# Patient Record
Sex: FEMALE | Race: WHITE | Employment: OTHER | URBAN - NONMETROPOLITAN AREA
[De-identification: names, ages, dates, MRNs, and addresses within clinical notes are randomized per-mention and may not be internally consistent; named-entity substitution may affect disease eponyms.]

---

## 2017-01-04 RX ORDER — ANASTROZOLE 1 MG/1
1 TABLET ORAL DAILY
Qty: 30 TABLET | Refills: 5 | Status: SHIPPED | OUTPATIENT
Start: 2017-01-04 | End: 2017-03-21 | Stop reason: SDUPTHER

## 2017-01-13 ENCOUNTER — CARE COORDINATION (OUTPATIENT)
Dept: PRIMARY CARE CLINIC | Age: 72
End: 2017-01-13

## 2017-01-23 ENCOUNTER — HOSPITAL ENCOUNTER (OUTPATIENT)
Dept: PAIN MANAGEMENT | Age: 72
Discharge: HOME | End: 2017-01-23
Payer: MEDICARE

## 2017-01-26 ENCOUNTER — TELEPHONE (OUTPATIENT)
Dept: PAIN MANAGEMENT | Age: 72
End: 2017-01-26

## 2017-01-31 ENCOUNTER — HOSPITAL ENCOUNTER (OUTPATIENT)
Dept: PAIN MANAGEMENT | Age: 72
Discharge: HOME | End: 2017-01-31
Payer: MEDICARE

## 2017-01-31 VITALS
OXYGEN SATURATION: 91 % | WEIGHT: 263 LBS | HEART RATE: 78 BPM | DIASTOLIC BLOOD PRESSURE: 44 MMHG | RESPIRATION RATE: 18 BRPM | TEMPERATURE: 97.4 F | HEIGHT: 62 IN | BODY MASS INDEX: 48.4 KG/M2 | SYSTOLIC BLOOD PRESSURE: 119 MMHG

## 2017-01-31 DIAGNOSIS — M25.50 ARTHRALGIA, UNSPECIFIED JOINT: ICD-10-CM

## 2017-01-31 DIAGNOSIS — G89.29 CHRONIC BILATERAL LOW BACK PAIN WITHOUT SCIATICA: ICD-10-CM

## 2017-01-31 DIAGNOSIS — G57.93 NEUROPATHY INVOLVING BOTH LOWER EXTREMITIES: ICD-10-CM

## 2017-01-31 DIAGNOSIS — M47.816 LUMBAR FACET ARTHROPATHY: ICD-10-CM

## 2017-01-31 DIAGNOSIS — M54.50 CHRONIC BILATERAL LOW BACK PAIN WITHOUT SCIATICA: ICD-10-CM

## 2017-01-31 PROCEDURE — 80307 DRUG TEST PRSMV CHEM ANLYZR: CPT

## 2017-01-31 PROCEDURE — 99213 OFFICE O/P EST LOW 20 MIN: CPT

## 2017-01-31 RX ORDER — HYDROCODONE BITARTRATE AND ACETAMINOPHEN 10; 325 MG/1; MG/1
1 TABLET ORAL DAILY PRN
Qty: 30 TABLET | Refills: 0 | Status: SHIPPED | OUTPATIENT
Start: 2017-02-08 | End: 2017-02-23 | Stop reason: SDUPTHER

## 2017-01-31 ASSESSMENT — ACTIVITIES OF DAILY LIVING (ADL): EFFECT OF PAIN ON DAILY ACTIVITIES: DAILY ACTIVITY

## 2017-01-31 ASSESSMENT — PAIN SCALES - GENERAL: PAINLEVEL_OUTOF10: 6

## 2017-01-31 ASSESSMENT — PAIN DESCRIPTION - ORIENTATION: ORIENTATION: RIGHT;LEFT;LOWER

## 2017-01-31 ASSESSMENT — PAIN DESCRIPTION - PROGRESSION: CLINICAL_PROGRESSION: NOT CHANGED

## 2017-01-31 ASSESSMENT — PAIN DESCRIPTION - FREQUENCY: FREQUENCY: CONTINUOUS

## 2017-01-31 ASSESSMENT — PAIN DESCRIPTION - DESCRIPTORS: DESCRIPTORS: ACHING;BURNING;CONSTANT

## 2017-01-31 ASSESSMENT — PAIN DESCRIPTION - ONSET: ONSET: ON-GOING

## 2017-01-31 ASSESSMENT — PAIN DESCRIPTION - LOCATION: LOCATION: BACK;NECK;ARM

## 2017-01-31 ASSESSMENT — PAIN DESCRIPTION - PAIN TYPE: TYPE: CHRONIC PAIN

## 2017-02-02 ENCOUNTER — CARE COORDINATION (OUTPATIENT)
Dept: CARE COORDINATION | Age: 72
End: 2017-02-02

## 2017-02-09 ENCOUNTER — CARE COORDINATION (OUTPATIENT)
Dept: PRIMARY CARE CLINIC | Age: 72
End: 2017-02-09

## 2017-02-09 ENCOUNTER — TELEPHONE (OUTPATIENT)
Dept: PRIMARY CARE CLINIC | Age: 72
End: 2017-02-09

## 2017-02-14 ENCOUNTER — OFFICE VISIT (OUTPATIENT)
Dept: PRIMARY CARE CLINIC | Age: 72
End: 2017-02-14
Payer: MEDICARE

## 2017-02-14 ENCOUNTER — CARE COORDINATOR VISIT (OUTPATIENT)
Dept: PRIMARY CARE CLINIC | Age: 72
End: 2017-02-14

## 2017-02-14 VITALS
BODY MASS INDEX: 48.4 KG/M2 | SYSTOLIC BLOOD PRESSURE: 128 MMHG | RESPIRATION RATE: 16 BRPM | HEIGHT: 62 IN | WEIGHT: 263 LBS | OXYGEN SATURATION: 96 % | HEART RATE: 74 BPM | DIASTOLIC BLOOD PRESSURE: 86 MMHG

## 2017-02-14 DIAGNOSIS — E11.8 TYPE 2 DIABETES MELLITUS WITH COMPLICATION, UNSPECIFIED LONG TERM INSULIN USE STATUS: Primary | ICD-10-CM

## 2017-02-14 PROCEDURE — 3017F COLORECTAL CA SCREEN DOC REV: CPT | Performed by: FAMILY MEDICINE

## 2017-02-14 PROCEDURE — G8427 DOCREV CUR MEDS BY ELIG CLIN: HCPCS | Performed by: FAMILY MEDICINE

## 2017-02-14 PROCEDURE — 1036F TOBACCO NON-USER: CPT | Performed by: FAMILY MEDICINE

## 2017-02-14 PROCEDURE — 4040F PNEUMOC VAC/ADMIN/RCVD: CPT | Performed by: FAMILY MEDICINE

## 2017-02-14 PROCEDURE — 1123F ACP DISCUSS/DSCN MKR DOCD: CPT | Performed by: FAMILY MEDICINE

## 2017-02-14 PROCEDURE — 99213 OFFICE O/P EST LOW 20 MIN: CPT | Performed by: FAMILY MEDICINE

## 2017-02-14 PROCEDURE — G8399 PT W/DXA RESULTS DOCUMENT: HCPCS | Performed by: FAMILY MEDICINE

## 2017-02-14 PROCEDURE — 3045F PR MOST RECENT HEMOGLOBIN A1C LEVEL 7.0-9.0%: CPT | Performed by: FAMILY MEDICINE

## 2017-02-14 PROCEDURE — 3014F SCREEN MAMMO DOC REV: CPT | Performed by: FAMILY MEDICINE

## 2017-02-14 PROCEDURE — G8417 CALC BMI ABV UP PARAM F/U: HCPCS | Performed by: FAMILY MEDICINE

## 2017-02-14 PROCEDURE — 1090F PRES/ABSN URINE INCON ASSESS: CPT | Performed by: FAMILY MEDICINE

## 2017-02-14 PROCEDURE — G8482 FLU IMMUNIZE ORDER/ADMIN: HCPCS | Performed by: FAMILY MEDICINE

## 2017-02-14 RX ORDER — ERGOCALCIFEROL 1.25 MG/1
CAPSULE ORAL
Qty: 12 CAPSULE | Refills: 3 | Status: SHIPPED | OUTPATIENT
Start: 2017-02-14 | End: 2017-03-03 | Stop reason: SDUPTHER

## 2017-02-14 ASSESSMENT — ENCOUNTER SYMPTOMS
SHORTNESS OF BREATH: 0
COUGH: 0
COLOR CHANGE: 0

## 2017-02-15 DIAGNOSIS — C50.412 MALIGNANT NEOPLASM OF UPPER-OUTER QUADRANT OF LEFT FEMALE BREAST (HCC): Primary | ICD-10-CM

## 2017-02-16 ENCOUNTER — OFFICE VISIT (OUTPATIENT)
Dept: ONCOLOGY | Facility: CLINIC | Age: 72
End: 2017-02-16

## 2017-02-16 ENCOUNTER — LAB (OUTPATIENT)
Dept: ONCOLOGY | Facility: CLINIC | Age: 72
End: 2017-02-16

## 2017-02-16 VITALS
HEIGHT: 67 IN | OXYGEN SATURATION: 95 % | SYSTOLIC BLOOD PRESSURE: 130 MMHG | HEART RATE: 77 BPM | BODY MASS INDEX: 40.65 KG/M2 | TEMPERATURE: 98.2 F | DIASTOLIC BLOOD PRESSURE: 74 MMHG | RESPIRATION RATE: 18 BRPM | WEIGHT: 259 LBS

## 2017-02-16 DIAGNOSIS — C50.412 MALIGNANT NEOPLASM OF UPPER-OUTER QUADRANT OF LEFT FEMALE BREAST (HCC): Primary | ICD-10-CM

## 2017-02-16 LAB
ALBUMIN SERPL-MCNC: 4.3 G/DL (ref 3.5–5)
ALBUMIN/GLOB SERPL: 1.2 G/DL
ALP SERPL-CCNC: 85 U/L (ref 38–126)
ALT SERPL W P-5'-P-CCNC: 41 U/L (ref 9–52)
ANION GAP SERPL CALCULATED.3IONS-SCNC: 14 MMOL/L
AST SERPL-CCNC: 31 U/L (ref 5–40)
AUTO MIXED CELLS #: 0.6 10*3/UL (ref 0.1–1.5)
AUTO MIXED CELLS %: 7.4 % (ref 0.2–15.1)
BILIRUB SERPL-MCNC: 0.6 MG/DL (ref 0.2–1.3)
BUN BLD-MCNC: 14 MG/DL (ref 7–26)
BUN/CREAT SERPL: 20 (ref 7–25)
CALCIUM SPEC-SCNC: 9.4 MG/DL (ref 8.4–10.2)
CHLORIDE SERPL-SCNC: 98 MMOL/L (ref 98–107)
CO2 SERPL-SCNC: 27 MMOL/L (ref 22–30)
CREAT BLD-MCNC: 0.7 MG/DL (ref 0.7–1.4)
ERYTHROCYTE [DISTWIDTH] IN BLOOD BY AUTOMATED COUNT: 16.4 % (ref 11.5–14.5)
GFR SERPL CREATININE-BSD FRML MDRD: 83 ML/MIN/1.73
GLOBULIN UR ELPH-MCNC: 3.6 GM/DL
GLUCOSE BLD-MCNC: 148 MG/DL (ref 75–110)
HCT VFR BLD AUTO: 41.9 % (ref 37–47)
HGB BLD-MCNC: 13.3 G/DL (ref 12–16)
LYMPHOCYTES # BLD AUTO: 3 10*3/MM3 (ref 0.8–7)
LYMPHOCYTES NFR BLD AUTO: 35.4 % (ref 10–58.5)
MCH RBC QN AUTO: 26.3 PG (ref 27–31)
MCHC RBC AUTO-ENTMCNC: 31.7 G/DL (ref 33–37)
MCV RBC AUTO: 83 FL (ref 81–99)
NEUTROPHILS # BLD AUTO: 4.9 10*3/MM3 (ref 2–7.8)
NEUTROPHILS NFR BLD AUTO: 57.2 % (ref 37–92)
PLATELET # BLD AUTO: 293 10*3/MM3 (ref 130–400)
PMV BLD AUTO: 8.5 FL (ref 6–12)
POTASSIUM BLD-SCNC: 4.6 MMOL/L (ref 3.6–5)
PROT SERPL-MCNC: 7.9 G/DL (ref 6.3–8.2)
RBC # BLD AUTO: 5.05 10*6/MM3 (ref 4.2–5.4)
SODIUM BLD-SCNC: 139 MMOL/L (ref 137–145)
WBC NRBC COR # BLD: 8.5 10*3/MM3 (ref 4.8–10.8)

## 2017-02-16 PROCEDURE — 36415 COLL VENOUS BLD VENIPUNCTURE: CPT | Performed by: INTERNAL MEDICINE

## 2017-02-16 PROCEDURE — 99214 OFFICE O/P EST MOD 30 MIN: CPT | Performed by: INTERNAL MEDICINE

## 2017-02-16 PROCEDURE — 80053 COMPREHEN METABOLIC PANEL: CPT | Performed by: INTERNAL MEDICINE

## 2017-02-16 PROCEDURE — 85025 COMPLETE CBC W/AUTO DIFF WBC: CPT | Performed by: INTERNAL MEDICINE

## 2017-02-21 ENCOUNTER — CARE COORDINATION (OUTPATIENT)
Dept: PRIMARY CARE CLINIC | Age: 72
End: 2017-02-21

## 2017-02-23 ENCOUNTER — TELEPHONE (OUTPATIENT)
Dept: PRIMARY CARE CLINIC | Age: 72
End: 2017-02-23

## 2017-02-23 ENCOUNTER — CARE COORDINATOR VISIT (OUTPATIENT)
Dept: PRIMARY CARE CLINIC | Age: 72
End: 2017-02-23

## 2017-02-24 RX ORDER — HYDROCODONE BITARTRATE AND ACETAMINOPHEN 10; 325 MG/1; MG/1
1 TABLET ORAL DAILY PRN
Qty: 30 TABLET | Refills: 0 | Status: SHIPPED | OUTPATIENT
Start: 2017-03-02 | End: 2017-03-27 | Stop reason: SDUPTHER

## 2017-02-28 ENCOUNTER — TELEPHONE (OUTPATIENT)
Dept: PRIMARY CARE CLINIC | Age: 72
End: 2017-02-28

## 2017-03-01 ENCOUNTER — CARE COORDINATION (OUTPATIENT)
Dept: PRIMARY CARE CLINIC | Age: 72
End: 2017-03-01

## 2017-03-01 DIAGNOSIS — E11.8 TYPE 2 DIABETES MELLITUS WITH COMPLICATION, UNSPECIFIED LONG TERM INSULIN USE STATUS: ICD-10-CM

## 2017-03-01 LAB
AMPHETAMINES, URINE: NEGATIVE NG/ML
BARBITURATES, URINE: NEGATIVE NG/ML
BENZODIAZEPINES, URINE: NEGATIVE NG/ML
CANNABINOIDS, URINE: NEGATIVE NG/ML
COCAINE METABOLITE, URINE: NEGATIVE NG/ML
CREATININE, URINE: 65.3 MG/DL (ref 20–300)
ETHANOL U, QUAN: NEGATIVE %
FENTANYL URINE: NEGATIVE ML
MEPERIDINE, UR: NEGATIVE NG/ML
METHADONE SCREEN, URINE: NEGATIVE NG/ML
OPIATES, URINE: NEGATIVE NG/ML
OXYCODONE/OXYMORPHONE, UR: NEGATIVE NG/ML
PH, URINE: 5.9 (ref 4.5–8.9)
PHENCYCLIDINE, URINE: NEGATIVE NG/ML
PROPOXYPHENE, URINE: NEGATIVE NG/ML

## 2017-03-02 DIAGNOSIS — E11.8 TYPE 2 DIABETES MELLITUS WITH COMPLICATION, UNSPECIFIED LONG TERM INSULIN USE STATUS: ICD-10-CM

## 2017-03-02 LAB
ALBUMIN SERPL-MCNC: 4.3 G/DL (ref 3.5–5.2)
ALP BLD-CCNC: 83 U/L (ref 35–104)
ALT SERPL-CCNC: 32 U/L (ref 5–33)
ANION GAP SERPL CALCULATED.3IONS-SCNC: 16 MMOL/L (ref 7–19)
AST SERPL-CCNC: 29 U/L (ref 5–32)
BILIRUB SERPL-MCNC: 0.5 MG/DL (ref 0.2–1.2)
BUN BLDV-MCNC: 13 MG/DL (ref 8–23)
CALCIUM SERPL-MCNC: 9.1 MG/DL (ref 8.8–10.2)
CHLORIDE BLD-SCNC: 95 MMOL/L (ref 98–111)
CO2: 26 MMOL/L (ref 22–29)
CREAT SERPL-MCNC: 0.7 MG/DL (ref 0.5–0.9)
GFR NON-AFRICAN AMERICAN: >60
GLOBULIN: 3.1 G/DL
GLUCOSE BLD-MCNC: 127 MG/DL (ref 74–109)
HBA1C MFR BLD: 8.4 %
POTASSIUM SERPL-SCNC: 4.4 MMOL/L (ref 3.5–5)
SODIUM BLD-SCNC: 137 MMOL/L (ref 136–145)
TOTAL PROTEIN: 7.4 G/DL (ref 6.6–8.7)

## 2017-03-06 ENCOUNTER — TELEPHONE (OUTPATIENT)
Dept: PRIMARY CARE CLINIC | Age: 72
End: 2017-03-06

## 2017-03-06 RX ORDER — FLUCONAZOLE 100 MG/1
100 TABLET ORAL DAILY
Qty: 7 TABLET | Refills: 0 | Status: SHIPPED | OUTPATIENT
Start: 2017-03-06 | End: 2017-10-04 | Stop reason: SDUPTHER

## 2017-03-09 ENCOUNTER — CARE COORDINATION (OUTPATIENT)
Dept: PRIMARY CARE CLINIC | Age: 72
End: 2017-03-09

## 2017-03-14 ENCOUNTER — OFFICE VISIT (OUTPATIENT)
Dept: PRIMARY CARE CLINIC | Age: 72
End: 2017-03-14
Payer: MEDICARE

## 2017-03-14 ENCOUNTER — CARE COORDINATOR VISIT (OUTPATIENT)
Dept: PRIMARY CARE CLINIC | Age: 72
End: 2017-03-14

## 2017-03-14 VITALS
HEIGHT: 62 IN | SYSTOLIC BLOOD PRESSURE: 138 MMHG | OXYGEN SATURATION: 96 % | BODY MASS INDEX: 47.48 KG/M2 | WEIGHT: 258 LBS | DIASTOLIC BLOOD PRESSURE: 72 MMHG | HEART RATE: 72 BPM | RESPIRATION RATE: 18 BRPM | TEMPERATURE: 97.2 F

## 2017-03-14 DIAGNOSIS — G62.9 NEUROPATHY: ICD-10-CM

## 2017-03-14 DIAGNOSIS — H66.92 LEFT OTITIS MEDIA, UNSPECIFIED CHRONICITY, UNSPECIFIED OTITIS MEDIA TYPE: ICD-10-CM

## 2017-03-14 DIAGNOSIS — E11.8 TYPE 2 DIABETES MELLITUS WITH COMPLICATION, UNSPECIFIED LONG TERM INSULIN USE STATUS: Primary | ICD-10-CM

## 2017-03-14 DIAGNOSIS — Z12.11 SCREENING FOR COLON CANCER: ICD-10-CM

## 2017-03-14 PROCEDURE — 1036F TOBACCO NON-USER: CPT | Performed by: NURSE PRACTITIONER

## 2017-03-14 PROCEDURE — G8428 CUR MEDS NOT DOCUMENT: HCPCS | Performed by: NURSE PRACTITIONER

## 2017-03-14 PROCEDURE — 4040F PNEUMOC VAC/ADMIN/RCVD: CPT | Performed by: NURSE PRACTITIONER

## 2017-03-14 PROCEDURE — 1123F ACP DISCUSS/DSCN MKR DOCD: CPT | Performed by: NURSE PRACTITIONER

## 2017-03-14 PROCEDURE — G8399 PT W/DXA RESULTS DOCUMENT: HCPCS | Performed by: NURSE PRACTITIONER

## 2017-03-14 PROCEDURE — 3045F PR MOST RECENT HEMOGLOBIN A1C LEVEL 7.0-9.0%: CPT | Performed by: NURSE PRACTITIONER

## 2017-03-14 PROCEDURE — 1090F PRES/ABSN URINE INCON ASSESS: CPT | Performed by: NURSE PRACTITIONER

## 2017-03-14 PROCEDURE — 2028F FOOT EXAM PERFORMED: CPT | Performed by: NURSE PRACTITIONER

## 2017-03-14 PROCEDURE — 3017F COLORECTAL CA SCREEN DOC REV: CPT | Performed by: NURSE PRACTITIONER

## 2017-03-14 PROCEDURE — 99214 OFFICE O/P EST MOD 30 MIN: CPT | Performed by: NURSE PRACTITIONER

## 2017-03-14 PROCEDURE — 3014F SCREEN MAMMO DOC REV: CPT | Performed by: NURSE PRACTITIONER

## 2017-03-14 PROCEDURE — G8482 FLU IMMUNIZE ORDER/ADMIN: HCPCS | Performed by: NURSE PRACTITIONER

## 2017-03-14 PROCEDURE — G8417 CALC BMI ABV UP PARAM F/U: HCPCS | Performed by: NURSE PRACTITIONER

## 2017-03-14 RX ORDER — CEFDINIR 300 MG/1
300 CAPSULE ORAL 2 TIMES DAILY
Qty: 20 CAPSULE | Refills: 0 | Status: SHIPPED | OUTPATIENT
Start: 2017-03-14 | End: 2017-03-24

## 2017-03-14 ASSESSMENT — ENCOUNTER SYMPTOMS
RESPIRATORY NEGATIVE: 1
EYES NEGATIVE: 1
GASTROINTESTINAL NEGATIVE: 1

## 2017-03-15 ENCOUNTER — TELEPHONE (OUTPATIENT)
Dept: PRIMARY CARE CLINIC | Age: 72
End: 2017-03-15

## 2017-03-15 DIAGNOSIS — E11.8 TYPE 2 DIABETES MELLITUS WITH COMPLICATION, UNSPECIFIED LONG TERM INSULIN USE STATUS: Primary | ICD-10-CM

## 2017-03-17 ENCOUNTER — TELEPHONE (OUTPATIENT)
Dept: PRIMARY CARE CLINIC | Age: 72
End: 2017-03-17

## 2017-03-20 ENCOUNTER — TELEPHONE (OUTPATIENT)
Dept: PRIMARY CARE CLINIC | Age: 72
End: 2017-03-20

## 2017-03-20 DIAGNOSIS — E11.8 TYPE 2 DIABETES MELLITUS WITH COMPLICATION, WITHOUT LONG-TERM CURRENT USE OF INSULIN (HCC): Primary | ICD-10-CM

## 2017-03-21 ENCOUNTER — OFFICE VISIT (OUTPATIENT)
Dept: OBSTETRICS AND GYNECOLOGY | Facility: CLINIC | Age: 72
End: 2017-03-21

## 2017-03-21 VITALS
SYSTOLIC BLOOD PRESSURE: 131 MMHG | HEIGHT: 67 IN | BODY MASS INDEX: 40.65 KG/M2 | DIASTOLIC BLOOD PRESSURE: 75 MMHG | WEIGHT: 259 LBS

## 2017-03-21 DIAGNOSIS — N39.3 SUI (STRESS URINARY INCONTINENCE, FEMALE): ICD-10-CM

## 2017-03-21 DIAGNOSIS — N39.41 URGE INCONTINENCE: Primary | ICD-10-CM

## 2017-03-21 PROCEDURE — 99213 OFFICE O/P EST LOW 20 MIN: CPT | Performed by: NURSE PRACTITIONER

## 2017-03-21 RX ORDER — MONTELUKAST SODIUM 10 MG/1
TABLET ORAL
COMMUNITY
Start: 2017-02-03

## 2017-03-21 RX ORDER — OXYBUTYNIN CHLORIDE 10 MG/1
10 TABLET, EXTENDED RELEASE ORAL DAILY
COMMUNITY
Start: 2016-12-08

## 2017-03-21 RX ORDER — ERGOCALCIFEROL 1.25 MG/1
CAPSULE ORAL
COMMUNITY
Start: 2017-03-06 | End: 2020-06-08

## 2017-03-21 RX ORDER — ANASTROZOLE 1 MG/1
TABLET ORAL
COMMUNITY
End: 2018-09-07 | Stop reason: SDUPTHER

## 2017-03-21 RX ORDER — ASPIRIN 81 MG/1
TABLET, CHEWABLE ORAL
COMMUNITY
End: 2020-06-08

## 2017-03-21 RX ORDER — INSULIN ASPART 100 [IU]/ML
INJECTION, SOLUTION INTRAVENOUS; SUBCUTANEOUS
COMMUNITY
Start: 2016-09-02

## 2017-03-21 RX ORDER — LORATADINE 10 MG/1
TABLET ORAL
COMMUNITY
Start: 2016-11-11

## 2017-03-21 RX ORDER — MAGNESIUM OXIDE 400 MG/1
TABLET ORAL
COMMUNITY
Start: 2016-02-23

## 2017-03-21 RX ORDER — CITALOPRAM 20 MG/1
TABLET ORAL
COMMUNITY
Start: 2016-11-14

## 2017-03-21 RX ORDER — OMEPRAZOLE 20 MG/1
CAPSULE, DELAYED RELEASE ORAL
COMMUNITY
Start: 2016-12-08

## 2017-03-21 RX ORDER — ROSUVASTATIN CALCIUM 10 MG/1
TABLET, COATED ORAL
COMMUNITY
Start: 2016-02-23 | End: 2019-03-12

## 2017-03-21 RX ORDER — HYDROCODONE BITARTRATE AND ACETAMINOPHEN 10; 325 MG/1; MG/1
TABLET ORAL EVERY 24 HOURS
COMMUNITY
Start: 2017-03-02 | End: 2019-03-12

## 2017-03-21 RX ORDER — GABAPENTIN 300 MG/1
CAPSULE ORAL
COMMUNITY
Start: 2017-03-06

## 2017-03-21 RX ORDER — IBUPROFEN 200 MG
TABLET ORAL
COMMUNITY
Start: 2016-04-27

## 2017-03-21 RX ORDER — METOPROLOL SUCCINATE 50 MG/1
TABLET, EXTENDED RELEASE ORAL
COMMUNITY
Start: 2016-02-08

## 2017-03-21 RX ORDER — ALBUTEROL SULFATE 90 UG/1
AEROSOL, METERED RESPIRATORY (INHALATION) EVERY 4 HOURS
COMMUNITY

## 2017-03-21 RX ORDER — LOSARTAN POTASSIUM 100 MG/1
TABLET ORAL
COMMUNITY
Start: 2016-10-05

## 2017-03-27 ENCOUNTER — TELEPHONE (OUTPATIENT)
Dept: PRIMARY CARE CLINIC | Age: 72
End: 2017-03-27

## 2017-03-27 ENCOUNTER — HOSPITAL ENCOUNTER (OUTPATIENT)
Dept: PAIN MANAGEMENT | Age: 72
Discharge: HOME | End: 2017-03-27
Payer: MEDICARE

## 2017-03-27 VITALS
TEMPERATURE: 97.5 F | BODY MASS INDEX: 47.66 KG/M2 | WEIGHT: 259 LBS | HEART RATE: 75 BPM | DIASTOLIC BLOOD PRESSURE: 63 MMHG | SYSTOLIC BLOOD PRESSURE: 132 MMHG | RESPIRATION RATE: 16 BRPM | HEIGHT: 62 IN | OXYGEN SATURATION: 95 %

## 2017-03-27 DIAGNOSIS — G57.93 NEUROPATHY INVOLVING BOTH LOWER EXTREMITIES: ICD-10-CM

## 2017-03-27 DIAGNOSIS — G89.29 CHRONIC BILATERAL LOW BACK PAIN WITHOUT SCIATICA: ICD-10-CM

## 2017-03-27 DIAGNOSIS — M47.816 LUMBAR FACET ARTHROPATHY: ICD-10-CM

## 2017-03-27 DIAGNOSIS — M54.50 CHRONIC BILATERAL LOW BACK PAIN WITHOUT SCIATICA: ICD-10-CM

## 2017-03-27 DIAGNOSIS — M25.50 ARTHRALGIA, UNSPECIFIED JOINT: ICD-10-CM

## 2017-03-27 PROCEDURE — 80307 DRUG TEST PRSMV CHEM ANLYZR: CPT

## 2017-03-27 PROCEDURE — 99213 OFFICE O/P EST LOW 20 MIN: CPT

## 2017-03-27 RX ORDER — HYDROCODONE BITARTRATE AND ACETAMINOPHEN 10; 325 MG/1; MG/1
1 TABLET ORAL DAILY PRN
Qty: 30 TABLET | Refills: 0 | Status: SHIPPED | OUTPATIENT
Start: 2017-04-08 | End: 2017-05-02 | Stop reason: SDUPTHER

## 2017-03-27 ASSESSMENT — PAIN DESCRIPTION - DESCRIPTORS: DESCRIPTORS: ACHING;BURNING;CONSTANT

## 2017-03-27 ASSESSMENT — PAIN DESCRIPTION - LOCATION: LOCATION: BACK;SHOULDER;LEG

## 2017-03-27 ASSESSMENT — ACTIVITIES OF DAILY LIVING (ADL): EFFECT OF PAIN ON DAILY ACTIVITIES: DAILY ACTIVITY

## 2017-03-27 ASSESSMENT — PAIN DESCRIPTION - ORIENTATION: ORIENTATION: RIGHT;LEFT;LOWER

## 2017-03-27 ASSESSMENT — PAIN DESCRIPTION - ONSET: ONSET: ON-GOING

## 2017-03-27 ASSESSMENT — PAIN DESCRIPTION - FREQUENCY: FREQUENCY: CONTINUOUS

## 2017-03-27 ASSESSMENT — PAIN DESCRIPTION - PROGRESSION: CLINICAL_PROGRESSION: NOT CHANGED

## 2017-03-27 ASSESSMENT — PAIN DESCRIPTION - PAIN TYPE: TYPE: CHRONIC PAIN

## 2017-03-27 ASSESSMENT — PAIN SCALES - GENERAL: PAINLEVEL_OUTOF10: 6

## 2017-03-28 ENCOUNTER — TELEPHONE (OUTPATIENT)
Dept: PRIMARY CARE CLINIC | Age: 72
End: 2017-03-28

## 2017-03-28 LAB
AMPHETAMINES, URINE: NEGATIVE NG/ML
BARBITURATES, URINE: NEGATIVE NG/ML
BENZODIAZEPINES, URINE: NEGATIVE NG/ML
CANNABINOIDS, URINE: NEGATIVE NG/ML
COCAINE METABOLITE, URINE: NEGATIVE NG/ML
CREATININE, URINE: 40 MG/DL (ref 20–300)
ETHANOL U, QUAN: NEGATIVE %
FENTANYL URINE: NEGATIVE PG/ML
MEPERIDINE, UR: NEGATIVE NG/ML
METHADONE SCREEN, URINE: NEGATIVE NG/ML
OPIATES, URINE: NEGATIVE NG/ML
OXYCODONE/OXYMORPHONE, UR: NEGATIVE NG/ML
PH, URINE: 6.1 (ref 4.5–8.9)
PHENCYCLIDINE, URINE: NEGATIVE NG/ML
PROPOXYPHENE, URINE: NEGATIVE NG/ML

## 2017-03-31 ENCOUNTER — TELEPHONE (OUTPATIENT)
Dept: PRIMARY CARE CLINIC | Age: 72
End: 2017-03-31

## 2017-04-06 ENCOUNTER — HOSPITAL ENCOUNTER (OUTPATIENT)
Dept: MRI IMAGING | Age: 72
Discharge: HOME | End: 2017-04-06
Payer: MEDICARE

## 2017-04-06 DIAGNOSIS — M47.816 LUMBAR FACET ARTHROPATHY: ICD-10-CM

## 2017-04-06 PROCEDURE — 72148 MRI LUMBAR SPINE W/O DYE: CPT

## 2017-04-13 ENCOUNTER — OFFICE VISIT (OUTPATIENT)
Dept: PRIMARY CARE CLINIC | Age: 72
End: 2017-04-13
Payer: MEDICARE

## 2017-04-13 VITALS
RESPIRATION RATE: 18 BRPM | WEIGHT: 262 LBS | HEART RATE: 82 BPM | BODY MASS INDEX: 48.21 KG/M2 | HEIGHT: 62 IN | DIASTOLIC BLOOD PRESSURE: 62 MMHG | OXYGEN SATURATION: 98 % | SYSTOLIC BLOOD PRESSURE: 124 MMHG | TEMPERATURE: 97.8 F

## 2017-04-13 DIAGNOSIS — M21.612 BILATERAL BUNIONS: Primary | ICD-10-CM

## 2017-04-13 DIAGNOSIS — M21.611 BILATERAL BUNIONS: Primary | ICD-10-CM

## 2017-04-13 PROCEDURE — 3014F SCREEN MAMMO DOC REV: CPT | Performed by: NURSE PRACTITIONER

## 2017-04-13 PROCEDURE — 1090F PRES/ABSN URINE INCON ASSESS: CPT | Performed by: NURSE PRACTITIONER

## 2017-04-13 PROCEDURE — 99213 OFFICE O/P EST LOW 20 MIN: CPT | Performed by: NURSE PRACTITIONER

## 2017-04-13 PROCEDURE — 4040F PNEUMOC VAC/ADMIN/RCVD: CPT | Performed by: NURSE PRACTITIONER

## 2017-04-13 PROCEDURE — 3017F COLORECTAL CA SCREEN DOC REV: CPT | Performed by: NURSE PRACTITIONER

## 2017-04-13 PROCEDURE — G8417 CALC BMI ABV UP PARAM F/U: HCPCS | Performed by: NURSE PRACTITIONER

## 2017-04-13 PROCEDURE — 1123F ACP DISCUSS/DSCN MKR DOCD: CPT | Performed by: NURSE PRACTITIONER

## 2017-04-13 PROCEDURE — 1036F TOBACCO NON-USER: CPT | Performed by: NURSE PRACTITIONER

## 2017-04-13 PROCEDURE — G8427 DOCREV CUR MEDS BY ELIG CLIN: HCPCS | Performed by: NURSE PRACTITIONER

## 2017-04-13 PROCEDURE — G8399 PT W/DXA RESULTS DOCUMENT: HCPCS | Performed by: NURSE PRACTITIONER

## 2017-04-19 ENCOUNTER — HOSPITAL ENCOUNTER (OUTPATIENT)
Dept: PHYSICAL THERAPY | Facility: HOSPITAL | Age: 72
Setting detail: THERAPIES SERIES
Discharge: HOME | End: 2017-04-19

## 2017-04-19 DIAGNOSIS — N81.89 PELVIC FLOOR WEAKNESS IN FEMALE: ICD-10-CM

## 2017-04-19 DIAGNOSIS — N39.3 FEMALE STRESS INCONTINENCE: ICD-10-CM

## 2017-04-19 DIAGNOSIS — N39.41 URGE INCONTINENCE: Primary | ICD-10-CM

## 2017-04-19 PROCEDURE — G8978 MOBILITY CURRENT STATUS: HCPCS | Performed by: PHYSICAL THERAPIST

## 2017-04-19 PROCEDURE — 97161 PT EVAL LOW COMPLEX 20 MIN: CPT | Performed by: PHYSICAL THERAPIST

## 2017-04-19 PROCEDURE — G8979 MOBILITY GOAL STATUS: HCPCS | Performed by: PHYSICAL THERAPIST

## 2017-04-24 ENCOUNTER — CARE COORDINATOR VISIT (OUTPATIENT)
Dept: PRIMARY CARE CLINIC | Age: 72
End: 2017-04-24

## 2017-04-26 ENCOUNTER — HOSPITAL ENCOUNTER (OUTPATIENT)
Dept: PHYSICAL THERAPY | Facility: HOSPITAL | Age: 72
Setting detail: THERAPIES SERIES
Discharge: HOME | End: 2017-04-26

## 2017-04-26 DIAGNOSIS — N39.41 URGE INCONTINENCE: ICD-10-CM

## 2017-04-26 DIAGNOSIS — N81.89 PELVIC FLOOR WEAKNESS IN FEMALE: Primary | ICD-10-CM

## 2017-04-26 DIAGNOSIS — N39.3 FEMALE STRESS INCONTINENCE: ICD-10-CM

## 2017-04-26 PROCEDURE — 97110 THERAPEUTIC EXERCISES: CPT | Performed by: PHYSICAL THERAPIST

## 2017-04-27 ENCOUNTER — TELEPHONE (OUTPATIENT)
Dept: PRIMARY CARE CLINIC | Age: 72
End: 2017-04-27

## 2017-05-02 RX ORDER — HYDROCODONE BITARTRATE AND ACETAMINOPHEN 10; 325 MG/1; MG/1
1 TABLET ORAL DAILY PRN
Qty: 30 TABLET | Refills: 0 | Status: SHIPPED | OUTPATIENT
Start: 2017-05-08 | End: 2017-06-05 | Stop reason: SDUPTHER

## 2017-05-03 DIAGNOSIS — E11.8 TYPE 2 DIABETES MELLITUS WITH COMPLICATION, UNSPECIFIED LONG TERM INSULIN USE STATUS: Primary | ICD-10-CM

## 2017-05-03 DIAGNOSIS — J44.9 CHRONIC OBSTRUCTIVE PULMONARY DISEASE, UNSPECIFIED COPD TYPE (HCC): ICD-10-CM

## 2017-05-05 ENCOUNTER — HOSPITAL ENCOUNTER (OUTPATIENT)
Dept: PHYSICAL THERAPY | Facility: HOSPITAL | Age: 72
Setting detail: THERAPIES SERIES
Discharge: HOME | End: 2017-05-05

## 2017-05-05 DIAGNOSIS — N39.41 URGE INCONTINENCE: ICD-10-CM

## 2017-05-05 DIAGNOSIS — N39.3 FEMALE STRESS INCONTINENCE: ICD-10-CM

## 2017-05-05 DIAGNOSIS — N81.89 PELVIC FLOOR WEAKNESS IN FEMALE: Primary | ICD-10-CM

## 2017-05-05 PROCEDURE — 97110 THERAPEUTIC EXERCISES: CPT | Performed by: PHYSICAL THERAPIST

## 2017-05-08 ENCOUNTER — OFFICE VISIT (OUTPATIENT)
Dept: PODIATRY | Facility: CLINIC | Age: 72
End: 2017-05-08

## 2017-05-08 VITALS
HEART RATE: 72 BPM | DIASTOLIC BLOOD PRESSURE: 80 MMHG | SYSTOLIC BLOOD PRESSURE: 130 MMHG | BODY MASS INDEX: 47.66 KG/M2 | WEIGHT: 259 LBS | HEIGHT: 62 IN

## 2017-05-08 DIAGNOSIS — M20.11 HALLUX VALGUS (ACQUIRED), RIGHT FOOT: ICD-10-CM

## 2017-05-08 DIAGNOSIS — M20.12 HALLUX VALGUS (ACQUIRED), LEFT FOOT: ICD-10-CM

## 2017-05-08 DIAGNOSIS — M79.672 FOOT PAIN, BILATERAL: ICD-10-CM

## 2017-05-08 DIAGNOSIS — B35.1 ONYCHOMYCOSIS: ICD-10-CM

## 2017-05-08 DIAGNOSIS — M72.2 PLANTAR FASCIITIS, RIGHT: ICD-10-CM

## 2017-05-08 DIAGNOSIS — M79.671 FOOT PAIN, BILATERAL: ICD-10-CM

## 2017-05-08 DIAGNOSIS — E66.01 OBESITY, CLASS III, BMI 40-49.9 (MORBID OBESITY) (HCC): ICD-10-CM

## 2017-05-08 DIAGNOSIS — M72.2 PLANTAR FASCIITIS, LEFT: ICD-10-CM

## 2017-05-08 DIAGNOSIS — E11.49 DIABETES MELLITUS TYPE 2 WITH NEUROLOGICAL MANIFESTATIONS (HCC): Primary | ICD-10-CM

## 2017-05-08 PROCEDURE — 99203 OFFICE O/P NEW LOW 30 MIN: CPT | Performed by: PODIATRIST

## 2017-05-08 PROCEDURE — 11721 DEBRIDE NAIL 6 OR MORE: CPT | Performed by: PODIATRIST

## 2017-05-08 RX ORDER — INSULIN GLARGINE 100 [IU]/ML
INJECTION, SOLUTION SUBCUTANEOUS DAILY
COMMUNITY
End: 2017-06-12 | Stop reason: ALTCHOICE

## 2017-05-12 ENCOUNTER — HOSPITAL ENCOUNTER (OUTPATIENT)
Dept: PHYSICAL THERAPY | Facility: HOSPITAL | Age: 72
Setting detail: THERAPIES SERIES
Discharge: HOME | End: 2017-05-12

## 2017-05-12 DIAGNOSIS — N81.89 PELVIC FLOOR WEAKNESS IN FEMALE: Primary | ICD-10-CM

## 2017-05-12 DIAGNOSIS — N39.41 URGE INCONTINENCE: ICD-10-CM

## 2017-05-12 DIAGNOSIS — N39.3 FEMALE STRESS INCONTINENCE: ICD-10-CM

## 2017-05-12 PROCEDURE — 97110 THERAPEUTIC EXERCISES: CPT | Performed by: PHYSICAL THERAPIST

## 2017-05-16 ENCOUNTER — CARE COORDINATOR VISIT (OUTPATIENT)
Dept: PRIMARY CARE CLINIC | Age: 72
End: 2017-05-16

## 2017-05-16 ENCOUNTER — OFFICE VISIT (OUTPATIENT)
Dept: PRIMARY CARE CLINIC | Age: 72
End: 2017-05-16
Payer: MEDICARE

## 2017-05-16 ENCOUNTER — APPOINTMENT (OUTPATIENT)
Dept: PHYSICAL THERAPY | Facility: HOSPITAL | Age: 72
End: 2017-05-16

## 2017-05-16 VITALS
WEIGHT: 259 LBS | HEART RATE: 72 BPM | TEMPERATURE: 98.4 F | DIASTOLIC BLOOD PRESSURE: 80 MMHG | BODY MASS INDEX: 47.66 KG/M2 | SYSTOLIC BLOOD PRESSURE: 118 MMHG | OXYGEN SATURATION: 96 % | HEIGHT: 62 IN

## 2017-05-16 DIAGNOSIS — M47.816 LUMBAR FACET ARTHROPATHY: Chronic | ICD-10-CM

## 2017-05-16 DIAGNOSIS — J44.9 CHRONIC OBSTRUCTIVE PULMONARY DISEASE, UNSPECIFIED COPD TYPE (HCC): ICD-10-CM

## 2017-05-16 DIAGNOSIS — G62.9 NEUROPATHY: ICD-10-CM

## 2017-05-16 DIAGNOSIS — E11.8 TYPE 2 DIABETES MELLITUS WITH COMPLICATION, UNSPECIFIED LONG TERM INSULIN USE STATUS: ICD-10-CM

## 2017-05-16 DIAGNOSIS — E11.8 TYPE 2 DIABETES MELLITUS WITH COMPLICATION, UNSPECIFIED LONG TERM INSULIN USE STATUS: Primary | ICD-10-CM

## 2017-05-16 PROCEDURE — 3014F SCREEN MAMMO DOC REV: CPT | Performed by: FAMILY MEDICINE

## 2017-05-16 PROCEDURE — 4040F PNEUMOC VAC/ADMIN/RCVD: CPT | Performed by: FAMILY MEDICINE

## 2017-05-16 PROCEDURE — 3017F COLORECTAL CA SCREEN DOC REV: CPT | Performed by: FAMILY MEDICINE

## 2017-05-16 PROCEDURE — 3045F PR MOST RECENT HEMOGLOBIN A1C LEVEL 7.0-9.0%: CPT | Performed by: FAMILY MEDICINE

## 2017-05-16 PROCEDURE — 3023F SPIROM DOC REV: CPT | Performed by: FAMILY MEDICINE

## 2017-05-16 PROCEDURE — G8399 PT W/DXA RESULTS DOCUMENT: HCPCS | Performed by: FAMILY MEDICINE

## 2017-05-16 PROCEDURE — 1123F ACP DISCUSS/DSCN MKR DOCD: CPT | Performed by: FAMILY MEDICINE

## 2017-05-16 PROCEDURE — 1090F PRES/ABSN URINE INCON ASSESS: CPT | Performed by: FAMILY MEDICINE

## 2017-05-16 PROCEDURE — 1036F TOBACCO NON-USER: CPT | Performed by: FAMILY MEDICINE

## 2017-05-16 PROCEDURE — G8427 DOCREV CUR MEDS BY ELIG CLIN: HCPCS | Performed by: FAMILY MEDICINE

## 2017-05-16 PROCEDURE — 99214 OFFICE O/P EST MOD 30 MIN: CPT | Performed by: FAMILY MEDICINE

## 2017-05-16 PROCEDURE — G8926 SPIRO NO PERF OR DOC: HCPCS | Performed by: FAMILY MEDICINE

## 2017-05-16 PROCEDURE — G8417 CALC BMI ABV UP PARAM F/U: HCPCS | Performed by: FAMILY MEDICINE

## 2017-05-16 ASSESSMENT — ENCOUNTER SYMPTOMS
COLOR CHANGE: 0
SHORTNESS OF BREATH: 0
COUGH: 0

## 2017-05-17 ENCOUNTER — HOSPITAL ENCOUNTER (OUTPATIENT)
Dept: PHYSICAL THERAPY | Facility: HOSPITAL | Age: 72
Setting detail: THERAPIES SERIES
Discharge: HOME | End: 2017-05-17

## 2017-05-17 DIAGNOSIS — N39.3 FEMALE STRESS INCONTINENCE: ICD-10-CM

## 2017-05-17 DIAGNOSIS — N81.89 PELVIC FLOOR WEAKNESS IN FEMALE: Primary | ICD-10-CM

## 2017-05-17 DIAGNOSIS — N39.41 URGE INCONTINENCE: ICD-10-CM

## 2017-05-17 PROCEDURE — 97110 THERAPEUTIC EXERCISES: CPT | Performed by: PHYSICAL THERAPIST

## 2017-05-18 ENCOUNTER — OFFICE VISIT (OUTPATIENT)
Dept: BARIATRICS/WEIGHT MGMT | Facility: CLINIC | Age: 72
End: 2017-05-18

## 2017-05-18 ENCOUNTER — OFFICE VISIT (OUTPATIENT)
Dept: BARIATRICS/WEIGHT MGMT | Facility: HOSPITAL | Age: 72
End: 2017-05-18

## 2017-05-18 VITALS
SYSTOLIC BLOOD PRESSURE: 120 MMHG | HEIGHT: 62 IN | DIASTOLIC BLOOD PRESSURE: 49 MMHG | WEIGHT: 264.2 LBS | BODY MASS INDEX: 48.62 KG/M2 | RESPIRATION RATE: 18 BRPM | HEART RATE: 74 BPM | OXYGEN SATURATION: 99 %

## 2017-05-18 DIAGNOSIS — I10 ESSENTIAL HYPERTENSION: ICD-10-CM

## 2017-05-18 DIAGNOSIS — E11.69 DIABETES MELLITUS TYPE 2 IN OBESE (HCC): ICD-10-CM

## 2017-05-18 DIAGNOSIS — G47.33 OSA (OBSTRUCTIVE SLEEP APNEA): ICD-10-CM

## 2017-05-18 DIAGNOSIS — E66.9 DIABETES MELLITUS TYPE 2 IN OBESE (HCC): ICD-10-CM

## 2017-05-18 DIAGNOSIS — E78.5 HYPERLIPIDEMIA, UNSPECIFIED HYPERLIPIDEMIA TYPE: ICD-10-CM

## 2017-05-18 DIAGNOSIS — E66.01 MORBID OBESITY, UNSPECIFIED OBESITY TYPE (HCC): Primary | ICD-10-CM

## 2017-05-18 DIAGNOSIS — R53.83 FATIGUE, UNSPECIFIED TYPE: ICD-10-CM

## 2017-05-18 PROCEDURE — 99214 OFFICE O/P EST MOD 30 MIN: CPT | Performed by: NURSE PRACTITIONER

## 2017-05-24 ENCOUNTER — TELEPHONE (OUTPATIENT)
Dept: PRIMARY CARE CLINIC | Age: 72
End: 2017-05-24

## 2017-05-25 ENCOUNTER — CARE COORDINATOR VISIT (OUTPATIENT)
Dept: CARE COORDINATION | Age: 72
End: 2017-05-25

## 2017-05-30 ENCOUNTER — CARE COORDINATION (OUTPATIENT)
Dept: PRIMARY CARE CLINIC | Age: 72
End: 2017-05-30

## 2017-06-02 ENCOUNTER — APPOINTMENT (OUTPATIENT)
Dept: PHYSICAL THERAPY | Facility: HOSPITAL | Age: 72
End: 2017-06-02

## 2017-06-02 ENCOUNTER — CARE COORDINATOR VISIT (OUTPATIENT)
Dept: PRIMARY CARE CLINIC | Age: 72
End: 2017-06-02

## 2017-06-05 ENCOUNTER — CARE COORDINATOR VISIT (OUTPATIENT)
Dept: PRIMARY CARE CLINIC | Age: 72
End: 2017-06-05

## 2017-06-05 RX ORDER — HYDROCODONE BITARTRATE AND ACETAMINOPHEN 10; 325 MG/1; MG/1
1 TABLET ORAL DAILY PRN
Qty: 30 TABLET | Refills: 0 | Status: SHIPPED | OUTPATIENT
Start: 2017-06-08 | End: 2017-06-29 | Stop reason: SDUPTHER

## 2017-06-06 ENCOUNTER — CARE COORDINATION (OUTPATIENT)
Dept: PRIMARY CARE CLINIC | Age: 72
End: 2017-06-06

## 2017-06-07 ENCOUNTER — HOSPITAL ENCOUNTER (OUTPATIENT)
Dept: PHYSICAL THERAPY | Facility: HOSPITAL | Age: 72
Setting detail: THERAPIES SERIES
Discharge: HOME | End: 2017-06-07

## 2017-06-07 DIAGNOSIS — N81.89 PELVIC FLOOR WEAKNESS IN FEMALE: Primary | ICD-10-CM

## 2017-06-07 DIAGNOSIS — N39.41 URGE INCONTINENCE: ICD-10-CM

## 2017-06-07 DIAGNOSIS — N39.3 FEMALE STRESS INCONTINENCE: ICD-10-CM

## 2017-06-07 PROCEDURE — 97110 THERAPEUTIC EXERCISES: CPT | Performed by: PHYSICAL THERAPIST

## 2017-06-08 ENCOUNTER — CARE COORDINATOR VISIT (OUTPATIENT)
Dept: PRIMARY CARE CLINIC | Age: 72
End: 2017-06-08

## 2017-06-08 DIAGNOSIS — I10 UNSPECIFIED ESSENTIAL HYPERTENSION: ICD-10-CM

## 2017-06-08 RX ORDER — METOPROLOL SUCCINATE 50 MG/1
TABLET, EXTENDED RELEASE ORAL
Qty: 30 TABLET | Refills: 0 | Status: SHIPPED | OUTPATIENT
Start: 2017-06-08 | End: 2017-06-15 | Stop reason: SDUPTHER

## 2017-06-09 ENCOUNTER — RESULTS ENCOUNTER (OUTPATIENT)
Dept: BARIATRICS/WEIGHT MGMT | Facility: CLINIC | Age: 72
End: 2017-06-09

## 2017-06-09 DIAGNOSIS — E78.5 HYPERLIPIDEMIA, UNSPECIFIED HYPERLIPIDEMIA TYPE: ICD-10-CM

## 2017-06-09 DIAGNOSIS — I10 ESSENTIAL HYPERTENSION: ICD-10-CM

## 2017-06-09 DIAGNOSIS — E66.9 DIABETES MELLITUS TYPE 2 IN OBESE (HCC): ICD-10-CM

## 2017-06-09 DIAGNOSIS — E11.69 DIABETES MELLITUS TYPE 2 IN OBESE (HCC): ICD-10-CM

## 2017-06-09 DIAGNOSIS — E66.01 MORBID OBESITY, UNSPECIFIED OBESITY TYPE (HCC): ICD-10-CM

## 2017-06-12 ENCOUNTER — OFFICE VISIT (OUTPATIENT)
Dept: BARIATRICS/WEIGHT MGMT | Facility: CLINIC | Age: 72
End: 2017-06-12

## 2017-06-12 VITALS
DIASTOLIC BLOOD PRESSURE: 54 MMHG | OXYGEN SATURATION: 97 % | WEIGHT: 263.8 LBS | HEART RATE: 81 BPM | TEMPERATURE: 98.9 F | HEIGHT: 62 IN | SYSTOLIC BLOOD PRESSURE: 127 MMHG | BODY MASS INDEX: 48.55 KG/M2

## 2017-06-12 DIAGNOSIS — E66.01 MORBID OBESITY, UNSPECIFIED OBESITY TYPE (HCC): Primary | ICD-10-CM

## 2017-06-12 PROCEDURE — 99212 OFFICE O/P EST SF 10 MIN: CPT | Performed by: NURSE PRACTITIONER

## 2017-06-12 RX ORDER — FAMOTIDINE 20 MG
TABLET ORAL AS NEEDED
COMMUNITY
End: 2020-06-08

## 2017-06-14 ENCOUNTER — CARE COORDINATION (OUTPATIENT)
Dept: PRIMARY CARE CLINIC | Age: 72
End: 2017-06-14

## 2017-06-15 DIAGNOSIS — K21.9 GASTROESOPHAGEAL REFLUX DISEASE, ESOPHAGITIS PRESENCE NOT SPECIFIED: ICD-10-CM

## 2017-06-15 DIAGNOSIS — I10 UNSPECIFIED ESSENTIAL HYPERTENSION: ICD-10-CM

## 2017-06-15 RX ORDER — OMEPRAZOLE 20 MG/1
CAPSULE, DELAYED RELEASE ORAL
Qty: 11 CAPSULE | Refills: 0 | Status: SHIPPED | OUTPATIENT
Start: 2017-06-15 | End: 2017-06-15 | Stop reason: SDUPTHER

## 2017-06-15 RX ORDER — OMEPRAZOLE 20 MG/1
CAPSULE, DELAYED RELEASE ORAL
Qty: 60 CAPSULE | Refills: 11 | Status: SHIPPED | OUTPATIENT
Start: 2017-06-15 | End: 2018-11-06 | Stop reason: SDUPTHER

## 2017-06-15 RX ORDER — METOPROLOL SUCCINATE 50 MG/1
TABLET, EXTENDED RELEASE ORAL
Qty: 30 TABLET | Refills: 11 | Status: SHIPPED | OUTPATIENT
Start: 2017-06-15 | End: 2018-07-02 | Stop reason: SDUPTHER

## 2017-06-16 ENCOUNTER — APPOINTMENT (OUTPATIENT)
Dept: PHYSICAL THERAPY | Facility: HOSPITAL | Age: 72
End: 2017-06-16

## 2017-06-20 ENCOUNTER — CARE COORDINATION (OUTPATIENT)
Dept: CARE COORDINATION | Age: 72
End: 2017-06-20

## 2017-06-28 DIAGNOSIS — E11.69 TYPE 2 DIABETES MELLITUS WITH OTHER SPECIFIED COMPLICATION (HCC): ICD-10-CM

## 2017-06-29 RX ORDER — HYDROCODONE BITARTRATE AND ACETAMINOPHEN 10; 325 MG/1; MG/1
1 TABLET ORAL DAILY PRN
Qty: 9 TABLET | Refills: 0 | Status: SHIPPED | OUTPATIENT
Start: 2017-07-08 | End: 2017-07-17 | Stop reason: SDUPTHER

## 2017-07-05 RX ORDER — ANASTROZOLE 1 MG/1
TABLET ORAL
Qty: 30 TABLET | Refills: 5 | Status: SHIPPED | OUTPATIENT
Start: 2017-07-05 | End: 2018-02-12 | Stop reason: SDUPTHER

## 2017-07-07 ENCOUNTER — RESULTS ENCOUNTER (OUTPATIENT)
Dept: BARIATRICS/WEIGHT MGMT | Facility: CLINIC | Age: 72
End: 2017-07-07

## 2017-07-07 DIAGNOSIS — E66.9 DIABETES MELLITUS TYPE 2 IN OBESE (HCC): ICD-10-CM

## 2017-07-07 DIAGNOSIS — I10 ESSENTIAL HYPERTENSION: ICD-10-CM

## 2017-07-07 DIAGNOSIS — E11.69 DIABETES MELLITUS TYPE 2 IN OBESE (HCC): ICD-10-CM

## 2017-07-07 DIAGNOSIS — E66.01 MORBID OBESITY, UNSPECIFIED OBESITY TYPE (HCC): ICD-10-CM

## 2017-07-07 DIAGNOSIS — E78.5 HYPERLIPIDEMIA, UNSPECIFIED HYPERLIPIDEMIA TYPE: ICD-10-CM

## 2017-07-13 ENCOUNTER — TELEPHONE (OUTPATIENT)
Dept: SURGERY | Age: 72
End: 2017-07-13

## 2017-07-17 ENCOUNTER — HOSPITAL ENCOUNTER (OUTPATIENT)
Dept: PAIN MANAGEMENT | Age: 72
Discharge: HOME | End: 2017-07-17
Payer: MEDICARE

## 2017-07-17 VITALS
BODY MASS INDEX: 47.84 KG/M2 | DIASTOLIC BLOOD PRESSURE: 73 MMHG | HEART RATE: 80 BPM | TEMPERATURE: 97.5 F | SYSTOLIC BLOOD PRESSURE: 120 MMHG | WEIGHT: 260 LBS | HEIGHT: 62 IN | OXYGEN SATURATION: 92 % | RESPIRATION RATE: 18 BRPM

## 2017-07-17 DIAGNOSIS — G57.93 NEUROPATHY INVOLVING BOTH LOWER EXTREMITIES: ICD-10-CM

## 2017-07-17 DIAGNOSIS — G89.29 CHRONIC BILATERAL LOW BACK PAIN WITHOUT SCIATICA: ICD-10-CM

## 2017-07-17 DIAGNOSIS — M25.50 ARTHRALGIA, UNSPECIFIED JOINT: ICD-10-CM

## 2017-07-17 DIAGNOSIS — M47.816 LUMBAR FACET ARTHROPATHY: ICD-10-CM

## 2017-07-17 DIAGNOSIS — M54.50 CHRONIC BILATERAL LOW BACK PAIN WITHOUT SCIATICA: ICD-10-CM

## 2017-07-17 PROCEDURE — 99213 OFFICE O/P EST LOW 20 MIN: CPT

## 2017-07-17 RX ORDER — HYDROCODONE BITARTRATE AND ACETAMINOPHEN 10; 325 MG/1; MG/1
1 TABLET ORAL DAILY PRN
Qty: 30 TABLET | Refills: 0 | Status: SHIPPED | OUTPATIENT
Start: 2017-07-17 | End: 2017-08-25 | Stop reason: SDUPTHER

## 2017-07-17 ASSESSMENT — ACTIVITIES OF DAILY LIVING (ADL): EFFECT OF PAIN ON DAILY ACTIVITIES: LIMITS ACTIVITY

## 2017-07-17 ASSESSMENT — PAIN DESCRIPTION - LOCATION: LOCATION: BACK;SHOULDER

## 2017-07-17 ASSESSMENT — PAIN SCALES - GENERAL: PAINLEVEL_OUTOF10: 7

## 2017-07-17 ASSESSMENT — PAIN DESCRIPTION - PROGRESSION: CLINICAL_PROGRESSION: NOT CHANGED

## 2017-07-17 ASSESSMENT — PAIN DESCRIPTION - ONSET: ONSET: ON-GOING

## 2017-07-17 ASSESSMENT — PAIN DESCRIPTION - DESCRIPTORS: DESCRIPTORS: ACHING;BURNING;CONSTANT

## 2017-07-17 ASSESSMENT — PAIN DESCRIPTION - DIRECTION: RADIATING_TOWARDS: INTO BILATERAL LEGS

## 2017-07-17 ASSESSMENT — PAIN DESCRIPTION - FREQUENCY: FREQUENCY: CONTINUOUS

## 2017-07-17 ASSESSMENT — PAIN DESCRIPTION - PAIN TYPE: TYPE: CHRONIC PAIN

## 2017-07-17 ASSESSMENT — PAIN DESCRIPTION - ORIENTATION: ORIENTATION: LOWER;RIGHT;LEFT

## 2017-07-31 ENCOUNTER — DOCUMENTATION (OUTPATIENT)
Dept: PHYSICAL THERAPY | Facility: HOSPITAL | Age: 72
End: 2017-07-31

## 2017-07-31 DIAGNOSIS — N39.41 URGE INCONTINENCE: ICD-10-CM

## 2017-07-31 DIAGNOSIS — N81.89 PELVIC FLOOR WEAKNESS IN FEMALE: Primary | ICD-10-CM

## 2017-07-31 DIAGNOSIS — N39.3 FEMALE STRESS INCONTINENCE: ICD-10-CM

## 2017-08-04 ENCOUNTER — RESULTS ENCOUNTER (OUTPATIENT)
Dept: BARIATRICS/WEIGHT MGMT | Facility: CLINIC | Age: 72
End: 2017-08-04

## 2017-08-04 DIAGNOSIS — E11.69 DIABETES MELLITUS TYPE 2 IN OBESE (HCC): ICD-10-CM

## 2017-08-04 DIAGNOSIS — E78.5 HYPERLIPIDEMIA, UNSPECIFIED HYPERLIPIDEMIA TYPE: ICD-10-CM

## 2017-08-04 DIAGNOSIS — E66.9 DIABETES MELLITUS TYPE 2 IN OBESE (HCC): ICD-10-CM

## 2017-08-04 DIAGNOSIS — E66.01 MORBID OBESITY, UNSPECIFIED OBESITY TYPE (HCC): ICD-10-CM

## 2017-08-04 DIAGNOSIS — I10 ESSENTIAL HYPERTENSION: ICD-10-CM

## 2017-08-14 ENCOUNTER — CARE COORDINATION (OUTPATIENT)
Dept: PRIMARY CARE CLINIC | Age: 72
End: 2017-08-14

## 2017-08-16 ENCOUNTER — OFFICE VISIT (OUTPATIENT)
Dept: SURGERY | Age: 72
End: 2017-08-16
Payer: MEDICARE

## 2017-08-16 ENCOUNTER — TELEPHONE (OUTPATIENT)
Dept: PAIN MANAGEMENT | Age: 72
End: 2017-08-16

## 2017-08-16 ENCOUNTER — HOSPITAL ENCOUNTER (OUTPATIENT)
Dept: WOMENS IMAGING | Age: 72
Discharge: HOME | End: 2017-08-16
Payer: MEDICARE

## 2017-08-16 VITALS — SYSTOLIC BLOOD PRESSURE: 110 MMHG | DIASTOLIC BLOOD PRESSURE: 66 MMHG

## 2017-08-16 DIAGNOSIS — Z85.3 PERSONAL HISTORY OF MALIGNANT NEOPLASM OF BREAST: Primary | ICD-10-CM

## 2017-08-16 DIAGNOSIS — Z85.3 PERSONAL HISTORY OF BREAST CANCER: ICD-10-CM

## 2017-08-16 PROCEDURE — 1123F ACP DISCUSS/DSCN MKR DOCD: CPT | Performed by: PHYSICIAN ASSISTANT

## 2017-08-16 PROCEDURE — 4040F PNEUMOC VAC/ADMIN/RCVD: CPT | Performed by: PHYSICIAN ASSISTANT

## 2017-08-16 PROCEDURE — G8399 PT W/DXA RESULTS DOCUMENT: HCPCS | Performed by: PHYSICIAN ASSISTANT

## 2017-08-16 PROCEDURE — 3014F SCREEN MAMMO DOC REV: CPT | Performed by: PHYSICIAN ASSISTANT

## 2017-08-16 PROCEDURE — 1036F TOBACCO NON-USER: CPT | Performed by: PHYSICIAN ASSISTANT

## 2017-08-16 PROCEDURE — 99212 OFFICE O/P EST SF 10 MIN: CPT | Performed by: PHYSICIAN ASSISTANT

## 2017-08-16 PROCEDURE — 1090F PRES/ABSN URINE INCON ASSESS: CPT | Performed by: PHYSICIAN ASSISTANT

## 2017-08-16 PROCEDURE — G8427 DOCREV CUR MEDS BY ELIG CLIN: HCPCS | Performed by: PHYSICIAN ASSISTANT

## 2017-08-16 PROCEDURE — 3017F COLORECTAL CA SCREEN DOC REV: CPT | Performed by: PHYSICIAN ASSISTANT

## 2017-08-16 PROCEDURE — G8417 CALC BMI ABV UP PARAM F/U: HCPCS | Performed by: PHYSICIAN ASSISTANT

## 2017-08-16 PROCEDURE — G0279 TOMOSYNTHESIS, MAMMO: HCPCS

## 2017-08-17 ENCOUNTER — TELEPHONE (OUTPATIENT)
Dept: PRIMARY CARE CLINIC | Age: 72
End: 2017-08-17

## 2017-08-25 ENCOUNTER — HOSPITAL ENCOUNTER (OUTPATIENT)
Dept: PAIN MANAGEMENT | Age: 72
Discharge: HOME | End: 2017-08-25
Payer: MEDICARE

## 2017-08-25 VITALS
BODY MASS INDEX: 47.84 KG/M2 | HEIGHT: 62 IN | RESPIRATION RATE: 20 BRPM | SYSTOLIC BLOOD PRESSURE: 107 MMHG | DIASTOLIC BLOOD PRESSURE: 48 MMHG | WEIGHT: 260 LBS | HEART RATE: 70 BPM | OXYGEN SATURATION: 97 % | TEMPERATURE: 97.5 F

## 2017-08-25 DIAGNOSIS — G57.93 NEUROPATHY INVOLVING BOTH LOWER EXTREMITIES: ICD-10-CM

## 2017-08-25 DIAGNOSIS — M47.816 LUMBAR FACET ARTHROPATHY: ICD-10-CM

## 2017-08-25 DIAGNOSIS — G89.29 CHRONIC BILATERAL LOW BACK PAIN WITHOUT SCIATICA: ICD-10-CM

## 2017-08-25 DIAGNOSIS — M54.50 CHRONIC BILATERAL LOW BACK PAIN WITHOUT SCIATICA: ICD-10-CM

## 2017-08-25 DIAGNOSIS — M47.812 CERVICAL FACET JOINT SYNDROME: ICD-10-CM

## 2017-08-25 PROCEDURE — 64636 DESTROY L/S FACET JNT ADDL: CPT

## 2017-08-25 PROCEDURE — 64635 DESTROY LUMB/SAC FACET JNT: CPT

## 2017-08-25 PROCEDURE — 6360000002 HC RX W HCPCS

## 2017-08-25 PROCEDURE — 2500000003 HC RX 250 WO HCPCS

## 2017-08-25 PROCEDURE — 3209999900 FLUORO FOR SURGICAL PROCEDURES

## 2017-08-25 PROCEDURE — 62323 NJX INTERLAMINAR LMBR/SAC: CPT

## 2017-08-25 PROCEDURE — 20553 NJX 1/MLT TRIGGER POINTS 3/>: CPT

## 2017-08-25 RX ORDER — BUPIVACAINE HYDROCHLORIDE 5 MG/ML
INJECTION, SOLUTION EPIDURAL; INTRACAUDAL
Status: COMPLETED | OUTPATIENT
Start: 2017-08-25 | End: 2017-08-25

## 2017-08-25 RX ORDER — LIDOCAINE HYDROCHLORIDE 10 MG/ML
INJECTION, SOLUTION EPIDURAL; INFILTRATION; INTRACAUDAL; PERINEURAL
Status: COMPLETED | OUTPATIENT
Start: 2017-08-25 | End: 2017-08-25

## 2017-08-25 RX ORDER — HYDROCODONE BITARTRATE AND ACETAMINOPHEN 10; 325 MG/1; MG/1
1 TABLET ORAL DAILY PRN
Qty: 30 TABLET | Refills: 0 | Status: SHIPPED | OUTPATIENT
Start: 2017-08-25 | End: 2017-09-19 | Stop reason: SDUPTHER

## 2017-08-25 RX ORDER — TRIAMCINOLONE ACETONIDE 40 MG/ML
INJECTION, SUSPENSION INTRA-ARTICULAR; INTRAMUSCULAR
Status: COMPLETED | OUTPATIENT
Start: 2017-08-25 | End: 2017-08-25

## 2017-08-25 RX ORDER — LIDOCAINE HYDROCHLORIDE 20 MG/ML
INJECTION, SOLUTION EPIDURAL; INFILTRATION; INTRACAUDAL; PERINEURAL
Status: COMPLETED | OUTPATIENT
Start: 2017-08-25 | End: 2017-08-25

## 2017-08-25 RX ADMIN — TRIAMCINOLONE ACETONIDE 40 MG: 40 INJECTION, SUSPENSION INTRA-ARTICULAR; INTRAMUSCULAR at 11:44

## 2017-08-25 RX ADMIN — BUPIVACAINE HYDROCHLORIDE 4 ML: 5 INJECTION, SOLUTION EPIDURAL; INTRACAUDAL at 11:40

## 2017-08-25 RX ADMIN — BUPIVACAINE HYDROCHLORIDE 19 ML: 5 INJECTION, SOLUTION EPIDURAL; INTRACAUDAL at 11:44

## 2017-08-25 RX ADMIN — LIDOCAINE HYDROCHLORIDE 10 ML: 20 INJECTION, SOLUTION EPIDURAL; INFILTRATION; INTRACAUDAL; PERINEURAL at 11:39

## 2017-08-25 RX ADMIN — TRIAMCINOLONE ACETONIDE 40 MG: 40 INJECTION, SUSPENSION INTRA-ARTICULAR; INTRAMUSCULAR at 11:43

## 2017-08-25 RX ADMIN — LIDOCAINE HYDROCHLORIDE 3 ML: 10 INJECTION, SOLUTION EPIDURAL; INFILTRATION; INTRACAUDAL; PERINEURAL at 11:39

## 2017-08-25 ASSESSMENT — PAIN - FUNCTIONAL ASSESSMENT
PAIN_FUNCTIONAL_ASSESSMENT: 0-10
PAIN_FUNCTIONAL_ASSESSMENT: 0-10

## 2017-08-25 ASSESSMENT — PAIN DESCRIPTION - DESCRIPTORS: DESCRIPTORS: ACHING;CONSTANT;THROBBING

## 2017-08-25 ASSESSMENT — ACTIVITIES OF DAILY LIVING (ADL): EFFECT OF PAIN ON DAILY ACTIVITIES: DAILY CHORES AND ACTIVITIES

## 2017-08-31 RX ORDER — OXYBUTYNIN CHLORIDE 5 MG/1
TABLET, EXTENDED RELEASE ORAL
Qty: 30 TABLET | Refills: 0 | Status: SHIPPED | OUTPATIENT
Start: 2017-08-31 | End: 2017-10-02 | Stop reason: SDUPTHER

## 2017-09-01 ENCOUNTER — RESULTS ENCOUNTER (OUTPATIENT)
Dept: BARIATRICS/WEIGHT MGMT | Facility: CLINIC | Age: 72
End: 2017-09-01

## 2017-09-01 DIAGNOSIS — E66.9 DIABETES MELLITUS TYPE 2 IN OBESE (HCC): ICD-10-CM

## 2017-09-01 DIAGNOSIS — E66.01 MORBID OBESITY, UNSPECIFIED OBESITY TYPE (HCC): ICD-10-CM

## 2017-09-01 DIAGNOSIS — E11.69 DIABETES MELLITUS TYPE 2 IN OBESE (HCC): ICD-10-CM

## 2017-09-01 DIAGNOSIS — E78.5 HYPERLIPIDEMIA, UNSPECIFIED HYPERLIPIDEMIA TYPE: ICD-10-CM

## 2017-09-01 DIAGNOSIS — I10 ESSENTIAL HYPERTENSION: ICD-10-CM

## 2017-09-14 ENCOUNTER — CARE COORDINATION (OUTPATIENT)
Dept: PRIMARY CARE CLINIC | Age: 72
End: 2017-09-14

## 2017-09-14 DIAGNOSIS — Z79.4 TYPE 2 DIABETES MELLITUS WITHOUT COMPLICATION, WITH LONG-TERM CURRENT USE OF INSULIN (HCC): ICD-10-CM

## 2017-09-14 DIAGNOSIS — E11.9 TYPE 2 DIABETES MELLITUS WITHOUT COMPLICATION, WITH LONG-TERM CURRENT USE OF INSULIN (HCC): ICD-10-CM

## 2017-09-14 RX ORDER — IBUPROFEN 200 MG
TABLET ORAL
Qty: 90 EACH | Refills: 1 | Status: SHIPPED | OUTPATIENT
Start: 2017-09-14 | End: 2022-07-12 | Stop reason: SDUPTHER

## 2017-09-19 RX ORDER — HYDROCODONE BITARTRATE AND ACETAMINOPHEN 10; 325 MG/1; MG/1
1 TABLET ORAL DAILY PRN
Qty: 30 TABLET | Refills: 0 | Status: SHIPPED | OUTPATIENT
Start: 2017-09-24 | End: 2017-10-23 | Stop reason: SDUPTHER

## 2017-09-29 ENCOUNTER — RESULTS ENCOUNTER (OUTPATIENT)
Dept: BARIATRICS/WEIGHT MGMT | Facility: CLINIC | Age: 72
End: 2017-09-29

## 2017-09-29 DIAGNOSIS — E66.9 DIABETES MELLITUS TYPE 2 IN OBESE (HCC): ICD-10-CM

## 2017-09-29 DIAGNOSIS — E78.5 HYPERLIPIDEMIA, UNSPECIFIED HYPERLIPIDEMIA TYPE: ICD-10-CM

## 2017-09-29 DIAGNOSIS — E11.69 DIABETES MELLITUS TYPE 2 IN OBESE (HCC): ICD-10-CM

## 2017-09-29 DIAGNOSIS — I10 ESSENTIAL HYPERTENSION: ICD-10-CM

## 2017-09-29 DIAGNOSIS — E66.01 MORBID OBESITY, UNSPECIFIED OBESITY TYPE (HCC): ICD-10-CM

## 2017-10-02 RX ORDER — ERGOCALCIFEROL 1.25 MG/1
CAPSULE ORAL
Qty: 4 CAPSULE | Refills: 0 | Status: SHIPPED | OUTPATIENT
Start: 2017-10-02 | End: 2018-03-30 | Stop reason: SDUPTHER

## 2017-10-02 RX ORDER — OXYBUTYNIN CHLORIDE 5 MG/1
TABLET, EXTENDED RELEASE ORAL
Qty: 30 TABLET | Refills: 0 | Status: SHIPPED | OUTPATIENT
Start: 2017-10-02 | End: 2017-12-05 | Stop reason: SDUPTHER

## 2017-10-04 DIAGNOSIS — G57.93 NEUROPATHY INVOLVING BOTH LOWER EXTREMITIES: Chronic | ICD-10-CM

## 2017-10-05 RX ORDER — GABAPENTIN 300 MG/1
CAPSULE ORAL
Qty: 90 CAPSULE | Refills: 5 | Status: SHIPPED | OUTPATIENT
Start: 2017-10-05 | End: 2018-03-06 | Stop reason: SDUPTHER

## 2017-10-05 RX ORDER — FLUCONAZOLE 100 MG/1
100 TABLET ORAL DAILY
Qty: 7 TABLET | Refills: 0 | Status: SHIPPED | OUTPATIENT
Start: 2017-10-05 | End: 2017-10-12

## 2017-10-05 NOTE — TELEPHONE ENCOUNTER
Orders Placed This Encounter   Medications    gabapentin (NEURONTIN) 300 MG capsule     Sig: TAKE 1 CAPSULE THREE TIMES DAILY.      Dispense:  90 capsule     Refill:  5    fluconazole (DIFLUCAN) 100 MG tablet     Sig: Take 1 tablet by mouth daily for 7 days     Dispense:  7 tablet     Refill:  0

## 2017-10-10 ENCOUNTER — TELEPHONE (OUTPATIENT)
Dept: PRIMARY CARE CLINIC | Age: 72
End: 2017-10-10

## 2017-10-10 NOTE — TELEPHONE ENCOUNTER
Patient called stating her C-pap machine is no longer working and needs a replacement ASAP. I ask her where she gets her supplies but she could not tell me and does not remember. Patient did say the company would be faxing an order for  to sign. We have not received a fax to this date. I did call patient back twice and finally left a message for a call back. We will need a phone number of the company where she gets her supplies for the C-pap or find out who is suppose to be faxing an order to be signed.

## 2017-10-23 RX ORDER — HYDROCODONE BITARTRATE AND ACETAMINOPHEN 10; 325 MG/1; MG/1
1 TABLET ORAL DAILY PRN
Qty: 30 TABLET | Refills: 0 | Status: SHIPPED | OUTPATIENT
Start: 2017-10-24 | End: 2017-11-20 | Stop reason: SDUPTHER

## 2017-10-25 ENCOUNTER — TELEPHONE (OUTPATIENT)
Dept: PRIMARY CARE CLINIC | Age: 72
End: 2017-10-25

## 2017-10-26 ENCOUNTER — TELEPHONE (OUTPATIENT)
Dept: PRIMARY CARE CLINIC | Age: 72
End: 2017-10-26

## 2017-10-27 ENCOUNTER — RESULTS ENCOUNTER (OUTPATIENT)
Dept: BARIATRICS/WEIGHT MGMT | Facility: CLINIC | Age: 72
End: 2017-10-27

## 2017-10-27 DIAGNOSIS — E66.01 MORBID OBESITY, UNSPECIFIED OBESITY TYPE (HCC): ICD-10-CM

## 2017-10-27 DIAGNOSIS — E78.5 HYPERLIPIDEMIA, UNSPECIFIED HYPERLIPIDEMIA TYPE: ICD-10-CM

## 2017-10-27 DIAGNOSIS — I10 ESSENTIAL HYPERTENSION: ICD-10-CM

## 2017-10-27 DIAGNOSIS — E66.9 DIABETES MELLITUS TYPE 2 IN OBESE (HCC): ICD-10-CM

## 2017-10-27 DIAGNOSIS — E11.69 DIABETES MELLITUS TYPE 2 IN OBESE (HCC): ICD-10-CM

## 2017-11-01 ENCOUNTER — CARE COORDINATOR VISIT (OUTPATIENT)
Dept: CARE COORDINATION | Age: 72
End: 2017-11-01

## 2017-11-02 DIAGNOSIS — I10 ESSENTIAL HYPERTENSION: ICD-10-CM

## 2017-11-02 RX ORDER — LOSARTAN POTASSIUM 100 MG/1
TABLET ORAL
Qty: 30 TABLET | Refills: 0 | Status: SHIPPED | OUTPATIENT
Start: 2017-11-02 | End: 2018-11-05 | Stop reason: SDUPTHER

## 2017-11-07 ENCOUNTER — TELEPHONE (OUTPATIENT)
Dept: PODIATRY | Facility: CLINIC | Age: 72
End: 2017-11-07

## 2017-11-20 ENCOUNTER — HOSPITAL ENCOUNTER (OUTPATIENT)
Dept: PAIN MANAGEMENT | Age: 72
Discharge: HOME | End: 2017-11-20
Payer: MEDICARE

## 2017-11-20 VITALS
OXYGEN SATURATION: 94 % | BODY MASS INDEX: 46.93 KG/M2 | RESPIRATION RATE: 20 BRPM | TEMPERATURE: 97.1 F | WEIGHT: 255 LBS | DIASTOLIC BLOOD PRESSURE: 72 MMHG | HEART RATE: 81 BPM | HEIGHT: 62 IN | SYSTOLIC BLOOD PRESSURE: 124 MMHG

## 2017-11-20 DIAGNOSIS — G57.93 NEUROPATHY INVOLVING BOTH LOWER EXTREMITIES: ICD-10-CM

## 2017-11-20 DIAGNOSIS — M47.816 LUMBAR FACET ARTHROPATHY: ICD-10-CM

## 2017-11-20 PROCEDURE — 99213 OFFICE O/P EST LOW 20 MIN: CPT

## 2017-11-20 RX ORDER — HYDROCODONE BITARTRATE AND ACETAMINOPHEN 10; 325 MG/1; MG/1
1 TABLET ORAL DAILY PRN
Qty: 30 TABLET | Refills: 0 | Status: SHIPPED | OUTPATIENT
Start: 2017-11-24 | End: 2017-12-28 | Stop reason: SDUPTHER

## 2017-11-20 ASSESSMENT — PAIN DESCRIPTION - PAIN TYPE: TYPE: CHRONIC PAIN

## 2017-11-20 ASSESSMENT — PAIN DESCRIPTION - FREQUENCY: FREQUENCY: CONTINUOUS

## 2017-11-20 ASSESSMENT — PAIN DESCRIPTION - PROGRESSION: CLINICAL_PROGRESSION: NOT CHANGED

## 2017-11-20 ASSESSMENT — PAIN DESCRIPTION - ONSET: ONSET: ON-GOING

## 2017-11-20 ASSESSMENT — PAIN SCALES - GENERAL: PAINLEVEL_OUTOF10: 8

## 2017-11-20 ASSESSMENT — PAIN DESCRIPTION - ORIENTATION: ORIENTATION: LOWER;UPPER

## 2017-11-20 ASSESSMENT — PAIN DESCRIPTION - DESCRIPTORS: DESCRIPTORS: ACHING;CONSTANT;THROBBING

## 2017-11-20 ASSESSMENT — ACTIVITIES OF DAILY LIVING (ADL): EFFECT OF PAIN ON DAILY ACTIVITIES: LIMITS ACTIVITIES

## 2017-11-20 ASSESSMENT — PAIN DESCRIPTION - LOCATION: LOCATION: BACK;NECK

## 2017-11-20 NOTE — PROGRESS NOTES
Nursing Admission Record    Current Issues / Falls / ER Visits:  No    Percentage of Pain Relief after Last Procedure:  50 %    How long lasted:  3 months    Radiology exams received during the last 12 months: Yes       When April 2017                                              Where [facility]       Imaging on chart: Yes         Imaging records requested: No  MRI exams received in the past 2 years:  Yes Lumbar MRI in April 2017 at [facility]  Physical therapy during the last 6 months: No       When: na                                             Where  na  Labs during the last 12 months: Yes    Education Provided:  [x] Review of [place]  [] Agreement Review  [] Compliance Issues Discussed    [] Cognitive Behavior Needs [x] Exercise [] Review of Test [] Financial Issues  [x] Tobacco/Alcohol Use [x] Teaching [] New Patient [] Picture Obtained    Physician Plan:  [] Outgoing Referral  [] Pharmacy Consult  [] Test Ordered   [] Obtained Test Results / Consult Notes  [x] UDS due at next visit, verified per EPIC      [] Suspected Physical Abuse or Suicide Risk assessed - IF YES COMPLETE QUESTIONS BELOW    If any of the following questions are answered yes - contact attending physician for referral:    Has been considering harming self to escape stress, pain problems? [] YES  [] NO  Has a suicide plan? [] YES  [] NO  Has attempted suicide in the past?   [] YES  [] NO  Has a close friend or family member who committed suicide? [] YES  [] NO    Patient Referred To :      Additional Notes:    Assessment Completed by:  Electronically signed by Elida Louise RN on 11/20/2017 at 3:08 PM

## 2017-11-20 NOTE — PROGRESS NOTES
Lillie Lundy/Zully  Patient Pain Assessment  Progress Note       Chief Complaint   Patient presents with    Back Pain     lower    Neck Pain     radiates down bilateral shoulders     Pain Assessment  Pain Assessment: 0-10  Pain Level: 8  Pain Type: Chronic pain  Pain Location: Back, Neck  Pain Orientation: Lower, Upper  Pain Radiating Towards: neck pain down shoulders and back pain down to hips  Pain Descriptors: Aching, Constant, Throbbing  Pain Frequency: Continuous  Pain Onset: On-going  Clinical Progression: Not changed  Effect of Pain on Daily Activities: limits activities       []  Issues of Concern:     [x]  Reports current medication is helping, but continues to have on-going pain    [x]  Reports current pain medication increases ability to do activities of daily living     [x]  Current narcotic medications    [x]  Discussed possible medication side effects, risk of tolerance and/or dependence, alternative treatments    [x]  Encouraged to set goals of decreasing daily narcotic intake    [x]  Discussed effects of long term narcotic use      [x]  Injection options discussed     []Yes [x]No  Current medication side effects, comment if applicable:      []Yes [x]No   Acute bladder or bowel changes    Previous Procedure / Percentage of pain control / Imaging / PT History:  Percentage of Pain Relief after Last Procedure: 50 %  How long lasted: 3 months     Radiology exams received during the last 12 months: Yes  When: April 2017                                            Where: [facility]  Imaging on chart: Yes    MRI exams received in the past 2 years: Lumbar MRI in April 2017 at [facility]  Physical therapy during the last 6 months: No    BMI: Body mass index is 46.64 kg/m².     [x]  Nutrient rich, low fat, low carbohydrate diet discussed   [x]  Positive effect of weight management on pain control discussed    Activity:   [x] Exercise discussed as beneficial to pain reduction, encouraged stretching exercises (COZAAR) 100 MG tablet TAKE (1) TABLET DAILY FOR BLOOD PRESSURE. 30 tablet 0    HYDROcodone-acetaminophen (NORCO)  MG per tablet Take 1 tablet by mouth daily as needed for Pain (may fill 10/24/17) . 30 tablet 0    gabapentin (NEURONTIN) 300 MG capsule TAKE 1 CAPSULE THREE TIMES DAILY. 90 capsule 5    oxybutynin (DITROPAN-XL) 5 MG extended release tablet TAKE 1 TABLET DAILY 30 tablet 0    vitamin D (ERGOCALCIFEROL) 64054 units CAPS capsule TAKE 1 CAPSULE ONCE A WEEK. 4 capsule 0    INSULIN SYRINGE 1CC/29G 29G X 1/2\" 1 ML MISC Use with each dose of novolog TID 90 each 1    insulin detemir (LEVEMIR) 100 UNIT/ML injection pen Inject 50 Units into the skin 2 times daily      Insulin Pen Needle 32G X 6 MM MISC As directed 50 each 5    metoprolol succinate (TOPROL XL) 50 MG extended release tablet TAKE 1 TABLET DAILY FOR BLOOD PRESSURE AND HEART PROTECTION. 30 tablet 11    omeprazole (PRILOSEC) 20 MG delayed release capsule TAKE (1) CAPSULE BY MOUTH TWICE DAILY AS NEEDED. 60 capsule 11    empagliflozin (JARDIANCE) 10 MG tablet Take 1 tablet by mouth daily Samples given to pt 3 bottles [id number] 7/2019 1 bottle [id number] 3/2019 30 tablet 3    tiotropium (SPIRIVA HANDIHALER) 18 MCG inhalation capsule Inhale 1 capsule into the lungs daily 90 capsule 3    mometasone-formoterol (DULERA) 200-5 MCG/ACT inhaler Inhale 2 puffs into the lungs every 12 hours      sitaGLIPtan-metformin (JANUMET)  MG per tablet TAKE 1 TABLET TWICE DAILY WITH MEALS. 180 tablet 5    montelukast (SINGULAIR) 10 MG tablet TAKE 1 TABLET BY MOUTH AT NIGHT 90 tablet 3    citalopram (CELEXA) 20 MG tablet TAKE 1 TABLET DAILY 30 tablet 11    loratadine (CLARITIN) 10 MG tablet TAKE 1 TABLET DAILY 30 tablet 11    insulin aspart (NOVOLOG FLEXPEN) 100 UNIT/ML injection pen Use sliding scale prior to eating each meal check blood sugar, follow the slidingscale. 608-210-ifid 2 units;608-865-wrqt 4 units; 622-353-njji 6 units; 364-290-pfpn 8 units; 699-875-zmri 10 units over 400 call MD 5 Pen 3    anastrozole (ARIMIDEX) 1 MG tablet Take 1 mg by mouth daily      Multiple Vitamins-Minerals (MULTIVITAMIN ADULT PO) Take by mouth      magnesium oxide (MAG-OX) 400 MG tablet Take 1 tablet by mouth daily 30 tablet 5    rosuvastatin (CRESTOR) 10 MG tablet Take one tablet nightly    This rx needs to be faxed to [address] [place] [address] fax # [phone]  Patient DQ#[id number] 270 tablet 3    albuterol (PROVENTIL HFA;VENTOLIN HFA) 108 (90 BASE) MCG/ACT inhaler Inhale 2 puffs into the lungs every 4 hours as needed for Wheezing      Incontinence Supply Disposable (POISE PANTILINERS) PADS Use pads as needed daily for urine leakage. 1 each 11    aspirin 81 MG chewable tablet Take 81 mg by mouth daily. No current facility-administered medications for this encounter.       UDS:    Lab Results   Component Value Date    AMPHETUR Negative 03/27/2017    BARBITURATES Negative 03/27/2017    BENZODIAZURI Negative 03/27/2017    CANNANBINURI Negative 03/27/2017    COCAINEMETUR Negative 03/27/2017    OPIAU Negative * (Norco per med list, PRN only, #30/month) 03/27/2017    OXYCOOXYMO Negative 03/27/2017    PHENCYCU Negative 03/27/2017    LABMETH Negative 03/27/2017    PPXUR Negative 03/27/2017    MEPERIDU Negative 03/27/2017    FENTU Negative 03/27/2017    ETHUQN Negative 03/27/2017          Vitals:  /72   Pulse 81   Temp 97.1 °F (36.2 °C) (Oral)   Resp 20   Ht 5' 2\" (1.575 m)   Wt 255 lb (115.7 kg)   SpO2 94%   BMI 46.64 kg/m²       Physical Exam:  General appearance: no acute distress  Head: NCAT, EOMI  Skin: Warm, Dry   Musculoskeletal: ambulatory per self, steady gait with rolling walker  Neurologic: alert and oriented X 3, speech clear  Mood and affect: appropriate, no SI or HI    Assessment:    *     Lumbar DDD  *     Lumbar Facet Syndrome  *     Myofascial Pain Syndrome, Cervical     Plan:   [x]  Patient is to call with any questions or concerns which may arise prior to the next office visit    [x]  Continue current medications per our office, see medication tab, JARROD reviewed   []  Add   []  Imaging order given to patient   []  PT order given to patient   [x]  Procedure scheduled for next visit, see encounter details (RFL Lumbar Facets L4-5, L5-S1 and Cervical Trigger point injections next visit)   []  UDS done today   []  UDS next visit   []  . .. Controlled Substance Monitoring: Discussed with patient possible medication side effects, risk of tolerance and/or dependence, and alternative treatments. Discussed the effects of long term narcotic use. Patient encouraged to set daily goals of exercising and decreasing daily narcotic intake.       Electronically signed by ANTONIA Dong

## 2017-11-21 ENCOUNTER — CARE COORDINATION (OUTPATIENT)
Dept: CARE COORDINATION | Age: 72
End: 2017-11-21

## 2017-11-24 ENCOUNTER — RESULTS ENCOUNTER (OUTPATIENT)
Dept: BARIATRICS/WEIGHT MGMT | Facility: CLINIC | Age: 72
End: 2017-11-24

## 2017-11-24 DIAGNOSIS — I10 ESSENTIAL HYPERTENSION: ICD-10-CM

## 2017-11-24 DIAGNOSIS — E78.5 HYPERLIPIDEMIA, UNSPECIFIED HYPERLIPIDEMIA TYPE: ICD-10-CM

## 2017-11-24 DIAGNOSIS — E66.01 MORBID OBESITY, UNSPECIFIED OBESITY TYPE (HCC): ICD-10-CM

## 2017-11-24 DIAGNOSIS — E66.9 DIABETES MELLITUS TYPE 2 IN OBESE (HCC): ICD-10-CM

## 2017-11-24 DIAGNOSIS — E11.69 DIABETES MELLITUS TYPE 2 IN OBESE (HCC): ICD-10-CM

## 2017-11-24 NOTE — CARE COORDINATION
oxybutynin (DITROPAN-XL) 5 MG extended release tablet TAKE 1 TABLET DAILY 10/2/17   Nam Escobedo MD   vitamin D (ERGOCALCIFEROL) 57671 units CAPS capsule TAKE 1 CAPSULE ONCE A WEEK. 10/2/17   Nam Escobedo MD   INSULIN SYRINGE 1CC/29G 29G X 1/2\" 1 ML MISC Use with each dose of novolog TID 9/14/17   Nam Escobedo MD   insulin detemir (LEVEMIR) 100 UNIT/ML injection pen Inject 50 Units into the skin 2 times daily 3/14/17   Historical Provider, MD   Insulin Pen Needle 32G X 6 MM MISC As directed 6/28/17   Nam Escobedo MD   metoprolol succinate (TOPROL XL) 50 MG extended release tablet TAKE 1 TABLET DAILY FOR BLOOD PRESSURE AND HEART PROTECTION. 6/15/17   Nam Escobedo MD   omeprazole (PRILOSEC) 20 MG delayed release capsule TAKE (1) CAPSULE BY MOUTH TWICE DAILY AS NEEDED. 6/15/17   Nam Escobedo MD   empagliflozin (JARDIANCE) 10 MG tablet Take 1 tablet by mouth daily Samples given to pt 3 bottles [id number] 7/2019 1 bottle [id number] 3/2019 5/16/17   Nam Escobedo MD   tiotropium (SPIRIVA HANDIHALER) 18 MCG inhalation capsule Inhale 1 capsule into the lungs daily 5/3/17   Nam Escobedo MD   mometasone-formoterol [facility]) 200-5 MCG/ACT inhaler Inhale 2 puffs into the lungs every 12 hours    Historical Provider, MD   sitaGLIPtan-metformin (JANUMET)  MG per tablet TAKE 1 TABLET TWICE DAILY WITH MEALS. 2/14/17   Nam Escobedo MD   montelukast (SINGULAIR) 10 MG tablet TAKE 1 TABLET BY MOUTH AT NIGHT 2/3/17   Nam Escobedo MD   citalopram (CELEXA) 20 MG tablet TAKE 1 TABLET DAILY 11/14/16   Nam Escobedo MD   loratadine (CLARITIN) 10 MG tablet TAKE 1 TABLET DAILY 11/11/16   Nam Escobedo MD   insulin aspart (NOVOLOG FLEXPEN) 100 UNIT/ML injection pen Use sliding scale prior to eating each meal check blood sugar, follow the slidingscale. 112-220-izkx 2 units;981-081-toby 4 units; 008-740-iwjv 6 units; 053-134-dedq 8 units;

## 2017-12-05 ENCOUNTER — OFFICE VISIT (OUTPATIENT)
Dept: PRIMARY CARE CLINIC | Age: 72
End: 2017-12-05
Payer: MEDICARE

## 2017-12-05 ENCOUNTER — CARE COORDINATOR VISIT (OUTPATIENT)
Dept: CARE COORDINATION | Age: 72
End: 2017-12-05

## 2017-12-05 VITALS
DIASTOLIC BLOOD PRESSURE: 62 MMHG | WEIGHT: 255 LBS | OXYGEN SATURATION: 95 % | TEMPERATURE: 97.8 F | HEIGHT: 62 IN | BODY MASS INDEX: 46.93 KG/M2 | HEART RATE: 82 BPM | SYSTOLIC BLOOD PRESSURE: 132 MMHG

## 2017-12-05 DIAGNOSIS — J30.1 ACUTE SEASONAL ALLERGIC RHINITIS DUE TO POLLEN: ICD-10-CM

## 2017-12-05 DIAGNOSIS — E11.8 TYPE 2 DIABETES MELLITUS WITH COMPLICATION, WITH LONG-TERM CURRENT USE OF INSULIN (HCC): Primary | ICD-10-CM

## 2017-12-05 DIAGNOSIS — F39 MOOD DISORDER (HCC): ICD-10-CM

## 2017-12-05 DIAGNOSIS — K04.7 DENTAL ABSCESS: ICD-10-CM

## 2017-12-05 DIAGNOSIS — Z79.4 TYPE 2 DIABETES MELLITUS WITH COMPLICATION, WITH LONG-TERM CURRENT USE OF INSULIN (HCC): Primary | ICD-10-CM

## 2017-12-05 LAB — HBA1C MFR BLD: 7.6 %

## 2017-12-05 PROCEDURE — 83036 HEMOGLOBIN GLYCOSYLATED A1C: CPT | Performed by: NURSE PRACTITIONER

## 2017-12-05 PROCEDURE — G8399 PT W/DXA RESULTS DOCUMENT: HCPCS | Performed by: NURSE PRACTITIONER

## 2017-12-05 PROCEDURE — 3045F PR MOST RECENT HEMOGLOBIN A1C LEVEL 7.0-9.0%: CPT | Performed by: NURSE PRACTITIONER

## 2017-12-05 PROCEDURE — 3014F SCREEN MAMMO DOC REV: CPT | Performed by: NURSE PRACTITIONER

## 2017-12-05 PROCEDURE — G8484 FLU IMMUNIZE NO ADMIN: HCPCS | Performed by: NURSE PRACTITIONER

## 2017-12-05 PROCEDURE — 3017F COLORECTAL CA SCREEN DOC REV: CPT | Performed by: NURSE PRACTITIONER

## 2017-12-05 PROCEDURE — 1123F ACP DISCUSS/DSCN MKR DOCD: CPT | Performed by: NURSE PRACTITIONER

## 2017-12-05 PROCEDURE — G8417 CALC BMI ABV UP PARAM F/U: HCPCS | Performed by: NURSE PRACTITIONER

## 2017-12-05 PROCEDURE — 1036F TOBACCO NON-USER: CPT | Performed by: NURSE PRACTITIONER

## 2017-12-05 PROCEDURE — 1090F PRES/ABSN URINE INCON ASSESS: CPT | Performed by: NURSE PRACTITIONER

## 2017-12-05 PROCEDURE — G8427 DOCREV CUR MEDS BY ELIG CLIN: HCPCS | Performed by: NURSE PRACTITIONER

## 2017-12-05 PROCEDURE — 99215 OFFICE O/P EST HI 40 MIN: CPT | Performed by: NURSE PRACTITIONER

## 2017-12-05 PROCEDURE — 4040F PNEUMOC VAC/ADMIN/RCVD: CPT | Performed by: NURSE PRACTITIONER

## 2017-12-05 RX ORDER — CITALOPRAM 20 MG/1
TABLET ORAL
Qty: 30 TABLET | Refills: 3 | Status: SHIPPED | OUTPATIENT
Start: 2017-12-05 | End: 2018-03-06 | Stop reason: SDUPTHER

## 2017-12-05 RX ORDER — LORATADINE 10 MG/1
TABLET ORAL
Qty: 30 TABLET | Refills: 2 | Status: SHIPPED | OUTPATIENT
Start: 2017-12-05 | End: 2018-03-06 | Stop reason: SDUPTHER

## 2017-12-05 RX ORDER — DOXYCYCLINE HYCLATE 100 MG
100 TABLET ORAL 2 TIMES DAILY
Qty: 20 TABLET | Refills: 0 | Status: SHIPPED | OUTPATIENT
Start: 2017-12-05 | End: 2017-12-15

## 2017-12-05 RX ORDER — OXYBUTYNIN CHLORIDE 5 MG/1
TABLET, EXTENDED RELEASE ORAL
Qty: 30 TABLET | Refills: 2 | Status: SHIPPED | OUTPATIENT
Start: 2017-12-05 | End: 2018-04-10 | Stop reason: SDUPTHER

## 2017-12-05 ASSESSMENT — ENCOUNTER SYMPTOMS
GASTROINTESTINAL NEGATIVE: 1
EYES NEGATIVE: 1
RESPIRATORY NEGATIVE: 1

## 2017-12-06 ENCOUNTER — CARE COORDINATOR VISIT (OUTPATIENT)
Dept: CARE COORDINATION | Age: 72
End: 2017-12-06

## 2017-12-06 DIAGNOSIS — E11.8 TYPE 2 DIABETES MELLITUS WITH COMPLICATION, WITH LONG-TERM CURRENT USE OF INSULIN (HCC): ICD-10-CM

## 2017-12-06 DIAGNOSIS — M47.816 LUMBAR FACET ARTHROPATHY: ICD-10-CM

## 2017-12-06 DIAGNOSIS — Z79.4 TYPE 2 DIABETES MELLITUS WITH COMPLICATION, WITH LONG-TERM CURRENT USE OF INSULIN (HCC): ICD-10-CM

## 2017-12-06 LAB
ALBUMIN SERPL-MCNC: 4.4 G/DL (ref 3.5–5.2)
ALP BLD-CCNC: 92 U/L (ref 35–104)
ALT SERPL-CCNC: 34 U/L (ref 5–33)
ANION GAP SERPL CALCULATED.3IONS-SCNC: 18 MMOL/L (ref 7–19)
AST SERPL-CCNC: 28 U/L (ref 5–32)
BILIRUB SERPL-MCNC: 0.5 MG/DL (ref 0.2–1.2)
BUN BLDV-MCNC: 16 MG/DL (ref 8–23)
CALCIUM SERPL-MCNC: 9.4 MG/DL (ref 8.8–10.2)
CHLORIDE BLD-SCNC: 98 MMOL/L (ref 98–111)
CHOLESTEROL, TOTAL: 211 MG/DL (ref 160–199)
CO2: 23 MMOL/L (ref 22–29)
CREAT SERPL-MCNC: 0.6 MG/DL (ref 0.5–0.9)
CREATININE URINE: 30.1 MG/DL (ref 4.2–622)
GFR NON-AFRICAN AMERICAN: >60
GLUCOSE BLD-MCNC: 154 MG/DL (ref 74–109)
HCT VFR BLD CALC: 43.2 % (ref 37–47)
HDLC SERPL-MCNC: 44 MG/DL (ref 65–121)
HEMOGLOBIN: 13.5 G/DL (ref 12–16)
LDL CHOLESTEROL CALCULATED: 126 MG/DL
MCH RBC QN AUTO: 25.9 PG (ref 27–31)
MCHC RBC AUTO-ENTMCNC: 31.3 G/DL (ref 33–37)
MCV RBC AUTO: 82.9 FL (ref 81–99)
MICROALBUMIN UR-MCNC: 5.8 MG/DL (ref 0–19)
MICROALBUMIN/CREAT UR-RTO: 192.7 MG/G
PDW BLD-RTO: 14.3 % (ref 11.5–14.5)
PLATELET # BLD: 287 K/UL (ref 130–400)
PMV BLD AUTO: 9.8 FL (ref 9.4–12.3)
POTASSIUM SERPL-SCNC: 4.2 MMOL/L (ref 3.5–5)
RBC # BLD: 5.21 M/UL (ref 4.2–5.4)
SODIUM BLD-SCNC: 139 MMOL/L (ref 136–145)
TOTAL PROTEIN: 7.6 G/DL (ref 6.6–8.7)
TRIGL SERPL-MCNC: 203 MG/DL (ref 0–149)
TSH SERPL DL<=0.05 MIU/L-ACNC: 1.13 UIU/ML (ref 0.27–4.2)
WBC # BLD: 8.3 K/UL (ref 4.8–10.8)

## 2017-12-06 NOTE — CARE COORDINATION
albuterol (PROVENTIL HFA;VENTOLIN HFA) 108 (90 BASE) MCG/ACT inhaler Inhale 2 puffs into the lungs every 4 hours as needed for Wheezing    Vladimir Renner MD   Incontinence Supply Disposable (POISE PANTILINERS) PADS Use pads as needed daily for urine leakage. 3/24/14   ANTONIA Ross   aspirin 81 MG chewable tablet Take 81 mg by mouth daily.     Historical Provider, MD       Future Appointments  Date Time Provider Alberto Khan   1/17/2018 2:45 PM Shelly Cordero MD [facility]   2/13/2018 2:15 PM Ronaldo Phoenix, APRN [facility]   2/27/2018 11:00 AM Bonilla Leggett MD [facility]

## 2017-12-06 NOTE — LETTER
[address] [id number] [facility], [address] [place] [address], [address]    [address] Worker  Ph [phone]      December 6, 2017    [address] [facility]      Dear Michael Chawla:    Chiqui Pruitt informed me that she spoke with you yesterday afternoon, and she stated you have some needs to get addressed:    1. Teeth Extractions - Please call [facility] in [place], [place], on Friday mornings at 08:00 am.  This is the only time they make appointments for the following week. [facility] is a no-cost organization. It makes appointments on a first-come, first-served basis. You may need to call multiple Fridays to get an appointment. And, definitely start dialing at 08:00. The number is [phone]. 2.  Personal Claim of Disability - We have talked about this several times. I would like you to call our local Social Security Administration office on [address] [facility], in [place], to schedule an appointment to talk to them about being declared disabled. Again, this office and providers will not declare anyone disabled. If you are truly disabled, only [facility] can make that determination. The number is [phone]. 3.  Rent at the [address] [facility] live in a complex that bases rent on income. Unfortunately, it is beyond the scope of what we do to ask your landlord to lower your rent. I suggest you take your financial paperwork to the office to meet with the  to re-evaluate your current rate. As a thought, please ask your manager to write-out how your rent is calculated so you have a copy for your records. 4.  Unpacking Boxes - Yay! You have a new apartment, and it is freshly remodeled. I do not know of anyone to help you unpack, unless it is family or friends.   You may want to ask your  if he/she

## 2017-12-07 ENCOUNTER — TELEPHONE (OUTPATIENT)
Dept: PRIMARY CARE CLINIC | Age: 72
End: 2017-12-07

## 2017-12-07 NOTE — TELEPHONE ENCOUNTER
----- Message from ANTONIA Andersen sent at 12/7/2017  8:31 AM CST -----  Please let patient know that labs have returned. They are stable from last check. Her cholesterol panel improved slightly.

## 2017-12-22 ENCOUNTER — RESULTS ENCOUNTER (OUTPATIENT)
Dept: BARIATRICS/WEIGHT MGMT | Facility: CLINIC | Age: 72
End: 2017-12-22

## 2017-12-22 DIAGNOSIS — E11.69 DIABETES MELLITUS TYPE 2 IN OBESE (HCC): ICD-10-CM

## 2017-12-22 DIAGNOSIS — E66.01 MORBID OBESITY, UNSPECIFIED OBESITY TYPE (HCC): ICD-10-CM

## 2017-12-22 DIAGNOSIS — E66.9 DIABETES MELLITUS TYPE 2 IN OBESE (HCC): ICD-10-CM

## 2017-12-22 DIAGNOSIS — E78.5 HYPERLIPIDEMIA, UNSPECIFIED HYPERLIPIDEMIA TYPE: ICD-10-CM

## 2017-12-22 DIAGNOSIS — I10 ESSENTIAL HYPERTENSION: ICD-10-CM

## 2017-12-26 ENCOUNTER — CARE COORDINATION (OUTPATIENT)
Dept: CARE COORDINATION | Age: 72
End: 2017-12-26

## 2017-12-27 RX ORDER — MONTELUKAST SODIUM 10 MG/1
TABLET ORAL
Qty: 90 TABLET | Refills: 0 | Status: SHIPPED | OUTPATIENT
Start: 2017-12-27 | End: 2018-03-19 | Stop reason: SDUPTHER

## 2017-12-27 RX ORDER — PRAVASTATIN SODIUM 20 MG
TABLET ORAL
Qty: 30 TABLET | Refills: 0 | Status: SHIPPED | OUTPATIENT
Start: 2017-12-27 | End: 2018-02-06 | Stop reason: SDUPTHER

## 2017-12-28 RX ORDER — HYDROCODONE BITARTRATE AND ACETAMINOPHEN 10; 325 MG/1; MG/1
1 TABLET ORAL DAILY PRN
Qty: 30 TABLET | Refills: 0 | Status: SHIPPED | OUTPATIENT
Start: 2017-12-29 | End: 2018-01-26 | Stop reason: SDUPTHER

## 2018-01-02 NOTE — CARE COORDINATION
Ambulatory Care Coordination Note  12/26/2017  CM Risk Score: 6  Haylie Mortality Risk Score: 5.81    ACC: Pushpa Lopez, RN    Summary Note: ACC spoke with pt, pt was more worried about teeth needing to be pulled and the cost of the procedure to focus on a different topic. Gave the pt the number to [facility] [phone] and told her to give the clinic a call on Friday mornings to see if she could get an appt there for free and pt voiced understanding         Care Coordination Interventions    Program Enrollment:  Rising Risk  Referral from Primary Care Provider:  No  Suggested Interventions and Community Resources  Medication Assistance Program:  Completed (Comment: Pt has received Novolog, Levemir, Jardiance and Wale.Medal )  Pharmacist:  Declined  Other Services:  Completed (Comment: 12/5/17-pt picked up the last shipment of Lantus and Novalog from the [facility], pt has to reapply in 2018 for assistance)  Zone Management Tools: In Process (Comment: 12/26/17-pt unable to give any blood sugar readings at this time, pt was more worried about issues with teeth)  Other Services or Interventions:  12/26/17-pt still has c/o teeth that are needing pulled and the cost, and c/o sleep apnea machine          Goals Addressed     None          Prior to Admission medications    Medication Sig Start Date End Date Taking? Authorizing Provider   HYDROcodone-acetaminophen (NORCO)  MG per tablet Take 1 tablet by mouth daily as needed for Pain (may fill 12/29/17) .  33/98/65   Chiquita G ANTONIA Garcia   pravastatin (PRAVACHOL) 20 MG tablet TAKE 1 TABLET DAILY 12/27/17   Jia Palomino MD   montelukast (SINGULAIR) 10 MG tablet TAKE 1 TABLET BY MOUTH AT NIGHT 12/27/17   Jia Palomino MD   oxybutynin (DITROPAN-XL) 5 MG extended release tablet TAKE 1 TABLET DAILY 12/5/17   ANTONIA Larsen   loratadine (CLARITIN) 10 MG tablet TAKE 1 TABLET DAILY 12/5/17   ANTONIA Larsen   citalopram (CELEXA) 20 MG tablet TAKE 1 TABLET DAILY 12/5/17   ANTONIA Power   losartan (COZAAR) 100 MG tablet TAKE (1) TABLET DAILY FOR BLOOD PRESSURE. 11/2/17   ANTONIA Tilley   gabapentin (NEURONTIN) 300 MG capsule TAKE 1 CAPSULE THREE TIMES DAILY. 10/5/17   Kd Nguyen MD   vitamin D (ERGOCALCIFEROL) 59151 units CAPS capsule TAKE 1 CAPSULE ONCE A WEEK. 10/2/17   Kd Nguyen MD   INSULIN SYRINGE 1CC/29G 29G X 1/2\" 1 ML MISC Use with each dose of novolog TID 9/14/17   Kd Nguyen MD   insulin detemir (LEVEMIR) 100 UNIT/ML injection pen Inject 50 Units into the skin 2 times daily 3/14/17   Historical Provider, MD   Insulin Pen Needle 32G X 6 MM MISC As directed 6/28/17   Kd Nguyen MD   metoprolol succinate (TOPROL XL) 50 MG extended release tablet TAKE 1 TABLET DAILY FOR BLOOD PRESSURE AND HEART PROTECTION. 6/15/17   Kd Nguyen MD   omeprazole (PRILOSEC) 20 MG delayed release capsule TAKE (1) CAPSULE BY MOUTH TWICE DAILY AS NEEDED. 6/15/17   Kd Nguyen MD   empagliflozin (JARDIANCE) 10 MG tablet Take 1 tablet by mouth daily Samples given to pt 3 bottles [id number] 7/2019 1 bottle [id number] 3/2019 5/16/17   Kd Nguyen MD   tiotropium (SPIRIVA HANDIHALER) 18 MCG inhalation capsule Inhale 1 capsule into the lungs daily 5/3/17   Kd Nguyen MD   mometasone-formoterol [facility]) 200-5 MCG/ACT inhaler Inhale 2 puffs into the lungs every 12 hours    Historical Provider, MD   sitaGLIPtan-metformin (JANUMET)  MG per tablet TAKE 1 TABLET TWICE DAILY WITH MEALS. 2/14/17   Kd Nguyen MD   insulin aspart (NOVOLOG FLEXPEN) 100 UNIT/ML injection pen Use sliding scale prior to eating each meal check blood sugar, follow the slidingscale. 175-929-uzfp 2 units;463-301-jfgh 4 units; 070-779-zhtz 6 units; 340-220-wtms 8 units; 827-041-nzds 10 units over 400 call MD 9/2/16   Kd Nguyen MD   anastrozole (ARIMIDEX) 1 MG tablet Take 1 mg

## 2018-01-19 ENCOUNTER — RESULTS ENCOUNTER (OUTPATIENT)
Dept: BARIATRICS/WEIGHT MGMT | Facility: CLINIC | Age: 73
End: 2018-01-19

## 2018-01-19 DIAGNOSIS — E66.01 MORBID OBESITY, UNSPECIFIED OBESITY TYPE (HCC): ICD-10-CM

## 2018-01-19 DIAGNOSIS — I10 ESSENTIAL HYPERTENSION: ICD-10-CM

## 2018-01-19 DIAGNOSIS — E11.69 DIABETES MELLITUS TYPE 2 IN OBESE (HCC): ICD-10-CM

## 2018-01-19 DIAGNOSIS — E66.9 DIABETES MELLITUS TYPE 2 IN OBESE (HCC): ICD-10-CM

## 2018-01-19 DIAGNOSIS — E78.5 HYPERLIPIDEMIA, UNSPECIFIED HYPERLIPIDEMIA TYPE: ICD-10-CM

## 2018-01-26 RX ORDER — HYDROCODONE BITARTRATE AND ACETAMINOPHEN 10; 325 MG/1; MG/1
1 TABLET ORAL DAILY PRN
Qty: 30 TABLET | Refills: 0 | Status: SHIPPED | OUTPATIENT
Start: 2018-01-29 | End: 2018-02-27 | Stop reason: SDUPTHER

## 2018-02-05 RX ORDER — PRAVASTATIN SODIUM 20 MG
TABLET ORAL
Qty: 30 TABLET | Refills: 0 | Status: CANCELLED | OUTPATIENT
Start: 2018-02-05

## 2018-02-06 RX ORDER — LOVASTATIN 10 MG/1
10 TABLET ORAL NIGHTLY
Qty: 30 TABLET | Refills: 3 | Status: ON HOLD | OUTPATIENT
Start: 2018-02-06 | End: 2018-07-26 | Stop reason: ALTCHOICE

## 2018-02-09 DIAGNOSIS — C50.919 MALIGNANT NEOPLASM OF FEMALE BREAST, UNSPECIFIED ESTROGEN RECEPTOR STATUS, UNSPECIFIED LATERALITY, UNSPECIFIED SITE OF BREAST (HCC): Primary | ICD-10-CM

## 2018-02-12 ENCOUNTER — LAB (OUTPATIENT)
Dept: LAB | Facility: HOSPITAL | Age: 73
End: 2018-02-12

## 2018-02-12 ENCOUNTER — OFFICE VISIT (OUTPATIENT)
Dept: ONCOLOGY | Facility: CLINIC | Age: 73
End: 2018-02-12

## 2018-02-12 VITALS
TEMPERATURE: 98.1 F | DIASTOLIC BLOOD PRESSURE: 78 MMHG | WEIGHT: 200 LBS | HEART RATE: 84 BPM | HEIGHT: 62 IN | SYSTOLIC BLOOD PRESSURE: 130 MMHG | BODY MASS INDEX: 36.8 KG/M2 | RESPIRATION RATE: 18 BRPM | OXYGEN SATURATION: 95 %

## 2018-02-12 DIAGNOSIS — C50.812 MALIGNANT NEOPLASM OF OVERLAPPING SITES OF LEFT BREAST IN FEMALE, ESTROGEN RECEPTOR POSITIVE (HCC): Primary | ICD-10-CM

## 2018-02-12 DIAGNOSIS — Z17.0 MALIGNANT NEOPLASM OF OVERLAPPING SITES OF LEFT BREAST IN FEMALE, ESTROGEN RECEPTOR POSITIVE (HCC): Primary | ICD-10-CM

## 2018-02-12 DIAGNOSIS — C50.412 MALIGNANT NEOPLASM OF UPPER-OUTER QUADRANT OF LEFT FEMALE BREAST, UNSPECIFIED ESTROGEN RECEPTOR STATUS (HCC): Primary | ICD-10-CM

## 2018-02-12 LAB
ALBUMIN SERPL-MCNC: 4.2 G/DL (ref 3.5–5)
ALBUMIN/GLOB SERPL: 1.3 G/DL (ref 1.1–2.5)
ALP SERPL-CCNC: 96 U/L (ref 24–120)
ALT SERPL W P-5'-P-CCNC: 47 U/L (ref 0–54)
ANION GAP SERPL CALCULATED.3IONS-SCNC: 10 MMOL/L (ref 4–13)
AST SERPL-CCNC: 37 U/L (ref 7–45)
BASOPHILS # BLD AUTO: 0.04 10*3/MM3 (ref 0–0.2)
BASOPHILS NFR BLD AUTO: 0.4 % (ref 0–2)
BILIRUB SERPL-MCNC: 0.4 MG/DL (ref 0.1–1)
BUN BLD-MCNC: 17 MG/DL (ref 5–21)
BUN/CREAT SERPL: 17.9 (ref 7–25)
CALCIUM SPEC-SCNC: 9.3 MG/DL (ref 8.4–10.4)
CHLORIDE SERPL-SCNC: 97 MMOL/L (ref 98–110)
CO2 SERPL-SCNC: 31 MMOL/L (ref 24–31)
CREAT BLD-MCNC: 0.95 MG/DL (ref 0.5–1.4)
DEPRECATED RDW RBC AUTO: 41.2 FL (ref 40–54)
EOSINOPHIL # BLD AUTO: 0.28 10*3/MM3 (ref 0–0.7)
EOSINOPHIL NFR BLD AUTO: 2.8 % (ref 0–4)
ERYTHROCYTE [DISTWIDTH] IN BLOOD BY AUTOMATED COUNT: 14.1 % (ref 12–15)
GFR SERPL CREATININE-BSD FRML MDRD: 58 ML/MIN/1.73
GLOBULIN UR ELPH-MCNC: 3.2 GM/DL
GLUCOSE BLD-MCNC: 196 MG/DL (ref 70–100)
HCT VFR BLD AUTO: 43 % (ref 37–47)
HGB BLD-MCNC: 13.8 G/DL (ref 12–16)
HOLD SPECIMEN: NORMAL
HOLD SPECIMEN: NORMAL
IMM GRANULOCYTES # BLD: 0.05 10*3/MM3 (ref 0–0.03)
IMM GRANULOCYTES NFR BLD: 0.5 % (ref 0–5)
LYMPHOCYTES # BLD AUTO: 3.2 10*3/MM3 (ref 0.72–4.86)
LYMPHOCYTES NFR BLD AUTO: 32.3 % (ref 15–45)
MCH RBC QN AUTO: 26 PG (ref 28–32)
MCHC RBC AUTO-ENTMCNC: 32.1 G/DL (ref 33–36)
MCV RBC AUTO: 81 FL (ref 82–98)
MONOCYTES # BLD AUTO: 0.77 10*3/MM3 (ref 0.19–1.3)
MONOCYTES NFR BLD AUTO: 7.8 % (ref 4–12)
NEUTROPHILS # BLD AUTO: 5.58 10*3/MM3 (ref 1.87–8.4)
NEUTROPHILS NFR BLD AUTO: 56.2 % (ref 39–78)
NRBC BLD MANUAL-RTO: 0 /100 WBC (ref 0–0)
PLATELET # BLD AUTO: 331 10*3/MM3 (ref 130–400)
PMV BLD AUTO: 9.6 FL (ref 6–12)
POTASSIUM BLD-SCNC: 4.8 MMOL/L (ref 3.5–5.3)
PROT SERPL-MCNC: 7.4 G/DL (ref 6.3–8.7)
RBC # BLD AUTO: 5.31 10*6/MM3 (ref 4.2–5.4)
SODIUM BLD-SCNC: 138 MMOL/L (ref 135–145)
WBC NRBC COR # BLD: 9.92 10*3/MM3 (ref 4.8–10.8)

## 2018-02-12 PROCEDURE — 99214 OFFICE O/P EST MOD 30 MIN: CPT | Performed by: INTERNAL MEDICINE

## 2018-02-12 PROCEDURE — 85025 COMPLETE CBC W/AUTO DIFF WBC: CPT | Performed by: INTERNAL MEDICINE

## 2018-02-12 PROCEDURE — 36415 COLL VENOUS BLD VENIPUNCTURE: CPT

## 2018-02-12 PROCEDURE — 80053 COMPREHEN METABOLIC PANEL: CPT | Performed by: INTERNAL MEDICINE

## 2018-02-12 RX ORDER — ANASTROZOLE 1 MG/1
1 TABLET ORAL DAILY
Qty: 30 TABLET | Refills: 5 | Status: SHIPPED | OUTPATIENT
Start: 2018-02-12 | End: 2018-09-07 | Stop reason: SDUPTHER

## 2018-02-16 ENCOUNTER — RESULTS ENCOUNTER (OUTPATIENT)
Dept: BARIATRICS/WEIGHT MGMT | Facility: CLINIC | Age: 73
End: 2018-02-16

## 2018-02-16 DIAGNOSIS — E78.5 HYPERLIPIDEMIA, UNSPECIFIED HYPERLIPIDEMIA TYPE: ICD-10-CM

## 2018-02-16 DIAGNOSIS — E11.69 DIABETES MELLITUS TYPE 2 IN OBESE (HCC): ICD-10-CM

## 2018-02-16 DIAGNOSIS — I10 ESSENTIAL HYPERTENSION: ICD-10-CM

## 2018-02-16 DIAGNOSIS — E66.01 MORBID OBESITY, UNSPECIFIED OBESITY TYPE (HCC): ICD-10-CM

## 2018-02-16 DIAGNOSIS — E66.9 DIABETES MELLITUS TYPE 2 IN OBESE (HCC): ICD-10-CM

## 2018-02-20 ENCOUNTER — CARE COORDINATION (OUTPATIENT)
Dept: CARE COORDINATION | Age: 73
End: 2018-02-20

## 2018-02-20 DIAGNOSIS — M47.816 LUMBAR FACET ARTHROPATHY: ICD-10-CM

## 2018-02-27 ENCOUNTER — HOSPITAL ENCOUNTER (OUTPATIENT)
Dept: PAIN MANAGEMENT | Age: 73
Discharge: HOME | End: 2018-02-27
Payer: MEDICARE

## 2018-02-27 VITALS
HEART RATE: 81 BPM | DIASTOLIC BLOOD PRESSURE: 63 MMHG | RESPIRATION RATE: 16 BRPM | OXYGEN SATURATION: 93 % | WEIGHT: 251 LBS | TEMPERATURE: 97.8 F | SYSTOLIC BLOOD PRESSURE: 119 MMHG | BODY MASS INDEX: 46.19 KG/M2 | HEIGHT: 62 IN

## 2018-02-27 DIAGNOSIS — M54.59 LUMBAR FACET JOINT PAIN: ICD-10-CM

## 2018-02-27 DIAGNOSIS — M47.816 LUMBAR FACET ARTHROPATHY: ICD-10-CM

## 2018-02-27 DIAGNOSIS — G57.93 NEUROPATHY INVOLVING BOTH LOWER EXTREMITIES: ICD-10-CM

## 2018-02-27 PROCEDURE — 64636 DESTROY L/S FACET JNT ADDL: CPT

## 2018-02-27 PROCEDURE — 64635 DESTROY LUMB/SAC FACET JNT: CPT

## 2018-02-27 PROCEDURE — 2500000003 HC RX 250 WO HCPCS

## 2018-02-27 PROCEDURE — 80307 DRUG TEST PRSMV CHEM ANLYZR: CPT

## 2018-02-27 PROCEDURE — 6360000002 HC RX W HCPCS

## 2018-02-27 PROCEDURE — 3209999900 FLUORO FOR SURGICAL PROCEDURES

## 2018-02-27 PROCEDURE — 20553 NJX 1/MLT TRIGGER POINTS 3/>: CPT

## 2018-02-27 RX ORDER — HYDROCODONE BITARTRATE AND ACETAMINOPHEN 10; 325 MG/1; MG/1
1 TABLET ORAL 2 TIMES DAILY PRN
Qty: 30 TABLET | Refills: 0 | Status: SHIPPED | OUTPATIENT
Start: 2018-03-02 | End: 2018-02-27 | Stop reason: SDUPTHER

## 2018-02-27 RX ORDER — TRIAMCINOLONE ACETONIDE 40 MG/ML
INJECTION, SUSPENSION INTRA-ARTICULAR; INTRAMUSCULAR
Status: COMPLETED | OUTPATIENT
Start: 2018-02-27 | End: 2018-02-27

## 2018-02-27 RX ORDER — BUPIVACAINE HYDROCHLORIDE 5 MG/ML
INJECTION, SOLUTION EPIDURAL; INTRACAUDAL
Status: COMPLETED | OUTPATIENT
Start: 2018-02-27 | End: 2018-02-27

## 2018-02-27 RX ORDER — LIDOCAINE HYDROCHLORIDE 20 MG/ML
INJECTION, SOLUTION EPIDURAL; INFILTRATION; INTRACAUDAL; PERINEURAL
Status: COMPLETED | OUTPATIENT
Start: 2018-02-27 | End: 2018-02-27

## 2018-02-27 RX ORDER — LIDOCAINE HYDROCHLORIDE 10 MG/ML
INJECTION, SOLUTION EPIDURAL; INFILTRATION; INTRACAUDAL; PERINEURAL
Status: COMPLETED | OUTPATIENT
Start: 2018-02-27 | End: 2018-02-27

## 2018-02-27 RX ORDER — HYDROCODONE BITARTRATE AND ACETAMINOPHEN 10; 325 MG/1; MG/1
1 TABLET ORAL 2 TIMES DAILY PRN
Qty: 60 TABLET | Refills: 0 | Status: SHIPPED | OUTPATIENT
Start: 2018-03-02 | End: 2018-03-20 | Stop reason: SDUPTHER

## 2018-02-27 RX ADMIN — BUPIVACAINE HYDROCHLORIDE 1 ML: 5 INJECTION, SOLUTION EPIDURAL; INTRACAUDAL at 12:26

## 2018-02-27 RX ADMIN — LIDOCAINE HYDROCHLORIDE 2 ML: 20 INJECTION, SOLUTION EPIDURAL; INFILTRATION; INTRACAUDAL; PERINEURAL at 12:23

## 2018-02-27 RX ADMIN — BUPIVACAINE HYDROCHLORIDE 9.5 ML: 5 INJECTION, SOLUTION EPIDURAL; INTRACAUDAL at 12:27

## 2018-02-27 RX ADMIN — LIDOCAINE HYDROCHLORIDE 3 ML: 10 INJECTION, SOLUTION EPIDURAL; INFILTRATION; INTRACAUDAL; PERINEURAL at 12:16

## 2018-02-27 RX ADMIN — TRIAMCINOLONE ACETONIDE 20 MG: 40 INJECTION, SUSPENSION INTRA-ARTICULAR; INTRAMUSCULAR at 12:30

## 2018-02-27 RX ADMIN — TRIAMCINOLONE ACETONIDE 8 MG: 40 INJECTION, SUSPENSION INTRA-ARTICULAR; INTRAMUSCULAR at 12:26

## 2018-02-27 ASSESSMENT — PAIN - FUNCTIONAL ASSESSMENT: PAIN_FUNCTIONAL_ASSESSMENT: 0-10

## 2018-02-27 ASSESSMENT — PAIN DESCRIPTION - DESCRIPTORS: DESCRIPTORS: ACHING;CONSTANT;THROBBING;RADIATING

## 2018-02-27 ASSESSMENT — ACTIVITIES OF DAILY LIVING (ADL): EFFECT OF PAIN ON DAILY ACTIVITIES: LIMITS ACTIVITY

## 2018-02-27 NOTE — PROCEDURES
DATE: 2/27/2018    REASON FOR VISIT: Lumbar Degenerative Disc Disease, Lumbar Facets Syndrome      PROCEDURE: Radiofrequency Lesioning. [] Moderate Sedation    DESCRIPTION OF PROCEDURE:    After obtaining informed consent, the patient was taken to the procedure room, positioned [x] prone [] laterally, and sterilely prepped. The procedure was done with fluoroscopic imaging. First, 3ml of 1% Xylocaine was used for local anesthesia. Using fluoroscopic guidance a [] 5mm [x] 10mm Accutip probe was passed to the median nerve branches of the [] Cervical [] Thoracic [x] Lumbar facets on the [x] Left [] Right  At L5-S1, L4-5. Stimulation at 50 hertz to less than 1 volt reproduced localized pain at these sites. [x] (At 2 hertz stimulation up to 3 volts did not produce any radicular signs or symptoms.)  After injection of [] 0.5ml [x] 1ml of 2% Xylocaine an 80-degree, [] 60  [x] 90-second lesion was created. Then [] 0.5ml [x] 1ml of 0.5% Marcaine and [] 4mg  [x] 8mg of Kenalog was injected. [] The fluoroscope was repositioned to the [] Left  [x] Right and the procedure was repeated identically at L5-S1, L4-5. There were no complications. OUTCOME OF THE PROCEDURE  [x] The patient felt improved after the procedure. DIAGNOSES:  [x] Cervical/Lumbar Degenerative Disc Disease  [x] *Facet Syndrome [] Cervical  [] Cervical/Lumbar Radiculopathy    [] Other    DATE: 2/27/2018      REASON FOR VISIT: Myofascial Pain Syndrome    PROCEDURE: Trigger Point Injection                          [] Moderate Sedation    DESCRIPTION OF PROCEDURE:    After obtaining informed consent 9.5 ml of 0.5% Marcaine with 20 mg of Kenalog  Was divided into tense areas of muscle spasm in the   [] Right   [] Left   [x] Bilateral   [x] Splenius   [x] Rhomboids   [x] Trapezius   [] Latissimus   [] Erector Spinae                 [] Supraspinatus   [] Infraspinatus   [] Paraspinous   [] Other     There were no complications.     DIAGNOSES:    [] Muscle tension and spasms   [x] *Myofascial Pain Syndrome   [] Cervicalgia  [] Cephalgia   [] Other     Nursing Admission Record    Current Issues / Falls / ER Visits:  Yes Patient states she needs to have a couple teeth pulled. Patient is requesting for additional pain medication until she can afford to get it pulled    Percentage of Pain Relief after Last Procedure:  50 %    How long lasted:  3 months    Radiology exams received during the last 12 months: No       When na                                              Where na       Imaging on chart: No         Imaging records requested: No  MRI exams received in the past 2 years:  Yes MRI lumbar spine 4/2017 @ [facility]  Physical therapy during the last 6 months: No       When: na                                             Where  na  Labs during the last 12 months: Yes        Tinels positive in left   Patient has neuropathy in bilateral feet  Patient state that she has coldness in her feet     COMMENTS:  Patient c/o neck pain and low back pain. Patient states that she is having issues with her right arm, patient thinks that this derives from lymphedema. Patient states that she has sleep apnea with bipap usage and fibromyalgia. Patient rates her pain level a 5/10 on numeric scale today. Discussed risks and benefits of procedure with patient today. Patient states that she has to have 4 teeth that need to be abstracted and will undergo 1 at a time. Patient informed to prolong to in a few weeks to begin abstractions in relation to cortisone injections. Informed patient that she will receive additional Norco tablets for the dental procedure. Discussed the importance of having narcotic free days. Will proceed with radiofrequency lesioning lumbar facets and cervical trigger point injections today. PLAN:  [x] Will return to office in 3 month(s) for   [] Planned Procedure  [x] Office Visit.   [x] Prescriptions were given today   [] No prescriptions needed today  [x]

## 2018-02-28 ENCOUNTER — CARE COORDINATION (OUTPATIENT)
Dept: CARE COORDINATION | Age: 73
End: 2018-02-28

## 2018-02-28 DIAGNOSIS — M47.816 LUMBAR FACET ARTHROPATHY: ICD-10-CM

## 2018-02-28 LAB
AMPHETAMINES, URINE: NEGATIVE NG/ML
BARBITURATES, URINE: NEGATIVE NG/ML
BENZODIAZEPINES, URINE: NEGATIVE NG/ML
CANNABINOIDS, URINE: NEGATIVE NG/ML
COCAINE METABOLITE, URINE: NEGATIVE NG/ML
CREATININE, URINE: 49.8 MG/DL (ref 20–300)
ETHANOL U, QUAN: NEGATIVE %
FENTANYL URINE: NEGATIVE PG/ML
MEPERIDINE, UR: NEGATIVE NG/ML
METHADONE SCREEN, URINE: NEGATIVE NG/ML
OPIATES, URINE: NEGATIVE NG/ML
OXYCODONE/OXYMORPHONE, UR: NEGATIVE NG/ML
PH, URINE: 6.4 (ref 4.5–8.9)
PHENCYCLIDINE, URINE: NEGATIVE NG/ML
PROPOXYPHENE, URINE: NEGATIVE NG/ML

## 2018-02-28 NOTE — CARE COORDINATION
PATIENT TELEPHONED. LEFT A VOICE MESSAGE STATING SHE NEEDS ASSISTANCE WITH JANUMET PAPERWORK; INSURANCE COVERAGE; AND CPAP PAPERWORK. RETURNED THE PATIENT'S CALL. ADDRESSED THE FOLLOWING:    - CPAP PAPERWORK:  TOLD WILLIAM THAT I HAD REFAXED THE SIGNED PAPERWORK TO [employer]. TOLD HER I WOULD CALL THEM TO CHECK ON STATUS. SHE VERBALIZED UNDERSTANDING.    - WILLIAM STATED SHE WAS TOLD BY GILBERTO, OP REGISTRATION AT [facility], THAT SHE DID NOT HAVE INSURANCE COVERAGE. TOLD WILLIAM SHE HAS TRADITIONAL MEDICATION WITH [employer] PREFERRED RX FOR PART D. PATIENT STATED SHE HAD [employer] PREFERRED AS HER INSURANCE COVERAGE. TOLD HER, AGAIN, IT WAS PART D ONLY. I TELEPHONED GILBERTO IN REGISTRATION. SHE RECALLED THE PATIENT. SHE STATED THE PATIENT WAS TRYING TO GIVE HER THE [employer] CARD AS HER INSURANCE CARD. THE PATIENT DID NOT UNDERSTAND THAT IT WAS A PRESCRIPTION PLAN ONLY. REVIEWED THE MEDICARE VERIFICATION. DISCREPANCY BETWEEN MEDICARE ID IN THE PATIENT'S MEDICAL CHART AND HER MEDICARE CARD. [facility] \"[facility] BUT THE CARD HAS THE NUMBER WITH \"W\". TELEPHONED YUSUF WHITMAN. ASKED HER TO REVIEW. SHE STATED SHE WILL GET THE INFORMATION IN [facility] CORRECTED. - PATIENT STATED SHE HAS AN APPOINTMENT WITH [facility]iscoe TO COMPLETE HER PAPERWORK FOR PRESCRIPTION ASSISTANCE. SHE SAID SHE WILL BE DROPPING OFF THE PAPERWORK FOR DR JOINER TO COMPLETE. Submitted by Ara/CHET    TELEPHONED Hesham. SPOKE WITH ROSARIO.  SHE STATED THEY RECEIVED THE SIGNED ORDER FROM DR JOINER, BUT SHE SAID THEY NEED A COPY OF THE PATIENT'S SLEEP STUDY AND DOCUMENTATION FROM DR REINA VILLEGAS [facility] THAT THE PATIENT IS COMPLIANT IN USING HER CPAP. ROUTED MESSAGE TO JOCELINE JHA FOR DR JOINER, TO ADDRESS ADDITIONAL PAPERWORK.     Submitted by Ara/CHET

## 2018-03-06 DIAGNOSIS — G57.93 NEUROPATHY INVOLVING BOTH LOWER EXTREMITIES: Chronic | ICD-10-CM

## 2018-03-07 ENCOUNTER — CARE COORDINATION (OUTPATIENT)
Dept: CARE COORDINATION | Age: 73
End: 2018-03-07

## 2018-03-07 ENCOUNTER — OFFICE VISIT (OUTPATIENT)
Dept: PRIMARY CARE CLINIC | Age: 73
End: 2018-03-07
Payer: MEDICARE

## 2018-03-07 VITALS
RESPIRATION RATE: 18 BRPM | DIASTOLIC BLOOD PRESSURE: 62 MMHG | SYSTOLIC BLOOD PRESSURE: 104 MMHG | TEMPERATURE: 97.2 F | BODY MASS INDEX: 46.27 KG/M2 | WEIGHT: 253 LBS | OXYGEN SATURATION: 98 % | HEART RATE: 82 BPM

## 2018-03-07 DIAGNOSIS — Z74.09 IMPAIRED MOBILITY AND ACTIVITIES OF DAILY LIVING: ICD-10-CM

## 2018-03-07 DIAGNOSIS — H91.93 DECREASED HEARING OF BOTH EARS: ICD-10-CM

## 2018-03-07 DIAGNOSIS — H61.23 BILATERAL IMPACTED CERUMEN: Primary | ICD-10-CM

## 2018-03-07 DIAGNOSIS — R06.83 SNORING: ICD-10-CM

## 2018-03-07 DIAGNOSIS — Z78.9 IMPAIRED MOBILITY AND ACTIVITIES OF DAILY LIVING: ICD-10-CM

## 2018-03-07 DIAGNOSIS — G47.30 SLEEP APNEA, UNSPECIFIED TYPE: ICD-10-CM

## 2018-03-07 PROCEDURE — G8417 CALC BMI ABV UP PARAM F/U: HCPCS | Performed by: NURSE PRACTITIONER

## 2018-03-07 PROCEDURE — 1036F TOBACCO NON-USER: CPT | Performed by: NURSE PRACTITIONER

## 2018-03-07 PROCEDURE — 3017F COLORECTAL CA SCREEN DOC REV: CPT | Performed by: NURSE PRACTITIONER

## 2018-03-07 PROCEDURE — 1123F ACP DISCUSS/DSCN MKR DOCD: CPT | Performed by: NURSE PRACTITIONER

## 2018-03-07 PROCEDURE — G8399 PT W/DXA RESULTS DOCUMENT: HCPCS | Performed by: NURSE PRACTITIONER

## 2018-03-07 PROCEDURE — 99213 OFFICE O/P EST LOW 20 MIN: CPT | Performed by: NURSE PRACTITIONER

## 2018-03-07 PROCEDURE — G8428 CUR MEDS NOT DOCUMENT: HCPCS | Performed by: NURSE PRACTITIONER

## 2018-03-07 PROCEDURE — G8484 FLU IMMUNIZE NO ADMIN: HCPCS | Performed by: NURSE PRACTITIONER

## 2018-03-07 PROCEDURE — 69210 REMOVE IMPACTED EAR WAX UNI: CPT | Performed by: NURSE PRACTITIONER

## 2018-03-07 PROCEDURE — 3014F SCREEN MAMMO DOC REV: CPT | Performed by: NURSE PRACTITIONER

## 2018-03-07 PROCEDURE — 1090F PRES/ABSN URINE INCON ASSESS: CPT | Performed by: NURSE PRACTITIONER

## 2018-03-07 PROCEDURE — 4040F PNEUMOC VAC/ADMIN/RCVD: CPT | Performed by: NURSE PRACTITIONER

## 2018-03-07 ASSESSMENT — ENCOUNTER SYMPTOMS
WHEEZING: 0
DIARRHEA: 0
SHORTNESS OF BREATH: 0
EYE REDNESS: 0
TROUBLE SWALLOWING: 0
COUGH: 0
VOMITING: 0
ABDOMINAL PAIN: 0
SORE THROAT: 0
CHEST TIGHTNESS: 0
BLOOD IN STOOL: 0
NAUSEA: 0
RHINORRHEA: 0
VOICE CHANGE: 0
CONSTIPATION: 0

## 2018-03-07 NOTE — PROGRESS NOTES
[facility] PRIMARY CARE  [facility] [address] [address]  Dept: [phone]  Dept Fax: [phone]  Loc: [phone]        Lefty Boston is a 81 y.o. female who presents today for her medical conditions/ complaints as noted below. Lefty Boston is c/o 3 Month Follow-Up (Main Problem is ears being stopped up/ ) and Other (would like samples of Jardiance, levemier, novolog)        Chief Complaint   Patient presents with    3 Month Follow-Up     Main Problem is ears being stopped up/     Other     would like samples of Jardiance, levemier, novolog       HPI:     HPI    1. Sleep Apnea: Patient presents with possible obstructive sleep apnea. Patent has a a few years history of symptoms of daytime fatigue, morning fatigue, [place] sleepiness scale and morning headache. Patient generally gets 8 hours of sleep per night, and states they generally have difficulty falling asleep, nightime awakenings and difficulty falling back asleep if awakened. Snoring of severe severity is present. Apneic episodes is present. Nasal obstruction is not present. Patient has not had tonsillectomy. 2.  Lefty Boston requires the assistance of a standard walker to successfully complete daily living tasks such as: bathing, toileting, dressing and grooming. A standard walker is necessary due to the patient's impaired ambulation and mobility restrictions, and can ambulate only by using a walker instead of a lesser assistive device, such as a cane or crutch. 3. Ear fullness, bilateral. Wishes to have ears cleaned out.      Electronically signed by Charmel Gitelman, APRN on 3/7/2018 at 4:59 PM      Past Medical History:   Diagnosis Date    Back pain 2/2/2016    Breast CA ([facility] [address]     Chronic back pain     Fibromyalgia     GERD (gastroesophageal reflux disease)     Hyperlipidemia     Hypertension     Joint pain 2/2/2016    Morbid obesity ([address]     Multiple food Physical Exam   Constitutional: She is oriented to person, place, and time. She appears well-developed and well-nourished. No distress. HENT:   Head: Normocephalic and atraumatic. Right Ear: External ear normal.   Left Ear: External ear normal.   Nose: Nose normal.   Mouth/Throat: Oropharynx is clear and moist. No oropharyngeal exudate. Eyes: Conjunctivae are normal. Pupils are equal, round, and reactive to light. Right eye exhibits no discharge. Left eye exhibits no discharge. Neck: Normal range of motion. Neck supple. Cardiovascular: Normal rate, regular rhythm, normal heart sounds and intact distal pulses. No murmur heard. Pulmonary/Chest: Effort normal and breath sounds normal. No stridor. No respiratory distress. She has no wheezes. She has no rales. She exhibits no tenderness. Right breast exhibits no inverted nipple, no mass, no nipple discharge, no skin change and no tenderness. Left breast exhibits no inverted nipple, no mass, no nipple discharge, no skin change and no tenderness. Abdominal: Soft. Bowel sounds are normal. She exhibits no distension. There is no tenderness. Musculoskeletal: She exhibits no edema, tenderness or deformity. Neurological: She is alert and oriented to person, place, and time. No cranial nerve deficit. Coordination abnormal.   Skin: Skin is warm and dry. No rash noted. She is not diaphoretic. No erythema. Psychiatric: She has a normal mood and affect. Her behavior is normal. Thought content normal.   Nursing note and vitals reviewed. /62   Pulse 82   Temp 97.2 °F (36.2 °C) (Temporal)   Resp 18   Wt 253 lb (114.8 kg)   SpO2 98%   BMI 46.27 kg/m²     Assessment:     1. Bilateral impacted cerumen  DC REMOVAL IMPACTED CERUMEN INSTRUMENTATION UNILAT    Remove impacted ear wax   2. Snoring     3. Sleep apnea, unspecified type     4. Impaired mobility and activities of daily living  Misc. Devices (ROLLER WALKER) MISC   5.  Decreased hearing of both Systems  already completed. Objective:      /62   Pulse 82   Temp 97.2 °F (36.2 °C) (Temporal)   Resp 18   Wt 253 lb (114.8 kg)   SpO2 98%   BMI 46.27 kg/m²   General: alert, appears stated age and cooperative   Right Ear: cerumen, dark brown/ black, hard   Left Ear:  normal appearance and cerumen, dark/ black, hard   After removal: normal bilaterally         Assessment:      Cerumen Impaction, without otitis externa. Plan:      Cerumen removed by flushing. Care instructions given. Home treatment: none. Follow up as needed.

## 2018-03-08 RX ORDER — GABAPENTIN 300 MG/1
CAPSULE ORAL
Qty: 90 CAPSULE | Refills: 5 | Status: SHIPPED | OUTPATIENT
Start: 2018-03-08 | End: 2018-03-13 | Stop reason: SDUPTHER

## 2018-03-08 NOTE — TELEPHONE ENCOUNTER
JARROD was reviewed today per office protocol. Report shows No discrepancies. Fill pattern is consistent from single provider(s) at single pharmacy(s). Prescription escribed.  Patient is aware to check within the pharmacy

## 2018-03-09 NOTE — CARE COORDINATION
MET WITH MERCEDEZ ABOUT WILLIAM REQUESTING SAMPLES FROM THE OFFICE. TOLD PERI [facility], THE PATIENT HAS BEEN TOLD THAT SHE IS NOT TO ASK FOR SAMPLES AS SAMPLES ARE LIMITED AND NEED TO BE USED FOR NEWLY DIAGNOSED PATIENTS. WILLIAM DOES NOT WANT TO PAY OUT OF POCKET FOR ANY OF HER MEDICATION. PATIENT IS WORKING WITH [employer] TO GET HER MEDS FREE. CURRENTLY, SHE IS HAVING DIFFICULTY GETTING MANUFACTURERS TO HELP HER AS SHE HAS NOT PAID OUT OF POCKET. SHE HAS TO MEET A MINIMUM DOLLAR AMOUNT ($1,100 APPROX) BEFORE SOME MANUFACTURERS WILL GIVE HER MEDICATION FOR FREE.     Submitted by Ara/CHET

## 2018-03-09 NOTE — CARE COORDINATION
CAPSULE BY MOUTH TWICE DAILY AS NEEDED. 6/15/17   Refugio Steven MD   empagliflozin (JARDIANCE) 10 MG tablet Take 1 tablet by mouth daily Samples given to pt 3 bottles [id number] 7/2019 1 bottle [id number] 3/2019 5/16/17   Refugio Steven MD   tiotropium (SPIRIVA HANDIHALER) 18 MCG inhalation capsule Inhale 1 capsule into the lungs daily 5/3/17   Refugio Steven MD   mometasone-formoterol [facility]) 200-5 MCG/ACT inhaler Inhale 2 puffs into the lungs every 12 hours    Historical Provider, MD   sitaGLIPtan-metformin (JANUMET)  MG per tablet TAKE 1 TABLET TWICE DAILY WITH MEALS. 2/14/17   Refugio Steven MD   insulin aspart (NOVOLOG FLEXPEN) 100 UNIT/ML injection pen Use sliding scale prior to eating each meal check blood sugar, follow the slidingscale. 314-036-dold 2 units;193-717-jwja 4 units; 543-612-yawo 6 units; 871-525-ntrp 8 units; 865-446-ddfm 10 units over 400 call MD 9/2/16   Refugio Steven MD   anastrozole (ARIMIDEX) 1 MG tablet Take 1 mg by mouth daily    Historical Provider, MD   Multiple Vitamins-Minerals (MULTIVITAMIN ADULT PO) Take by mouth    Historical Provider, MD   magnesium oxide (MAG-OX) 400 MG tablet Take 1 tablet by mouth daily 2/23/16   ANTONIA Soliman   rosuvastatin (CRESTOR) 10 MG tablet Take one tablet nightly    This rx needs to be faxed to [address] [place] [address] 087 fax # [phone]  Patient PA#[id number] 2/23/16   ANTONIA Soliman   albuterol (PROVENTIL HFA;VENTOLIN HFA) 108 (90 BASE) MCG/ACT inhaler Inhale 2 puffs into the lungs every 4 hours as needed for Wheezing    Alexandra Riddle MD   Incontinence Supply Disposable (POISE PANTILINERS) PADS Use pads as needed daily for urine leakage. 3/24/14   ANTONIA Trevino   aspirin 81 MG chewable tablet Take 81 mg by mouth daily.     Historical Provider, MD       Future Appointments  Date Time Provider Alberto Khan   5/29/2018 1:30 PM ANTONIA Castillo [facility]

## 2018-03-13 ENCOUNTER — OFFICE VISIT (OUTPATIENT)
Dept: PRIMARY CARE CLINIC | Age: 73
End: 2018-03-13
Payer: MEDICARE

## 2018-03-13 ENCOUNTER — CARE COORDINATION (OUTPATIENT)
Dept: CARE COORDINATION | Age: 73
End: 2018-03-13

## 2018-03-13 VITALS
WEIGHT: 253.2 LBS | HEIGHT: 62 IN | TEMPERATURE: 98.4 F | OXYGEN SATURATION: 96 % | BODY MASS INDEX: 46.59 KG/M2 | SYSTOLIC BLOOD PRESSURE: 124 MMHG | DIASTOLIC BLOOD PRESSURE: 80 MMHG | HEART RATE: 74 BPM

## 2018-03-13 DIAGNOSIS — E78.5 HYPERLIPIDEMIA, UNSPECIFIED HYPERLIPIDEMIA TYPE: ICD-10-CM

## 2018-03-13 DIAGNOSIS — Z79.4 TYPE 2 DIABETES MELLITUS WITH COMPLICATION, WITH LONG-TERM CURRENT USE OF INSULIN (HCC): Primary | ICD-10-CM

## 2018-03-13 DIAGNOSIS — E11.8 TYPE 2 DIABETES MELLITUS WITH COMPLICATION, WITH LONG-TERM CURRENT USE OF INSULIN (HCC): Primary | ICD-10-CM

## 2018-03-13 DIAGNOSIS — I89.0 LYMPHEDEMA: ICD-10-CM

## 2018-03-13 DIAGNOSIS — R26.89 DECREASED MOBILITY: ICD-10-CM

## 2018-03-13 DIAGNOSIS — R06.83 SNORING: ICD-10-CM

## 2018-03-13 DIAGNOSIS — G57.93 NEUROPATHY INVOLVING BOTH LOWER EXTREMITIES: Chronic | ICD-10-CM

## 2018-03-13 PROCEDURE — G8484 FLU IMMUNIZE NO ADMIN: HCPCS | Performed by: NURSE PRACTITIONER

## 2018-03-13 PROCEDURE — 1090F PRES/ABSN URINE INCON ASSESS: CPT | Performed by: NURSE PRACTITIONER

## 2018-03-13 PROCEDURE — G8427 DOCREV CUR MEDS BY ELIG CLIN: HCPCS | Performed by: NURSE PRACTITIONER

## 2018-03-13 PROCEDURE — 1036F TOBACCO NON-USER: CPT | Performed by: NURSE PRACTITIONER

## 2018-03-13 PROCEDURE — 3017F COLORECTAL CA SCREEN DOC REV: CPT | Performed by: NURSE PRACTITIONER

## 2018-03-13 PROCEDURE — 3046F HEMOGLOBIN A1C LEVEL >9.0%: CPT | Performed by: NURSE PRACTITIONER

## 2018-03-13 PROCEDURE — 1123F ACP DISCUSS/DSCN MKR DOCD: CPT | Performed by: NURSE PRACTITIONER

## 2018-03-13 PROCEDURE — G8417 CALC BMI ABV UP PARAM F/U: HCPCS | Performed by: NURSE PRACTITIONER

## 2018-03-13 PROCEDURE — 3014F SCREEN MAMMO DOC REV: CPT | Performed by: NURSE PRACTITIONER

## 2018-03-13 PROCEDURE — 99214 OFFICE O/P EST MOD 30 MIN: CPT | Performed by: NURSE PRACTITIONER

## 2018-03-13 PROCEDURE — G8399 PT W/DXA RESULTS DOCUMENT: HCPCS | Performed by: NURSE PRACTITIONER

## 2018-03-13 PROCEDURE — 4040F PNEUMOC VAC/ADMIN/RCVD: CPT | Performed by: NURSE PRACTITIONER

## 2018-03-13 RX ORDER — GABAPENTIN 300 MG/1
300 CAPSULE ORAL 3 TIMES DAILY
Qty: 90 CAPSULE | Refills: 2 | Status: SHIPPED | OUTPATIENT
Start: 2018-03-13 | End: 2018-05-29 | Stop reason: SDUPTHER

## 2018-03-13 RX ORDER — ROSUVASTATIN CALCIUM 10 MG/1
TABLET, COATED ORAL
Qty: 30 TABLET | Refills: 2 | Status: SHIPPED | OUTPATIENT
Start: 2018-03-13 | End: 2018-07-10 | Stop reason: SDUPTHER

## 2018-03-13 ASSESSMENT — ENCOUNTER SYMPTOMS
TROUBLE SWALLOWING: 0
NAUSEA: 0
RHINORRHEA: 0
CHEST TIGHTNESS: 0
COUGH: 0
VOICE CHANGE: 0
WHEEZING: 0
SHORTNESS OF BREATH: 0
ABDOMINAL PAIN: 0
VOMITING: 0
EYE REDNESS: 0
CONSTIPATION: 0
BLOOD IN STOOL: 0
SORE THROAT: 0
DIARRHEA: 0

## 2018-03-13 NOTE — PROGRESS NOTES
(H) 12/06/2017    HDL 44 (L) 12/06/2017    LDLCALC 126 12/06/2017    LDLDIRECT 151 (H) 01/06/2016     Lab Results   Component Value Date    ALT 34 (H) 12/06/2017    AST 28 12/06/2017          Patient reports that they have been compliant with taking medications as directed. Past Medical History:   Diagnosis Date    Back pain 2/2/2016    Breast CA ([address]     Chronic back pain     Fibromyalgia     GERD (gastroesophageal reflux disease)     Hyperlipidemia     Hypertension     Joint pain 2/2/2016    Morbid obesity ([address]     Multiple food allergies     Neuropathic pain     Neuropathy ([facility] [address]     Neuropathy involving both lower extremities 2/2/2016    Osteoarthritis     Postmenopausal osteoporosis     S/P lumpectomy, left breast 8/19/2015 8/4/2015    Sleep apnea     Type II or unspecified type diabetes mellitus with neurological manifestations, uncontrolled(250.62)        Past Surgical History:   Procedure Laterality Date    BREAST SURGERY Left 8/4/2015    left partial mastectomy on 8/4/15   [address]?  EYE SURGERY      EYE SURGERY Bilateral 04/02/2017    Dr. Layton Standard      broke L ankle due to osteoporosis, has hardware in   [address] [place] [address] ENDOSCOPY  4/8/2016    Dr Matt Sethi, (-) H Pylori    WRIST SURGERY         Social History     Social History    Marital status:       Spouse name: N/A    Number of children: N/A    Years of education: N/A     Social History Main Topics    Smoking status: Never Smoker    Smokeless tobacco: Never Used    Alcohol use 1.2 oz/week     1 Glasses of wine, 1 Cans of beer per week      Comment: occ    Drug use: No    Sexual activity: Not Asked     Other Topics Concern    None     Social History Narrative    None       Family History   Problem Relation Age of Onset    High Blood Pressure Father     Heart Disease Father     Diabetes Father     High Blood Pressure Sister     Diabetes month (around 2018), or if symptoms worsen or fail to improve, for 30 min appointment, medication recheck. Orders Placed This Encounter   Procedures    External Referral To Diabetic Education     Referral Priority:   Routine     Referral Type:   Consult for Advice and Opinion     Referral Reason:   Specialty Services Required     Requested Specialty:   Nutrition     Number of Visits Requested:   1    External Referral To Lymphedema Clinic     Referral Priority:   Routine     Referral Type:   Consult for Advice and Opinion     Referral Reason:   Specialty Services Required     Requested Specialty:   Nutrition     Number of Visits Requested:   [id number] [address]     Referral Priority:   Routine     Referral Type:   Consult for Advice and Opinion     Referral Reason:   Specialty Services Required     Requested Specialty:   Sleep Center     Number of Visits Requested:   1     Orders Placed This Encounter   Medications    gabapentin (NEURONTIN) 300 MG capsule     Sig: Take 1 capsule by mouth 3 times daily for 31 days. Dispense:  90 capsule     Refill:  2    insulin detemir (LEVEMIR) 100 UNIT/ML injection pen     Sig: Inject 50 Units into the skin 2 times daily     Dispense:  5 pen     Refill:  2    insulin aspart (NOVOLOG FLEXPEN) 100 UNIT/ML injection pen     Sig: Take 15 units before eat meal. Three times a day. Dispense:  5 pen     Refill:  3    rosuvastatin (CRESTOR) 10 MG tablet     Sig: Take one tablet nightly     Dispense:  30 tablet     Refill:  2    Misc. Devices (ROLLER WALKER) MISC     Si each by Does not apply route daily     Dispense:  1 each     Refill:  0       Patient Instructions       Will refer to [facility] for lymphedema. Will refer for sleep study    Begin using Novolog 15 units before every meal.     Take Levimeir 50 units twice a day. Follow up in 2 weeks to 1 month to discuss other health concerns.        Patient/family given educational materials - see patient instructions. Discussed use, benefit, and side effects of prescribed medications. All patient/family questions answered and voiced understanding. Instructed to continue current medications, diet and exercise. Pt/ family agreed with treatment plan. Follow up as directed and sooner if needed. Patient/ family instructed that is symptoms worsen or persist they are to contact office or report to nearest ER. Pt/family voice understanding and agreement with this plan.      Electronically signed by ANTONIA Toribio on 3/14/2018 at 2:14 PM

## 2018-03-14 ENCOUNTER — TRANSCRIBE ORDERS (OUTPATIENT)
Dept: PHYSICAL THERAPY | Facility: HOSPITAL | Age: 73
End: 2018-03-14

## 2018-03-14 DIAGNOSIS — I89.0 OBLITERATION OF LYMPHATIC VESSEL: Primary | ICD-10-CM

## 2018-03-16 ENCOUNTER — RESULTS ENCOUNTER (OUTPATIENT)
Dept: BARIATRICS/WEIGHT MGMT | Facility: CLINIC | Age: 73
End: 2018-03-16

## 2018-03-16 DIAGNOSIS — E11.69 DIABETES MELLITUS TYPE 2 IN OBESE (HCC): ICD-10-CM

## 2018-03-16 DIAGNOSIS — E66.01 MORBID OBESITY, UNSPECIFIED OBESITY TYPE (HCC): ICD-10-CM

## 2018-03-16 DIAGNOSIS — E78.5 HYPERLIPIDEMIA, UNSPECIFIED HYPERLIPIDEMIA TYPE: ICD-10-CM

## 2018-03-16 DIAGNOSIS — E66.9 DIABETES MELLITUS TYPE 2 IN OBESE (HCC): ICD-10-CM

## 2018-03-16 DIAGNOSIS — I10 ESSENTIAL HYPERTENSION: ICD-10-CM

## 2018-03-19 RX ORDER — MONTELUKAST SODIUM 10 MG/1
TABLET ORAL
Qty: 90 TABLET | Refills: 5 | Status: SHIPPED | OUTPATIENT
Start: 2018-03-19 | End: 2019-04-05 | Stop reason: SDUPTHER

## 2018-03-21 ENCOUNTER — CARE COORDINATION (OUTPATIENT)
Dept: CARE COORDINATION | Age: 73
End: 2018-03-21

## 2018-03-21 ENCOUNTER — HOSPITAL ENCOUNTER (OUTPATIENT)
Dept: PHYSICAL THERAPY | Facility: HOSPITAL | Age: 73
Setting detail: THERAPIES SERIES
Discharge: HOME | End: 2018-03-21

## 2018-03-21 DIAGNOSIS — I89.0 LYMPHEDEMA OF LEFT ARM: Primary | ICD-10-CM

## 2018-03-21 DIAGNOSIS — C50.812 MALIGNANT NEOPLASM OF OVERLAPPING SITES OF LEFT BREAST IN FEMALE, ESTROGEN RECEPTOR POSITIVE (HCC): ICD-10-CM

## 2018-03-21 DIAGNOSIS — Z17.0 MALIGNANT NEOPLASM OF OVERLAPPING SITES OF LEFT BREAST IN FEMALE, ESTROGEN RECEPTOR POSITIVE (HCC): ICD-10-CM

## 2018-03-21 PROCEDURE — 97162 PT EVAL MOD COMPLEX 30 MIN: CPT | Performed by: PHYSICAL THERAPIST

## 2018-03-21 PROCEDURE — G8990 OTHER PT/OT CURRENT STATUS: HCPCS | Performed by: PHYSICAL THERAPIST

## 2018-03-21 PROCEDURE — 97140 MANUAL THERAPY 1/> REGIONS: CPT | Performed by: PHYSICAL THERAPIST

## 2018-03-21 PROCEDURE — G8991 OTHER PT/OT GOAL STATUS: HCPCS | Performed by: PHYSICAL THERAPIST

## 2018-03-21 RX ORDER — HYDROCODONE BITARTRATE AND ACETAMINOPHEN 10; 325 MG/1; MG/1
1 TABLET ORAL DAILY PRN
Qty: 30 TABLET | Refills: 0 | Status: SHIPPED | OUTPATIENT
Start: 2018-04-01 | End: 2018-04-26 | Stop reason: SDUPTHER

## 2018-03-28 ENCOUNTER — HOSPITAL ENCOUNTER (OUTPATIENT)
Dept: PHYSICAL THERAPY | Facility: HOSPITAL | Age: 73
Setting detail: THERAPIES SERIES
Discharge: HOME | End: 2018-03-28

## 2018-03-28 DIAGNOSIS — I89.0 LYMPHEDEMA OF LEFT ARM: Primary | ICD-10-CM

## 2018-03-28 PROCEDURE — 97140 MANUAL THERAPY 1/> REGIONS: CPT | Performed by: PHYSICAL THERAPIST

## 2018-04-09 ENCOUNTER — APPOINTMENT (OUTPATIENT)
Dept: PHYSICAL THERAPY | Facility: HOSPITAL | Age: 73
End: 2018-04-09

## 2018-04-10 ENCOUNTER — OFFICE VISIT (OUTPATIENT)
Dept: PRIMARY CARE CLINIC | Age: 73
End: 2018-04-10
Payer: MEDICARE

## 2018-04-10 VITALS
SYSTOLIC BLOOD PRESSURE: 124 MMHG | RESPIRATION RATE: 18 BRPM | DIASTOLIC BLOOD PRESSURE: 74 MMHG | OXYGEN SATURATION: 97 % | HEIGHT: 62 IN | HEART RATE: 78 BPM | TEMPERATURE: 98.3 F

## 2018-04-10 DIAGNOSIS — M21.612 BUNION OF LEFT FOOT: ICD-10-CM

## 2018-04-10 DIAGNOSIS — M79.672 BILATERAL FOOT PAIN: ICD-10-CM

## 2018-04-10 DIAGNOSIS — M15.9 GENERALIZED OA: ICD-10-CM

## 2018-04-10 DIAGNOSIS — R26.81 UNSTEADY GAIT: Primary | ICD-10-CM

## 2018-04-10 DIAGNOSIS — M79.671 BILATERAL FOOT PAIN: ICD-10-CM

## 2018-04-10 DIAGNOSIS — R29.6 FREQUENT FALLS: ICD-10-CM

## 2018-04-10 DIAGNOSIS — M21.611 BUNION, RIGHT FOOT: ICD-10-CM

## 2018-04-10 DIAGNOSIS — R26.89 DECREASED MOBILITY: ICD-10-CM

## 2018-04-10 PROCEDURE — 3014F SCREEN MAMMO DOC REV: CPT | Performed by: NURSE PRACTITIONER

## 2018-04-10 PROCEDURE — 1036F TOBACCO NON-USER: CPT | Performed by: NURSE PRACTITIONER

## 2018-04-10 PROCEDURE — G8417 CALC BMI ABV UP PARAM F/U: HCPCS | Performed by: NURSE PRACTITIONER

## 2018-04-10 PROCEDURE — 1123F ACP DISCUSS/DSCN MKR DOCD: CPT | Performed by: NURSE PRACTITIONER

## 2018-04-10 PROCEDURE — G8399 PT W/DXA RESULTS DOCUMENT: HCPCS | Performed by: NURSE PRACTITIONER

## 2018-04-10 PROCEDURE — 3017F COLORECTAL CA SCREEN DOC REV: CPT | Performed by: NURSE PRACTITIONER

## 2018-04-10 PROCEDURE — 4040F PNEUMOC VAC/ADMIN/RCVD: CPT | Performed by: NURSE PRACTITIONER

## 2018-04-10 PROCEDURE — 99214 OFFICE O/P EST MOD 30 MIN: CPT | Performed by: NURSE PRACTITIONER

## 2018-04-10 PROCEDURE — 1090F PRES/ABSN URINE INCON ASSESS: CPT | Performed by: NURSE PRACTITIONER

## 2018-04-10 PROCEDURE — G8428 CUR MEDS NOT DOCUMENT: HCPCS | Performed by: NURSE PRACTITIONER

## 2018-04-10 RX ORDER — OXYBUTYNIN CHLORIDE 5 MG/1
TABLET, EXTENDED RELEASE ORAL
Qty: 30 TABLET | Refills: 2 | Status: SHIPPED | OUTPATIENT
Start: 2018-04-10 | End: 2019-07-09 | Stop reason: SDUPTHER

## 2018-04-10 ASSESSMENT — ENCOUNTER SYMPTOMS
CHEST TIGHTNESS: 0
SHORTNESS OF BREATH: 0
EYE REDNESS: 0
DIARRHEA: 0
VOMITING: 0
SORE THROAT: 0
NAUSEA: 0
TROUBLE SWALLOWING: 0
BLOOD IN STOOL: 0
ABDOMINAL PAIN: 0
CONSTIPATION: 0
RHINORRHEA: 0
COUGH: 0
WHEEZING: 0
VOICE CHANGE: 0

## 2018-04-11 ENCOUNTER — CARE COORDINATION (OUTPATIENT)
Dept: CARE COORDINATION | Age: 73
End: 2018-04-11

## 2018-04-11 ENCOUNTER — HOSPITAL ENCOUNTER (OUTPATIENT)
Dept: PHYSICAL THERAPY | Facility: HOSPITAL | Age: 73
Setting detail: THERAPIES SERIES
Discharge: HOME | End: 2018-04-11

## 2018-04-11 DIAGNOSIS — I89.0 LYMPHEDEMA OF LEFT ARM: Primary | ICD-10-CM

## 2018-04-11 PROCEDURE — 97140 MANUAL THERAPY 1/> REGIONS: CPT | Performed by: PHYSICAL THERAPIST

## 2018-04-13 ENCOUNTER — RESULTS ENCOUNTER (OUTPATIENT)
Dept: BARIATRICS/WEIGHT MGMT | Facility: CLINIC | Age: 73
End: 2018-04-13

## 2018-04-13 DIAGNOSIS — E11.69 DIABETES MELLITUS TYPE 2 IN OBESE (HCC): ICD-10-CM

## 2018-04-13 DIAGNOSIS — E66.9 DIABETES MELLITUS TYPE 2 IN OBESE (HCC): ICD-10-CM

## 2018-04-13 DIAGNOSIS — E78.5 HYPERLIPIDEMIA, UNSPECIFIED HYPERLIPIDEMIA TYPE: ICD-10-CM

## 2018-04-13 DIAGNOSIS — E66.01 MORBID OBESITY, UNSPECIFIED OBESITY TYPE (HCC): ICD-10-CM

## 2018-04-13 DIAGNOSIS — I10 ESSENTIAL HYPERTENSION: ICD-10-CM

## 2018-04-17 ENCOUNTER — TELEPHONE (OUTPATIENT)
Dept: PRIMARY CARE CLINIC | Age: 73
End: 2018-04-17

## 2018-04-18 ENCOUNTER — HOSPITAL ENCOUNTER (OUTPATIENT)
Dept: PHYSICAL THERAPY | Facility: HOSPITAL | Age: 73
Setting detail: THERAPIES SERIES
Discharge: HOME | End: 2018-04-18

## 2018-04-18 DIAGNOSIS — I89.0 LYMPHEDEMA OF LEFT ARM: Primary | ICD-10-CM

## 2018-04-18 PROCEDURE — 97140 MANUAL THERAPY 1/> REGIONS: CPT | Performed by: PHYSICAL THERAPIST

## 2018-04-25 ENCOUNTER — TELEPHONE (OUTPATIENT)
Dept: PRIMARY CARE CLINIC | Age: 73
End: 2018-04-25

## 2018-04-25 ENCOUNTER — HOSPITAL ENCOUNTER (OUTPATIENT)
Dept: PHYSICAL THERAPY | Facility: HOSPITAL | Age: 73
Setting detail: THERAPIES SERIES
Discharge: HOME | End: 2018-04-25

## 2018-04-25 DIAGNOSIS — E11.8 DIABETIC COMPLICATION (HCC): Primary | ICD-10-CM

## 2018-04-25 DIAGNOSIS — I89.0 LYMPHEDEMA OF LEFT ARM: Primary | ICD-10-CM

## 2018-04-25 PROCEDURE — 97140 MANUAL THERAPY 1/> REGIONS: CPT | Performed by: PHYSICAL THERAPIST

## 2018-04-30 RX ORDER — HYDROCODONE BITARTRATE AND ACETAMINOPHEN 10; 325 MG/1; MG/1
1 TABLET ORAL DAILY PRN
Qty: 30 TABLET | Refills: 0 | Status: SHIPPED | OUTPATIENT
Start: 2018-05-01 | End: 2018-05-29 | Stop reason: SDUPTHER

## 2018-05-01 ENCOUNTER — OFFICE VISIT (OUTPATIENT)
Dept: PRIMARY CARE CLINIC | Age: 73
End: 2018-05-01
Payer: MEDICARE

## 2018-05-01 ENCOUNTER — CARE COORDINATION (OUTPATIENT)
Dept: CARE COORDINATION | Age: 73
End: 2018-05-01

## 2018-05-01 VITALS
BODY MASS INDEX: 46.56 KG/M2 | HEART RATE: 84 BPM | DIASTOLIC BLOOD PRESSURE: 76 MMHG | WEIGHT: 253 LBS | OXYGEN SATURATION: 95 % | TEMPERATURE: 98 F | HEIGHT: 62 IN | SYSTOLIC BLOOD PRESSURE: 130 MMHG

## 2018-05-01 DIAGNOSIS — G62.9 NEUROPATHY: ICD-10-CM

## 2018-05-01 DIAGNOSIS — N94.9 VAGINAL BURNING: ICD-10-CM

## 2018-05-01 DIAGNOSIS — E11.8 TYPE 2 DIABETES MELLITUS WITH COMPLICATION, WITH LONG-TERM CURRENT USE OF INSULIN (HCC): Primary | ICD-10-CM

## 2018-05-01 DIAGNOSIS — Z79.4 TYPE 2 DIABETES MELLITUS WITH COMPLICATION, WITH LONG-TERM CURRENT USE OF INSULIN (HCC): Primary | ICD-10-CM

## 2018-05-01 DIAGNOSIS — R30.0 DYSURIA: ICD-10-CM

## 2018-05-01 LAB
APPEARANCE FLUID: NORMAL
BILIRUBIN, POC: NORMAL
BLOOD URINE, POC: NORMAL
CLARITY, POC: CLEAR
COLOR, POC: YELLOW
GLUCOSE URINE, POC: 500
KETONES, POC: NORMAL
LEUKOCYTE EST, POC: NORMAL
NITRITE, POC: NORMAL
PH, POC: 6
PROTEIN, POC: NORMAL
SPECIFIC GRAVITY, POC: 1.01
UROBILINOGEN, POC: 0.2

## 2018-05-01 PROCEDURE — 2022F DILAT RTA XM EVC RTNOPTHY: CPT | Performed by: NURSE PRACTITIONER

## 2018-05-01 PROCEDURE — 1090F PRES/ABSN URINE INCON ASSESS: CPT | Performed by: NURSE PRACTITIONER

## 2018-05-01 PROCEDURE — 1123F ACP DISCUSS/DSCN MKR DOCD: CPT | Performed by: NURSE PRACTITIONER

## 2018-05-01 PROCEDURE — G8427 DOCREV CUR MEDS BY ELIG CLIN: HCPCS | Performed by: NURSE PRACTITIONER

## 2018-05-01 PROCEDURE — 99214 OFFICE O/P EST MOD 30 MIN: CPT | Performed by: NURSE PRACTITIONER

## 2018-05-01 PROCEDURE — 1036F TOBACCO NON-USER: CPT | Performed by: NURSE PRACTITIONER

## 2018-05-01 PROCEDURE — G8417 CALC BMI ABV UP PARAM F/U: HCPCS | Performed by: NURSE PRACTITIONER

## 2018-05-01 PROCEDURE — 4040F PNEUMOC VAC/ADMIN/RCVD: CPT | Performed by: NURSE PRACTITIONER

## 2018-05-01 PROCEDURE — 3046F HEMOGLOBIN A1C LEVEL >9.0%: CPT | Performed by: NURSE PRACTITIONER

## 2018-05-01 PROCEDURE — 81002 URINALYSIS NONAUTO W/O SCOPE: CPT | Performed by: NURSE PRACTITIONER

## 2018-05-01 PROCEDURE — G8399 PT W/DXA RESULTS DOCUMENT: HCPCS | Performed by: NURSE PRACTITIONER

## 2018-05-01 PROCEDURE — 3017F COLORECTAL CA SCREEN DOC REV: CPT | Performed by: NURSE PRACTITIONER

## 2018-05-01 ASSESSMENT — PATIENT HEALTH QUESTIONNAIRE - PHQ9
1. LITTLE INTEREST OR PLEASURE IN DOING THINGS: 0
SUM OF ALL RESPONSES TO PHQ QUESTIONS 1-9: 0
2. FEELING DOWN, DEPRESSED OR HOPELESS: 0
SUM OF ALL RESPONSES TO PHQ9 QUESTIONS 1 & 2: 0

## 2018-05-01 ASSESSMENT — ENCOUNTER SYMPTOMS
GASTROINTESTINAL NEGATIVE: 1
RESPIRATORY NEGATIVE: 1
EYES NEGATIVE: 1

## 2018-05-02 ENCOUNTER — HOSPITAL ENCOUNTER (OUTPATIENT)
Dept: PHYSICAL THERAPY | Facility: HOSPITAL | Age: 73
Setting detail: THERAPIES SERIES
Discharge: HOME | End: 2018-05-02

## 2018-05-02 DIAGNOSIS — I89.0 LYMPHEDEMA OF LEFT ARM: Primary | ICD-10-CM

## 2018-05-02 PROCEDURE — 97140 MANUAL THERAPY 1/> REGIONS: CPT | Performed by: PHYSICAL THERAPIST

## 2018-05-07 ENCOUNTER — TELEPHONE (OUTPATIENT)
Dept: PRIMARY CARE CLINIC | Age: 73
End: 2018-05-07

## 2018-05-08 ENCOUNTER — HOSPITAL ENCOUNTER (OUTPATIENT)
Dept: SLEEP CENTER | Age: 73
Discharge: HOME | End: 2018-05-10
Payer: MEDICARE

## 2018-05-08 PROCEDURE — 95811 POLYSOM 6/>YRS CPAP 4/> PARM: CPT

## 2018-05-08 PROCEDURE — 95811 POLYSOM 6/>YRS CPAP 4/> PARM: CPT | Performed by: PSYCHIATRY & NEUROLOGY

## 2018-05-09 ENCOUNTER — HOSPITAL ENCOUNTER (OUTPATIENT)
Dept: PHYSICAL THERAPY | Facility: HOSPITAL | Age: 73
Setting detail: THERAPIES SERIES
Discharge: HOME | End: 2018-05-09

## 2018-05-09 ENCOUNTER — TELEPHONE (OUTPATIENT)
Dept: PRIMARY CARE CLINIC | Age: 73
End: 2018-05-09

## 2018-05-09 DIAGNOSIS — I89.0 LYMPHEDEMA OF LEFT ARM: Primary | ICD-10-CM

## 2018-05-09 PROCEDURE — 97140 MANUAL THERAPY 1/> REGIONS: CPT | Performed by: PHYSICAL THERAPIST

## 2018-05-09 RX ORDER — FLUCONAZOLE 150 MG/1
150 TABLET ORAL ONCE
Qty: 1 TABLET | Refills: 0 | Status: SHIPPED | OUTPATIENT
Start: 2018-05-09 | End: 2018-05-09

## 2018-05-15 ENCOUNTER — OFFICE VISIT (OUTPATIENT)
Dept: PRIMARY CARE CLINIC | Age: 73
End: 2018-05-15
Payer: MEDICARE

## 2018-05-15 VITALS
DIASTOLIC BLOOD PRESSURE: 72 MMHG | HEIGHT: 62 IN | TEMPERATURE: 97.8 F | OXYGEN SATURATION: 98 % | WEIGHT: 253 LBS | BODY MASS INDEX: 46.56 KG/M2 | SYSTOLIC BLOOD PRESSURE: 110 MMHG | HEART RATE: 87 BPM

## 2018-05-15 DIAGNOSIS — R53.1 GENERALIZED WEAKNESS: ICD-10-CM

## 2018-05-15 DIAGNOSIS — Z00.00 ROUTINE GENERAL MEDICAL EXAMINATION AT A HEALTH CARE FACILITY: Primary | ICD-10-CM

## 2018-05-15 DIAGNOSIS — F41.9 ANXIETY: ICD-10-CM

## 2018-05-15 PROBLEM — E78.5 HYPERLIPIDEMIA: Status: ACTIVE | Noted: 2017-05-18

## 2018-05-15 PROBLEM — E66.01 MORBID OBESITY (HCC): Status: ACTIVE | Noted: 2017-05-18

## 2018-05-15 PROBLEM — G47.33 OSA (OBSTRUCTIVE SLEEP APNEA): Status: ACTIVE | Noted: 2017-05-18

## 2018-05-15 PROBLEM — I10 ESSENTIAL HYPERTENSION: Status: ACTIVE | Noted: 2017-05-18

## 2018-05-15 PROCEDURE — G0438 PPPS, INITIAL VISIT: HCPCS | Performed by: NURSE PRACTITIONER

## 2018-05-15 PROCEDURE — 4040F PNEUMOC VAC/ADMIN/RCVD: CPT | Performed by: NURSE PRACTITIONER

## 2018-05-15 PROCEDURE — G8427 DOCREV CUR MEDS BY ELIG CLIN: HCPCS | Performed by: NURSE PRACTITIONER

## 2018-05-15 PROCEDURE — 99213 OFFICE O/P EST LOW 20 MIN: CPT | Performed by: NURSE PRACTITIONER

## 2018-05-15 PROCEDURE — 1123F ACP DISCUSS/DSCN MKR DOCD: CPT | Performed by: NURSE PRACTITIONER

## 2018-05-15 PROCEDURE — G8399 PT W/DXA RESULTS DOCUMENT: HCPCS | Performed by: NURSE PRACTITIONER

## 2018-05-15 PROCEDURE — G8417 CALC BMI ABV UP PARAM F/U: HCPCS | Performed by: NURSE PRACTITIONER

## 2018-05-15 PROCEDURE — 1090F PRES/ABSN URINE INCON ASSESS: CPT | Performed by: NURSE PRACTITIONER

## 2018-05-15 PROCEDURE — G0444 DEPRESSION SCREEN ANNUAL: HCPCS | Performed by: NURSE PRACTITIONER

## 2018-05-15 PROCEDURE — 3017F COLORECTAL CA SCREEN DOC REV: CPT | Performed by: NURSE PRACTITIONER

## 2018-05-15 PROCEDURE — G0447 BEHAVIOR COUNSEL OBESITY 15M: HCPCS | Performed by: NURSE PRACTITIONER

## 2018-05-15 PROCEDURE — 1036F TOBACCO NON-USER: CPT | Performed by: NURSE PRACTITIONER

## 2018-05-15 ASSESSMENT — LIFESTYLE VARIABLES
HAS A RELATIVE, FRIEND, DOCTOR, OR ANOTHER HEALTH PROFESSIONAL EXPRESSED CONCERN ABOUT YOUR DRINKING OR SUGGESTED YOU CUT DOWN: 0
HOW OFTEN DURING THE LAST YEAR HAVE YOU FOUND THAT YOU WERE NOT ABLE TO STOP DRINKING ONCE YOU HAD STARTED: 0
AUDIT TOTAL SCORE: 1
HOW OFTEN DURING THE LAST YEAR HAVE YOU FAILED TO DO WHAT WAS NORMALLY EXPECTED FROM YOU BECAUSE OF DRINKING: 0
HOW MANY STANDARD DRINKS CONTAINING ALCOHOL DO YOU HAVE ON A TYPICAL DAY: 0
HOW OFTEN DURING THE LAST YEAR HAVE YOU HAD A FEELING OF GUILT OR REMORSE AFTER DRINKING: 0
AUDIT-C TOTAL SCORE: 1
HOW OFTEN DURING THE LAST YEAR HAVE YOU BEEN UNABLE TO REMEMBER WHAT HAPPENED THE NIGHT BEFORE BECAUSE YOU HAD BEEN DRINKING: 0
HOW OFTEN DURING THE LAST YEAR HAVE YOU NEEDED AN ALCOHOLIC DRINK FIRST THING IN THE MORNING TO GET YOURSELF GOING AFTER A NIGHT OF HEAVY DRINKING: 0
HAVE YOU OR SOMEONE ELSE BEEN INJURED AS A RESULT OF YOUR DRINKING: 0
HOW OFTEN DO YOU HAVE A DRINK CONTAINING ALCOHOL: 1
HOW OFTEN DO YOU HAVE SIX OR MORE DRINKS ON ONE OCCASION: 0

## 2018-05-15 ASSESSMENT — ENCOUNTER SYMPTOMS
EYES NEGATIVE: 1
GASTROINTESTINAL NEGATIVE: 1
RESPIRATORY NEGATIVE: 1

## 2018-05-15 ASSESSMENT — ANXIETY QUESTIONNAIRES: GAD7 TOTAL SCORE: 1

## 2018-05-16 ENCOUNTER — CARE COORDINATION (OUTPATIENT)
Dept: CARE COORDINATION | Age: 73
End: 2018-05-16

## 2018-05-16 ENCOUNTER — HOSPITAL ENCOUNTER (OUTPATIENT)
Dept: PHYSICAL THERAPY | Facility: HOSPITAL | Age: 73
Setting detail: THERAPIES SERIES
Discharge: HOME | End: 2018-05-16

## 2018-05-16 DIAGNOSIS — I89.0 LYMPHEDEMA OF LEFT ARM: Primary | ICD-10-CM

## 2018-05-16 PROCEDURE — 97140 MANUAL THERAPY 1/> REGIONS: CPT | Performed by: PHYSICAL THERAPIST

## 2018-05-29 ENCOUNTER — HOSPITAL ENCOUNTER (OUTPATIENT)
Dept: PAIN MANAGEMENT | Age: 73
Discharge: HOME | End: 2018-05-29
Payer: MEDICARE

## 2018-05-29 VITALS
WEIGHT: 220 LBS | HEIGHT: 62 IN | HEART RATE: 82 BPM | SYSTOLIC BLOOD PRESSURE: 131 MMHG | DIASTOLIC BLOOD PRESSURE: 75 MMHG | RESPIRATION RATE: 18 BRPM | BODY MASS INDEX: 40.48 KG/M2 | TEMPERATURE: 98.5 F | OXYGEN SATURATION: 92 %

## 2018-05-29 DIAGNOSIS — G57.93 NEUROPATHY INVOLVING BOTH LOWER EXTREMITIES: Chronic | ICD-10-CM

## 2018-05-29 DIAGNOSIS — M47.816 LUMBAR FACET ARTHROPATHY: ICD-10-CM

## 2018-05-29 DIAGNOSIS — G47.33 OBSTRUCTIVE SLEEP APNEA: Primary | ICD-10-CM

## 2018-05-29 DIAGNOSIS — G57.93 NEUROPATHY INVOLVING BOTH LOWER EXTREMITIES: ICD-10-CM

## 2018-05-29 PROCEDURE — 99213 OFFICE O/P EST LOW 20 MIN: CPT

## 2018-05-29 PROCEDURE — 99214 OFFICE O/P EST MOD 30 MIN: CPT | Performed by: NURSE PRACTITIONER

## 2018-05-29 RX ORDER — HYDROCODONE BITARTRATE AND ACETAMINOPHEN 10; 325 MG/1; MG/1
1 TABLET ORAL EVERY 8 HOURS PRN
Qty: 15 TABLET | Refills: 0 | Status: SHIPPED | OUTPATIENT
Start: 2018-06-05 | End: 2018-06-14 | Stop reason: SDUPTHER

## 2018-05-29 RX ORDER — GABAPENTIN 300 MG/1
CAPSULE ORAL
Qty: 120 CAPSULE | Refills: 2 | Status: ON HOLD | OUTPATIENT
Start: 2018-05-29 | End: 2018-07-26

## 2018-05-29 RX ORDER — HYDROCODONE BITARTRATE AND ACETAMINOPHEN 10; 325 MG/1; MG/1
1 TABLET ORAL DAILY PRN
Qty: 30 TABLET | Refills: 0 | Status: SHIPPED | OUTPATIENT
Start: 2018-06-03 | End: 2018-07-03 | Stop reason: SDUPTHER

## 2018-05-29 ASSESSMENT — ENCOUNTER SYMPTOMS
BACK PAIN: 1
CONSTIPATION: 0

## 2018-05-29 ASSESSMENT — ACTIVITIES OF DAILY LIVING (ADL): EFFECT OF PAIN ON DAILY ACTIVITIES: LIMITS ACTIVITY

## 2018-05-29 ASSESSMENT — PAIN DESCRIPTION - DESCRIPTORS: DESCRIPTORS: ACHING;CONSTANT;THROBBING;RADIATING

## 2018-05-29 ASSESSMENT — PAIN DESCRIPTION - PROGRESSION: CLINICAL_PROGRESSION: NOT CHANGED

## 2018-05-29 ASSESSMENT — PAIN DESCRIPTION - ONSET: ONSET: ON-GOING

## 2018-05-29 ASSESSMENT — PAIN DESCRIPTION - PAIN TYPE: TYPE: CHRONIC PAIN

## 2018-05-29 ASSESSMENT — PAIN DESCRIPTION - FREQUENCY: FREQUENCY: CONTINUOUS

## 2018-05-29 ASSESSMENT — PAIN DESCRIPTION - LOCATION: LOCATION: BACK;NECK

## 2018-05-29 ASSESSMENT — PAIN DESCRIPTION - ORIENTATION: ORIENTATION: UPPER;MID

## 2018-05-29 ASSESSMENT — PAIN SCALES - GENERAL: PAINLEVEL_OUTOF10: 8

## 2018-06-06 ENCOUNTER — HOSPITAL ENCOUNTER (OUTPATIENT)
Dept: PHYSICAL THERAPY | Facility: HOSPITAL | Age: 73
Setting detail: THERAPIES SERIES
Discharge: HOME | End: 2018-06-06

## 2018-06-06 DIAGNOSIS — I89.0 LYMPHEDEMA OF LEFT ARM: Primary | ICD-10-CM

## 2018-06-06 PROCEDURE — 97140 MANUAL THERAPY 1/> REGIONS: CPT | Performed by: PHYSICAL THERAPIST

## 2018-06-14 DIAGNOSIS — K13.79 MOUTH PAIN: ICD-10-CM

## 2018-06-14 DIAGNOSIS — K13.79 MOUTH PAIN: Primary | ICD-10-CM

## 2018-06-14 RX ORDER — HYDROCODONE BITARTRATE AND ACETAMINOPHEN 10; 325 MG/1; MG/1
1 TABLET ORAL EVERY 8 HOURS PRN
Qty: 15 TABLET | Refills: 0 | Status: SHIPPED | OUTPATIENT
Start: 2018-06-14 | End: 2018-06-14 | Stop reason: SDUPTHER

## 2018-06-14 RX ORDER — HYDROCODONE BITARTRATE AND ACETAMINOPHEN 10; 325 MG/1; MG/1
1 TABLET ORAL EVERY 8 HOURS PRN
Qty: 15 TABLET | Refills: 0 | Status: ON HOLD | OUTPATIENT
Start: 2018-06-14 | End: 2018-07-26 | Stop reason: ALTCHOICE

## 2018-06-15 ENCOUNTER — TELEPHONE (OUTPATIENT)
Dept: PAIN MANAGEMENT | Age: 73
End: 2018-06-15

## 2018-06-18 ENCOUNTER — TELEPHONE (OUTPATIENT)
Dept: PRIMARY CARE CLINIC | Age: 73
End: 2018-06-18

## 2018-06-21 ENCOUNTER — HOSPITAL ENCOUNTER (OUTPATIENT)
Dept: MRI IMAGING | Age: 73
Discharge: HOME | End: 2018-06-21
Payer: MEDICARE

## 2018-06-21 DIAGNOSIS — M54.12 RADICULOPATHY OF CERVICAL SPINE: ICD-10-CM

## 2018-06-21 DIAGNOSIS — M54.17 LUMBOSACRAL RADICULOPATHY: ICD-10-CM

## 2018-06-21 PROCEDURE — 72141 MRI NECK SPINE W/O DYE: CPT

## 2018-06-21 PROCEDURE — 72148 MRI LUMBAR SPINE W/O DYE: CPT

## 2018-06-27 ENCOUNTER — HOSPITAL ENCOUNTER (OUTPATIENT)
Dept: PHYSICAL THERAPY | Facility: HOSPITAL | Age: 73
Setting detail: THERAPIES SERIES
Discharge: HOME | End: 2018-06-27

## 2018-06-27 DIAGNOSIS — I89.0 LYMPHEDEMA OF LEFT ARM: Primary | ICD-10-CM

## 2018-06-27 PROCEDURE — G8991 OTHER PT/OT GOAL STATUS: HCPCS | Performed by: PHYSICAL THERAPIST

## 2018-06-27 PROCEDURE — G8990 OTHER PT/OT CURRENT STATUS: HCPCS | Performed by: PHYSICAL THERAPIST

## 2018-06-27 PROCEDURE — 97140 MANUAL THERAPY 1/> REGIONS: CPT

## 2018-06-28 DIAGNOSIS — E11.8 TYPE 2 DIABETES MELLITUS WITH COMPLICATION, WITH LONG-TERM CURRENT USE OF INSULIN (HCC): ICD-10-CM

## 2018-06-28 DIAGNOSIS — Z79.4 TYPE 2 DIABETES MELLITUS WITH COMPLICATION, WITH LONG-TERM CURRENT USE OF INSULIN (HCC): ICD-10-CM

## 2018-07-03 DIAGNOSIS — M47.816 LUMBAR FACET ARTHROPATHY: Primary | ICD-10-CM

## 2018-07-03 DIAGNOSIS — K13.79 MOUTH PAIN: ICD-10-CM

## 2018-07-03 RX ORDER — HYDROCODONE BITARTRATE AND ACETAMINOPHEN 10; 325 MG/1; MG/1
1 TABLET ORAL DAILY PRN
Qty: 30 TABLET | Refills: 0 | OUTPATIENT
Start: 2018-07-03 | End: 2018-08-02

## 2018-07-05 RX ORDER — HYDROCODONE BITARTRATE AND ACETAMINOPHEN 10; 325 MG/1; MG/1
1 TABLET ORAL DAILY PRN
Qty: 30 TABLET | Refills: 0 | Status: SHIPPED | OUTPATIENT
Start: 2018-07-05 | End: 2018-08-15

## 2018-07-10 DIAGNOSIS — E78.5 HYPERLIPIDEMIA, UNSPECIFIED HYPERLIPIDEMIA TYPE: ICD-10-CM

## 2018-07-10 RX ORDER — ROSUVASTATIN CALCIUM 10 MG/1
TABLET, COATED ORAL
Qty: 30 TABLET | Refills: 2 | Status: ON HOLD | OUTPATIENT
Start: 2018-07-10 | End: 2018-07-24

## 2018-07-17 ENCOUNTER — OFFICE VISIT (OUTPATIENT)
Dept: PRIMARY CARE CLINIC | Age: 73
End: 2018-07-17
Payer: MEDICARE

## 2018-07-17 ENCOUNTER — CARE COORDINATION (OUTPATIENT)
Dept: CARE COORDINATION | Age: 73
End: 2018-07-17

## 2018-07-17 VITALS
OXYGEN SATURATION: 99 % | BODY MASS INDEX: 46.7 KG/M2 | TEMPERATURE: 98.6 F | SYSTOLIC BLOOD PRESSURE: 109 MMHG | HEART RATE: 82 BPM | DIASTOLIC BLOOD PRESSURE: 62 MMHG | HEIGHT: 62 IN | WEIGHT: 253.8 LBS

## 2018-07-17 DIAGNOSIS — B37.31 VAGINAL CANDIDA: Primary | ICD-10-CM

## 2018-07-17 DIAGNOSIS — B35.4 TINEA CORPORIS: ICD-10-CM

## 2018-07-17 DIAGNOSIS — Z79.4 TYPE 2 DIABETES MELLITUS WITH COMPLICATION, WITH LONG-TERM CURRENT USE OF INSULIN (HCC): ICD-10-CM

## 2018-07-17 DIAGNOSIS — E11.8 TYPE 2 DIABETES MELLITUS WITH COMPLICATION, WITH LONG-TERM CURRENT USE OF INSULIN (HCC): ICD-10-CM

## 2018-07-17 PROCEDURE — 1123F ACP DISCUSS/DSCN MKR DOCD: CPT | Performed by: NURSE PRACTITIONER

## 2018-07-17 PROCEDURE — 4040F PNEUMOC VAC/ADMIN/RCVD: CPT | Performed by: NURSE PRACTITIONER

## 2018-07-17 PROCEDURE — 1036F TOBACCO NON-USER: CPT | Performed by: NURSE PRACTITIONER

## 2018-07-17 PROCEDURE — 2022F DILAT RTA XM EVC RTNOPTHY: CPT | Performed by: NURSE PRACTITIONER

## 2018-07-17 PROCEDURE — 99214 OFFICE O/P EST MOD 30 MIN: CPT | Performed by: NURSE PRACTITIONER

## 2018-07-17 PROCEDURE — 3017F COLORECTAL CA SCREEN DOC REV: CPT | Performed by: NURSE PRACTITIONER

## 2018-07-17 PROCEDURE — G8427 DOCREV CUR MEDS BY ELIG CLIN: HCPCS | Performed by: NURSE PRACTITIONER

## 2018-07-17 PROCEDURE — 1101F PT FALLS ASSESS-DOCD LE1/YR: CPT | Performed by: NURSE PRACTITIONER

## 2018-07-17 PROCEDURE — G8417 CALC BMI ABV UP PARAM F/U: HCPCS | Performed by: NURSE PRACTITIONER

## 2018-07-17 PROCEDURE — 1090F PRES/ABSN URINE INCON ASSESS: CPT | Performed by: NURSE PRACTITIONER

## 2018-07-17 PROCEDURE — G8399 PT W/DXA RESULTS DOCUMENT: HCPCS | Performed by: NURSE PRACTITIONER

## 2018-07-17 PROCEDURE — 3046F HEMOGLOBIN A1C LEVEL >9.0%: CPT | Performed by: NURSE PRACTITIONER

## 2018-07-17 RX ORDER — NYSTATIN 100000 [USP'U]/G
POWDER TOPICAL
Qty: 1 BOTTLE | Refills: 0 | Status: SHIPPED | OUTPATIENT
Start: 2018-07-17 | End: 2018-07-17 | Stop reason: SDUPTHER

## 2018-07-17 RX ORDER — FLUCONAZOLE 150 MG/1
150 TABLET ORAL ONCE
Qty: 30 TABLET | Refills: 0 | Status: SHIPPED | OUTPATIENT
Start: 2018-07-17 | End: 2018-07-17

## 2018-07-17 RX ORDER — FLUCONAZOLE 150 MG/1
150 TABLET ORAL ONCE
Qty: 30 TABLET | Refills: 0 | Status: SHIPPED | OUTPATIENT
Start: 2018-07-17 | End: 2018-07-17 | Stop reason: SDUPTHER

## 2018-07-17 RX ORDER — NYSTATIN 100000 [USP'U]/G
POWDER TOPICAL
Qty: 1 BOTTLE | Refills: 0 | Status: SHIPPED | OUTPATIENT
Start: 2018-07-17 | End: 2019-02-14 | Stop reason: SDUPTHER

## 2018-07-17 ASSESSMENT — ENCOUNTER SYMPTOMS
CHEST TIGHTNESS: 0
CONSTIPATION: 0
DIARRHEA: 0
RHINORRHEA: 0
TROUBLE SWALLOWING: 0
NAUSEA: 0
WHEEZING: 0
SORE THROAT: 0
VOICE CHANGE: 0
SHORTNESS OF BREATH: 0
ABDOMINAL PAIN: 0
VOMITING: 0
EYE REDNESS: 0
COUGH: 0
BLOOD IN STOOL: 0

## 2018-07-17 NOTE — PROGRESS NOTES
[facility]  [address] [facility]  [address] [place] [address]  Dept: [phone]  Dept Fax: [phone]  Loc: [phone]        Dino Elise is a 81 y.o. female who presents today for her medical conditions/ complaints as noted below. Dino Elise is c/o 3 Month Follow-Up; Dysuria; and Vaginal Pain (pt states she thinks she has \"pin worms\")        Chief Complaint   Patient presents with    3 Month Follow-Up    Dysuria    Vaginal Pain     pt states she thinks she has \"pin worms\"       HPI:     HPI    Pt c/o itching in vaginal area. Pt states that the burning has subsided since she began putting triamcinolone cream to the area. Pt thinks she may have pin worms. Pt states that her grandson also had pin worms. Patient reports that they have been compliant with taking medications as directed. Past Medical History:   Diagnosis Date    Back pain 2/2/2016    Breast CA ([address]     Chronic back pain     Fibromyalgia     GERD (gastroesophageal reflux disease)     Hyperlipidemia     Hypertension     Joint pain 2/2/2016    Morbid obesity ([address]     Multiple food allergies     Neuropathic pain     Neuropathy ([address]     Neuropathy involving both lower extremities 2/2/2016    Osteoarthritis     Postmenopausal osteoporosis     S/P lumpectomy, left breast 8/19/2015 8/4/2015    Sleep apnea     Type II or unspecified type diabetes mellitus with neurological manifestations, uncontrolled(250.62)        Past Surgical History:   Procedure Laterality Date    BREAST SURGERY Left 8/4/2015    left partial mastectomy on 8/4/15   [address]?     EYE SURGERY      EYE SURGERY Bilateral 04/02/2017    Dr. Jacinto Xiao      broke L ankle due to osteoporosis, has hardware in    HYSTERECTOMY      TOOTH EXTRACTION      UPPER GASTROINTESTINAL ENDOSCOPY  4/8/2016    Dr Favian De Luna, (-) H Pylori    WRIST SURGERY         Social History daily 30 tablet 5    albuterol (PROVENTIL HFA;VENTOLIN HFA) 108 (90 BASE) MCG/ACT inhaler Inhale 2 puffs into the lungs every 4 hours as needed for Wheezing      Incontinence Supply Disposable (POISE PANTILINERS) PADS Use pads as needed daily for urine leakage. 1 each 11    aspirin 81 MG chewable tablet Take 81 mg by mouth daily. No current facility-administered medications for this visit. Allergies   Allergen Reactions    Cefdinir     Pcn [Penicillins] Hives and Itching       Lab Review not applicable    Subjective:   Review of Systems   Constitutional: Negative for activity change, appetite change, fatigue, fever and unexpected weight change. HENT: Negative for congestion, ear pain, nosebleeds, rhinorrhea, sore throat, trouble swallowing and voice change. Eyes: Negative for redness and visual disturbance. Respiratory: Negative for cough, chest tightness, shortness of breath and wheezing. Cardiovascular: Negative for chest pain, palpitations and leg swelling. Gastrointestinal: Negative for abdominal pain, blood in stool, constipation, diarrhea, nausea and vomiting. Endocrine: Negative for polydipsia, polyphagia and polyuria. Genitourinary: Negative for dysuria, frequency, urgency and vaginal discharge. Musculoskeletal: Negative for myalgias. Skin: Positive for rash (erythematous, itchy rash to vaginal area and under lower abdominal fold. ). Negative for wound. Neurological: Negative for dizziness, speech difficulty, light-headedness and headaches. Psychiatric/Behavioral: Negative for agitation, confusion, self-injury and suicidal ideas. The patient is not nervous/anxious. Objective:     Physical Exam   Constitutional: She is oriented to person, place, and time. She appears well-developed and well-nourished. No distress. HENT:   Head: Normocephalic and atraumatic.    Right Ear: External ear normal.   Left Ear: External ear normal.   Nose: Nose normal. Refill:  1    fluconazole (DIFLUCAN) 150 MG tablet     Sig: Take 1 tablet by mouth once for 1 dose     Dispense:  30 tablet     Refill:  0    nystatin (MYCOSTATIN) 091881 UNIT/GM powder     Sig: Apply 3 times daily. Dispense:  1 Bottle     Refill:  0    Insulin Degludec (TRESIBA FLEXTOUCH) 200 UNIT/ML SOPN     Sig: Inject 100 Units into the skin nightly     Dispense:  5 pen     Refill:  1       Patient Instructions   Begin taking diflucan 150mg daily x 30 days. Use nystatin powder as directed. Continue tresiba 100 units at bedtime. Have labs before you return in one month. Patient/family given educational materials - see patient instructions. Discussed use, benefit, and side effects of prescribed medications. All patient/family questions answered and voiced understanding. Instructed to continue current medications, diet and exercise. Pt/family agreed with treatment plan. Follow up as directed and sooner if needed. Patient/ family instructed that is symptoms worsen or persist they are to contact office or report to nearest ER. They voice understanding and agreement with this plan.      Electronically signed by ANTONIA Toro on 7/17/2018 at 3:18 PM

## 2018-07-19 ENCOUNTER — TELEPHONE (OUTPATIENT)
Dept: NEUROLOGY | Age: 73
End: 2018-07-19

## 2018-07-23 ENCOUNTER — APPOINTMENT (OUTPATIENT)
Dept: CT IMAGING | Age: 73
DRG: 580 | End: 2018-07-23
Payer: MEDICARE

## 2018-07-23 ENCOUNTER — HOSPITAL ENCOUNTER (INPATIENT)
Age: 73
LOS: 4 days | Discharge: HOME | DRG: 580 | End: 2018-07-27
Attending: FAMILY MEDICINE | Admitting: FAMILY MEDICINE
Payer: MEDICARE

## 2018-07-23 ENCOUNTER — APPOINTMENT (OUTPATIENT)
Dept: GENERAL RADIOLOGY | Age: 73
DRG: 580 | End: 2018-07-23
Payer: MEDICARE

## 2018-07-23 DIAGNOSIS — L03.319 CELLULITIS AND ABSCESS OF TRUNK: ICD-10-CM

## 2018-07-23 DIAGNOSIS — L02.219 CELLULITIS AND ABSCESS OF TRUNK: ICD-10-CM

## 2018-07-23 DIAGNOSIS — E11.69 TYPE 2 DIABETES MELLITUS WITH OTHER SPECIFIED COMPLICATION, WITH LONG-TERM CURRENT USE OF INSULIN (HCC): Primary | ICD-10-CM

## 2018-07-23 DIAGNOSIS — Z79.4 TYPE 2 DIABETES MELLITUS WITH OTHER SPECIFIED COMPLICATION, WITH LONG-TERM CURRENT USE OF INSULIN (HCC): Primary | ICD-10-CM

## 2018-07-23 PROBLEM — L02.91 ABSCESS: Status: ACTIVE | Noted: 2018-07-23

## 2018-07-23 LAB
ALBUMIN SERPL-MCNC: 3.9 G/DL (ref 3.5–5.2)
ALP BLD-CCNC: 90 U/L (ref 35–104)
ALT SERPL-CCNC: 13 U/L (ref 5–33)
ANION GAP SERPL CALCULATED.3IONS-SCNC: 16 MMOL/L (ref 7–19)
AST SERPL-CCNC: 13 U/L (ref 5–32)
BASOPHILS ABSOLUTE: 0 K/UL (ref 0–0.2)
BASOPHILS RELATIVE PERCENT: 0.3 % (ref 0–1)
BILIRUB SERPL-MCNC: 0.5 MG/DL (ref 0.2–1.2)
BUN BLDV-MCNC: 16 MG/DL (ref 8–23)
C-REACTIVE PROTEIN: 12.8 MG/DL (ref 0–0.5)
CALCIUM SERPL-MCNC: 9.5 MG/DL (ref 8.8–10.2)
CHLORIDE BLD-SCNC: 94 MMOL/L (ref 98–111)
CO2: 23 MMOL/L (ref 22–29)
CREAT SERPL-MCNC: 0.7 MG/DL (ref 0.5–0.9)
EOSINOPHILS ABSOLUTE: 0.1 K/UL (ref 0–0.6)
EOSINOPHILS RELATIVE PERCENT: 1.1 % (ref 0–5)
GFR NON-AFRICAN AMERICAN: >60
GLUCOSE BLD-MCNC: 222 MG/DL
GLUCOSE BLD-MCNC: 222 MG/DL (ref 70–99)
GLUCOSE BLD-MCNC: 224 MG/DL (ref 74–109)
GLUCOSE BLD-MCNC: 253 MG/DL (ref 70–99)
HBA1C MFR BLD: 11.1 % (ref 4–6)
HCT VFR BLD CALC: 42.9 % (ref 37–47)
HEMOGLOBIN: 13.2 G/DL (ref 12–16)
INR BLD: 1.12 (ref 0.88–1.18)
LACTIC ACID: 2.7 MMOL/L (ref 0.5–1.9)
LYMPHOCYTES ABSOLUTE: 2.8 K/UL (ref 1.1–4.5)
LYMPHOCYTES RELATIVE PERCENT: 24.1 % (ref 20–40)
MCH RBC QN AUTO: 25.8 PG (ref 27–31)
MCHC RBC AUTO-ENTMCNC: 30.8 G/DL (ref 33–37)
MCV RBC AUTO: 84 FL (ref 81–99)
MONOCYTES ABSOLUTE: 1.2 K/UL (ref 0–0.9)
MONOCYTES RELATIVE PERCENT: 10 % (ref 0–10)
NEUTROPHILS ABSOLUTE: 7.3 K/UL (ref 1.5–7.5)
NEUTROPHILS RELATIVE PERCENT: 63.9 % (ref 50–65)
PDW BLD-RTO: 13.7 % (ref 11.5–14.5)
PERFORMED ON: ABNORMAL
PERFORMED ON: ABNORMAL
PLATELET # BLD: 305 K/UL (ref 130–400)
PMV BLD AUTO: 10.1 FL (ref 9.4–12.3)
POTASSIUM REFLEX MAGNESIUM: 4.7 MMOL/L (ref 3.5–5)
PRO-BNP: 57 PG/ML (ref 0–900)
PROTHROMBIN TIME: 14.3 SEC (ref 12–14.6)
RBC # BLD: 5.11 M/UL (ref 4.2–5.4)
SEDIMENTATION RATE, ERYTHROCYTE: 85 MM/HR (ref 0–25)
SODIUM BLD-SCNC: 133 MMOL/L (ref 136–145)
TOTAL PROTEIN: 7.6 G/DL (ref 6.6–8.7)
TROPONIN: <0.01 NG/ML (ref 0–0.03)
WBC # BLD: 11.5 K/UL (ref 4.8–10.8)

## 2018-07-23 PROCEDURE — 74177 CT ABD & PELVIS W/CONTRAST: CPT

## 2018-07-23 PROCEDURE — 87070 CULTURE OTHR SPECIMN AEROBIC: CPT

## 2018-07-23 PROCEDURE — 87040 BLOOD CULTURE FOR BACTERIA: CPT

## 2018-07-23 PROCEDURE — 83880 ASSAY OF NATRIURETIC PEPTIDE: CPT

## 2018-07-23 PROCEDURE — 96367 TX/PROPH/DG ADDL SEQ IV INF: CPT

## 2018-07-23 PROCEDURE — 83036 HEMOGLOBIN GLYCOSYLATED A1C: CPT

## 2018-07-23 PROCEDURE — 85652 RBC SED RATE AUTOMATED: CPT

## 2018-07-23 PROCEDURE — 84484 ASSAY OF TROPONIN QUANT: CPT

## 2018-07-23 PROCEDURE — 96365 THER/PROPH/DIAG IV INF INIT: CPT

## 2018-07-23 PROCEDURE — 2580000003 HC RX 258: Performed by: NURSE PRACTITIONER

## 2018-07-23 PROCEDURE — 71046 X-RAY EXAM CHEST 2 VIEWS: CPT

## 2018-07-23 PROCEDURE — 99285 EMERGENCY DEPT VISIT HI MDM: CPT | Performed by: EMERGENCY MEDICINE

## 2018-07-23 PROCEDURE — 1210000000 HC MED SURG R&B

## 2018-07-23 PROCEDURE — 83605 ASSAY OF LACTIC ACID: CPT

## 2018-07-23 PROCEDURE — 6360000002 HC RX W HCPCS: Performed by: NURSE PRACTITIONER

## 2018-07-23 PROCEDURE — 86140 C-REACTIVE PROTEIN: CPT

## 2018-07-23 PROCEDURE — 85025 COMPLETE CBC W/AUTO DIFF WBC: CPT

## 2018-07-23 PROCEDURE — 93005 ELECTROCARDIOGRAM TRACING: CPT

## 2018-07-23 PROCEDURE — 6360000004 HC RX CONTRAST MEDICATION: Performed by: NURSE PRACTITIONER

## 2018-07-23 PROCEDURE — 36415 COLL VENOUS BLD VENIPUNCTURE: CPT

## 2018-07-23 PROCEDURE — 85610 PROTHROMBIN TIME: CPT

## 2018-07-23 PROCEDURE — 99223 1ST HOSP IP/OBS HIGH 75: CPT | Performed by: INTERNAL MEDICINE

## 2018-07-23 PROCEDURE — 99284 EMERGENCY DEPT VISIT MOD MDM: CPT | Performed by: SURGERY

## 2018-07-23 PROCEDURE — 87205 SMEAR GRAM STAIN: CPT

## 2018-07-23 PROCEDURE — 80053 COMPREHEN METABOLIC PANEL: CPT

## 2018-07-23 PROCEDURE — 87186 SC STD MICRODIL/AGAR DIL: CPT

## 2018-07-23 PROCEDURE — 82948 REAGENT STRIP/BLOOD GLUCOSE: CPT

## 2018-07-23 PROCEDURE — 99285 EMERGENCY DEPT VISIT HI MDM: CPT

## 2018-07-23 RX ORDER — DEXTROSE MONOHYDRATE 50 MG/ML
100 INJECTION, SOLUTION INTRAVENOUS PRN
Status: DISCONTINUED | OUTPATIENT
Start: 2018-07-23 | End: 2018-07-24

## 2018-07-23 RX ORDER — HYDROCODONE BITARTRATE AND ACETAMINOPHEN 10; 325 MG/1; MG/1
1 TABLET ORAL EVERY 6 HOURS PRN
Status: DISCONTINUED | OUTPATIENT
Start: 2018-07-23 | End: 2018-07-24

## 2018-07-23 RX ORDER — 0.9 % SODIUM CHLORIDE 0.9 %
1000 INTRAVENOUS SOLUTION INTRAVENOUS ONCE
Status: COMPLETED | OUTPATIENT
Start: 2018-07-23 | End: 2018-07-23

## 2018-07-23 RX ORDER — DEXTROSE MONOHYDRATE 25 G/50ML
12.5 INJECTION, SOLUTION INTRAVENOUS PRN
Status: DISCONTINUED | OUTPATIENT
Start: 2018-07-23 | End: 2018-07-24

## 2018-07-23 RX ORDER — CITALOPRAM 20 MG/1
20 TABLET ORAL DAILY
Status: DISCONTINUED | OUTPATIENT
Start: 2018-07-24 | End: 2018-07-24

## 2018-07-23 RX ORDER — PANTOPRAZOLE SODIUM 40 MG/1
40 TABLET, DELAYED RELEASE ORAL
Status: DISCONTINUED | OUTPATIENT
Start: 2018-07-24 | End: 2018-07-24

## 2018-07-23 RX ORDER — ANASTROZOLE 1 MG/1
1 TABLET ORAL DAILY
Status: DISCONTINUED | OUTPATIENT
Start: 2018-07-24 | End: 2018-07-24

## 2018-07-23 RX ORDER — VANCOMYCIN HYDROCHLORIDE 1 G/200ML
1000 INJECTION, SOLUTION INTRAVENOUS ONCE
Status: COMPLETED | OUTPATIENT
Start: 2018-07-23 | End: 2018-07-23

## 2018-07-23 RX ORDER — ACETAMINOPHEN 325 MG/1
650 TABLET ORAL EVERY 8 HOURS PRN
Status: DISCONTINUED | OUTPATIENT
Start: 2018-07-23 | End: 2018-07-24

## 2018-07-23 RX ORDER — INSULIN GLARGINE 100 [IU]/ML
50 INJECTION, SOLUTION SUBCUTANEOUS 2 TIMES DAILY
Status: DISCONTINUED | OUTPATIENT
Start: 2018-07-23 | End: 2018-07-24

## 2018-07-23 RX ORDER — ONDANSETRON 2 MG/ML
4 INJECTION INTRAMUSCULAR; INTRAVENOUS EVERY 6 HOURS PRN
Status: DISCONTINUED | OUTPATIENT
Start: 2018-07-23 | End: 2018-07-24

## 2018-07-23 RX ORDER — SODIUM CHLORIDE 9 MG/ML
INJECTION, SOLUTION INTRAVENOUS CONTINUOUS
Status: DISCONTINUED | OUTPATIENT
Start: 2018-07-23 | End: 2018-07-24

## 2018-07-23 RX ORDER — ALBUTEROL SULFATE 90 UG/1
2 AEROSOL, METERED RESPIRATORY (INHALATION) EVERY 4 HOURS PRN
Status: DISCONTINUED | OUTPATIENT
Start: 2018-07-23 | End: 2018-07-24

## 2018-07-23 RX ORDER — ASPIRIN 81 MG/1
81 TABLET, CHEWABLE ORAL DAILY
Status: DISCONTINUED | OUTPATIENT
Start: 2018-07-24 | End: 2018-07-24

## 2018-07-23 RX ORDER — NICOTINE POLACRILEX 4 MG
15 LOZENGE BUCCAL PRN
Status: DISCONTINUED | OUTPATIENT
Start: 2018-07-23 | End: 2018-07-24

## 2018-07-23 RX ADMIN — VANCOMYCIN HYDROCHLORIDE 1000 MG: 1 INJECTION, SOLUTION INTRAVENOUS at 20:04

## 2018-07-23 RX ADMIN — SODIUM CHLORIDE 1000 ML: 9 INJECTION, SOLUTION INTRAVENOUS at 15:32

## 2018-07-23 RX ADMIN — IOPAMIDOL 90 ML: 755 INJECTION, SOLUTION INTRAVENOUS at 16:20

## 2018-07-23 RX ADMIN — TAZOBACTAM SODIUM AND PIPERACILLIN SODIUM 3.38 G: 375; 3 INJECTION, SOLUTION INTRAVENOUS at 19:30

## 2018-07-23 ASSESSMENT — PAIN DESCRIPTION - LOCATION: LOCATION: ABDOMEN

## 2018-07-23 ASSESSMENT — ENCOUNTER SYMPTOMS
VOMITING: 0
NAUSEA: 0
SHORTNESS OF BREATH: 1
CHEST TIGHTNESS: 0

## 2018-07-23 ASSESSMENT — PAIN DESCRIPTION - PAIN TYPE: TYPE: ACUTE PAIN

## 2018-07-23 ASSESSMENT — PAIN SCALES - GENERAL
PAINLEVEL_OUTOF10: 9
PAINLEVEL_OUTOF10: 10

## 2018-07-23 NOTE — ED PROVIDER NOTES
TAKE 1 TABLET DAILY    OXYBUTYNIN (DITROPAN-XL) 5 MG EXTENDED RELEASE TABLET    TAKE 1 TABLET DAILY    ROSUVASTATIN (CRESTOR) 10 MG TABLET    Take one tablet nightly    TIOTROPIUM (Wyvonnia Savers) 18 MCG INHALATION CAPSULE    Inhale 1 capsule into the lungs daily    VITAMIN D (ERGOCALCIFEROL) 37968 UNITS CAPS CAPSULE    TAKE 1 CAPSULE BY MOUTH ONCE A WEEK       ALLERGIES     Cefdinir and Pcn [penicillins]    FAMILY HISTORY       Family History   Problem Relation Age of Onset    High Blood Pressure Father     Heart Disease Father     Diabetes Father     High Blood Pressure Sister     Diabetes Sister     Cancer Sister     High Blood Pressure Brother     Colon Cancer Neg Hx     Colon Polyps Neg Hx     Esophageal Cancer Neg Hx     Liver Cancer Neg Hx     Liver Disease Neg Hx     Rectal Cancer Neg Hx     Stomach Cancer Neg Hx           SOCIAL HISTORY       Social History     Social History    Marital status:      Spouse name: N/A    Number of children: N/A    Years of education: N/A     Social History Main Topics    Smoking status: Never Smoker    Smokeless tobacco: Never Used    Alcohol use 1.2 oz/week     1 Glasses of wine, 1 Cans of beer per week      Comment: occ    Drug use: No    Sexual activity: Not Asked     Other Topics Concern    None     Social History Narrative    None       SCREENINGS    Kramer Coma Scale  Eye Opening: Spontaneous  Best Verbal Response: Oriented  Best Motor Response: Obeys commands  Luis Coma Scale Score: 15        PHYSICAL EXAM    (up to 7 for level 4, 8 or more for level 5)     ED Triage Vitals   BP Temp Temp src Pulse Resp SpO2 Height Weight   07/23/18 1310 07/23/18 1310 -- 07/23/18 1310 07/23/18 1310 07/23/18 1310 07/23/18 1306 07/23/18 1306   (!) 151/79 97.6 °F (36.4 °C)  89 16 96 % 5' 2\" (1.575 m) 253 lb (114.8 kg)       Physical Exam   Constitutional: She appears well-developed and well-nourished. HENT:   Head: Normocephalic and atraumatic. Eyes: Right eye exhibits no discharge. Left eye exhibits no discharge. No scleral icterus. Neck: Normal range of motion. Neck supple. Cardiovascular: Normal rate. Pulmonary/Chest: No respiratory distress. Neurological: She is alert. Skin: Skin is warm. 12cm circular area of redness with a central blister that is 4 x 5cm and fluid filled   Psychiatric: She has a normal mood and affect. Her behavior is normal.   Nursing note and vitals reviewed. DIAGNOSTIC RESULTS     EKG: All EKG's are interpreted by the Emergency Department Physician who either signs or Co-signs this chart in the absence of a cardiologist. SR 87 nonspecific changes  Read by Dr Raul Tafoya 1500        RADIOLOGY:   Non-plain film images such as CT, Ultrasound and MRI are read by the radiologist. Plain radiographic images are visualized and preliminarily interpreted by the emergency physician with the below findings:      Interpretation per the Radiologist below, if available at the time of this note:    CT ABDOMEN PELVIS W IV CONTRAST Additional Contrast? None   Final Result   1. Findings compatible with abdominal wall cellulitis and probable   developing subcutaneous abscess that measures up to 5.9 cm,   incompletely organized at this time. Consider ultrasound evaluation   with potential ultrasound-guided aspiration for cultures. 2. Low-attenuation lesion within the posterior liver dome probably a   small hemangioma, measuring approximately 1.9 cm.   3. Increased attenuation of the dependent gallbladder contents may   represent sludge or small stones. No evidence of acute cholecystitis. 4. 4.3 cm benign-appearing cyst within the right mid kidney as   described above. Signed by Dr Eloise Brar. Suzan on 7/23/2018 4:52 PM      XR CHEST STANDARD (2 VW)   Final Result   1. No radiographic evidence of acute cardiopulmonary process.    Signed by Dr Dinorah Roque on 7/23/2018 3:13 PM            ED BEDSIDE ULTRASOUND:   Performed by ED Physician - none    LABS:  Labs Reviewed   CBC WITH AUTO DIFFERENTIAL - Abnormal; Notable for the following:        Result Value    WBC 11.5 (*)     MCH 25.8 (*)     MCHC 30.8 (*)     Monocytes # 1.20 (*)     All other components within normal limits   COMPREHENSIVE METABOLIC PANEL W/ REFLEX TO MG FOR LOW K - Abnormal; Notable for the following:     Sodium 133 (*)     Chloride 94 (*)     Glucose 224 (*)     All other components within normal limits   LACTIC ACID, PLASMA - Abnormal; Notable for the following:     Lactic Acid 2.7 (*)     All other components within normal limits    Narrative:     [address] tel[address] ,  Chemistry results called to and read back by [facility] RN in ED, 07/23/2018 15:09,  by [address] - Abnormal; Notable for the following:     Sed Rate 85 (*)     All other components within normal limits   C-REACTIVE PROTEIN - Abnormal; Notable for the following:     CRP 12.80 (*)     All other components within normal limits   HEMOGLOBIN A1C - Abnormal; Notable for the following:     Hemoglobin A1C 11.1 (*)     All other components within normal limits   POCT GLUCOSE - Abnormal; Notable for the following:     POC Glucose 222 (*)     All other components within normal limits   POCT GLUCOSE - Normal   CULTURE BLOOD #1   CULTURE BLOOD #2   WOUND CULTURE   TROPONIN   BRAIN NATRIURETIC PEPTIDE   PROTIME-INR       All other labs were within normal range or not returned as of this dictation. EMERGENCY DEPARTMENT COURSE and DIFFERENTIAL DIAGNOSIS/MDM:   Vitals:    Vitals:    07/23/18 1306 07/23/18 1310   BP:  (!) 151/79   Pulse:  89   Resp:  16   Temp:  97.6 °F (36.4 °C)   SpO2:  96%   Weight: 253 lb (114.8 kg)    Height: 5' 2\" (1.575 m)            MDM  Dr Giovany Walker came and saw the pt. Spoke to Dr Sweta Bell the hospitalist who accepted pt for admission.   Dr Kalin Lambert to do surgery t      CONSULTS:  IP CONSULT TO GENERAL SURGERY    PROCEDURES:  Unless otherwise noted below, none     Procedures    FINAL IMPRESSION      1. Type 2 diabetes mellitus with other specified complication, with long-term current use of insulin ([facility] [address]    2.  Cellulitis and abscess of trunk          DISPOSITION/PLAN   DISPOSITION Decision To Admit 07/23/2018 07:16:03 PM      PATIENT REFERRED TO:  Claudia Washington MD  [address] [place] [address] Dr Araiza [address] [facility]          [address] ARAIZA [address] [facility]            DISCHARGE MEDICATIONS:  New Prescriptions    No medications on file          (Please note that portions of this note were completed with a voice recognition program.  Efforts were made to edit the dictations but occasionally words are mis-transcribed.)    ANTONIA Torres (electronically signed)          ANTONIA Torres  07/23/18 3039

## 2018-07-23 NOTE — CONSULTS
 HYDROcodone-acetaminophen (NORCO)  MG per tablet Take 1 tablet by mouth daily as needed for Pain for up to 30 days. Ward Beckforder Date: 7/5/18 30 tablet 0    HYDROcodone-acetaminophen (NORCO)  MG per tablet Take 1 tablet by mouth every 8 hours as needed for Pain (supplemental script) for up to 2 days. . 15 tablet 0    Misc. Devices (ROLLER WALKER) MISC 1 each by Does not apply route daily Pt states that she falls at least once a month up to 3 times a month. 1 each 0    insulin detemir (LEVEMIR) 100 UNIT/ML injection pen Inject 50 Units into the skin 2 times daily 5 pen 2    INSULIN SYRINGE 1CC/29G 29G X 1/2\" 1 ML MISC Use with each dose of novolog TID 90 each 1    Insulin Pen Needle 32G X 6 MM MISC As directed 50 each 5    mometasone-formoterol (DULERA) 200-5 MCG/ACT inhaler Inhale 2 puffs into the lungs every 12 hours      albuterol (PROVENTIL HFA;VENTOLIN HFA) 108 (90 BASE) MCG/ACT inhaler Inhale 2 puffs into the lungs every 4 hours as needed for Wheezing      Incontinence Supply Disposable (POISE PANTILINERS) PADS Use pads as needed daily for urine leakage. 1 each 11     Allergies: Cefdinir and Pcn [penicillins]  Past Medical History:   Diagnosis Date    Back pain 2/2/2016    Breast CA ([address]     Chronic back pain     Fibromyalgia     GERD (gastroesophageal reflux disease)     Hyperlipidemia     Hypertension     Joint pain 2/2/2016    Morbid obesity ([address]     Multiple food allergies     Neuropathic pain     Neuropathy ([address]     Neuropathy involving both lower extremities 2/2/2016    Osteoarthritis     Postmenopausal osteoporosis     S/P lumpectomy, left breast 8/19/2015 8/4/2015    Sleep apnea     Type II or unspecified type diabetes mellitus with neurological manifestations, uncontrolled(250.62)      Past Surgical History:   Procedure Laterality Date    BREAST SURGERY Left 8/4/2015    left partial mastectomy on 8/4/15   [address]?     EYE SURGERY      EYE evaluation   with potential ultrasound-guided aspiration for cultures. IMPRESSION:Abdominal wall abscess in a patient who is systemically ill.                           Diabetes with elevated lactate, CRP,   WBC and   glucose                        PLAN:Medical management and stabilization tonight with I&D of this abscess in the OR tomorrow     She understands and agrees

## 2018-07-24 ENCOUNTER — ANESTHESIA (OUTPATIENT)
Dept: OPERATING ROOM | Age: 73
DRG: 580 | End: 2018-07-24
Payer: MEDICARE

## 2018-07-24 ENCOUNTER — ANESTHESIA EVENT (OUTPATIENT)
Dept: OPERATING ROOM | Age: 73
DRG: 580 | End: 2018-07-24
Payer: MEDICARE

## 2018-07-24 VITALS
RESPIRATION RATE: 1 BRPM | SYSTOLIC BLOOD PRESSURE: 135 MMHG | DIASTOLIC BLOOD PRESSURE: 60 MMHG | OXYGEN SATURATION: 99 %

## 2018-07-24 LAB
ALBUMIN SERPL-MCNC: 3.4 G/DL (ref 3.5–5.2)
ALP BLD-CCNC: 81 U/L (ref 35–104)
ALT SERPL-CCNC: 11 U/L (ref 5–33)
ANION GAP SERPL CALCULATED.3IONS-SCNC: 16 MMOL/L (ref 7–19)
AST SERPL-CCNC: 10 U/L (ref 5–32)
BASOPHILS ABSOLUTE: 0 K/UL (ref 0–0.2)
BASOPHILS RELATIVE PERCENT: 0.4 % (ref 0–1)
BILIRUB SERPL-MCNC: 0.4 MG/DL (ref 0.2–1.2)
BUN BLDV-MCNC: 15 MG/DL (ref 8–23)
CALCIUM SERPL-MCNC: 8.8 MG/DL (ref 8.8–10.2)
CHLORIDE BLD-SCNC: 99 MMOL/L (ref 98–111)
CO2: 20 MMOL/L (ref 22–29)
CREAT SERPL-MCNC: 0.7 MG/DL (ref 0.5–0.9)
EOSINOPHILS ABSOLUTE: 0.1 K/UL (ref 0–0.6)
EOSINOPHILS RELATIVE PERCENT: 1.4 % (ref 0–5)
GFR NON-AFRICAN AMERICAN: >60
GLUCOSE BLD-MCNC: 129 MG/DL (ref 70–99)
GLUCOSE BLD-MCNC: 149 MG/DL (ref 70–99)
GLUCOSE BLD-MCNC: 197 MG/DL (ref 70–99)
GLUCOSE BLD-MCNC: 242 MG/DL (ref 74–109)
GLUCOSE BLD-MCNC: 261 MG/DL (ref 70–99)
HCT VFR BLD CALC: 38.1 % (ref 37–47)
HEMOGLOBIN: 12.2 G/DL (ref 12–16)
LYMPHOCYTES ABSOLUTE: 2.4 K/UL (ref 1.1–4.5)
LYMPHOCYTES RELATIVE PERCENT: 23.5 % (ref 20–40)
MCH RBC QN AUTO: 26.5 PG (ref 27–31)
MCHC RBC AUTO-ENTMCNC: 32 G/DL (ref 33–37)
MCV RBC AUTO: 82.8 FL (ref 81–99)
MONOCYTES ABSOLUTE: 1.1 K/UL (ref 0–0.9)
MONOCYTES RELATIVE PERCENT: 10.9 % (ref 0–10)
NEUTROPHILS ABSOLUTE: 6.5 K/UL (ref 1.5–7.5)
NEUTROPHILS RELATIVE PERCENT: 63.3 % (ref 50–65)
PDW BLD-RTO: 13.8 % (ref 11.5–14.5)
PERFORMED ON: ABNORMAL
PLATELET # BLD: 306 K/UL (ref 130–400)
PMV BLD AUTO: 10.2 FL (ref 9.4–12.3)
POTASSIUM REFLEX MAGNESIUM: 4.1 MMOL/L (ref 3.5–5)
RBC # BLD: 4.6 M/UL (ref 4.2–5.4)
SODIUM BLD-SCNC: 135 MMOL/L (ref 136–145)
TOTAL PROTEIN: 6.8 G/DL (ref 6.6–8.7)
WBC # BLD: 10.3 K/UL (ref 4.8–10.8)

## 2018-07-24 PROCEDURE — 36415 COLL VENOUS BLD VENIPUNCTURE: CPT

## 2018-07-24 PROCEDURE — 6360000002 HC RX W HCPCS: Performed by: INTERNAL MEDICINE

## 2018-07-24 PROCEDURE — 6360000002 HC RX W HCPCS: Performed by: FAMILY MEDICINE

## 2018-07-24 PROCEDURE — 2580000003 HC RX 258: Performed by: SURGERY

## 2018-07-24 PROCEDURE — 80053 COMPREHEN METABOLIC PANEL: CPT

## 2018-07-24 PROCEDURE — 6370000000 HC RX 637 (ALT 250 FOR IP): Performed by: FAMILY MEDICINE

## 2018-07-24 PROCEDURE — 99232 SBSQ HOSP IP/OBS MODERATE 35: CPT | Performed by: FAMILY MEDICINE

## 2018-07-24 PROCEDURE — 85025 COMPLETE CBC W/AUTO DIFF WBC: CPT

## 2018-07-24 PROCEDURE — 87070 CULTURE OTHR SPECIMN AEROBIC: CPT

## 2018-07-24 PROCEDURE — 3700000001 HC ADD 15 MINUTES (ANESTHESIA): Performed by: SURGERY

## 2018-07-24 PROCEDURE — 3700000000 HC ANESTHESIA ATTENDED CARE: Performed by: SURGERY

## 2018-07-24 PROCEDURE — 82948 REAGENT STRIP/BLOOD GLUCOSE: CPT

## 2018-07-24 PROCEDURE — 1210000000 HC MED SURG R&B

## 2018-07-24 PROCEDURE — 87205 SMEAR GRAM STAIN: CPT

## 2018-07-24 PROCEDURE — 2580000003 HC RX 258: Performed by: NURSE ANESTHETIST, CERTIFIED REGISTERED

## 2018-07-24 PROCEDURE — 2780000010 HC IMPLANT OTHER: Performed by: SURGERY

## 2018-07-24 PROCEDURE — 2500000003 HC RX 250 WO HCPCS: Performed by: NURSE ANESTHETIST, CERTIFIED REGISTERED

## 2018-07-24 PROCEDURE — 7100000001 HC PACU RECOVERY - ADDTL 15 MIN: Performed by: SURGERY

## 2018-07-24 PROCEDURE — 6370000000 HC RX 637 (ALT 250 FOR IP): Performed by: INTERNAL MEDICINE

## 2018-07-24 PROCEDURE — 2580000003 HC RX 258: Performed by: INTERNAL MEDICINE

## 2018-07-24 PROCEDURE — 7100000000 HC PACU RECOVERY - FIRST 15 MIN: Performed by: SURGERY

## 2018-07-24 PROCEDURE — 99999 PR OFFICE/OUTPT VISIT,PROCEDURE ONLY: CPT | Performed by: PHYSICIAN ASSISTANT

## 2018-07-24 PROCEDURE — 2500000003 HC RX 250 WO HCPCS: Performed by: SURGERY

## 2018-07-24 PROCEDURE — 2700000000 HC OXYGEN THERAPY PER DAY

## 2018-07-24 PROCEDURE — 0W9F00Z DRAINAGE OF ABDOMINAL WALL WITH DRAINAGE DEVICE, OPEN APPROACH: ICD-10-PCS | Performed by: SURGERY

## 2018-07-24 PROCEDURE — 2500000003 HC RX 250 WO HCPCS: Performed by: FAMILY MEDICINE

## 2018-07-24 PROCEDURE — 94660 CPAP INITIATION&MGMT: CPT

## 2018-07-24 PROCEDURE — 3600000004 HC SURGERY LEVEL 4 BASE: Performed by: SURGERY

## 2018-07-24 PROCEDURE — 6360000002 HC RX W HCPCS: Performed by: NURSE ANESTHETIST, CERTIFIED REGISTERED

## 2018-07-24 PROCEDURE — 2709999900 HC NON-CHARGEABLE SUPPLY: Performed by: SURGERY

## 2018-07-24 PROCEDURE — 10061 I&D ABSCESS COMP/MULTIPLE: CPT | Performed by: SURGERY

## 2018-07-24 PROCEDURE — 3600000014 HC SURGERY LEVEL 4 ADDTL 15MIN: Performed by: SURGERY

## 2018-07-24 PROCEDURE — 2500000003 HC RX 250 WO HCPCS: Performed by: INTERNAL MEDICINE

## 2018-07-24 RX ORDER — PANTOPRAZOLE SODIUM 40 MG/1
40 TABLET, DELAYED RELEASE ORAL
Status: DISCONTINUED | OUTPATIENT
Start: 2018-07-25 | End: 2018-07-27 | Stop reason: HOSPADM

## 2018-07-24 RX ORDER — METOCLOPRAMIDE HYDROCHLORIDE 5 MG/ML
10 INJECTION INTRAMUSCULAR; INTRAVENOUS
Status: DISCONTINUED | OUTPATIENT
Start: 2018-07-24 | End: 2018-07-24 | Stop reason: HOSPADM

## 2018-07-24 RX ORDER — HYDROCODONE BITARTRATE AND ACETAMINOPHEN 10; 325 MG/1; MG/1
1 TABLET ORAL EVERY 6 HOURS PRN
Status: DISCONTINUED | OUTPATIENT
Start: 2018-07-24 | End: 2018-07-27 | Stop reason: HOSPADM

## 2018-07-24 RX ORDER — FENTANYL CITRATE 50 UG/ML
INJECTION, SOLUTION INTRAMUSCULAR; INTRAVENOUS PRN
Status: DISCONTINUED | OUTPATIENT
Start: 2018-07-24 | End: 2018-07-24 | Stop reason: SDUPTHER

## 2018-07-24 RX ORDER — CITALOPRAM 20 MG/1
20 TABLET ORAL DAILY
Status: DISCONTINUED | OUTPATIENT
Start: 2018-07-25 | End: 2018-07-27 | Stop reason: HOSPADM

## 2018-07-24 RX ORDER — IBUPROFEN 200 MG
TABLET ORAL
Status: CANCELLED | OUTPATIENT
Start: 2018-07-24

## 2018-07-24 RX ORDER — ONDANSETRON 2 MG/ML
INJECTION INTRAMUSCULAR; INTRAVENOUS PRN
Status: DISCONTINUED | OUTPATIENT
Start: 2018-07-24 | End: 2018-07-24 | Stop reason: SDUPTHER

## 2018-07-24 RX ORDER — ALBUTEROL SULFATE 90 UG/1
2 AEROSOL, METERED RESPIRATORY (INHALATION) EVERY 4 HOURS PRN
Status: DISCONTINUED | OUTPATIENT
Start: 2018-07-24 | End: 2018-07-27 | Stop reason: HOSPADM

## 2018-07-24 RX ORDER — ACETAMINOPHEN 325 MG/1
650 TABLET ORAL EVERY 8 HOURS PRN
Status: DISCONTINUED | OUTPATIENT
Start: 2018-07-24 | End: 2018-07-24

## 2018-07-24 RX ORDER — MEPERIDINE HYDROCHLORIDE 50 MG/ML
12.5 INJECTION INTRAMUSCULAR; INTRAVENOUS; SUBCUTANEOUS EVERY 5 MIN PRN
Status: DISCONTINUED | OUTPATIENT
Start: 2018-07-24 | End: 2018-07-24 | Stop reason: HOSPADM

## 2018-07-24 RX ORDER — ASPIRIN 81 MG/1
81 TABLET, CHEWABLE ORAL DAILY
Status: DISCONTINUED | OUTPATIENT
Start: 2018-07-24 | End: 2018-07-24

## 2018-07-24 RX ORDER — INSULIN GLARGINE 100 [IU]/ML
50 INJECTION, SOLUTION SUBCUTANEOUS 2 TIMES DAILY
Status: DISCONTINUED | OUTPATIENT
Start: 2018-07-24 | End: 2018-07-27 | Stop reason: HOSPADM

## 2018-07-24 RX ORDER — DEXTROSE MONOHYDRATE 25 G/50ML
12.5 INJECTION, SOLUTION INTRAVENOUS PRN
Status: DISCONTINUED | OUTPATIENT
Start: 2018-07-24 | End: 2018-07-27 | Stop reason: HOSPADM

## 2018-07-24 RX ORDER — NICOTINE POLACRILEX 4 MG
15 LOZENGE BUCCAL PRN
Status: DISCONTINUED | OUTPATIENT
Start: 2018-07-24 | End: 2018-07-24

## 2018-07-24 RX ORDER — HYDROCODONE BITARTRATE AND ACETAMINOPHEN 10; 325 MG/1; MG/1
1 TABLET ORAL EVERY 6 HOURS PRN
Status: DISCONTINUED | OUTPATIENT
Start: 2018-07-24 | End: 2018-07-24

## 2018-07-24 RX ORDER — NICOTINE POLACRILEX 4 MG
15 LOZENGE BUCCAL PRN
Status: DISCONTINUED | OUTPATIENT
Start: 2018-07-24 | End: 2018-07-27 | Stop reason: HOSPADM

## 2018-07-24 RX ORDER — METOPROLOL SUCCINATE 50 MG/1
TABLET, EXTENDED RELEASE ORAL
Status: CANCELLED | OUTPATIENT
Start: 2018-07-24

## 2018-07-24 RX ORDER — ALBUTEROL SULFATE 90 UG/1
2 AEROSOL, METERED RESPIRATORY (INHALATION) EVERY 4 HOURS PRN
Status: DISCONTINUED | OUTPATIENT
Start: 2018-07-24 | End: 2018-07-24

## 2018-07-24 RX ORDER — ACETAMINOPHEN 325 MG/1
650 TABLET ORAL EVERY 8 HOURS PRN
Status: DISCONTINUED | OUTPATIENT
Start: 2018-07-24 | End: 2018-07-27 | Stop reason: HOSPADM

## 2018-07-24 RX ORDER — HYDROMORPHONE HCL IN 0.9% NACL 0.5 MG/ML
0.25 SYRINGE (ML) INTRAVENOUS EVERY 5 MIN PRN
Status: DISCONTINUED | OUTPATIENT
Start: 2018-07-24 | End: 2018-07-24 | Stop reason: HOSPADM

## 2018-07-24 RX ORDER — PROPOFOL 10 MG/ML
INJECTION, EMULSION INTRAVENOUS PRN
Status: DISCONTINUED | OUTPATIENT
Start: 2018-07-24 | End: 2018-07-24 | Stop reason: SDUPTHER

## 2018-07-24 RX ORDER — KETAMINE HYDROCHLORIDE 100 MG/ML
INJECTION, SOLUTION INTRAMUSCULAR; INTRAVENOUS PRN
Status: DISCONTINUED | OUTPATIENT
Start: 2018-07-24 | End: 2018-07-24 | Stop reason: SDUPTHER

## 2018-07-24 RX ORDER — HYDRALAZINE HYDROCHLORIDE 20 MG/ML
5 INJECTION INTRAMUSCULAR; INTRAVENOUS EVERY 10 MIN PRN
Status: DISCONTINUED | OUTPATIENT
Start: 2018-07-24 | End: 2018-07-24 | Stop reason: HOSPADM

## 2018-07-24 RX ORDER — SODIUM CHLORIDE 9 MG/ML
INJECTION, SOLUTION INTRAVENOUS CONTINUOUS
Status: DISCONTINUED | OUTPATIENT
Start: 2018-07-24 | End: 2018-07-24

## 2018-07-24 RX ORDER — SODIUM CHLORIDE 9 MG/ML
INJECTION, SOLUTION INTRAVENOUS CONTINUOUS
Status: DISCONTINUED | OUTPATIENT
Start: 2018-07-24 | End: 2018-07-27

## 2018-07-24 RX ORDER — PANTOPRAZOLE SODIUM 40 MG/1
40 TABLET, DELAYED RELEASE ORAL
Status: DISCONTINUED | OUTPATIENT
Start: 2018-07-25 | End: 2018-07-24

## 2018-07-24 RX ORDER — DEXTROSE MONOHYDRATE 25 G/50ML
12.5 INJECTION, SOLUTION INTRAVENOUS PRN
Status: DISCONTINUED | OUTPATIENT
Start: 2018-07-24 | End: 2018-07-24

## 2018-07-24 RX ORDER — PROMETHAZINE HYDROCHLORIDE 25 MG/ML
6.25 INJECTION, SOLUTION INTRAMUSCULAR; INTRAVENOUS
Status: DISCONTINUED | OUTPATIENT
Start: 2018-07-24 | End: 2018-07-24 | Stop reason: HOSPADM

## 2018-07-24 RX ORDER — MAGNESIUM OXIDE 400 MG/1
400 TABLET ORAL DAILY
Status: CANCELLED | OUTPATIENT
Start: 2018-07-24

## 2018-07-24 RX ORDER — MORPHINE SULFATE 1 MG/ML
2 INJECTION, SOLUTION EPIDURAL; INTRATHECAL; INTRAVENOUS EVERY 5 MIN PRN
Status: DISCONTINUED | OUTPATIENT
Start: 2018-07-24 | End: 2018-07-24 | Stop reason: HOSPADM

## 2018-07-24 RX ORDER — ONDANSETRON 2 MG/ML
4 INJECTION INTRAMUSCULAR; INTRAVENOUS EVERY 6 HOURS PRN
Status: DISCONTINUED | OUTPATIENT
Start: 2018-07-24 | End: 2018-07-27 | Stop reason: HOSPADM

## 2018-07-24 RX ORDER — LOSARTAN POTASSIUM 100 MG/1
TABLET ORAL
Status: CANCELLED | OUTPATIENT
Start: 2018-07-24

## 2018-07-24 RX ORDER — PROPOFOL 10 MG/ML
INJECTION, EMULSION INTRAVENOUS CONTINUOUS PRN
Status: DISCONTINUED | OUTPATIENT
Start: 2018-07-24 | End: 2018-07-24 | Stop reason: SDUPTHER

## 2018-07-24 RX ORDER — SODIUM CHLORIDE, SODIUM LACTATE, POTASSIUM CHLORIDE, CALCIUM CHLORIDE 600; 310; 30; 20 MG/100ML; MG/100ML; MG/100ML; MG/100ML
INJECTION, SOLUTION INTRAVENOUS CONTINUOUS PRN
Status: DISCONTINUED | OUTPATIENT
Start: 2018-07-24 | End: 2018-07-24 | Stop reason: SDUPTHER

## 2018-07-24 RX ORDER — DEXTROSE MONOHYDRATE 50 MG/ML
100 INJECTION, SOLUTION INTRAVENOUS PRN
Status: DISCONTINUED | OUTPATIENT
Start: 2018-07-24 | End: 2018-07-27 | Stop reason: HOSPADM

## 2018-07-24 RX ORDER — ANASTROZOLE 1 MG/1
1 TABLET ORAL DAILY
Status: DISCONTINUED | OUTPATIENT
Start: 2018-07-25 | End: 2018-07-27 | Stop reason: HOSPADM

## 2018-07-24 RX ORDER — MONTELUKAST SODIUM 10 MG/1
TABLET ORAL
Status: CANCELLED | OUTPATIENT
Start: 2018-07-24

## 2018-07-24 RX ORDER — LIDOCAINE HYDROCHLORIDE 10 MG/ML
INJECTION, SOLUTION EPIDURAL; INFILTRATION; INTRACAUDAL; PERINEURAL PRN
Status: DISCONTINUED | OUTPATIENT
Start: 2018-07-24 | End: 2018-07-24 | Stop reason: SDUPTHER

## 2018-07-24 RX ORDER — HYDROMORPHONE HCL IN 0.9% NACL 0.5 MG/ML
0.5 SYRINGE (ML) INTRAVENOUS EVERY 5 MIN PRN
Status: DISCONTINUED | OUTPATIENT
Start: 2018-07-24 | End: 2018-07-24 | Stop reason: HOSPADM

## 2018-07-24 RX ORDER — CITALOPRAM 20 MG/1
20 TABLET ORAL DAILY
Status: DISCONTINUED | OUTPATIENT
Start: 2018-07-24 | End: 2018-07-24

## 2018-07-24 RX ORDER — DIPHENHYDRAMINE HYDROCHLORIDE 50 MG/ML
12.5 INJECTION INTRAMUSCULAR; INTRAVENOUS
Status: DISCONTINUED | OUTPATIENT
Start: 2018-07-24 | End: 2018-07-24 | Stop reason: HOSPADM

## 2018-07-24 RX ORDER — INSULIN GLARGINE 100 [IU]/ML
50 INJECTION, SOLUTION SUBCUTANEOUS 2 TIMES DAILY
Status: DISCONTINUED | OUTPATIENT
Start: 2018-07-24 | End: 2018-07-24

## 2018-07-24 RX ORDER — ERGOCALCIFEROL 1.25 MG/1
CAPSULE ORAL
Status: CANCELLED | OUTPATIENT
Start: 2018-07-24

## 2018-07-24 RX ORDER — ONDANSETRON 2 MG/ML
4 INJECTION INTRAMUSCULAR; INTRAVENOUS EVERY 6 HOURS PRN
Status: DISCONTINUED | OUTPATIENT
Start: 2018-07-24 | End: 2018-07-24

## 2018-07-24 RX ORDER — MORPHINE SULFATE 1 MG/ML
4 INJECTION, SOLUTION EPIDURAL; INTRATHECAL; INTRAVENOUS EVERY 5 MIN PRN
Status: DISCONTINUED | OUTPATIENT
Start: 2018-07-24 | End: 2018-07-24 | Stop reason: HOSPADM

## 2018-07-24 RX ORDER — ANASTROZOLE 1 MG/1
1 TABLET ORAL DAILY
Status: DISCONTINUED | OUTPATIENT
Start: 2018-07-24 | End: 2018-07-24

## 2018-07-24 RX ORDER — ASPIRIN 81 MG/1
81 TABLET, CHEWABLE ORAL DAILY
Status: DISCONTINUED | OUTPATIENT
Start: 2018-07-25 | End: 2018-07-27 | Stop reason: HOSPADM

## 2018-07-24 RX ORDER — LABETALOL HYDROCHLORIDE 5 MG/ML
5 INJECTION, SOLUTION INTRAVENOUS EVERY 10 MIN PRN
Status: DISCONTINUED | OUTPATIENT
Start: 2018-07-24 | End: 2018-07-24 | Stop reason: HOSPADM

## 2018-07-24 RX ORDER — OXYBUTYNIN CHLORIDE 5 MG/1
1 TABLET, EXTENDED RELEASE ORAL DAILY
Status: CANCELLED | OUTPATIENT
Start: 2018-07-24

## 2018-07-24 RX ADMIN — PROPOFOL 160 MCG/KG/MIN: 10 INJECTION, EMULSION INTRAVENOUS at 15:32

## 2018-07-24 RX ADMIN — ANASTROZOLE 1 MG: 1 TABLET, COATED ORAL at 09:02

## 2018-07-24 RX ADMIN — AZTREONAM 1 G: 1 INJECTION, SOLUTION INTRAVENOUS at 02:11

## 2018-07-24 RX ADMIN — CITALOPRAM HYDROBROMIDE 20 MG: 20 TABLET ORAL at 09:02

## 2018-07-24 RX ADMIN — FENTANYL CITRATE 25 MCG: 50 INJECTION INTRAMUSCULAR; INTRAVENOUS at 15:51

## 2018-07-24 RX ADMIN — Medication 50 MG: at 15:33

## 2018-07-24 RX ADMIN — INSULIN LISPRO 3 UNITS: 100 INJECTION, SOLUTION INTRAVENOUS; SUBCUTANEOUS at 23:07

## 2018-07-24 RX ADMIN — HYDROCODONE BITARTRATE AND ACETAMINOPHEN 1 TABLET: 10; 325 TABLET ORAL at 18:19

## 2018-07-24 RX ADMIN — INSULIN GLARGINE 25 UNITS: 100 INJECTION, SOLUTION SUBCUTANEOUS at 02:09

## 2018-07-24 RX ADMIN — METRONIDAZOLE 500 MG: 500 INJECTION, SOLUTION INTRAVENOUS at 02:59

## 2018-07-24 RX ADMIN — LIDOCAINE HYDROCHLORIDE 50 MG: 10 INJECTION, SOLUTION EPIDURAL; INFILTRATION; INTRACAUDAL; PERINEURAL at 15:31

## 2018-07-24 RX ADMIN — AZTREONAM 1 G: 1 INJECTION, SOLUTION INTRAVENOUS at 09:02

## 2018-07-24 RX ADMIN — ENOXAPARIN SODIUM 40 MG: 100 INJECTION SUBCUTANEOUS at 18:38

## 2018-07-24 RX ADMIN — INSULIN GLARGINE 50 UNITS: 100 INJECTION, SOLUTION SUBCUTANEOUS at 23:07

## 2018-07-24 RX ADMIN — ASPIRIN 81 MG: 81 TABLET, CHEWABLE ORAL at 09:01

## 2018-07-24 RX ADMIN — ONDANSETRON HYDROCHLORIDE 4 MG: 2 INJECTION, SOLUTION INTRAMUSCULAR; INTRAVENOUS at 15:32

## 2018-07-24 RX ADMIN — SODIUM CHLORIDE, SODIUM LACTATE, POTASSIUM CHLORIDE, AND CALCIUM CHLORIDE: 600; 310; 30; 20 INJECTION, SOLUTION INTRAVENOUS at 15:31

## 2018-07-24 RX ADMIN — METRONIDAZOLE 500 MG: 500 INJECTION, SOLUTION INTRAVENOUS at 18:39

## 2018-07-24 RX ADMIN — VANCOMYCIN HYDROCHLORIDE 1500 MG: 5 INJECTION, POWDER, LYOPHILIZED, FOR SOLUTION INTRAVENOUS at 06:28

## 2018-07-24 RX ADMIN — METRONIDAZOLE 500 MG: 500 INJECTION, SOLUTION INTRAVENOUS at 09:45

## 2018-07-24 RX ADMIN — PROPOFOL 150 MG: 10 INJECTION, EMULSION INTRAVENOUS at 15:31

## 2018-07-24 RX ADMIN — SODIUM CHLORIDE: 9 INJECTION, SOLUTION INTRAVENOUS at 01:17

## 2018-07-24 ASSESSMENT — ENCOUNTER SYMPTOMS
HEARTBURN: 0
ORTHOPNEA: 0
VOMITING: 0
SPUTUM PRODUCTION: 0
DOUBLE VISION: 0
BLURRED VISION: 0
COUGH: 0
HEMOPTYSIS: 0
SHORTNESS OF BREATH: 0
ABDOMINAL PAIN: 1
NAUSEA: 0

## 2018-07-24 ASSESSMENT — PAIN DESCRIPTION - LOCATION: LOCATION: ABDOMEN

## 2018-07-24 ASSESSMENT — PAIN SCALES - GENERAL
PAINLEVEL_OUTOF10: 8
PAINLEVEL_OUTOF10: 0
PAINLEVEL_OUTOF10: 5
PAINLEVEL_OUTOF10: 10

## 2018-07-24 ASSESSMENT — PAIN DESCRIPTION - PAIN TYPE: TYPE: ACUTE PAIN

## 2018-07-24 NOTE — ANESTHESIA PRE PROCEDURE
Department of Anesthesiology  Preprocedure Note       Name:  Rosendo Frankel   Age:  81 y.o.  :  1945                                          MRN:  [id number]         Date:  2018      Surgeon: Nacho Stiles):  Billy Zaldivar MD    Procedure: Procedure(s):  ABDOMEN INCISION AND DRAINAGE    Medications prior to admission:   Prior to Admission medications    Medication Sig Start Date End Date Taking? Authorizing Provider   nystatin (MYCOSTATIN) 683726 UNIT/GM powder Apply 3 times daily. 18  Yes ANTONIA Burnette   Insulin Degludec (TRESIBA FLEXTOUCH) 200 UNIT/ML SOPN Inject 100 Units into the skin nightly 18 Yes ANTONIA Burnette   HYDROcodone-acetaminophen (NORCO)  MG per tablet Take 1 tablet by mouth daily as needed for Pain for up to 30 days. [facility] [place] [facility] Date: 18 Yes Taylor Ghosh MD   oxybutynin (DITROPAN-XL) 5 MG extended release tablet TAKE 1 TABLET DAILY 18  Yes ANTONIA Moore   metoprolol succinate (TOPROL XL) 50 MG extended release tablet TAKE 1 TABLET DAILY FOR BLOOD PRESSURE AND HEART PROTECTION. 18  Yes Emile Higgins MD   insulin aspart (NOVOLOG FLEXPEN) 100 UNIT/ML injection pen Take 15 units before eat meal. Three times a day. Patient taking differently: 12 Units 2 times daily (before meals) Take 15 units before eat meal. Three times a day. 18  Yes Emile Higgins MD   gabapentin (NEURONTIN) 300 MG capsule Take 1 capsule PO at breakfast, take 1 capsule PO at lunch, take 2 capsules PO at bedtime.   Yes Chiquita ANTONIA Vargas   oxybutynin (DITROPAN-XL) 5 MG extended release tablet TAKE 1 TABLET DAILY 4/10/18  Yes ANTONIA Burnette   vitamin D (ERGOCALCIFEROL) 71894 units CAPS capsule TAKE 1 CAPSULE BY MOUTH ONCE A WEEK  Patient taking differently: TAKE 1 CAPSULE BY MOUTH ONCE A WEEK on Saturday 3/30/18  Yes Emile Higgins MD   JANUMET  MG per tablet TAKE 1 TABLET Subcutaneous Q4H Brayan Lozada MD   Stopped at 07/24/18 0901    [MAR Hold] 0.9 % sodium chloride infusion   Intravenous Continuous David Molina MD 75 mL/hr at 07/24/18 0117      [MAR Hold] ondansetron (ZOFRAN) injection 4 mg  4 mg Intravenous Q6H PRN Brayan Lozada MD        [MAR Hold] enoxaparin (LOVENOX) injection 40 mg  40 mg Subcutaneous Daily Brayan Lozada MD   Stopped at 07/24/18 0902    [MAR Hold] acetaminophen (TYLENOL) tablet 650 mg  650 mg Oral Q8H PRN Brayan Loazda MD        [MAR Hold] aztreonam (AZACTAM) 1 g in dextrose 5% 50mL IVPB  1 g Intravenous Kami Nguyen MD   Stopped at 07/24/18 0944    [MAR Hold] metronidazole (FLAGYL) 500 mg in NaCl 100 mL IVPB premix  500 mg Intravenous Q8H Brayan Lozada MD   Stopped at 07/24/18 1059    [MAR Hold] insulin glargine (LANTUS) injection vial 50 Units  50 Units Subcutaneous BID Brayan Lozada MD   25 Units at 07/24/18 0209       Allergies:     Allergies   Allergen Reactions    Cefdinir     Pcn [Penicillins] Hives and Itching       Problem List:    Patient Active Problem List   Diagnosis Code    Postmenopausal osteoporosis M81.0    Type 2 diabetes mellitus with complication ([facility] W05.8    Neuropathy ([facility] G62.9    COPD (chronic obstructive pulmonary disease) ([facility]) J44.9    Mood disorder ([facility] F39    Dementia F03.90    S/P lumpectomy, left breast Z98.890    Malignant neoplasm of upper-outer quadrant of female breast ([facility] C50.419    Joint pain M25.50    Neuropathy involving both lower extremities G57.93    Back pain M54.9    Dyspepsia R10.13    Screening for colon cancer Z12.11    Lumbar facet arthropathy M12.88    Lumbar facet arthropathy M12.88    Lumbar facet arthropathy M12.88    Lumbar facet arthropathy M12.88    Lumbar facet arthropathy M12.88    Body mass index 45.0-49.9, adult ([facility]) Z68.42    Essential hypertension I10    Hyperlipidemia E78.5    Morbid obesity ([facility]) E66.01    DARSHAN (obstructive sleep apnea) G47.33    Abscess L02.91    Diabetes mellitus ([address] E11.9    Cellulitis and abscess of trunk L03.319, L02.219       Past Medical History:        Diagnosis Date    Asthma     Has rescue inhalers    Back pain 2/2/2016    Blood circulation, collateral     Diabetic neurapathy in feet    Breast CA (HCC)     Left breast Nodules removed Took radiation    Chronic back pain     Chronic kidney disease     Overactive bladder     COPD (chronic obstructive pulmonary disease) (HCC)     x few years Scarring on lungs 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    Diabetes mellitus ([facility]     On insulin x 10 years    Fibromyalgia     Fibromyalgia     for 20 years    GERD (gastroesophageal reflux disease)     History of blood transfusion     ?when    Hyperlipidemia     High cholesterol    Hypertension     On medications for 2 years    Joint pain 2/2/2016    Liver disease     Has fatty liver    Morbid obesity (HCC)     Multiple food allergies     Neuropathic pain     Neuropathy (HCC)     Neuropathy involving both lower extremities 2/2/2016    Osteoarthritis     Other disorders of kidney and ureter in diseases classified elsewhere     Pneumonia     Postmenopausal osteoporosis     Psychiatric problem     S/P lumpectomy, left breast 8/19/2015 8/4/2015    Sleep apnea     Type II or unspecified type diabetes mellitus with neurological manifestations, uncontrolled(250.62)        Past Surgical History:        Procedure Laterality Date    BREAST SURGERY Left 8/4/2015    left partial mastectomy on 8/4/15    EYE SURGERY      EYE SURGERY Bilateral 04/02/2017    Dr. Garth Mendieta      broke L ankle due to osteoporosis, has hardware in    FRACTURE SURGERY      Left ankle     HYSTERECTOMY      TOOTH EXTRACTION      UPPER GASTROINTESTINAL ENDOSCOPY  4/8/2016    Dr Yesica Avalos, (-) H Pylori    WRIST SURGERY         Social History:    Social History   Substance Use Topics    Smoking status: Never Smoker

## 2018-07-24 NOTE — PROGRESS NOTES
[id number] [facility]      Patient:  Jorge Alberto Morales  YOB: 1945  Date of Service: 7/24/2018  MRN: [id number]   Acct: [de-identified]   Primary Care Physician: Mikaela Gilmore MD  Advance Directive: Full Code  Admit Date: 7/23/2018       Hospital Day: 1    CHIEF COMPLAINT Pain in abdomen    HPI this admission:  The patient is a 81 y.o. female who presents to er with redness pain abdominal wall area > left side. This has been present for 5 days, she has a history of uncontrolled DM here a1c around 11, she will be admitted for abdominal wall abscess, surgery has been consulted. Interim note:    7/24 She is status post I&D of abdominal wall abscess with about 100 cc of purulent material. She feels better after the procedure. Review of Systems:   Constitutional: Positive for chills. Negative for diaphoresis, fever, malaise/fatigue and weight loss. HENT: Negative. Eyes: Negative for blurred vision and double vision. Respiratory: Negative for cough, hemoptysis, sputum production and shortness of breath. Cardiovascular: Negative for chest pain, palpitations, orthopnea and claudication. Gastrointestinal: Positive for abdominal pain. Negative for heartburn, nausea and vomiting. Genitourinary: Negative. Musculoskeletal: Negative. Skin:        Redness abdomen   Neurological: Negative for dizziness and headaches. Psychiatric/Behavioral: Negative. Objective:   VITALS:  BP (!) 150/81   Pulse 84   Temp 97.2 °F (36.2 °C) (Temporal)   Resp 18   Ht 5' 2\" (1.575 m)   Wt 241 lb 9 oz (109.6 kg)   SpO2 97%   BMI 44.18 kg/m²   24HR INTAKE/OUTPUT:    Intake/Output Summary (Last 24 hours) at 07/24/18 1836  Last data filed at 07/24/18 1552   Gross per 24 hour   Intake              600 ml   Output              350 ml   Net              250 ml     Constitutional: Oriented to person, place, and time. Well-developed and well-nourished. No distress. Somnolent post op.   HENT:  Head: Normocephalic and atraumatic.    Mouth/Throat: Oropharynx is clear and moist. No oropharyngeal exudate. Eyes: Conjunctivae and EOM are normal. Pupils are equal, round, and reactive to light. No scleral icterus. Neck: Normal range of motion. Neck supple. No JVD present. No tracheal deviation present. No thyromegaly present. Cardiovascular: Normal rate, regular rhythm and normal heart sounds.  Exam reveals no gallop and no friction rub.     No murmur heard. Pulmonary/Chest: Effort normal and breath sounds normal. No stridor. No respiratory distress. No wheezes. No rales. Abdominal: Soft. Bowel sounds are normal. No distension. There is no tenderness. There is no rebound and no guarding. Anterior abdominal wall covered with gauze, exam is limited. Musculoskeletal: Normal range of motion. There is no edema or tenderness. Lymphadenopathy: No cervical adenopathy. Neurological: Alert and oriented to person, place, and time. No cranial nerve deficit. Skin: Skin is warm and dry. No rash noted. Not diaphoretic. No erythema. No pallor. Psychiatric: Normal mood and affect.  Behavior is normal. Judgment and thought content normal.     Medications:      dextrose      sodium chloride 75 mL/hr at 07/24/18 1749    dextrose        vancomycin (VANCOCIN) intermittent dosing (placeholder)   Other RX Placeholder    vancomycin  1,500 mg Intravenous Q12H    insulin glargine  50 Units Subcutaneous BID    insulin lispro  0-6 Units Subcutaneous Q4H    [START ON 7/25/2018] pantoprazole  40 mg Oral QAM AC    [START ON 7/25/2018] citalopram  20 mg Oral Daily    [START ON 7/25/2018] aspirin  81 mg Oral Daily    [START ON 7/25/2018] anastrozole  1 mg Oral Daily    metroNIDAZOLE  500 mg Intravenous Q8H    aztreonam  1 g Intravenous Q8H    enoxaparin  40 mg Subcutaneous Daily     albuterol sulfate HFA, glucose, dextrose, glucagon (rDNA), dextrose, dextrose, HYDROcodone-acetaminophen, acetaminophen, ondansetron  Diet Lumbar facet arthropathy 08/25/2017    Essential hypertension 05/18/2017    Hyperlipidemia 05/18/2017    Morbid obesity ([facility] 05/18/2017    DARSHAN (obstructive sleep apnea) 05/18/2017    Lumbar facet arthropathy 11/22/2016    Lumbar facet arthropathy 09/20/2016    Lumbar facet arthropathy 08/18/2016    Screening for colon cancer 08/17/2016    Dyspepsia     Joint pain 02/02/2016    Neuropathy involving both lower extremities 02/02/2016    Back pain 02/02/2016    S/P lumpectomy, left breast 08/19/2015 8/4/2015      Malignant neoplasm of upper-outer quadrant of female breast ([facility] 08/19/2015    Mood disorder ([facility] 03/11/2015    Dementia 03/11/2015    COPD (chronic obstructive pulmonary disease) ([facility] 01/02/2015    Type 2 diabetes mellitus with complication (HCC)     Neuropathy (HCC)     Postmenopausal osteoporosis        Assessment/plan: Active Problems:    Abscess    Diabetes mellitus (HCC)    Cellulitis and abscess of trunk  Resolved Problems:    * No resolved hospital problems. *  Kidney cyst  Liver 1.9 cm lesion    Plan:     1. Continue care, see above and orders. 2. Broad spectrum empiric antibiotics and follow cultures. 3. DM resume Insulin. Advance diet when alert. 4. Pain control. 5. Case discussed with Dr Chelly Rivera. 6. Prognosis fair  7. Disposition continue inpatient care.       Mary Grace MD  Hospitalist Service  7/24/2018  6:36 PM

## 2018-07-24 NOTE — H&P
once a month up to 3 times a month. insulin detemir (LEVEMIR) 100 UNIT/ML injection pen, Inject 50 Units into the skin 2 times daily  INSULIN SYRINGE 1CC/29G 29G X 1/2\" 1 ML MISC, Use with each dose of novolog TID  Insulin Pen Needle 32G X 6 MM MISC, As directed  mometasone-formoterol (DULERA) 200-5 MCG/ACT inhaler, Inhale 2 puffs into the lungs every 12 hours  albuterol (PROVENTIL HFA;VENTOLIN HFA) 108 (90 BASE) MCG/ACT inhaler, Inhale 2 puffs into the lungs every 4 hours as needed for Wheezing  Incontinence Supply Disposable (POISE PANTILINERS) PADS, Use pads as needed daily for urine leakage. Allergies:  Cefdinir and Pcn [penicillins]    Social History:   TOBACCO:   reports that she has never smoked. She has never used smokeless tobacco.  ETOH:   reports that she drinks about 1.2 oz of alcohol per week . Patient currently lives alone    Family History:   Family History   Problem Relation Age of Onset    High Blood Pressure Father     Heart Disease Father     Diabetes Father     High Blood Pressure Sister     Diabetes Sister     Cancer Sister     High Blood Pressure Brother     Colon Cancer Neg Hx     Colon Polyps Neg Hx     Esophageal Cancer Neg Hx     Liver Cancer Neg Hx     Liver Disease Neg Hx     Rectal Cancer Neg Hx     Stomach Cancer Neg Hx        I have personally reviewed above histories  REVIEW OF SYSTEMS:  Review of Systems   Constitutional: Positive for chills. Negative for diaphoresis, fever, malaise/fatigue and weight loss. HENT: Negative. Eyes: Negative for blurred vision and double vision. Respiratory: Negative for cough, hemoptysis, sputum production and shortness of breath. Cardiovascular: Negative for chest pain, palpitations, orthopnea and claudication. Gastrointestinal: Positive for abdominal pain. Negative for heartburn, nausea and vomiting. Genitourinary: Negative. Musculoskeletal: Negative.     Skin:        Redness abdomen   Neurological: Negative for COLLECTED:  07/23/18 18:32  ANTIBIOTICS AT [facility].:                      RECEIVED :  07/23/18 19:06   TROPONIN   BRAIN NATRIURETIC PEPTIDE   PROTIME-INR          IMPRESSION:    1. Abdominal wall cellulitis/abscess  2. Uncontrolled dm  3. Morbid obesity      PLAN:     1. Admit floor  2. Iv abx  3. Vanco,cefepime and metronidazole  4. bs control  5. Surgery consult  6.  Npo after MN      Pérez Matt MD    Internal Medicine Hospitalist

## 2018-07-24 NOTE — ANESTHESIA POSTPROCEDURE EVALUATION
Department of Anesthesiology  Postprocedure Note    Patient: Lida Miranda  MRN: [id number]  [place]: 1945  Date of evaluation: 7/24/2018  Time:  4:09 PM     Procedure Summary     Date:  07/24/18 Room / Location:  [facility] OR  / [facility] OR    Anesthesia Start:  7713 Anesthesia Stop:  5011    Procedure:  ABDOMINAL WALL ABSCESS INCISION AND DRAINAGE (N/A ) Diagnosis:  (abscess)    Surgeon:  Kristin Herrera MD Responsible Provider:  ANTONIA Jimenez CRNA    Anesthesia Type:  general ASA Status:  4          Anesthesia Type: general    Ruthann Phase I:      Ruthann Phase II: Ruthann Score: 10    Last vitals: Reviewed and per EMR flowsheets.        Anesthesia Post Evaluation    Patient location during evaluation: PACU  Patient participation: complete - patient participated  Level of consciousness: awake and alert  Pain score: 0  Airway patency: patent  Nausea & Vomiting: no vomiting and no nausea  Complications: no  Cardiovascular status: hemodynamically stable  Respiratory status: acceptable and nasal cannula  Hydration status: stable

## 2018-07-25 LAB
ANION GAP SERPL CALCULATED.3IONS-SCNC: 15 MMOL/L (ref 7–19)
BASOPHILS ABSOLUTE: 0.1 K/UL (ref 0–0.2)
BASOPHILS RELATIVE PERCENT: 0.6 % (ref 0–1)
BUN BLDV-MCNC: 12 MG/DL (ref 8–23)
CALCIUM SERPL-MCNC: 8.7 MG/DL (ref 8.8–10.2)
CHLORIDE BLD-SCNC: 98 MMOL/L (ref 98–111)
CO2: 24 MMOL/L (ref 22–29)
CREAT SERPL-MCNC: 0.5 MG/DL (ref 0.5–0.9)
EOSINOPHILS ABSOLUTE: 0.3 K/UL (ref 0–0.6)
EOSINOPHILS RELATIVE PERCENT: 3.2 % (ref 0–5)
GFR NON-AFRICAN AMERICAN: >60
GLUCOSE BLD-MCNC: 180 MG/DL (ref 70–99)
GLUCOSE BLD-MCNC: 200 MG/DL (ref 74–109)
GLUCOSE BLD-MCNC: 223 MG/DL (ref 70–99)
GLUCOSE BLD-MCNC: 229 MG/DL (ref 70–99)
GLUCOSE BLD-MCNC: 281 MG/DL (ref 70–99)
HCT VFR BLD CALC: 37.4 % (ref 37–47)
HEMOGLOBIN: 11.9 G/DL (ref 12–16)
LYMPHOCYTES ABSOLUTE: 2.1 K/UL (ref 1.1–4.5)
LYMPHOCYTES RELATIVE PERCENT: 24.1 % (ref 20–40)
MCH RBC QN AUTO: 26.3 PG (ref 27–31)
MCHC RBC AUTO-ENTMCNC: 31.8 G/DL (ref 33–37)
MCV RBC AUTO: 82.7 FL (ref 81–99)
MONOCYTES ABSOLUTE: 0.7 K/UL (ref 0–0.9)
MONOCYTES RELATIVE PERCENT: 7.6 % (ref 0–10)
NEUTROPHILS ABSOLUTE: 5.6 K/UL (ref 1.5–7.5)
NEUTROPHILS RELATIVE PERCENT: 64 % (ref 50–65)
PDW BLD-RTO: 13.4 % (ref 11.5–14.5)
PERFORMED ON: ABNORMAL
PLATELET # BLD: 292 K/UL (ref 130–400)
PMV BLD AUTO: 9.2 FL (ref 9.4–12.3)
POTASSIUM SERPL-SCNC: 4.1 MMOL/L (ref 3.5–5)
RBC # BLD: 4.52 M/UL (ref 4.2–5.4)
SODIUM BLD-SCNC: 137 MMOL/L (ref 136–145)
WBC # BLD: 8.7 K/UL (ref 4.8–10.8)

## 2018-07-25 PROCEDURE — 6370000000 HC RX 637 (ALT 250 FOR IP): Performed by: FAMILY MEDICINE

## 2018-07-25 PROCEDURE — 36415 COLL VENOUS BLD VENIPUNCTURE: CPT

## 2018-07-25 PROCEDURE — 2500000003 HC RX 250 WO HCPCS: Performed by: FAMILY MEDICINE

## 2018-07-25 PROCEDURE — 2580000003 HC RX 258: Performed by: FAMILY MEDICINE

## 2018-07-25 PROCEDURE — 99232 SBSQ HOSP IP/OBS MODERATE 35: CPT | Performed by: FAMILY MEDICINE

## 2018-07-25 PROCEDURE — 85025 COMPLETE CBC W/AUTO DIFF WBC: CPT

## 2018-07-25 PROCEDURE — 99024 POSTOP FOLLOW-UP VISIT: CPT | Performed by: SURGERY

## 2018-07-25 PROCEDURE — 82948 REAGENT STRIP/BLOOD GLUCOSE: CPT

## 2018-07-25 PROCEDURE — 80048 BASIC METABOLIC PNL TOTAL CA: CPT

## 2018-07-25 PROCEDURE — 6360000002 HC RX W HCPCS: Performed by: FAMILY MEDICINE

## 2018-07-25 PROCEDURE — 1210000000 HC MED SURG R&B

## 2018-07-25 RX ADMIN — METRONIDAZOLE 500 MG: 500 INJECTION, SOLUTION INTRAVENOUS at 20:45

## 2018-07-25 RX ADMIN — INSULIN GLARGINE 50 UNITS: 100 INJECTION, SOLUTION SUBCUTANEOUS at 10:00

## 2018-07-25 RX ADMIN — INSULIN GLARGINE 50 UNITS: 100 INJECTION, SOLUTION SUBCUTANEOUS at 21:52

## 2018-07-25 RX ADMIN — INSULIN LISPRO 2 UNITS: 100 INJECTION, SOLUTION INTRAVENOUS; SUBCUTANEOUS at 18:46

## 2018-07-25 RX ADMIN — METRONIDAZOLE 500 MG: 500 INJECTION, SOLUTION INTRAVENOUS at 14:00

## 2018-07-25 RX ADMIN — VANCOMYCIN HYDROCHLORIDE 1500 MG: 10 INJECTION, POWDER, LYOPHILIZED, FOR SOLUTION INTRAVENOUS at 03:06

## 2018-07-25 RX ADMIN — VANCOMYCIN HYDROCHLORIDE 1500 MG: 10 INJECTION, POWDER, LYOPHILIZED, FOR SOLUTION INTRAVENOUS at 16:20

## 2018-07-25 RX ADMIN — INSULIN LISPRO 2 UNITS: 100 INJECTION, SOLUTION INTRAVENOUS; SUBCUTANEOUS at 14:56

## 2018-07-25 RX ADMIN — PANTOPRAZOLE SODIUM 40 MG: 40 TABLET, DELAYED RELEASE ORAL at 06:42

## 2018-07-25 RX ADMIN — AZTREONAM 1 G: 1 INJECTION, SOLUTION INTRAVENOUS at 09:35

## 2018-07-25 RX ADMIN — AZTREONAM 1 G: 1 INJECTION, SOLUTION INTRAVENOUS at 18:46

## 2018-07-25 RX ADMIN — METRONIDAZOLE 500 MG: 500 INJECTION, SOLUTION INTRAVENOUS at 07:28

## 2018-07-25 RX ADMIN — CITALOPRAM HYDROBROMIDE 20 MG: 20 TABLET ORAL at 09:34

## 2018-07-25 RX ADMIN — INSULIN LISPRO 3 UNITS: 100 INJECTION, SOLUTION INTRAVENOUS; SUBCUTANEOUS at 21:52

## 2018-07-25 RX ADMIN — ASPIRIN 81 MG: 81 TABLET, CHEWABLE ORAL at 09:34

## 2018-07-25 RX ADMIN — ANASTROZOLE 1 MG: 1 TABLET, COATED ORAL at 09:34

## 2018-07-25 RX ADMIN — INSULIN LISPRO 1 UNITS: 100 INJECTION, SOLUTION INTRAVENOUS; SUBCUTANEOUS at 09:59

## 2018-07-25 RX ADMIN — AZTREONAM 1 G: 1 INJECTION, SOLUTION INTRAVENOUS at 02:30

## 2018-07-25 RX ADMIN — ENOXAPARIN SODIUM 40 MG: 100 INJECTION SUBCUTANEOUS at 09:34

## 2018-07-25 ASSESSMENT — PAIN SCALES - GENERAL: PAINLEVEL_OUTOF10: 5

## 2018-07-25 NOTE — OP NOTE
[facility] [address] [facility]                                 OPERATIVE REPORT    PATIENT NAME: Ruth Persaud                   :        1945  MED REC NO:   [id number]                              ROOM:       [facility]  ACCOUNT NO:   [de-identified]                           ADMIT DATE: 2018  PROVIDER:     Martina Enamorado MD    DATE OF PROCEDURE:  2018    PREOPERATIVE DIAGNOSIS:  Abdominal wall abscess. POSTOPERATIVE DIAGNOSIS:  Abdominal wall abscess. PROCEDURE:  I and D of complex abdominal wall abscess approximately 100 mL. SURGEON:  Martina Enamorado MD    ASSISTANT:  Unique Perla PA-C    ANESTHESIA:  General.    INDICATIONS:  The patient is a 72-year-old lady presented to the emergency  room yesterday with a very impressive large abscess in her abdominal wall. She was somewhat acidotic and blood sugar was out of control and lactate  was elevated. Spent the evening getting resuscitated by the hospitalist.   We discussed the risks and benefits. She understood and was agreeable. OPERATIVE PROCEDURE:  Today, she came to the operating room, underwent  adequate general anesthesia, prepped and draped in sterile fashion. The  area was opened up and drained about 100 mL of grossly purulent material  that had a foul odor. We irrigated copiously, _____ sufficiently though I  did make a counter incision and placed a Penrose drain through that and  sutured it to itself. We then irrigated once more and packed the wound  open with Kefzol-bacitracin soaked 4 x4s. Estimated blood loss, minimal.  Complications, none. She tolerated the  procedure well.         Rona Moser MD    D: 2018 17:09:22      T: 2018 22:27:39     [email or web address]  Job#: [id number]     Doc#: [id number]    CC:

## 2018-07-25 NOTE — PROGRESS NOTES
and time. Well-developed and well-nourished. No distress. Somnolent post op. HENT:  Head: Normocephalic and atraumatic.    Mouth/Throat: Oropharynx is clear and moist. No oropharyngeal exudate. Eyes: Conjunctivae and EOM are normal. Pupils are equal, round, and reactive to light. No scleral icterus. Neck: Normal range of motion. Neck supple. No JVD present. No tracheal deviation present. No thyromegaly present. Cardiovascular: Normal rate, regular rhythm and normal heart sounds.  Exam reveals no gallop and no friction rub.     No murmur heard. Pulmonary/Chest: Effort normal and breath sounds normal. No stridor. No respiratory distress. No wheezes. No rales. Abdominal: Soft. Bowel sounds are normal. No distension. There is no tenderness. There is no rebound and no guarding. Anterior abdominal wall covered with gauze, exam is limited. Musculoskeletal: Normal range of motion. There is no edema or tenderness. Lymphadenopathy: No cervical adenopathy. Neurological: Alert and oriented to person, place, and time. No cranial nerve deficit. Skin: Skin is warm and dry. No rash noted. Not diaphoretic. No erythema. No pallor. Psychiatric: Normal mood and affect.  Behavior is normal. Judgment and thought content normal.     Medications:      dextrose      sodium chloride 75 mL/hr at 07/24/18 1749    dextrose        vancomycin (VANCOCIN) intermittent dosing (placeholder)   Other RX Placeholder    vancomycin  1,500 mg Intravenous Q12H    insulin glargine  50 Units Subcutaneous BID    insulin lispro  0-6 Units Subcutaneous Q4H    pantoprazole  40 mg Oral QAM AC    citalopram  20 mg Oral Daily    aspirin  81 mg Oral Daily    anastrozole  1 mg Oral Daily    metroNIDAZOLE  500 mg Intravenous Q8H    aztreonam  1 g Intravenous Q8H    enoxaparin  40 mg Subcutaneous Daily     albuterol sulfate HFA, glucose, dextrose, glucagon (rDNA), dextrose, dextrose, HYDROcodone-acetaminophen, acetaminophen, ondansetron  DIET CARB CONTROL;     DVT Prophylaxis: Lovenox. Lab and other Data:     Recent Labs      07/23/18   1430  07/24/18   0310  07/25/18   0603   WBC  11.5*  10.3  8.7   HGB  13.2  12.2  11.9*   PLT  305  306  292     Recent Labs      07/23/18   1430  07/23/18   1816  07/24/18   0310  07/25/18   0603   NA  133*   --   135*  137   K  4.7   --   4.1  4.1   CL  94*   --   99  98   CO2  23   --   20*  24   BUN  16   --   15  12   CREATININE  0.7   --   0.7  0.5   GLUCOSE  224*  222  242*  200*     Recent Labs      07/23/18   1430  07/24/18   0310   AST  13  10   ALT  13  11   BILITOT  0.5  0.4   ALKPHOS  90  81     Troponin T:   Recent Labs      07/23/18   1430   TROPONINI  <0.01     Pro-BNP: No results for input(s): BNP in the last 72 hours. INR:   Recent Labs      07/23/18   1430   INR  1.12     ABGs: No results found for: PHART, PO2ART, SRL6CXA  UA:No results for input(s): NITRITE, COLORU, PHUR, LABCAST, WBCUA, RBCUA, MUCUS, TRICHOMONAS, YEAST, BACTERIA, CLARITYU, SPECGRAV, LEUKOCYTESUR, UROBILINOGEN, BILIRUBINUR, BLOODU, GLUCOSEU, AMORPHOUS in the last 72 hours. Invalid input(s): Maria Fernanda Vaz    RAD:   CT ABDOMEN PELVIS W IV CONTRAST Additional Contrast  1.  Findings compatible with abdominal wall cellulitis and probable  developing subcutaneous abscess that measures up to 5.9 cm,  incompletely organized at this time. Consider ultrasound evaluation  with potential ultrasound-guided aspiration for cultures. 2. Low-attenuation lesion within the posterior liver dome probably a  small hemangioma, measuring approximately 1.9 cm.  3. Increased attenuation of the dependent gallbladder contents may  represent sludge or small stones. No evidence of acute cholecystitis. 4. 4.3 cm benign-appearing cyst within the right mid kidney as  described above. Micro: Pending.   Patient Active Problem List    Diagnosis Date Noted    Diabetes mellitus ([facility]     Cellulitis and abscess of trunk     Abscess 07/23/2018   

## 2018-07-26 LAB
GLUCOSE BLD-MCNC: 162 MG/DL (ref 70–99)
GLUCOSE BLD-MCNC: 165 MG/DL (ref 70–99)
GLUCOSE BLD-MCNC: 167 MG/DL (ref 70–99)
GLUCOSE BLD-MCNC: 180 MG/DL (ref 70–99)
GLUCOSE BLD-MCNC: 185 MG/DL (ref 70–99)
GLUCOSE BLD-MCNC: 274 MG/DL (ref 70–99)
GLUCOSE BLD-MCNC: 275 MG/DL (ref 70–99)
PERFORMED ON: ABNORMAL
VANCOMYCIN TROUGH: 11.9 UG/ML (ref 10–20)

## 2018-07-26 PROCEDURE — 2500000003 HC RX 250 WO HCPCS: Performed by: FAMILY MEDICINE

## 2018-07-26 PROCEDURE — 36415 COLL VENOUS BLD VENIPUNCTURE: CPT

## 2018-07-26 PROCEDURE — 6370000000 HC RX 637 (ALT 250 FOR IP): Performed by: FAMILY MEDICINE

## 2018-07-26 PROCEDURE — 94660 CPAP INITIATION&MGMT: CPT

## 2018-07-26 PROCEDURE — 99232 SBSQ HOSP IP/OBS MODERATE 35: CPT | Performed by: FAMILY MEDICINE

## 2018-07-26 PROCEDURE — 80202 ASSAY OF VANCOMYCIN: CPT

## 2018-07-26 PROCEDURE — 6360000002 HC RX W HCPCS: Performed by: FAMILY MEDICINE

## 2018-07-26 PROCEDURE — 2580000003 HC RX 258: Performed by: FAMILY MEDICINE

## 2018-07-26 PROCEDURE — 99024 POSTOP FOLLOW-UP VISIT: CPT | Performed by: SURGERY

## 2018-07-26 PROCEDURE — 82948 REAGENT STRIP/BLOOD GLUCOSE: CPT

## 2018-07-26 PROCEDURE — 1210000000 HC MED SURG R&B

## 2018-07-26 RX ORDER — ROSUVASTATIN CALCIUM 10 MG/1
10 TABLET, COATED ORAL NIGHTLY
COMMUNITY
End: 2018-11-13

## 2018-07-26 RX ORDER — GABAPENTIN 300 MG/1
300 CAPSULE ORAL DAILY
COMMUNITY
End: 2018-11-06 | Stop reason: SDUPTHER

## 2018-07-26 RX ORDER — GABAPENTIN 300 MG/1
300 CAPSULE ORAL DAILY PRN
COMMUNITY
End: 2018-08-06 | Stop reason: ALTCHOICE

## 2018-07-26 RX ORDER — GABAPENTIN 300 MG/1
600 CAPSULE ORAL NIGHTLY
COMMUNITY
End: 2018-08-06 | Stop reason: ALTCHOICE

## 2018-07-26 RX ORDER — DOCUSATE SODIUM 100 MG/1
100 CAPSULE, LIQUID FILLED ORAL 2 TIMES DAILY
Status: DISCONTINUED | OUTPATIENT
Start: 2018-07-26 | End: 2018-07-27 | Stop reason: HOSPADM

## 2018-07-26 RX ORDER — POLYETHYLENE GLYCOL 3350 17 G/17G
17 POWDER, FOR SOLUTION ORAL DAILY PRN
Status: DISCONTINUED | OUTPATIENT
Start: 2018-07-26 | End: 2018-07-27 | Stop reason: HOSPADM

## 2018-07-26 RX ADMIN — HYDROCODONE BITARTRATE AND ACETAMINOPHEN 1 TABLET: 10; 325 TABLET ORAL at 09:26

## 2018-07-26 RX ADMIN — AZTREONAM 1 G: 1 INJECTION, SOLUTION INTRAVENOUS at 01:40

## 2018-07-26 RX ADMIN — INSULIN GLARGINE 50 UNITS: 100 INJECTION, SOLUTION SUBCUTANEOUS at 09:28

## 2018-07-26 RX ADMIN — VANCOMYCIN HYDROCHLORIDE 1500 MG: 10 INJECTION, POWDER, LYOPHILIZED, FOR SOLUTION INTRAVENOUS at 15:12

## 2018-07-26 RX ADMIN — INSULIN LISPRO 3 UNITS: 100 INJECTION, SOLUTION INTRAVENOUS; SUBCUTANEOUS at 17:50

## 2018-07-26 RX ADMIN — CITALOPRAM HYDROBROMIDE 20 MG: 20 TABLET ORAL at 09:26

## 2018-07-26 RX ADMIN — VANCOMYCIN HYDROCHLORIDE 1500 MG: 10 INJECTION, POWDER, LYOPHILIZED, FOR SOLUTION INTRAVENOUS at 02:17

## 2018-07-26 RX ADMIN — AZTREONAM 1 G: 1 INJECTION, SOLUTION INTRAVENOUS at 10:38

## 2018-07-26 RX ADMIN — METRONIDAZOLE 500 MG: 500 INJECTION, SOLUTION INTRAVENOUS at 14:07

## 2018-07-26 RX ADMIN — PANTOPRAZOLE SODIUM 40 MG: 40 TABLET, DELAYED RELEASE ORAL at 06:16

## 2018-07-26 RX ADMIN — ENOXAPARIN SODIUM 40 MG: 100 INJECTION SUBCUTANEOUS at 09:25

## 2018-07-26 RX ADMIN — HYDROCODONE BITARTRATE AND ACETAMINOPHEN 1 TABLET: 10; 325 TABLET ORAL at 02:17

## 2018-07-26 RX ADMIN — HYDROCODONE BITARTRATE AND ACETAMINOPHEN 1 TABLET: 10; 325 TABLET ORAL at 21:30

## 2018-07-26 RX ADMIN — INSULIN LISPRO 1 UNITS: 100 INJECTION, SOLUTION INTRAVENOUS; SUBCUTANEOUS at 10:43

## 2018-07-26 RX ADMIN — INSULIN GLARGINE 50 UNITS: 100 INJECTION, SOLUTION SUBCUTANEOUS at 21:30

## 2018-07-26 RX ADMIN — ASPIRIN 81 MG: 81 TABLET, CHEWABLE ORAL at 09:26

## 2018-07-26 RX ADMIN — DOCUSATE SODIUM 100 MG: 100 CAPSULE, LIQUID FILLED ORAL at 21:30

## 2018-07-26 RX ADMIN — ANASTROZOLE 1 MG: 1 TABLET, COATED ORAL at 09:26

## 2018-07-26 RX ADMIN — INSULIN LISPRO 3 UNITS: 100 INJECTION, SOLUTION INTRAVENOUS; SUBCUTANEOUS at 21:30

## 2018-07-26 RX ADMIN — METRONIDAZOLE 500 MG: 500 INJECTION, SOLUTION INTRAVENOUS at 05:31

## 2018-07-26 RX ADMIN — INSULIN LISPRO 1 UNITS: 100 INJECTION, SOLUTION INTRAVENOUS; SUBCUTANEOUS at 13:54

## 2018-07-26 ASSESSMENT — PAIN DESCRIPTION - PAIN TYPE: TYPE: SURGICAL PAIN

## 2018-07-26 ASSESSMENT — PAIN DESCRIPTION - LOCATION: LOCATION: ABDOMEN

## 2018-07-26 ASSESSMENT — PAIN SCALES - GENERAL
PAINLEVEL_OUTOF10: 8
PAINLEVEL_OUTOF10: 6
PAINLEVEL_OUTOF10: 4
PAINLEVEL_OUTOF10: 0
PAINLEVEL_OUTOF10: 8

## 2018-07-26 NOTE — PROGRESS NOTES
Fernando Lara M.D., FACS  Daily Progress Note    Pt Name: Matty Burr  Medical Record Number: [id number]  Date of Birth 1945   Today's Date: 7/25/2018    Chief Complaint:  Chief Complaint   Patient presents with    Cellulitis     Patient c/o inflammation to stomach with large fluid filled blister         SUBJECTIVE:     Chantelle Soliz is doing well. Pain is well controlled. She is feeling much better and her wound is clean. She and her family will need education as far as wound management and dressing changes.     MEDS:     Scheduled Meds:   vancomycin (VANCOCIN) intermittent dosing (placeholder)   Other RX Placeholder    vancomycin  1,500 mg Intravenous Q12H    insulin glargine  50 Units Subcutaneous BID    insulin lispro  0-6 Units Subcutaneous Q4H    pantoprazole  40 mg Oral QAM AC    citalopram  20 mg Oral Daily    aspirin  81 mg Oral Daily    anastrozole  1 mg Oral Daily    metroNIDAZOLE  500 mg Intravenous Q8H    aztreonam  1 g Intravenous Q8H    enoxaparin  40 mg Subcutaneous Daily     Continuous Infusions:   dextrose      sodium chloride 75 mL/hr at 07/24/18 1749    dextrose       PRN Meds:  albuterol sulfate HFA 2 puff Q4H PRN   glucose 15 g PRN   dextrose 12.5 g PRN   glucagon (rDNA) 1 mg PRN   dextrose 100 mL/hr PRN   dextrose 100 mL/hr PRN   HYDROcodone-acetaminophen 1 tablet Q6H PRN   acetaminophen 650 mg Q8H PRN   ondansetron 4 mg Q6H PRN       OBJECTIVE:     Patient Vitals for the past 24 hrs:   BP Temp Temp src Pulse Resp SpO2 Weight   07/25/18 1852 122/70 97.8 °F (36.6 °C) Temporal 75 16 95 % -   07/25/18 0701 128/71 96.8 °F (36 °C) Temporal 59 20 100 % -   07/25/18 0426 - - - - - - 245 lb 2 oz (111.2 kg)         Intake/Output Summary (Last 24 hours) at 07/25/18 2315  Last data filed at 07/25/18 2229   Gross per 24 hour   Intake             2856 ml   Output             3300 ml   Net             -444 ml       In: 528 [P.O.:528]  Out: 7395 [OUYAN:0101]    I/O last

## 2018-07-26 NOTE — PROGRESS NOTES
[facility]      Patient:  Zoe Solis  YOB: 1945  Date of Service: 7/26/2018  MRN: [id number]   Acct: [de-identified]   Primary Care Physician: Paola Watson MD  Advance Directive: Full Code  Admit Date: 7/23/2018       Hospital Day: 3    CHIEF COMPLAINT Pain in abdomen    HPI this admission:  The patient is a 81 y.o. female who presents to er with redness pain abdominal wall area > left side. This has been present for 5 days, she has a history of uncontrolled DM here a1c around 11, she will be admitted for abdominal wall abscess, surgery has been consulted. Interim note:    7/24 She is status post I&D of abdominal wall abscess with about 100 cc of purulent material. She feels better after the procedure. 7/25 No new complaints today, other than some \"visions\" that are likely related to medications effects. 7/26 Abdominal pain continues to improve. No bowel movements. Review of Systems:   Constitutional: Positive for chills. Negative for diaphoresis, fever, malaise/fatigue and weight loss. HENT: Negative. Eyes: Negative for blurred vision and double vision. Respiratory: Negative for cough, hemoptysis, sputum production and shortness of breath. Cardiovascular: Negative for chest pain, palpitations, orthopnea and claudication. Gastrointestinal: Positive for abdominal pain. Negative for heartburn, nausea and vomiting. Genitourinary: Negative. Musculoskeletal: Negative. Skin:        Redness abdomen   Neurological: Negative for dizziness and headaches. Psychiatric/Behavioral: Negative.       Objective:   VITALS:  /66   Pulse 70   Temp 97.9 °F (36.6 °C) (Temporal)   Resp 18   Ht 5' 2\" (1.575 m)   Wt 246 lb 2 oz (111.6 kg)   SpO2 96%   BMI 45.02 kg/m²   24HR INTAKE/OUTPUT:      Intake/Output Summary (Last 24 hours) at 07/26/18 1433  Last data filed at 07/26/18 0906   Gross per 24 hour   Intake              480 ml   Output             1950 ml   Net -1470 ml     Constitutional: Oriented to person, place, and time. Well-developed and well-nourished. No distress. Somnolent post op. HENT:  Head: Normocephalic and atraumatic.    Mouth/Throat: Oropharynx is clear and moist. No oropharyngeal exudate. Eyes: Conjunctivae and EOM are normal. Pupils are equal, round, and reactive to light. No scleral icterus. Neck: Normal range of motion. Neck supple. No JVD present. No tracheal deviation present. No thyromegaly present. Cardiovascular: Normal rate, regular rhythm and normal heart sounds.  Exam reveals no gallop and no friction rub.     No murmur heard. Pulmonary/Chest: Effort normal and breath sounds normal. No stridor. No respiratory distress. No wheezes. No rales. Abdominal: Soft. Bowel sounds are normal. No distension. There is no tenderness. There is no rebound and no guarding. Anterior abdominal wall covered with gauze, exam is limited. Musculoskeletal: Normal range of motion. There is no edema or tenderness. Lymphadenopathy: No cervical adenopathy. Neurological: Alert and oriented to person, place, and time. No cranial nerve deficit. Skin: Skin is warm and dry. No rash noted. Not diaphoretic. No erythema. No pallor. Psychiatric: Normal mood and affect.  Behavior is normal. Judgment and thought content normal.     Medications:      dextrose      sodium chloride 75 mL/hr at 07/24/18 1749    dextrose        vancomycin (VANCOCIN) intermittent dosing (placeholder)   Other RX Placeholder    vancomycin  1,500 mg Intravenous Q12H    insulin glargine  50 Units Subcutaneous BID    insulin lispro  0-6 Units Subcutaneous Q4H    pantoprazole  40 mg Oral QAM AC    citalopram  20 mg Oral Daily    aspirin  81 mg Oral Daily    anastrozole  1 mg Oral Daily    metroNIDAZOLE  500 mg Intravenous Q8H    aztreonam  1 g Intravenous Q8H    enoxaparin  40 mg Subcutaneous Daily     albuterol sulfate HFA, glucose, dextrose, glucagon (rDNA), dextrose,

## 2018-07-26 NOTE — PLAN OF CARE
Problem: Falls - Risk of:  Goal: Will remain free from falls  Will remain free from falls   Outcome: Ongoing      Problem: Pain:  Goal: Pain level will decrease  Pain level will decrease   Outcome: Ongoing      Problem: Nutrition  Goal: Optimal nutrition therapy  Nutrition Problem: Increased nutrient needs, Inadequate oral intake  Intervention: Food and/or Nutrient Delivery: Continue NPO  Nutritional Goals: meet nutritional needs through po intake     Outcome: Ongoing

## 2018-07-27 VITALS
HEIGHT: 62 IN | BODY MASS INDEX: 46.14 KG/M2 | TEMPERATURE: 97.9 F | SYSTOLIC BLOOD PRESSURE: 152 MMHG | WEIGHT: 250.7 LBS | HEART RATE: 77 BPM | RESPIRATION RATE: 16 BRPM | OXYGEN SATURATION: 95 % | DIASTOLIC BLOOD PRESSURE: 86 MMHG

## 2018-07-27 LAB
GLUCOSE BLD-MCNC: 149 MG/DL (ref 70–99)
GLUCOSE BLD-MCNC: 152 MG/DL (ref 70–99)
GLUCOSE BLD-MCNC: 171 MG/DL (ref 70–99)
GLUCOSE BLD-MCNC: 190 MG/DL (ref 70–99)
GLUCOSE BLD-MCNC: 210 MG/DL (ref 70–99)
GLUCOSE BLD-MCNC: 222 MG/DL (ref 70–99)
GRAM STAIN RESULT: ABNORMAL
ORGANISM: ABNORMAL
ORGANISM: ABNORMAL
PERFORMED ON: ABNORMAL
WOUND/ABSCESS: ABNORMAL

## 2018-07-27 PROCEDURE — 82948 REAGENT STRIP/BLOOD GLUCOSE: CPT

## 2018-07-27 PROCEDURE — 99239 HOSP IP/OBS DSCHRG MGMT >30: CPT | Performed by: FAMILY MEDICINE

## 2018-07-27 PROCEDURE — 6370000000 HC RX 637 (ALT 250 FOR IP): Performed by: FAMILY MEDICINE

## 2018-07-27 PROCEDURE — 6360000002 HC RX W HCPCS: Performed by: FAMILY MEDICINE

## 2018-07-27 PROCEDURE — 2580000003 HC RX 258: Performed by: FAMILY MEDICINE

## 2018-07-27 RX ORDER — AMOXICILLIN AND CLAVULANATE POTASSIUM 500; 125 MG/1; MG/1
1 TABLET, FILM COATED ORAL EVERY 8 HOURS SCHEDULED
Status: DISCONTINUED | OUTPATIENT
Start: 2018-07-27 | End: 2018-07-27 | Stop reason: HOSPADM

## 2018-07-27 RX ORDER — AMOXICILLIN AND CLAVULANATE POTASSIUM 500; 125 MG/1; MG/1
1 TABLET, FILM COATED ORAL EVERY 8 HOURS SCHEDULED
Qty: 42 TABLET | Refills: 1 | Status: SHIPPED | OUTPATIENT
Start: 2018-07-27 | End: 2018-08-10

## 2018-07-27 RX ADMIN — DOCUSATE SODIUM 100 MG: 100 CAPSULE, LIQUID FILLED ORAL at 09:14

## 2018-07-27 RX ADMIN — CITALOPRAM HYDROBROMIDE 20 MG: 20 TABLET ORAL at 09:14

## 2018-07-27 RX ADMIN — INSULIN LISPRO 1 UNITS: 100 INJECTION, SOLUTION INTRAVENOUS; SUBCUTANEOUS at 09:32

## 2018-07-27 RX ADMIN — INSULIN LISPRO 1 UNITS: 100 INJECTION, SOLUTION INTRAVENOUS; SUBCUTANEOUS at 13:38

## 2018-07-27 RX ADMIN — HYDROCODONE BITARTRATE AND ACETAMINOPHEN 1 TABLET: 10; 325 TABLET ORAL at 09:31

## 2018-07-27 RX ADMIN — VANCOMYCIN HYDROCHLORIDE 1500 MG: 10 INJECTION, POWDER, LYOPHILIZED, FOR SOLUTION INTRAVENOUS at 02:09

## 2018-07-27 RX ADMIN — ENOXAPARIN SODIUM 40 MG: 100 INJECTION SUBCUTANEOUS at 09:14

## 2018-07-27 RX ADMIN — ASPIRIN 81 MG: 81 TABLET, CHEWABLE ORAL at 09:14

## 2018-07-27 RX ADMIN — VANCOMYCIN HYDROCHLORIDE 1500 MG: 10 INJECTION, POWDER, LYOPHILIZED, FOR SOLUTION INTRAVENOUS at 13:38

## 2018-07-27 RX ADMIN — PANTOPRAZOLE SODIUM 40 MG: 40 TABLET, DELAYED RELEASE ORAL at 05:51

## 2018-07-27 RX ADMIN — POLYETHYLENE GLYCOL 3350 17 G: 17 POWDER, FOR SOLUTION ORAL at 05:51

## 2018-07-27 RX ADMIN — ANASTROZOLE 1 MG: 1 TABLET, COATED ORAL at 09:14

## 2018-07-27 RX ADMIN — INSULIN GLARGINE 50 UNITS: 100 INJECTION, SOLUTION SUBCUTANEOUS at 09:13

## 2018-07-27 RX ADMIN — AMOXICILLIN AND CLAVULANATE POTASSIUM 1 TABLET: 500; 125 TABLET, FILM COATED ORAL at 15:07

## 2018-07-27 ASSESSMENT — PAIN SCALES - GENERAL
PAINLEVEL_OUTOF10: 0
PAINLEVEL_OUTOF10: 8

## 2018-07-27 ASSESSMENT — PAIN DESCRIPTION - LOCATION: LOCATION: ABDOMEN

## 2018-07-27 ASSESSMENT — PAIN DESCRIPTION - PAIN TYPE: TYPE: SURGICAL PAIN

## 2018-07-27 NOTE — DISCHARGE SUMMARY
Hospitalist   Discharge Summary    Sharyle Row  :  1945  MRN:  [id number]    Admit date:  2018  Discharge date:  2018    Admitting Physician:  Rylie Scales MD    Advance Directive: Full Code    Consults: general surgery    Primary Care Physician:  Guerrero Nayak MD    Discharge Diagnoses:  Principal Problem:    Cellulitis and abscess of trunk  Active Problems:    COPD (chronic obstructive pulmonary disease) ([facility] [facility]    Body mass index 45.0-49.9, adult ([facility] [facility]    Essential hypertension    Abscess    Diabetes mellitus ([facility] [facility]  Resolved Problems:    * No resolved hospital problems. *    Significant Diagnostic Studies:   Xr Chest Standard (2 Vw) Result Date: 2018  1. No radiographic evidence of acute cardiopulmonary process. Signed by Dr Nichol Cuba on 2018 3:13 PM    Ct Abdomen Pelvis W Iv Contrast Additional Contrast? None Result Date: 2018  1. Findings compatible with abdominal wall cellulitis and probable developing subcutaneous abscess that measures up to 5.9 cm, incompletely organized at this time. Consider ultrasound evaluation with potential ultrasound-guided aspiration for cultures. 2. Low-attenuation lesion within the posterior liver dome probably a small hemangioma, measuring approximately 1.9 cm. 3. Increased attenuation of the dependent gallbladder contents may represent sludge or small stones. No evidence of acute cholecystitis. 4. 4.3 cm benign-appearing cyst within the right mid kidney as described above. Signed by Dr Vin Canela. Suzan on 2018 4:52 PM    Labs:    CBC:   Recent Labs      18   0603   WBC  8.7   HGB  11.9*   PLT  292     BMP:    Recent Labs      18   0603   NA  137   K  4.1   CL  98   CO2  24   BUN  12   CREATININE  0.5   GLUCOSE  200*     30 Day lookback of cultures:    Blood Culture Recent:   Recent Labs      18   1430   BC  No Growth to date. Any change in status will be called.      Gram Stain Recent:   Recent Labs adenopathy, no carotid bruit, no JVD, supple, symmetrical, trachea midline and thyroid not enlarged, symmetric, no tenderness/mass/nodules  Lungs: clear to auscultation bilaterally  Heart: regular rate and rhythm, S1, S2 normal, no murmur, click, rub or gallop  Abdomen: soft, non-tender; bowel sounds normal; no masses,  no organomegaly  Extremities: extremities normal, atraumatic, no cyanosis or edema  Neurologic: Mental status: Alert, oriented, thought content appropriate    Discharge Medications:       Deirdre Sheikh   Home Medication Instructions [facility]:[id number]    Printed on:07/27/18 5218   Medication Information                      albuterol (PROVENTIL HFA;VENTOLIN HFA) 108 (90 BASE) MCG/ACT inhaler  Inhale 2 puffs into the lungs every 4 hours as needed for Wheezing             amoxicillin-clavulanate (AUGMENTIN) 500-125 MG per tablet  Take 1 tablet by mouth every 8 hours for 14 days             anastrozole (ARIMIDEX) 1 MG tablet  Take 1 mg by mouth daily             aspirin 81 MG chewable tablet  Take 81 mg by mouth daily. citalopram (CELEXA) 20 MG tablet  TAKE 1 TABLET DAILY             empagliflozin (JARDIANCE) 10 MG tablet  Take 1 tablet by mouth daily Samples given to pt 3 bottles [id number] 7/2019 1 bottle [id number] 3/2019             gabapentin (NEURONTIN) 300 MG capsule  Take 300 mg by mouth Daily. Yang Hawthorne gabapentin (NEURONTIN) 300 MG capsule  Take 600 mg by mouth nightly. .             gabapentin (NEURONTIN) 300 MG capsule  Take 300 mg by mouth daily as needed (takes a lunch if needed). Yang Hawthorne HYDROcodone-acetaminophen (NORCO)  MG per tablet  Take 1 tablet by mouth daily as needed for Pain for up to 30 days. Fantasma Valdivia Date: 7/5/18             Incontinence Supply Disposable (POISE PANTILINERS) PADS  Use pads as needed daily for urine leakage.              insulin aspart (NOVOLOG FLEXPEN) 100 UNIT/ML injection pen  Inject 12 Units into the skin 2 times daily Takes with breakfast and at 7pm             Insulin Degludec (TRESIBA FLEXTOUCH) 200 UNIT/ML SOPN  Inject 100 Units into the skin nightly             Insulin Pen Needle 32G X 6 MM MISC  As directed             INSULIN SYRINGE 1CC/29G 29G X 1/2\" 1 ML MISC  Use with each dose of novolog TID             JANUMET  MG per tablet  TAKE 1 TABLET TWICE DAILY WITH FOOD             loratadine (CLARITIN) 10 MG tablet  TAKE 1 TABLET DAILY             losartan (COZAAR) 100 MG tablet  TAKE (1) TABLET DAILY FOR BLOOD PRESSURE.             magnesium oxide (MAG-OX) 400 MG tablet  Take 1 tablet by mouth daily             metoprolol succinate (TOPROL XL) 50 MG extended release tablet  TAKE 1 TABLET DAILY FOR BLOOD PRESSURE AND HEART PROTECTION. Misc. Devices (ROLLER WALKER) MISC  1 each by Does not apply route daily Pt states that she falls at least once a month up to 3 times a month.             mometasone-formoterol (DULERA) 200-5 MCG/ACT inhaler  Inhale 2 puffs into the lungs every 12 hours             montelukast (SINGULAIR) 10 MG tablet  TAKE 1 TABLET BY MOUTH AT NIGHT             Multiple Vitamins-Minerals (MULTIVITAMIN ADULT PO)  Take 1 tablet by mouth daily              nystatin (MYCOSTATIN) 052804 UNIT/GM powder  Apply 3 times daily. omeprazole (PRILOSEC) 20 MG delayed release capsule  TAKE (1) CAPSULE BY MOUTH TWICE DAILY AS NEEDED. oxybutynin (DITROPAN-XL) 5 MG extended release tablet  TAKE 1 TABLET DAILY             rosuvastatin (CRESTOR) 10 MG tablet  Take 10 mg by mouth nightly             vitamin D (ERGOCALCIFEROL) 72879 units CAPS capsule  TAKE 1 CAPSULE BY MOUTH ONCE A WEEK                 Discharge Instructions: Follow up with Dr Noah Wong in 1 week    Take medications as directed. Resume activity as tolerated. Diet: DIET CARB CONTROL;     Disposition: Patient is medically stable and will be discharged home. Time spent on discharge 49 minutes.     Signed:  Ritchie Obrien MD  Hospitalist service  7/27/2018  4:40 PM

## 2018-07-28 LAB
ANAEROBIC CULTURE: ABNORMAL
BLOOD CULTURE, ROUTINE: NORMAL
CULTURE SURGICAL: ABNORMAL
CULTURE, BLOOD 2: NORMAL
GRAM STAIN RESULT: ABNORMAL
ORGANISM: ABNORMAL

## 2018-07-28 NOTE — PROGRESS NOTES
Patient ambulates out to private vehicle using wheeled walker. Gait is steady. Patient alert and oriented. Assisted patient with loading personal bags into car. Family member loads wheeled walker and patient gets into right front seat unassisted.

## 2018-07-30 ENCOUNTER — CARE COORDINATION (OUTPATIENT)
Dept: CASE MANAGEMENT | Age: 73
End: 2018-07-30

## 2018-07-30 ENCOUNTER — TELEPHONE (OUTPATIENT)
Dept: PRIMARY CARE CLINIC | Age: 73
End: 2018-07-30

## 2018-07-30 DIAGNOSIS — Z79.4 TYPE 2 DIABETES MELLITUS WITH COMPLICATION, WITH LONG-TERM CURRENT USE OF INSULIN (HCC): ICD-10-CM

## 2018-07-30 DIAGNOSIS — E11.8 TYPE 2 DIABETES MELLITUS WITH COMPLICATION, WITH LONG-TERM CURRENT USE OF INSULIN (HCC): ICD-10-CM

## 2018-07-30 LAB
ALBUMIN SERPL-MCNC: 3.8 G/DL (ref 3.5–5.2)
ALP BLD-CCNC: 72 U/L (ref 35–104)
ALT SERPL-CCNC: 29 U/L (ref 5–33)
ANION GAP SERPL CALCULATED.3IONS-SCNC: 15 MMOL/L (ref 7–19)
AST SERPL-CCNC: 27 U/L (ref 5–32)
BACTERIA: NEGATIVE /HPF
BASOPHILS ABSOLUTE: 0.1 K/UL (ref 0–0.2)
BASOPHILS RELATIVE PERCENT: 0.8 % (ref 0–1)
BILIRUB SERPL-MCNC: <0.2 MG/DL (ref 0.2–1.2)
BILIRUBIN URINE: NEGATIVE
BLOOD, URINE: NEGATIVE
BUN BLDV-MCNC: 12 MG/DL (ref 8–23)
CALCIUM SERPL-MCNC: 9.6 MG/DL (ref 8.8–10.2)
CHLORIDE BLD-SCNC: 96 MMOL/L (ref 98–111)
CHOLESTEROL, TOTAL: 156 MG/DL (ref 160–199)
CLARITY: CLEAR
CO2: 28 MMOL/L (ref 22–29)
COLOR: YELLOW
CREAT SERPL-MCNC: 0.6 MG/DL (ref 0.5–0.9)
CREATININE URINE: 75.8 MG/DL (ref 4.2–622)
EKG P AXIS: 55 DEGREES
EKG P-R INTERVAL: 136 MS
EKG Q-T INTERVAL: 364 MS
EKG QRS DURATION: 80 MS
EKG QTC CALCULATION (BAZETT): 411 MS
EKG T AXIS: 54 DEGREES
EOSINOPHILS ABSOLUTE: 0.4 K/UL (ref 0–0.6)
EOSINOPHILS RELATIVE PERCENT: 4 % (ref 0–5)
EPITHELIAL CELLS, UA: 1 /HPF (ref 0–5)
GFR NON-AFRICAN AMERICAN: >60
GLUCOSE BLD-MCNC: 71 MG/DL (ref 74–109)
GLUCOSE URINE: >=1000 MG/DL
HBA1C MFR BLD: 11 % (ref 4–6)
HCT VFR BLD CALC: 44 % (ref 37–47)
HDLC SERPL-MCNC: 35 MG/DL (ref 65–121)
HEMOGLOBIN: 13.4 G/DL (ref 12–16)
HYALINE CASTS: 2 /HPF (ref 0–8)
KETONES, URINE: NEGATIVE MG/DL
LDL CHOLESTEROL CALCULATED: 83 MG/DL
LEUKOCYTE ESTERASE, URINE: NEGATIVE
LYMPHOCYTES ABSOLUTE: 3.3 K/UL (ref 1.1–4.5)
LYMPHOCYTES RELATIVE PERCENT: 33.4 % (ref 20–40)
MCH RBC QN AUTO: 25.8 PG (ref 27–31)
MCHC RBC AUTO-ENTMCNC: 30.5 G/DL (ref 33–37)
MCV RBC AUTO: 84.8 FL (ref 81–99)
MICROALBUMIN UR-MCNC: 15.4 MG/DL (ref 0–19)
MICROALBUMIN/CREAT UR-RTO: 203.2 MG/G
MONOCYTES ABSOLUTE: 0.7 K/UL (ref 0–0.9)
MONOCYTES RELATIVE PERCENT: 7.3 % (ref 0–10)
NEUTROPHILS ABSOLUTE: 5.2 K/UL (ref 1.5–7.5)
NEUTROPHILS RELATIVE PERCENT: 53.1 % (ref 50–65)
NITRITE, URINE: NEGATIVE
PDW BLD-RTO: 14.1 % (ref 11.5–14.5)
PH UA: 7
PLATELET # BLD: 414 K/UL (ref 130–400)
PMV BLD AUTO: 10 FL (ref 9.4–12.3)
POTASSIUM SERPL-SCNC: 4.8 MMOL/L (ref 3.5–5)
PROTEIN UA: 30 MG/DL
RBC # BLD: 5.19 M/UL (ref 4.2–5.4)
RBC UA: 4 /HPF (ref 0–4)
SODIUM BLD-SCNC: 139 MMOL/L (ref 136–145)
SPECIFIC GRAVITY UA: 1.02
TOTAL PROTEIN: 7.2 G/DL (ref 6.6–8.7)
TRIGL SERPL-MCNC: 192 MG/DL (ref 0–149)
TSH SERPL DL<=0.05 MIU/L-ACNC: 1.45 UIU/ML (ref 0.27–4.2)
UROBILINOGEN, URINE: 1 E.U./DL
WBC # BLD: 9.9 K/UL (ref 4.8–10.8)
WBC UA: 5 /HPF (ref 0–5)

## 2018-07-30 NOTE — TELEPHONE ENCOUNTER
Kofi Aburto left a message stating that pt had been d/c from the hospital for abd abscess, she is doing a wet to dry BID but was instructed to do it TID. Pt's daughter is requesting Home Health come out and help with wound care. Pt also stated she went to have labs done and become dizzy and started to have lower back pain. Please contact Daughter at [phone], pt at this time is staying at her daughters due to wound care.

## 2018-07-31 DIAGNOSIS — Z79.4 TYPE 2 DIABETES MELLITUS WITH COMPLICATION, WITH LONG-TERM CURRENT USE OF INSULIN (HCC): Primary | ICD-10-CM

## 2018-07-31 DIAGNOSIS — L03.319 CELLULITIS AND ABSCESS OF TRUNK: ICD-10-CM

## 2018-07-31 DIAGNOSIS — L02.219 CELLULITIS AND ABSCESS OF TRUNK: ICD-10-CM

## 2018-07-31 DIAGNOSIS — E11.8 TYPE 2 DIABETES MELLITUS WITH COMPLICATION, WITH LONG-TERM CURRENT USE OF INSULIN (HCC): Primary | ICD-10-CM

## 2018-08-01 ENCOUNTER — CARE COORDINATION (OUTPATIENT)
Dept: CASE MANAGEMENT | Age: 73
End: 2018-08-01

## 2018-08-01 NOTE — CARE COORDINATION
[facility] Transitions Follow Up Call    2018    Patient: Jailyn Martinez  Patient : 1945   MRN: [id number]  Reason for Admission: There are no discharge diagnoses documented for the most recent discharge. Discharge Date: 18 RARS: Readmission Risk Score: 20       Spoke with: N/A    Care Transitions Subsequent and Final Call    Subsequent and Final Calls  Care Transitions Interventions  Other Interventions: Follow Up: Attempted to make contact with patient for a follow up call without success. Left a message regarding intent of call and call back information. Will try again at a later date.         Future Appointments  Date Time Provider Alberto Khan   8/3/2018 1:30 PM ANTONIA Ferrell [facility]   2018 1:00 PM Mark Noguera PA-C LPS Gen Surg [facility]   8/15/2018 11:00 AM PAIGE Salazar NEURO [facility]   2018 2:30 PM [facility]AMMO RM 1 L [facility]OMENS [facility]   2018 3:30 PM Maco Andre PA-C LPS Gen Surg [facility]   2018 1:20 PM Miguel Cote MD L PAIN MGT [facility]   2018 1:30 PM ANTONIA [facility]       Hien Dacosta RN

## 2018-08-02 ENCOUNTER — CARE COORDINATION (OUTPATIENT)
Dept: CASE MANAGEMENT | Age: 73
End: 2018-08-02

## 2018-08-02 ENCOUNTER — TELEPHONE (OUTPATIENT)
Dept: PRIMARY CARE CLINIC | Age: 73
End: 2018-08-02

## 2018-08-02 NOTE — TELEPHONE ENCOUNTER
Pt came in and wanted us to scan paperwork from [employer] into her media that had to be done by a provider. I told her she would have to have a appt for it. I'm not sure if she can do it at her appt tomorrow or not? She also handed me a post it that said \"Overnight oximetry test on pap device. \" She also wanted abdominal pads and wide medical tape. Which we were told that she could discuss that tomorrow at her appt.

## 2018-08-02 NOTE — TELEPHONE ENCOUNTER
[facility] called stated that pt is not home bound and is taking care of her abd wound, pt is going to appointment.  If there are any questions call [phone]

## 2018-08-03 ENCOUNTER — CARE COORDINATION (OUTPATIENT)
Dept: CARE COORDINATION | Age: 73
End: 2018-08-03

## 2018-08-03 ENCOUNTER — OFFICE VISIT (OUTPATIENT)
Dept: PRIMARY CARE CLINIC | Age: 73
End: 2018-08-03
Payer: MEDICARE

## 2018-08-03 VITALS
DIASTOLIC BLOOD PRESSURE: 80 MMHG | HEIGHT: 62 IN | WEIGHT: 253 LBS | TEMPERATURE: 98.2 F | HEART RATE: 92 BPM | OXYGEN SATURATION: 96 % | BODY MASS INDEX: 46.56 KG/M2 | SYSTOLIC BLOOD PRESSURE: 132 MMHG

## 2018-08-03 DIAGNOSIS — E66.01 MORBID OBESITY WITH BMI OF 45.0-49.9, ADULT (HCC): ICD-10-CM

## 2018-08-03 DIAGNOSIS — R10.84 GENERALIZED ABDOMINAL PAIN: ICD-10-CM

## 2018-08-03 DIAGNOSIS — Z09 HOSPITAL DISCHARGE FOLLOW-UP: Primary | ICD-10-CM

## 2018-08-03 PROCEDURE — 1111F DSCHRG MED/CURRENT MED MERGE: CPT | Performed by: NURSE PRACTITIONER

## 2018-08-03 PROCEDURE — 99496 TRANSJ CARE MGMT HIGH F2F 7D: CPT | Performed by: NURSE PRACTITIONER

## 2018-08-03 NOTE — CARE COORDINATION
ANTONIA Monson   losartan (COZAAR) 100 MG tablet TAKE (1) TABLET DAILY FOR BLOOD PRESSURE. 11/2/17   ANTONIA Carter   INSULIN SYRINGE 1CC/29G 29G X 1/2\" 1 ML MISC Use with each dose of novolog TID 9/14/17   Guerrero Nayak MD   Insulin Pen Needle 32G X 6 MM MISC As directed 6/28/17   Guerrero Nayak MD   omeprazole (PRILOSEC) 20 MG delayed release capsule TAKE (1) CAPSULE BY MOUTH TWICE DAILY AS NEEDED. Patient taking differently: Take 20 mg by mouth Daily Takes 20mg daily, but may take a second dose later if needed. 6/15/17   Guerrero Nayak MD   empagliflozin (JARDIANCE) 10 MG tablet Take 1 tablet by mouth daily Samples given to pt 3 bottles [id number] 7/2019 1 bottle [id number] 3/2019 5/16/17   Guerrero Nayak MD   mometasone-formoterol [facility]) 200-5 MCG/ACT inhaler Inhale 2 puffs into the lungs every 12 hours    Historical Provider, MD   anastrozole (ARIMIDEX) 1 MG tablet Take 1 mg by mouth daily    Historical Provider, MD   Multiple Vitamins-Minerals (MULTIVITAMIN ADULT PO) Take 1 tablet by mouth daily     Historical Provider, MD   magnesium oxide (MAG-OX) 400 MG tablet Take 1 tablet by mouth daily 2/23/16   ANTONIA Smith   albuterol (PROVENTIL HFA;VENTOLIN HFA) 108 (90 BASE) MCG/ACT inhaler Inhale 2 puffs into the lungs every 4 hours as needed for Emiliano Boeck, MD   Incontinence Supply Disposable (POISE PANTILINERS) PADS Use pads as needed daily for urine leakage. 3/24/14   ANTONIA Valentin   aspirin 81 MG chewable tablet Take 81 mg by mouth daily.     Historical Provider, MD       Future Appointments  Date Time Provider Alberto Khan   8/6/2018 1:00 PM Nereida Gomez PA-C [facility]   8/15/2018 11:00 AM PAIGE Kruger [place] [place]   8/20/2018 2:30 PM L MAMMO RM 1 L WOMENS [facility]   8/20/2018 3:30 PM Ruth Mccauley PA-C LPS [facility]   8/28/2018 1:20 PM Vanda Mendieta MD [facility] PAIN MGT [facility]   9/5/2018 1:30 PM Rubin Kemp Jazmyne Foster, [address] [place] [address]

## 2018-08-03 NOTE — PROGRESS NOTES
Post-Discharge Transitional Care Management Services      Jorge Alberto Morales   YOB: 1945    Date of Office Visit:  8/3/2018  Date of Hospital Admission: 7/23/18  Date of Hospital Discharge: 7/27/18  Readmission Risk Score(high >=14%. Medium >=10%):Readmission Risk Score: 20    Care management risk score Rising risk (score 2-5) and Complex Care (Scores >=6): 3     Non face to face  following discharge, date last encounter closed (first attempt may have been earlier): 7/30/2018 11:07 AM 7/30/2018 11:07 AM    Call initiated 2 business days of discharge: Yes     Patient Active Problem List   Diagnosis    Postmenopausal osteoporosis    Type 2 diabetes mellitus with complication ([address]    Neuropathy    COPD (chronic obstructive pulmonary disease) ([address]    Mood disorder ([address]    Dementia    S/P lumpectomy, left breast    Malignant neoplasm of upper-outer quadrant of female breast ([address]    Joint pain    Neuropathy involving both lower extremities    Back pain    Dyspepsia    Screening for colon cancer    Lumbar facet arthropathy ([address]    Lumbar facet arthropathy ([address]    Lumbar facet arthropathy ([address]    Lumbar facet arthropathy ([address]    Lumbar facet arthropathy ([address]    Body mass index 45.0-49.9, adult ([address]    Essential hypertension    Hyperlipidemia    Morbid obesity ([address]    DARSHAN (obstructive sleep apnea)    Abscess    Diabetes mellitus ([address]    Cellulitis and abscess of trunk       Allergies   Allergen Reactions    Pcn [Penicillins] Hives and Itching     Patient states she can tolerate ampicillin and amoxicillin however.     Cefdinir        Medications listed as ordered at the time of discharge from [facility] [place] Medication Instructions AMAIRANI:    Printed on:08/06/18 1992   Medication Information                      albuterol (PROVENTIL HFA;VENTOLIN HFA) 108 (90 BASE) MCG/ACT inhaler  Inhale 2 puffs into the lungs every 4 hours as needed for Wheezing hours             montelukast (SINGULAIR) 10 MG tablet  TAKE 1 TABLET BY MOUTH AT NIGHT             Multiple Vitamins-Minerals (MULTIVITAMIN ADULT PO)  Take 1 tablet by mouth daily              nystatin (MYCOSTATIN) 694855 UNIT/GM powder  Apply 3 times daily. omeprazole (PRILOSEC) 20 MG delayed release capsule  TAKE (1) CAPSULE BY MOUTH TWICE DAILY AS NEEDED. oxybutynin (DITROPAN-XL) 5 MG extended release tablet  TAKE 1 TABLET DAILY             rosuvastatin (CRESTOR) 10 MG tablet  Take 10 mg by mouth nightly             vitamin D (ERGOCALCIFEROL) 74271 units CAPS capsule  TAKE 1 CAPSULE BY MOUTH ONCE A WEEK                   Medications marked \"taking\" at this time  Outpatient Prescriptions Marked as Taking for the 8/3/18 encounter (Office Visit) with ANTONIA Cespedes   Medication Sig Dispense Refill    amoxicillin-clavulanate (AUGMENTIN) 500-125 MG per tablet Take 1 tablet by mouth every 8 hours for 14 days 42 tablet 1    gabapentin (NEURONTIN) 300 MG capsule Take 300 mg by mouth Daily. Felisha Clark insulin aspart (NOVOLOG FLEXPEN) 100 UNIT/ML injection pen Inject 12 Units into the skin 2 times daily Takes with breakfast and at 7pm      rosuvastatin (CRESTOR) 10 MG tablet Take 10 mg by mouth nightly      nystatin (MYCOSTATIN) 214248 UNIT/GM powder Apply 3 times daily. (Patient taking differently: Apply topically 3 times daily Apply 3 times daily. ) 1 Bottle 0    Insulin Degludec (TRESIBA FLEXTOUCH) 200 UNIT/ML SOPN Inject 100 Units into the skin nightly 5 pen 1    HYDROcodone-acetaminophen (NORCO)  MG per tablet Take 1 tablet by mouth daily as needed for Pain for up to 30 days. Mansi Sanchez Date: 7/5/18 30 tablet 0    metoprolol succinate (TOPROL XL) 50 MG extended release tablet TAKE 1 TABLET DAILY FOR BLOOD PRESSURE AND HEART PROTECTION. 30 tablet 11    Misc.  Devices (ROLLER WALKER) MISC 1 each by Does not apply route daily Pt states that she falls at least once a month up to 3 times a month. 1 each 0    oxybutynin (DITROPAN-XL) 5 MG extended release tablet TAKE 1 TABLET DAILY 30 tablet 2    vitamin D (ERGOCALCIFEROL) 03174 units CAPS capsule TAKE 1 CAPSULE BY MOUTH ONCE A WEEK (Patient taking differently: TAKE 1 CAPSULE BY MOUTH ONCE A WEEK on Saturday) 12 capsule 3    JANUMET  MG per tablet TAKE 1 TABLET TWICE DAILY WITH FOOD 60 tablet 5    montelukast (SINGULAIR) 10 MG tablet TAKE 1 TABLET BY MOUTH AT NIGHT 90 tablet 5    citalopram (CELEXA) 20 MG tablet TAKE 1 TABLET DAILY 30 tablet 11    loratadine (CLARITIN) 10 MG tablet TAKE 1 TABLET DAILY 30 tablet 11    losartan (COZAAR) 100 MG tablet TAKE (1) TABLET DAILY FOR BLOOD PRESSURE. 30 tablet 0    INSULIN SYRINGE 1CC/29G 29G X 1/2\" 1 ML MISC Use with each dose of novolog TID 90 each 1    Insulin Pen Needle 32G X 6 MM MISC As directed 50 each 5    omeprazole (PRILOSEC) 20 MG delayed release capsule TAKE (1) CAPSULE BY MOUTH TWICE DAILY AS NEEDED. (Patient taking differently: Take 20 mg by mouth Daily Takes 20mg daily, but may take a second dose later if needed.) 60 capsule 11    empagliflozin (JARDIANCE) 10 MG tablet Take 1 tablet by mouth daily Samples given to pt 3 bottles [id number] 7/2019 1 bottle [id number] 3/2019 30 tablet 3    mometasone-formoterol (DULERA) 200-5 MCG/ACT inhaler Inhale 2 puffs into the lungs every 12 hours      anastrozole (ARIMIDEX) 1 MG tablet Take 1 mg by mouth daily      Multiple Vitamins-Minerals (MULTIVITAMIN ADULT PO) Take 1 tablet by mouth daily       magnesium oxide (MAG-OX) 400 MG tablet Take 1 tablet by mouth daily 30 tablet 5    albuterol (PROVENTIL HFA;VENTOLIN HFA) 108 (90 BASE) MCG/ACT inhaler Inhale 2 puffs into the lungs every 4 hours as needed for Wheezing      Incontinence Supply Disposable (POISE PANTILINERS) PADS Use pads as needed daily for urine leakage. 1 each 11    aspirin 81 MG chewable tablet Take 81 mg by mouth daily.           Medications patient taking as of now and normal range of motion. Neck supple. No thyroid mass and no thyromegaly present. Cardiovascular: Normal rate, regular rhythm, normal heart sounds and normal pulses. Pulmonary/Chest: Effort normal and breath sounds normal.   Abdominal: Soft. Normal appearance and bowel sounds are normal. There is tenderness. Musculoskeletal: Normal range of motion. Cervical back: Normal. She exhibits normal range of motion and no tenderness. Thoracic back: Normal. She exhibits normal range of motion and no tenderness. Lumbar back: Normal. She exhibits normal range of motion and no tenderness. Neurological: She is alert and oriented to person, place, and time. She has normal strength. Skin: Skin is warm, dry and intact. Psychiatric: She has a normal mood and affect. Her speech is normal and behavior is normal. Judgment and thought content normal. Cognition and memory are normal.   Nursing note and vitals reviewed. Assessment/Plan:  Zaina Whitley was seen today for follow-up from hospital.    Diagnoses and all orders for this visit:    Hospital discharge follow-up  -     OK DISCHARGE MEDS RECONCILED W/ CURRENT OUTPATIENT MED LIST    Generalized abdominal pain          Medical Decision Making: high complexity    Patient to keep follow up with general surgery as scheduled for removal of drain.

## 2018-08-06 ENCOUNTER — OFFICE VISIT (OUTPATIENT)
Dept: SURGERY | Age: 73
End: 2018-08-06

## 2018-08-06 VITALS
TEMPERATURE: 98.4 F | HEIGHT: 62 IN | SYSTOLIC BLOOD PRESSURE: 120 MMHG | DIASTOLIC BLOOD PRESSURE: 74 MMHG | BODY MASS INDEX: 46.56 KG/M2 | WEIGHT: 253 LBS

## 2018-08-06 DIAGNOSIS — L02.91 ABSCESS: Primary | ICD-10-CM

## 2018-08-06 PROCEDURE — 99024 POSTOP FOLLOW-UP VISIT: CPT | Performed by: PHYSICIAN ASSISTANT

## 2018-08-06 ASSESSMENT — ENCOUNTER SYMPTOMS
EYES NEGATIVE: 1
RESPIRATORY NEGATIVE: 1
ABDOMINAL PAIN: 1

## 2018-08-06 NOTE — PROGRESS NOTES
metoprolol succinate (TOPROL XL) 50 MG extended release tablet TAKE 1 TABLET DAILY FOR BLOOD PRESSURE AND HEART PROTECTION. 30 tablet 11    Misc. Devices (ROLLER WALKER) MISC 1 each by Does not apply route daily Pt states that she falls at least once a month up to 3 times a month. 1 each 0    oxybutynin (DITROPAN-XL) 5 MG extended release tablet TAKE 1 TABLET DAILY 30 tablet 2    vitamin D (ERGOCALCIFEROL) 54707 units CAPS capsule TAKE 1 CAPSULE BY MOUTH ONCE A WEEK (Patient taking differently: TAKE 1 CAPSULE BY MOUTH ONCE A WEEK on Saturday) 12 capsule 3    JANUMET  MG per tablet TAKE 1 TABLET TWICE DAILY WITH FOOD 60 tablet 5    montelukast (SINGULAIR) 10 MG tablet TAKE 1 TABLET BY MOUTH AT NIGHT 90 tablet 5    citalopram (CELEXA) 20 MG tablet TAKE 1 TABLET DAILY 30 tablet 11    loratadine (CLARITIN) 10 MG tablet TAKE 1 TABLET DAILY 30 tablet 11    losartan (COZAAR) 100 MG tablet TAKE (1) TABLET DAILY FOR BLOOD PRESSURE. 30 tablet 0    INSULIN SYRINGE 1CC/29G 29G X 1/2\" 1 ML MISC Use with each dose of novolog TID 90 each 1    Insulin Pen Needle 32G X 6 MM MISC As directed 50 each 5    omeprazole (PRILOSEC) 20 MG delayed release capsule TAKE (1) CAPSULE BY MOUTH TWICE DAILY AS NEEDED.  (Patient taking differently: Take 20 mg by mouth Daily Takes 20mg daily, but may take a second dose later if needed.) 60 capsule 11    empagliflozin (JARDIANCE) 10 MG tablet Take 1 tablet by mouth daily Samples given to pt 3 bottles [id number] 7/2019 1 bottle [id number] 3/2019 30 tablet 3    mometasone-formoterol (DULERA) 200-5 MCG/ACT inhaler Inhale 2 puffs into the lungs every 12 hours      anastrozole (ARIMIDEX) 1 MG tablet Take 1 mg by mouth daily      Multiple Vitamins-Minerals (MULTIVITAMIN ADULT PO) Take 1 tablet by mouth daily       magnesium oxide (MAG-OX) 400 MG tablet Take 1 tablet by mouth daily 30 tablet 5    albuterol (PROVENTIL HFA;VENTOLIN HFA) 108 (90 BASE) MCG/ACT inhaler Inhale 2 puffs into the lungs every 4 hours as needed for Wheezing      Incontinence Supply Disposable (POISE PANTILINERS) PADS Use pads as needed daily for urine leakage. 1 each 11    aspirin 81 MG chewable tablet Take 81 mg by mouth daily.  HYDROcodone-acetaminophen (NORCO)  MG per tablet Take 1 tablet by mouth daily as needed for Pain for up to 30 days. Sebastien Reading Date: 7/5/18 30 tablet 0     No current facility-administered medications for this visit.       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    Breast CA (HCC)     Left breast Nodules removed Took radiation    Chronic back pain     Chronic kidney disease     Overactive bladder     COPD (chronic obstructive pulmonary disease) (HCC)     x few years Scarring on lungs 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    Diabetes mellitus ([address]     On insulin x 10 years    Fibromyalgia     Fibromyalgia     for 20 years    GERD (gastroesophageal reflux disease)     History of blood transfusion     ?when    Hyperlipidemia     High cholesterol    Hypertension     On medications for 2 years    Joint pain 2/2/2016    Liver disease     Has fatty liver    Morbid obesity ([address]     Multiple food allergies     Neuropathic pain     Neuropathy     Neuropathy involving both lower extremities 2/2/2016    Osteoarthritis     Other disorders of kidney and ureter in diseases classified elsewhere     Pneumonia     Postmenopausal osteoporosis     Psychiatric problem     S/P lumpectomy, left breast 8/19/2015 8/4/2015    Sleep apnea     Type II or unspecified type diabetes mellitus with neurological manifestations, uncontrolled(250.62)      Past Surgical History:   Procedure Laterality Date    BREAST SURGERY Left 8/4/2015    left partial mastectomy on 8/4/15    EYE SURGERY      EYE SURGERY Bilateral 04/02/2017    Dr. Peyton Mancini  FOOT SURGERY      broke L ankle due to osteoporosis, has hardware in    FRACTURE SURGERY      Left ankle     HYSTERECTOMY      CT DRAIN SKIN ABSCESS COMPLIC N/A 6/27/5208    ABDOMINAL WALL ABSCESS INCISION AND DRAINAGE performed by Genaro Dakin, MD at [address] ENDOSCOPY  4/8/2016    Dr Flex Cazares, (-) H Pylori    WRIST SURGERY       Family History   Problem Relation Age of Onset    High Blood Pressure Father     Heart Disease Father     Diabetes Father     High Blood Pressure Sister     Diabetes Sister     Cancer Sister     High Blood Pressure Brother     Colon Cancer Neg Hx     Colon Polyps Neg Hx     Esophageal Cancer Neg Hx     Liver Cancer Neg Hx     Liver Disease Neg Hx     Rectal Cancer Neg Hx     Stomach Cancer Neg Hx      Social History   Substance Use Topics    Smoking status: Never Smoker    Smokeless tobacco: Never Used    Alcohol use 1.2 oz/week     1 Glasses of wine, 1 Cans of beer per week      Comment: occ     Exam  Blood pressure 120/74, temperature 98.4 °F (36.9 °C), temperature source Temporal, height 5' 2\" (1.575 m), weight 253 lb (114.8 kg), not currently breastfeeding. On exam, her incision is healing well. Impression  abd wall abscess s/p I&D    Plan  We removed her penrose drain. We will have her continue her antibiotics and plan to see her back next week.

## 2018-08-07 ENCOUNTER — TELEPHONE (OUTPATIENT)
Dept: PRIMARY CARE CLINIC | Age: 73
End: 2018-08-07

## 2018-08-08 DIAGNOSIS — C50.919 MALIGNANT NEOPLASM OF FEMALE BREAST, UNSPECIFIED ESTROGEN RECEPTOR STATUS, UNSPECIFIED LATERALITY, UNSPECIFIED SITE OF BREAST (HCC): Primary | ICD-10-CM

## 2018-08-13 ENCOUNTER — APPOINTMENT (OUTPATIENT)
Dept: LAB | Facility: HOSPITAL | Age: 73
End: 2018-08-13

## 2018-08-13 ENCOUNTER — OFFICE VISIT (OUTPATIENT)
Dept: ONCOLOGY | Facility: CLINIC | Age: 73
End: 2018-08-13

## 2018-08-13 VITALS
WEIGHT: 253 LBS | BODY MASS INDEX: 46.56 KG/M2 | TEMPERATURE: 98.9 F | SYSTOLIC BLOOD PRESSURE: 138 MMHG | DIASTOLIC BLOOD PRESSURE: 74 MMHG | OXYGEN SATURATION: 97 % | HEIGHT: 62 IN | HEART RATE: 85 BPM | RESPIRATION RATE: 18 BRPM

## 2018-08-13 DIAGNOSIS — C50.412 MALIGNANT NEOPLASM OF UPPER-OUTER QUADRANT OF LEFT FEMALE BREAST, UNSPECIFIED ESTROGEN RECEPTOR STATUS (HCC): Primary | ICD-10-CM

## 2018-08-13 PROCEDURE — 99214 OFFICE O/P EST MOD 30 MIN: CPT | Performed by: INTERNAL MEDICINE

## 2018-08-13 RX ORDER — AMOXICILLIN AND CLAVULANATE POTASSIUM 875; 125 MG/1; MG/1
1 TABLET, FILM COATED ORAL 2 TIMES DAILY
COMMUNITY
End: 2019-03-12

## 2018-08-14 NOTE — PROGRESS NOTES
[facility] Sleep Follow Up Encounter      Information:   Patient Name: Zoe Solis  :   1945  Age:   81 y.o. MRN:   [id number]  Account #:  [de-identified]  Today:                8/15/18    Provider:  Lauren Grant PA-C    Chief Complaint   Patient presents with   [facility] Patient     1 month f/u, DARSHAN, pt states everything is going ok but would like to go over her compliance report        Subjective:   Zeo Solis is a 81 y.o. female  with a history of severe DARSHAN and RLS who comes in for a sleep clinic follow up. She was referred for a PSG due to her history of snoring, witnessed apneas, and excessive daytime somnolence. She was previously diagnosed with DARSHAN several years ago but her device malfunctioned. The PSG, 2018 revealed an AHI of 68.1 with O2 desaturations as low as 71%. She is prescribed auto BiPAP therapy with a pressure range of 8cm to 21cm. The compliance report indicates that she is averaging 11-12 hours of BiPAP use per day but only 18/30 days but she was in the hospital for 5 days and spent some time away from home. She averages 8 hours of sleep per night. She uses BiPAP when she naps and during the daytime. Sje reports that consistent BiPAP use has alleviated the previous DARSHAN symptoms. She is using a nasal mask. She takes Neurontin for RLS. 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    Breast CA (HCC)     Left breast Nodules removed Took radiation    Chronic back pain     Chronic kidney disease     Overactive bladder     COPD (chronic obstructive pulmonary disease) ([facility]     x few years Scarring on lungs 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 [place]    Diabetes mellitus ([facility]     On insulin x 10 years    Fibromyalgia     Fibromyalgia     for 20 years    GERD (gastroesophageal reflux disease)     History of blood transfusion     ?when    Hyperlipidemia     High cholesterol    Hypertension     On medications for 2 years    Joint pain 2/2/2016    Liver disease     Has fatty liver    Morbid obesity ([facility]     Multiple food allergies     Neuropathic pain     Neuropathy     Neuropathy involving both lower extremities 2/2/2016    Obstructive sleep apnea     AHI: 68.1 per PSG, 5/2018    Osteoarthritis     Other disorders of kidney and ureter in diseases classified elsewhere     Pneumonia     Postmenopausal osteoporosis     Psychiatric problem     Restless leg syndrome     S/P lumpectomy, left breast 8/19/2015 8/4/2015    Sleep apnea     Type II or unspecified type diabetes mellitus with neurological manifestations, 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 Alex Ignacio      broke L ankle due to osteoporosis, has hardware in    FRACTURE SURGERY      Left ankle     HYSTERECTOMY      NE DRAIN SKIN ABSCESS COMPLIC N/A 6/04/9994    ABDOMINAL WALL ABSCESS INCISION AND DRAINAGE performed by Katalina Rivera MD at [address] ENDOSCOPY  4/8/2016    Dr Ashley Coulter-Gastritis, (-) H Pylori    WRIST SURGERY         Recent Hospitalizations  ·     Significant Injuries  ·     Family History   Problem Relation Age of Onset    High Blood Pressure Father     Heart Disease Father     Diabetes Father     High Blood Pressure Sister     Diabetes Sister     Cancer Sister     High Blood Pressure Brother     Colon Cancer Neg Hx     Colon Polyps Neg Hx     MOUTH AT NIGHT 90 tablet 5    citalopram (CELEXA) 20 MG tablet TAKE 1 TABLET DAILY 30 tablet 11    loratadine (CLARITIN) 10 MG tablet TAKE 1 TABLET DAILY 30 tablet 11    losartan (COZAAR) 100 MG tablet TAKE (1) TABLET DAILY FOR BLOOD PRESSURE. 30 tablet 0    INSULIN SYRINGE 1CC/29G 29G X 1/2\" 1 ML MISC Use with each dose of novolog TID 90 each 1    Insulin Pen Needle 32G X 6 MM MISC As directed 50 each 5    omeprazole (PRILOSEC) 20 MG delayed release capsule TAKE (1) CAPSULE BY MOUTH TWICE DAILY AS NEEDED. (Patient taking differently: Take 20 mg by mouth Daily Takes 20mg daily, but may take a second dose later if needed.) 60 capsule 11    empagliflozin (JARDIANCE) 10 MG tablet Take 1 tablet by mouth daily Samples given to pt 3 bottles [id number] 7/2019 1 bottle [id number] 3/2019 30 tablet 3    mometasone-formoterol (DULERA) 200-5 MCG/ACT inhaler Inhale 2 puffs into the lungs every 12 hours      anastrozole (ARIMIDEX) 1 MG tablet Take 1 mg by mouth daily      Multiple Vitamins-Minerals (MULTIVITAMIN ADULT PO) Take 1 tablet by mouth daily       magnesium oxide (MAG-OX) 400 MG tablet Take 1 tablet by mouth daily 30 tablet 5    albuterol (PROVENTIL HFA;VENTOLIN HFA) 108 (90 BASE) MCG/ACT inhaler Inhale 2 puffs into the lungs every 4 hours as needed for Wheezing      Incontinence Supply Disposable (POISE PANTILINERS) PADS Use pads as needed daily for urine leakage. 1 each 11    aspirin 81 MG chewable tablet Take 81 mg by mouth daily. No current facility-administered medications for this visit.         Allergies:  Pcn [penicillins] and Cefdinir    REVIEW OF SYSTEMS     Constitutional: []Fever []Sweats []Chills [] Recent Injury   [x] Denies all unless marked  HENT:[x]Headache  [] Head Injury  [] Sore Throat  [] Ear Pain  [x] Dizziness [] Hearing Loss   [] Denies all unless marked  Spine:  [x] Neck pain  [x] Back pain  [] Sciaticia  [] Denies all unless marked  Cardiovascular:[]Chest Pain [x]Palpitations [] Heart Disease  [] Denies all unless marked  Pulmonary: [x]Shortness of Breath [x]Cough   [] Denies all unless marked  Gastrointestinal:  []Abdominal Pain  []Blood in Stool  []Diarrhea []Constipation []Nausea  []Vomiting  [x] Denies all unless marked  Genitourinary:  [] Dysuria [] Frequency  [] Incontinence [] Urgency   [x] Denies all unless marked  Musculoskeletal: [] Arthralgia  [] Myalgias [] Muscle cramps  [] Muscle twitches   [x] Denies all unless marked   Extremities:   [] Pain   [] Swelling   [x] Denies all unless marked  Skin:[] Rash  [] Color Change  [x] Denies all unless marked  Neurological:[] Visual Disturbance [] Double Vision [] Slurred Speech [] Trouble swallowing  [] Vertigo [] Tingling [] Numbness [] Weakness [] Loss of Balance   [] Loss of Consciousness [] Memory Loss [] Seizures  [x] Denies all unless marked  Psychiatric/Behavioral:[] Depression [] Anxiety  [x] Denies all unless marked  Sleep: []  Insomnia [] Sleep Disturbance [] Snoring [] Restless Legs [] Daytime Sleepiness [x] Sleep Apnea  [] Denies all unless marked    The MA has completed the ROS with the patient. I have reviewed it in its' entirety with the patient and agree with the documentation. PHYSICAL EXAM  /73   Pulse 86   Ht 5' 2\" (1.575 m)   Wt 253 lb (114.8 kg)   SpO2 91%   BMI 46.27 kg/m²      Constitutional - No acute distress    HEENT- Conjunctiva normal.  No scars, masses, or lesions over external nose or ears, no neck masses noted, no jugular vein distension, no bruit  Cardiac- Regular rate and rhythm  Pulmonary- Clear to auscultation, good expansion, normal effort without use of accessory muscles  Musculoskeletal - No significant wasting of muscles noted, no bony deformities  Extremities - No clubbing, cyanosis or edema  Skin - Warm, dry, and intact. No rash, erythema, or pallor  Psychiatric - Mood, affect, and behavior appear normal      Neurologic:  Extraocular movements are intact without nystagmus.   Visual fields are full to confrontation. Facial movements are symmetrical and normal.  Speech is precise. Extremity strength is normal in both uppers and lowers. Deep tendon reflexes are intact and symmetrical.  Rapid alternating movements are unimpaired. Finger-to-nose testing is performed well, without dysmetria. Gait is normal.    I reviewed the following studies:      []  :  Clinical laboratory test results    []  :  Radiology reports    [x]  :  Review and summarization of medical records and/or obtain medical records     [x]  :  Previous/recent polysomnogram report(s)    []  :  [place] Sleepiness Scale          [x]  :  Compliance download: The auto BiPAP is set at a pressure range of 8cm to 21cm. Compliance download shows that she uses device: 60% of the time;  percentage of days with usage >=4 hours: 57%. AHI: 2.2 (Large leaks noted with the 95th percentile: 41.3)    Assessment:       ICD-10-CM ICD-9-CM    1. Obstructive sleep apnea G47.33 327.23    2. Restless leg syndrome G25.81 333.94    3. BiPAP (biphasic positive airway pressure) dependence Z99.89 V46.8           [x]  :  Stable-RLS     []  :  Improved                       []  :  Well controlled              []  :  Resolving     []  :  Resolved     [x]  :  Inadequately controlled-secondary to non-compliance but she was hospitalized during the time period of non-use     []  :  Worsening     []  :  Additional workup planned        Plan:     No orders of the defined types were placed in this encounter. 1.   Patient advised of the etiology,  pathophysiology, diagnosis, treatment options, and risks of untreated DARSHAN. Risks may include, but are not limited to  hypertension, coronary artery disease, diabetes, stroke, weight gain, impaired cognition, daytime somnolence,  and motor vehicle accidents. Advised to abstain from driving or operating heavy machinery when drowsy and the use of respiratory suppressants.   2.  The following educational material has been

## 2018-08-15 ENCOUNTER — OFFICE VISIT (OUTPATIENT)
Dept: NEUROLOGY | Age: 73
End: 2018-08-15
Payer: MEDICARE

## 2018-08-15 ENCOUNTER — OFFICE VISIT (OUTPATIENT)
Dept: SURGERY | Age: 73
End: 2018-08-15

## 2018-08-15 VITALS
OXYGEN SATURATION: 91 % | DIASTOLIC BLOOD PRESSURE: 73 MMHG | WEIGHT: 253 LBS | BODY MASS INDEX: 46.56 KG/M2 | HEIGHT: 62 IN | HEART RATE: 86 BPM | SYSTOLIC BLOOD PRESSURE: 125 MMHG

## 2018-08-15 VITALS
HEIGHT: 62 IN | WEIGHT: 253 LBS | BODY MASS INDEX: 46.56 KG/M2 | SYSTOLIC BLOOD PRESSURE: 120 MMHG | TEMPERATURE: 97.2 F | DIASTOLIC BLOOD PRESSURE: 74 MMHG

## 2018-08-15 DIAGNOSIS — G47.33 OBSTRUCTIVE SLEEP APNEA: Primary | ICD-10-CM

## 2018-08-15 DIAGNOSIS — L02.219 CELLULITIS AND ABSCESS OF TRUNK: Primary | ICD-10-CM

## 2018-08-15 DIAGNOSIS — Z99.89 BIPAP (BIPHASIC POSITIVE AIRWAY PRESSURE) DEPENDENCE: ICD-10-CM

## 2018-08-15 DIAGNOSIS — L03.319 CELLULITIS AND ABSCESS OF TRUNK: Primary | ICD-10-CM

## 2018-08-15 DIAGNOSIS — G25.81 RESTLESS LEG SYNDROME: ICD-10-CM

## 2018-08-15 PROCEDURE — 99024 POSTOP FOLLOW-UP VISIT: CPT | Performed by: PHYSICIAN ASSISTANT

## 2018-08-15 PROCEDURE — 99214 OFFICE O/P EST MOD 30 MIN: CPT | Performed by: PHYSICIAN ASSISTANT

## 2018-08-15 RX ORDER — AMOXICILLIN AND CLAVULANATE POTASSIUM 875; 125 MG/1; MG/1
1 TABLET, FILM COATED ORAL
COMMUNITY
End: 2018-08-29 | Stop reason: ALTCHOICE

## 2018-08-15 RX ORDER — HYDROCODONE BITARTRATE AND ACETAMINOPHEN 10; 325 MG/1; MG/1
1 TABLET ORAL
COMMUNITY

## 2018-08-15 NOTE — PATIENT INSTRUCTIONS
Patient education: Sleep apnea in adults       INTRODUCTION -- Normally during sleep, air moves through the throat and in and out of the lungs at a regular rhythm. In a person with sleep apnea, air movement is periodically diminished or stopped. There are two types of sleep apnea: obstructive sleep apnea and central sleep apnea. In obstructive sleep apnea, breathing is abnormal because of narrowing or closure of the throat. In central sleep apnea, breathing is abnormal because of a change in the breathing control and rhythm. Sleep apnea is a serious condition that can affect a person's ability to safely perform normal daily activities and can affect long term health. Approximately 25 percent of adults are at risk for sleep apnea of some degree. Men are more commonly affected than women. Other risk factors include middle and older age, being overweight or obese, and having a small mouth and throat. This topic review focuses on the most common type of sleep apnea in adults, obstructive sleep apnea (DARSHAN). HOW SLEEP APNEA OCCURS -- The throat is surrounded by muscles that control the airway for speaking, swallowing, and breathing. During sleep, these muscles are less active, and this causes the throat to narrow. In most people, this narrowing does not affect breathing. In others, it can cause snoring, sometimes with reduced or completely blocked airflow. A completely blocked airway without airflow is called an obstructive apnea. Partial obstruction with diminished airflow is called a hypopnea. A person may have apnea and hypopnea during sleep. Insufficient breathing due to apnea or hypopnea causes oxygen levels to fall and carbon dioxide to rise. Because the airway is blocked, breathing faster or harder does not help to improve oxygen levels until the airway is reopened. Typically, the obstruction requires the person to awaken to activate the upper airway muscles.  Once the airway is opened, the person then takes several deep breaths to catch up on breathing. As the person awakens, he or she may move briefly, snort or snore, and take a deep breath. Less frequently, a person may awaken completely with a sensation of gasping, smothering, or choking. If the person falls back to sleep quickly, he or she will not remember the event. Many people with sleep apnea are unaware of their abnormal breathing in sleep, and all patients underestimate how often their sleep is interrupted. Awakening from sleep causes sleep to be unrefreshing and causes fatigue and daytime sleepiness. Anatomic causes of obstructive sleep apnea --  Most patients have DARSHAN because of a small upper airway. As the bones of the face and skull develop, some people develop a small lower face, a small mouth, and a tongue that seems too large for the mouth. These features are genetically determined, which explains why DARSHAN tends to cluster in families. Obesity is another major factor. Tonsil enlargement can be an important cause, especially in children. SLEEP APNEA SYMPTOMS -- The main symptoms of DARSHAN are loud snoring, fatigue, and daytime sleepiness. However, some people have no symptoms. For example, if the person does not have a bed partner, he or she may not be aware of the snoring. Fatigue and sleepiness have many causes and are often attributed to overwork and increasing age. As a result, a person may be slow to recognize that they have a problem. A bed partner or spouse often prompts the patient to seek medical care. Other symptoms may include one or more of the following:  ?Restless sleep  ? Awakening with choking, gasping, or smothering  ? Morning headaches, dry mouth, or sore throat  ? Waking frequently to urinate  ? Awakening unrested, groggy  ? Low energy, difficulty concentrating, memory impairment    Risk factors -- Certain factors increase the risk of sleep apnea.   ?Increasing age - DARSHAN occurs at all ages, but it is more common in middle and medications require special management and close monitoring to reduce the risk of a blocked airway. Dental devices -- A dental device, called an oral appliance or mandibular advancement device, can reposition the jaw (mandible), bringing the tongue and soft palate forward as well. This may relieve obstruction in some people. This treatment is excellent for reducing snoring, although the effect on DARSHAN is sometimes more limited. As a result, dental devices are best used for mild cases of DARSHAN when relief of snoring is the main goal. Failure to tolerate and accept CPAP is another indication for dental devices. While dental devices are not as effective as CPAP for DARSHAN, some patients prefer a dental device to CPAP. Side effects of dental devices are generally minor but may include changes to the bite with prolonged use. Surgical treatment -- Surgery is an alternative therapy for patients who cannot tolerate or do not improve with nonsurgical treatments such as CPAP or oral devices. Surgery can also be used in combination with other nonsurgical treatments. Surgical procedures reshape structures in the upper airways or surgically reposition bone or soft tissue. Uvulopalatopharyngoplasty (UPPP) removes the uvula and excessive tissue in the throat, including the tonsils, if present. Other procedures, such as maxillomandibular advancement (MMA), address both the upper and lower pharyngeal airway more globally. UPPP alone has limited success rates (less than 50 percent) and people can relapse (when DARSHAN symptoms return after surgery). As a result, this surgery is only recommended in a minority of people and should be considered with caution. MMA may have a higher success rate, particularly in people with abnormal jaw (maxilla and mandible) anatomy, but it is the most complicated procedure. A newer surgical approach, nerve stimulation to protrude the tongue, has promising success rates in very selected people.   Tracheostomy

## 2018-08-20 ENCOUNTER — OFFICE VISIT (OUTPATIENT)
Dept: SURGERY | Age: 73
End: 2018-08-20
Payer: MEDICARE

## 2018-08-20 ENCOUNTER — HOSPITAL ENCOUNTER (OUTPATIENT)
Dept: WOMENS IMAGING | Age: 73
Discharge: HOME | End: 2018-08-20
Payer: MEDICARE

## 2018-08-20 VITALS — SYSTOLIC BLOOD PRESSURE: 124 MMHG | DIASTOLIC BLOOD PRESSURE: 72 MMHG | HEART RATE: 84 BPM

## 2018-08-20 DIAGNOSIS — Z85.3 PERSONAL HISTORY OF MALIGNANT NEOPLASM OF BREAST: Primary | ICD-10-CM

## 2018-08-20 DIAGNOSIS — Z85.3 PERSONAL HISTORY OF MALIGNANT NEOPLASM OF BREAST: ICD-10-CM

## 2018-08-20 PROCEDURE — G0279 TOMOSYNTHESIS, MAMMO: HCPCS

## 2018-08-20 PROCEDURE — 99212 OFFICE O/P EST SF 10 MIN: CPT | Performed by: PHYSICIAN ASSISTANT

## 2018-08-20 RX ORDER — TRIAMCINOLONE ACETONIDE 5 MG/G
CREAM TOPICAL
Qty: 1 TUBE | Refills: 5 | Status: SHIPPED | OUTPATIENT
Start: 2018-08-20 | End: 2018-08-27

## 2018-08-20 NOTE — PROGRESS NOTES
Ms. Tylor rGegory is status post left partial mastectomy for 1.2 cm grade 3 invasive ductal carcinoma on 8-4-15. Her margins and SNB were negative. ER+, AL+, Utr6Vca -.  Mammoprint high risk. She completed radiation. [facility] W OR WO CAD BILATERAL    8/20/2018 1:01 PM   History: 81year-old asymptomatic woman for diagnostic mammography. High risk screening exam.   Routine protocol full field digital screening mammography utilizing   two-dimensional, and three-dimensional, and tomographic imaging   sequences. Family history of breast carcinoma: None. Personal history of left breast cancer in 2015. It with lumpectomy and   radiation therapy. Comparison: 08/16/2017 and 08/15/2016. The composition of the breast tissue is a category B - which means the   breasts are made up of scattered areas of fibroglandular tissue. The parenchymal tissue has a normal and symmetric pattern of   distribution. .   Scattered benign vascular and parenchymal calcifications are again   seen. There are no suspicious microcalcifications. Evaluation of the tomographic sequences shows no areas of   architectural distortion.     The mammograms were evaluated using a computer aided detection   software program (CAD). The patient's information has been added to a reminder system with a   target due date for the next mammogram.       Impression   1. Benign mammograms. 2. BI-RADS category 2.   3. One year bilateral screening mammography follow-up is recommended. She complains of an insect bite on the right chest wall. She states she woke up with it and it itches. BREAST EXAM:  There are fibrocystic changes throughout both breasts. There are no dominant masses, skin dimpling or nipple retraction. There is no axillary lymphadenopathy bilaterally. There are appropriate post op and post radiation changes on the left. There is a 2 cm area of raised skin and erythema consistent with an insect bite.

## 2018-08-24 ENCOUNTER — CARE COORDINATION (OUTPATIENT)
Dept: CARE COORDINATION | Age: 73
End: 2018-08-24

## 2018-08-28 ENCOUNTER — HOSPITAL ENCOUNTER (OUTPATIENT)
Dept: PAIN MANAGEMENT | Age: 73
Discharge: HOME | End: 2018-08-28

## 2018-08-29 ENCOUNTER — OFFICE VISIT (OUTPATIENT)
Dept: SURGERY | Age: 73
End: 2018-08-29

## 2018-08-29 VITALS
SYSTOLIC BLOOD PRESSURE: 124 MMHG | BODY MASS INDEX: 46.74 KG/M2 | HEIGHT: 62 IN | DIASTOLIC BLOOD PRESSURE: 78 MMHG | WEIGHT: 254 LBS | TEMPERATURE: 97.7 F

## 2018-08-29 DIAGNOSIS — L03.319 CELLULITIS AND ABSCESS OF TRUNK: Primary | ICD-10-CM

## 2018-08-29 DIAGNOSIS — L02.219 CELLULITIS AND ABSCESS OF TRUNK: Primary | ICD-10-CM

## 2018-08-29 PROCEDURE — 99024 POSTOP FOLLOW-UP VISIT: CPT | Performed by: PHYSICIAN ASSISTANT

## 2018-09-05 ENCOUNTER — OFFICE VISIT (OUTPATIENT)
Dept: PRIMARY CARE CLINIC | Age: 73
End: 2018-09-05
Payer: MEDICARE

## 2018-09-05 ENCOUNTER — CARE COORDINATION (OUTPATIENT)
Dept: CARE COORDINATION | Age: 73
End: 2018-09-05

## 2018-09-05 VITALS
HEIGHT: 62 IN | DIASTOLIC BLOOD PRESSURE: 75 MMHG | HEART RATE: 77 BPM | TEMPERATURE: 97.3 F | WEIGHT: 251.8 LBS | SYSTOLIC BLOOD PRESSURE: 127 MMHG | OXYGEN SATURATION: 98 % | BODY MASS INDEX: 46.33 KG/M2

## 2018-09-05 DIAGNOSIS — B37.31 VAGINAL CANDIDIASIS: ICD-10-CM

## 2018-09-05 DIAGNOSIS — Z23 NEED FOR PROPHYLACTIC VACCINATION AND INOCULATION AGAINST VARICELLA: ICD-10-CM

## 2018-09-05 DIAGNOSIS — R06.00 DYSPNEA, UNSPECIFIED TYPE: Primary | ICD-10-CM
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
## 2018-09-05 PROCEDURE — 99214 OFFICE O/P EST MOD 30 MIN: CPT | Performed by: NURSE PRACTITIONER

## 2018-09-05 RX ORDER — ASPIRIN 81 MG/1
81 TABLET, CHEWABLE ORAL DAILY
Qty: 30 TABLET | Refills: 3 | Status: SHIPPED | OUTPATIENT
Start: 2018-09-05

## 2018-09-05 RX ORDER — TRIAMCINOLONE ACETONIDE 0.25 MG/G
OINTMENT TOPICAL
Qty: 1 TUBE | Refills: 1 | Status: SHIPPED | OUTPATIENT
Start: 2018-09-05 | End: 2018-10-11 | Stop reason: SDUPTHER

## 2018-09-05 RX ORDER — CLOTRIMAZOLE 1 %
CREAM (GRAM) TOPICAL
Qty: 1 TUBE | Refills: 1 | Status: SHIPPED | OUTPATIENT
Start: 2018-09-05 | End: 2018-09-12

## 2018-09-05 RX ORDER — INSULIN DEGLUDEC 200 U/ML
INJECTION, SOLUTION SUBCUTANEOUS
COMMUNITY
Start: 2018-08-24 | End: 2018-10-10 | Stop reason: SDUPTHER

## 2018-09-05 NOTE — PROGRESS NOTES
[facility] PRIMARY CARE  [facility]  [address] [place] [id number]  Dept: [phone]  Dept Fax: [phone]  Loc: [phone]        Tosin Lux is a 81 y.o. female who presents today for her medical conditions/ complaints as noted below. Tosin Lux is c/o 1 Month Follow-Up; Medication Check; and Other (discuss labs)        Chief Complaint   Patient presents with    1 Month Follow-Up    Medication Check    Other     discuss labs       HPI:     HPI    Patient states that she is here for follow-up. Patient states that she is on for Idris 100 units daily at bedtime. Patient states that she is also taking 12 units of NovoLog BID. States that she was instructed to do this by the diabetic educator. Patient states that her blood sugar last night before bed was 183. Patient is not here today to discuss diabetes. She is here for other concerns. She will follow-up in one month to discuss diabetes. We will make sure that she has enough medication to make it through to that appointment. Patient states that she needs refills on triamcinolone lotion that she applies to the flea bites. Patient states that she has been battling these fleabites for a couple weeks. Patient states that she used to be on oxygen. Patient states that due to dyspnea that has returned she would like to return to oxygen. Discussed with patient that she has been off of oxygen for a few years and I cannot just restart her on this therapy. Discussed with patient that her O2 sat is 99% today. Discussed with patient that if she feels she needs oxygen we will refer her to respiratory disease clinic to discuss shortness of breath and to see if oxygen is needed. Patient reports that they have been compliant with taking medications as directed.      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 [address]     On insulin x 10 years    Fibromyalgia     Fibromyalgia     for 20 years    GERD (gastroesophageal reflux disease)     History of blood transfusion     ?when    Hyperlipidemia     High cholesterol    Hypertension     On medications for 2 years    Joint pain 2/2/2016    Liver disease     Has fatty liver    Morbid obesity ([address]     Multiple food allergies     Neuropathic pain     Neuropathy     Neuropathy involving both lower extremities 2/2/2016    Obstructive sleep apnea     AHI: 68.1 per PSG, 5/2018    Osteoarthritis     Other disorders of kidney and ureter in diseases classified elsewhere     Pneumonia     Postmenopausal osteoporosis     Psychiatric problem     Restless leg syndrome     S/P lumpectomy, left breast 8/19/2015 8/4/2015    Sleep apnea     Type II or unspecified type diabetes mellitus with neurological manifestations, 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 Cheko Dewitt      broke L ankle due to osteoporosis, has hardware in    FRACTURE SURGERY      Left ankle     HYSTERECTOMY      LA DRAIN SKIN ABSCESS COMPLIC N/A 4/63/8154    ABDOMINAL WALL ABSCESS INCISION AND DRAINAGE performed by Dagoberto Garnett MD at [address] ENDOSCOPY  4/8/2016    Dr Laron Coronel, (-) H Pylori    WRIST SURGERY         Social History     Social History    Marital status:       Spouse name: N/A   Cr Fuchs Number of children: N/A    Years of education: N/A     Social History Main Topics    Smoking status: Never Smoker    Smokeless tobacco: Never Used    Alcohol use 1.2 oz/week     1 Glasses of wine, 1 Cans of beer per week      Comment: occ    Drug use: No    Sexual activity: Not Asked     Other Topics Concern    None     Social History Narrative    None       Family History   Problem Relation Age of Onset    High Blood Pressure Father     Heart Disease Father     Diabetes Father     High Blood Pressure Sister     Diabetes Sister     Cancer Sister     High Blood Pressure Brother     Colon Cancer Neg Hx     Colon Polyps Neg Hx     Esophageal Cancer Neg Hx     Liver Cancer Neg Hx     Liver Disease Neg Hx     Rectal Cancer Neg Hx     Stomach Cancer Neg Hx        Current Outpatient Prescriptions   Medication Sig Dispense Refill    TRESIBA FLEXTOUCH 200 UNIT/ML SOPN       aspirin 81 MG chewable tablet Take 1 tablet by mouth daily 30 tablet 3    zoster recombinant adjuvanted vaccine (SHINGRIX) 50 MCG SUSR injection 50 MCG IM then repeat 2-6 months. 0.5 mL 1    clotrimazole (LOTRIMIN) 1 % cream Apply topically 2 times daily. 1 Tube 1    triamcinolone (KENALOG) 0.025 % ointment Apply topically 2 times daily. 1 Tube 1    HYDROcodone-acetaminophen (NORCO)  MG per tablet Take 1 tablet by mouth Daily with lunch. Maycol Catena Insulin Pen Needle 31G X 5 MM MISC As directed 100 each 1    gabapentin (NEURONTIN) 300 MG capsule Take 300 mg by mouth Daily. Maycol Catena insulin aspart (NOVOLOG FLEXPEN) 100 UNIT/ML injection pen Inject 12 Units into the skin 2 times daily Takes with breakfast and at 7pm      rosuvastatin (CRESTOR) 10 MG tablet Take 10 mg by mouth nightly      nystatin (MYCOSTATIN) 699204 UNIT/GM powder Apply 3 times daily. (Patient taking differently: Apply topically 3 times daily Apply 3 times daily. ) 1 Bottle 0    metoprolol succinate (TOPROL XL) 50 MG extended release tablet TAKE 1 TABLET

## 2018-09-07 ENCOUNTER — HOSPITAL ENCOUNTER (EMERGENCY)
Age: 73
Discharge: HOME | End: 2018-09-07
Attending: EMERGENCY MEDICINE
Payer: MEDICARE

## 2018-09-07 VITALS
DIASTOLIC BLOOD PRESSURE: 75 MMHG | SYSTOLIC BLOOD PRESSURE: 146 MMHG | HEIGHT: 62 IN | TEMPERATURE: 100 F | HEART RATE: 101 BPM | OXYGEN SATURATION: 93 % | WEIGHT: 255 LBS | BODY MASS INDEX: 46.93 KG/M2 | RESPIRATION RATE: 20 BRPM

## 2018-09-07 DIAGNOSIS — L02.91 ABSCESS: Primary | ICD-10-CM

## 2018-09-07 PROCEDURE — 2500000003 HC RX 250 WO HCPCS: Performed by: EMERGENCY MEDICINE

## 2018-09-07 PROCEDURE — 99282 EMERGENCY DEPT VISIT SF MDM: CPT

## 2018-09-07 PROCEDURE — 87070 CULTURE OTHR SPECIMN AEROBIC: CPT

## 2018-09-07 PROCEDURE — 86403 PARTICLE AGGLUT ANTBDY SCRN: CPT

## 2018-09-07 PROCEDURE — 87205 SMEAR GRAM STAIN: CPT

## 2018-09-07 PROCEDURE — 87186 SC STD MICRODIL/AGAR DIL: CPT

## 2018-09-07 PROCEDURE — 10060 I&D ABSCESS SIMPLE/SINGLE: CPT

## 2018-09-07 PROCEDURE — 87075 CULTR BACTERIA EXCEPT BLOOD: CPT

## 2018-09-07 PROCEDURE — 99283 EMERGENCY DEPT VISIT LOW MDM: CPT | Performed by: EMERGENCY MEDICINE

## 2018-09-07 PROCEDURE — 10061 I&D ABSCESS COMP/MULTIPLE: CPT | Performed by: NURSE PRACTITIONER

## 2018-09-07 RX ORDER — LIDOCAINE HYDROCHLORIDE 10 MG/ML
5 INJECTION, SOLUTION EPIDURAL; INFILTRATION; INTRACAUDAL; PERINEURAL ONCE
Status: COMPLETED | OUTPATIENT
Start: 2018-09-07 | End: 2018-09-07

## 2018-09-07 RX ORDER — SULFAMETHOXAZOLE AND TRIMETHOPRIM 800; 160 MG/1; MG/1
1 TABLET ORAL 2 TIMES DAILY
Qty: 14 TABLET | Refills: 0 | Status: SHIPPED | OUTPATIENT
Start: 2018-09-07 | End: 2018-09-12 | Stop reason: SDUPTHER

## 2018-09-07 RX ORDER — ANASTROZOLE 1 MG/1
1 TABLET ORAL DAILY
Qty: 30 TABLET | Refills: 5 | Status: SHIPPED | OUTPATIENT
Start: 2018-09-07 | End: 2018-09-27 | Stop reason: SDUPTHER

## 2018-09-07 RX ADMIN — LIDOCAINE HYDROCHLORIDE 5 ML: 10 INJECTION, SOLUTION EPIDURAL; INFILTRATION; INTRACAUDAL at 15:59

## 2018-09-07 ASSESSMENT — PAIN SCALES - GENERAL: PAINLEVEL_OUTOF10: 10

## 2018-09-07 NOTE — ED PROVIDER NOTES
injection 50 MCG IM then repeat 2-6 months., Disp-0.5 mL, R-1Print      clotrimazole (LOTRIMIN) 1 % cream Apply topically 2 times daily. , Disp-1 Tube, R-1, Normal      triamcinolone (KENALOG) 0.025 % ointment Apply topically 2 times daily. , Disp-1 Tube, R-1, Normal      HYDROcodone-acetaminophen (NORCO)  MG per tablet Take 1 tablet by mouth Daily with lunch. .Historical Med      !! Insulin Pen Needle 31G X 5 MM MISC Disp-100 each, R-1, PrintAs directed      gabapentin (NEURONTIN) 300 MG capsule Take 300 mg by mouth Daily. Samantha Cash Historical Med      insulin aspart (NOVOLOG FLEXPEN) 100 UNIT/ML injection pen Inject 12 Units into the skin 2 times daily Takes with breakfast and at 7pmHistorical Med      rosuvastatin (CRESTOR) 10 MG tablet Take 10 mg by mouth nightlyHistorical Med      nystatin (MYCOSTATIN) 994972 UNIT/GM powder Apply 3 times daily. , Disp-1 Bottle, R-0, Normal      metoprolol succinate (TOPROL XL) 50 MG extended release tablet TAKE 1 TABLET DAILY FOR BLOOD PRESSURE AND HEART PROTECTION. , Disp-30 tablet, R-11Normal      Misc. Devices (ROLLER WALKER) MISC DAILY Starting Tue 4/10/2018, Disp-1 each, R-0, PrintPt states that she falls at least once a month up to 3 times a month.       oxybutynin (DITROPAN-XL) 5 MG extended release tablet TAKE 1 TABLET DAILY, Disp-30 tablet, R-2Normal      vitamin D (ERGOCALCIFEROL) 29806 units CAPS capsule TAKE 1 CAPSULE BY MOUTH ONCE A WEEK, Disp-12 capsule, R-3Normal      JANUMET  MG per tablet TAKE 1 TABLET TWICE DAILY WITH FOOD, Disp-60 tablet, R-5Normal      montelukast (SINGULAIR) 10 MG tablet TAKE 1 TABLET BY MOUTH AT NIGHT, Disp-90 tablet, R-5Normal      citalopram (CELEXA) 20 MG tablet TAKE 1 TABLET DAILY, Disp-30 tablet, R-11Normal      loratadine (CLARITIN) 10 MG tablet TAKE 1 TABLET DAILY, Disp-30 tablet, R-11Normal      losartan (COZAAR) 100 MG tablet TAKE (1) TABLET DAILY FOR BLOOD PRESSURE., Disp-30 tablet, R-0Normal      INSULIN SYRINGE 1CC/29G 29G X 1/2\" Smokeless tobacco: Never Used    Alcohol use 1.2 oz/week     1 Glasses of wine, 1 Cans of beer per week      Comment: occ    Drug use: No    Sexual activity: Not on file     Other Topics Concern    Not on file     Social History Narrative    No narrative on file       SCREENINGS             PHYSICAL EXAM    (up to 7 for level 4, 8 or more for level 5)     ED Triage Vitals [09/07/18 1456]   BP Temp Temp Source Pulse Resp SpO2 Height Weight   (!) 146/75 100 °F (37.8 °C) Oral 101 20 93 % 5' 2\" (1.575 m) 255 lb (115.7 kg)       Physical Exam   Constitutional: She is oriented to person, place, and time. No distress. HENT:   Head: Atraumatic. Cardiovascular: Normal rate, regular rhythm and normal heart sounds. Pulmonary/Chest: Effort normal and breath sounds normal. No respiratory distress. Abdominal: Soft. She exhibits no distension. There is no tenderness. Neurological: She is alert and oriented to person, place, and time. Skin:        Over the right buttock area there is a small, less than 2 cm area of erythema. This does not seem to have any surrounding fluctuance or induration noted. No evidence of pointing or drainage identified. It is tender to palpation. Over the medial aspect of the left proximal thigh there is a 2 cm x 4 cm circular abscess area with tenderness, fluctuance surrounding erythema. There is no evidence of streaking erythema into the perineal region. Nursing note and vitals reviewed. DIAGNOSTIC RESULTS         LABS:      All other labs were within normal range or not returned as of this dictation.     EMERGENCY DEPARTMENT COURSE and DIFFERENTIAL DIAGNOSIS/MDM:   Vitals:    Vitals:    09/07/18 1456   BP: (!) 146/75   Pulse: 101   Resp: 20   Temp: 100 °F (37.8 °C)   TempSrc: Oral   SpO2: 93%   Weight: 255 lb (115.7 kg)   Height: 5' 2\" (1.575 m)       MDM  Number of Diagnoses or Management Options  Abscess: new and does not require workup      PROCEDURES:  Unless otherwise noted below, none     Procedures    FINAL IMPRESSION      1.  Abscess          DISPOSITION/PLAN   DISPOSITION Decision To Discharge 09/07/2018 04:00:12 PM      PATIENT REFERRED TO:  Dasha Shine MD  [address] [place] [address] [address] [phone]            DISCHARGE MEDICATIONS:  Discharge Medication List as of 9/7/2018  4:00 PM      START taking these medications    Details   sulfamethoxazole-trimethoprim (BACTRIM DS) 800-160 MG per tablet Take 1 tablet by mouth 2 times daily for 7 days, Disp-14 tablet, R-0Print                (Please note that portions of this note were completed with a voice recognition program.  Efforts were made to edit the dictations but occasionally words are mis-transcribed.)    Laura Astorga MD (electronically signed)  Attending Emergency Physician         Laura Astorga MD  09/11/18 [id number]

## 2018-09-07 NOTE — CARE COORDINATION
Dasha Shine MD   citalopram (CELEXA) 20 MG tablet TAKE 1 TABLET DAILY 3/6/18   Dasha Shine MD   loratadine (CLARITIN) 10 MG tablet TAKE 1 TABLET DAILY 3/6/18   ANTONIA Colon   losartan (COZAAR) 100 MG tablet TAKE (1) TABLET DAILY FOR BLOOD PRESSURE. 11/2/17   ANTONIA Mendez   INSULIN SYRINGE 1CC/29G 29G X 1/2\" 1 ML MISC Use with each dose of novolog TID 9/14/17   Dasha Shine MD   Insulin Pen Needle 32G X 6 MM MISC As directed 6/28/17   Dasha Shine MD   omeprazole (PRILOSEC) 20 MG delayed release capsule TAKE (1) CAPSULE BY MOUTH TWICE DAILY AS NEEDED. Patient taking differently: Take 20 mg by mouth Daily Takes 20mg daily, but may take a second dose later if needed. 6/15/17   Dasha Shine MD   empagliflozin (JARDIANCE) 10 MG tablet Take 1 tablet by mouth daily Samples given to pt 3 bottles [id number] 7/2019 1 bottle [id number] 3/2019 5/16/17   Dasha Shine MD   mometasone-formoterol [facility]) 200-5 MCG/ACT inhaler Inhale 2 puffs into the lungs every 12 hours    Historical Provider, MD   anastrozole (ARIMIDEX) 1 MG tablet Take 1 mg by mouth daily    Historical Provider, MD   Multiple Vitamins-Minerals (MULTIVITAMIN ADULT PO) Take 1 tablet by mouth daily     Historical Provider, MD   magnesium oxide (MAG-OX) 400 MG tablet Take 1 tablet by mouth daily 2/23/16   Saba ANTONIA López   albuterol (PROVENTIL HFA;VENTOLIN HFA) 108 (90 BASE) MCG/ACT inhaler Inhale 2 puffs into the lungs every 4 hours as needed for Seng Jacobs MD   Incontinence Supply Disposable (POISE PANTILINERS) PADS Use pads as needed daily for urine leakage.  3/24/14   ANTONIA Chang       Future Appointments  Date Time Provider Alberto Khan   9/18/2018 1:45 PM Sadiq Andres NEURO [facility]   10/10/2018 1:00 PM ANTONIA [facility]   8/26/2019 1:30 PM KENNETHL MAMMO RM 1 [facility]OMENS [facility]

## 2018-09-07 NOTE — ED PROVIDER NOTES
Incision/Drainage  Date/Time: 9/7/2018 3:55 PM  Performed by: Patricia Castanon  Authorized by: Jonathan iDego     Consent:     Consent obtained:  Verbal    Consent given by:  Patient    Risks discussed:  Infection and pain  Location:     Type:  Abscess    Size:  5cm     Location:  Lower extremity    Lower extremity location:  Leg    Leg location:  R upper leg  Pre-procedure details:     Skin preparation:  Betadine  Anesthesia (see MAR for exact dosages): Anesthesia method:  Local infiltration    Local anesthetic:  Lidocaine 1% w/o epi  Procedure type:     Complexity:  Complex  Procedure details:     Needle aspiration: no      Incision types:  Single straight    Incision depth:  Subcutaneous    Scalpel blade:  11    Wound management:  Probed and deloculated    Drainage:  Bloody    Drainage amount:  Scant    Wound treatment:  Wound left open    Packing materials:  1/4 in iodoform gauze    Amount 1/4\" iodoform:  2\"  Post-procedure details:     Patient tolerance of procedure:   Tolerated well, no immediate complications           Vernon Sandhoff, APRN  09/07/18 1558

## 2018-09-08 ASSESSMENT — ENCOUNTER SYMPTOMS
EYE REDNESS: 0
DIARRHEA: 0
VOMITING: 0
BLOOD IN STOOL: 0
TROUBLE SWALLOWING: 0
COUGH: 0
ABDOMINAL PAIN: 0
NAUSEA: 0
SORE THROAT: 0
SHORTNESS OF BREATH: 1
CHEST TIGHTNESS: 0
VOICE CHANGE: 0
CONSTIPATION: 0
RHINORRHEA: 0
WHEEZING: 0

## 2018-09-10 ENCOUNTER — CARE COORDINATION (OUTPATIENT)
Dept: CARE COORDINATION | Age: 73
End: 2018-09-10

## 2018-09-10 DIAGNOSIS — M47.816 LUMBAR FACET ARTHROPATHY: ICD-10-CM

## 2018-09-10 LAB
GRAM STAIN RESULT: ABNORMAL
ORGANISM: ABNORMAL
ORGANISM: ABNORMAL
WOUND/ABSCESS: ABNORMAL

## 2018-09-10 NOTE — CARE COORDINATION
tablet Take 1 tablet by mouth Daily with lunch. .    Historical Provider, MD   Insulin Pen Needle 31G X 5 MM MISC As directed 8/9/18   Ck Shea MD   gabapentin (NEURONTIN) 300 MG capsule Take 300 mg by mouth Daily. Yamilka Montes Historical Provider, MD   insulin aspart (NOVOLOG FLEXPEN) 100 UNIT/ML injection pen Inject 12 Units into the skin 2 times daily Takes with breakfast and at 7pm    Historical Provider, MD   rosuvastatin (CRESTOR) 10 MG tablet Take 10 mg by mouth nightly    Historical Provider, MD   nystatin (MYCOSTATIN) 821324 UNIT/GM powder Apply 3 times daily. Patient taking differently: Apply topically 3 times daily Apply 3 times daily. 7/17/18   ANTONIA Moncada   metoprolol succinate (TOPROL XL) 50 MG extended release tablet TAKE 1 TABLET DAILY FOR BLOOD PRESSURE AND HEART PROTECTION. 7/2/18   Ck Shea MD   Misc. Devices (ROLLER WALKER) MISC 1 each by Does not apply route daily Pt states that she falls at least once a month up to 3 times a month.  4/10/18   ANTONIA Moncada   oxybutynin (DITROPAN-XL) 5 MG extended release tablet TAKE 1 TABLET DAILY 4/10/18   ANTONIA Moncada   vitamin D (ERGOCALCIFEROL) 74945 units CAPS capsule TAKE 1 CAPSULE BY MOUTH ONCE A WEEK  Patient taking differently: TAKE 1 CAPSULE BY MOUTH ONCE A WEEK on Saturday 3/30/18   Ck Shea MD   JANUMET  MG per tablet TAKE 1 TABLET TWICE DAILY WITH FOOD 3/20/18   Ck Shea MD   montelukast (SINGULAIR) 10 MG tablet TAKE 1 TABLET BY MOUTH AT NIGHT 3/19/18   Ck Shea MD   citalopram (CELEXA) 20 MG tablet TAKE 1 TABLET DAILY 3/6/18   Ck Shea MD   loratadine (CLARITIN) 10 MG tablet TAKE 1 TABLET DAILY 3/6/18   ANTONIA Walton   losartan (COZAAR) 100 MG tablet TAKE (1) TABLET DAILY FOR BLOOD PRESSURE. 11/2/17   ANTONIA Jaramillo   INSULIN SYRINGE 1CC/29G 29G X 1/2\" 1 ML MISC Use with each dose of novolog TID 9/14/17   Adama Echols L

## 2018-09-11 ENCOUNTER — CARE COORDINATION (OUTPATIENT)
Dept: CARE COORDINATION | Age: 73
End: 2018-09-11

## 2018-09-11 ASSESSMENT — ENCOUNTER SYMPTOMS: SHORTNESS OF BREATH: 0

## 2018-09-11 NOTE — CARE COORDINATION
Ambulatory Care Coordination Note  9/11/2018  CM Risk Score: 3  Haylie Mortality Risk Score: 5.81    ACC: Sherry Cano, RN    Summary Note: Pt called stating she was returning a call placed to her, unable to determine who called the pt, concluded it was the reminder call about appointment tomorrow         Care Coordination Interventions    Program Enrollment:  Rising Risk  Referral from Primary Care Provider:  No  Suggested Interventions and Community Resources  Medication Assistance Program:  Completed (Comment: Pt has received Novolog, Levemir, Jardiance and Elizabeth )  Pharmacist:  Ben  [employer] Management Tools: In Process (Comment: 9/5/18-PT UNABLE TO GIVE ANY BLOOD SUGAR READINGS AT THIS TIME )  Other Services or Interventions:  9/10/18 Wound Abcess          Goals Addressed     None          Prior to Admission medications    Medication Sig Start Date End Date Taking? Authorizing Provider   sulfamethoxazole-trimethoprim (BACTRIM DS) 800-160 MG per tablet Take 1 tablet by mouth 2 times daily for 7 days 9/7/18 9/14/18  Marlin Timmons MD   TRESIBA FLEXTOUCH 200 UNIT/ML [facility]  8/24/18   Historical Provider, MD   aspirin 81 MG chewable tablet Take 1 tablet by mouth daily 9/5/18   Bal Nine, APRN   zoster recombinant adjuvanted vaccine (SHINGRIX) 50 MCG SUSR injection 50 MCG IM then repeat 2-6 months. 9/5/18 9/5/19  Bal Nine, APRN   clotrimazole (LOTRIMIN) 1 % cream Apply topically 2 times daily. 9/5/18 9/12/18  Bal Nine, APRN   triamcinolone (KENALOG) 0.025 % ointment Apply topically 2 times daily. 9/5/18 9/12/18  Bal Nine, APRN   HYDROcodone-acetaminophen (NORCO)  MG per tablet Take 1 tablet by mouth Daily with lunch. .    Historical Provider, MD   Insulin Pen Needle 31G X 5 MM MISC As directed 8/9/18   Nany Mcgrath MD   gabapentin (NEURONTIN) 300 MG capsule Take 300 mg by mouth Daily. Romeo Counter     Historical Provider, MD   insulin aspart (NOVOLOG FLEXPEN) 100 UNIT/ML injection pen Inject 12 Units into the skin 2 times daily Takes with breakfast and at 7pm    Historical Provider, MD   rosuvastatin (CRESTOR) 10 MG tablet Take 10 mg by mouth nightly    Historical Provider, MD   nystatin (MYCOSTATIN) 149349 UNIT/GM powder Apply 3 times daily. Patient taking differently: Apply topically 3 times daily Apply 3 times daily. 7/17/18   ANTONIA Hurt   metoprolol succinate (TOPROL XL) 50 MG extended release tablet TAKE 1 TABLET DAILY FOR BLOOD PRESSURE AND HEART PROTECTION. 7/2/18   Jun Gasca MD   Misc. Devices (ROLLER WALKER) MISC 1 each by Does not apply route daily Pt states that she falls at least once a month up to 3 times a month. 4/10/18   ANTONIA Hurt   oxybutynin (DITROPAN-XL) 5 MG extended release tablet TAKE 1 TABLET DAILY 4/10/18   ANTONIA Hurt   vitamin D (ERGOCALCIFEROL) 19609 units CAPS capsule TAKE 1 CAPSULE BY MOUTH ONCE A WEEK  Patient taking differently: TAKE 1 CAPSULE BY MOUTH ONCE A WEEK on Saturday 3/30/18   Jun Gasca MD   JANUMET  MG per tablet TAKE 1 TABLET TWICE DAILY WITH FOOD 3/20/18   Jun Gasca MD   montelukast (SINGULAIR) 10 MG tablet TAKE 1 TABLET BY MOUTH AT NIGHT 3/19/18   Jun Gasca MD   citalopram (CELEXA) 20 MG tablet TAKE 1 TABLET DAILY 3/6/18   Jun Gasca MD   loratadine (CLARITIN) 10 MG tablet TAKE 1 TABLET DAILY 3/6/18   ANTONIA Layne   losartan (COZAAR) 100 MG tablet TAKE (1) TABLET DAILY FOR BLOOD PRESSURE. 11/2/17   ANTONIA Diez   INSULIN SYRINGE 1CC/29G 29G X 1/2\" 1 ML MISC Use with each dose of novolog TID 9/14/17   Jun Gasca MD   Insulin Pen Needle 32G X 6 MM MISC As directed 6/28/17   Jun Gasca MD   omeprazole (PRILOSEC) 20 MG delayed release capsule TAKE (1) CAPSULE BY MOUTH TWICE DAILY AS NEEDED.   Patient taking differently: Take 20 mg by mouth Daily Takes 20mg daily, but may take a second dose

## 2018-09-12 ENCOUNTER — OFFICE VISIT (OUTPATIENT)
Dept: PRIMARY CARE CLINIC | Age: 73
End: 2018-09-12
Payer: MEDICARE

## 2018-09-12 ENCOUNTER — OFFICE VISIT (OUTPATIENT)
Dept: SURGERY | Age: 73
End: 2018-09-12
Payer: MEDICARE

## 2018-09-12 VITALS
BODY MASS INDEX: 46.74 KG/M2 | WEIGHT: 254 LBS | DIASTOLIC BLOOD PRESSURE: 80 MMHG | TEMPERATURE: 97.7 F | SYSTOLIC BLOOD PRESSURE: 130 MMHG | HEIGHT: 62 IN

## 2018-09-12 VITALS
BODY MASS INDEX: 45.82 KG/M2 | TEMPERATURE: 98.3 F | DIASTOLIC BLOOD PRESSURE: 86 MMHG | HEART RATE: 69 BPM | OXYGEN SATURATION: 98 % | WEIGHT: 249 LBS | HEIGHT: 62 IN | SYSTOLIC BLOOD PRESSURE: 138 MMHG

## 2018-09-12 DIAGNOSIS — Z22.322 MRSA (METHICILLIN RESISTANT STAPH AUREUS) CULTURE POSITIVE: Primary | ICD-10-CM

## 2018-09-12 DIAGNOSIS — K61.0 PERIANAL ABSCESS: Primary | ICD-10-CM

## 2018-09-12 PROCEDURE — 99211 OFF/OP EST MAY X REQ PHY/QHP: CPT | Performed by: PHYSICIAN ASSISTANT

## 2018-09-12 PROCEDURE — 99214 OFFICE O/P EST MOD 30 MIN: CPT | Performed by: NURSE PRACTITIONER

## 2018-09-12 RX ORDER — SULFAMETHOXAZOLE AND TRIMETHOPRIM 800; 160 MG/1; MG/1
1 TABLET ORAL 2 TIMES DAILY
Qty: 14 TABLET | Refills: 0 | Status: SHIPPED | OUTPATIENT
Start: 2018-09-12 | End: 2018-09-19

## 2018-09-12 NOTE — PROGRESS NOTES
1 MG tablet Take 1 mg by mouth daily      Multiple Vitamins-Minerals (MULTIVITAMIN ADULT PO) Take 1 tablet by mouth daily       magnesium oxide (MAG-OX) 400 MG tablet Take 1 tablet by mouth daily 30 tablet 5    albuterol (PROVENTIL HFA;VENTOLIN HFA) 108 (90 BASE) MCG/ACT inhaler Inhale 2 puffs into the lungs every 4 hours as needed for Wheezing      Incontinence Supply Disposable (POISE PANTILINERS) PADS Use pads as needed daily for urine leakage. 1 each 11     No current facility-administered medications for this visit. Allergies   Allergen Reactions    Pcn [Penicillins] Hives and Itching     Patient states she can tolerate ampicillin and amoxicillin however.  Cefdinir        Family History   Problem Relation Age of Onset    High Blood Pressure Father     Heart Disease Father     Diabetes Father     High Blood Pressure Sister     Diabetes Sister     Cancer Sister     High Blood Pressure Brother     Colon Cancer Neg Hx     Colon Polyps Neg Hx     Esophageal Cancer Neg Hx     Liver Cancer Neg Hx     Liver Disease Neg Hx     Rectal Cancer Neg Hx     Stomach Cancer Neg Hx                Subjective:      Review of Systems    Objective:     Physical Exam    /86   Pulse 69   Temp 98.3 °F (36.8 °C)   Ht 5' 2\" (1.575 m)   Wt 249 lb (112.9 kg)   SpO2 98%   BMI 45.54 kg/m²     Assessment:      Diagnosis Orders   1. Perianal abscess         No results found for this visit on 09/12/18. Plan:     Packing removed. Odor present. Spoke with general surgery and they are willing to see patient at this time. I am sending patient to [facility] for appointment via taxi since she rides [employer] transportation and they are unable to return at this time. Spoke with Kelley De,  about this situation and she agreed to call taxi cab. No Follow-up on file. No orders of the defined types were placed in this encounter.       No orders of the defined types were placed in this encounter. Patient given educational materials - see patient instructions. Discussed use, benefit, and side effects of prescribed medications. All patient questions answered. Pt voiced understanding. Reviewed health maintenance. Instructed to continue current medications, diet and exercise. Patient agreed with treatment plan. Follow up as directed.            Electronically signed by ANTONIA Crocker on 9/12/2018 at 2:46 PM

## 2018-09-13 ENCOUNTER — CARE COORDINATION (OUTPATIENT)
Dept: CARE COORDINATION | Age: 73
End: 2018-09-13

## 2018-09-13 NOTE — CARE COORDINATION
MCG/ACT inhaler Inhale 2 puffs into the lungs every 12 hours    Historical Provider, MD   anastrozole (ARIMIDEX) 1 MG tablet Take 1 mg by mouth daily    Historical Provider, MD   Multiple Vitamins-Minerals (MULTIVITAMIN ADULT PO) Take 1 tablet by mouth daily     Historical Provider, MD   magnesium oxide (MAG-OX) 400 MG tablet Take 1 tablet by mouth daily 2/23/16   ANTONIA Hudson   albuterol (PROVENTIL HFA;VENTOLIN HFA) 108 (90 BASE) MCG/ACT inhaler Inhale 2 puffs into the lungs every 4 hours as needed for Wheezing    Donavan Corral MD   Incontinence Supply Disposable (POISE PANTILINERS) PADS Use pads as needed daily for urine leakage.  3/24/14   ANTONIA Chang       Future Appointments  Date Time Provider Alberto Khan   9/17/2018 2:30 PM Leopoldo Palau, PA-C [facility]   9/18/2018 1:45 PM PAIGE Andres NEURO [facility]   10/10/2018 1:00 PM ANTONIA Costay PC [facility]   8/26/2019 1:30 PM L MAMMO RM 1 L [facility]

## 2018-09-14 ENCOUNTER — TELEPHONE (OUTPATIENT)
Dept: NEUROLOGY | Age: 73
End: 2018-09-14

## 2018-09-14 NOTE — TELEPHONE ENCOUNTER
Called and spoke with patient to let her know that her appointment with Amanda Green had to be rescheduled due to provider had a death in the family. Patient is aware that I have her appointment time/date rescheduled.

## 2018-09-17 ENCOUNTER — OFFICE VISIT (OUTPATIENT)
Dept: SURGERY | Age: 73
End: 2018-09-17

## 2018-09-17 VITALS
SYSTOLIC BLOOD PRESSURE: 128 MMHG | TEMPERATURE: 97.2 F | DIASTOLIC BLOOD PRESSURE: 74 MMHG | HEIGHT: 62 IN | BODY MASS INDEX: 46.74 KG/M2 | WEIGHT: 254 LBS

## 2018-09-17 DIAGNOSIS — Z22.322 MRSA (METHICILLIN RESISTANT STAPH AUREUS) CULTURE POSITIVE: Primary | ICD-10-CM

## 2018-09-17 PROCEDURE — 99024 POSTOP FOLLOW-UP VISIT: CPT | Performed by: PHYSICIAN ASSISTANT

## 2018-09-24 ENCOUNTER — OFFICE VISIT (OUTPATIENT)
Dept: SURGERY | Age: 73
End: 2018-09-24
Payer: MEDICARE

## 2018-09-24 VITALS
DIASTOLIC BLOOD PRESSURE: 80 MMHG | TEMPERATURE: 97.2 F | SYSTOLIC BLOOD PRESSURE: 130 MMHG | WEIGHT: 254 LBS | BODY MASS INDEX: 46.74 KG/M2 | HEIGHT: 62 IN

## 2018-09-24 DIAGNOSIS — L02.219 CELLULITIS AND ABSCESS OF TRUNK: Primary | ICD-10-CM

## 2018-09-24 DIAGNOSIS — L03.319 CELLULITIS AND ABSCESS OF TRUNK: Primary | ICD-10-CM

## 2018-09-24 PROCEDURE — 99211 OFF/OP EST MAY X REQ PHY/QHP: CPT | Performed by: PHYSICIAN ASSISTANT

## 2018-09-24 NOTE — PROGRESS NOTES
partial mastectomy on 8/4/15    EYE SURGERY      EYE SURGERY Bilateral 04/02/2017    Dr. Flaquita Mcclain      broke L ankle due to osteoporosis, has hardware in    FRACTURE SURGERY      Left ankle     HYSTERECTOMY      FL DRAIN SKIN ABSCESS COMPLIC N/A 2/97/2587    ABDOMINAL WALL ABSCESS INCISION AND DRAINAGE performed by Jerry Araiza MD at [address] ENDOSCOPY  4/8/2016    Dr Tere Guzman, (-) H Pylori    WRIST SURGERY       Family History   Problem Relation Age of Onset    High Blood Pressure Father     Heart Disease Father     Diabetes Father     High Blood Pressure Sister     Diabetes Sister     Cancer Sister     High Blood Pressure Brother     Colon Cancer Neg Hx     Colon Polyps Neg Hx     Esophageal Cancer Neg Hx     Liver Cancer Neg Hx     Liver Disease Neg Hx     Rectal Cancer Neg Hx     Stomach Cancer Neg Hx      Social History   Substance Use Topics    Smoking status: Never Smoker    Smokeless tobacco: Never Used    Alcohol use 1.2 oz/week     1 Glasses of wine, 1 Cans of beer per week      Comment: occ     Exam   Blood pressure 124/78, temperature 97.7 °F (36.5 °C), temperature source Temporal, height 5' 2\" (1.575 m), weight 254 lb (115.2 kg), not currently breastfeeding. On exam, her incision is healing. There is no drainage.  She still has some erythema but no evidence of recurrent abscess    Impression  abd wall abscess s/p I&D    Plan  We will see her back on a prn basis

## 2018-09-25 NOTE — PROGRESS NOTES
Subjective:  Mrs. Morgan Stubbs comes today in follow-up from her infected skin lesions. Most recently she's had an abscess to her posterior left upper thigh. This has been previously cultured and was noted to be MRSA. She was treated with systemic antibiotics. She has also noticed some lesions/insect bites on other parts of her body. She is unsure of their etiology. Objective:  Patient Active Problem List    Diagnosis Date Noted    BiPAP (biphasic positive airway pressure) dependence     Obstructive sleep apnea     Restless leg syndrome     Diabetes mellitus ([address]     Cellulitis and abscess of trunk     Abscess 07/23/2018    Body mass index 45.0-49.9, adult ([address] 05/02/2018    Lumbar facet arthropathy ([address] 02/27/2018    Lumbar facet arthropathy ([address] 08/25/2017    Essential hypertension 05/18/2017    Hyperlipidemia 05/18/2017    Morbid obesity ([address] 05/18/2017    DARSHAN (obstructive sleep apnea) 05/18/2017    Lumbar facet arthropathy ([address] 11/22/2016    Lumbar facet arthropathy ([address] 09/20/2016    Lumbar facet arthropathy [facility] 08/18/2016    Screening for colon cancer 08/17/2016    Dyspepsia     Joint pain 02/02/2016    Neuropathy involving both lower extremities 02/02/2016    Back pain 02/02/2016    S/P lumpectomy, left breast 08/19/2015    Malignant neoplasm of upper-outer quadrant of female breast ([address] 08/19/2015    Mood disorder ([address] 03/11/2015    Dementia 03/11/2015    COPD (chronic obstructive pulmonary disease) ([facility] 01/02/2015    Type 2 diabetes mellitus with complication (HCC)     Neuropathy     Postmenopausal osteoporosis      Current Outpatient Prescriptions   Medication Sig Dispense Refill    TRESIBA FLEXTOUCH 200 UNIT/ML [facility]       aspirin 81 MG chewable tablet Take 1 tablet by mouth daily 30 tablet 3    HYDROcodone-acetaminophen (NORCO)  MG per tablet Take 1 tablet by mouth Daily with lunch. Conny Reges Insulin Pen Needle 31G X 5 MM MISC As directed 100 each 1    gabapentin (NEURONTIN) 300 MG capsule Take 300 mg by mouth Daily. Dottie Mackey insulin aspart (NOVOLOG FLEXPEN) 100 UNIT/ML injection pen Inject 12 Units into the skin 2 times daily Takes with breakfast and at 7pm      rosuvastatin (CRESTOR) 10 MG tablet Take 10 mg by mouth nightly      nystatin (MYCOSTATIN) 684758 UNIT/GM powder Apply 3 times daily. (Patient taking differently: Apply topically 3 times daily Apply 3 times daily. ) 1 Bottle 0    metoprolol succinate (TOPROL XL) 50 MG extended release tablet TAKE 1 TABLET DAILY FOR BLOOD PRESSURE AND HEART PROTECTION. 30 tablet 11    Misc. Devices (ROLLER WALKER) MISC 1 each by Does not apply route daily Pt states that she falls at least once a month up to 3 times a month. 1 each 0    oxybutynin (DITROPAN-XL) 5 MG extended release tablet TAKE 1 TABLET DAILY 30 tablet 2    vitamin D (ERGOCALCIFEROL) 77453 units CAPS capsule TAKE 1 CAPSULE BY MOUTH ONCE A WEEK (Patient taking differently: TAKE 1 CAPSULE BY MOUTH ONCE A WEEK on Saturday) 12 capsule 3    JANUMET  MG per tablet TAKE 1 TABLET TWICE DAILY WITH FOOD 60 tablet 5    montelukast (SINGULAIR) 10 MG tablet TAKE 1 TABLET BY MOUTH AT NIGHT 90 tablet 5    citalopram (CELEXA) 20 MG tablet TAKE 1 TABLET DAILY 30 tablet 11    loratadine (CLARITIN) 10 MG tablet TAKE 1 TABLET DAILY 30 tablet 11    losartan (COZAAR) 100 MG tablet TAKE (1) TABLET DAILY FOR BLOOD PRESSURE. 30 tablet 0    INSULIN SYRINGE 1CC/29G 29G X 1/2\" 1 ML MISC Use with each dose of novolog TID 90 each 1    Insulin Pen Needle 32G X 6 MM MISC As directed 50 each 5    omeprazole (PRILOSEC) 20 MG delayed release capsule TAKE (1) CAPSULE BY MOUTH TWICE DAILY AS NEEDED.  (Patient taking differently: Take 20 mg by mouth Daily Takes 20mg daily, but may take a second dose later if needed.) 60 capsule 11    empagliflozin (JARDIANCE) 10 MG tablet Take 1 tablet by mouth daily Samples given to pt 3 bottles [id number] 7/2019 1 bottle [id number] 3/2019 30 tablet 3    mometasone-formoterol (DULERA) 200-5 MCG/ACT inhaler Inhale 2 puffs into the lungs every 12 hours      anastrozole (ARIMIDEX) 1 MG tablet Take 1 mg by mouth daily      Multiple Vitamins-Minerals (MULTIVITAMIN ADULT PO) Take 1 tablet by mouth daily       magnesium oxide (MAG-OX) 400 MG tablet Take 1 tablet by mouth daily 30 tablet 5    albuterol (PROVENTIL HFA;VENTOLIN HFA) 108 (90 BASE) MCG/ACT inhaler Inhale 2 puffs into the lungs every 4 hours as needed for Wheezing      Incontinence Supply Disposable (POISE PANTILINERS) PADS Use pads as needed daily for urine leakage. 1 each 11     No current facility-administered medications for this visit.       Allergies: Pcn [penicillins] and Cefdinir  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    Breast CA (HCC)     Left breast Nodules removed Took radiation    Chronic back pain     Chronic kidney disease     Overactive bladder     COPD (chronic obstructive pulmonary disease) (HCC)     x few years Scarring on lungs 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    Diabetes mellitus ([address]     On insulin x 10 years    Fibromyalgia     Fibromyalgia     for 20 years    GERD (gastroesophageal reflux disease)     History of blood transfusion     ?when    Hyperlipidemia     High cholesterol    Hypertension     On medications for 2 years    Joint pain 2/2/2016    Liver disease     Has fatty liver    Morbid obesity ([address]     Multiple food allergies     Neuropathic pain     Neuropathy     Neuropathy involving both lower extremities 2/2/2016    Obstructive sleep apnea     AHI: 68.1 per PSG, 5/2018    Osteoarthritis     Other disorders of kidney and ureter in diseases classified elsewhere     Pneumonia     Postmenopausal osteoporosis     Psychiatric problem     Restless leg syndrome bites      Plan:  I discussed with the patient if she felt that this was related to perhaps bedbugs and she declined. She thought it may be due to mosquito bites or flea bites while visiting at her daughter's home. At any rate, I am unsure of the etiology but do feel it is an issue for her to address. The area in her left posterior thigh is healed. I will see her back on a when necessary basis.

## 2018-09-27 RX ORDER — ANASTROZOLE 1 MG/1
1 TABLET ORAL DAILY
Qty: 90 TABLET | Refills: 3 | Status: SHIPPED | OUTPATIENT
Start: 2018-09-27 | End: 2019-10-04 | Stop reason: SDUPTHER

## 2018-09-27 NOTE — PROGRESS NOTES
(DULERA) 200-5 MCG/ACT inhaler Inhale 2 puffs into the lungs every 12 hours      anastrozole (ARIMIDEX) 1 MG tablet Take 1 mg by mouth daily      Multiple Vitamins-Minerals (MULTIVITAMIN ADULT PO) Take 1 tablet by mouth daily       magnesium oxide (MAG-OX) 400 MG tablet Take 1 tablet by mouth daily 30 tablet 5    albuterol (PROVENTIL HFA;VENTOLIN HFA) 108 (90 BASE) MCG/ACT inhaler Inhale 2 puffs into the lungs every 4 hours as needed for Wheezing      Incontinence Supply Disposable (POISE PANTILINERS) PADS Use pads as needed daily for urine leakage. 1 each 11     No current facility-administered medications for this visit.       Allergies: Pcn [penicillins] and Cefdinir     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    Breast CA (HCC)     Left breast Nodules removed Took radiation    Chronic back pain     Chronic kidney disease     Overactive bladder     COPD [facility] obstructive [facility] disease) [facility]     x few years Scarring on lungs 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    Diabetes mellitus ([address]     On insulin x 10 years    Fibromyalgia     Fibromyalgia     for 20 years    GERD (gastroesophageal reflux disease)     History of blood transfusion     ?when    Hyperlipidemia     High cholesterol    Hypertension     On medications for 2 years    Joint pain 2/2/2016    Liver disease     Has fatty liver    Morbid obesity ([address]     Multiple food allergies     Neuropathic pain     Neuropathy     Neuropathy involving both lower extremities 2/2/2016    Obstructive sleep apnea     AHI: 68.1 per PSG, 5/2018    Osteoarthritis     Other disorders of kidney and ureter in diseases classified elsewhere     Pneumonia     Postmenopausal osteoporosis     Psychiatric problem     Restless leg syndrome     S/P lumpectomy, left breast 8/19/2015 8/4/2015    Sleep apnea     Type II or unspecified type diabetes mellitus with neurological manifestations, uncontrolled(250.62)      Past Surgical History:   Procedure Laterality Date    BREAST SURGERY Left 8/4/2015    left partial mastectomy on 8/4/15    EYE SURGERY      EYE SURGERY Bilateral 04/02/2017    Dr. Dario Duggan      broke L ankle due to osteoporosis, has hardware in    FRACTURE SURGERY      Left ankle     HYSTERECTOMY      SC DRAIN SKIN ABSCESS COMPLIC N/A 2/12/5081    ABDOMINAL WALL ABSCESS INCISION AND DRAINAGE performed by Marlee Holman MD at [address] ENDOSCOPY  4/8/2016    Dr Nichole Beasley, (-) H Pylori    WRIST SURGERY       Family History   Problem Relation Age of Onset    High Blood Pressure Father     Heart Disease Father     Diabetes Father     High Blood Pressure Sister     Diabetes Sister     Cancer Sister     High Blood Pressure Brother     Colon Cancer Neg Hx     Colon Polyps Neg Hx     Esophageal Cancer Neg Hx     Liver Cancer Neg Hx     Liver Disease Neg Hx     Rectal Cancer Neg Hx     Stomach Cancer Neg Hx      Social History   Substance Use Topics    Smoking status: Never Smoker    Smokeless tobacco: Never Used    Alcohol use 1.2 oz/week     1 Glasses of wine, 1 Cans of beer per week      Comment: occ       Exam  She does have a erythematous area to the left upper posterior thigh it is open. There is some associated induration. The area was probed with a Q-tip. And the wound was clean. Assessment:  Left posterior thigh abscess status post drainage    Plan:  I see no need for further surgical intervention at this time. We will continue her on antibiotics and plan to see her in the office next week.

## 2018-09-28 NOTE — PROGRESS NOTES
(MYCOSTATIN) 213594 UNIT/GM powder Apply 3 times daily. (Patient taking differently: Apply topically 3 times daily Apply 3 times daily. ) 1 Bottle 0    metoprolol succinate (TOPROL XL) 50 MG extended release tablet TAKE 1 TABLET DAILY FOR BLOOD PRESSURE AND HEART PROTECTION. 30 tablet 11    Misc. Devices (ROLLER WALKER) MISC 1 each by Does not apply route daily Pt states that she falls at least once a month up to 3 times a month. 1 each 0    oxybutynin (DITROPAN-XL) 5 MG extended release tablet TAKE 1 TABLET DAILY 30 tablet 2    vitamin D (ERGOCALCIFEROL) 84883 units CAPS capsule TAKE 1 CAPSULE BY MOUTH ONCE A WEEK (Patient taking differently: TAKE 1 CAPSULE BY MOUTH ONCE A WEEK on Saturday) 12 capsule 3    JANUMET  MG per tablet TAKE 1 TABLET TWICE DAILY WITH FOOD 60 tablet 5    montelukast (SINGULAIR) 10 MG tablet TAKE 1 TABLET BY MOUTH AT NIGHT 90 tablet 5    citalopram (CELEXA) 20 MG tablet TAKE 1 TABLET DAILY 30 tablet 11    loratadine (CLARITIN) 10 MG tablet TAKE 1 TABLET DAILY 30 tablet 11    losartan (COZAAR) 100 MG tablet TAKE (1) TABLET DAILY FOR BLOOD PRESSURE. 30 tablet 0    INSULIN SYRINGE 1CC/29G 29G X 1/2\" 1 ML MISC Use with each dose of novolog TID 90 each 1    Insulin Pen Needle 32G X 6 MM MISC As directed 50 each 5    omeprazole (PRILOSEC) 20 MG delayed release capsule TAKE (1) CAPSULE BY MOUTH TWICE DAILY AS NEEDED.  (Patient taking differently: Take 20 mg by mouth Daily Takes 20mg daily, but may take a second dose later if needed.) 60 capsule 11    empagliflozin (JARDIANCE) 10 MG tablet Take 1 tablet by mouth daily Samples given to pt 3 bottles [id number] 7/2019 1 bottle [id number] 3/2019 30 tablet 3    mometasone-formoterol (DULERA) 200-5 MCG/ACT inhaler Inhale 2 puffs into the lungs every 12 hours      anastrozole (ARIMIDEX) 1 MG tablet Take 1 mg by mouth daily      Multiple Vitamins-Minerals (MULTIVITAMIN ADULT PO) Take 1 tablet by mouth daily       magnesium oxide (MAG-OX) 400

## 2018-10-02 ENCOUNTER — CARE COORDINATION (OUTPATIENT)
Dept: CARE COORDINATION | Age: 73
End: 2018-10-02

## 2018-10-02 DIAGNOSIS — K21.9 GASTROESOPHAGEAL REFLUX DISEASE, ESOPHAGITIS PRESENCE NOT SPECIFIED: ICD-10-CM

## 2018-10-02 DIAGNOSIS — E78.5 HYPERLIPIDEMIA, UNSPECIFIED HYPERLIPIDEMIA TYPE: ICD-10-CM

## 2018-10-02 RX ORDER — ROSUVASTATIN CALCIUM 10 MG/1
TABLET, COATED ORAL
Qty: 30 TABLET | Refills: 0 | Status: SHIPPED | OUTPATIENT
Start: 2018-10-02 | End: 2019-04-15

## 2018-10-02 RX ORDER — OMEPRAZOLE 20 MG/1
CAPSULE, DELAYED RELEASE ORAL
Qty: 60 CAPSULE | Refills: 0 | Status: SHIPPED | OUTPATIENT
Start: 2018-10-02 | End: 2018-11-06 | Stop reason: SDUPTHER

## 2018-10-05 ENCOUNTER — OFFICE VISIT (OUTPATIENT)
Dept: NEUROSURGERY | Age: 73
End: 2018-10-05
Payer: MEDICARE

## 2018-10-05 VITALS
DIASTOLIC BLOOD PRESSURE: 69 MMHG | WEIGHT: 252 LBS | HEIGHT: 62 IN | BODY MASS INDEX: 46.38 KG/M2 | SYSTOLIC BLOOD PRESSURE: 139 MMHG | HEART RATE: 84 BPM

## 2018-10-05 DIAGNOSIS — Z99.89 BIPAP (BIPHASIC POSITIVE AIRWAY PRESSURE) DEPENDENCE: ICD-10-CM

## 2018-10-05 DIAGNOSIS — G47.33 OBSTRUCTIVE SLEEP APNEA: Primary | ICD-10-CM

## 2018-10-05 PROCEDURE — 99213 OFFICE O/P EST LOW 20 MIN: CPT | Performed by: NURSE PRACTITIONER

## 2018-10-05 NOTE — PROGRESS NOTES
involving both lower extremities 2/2/2016    Obstructive sleep apnea     AHI: 68.1 per PSG, 5/2018    Osteoarthritis     Other disorders of kidney and ureter in diseases classified elsewhere     Pneumonia     Postmenopausal osteoporosis     Psychiatric problem     Restless leg syndrome     S/P lumpectomy, left breast 8/19/2015 8/4/2015    Sleep apnea     Type II or unspecified type diabetes mellitus with neurological manifestations, 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 Kirti Spann      broke L ankle due to osteoporosis, has hardware in    FRACTURE SURGERY      Left ankle     HYSTERECTOMY      NM DRAIN SKIN ABSCESS COMPLIC N/A 2/18/7464    ABDOMINAL WALL ABSCESS INCISION AND DRAINAGE performed by Cora Tinsley MD at [address] ENDOSCOPY  4/8/2016    Dr Osiel Coulter-Gastritis, (-) H Pylori    WRIST SURGERY         Recent Hospitalizations  ·     Significant Injuries  ·     Family History   Problem Relation Age of Onset    High Blood Pressure Father     Heart Disease Father     Diabetes Father     High Blood Pressure Sister     Diabetes Sister     Cancer Sister     High Blood Pressure Brother     Colon Cancer Neg Hx     Colon Polyps Neg Hx     Esophageal Cancer Neg Hx     Liver Cancer Neg Hx     Liver Disease Neg Hx     Rectal Cancer Neg Hx     Stomach Cancer Neg Hx        Social History  History   Smoking Status    Never Smoker   Smokeless Tobacco    Never Used     History   Alcohol Use    1.2 oz/week    1 Glasses of wine, 1 Cans of beer per week     Comment: occ     History   Drug Use No         Current Outpatient Prescriptions   Medication Sig Dispense Refill    omeprazole (PRILOSEC) 20 MG delayed release capsule TAKE (1) CAPSULE BY MOUTH TWICE DAILY AS omeprazole (PRILOSEC) 20 MG delayed release capsule TAKE (1) CAPSULE BY MOUTH TWICE DAILY AS NEEDED. (Patient taking differently: Take 20 mg by mouth Daily Takes 20mg daily, but may take a second dose later if needed.) 60 capsule 11    empagliflozin (JARDIANCE) 10 MG tablet Take 1 tablet by mouth daily Samples given to pt 3 bottles [id number] 7/2019 1 bottle [id number] 3/2019 30 tablet 3    mometasone-formoterol (DULERA) 200-5 MCG/ACT inhaler Inhale 2 puffs into the lungs every 12 hours      anastrozole (ARIMIDEX) 1 MG tablet Take 1 mg by mouth daily      Multiple Vitamins-Minerals (MULTIVITAMIN ADULT PO) Take 1 tablet by mouth daily       magnesium oxide (MAG-OX) 400 MG tablet Take 1 tablet by mouth daily 30 tablet 5    albuterol (PROVENTIL HFA;VENTOLIN HFA) 108 (90 BASE) MCG/ACT inhaler Inhale 2 puffs into the lungs every 4 hours as needed for Wheezing      Incontinence Supply Disposable (POISE PANTILINERS) PADS Use pads as needed daily for urine leakage. 1 each 11     No current facility-administered medications for this visit.         Allergies:  Pcn [penicillins] and Cefdinir    Review of Systems:   Constitutional: []Fever [x]Sweat []Chills [x] Recent Injury [x] Denies all unless marked  HEENT:[]Headache  [] Head Injury/Hearing Loss  [x] Sore Throat  [] Ear Ache/Dizziness  [x] Denies all unless marked  Spine:  [x] Neck pain  [x] Back pain  [x] Sciaticia  [x] Denies all unless marked  Cardiovascular:[]Heart Disease []Chest Pain [x] Palpitations  [x] Denies all unless marked  Pulmonary: [x]Shortness of Breath [x]Cough   [x] Denies all unless marke  Gastrointestinal: []Nausea  []Vomiting  []Abdominal Pain  []Constipation  [x]Diarrhea  []Dark Bloody Stools  [x] Denies all unless marked  Psychiatric/Behavioral:[x] Depression [x] Anxiety [x] Denies all unless marked  Genitourinary:   [x] Frequency  [x] Urgency  [x] Incontinence [] Pain with Urination  [x] Denies all unless marked  Extremities: [x]Pain  [x]Swelling  [x] Denies all unless marked  Musculoskeletal: [x] Muscle Pain  [x] Joint Pain  [x] Arthritis [x] Muscle Cramps [] Muscle Twitches  [x] Denies all unless marked  Sleep: [x] Insomnia [x] Snoring [x] Restless Legs [x] Sleep Apnea  [x] Daytime Sleepiness  [x] Denies all unless marked  Skin:[x] Rash [] Skin Discoloration [x] Denies all unless marked   Neurological: [x]Visual Disturbance/Memory Loss [] Loss of Balance [] Slurred Speech/Weakness [] Seizures  [] Vertigo/Dizziness [x] Denies all unless marked    The MA has completed the ROS with the patient. I have reviewed it in its' entirety with the patient and agree with the documentation. Examination:  Vitals:  /69   Pulse 84   Ht 5' 2\" (1.575 m)   Wt 252 lb (114.3 kg)   BMI 46.09 kg/m²   General appearance:  alert and cooperative with exam  HEENT:  PERRLA, EOMI, Sclera clear, anicteric, Oropharynx clear, no lesions, Neck supple with midline trachea and Trachea midline  Heart[de-identified]  regular rate and rhythm  Lungs:  clear to auscultation bilaterally  Extremities:  extremities normal, atraumatic, no cyanosis or edema  Neurologic:  Extraocular movements are intact without nystagmus. Visual fields are full to confrontation. Facial movements are symmetrical and normal.  Speech is precise. Extremity strength is normal in both uppers and lowers. Deep tendon reflexes are intact and symmetrical.  Rapid alternating movements are unimpaired. Finger-to-nose testing is performed well, without dysmetria.   Gait is normal.    [place] Sleepiness Scale     0 = would never doze  1 = slight chance of dozing  2 = moderate chance of dozing  3 = high chance of dozing    Situation Chance of Dozing (0-3)  Sitting and reading                                                                              2     Watching TV                                                                                       1     Sitting, inactive in a public place (e.g. a theatre or a drowsy and the use of respiratory suppressants. 2.  The following educational material has been included in this visit after visit summary for your review: DARSHAN/PAP guidelines-Discussed with the patient and all questions fully answered. 3.  The current medical regimen is effective;  continue present plan. Follow up in 3 months, sooner with any worsening. ANTONIA Dacosta       Note:  A total of >50% (>8 minutes) of 15 minutes was spent discussing the pathophysiology and treatment and/or coordination of care of the above diagnoses.

## 2018-10-05 NOTE — PROGRESS NOTES
circumstances permit       3     Sitting and talking to someone                                                           0     Sitting quietly after a lunch without alcohol                                        3     In a car, while stopped for a few minutes in the traffic                       0     Total                                                                                                    11

## 2018-10-10 RX ORDER — INSULIN DEGLUDEC 200 U/ML
INJECTION, SOLUTION SUBCUTANEOUS
Qty: 18 ML | Refills: 0 | Status: SHIPPED | OUTPATIENT
Start: 2018-10-10 | End: 2018-11-13 | Stop reason: SDUPTHER

## 2018-10-11 ENCOUNTER — OFFICE VISIT (OUTPATIENT)
Dept: PRIMARY CARE CLINIC | Age: 73
End: 2018-10-11
Payer: MEDICARE

## 2018-10-11 VITALS
SYSTOLIC BLOOD PRESSURE: 138 MMHG | DIASTOLIC BLOOD PRESSURE: 76 MMHG | TEMPERATURE: 98.4 F | HEIGHT: 62 IN | BODY MASS INDEX: 47.11 KG/M2 | WEIGHT: 256 LBS | HEART RATE: 80 BPM | OXYGEN SATURATION: 95 %

## 2018-10-11 DIAGNOSIS — H61.23 BILATERAL IMPACTED CERUMEN: ICD-10-CM
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
## 2018-10-11 PROCEDURE — 99213 OFFICE O/P EST LOW 20 MIN: CPT | Performed by: NURSE PRACTITIONER

## 2018-10-11 PROCEDURE — 69209 REMOVE IMPACTED EAR WAX UNI: CPT | Performed by: NURSE PRACTITIONER

## 2018-10-11 RX ORDER — TRIAMCINOLONE ACETONIDE 0.25 MG/G
OINTMENT TOPICAL
Qty: 1 TUBE | Refills: 1 | Status: SHIPPED | OUTPATIENT
Start: 2018-10-11 | End: 2018-10-18

## 2018-10-11 NOTE — PROGRESS NOTES
both lower extremities 2/2/2016    Obstructive sleep apnea     AHI: 68.1 per PSG, 5/2018    Osteoarthritis     Other disorders of kidney and ureter in diseases classified elsewhere     Pneumonia     Postmenopausal osteoporosis     Psychiatric problem     Restless leg syndrome     S/P lumpectomy, left breast 8/19/2015 8/4/2015    Sleep apnea     Type II or unspecified type diabetes mellitus with neurological manifestations, uncontrolled(250.62)         Past Surgical History:   Procedure Laterality Date    BREAST SURGERY Left 8/4/2015    left partial mastectomy on 8/4/15    EYE SURGERY      EYE SURGERY Bilateral 04/02/2017    Dr. Jaelyn Villatoro      broke L ankle due to osteoporosis, has hardware in    FRACTURE SURGERY      Left ankle     HYSTERECTOMY      AL DRAIN SKIN ABSCESS COMPLIC N/A 8/54/0382    ABDOMINAL WALL ABSCESS INCISION AND DRAINAGE performed by Cara Chairez MD at [address]  4/8/2016    Dr Jaiver Morton, (-) H Pylori    WRIST SURGERY         Social History   Substance Use Topics    Smoking status: Never Smoker    Smokeless tobacco: Never Used    Alcohol use 1.2 oz/week     1 Glasses of wine, 1 Cans of beer per week      Comment: occ        Current Outpatient Prescriptions   Medication Sig Dispense Refill    triamcinolone (KENALOG) 0.025 % ointment Apply topically 2 times daily. 1 Tube 1    TRESIBA FLEXTOUCH 200 UNIT/ML SOPN INJECT 100 UNITS INTO THE SKIN NIGHTLY 18 mL 0    rosuvastatin (CRESTOR) 10 MG tablet Take one tablet nightly 30 tablet 0    aspirin 81 MG chewable tablet Take 1 tablet by mouth daily 30 tablet 3    HYDROcodone-acetaminophen (NORCO)  MG per tablet Take 1 tablet by mouth Daily with lunch. Nazanin Palomo Insulin Pen Needle 31G X 5 MM MISC As directed 100 each 1    gabapentin (NEURONTIN) 300 MG capsule Take 300 mg by mouth Daily. .      insulin aspart (NOVOLOG FLEXPEN) 100 UNIT/ML

## 2018-10-12 DIAGNOSIS — G57.93 NEUROPATHY INVOLVING BOTH LOWER EXTREMITIES: Chronic | ICD-10-CM

## 2018-10-12 RX ORDER — GABAPENTIN 300 MG/1
CAPSULE ORAL
Qty: 90 CAPSULE | Refills: 0 | OUTPATIENT
Start: 2018-10-12 | End: 2018-11-06 | Stop reason: SDUPTHER

## 2018-10-17 NOTE — CARE COORDINATION
Ambulatory Care Coordination Note  10/02/2018  CM Risk Score: 3  Haylie Mortality Risk Score: 5.81    ACC: Romeo Bass, RN    Summary Note: ACC spoke with both the pt and her daughter. The pt daughter is worried that the pt has a flesh eating disease caused from the [place]. (daughter read this information on the Internet) Pt believes they are just bug bites, assisted pt in making an appointment to be seen by provider. Care Coordination Interventions    Program Enrollment:  Rising Risk  Referral from Primary Care Provider:  No  Suggested Interventions and Community Resources  Medication Assistance Program:  Completed (Comment: Pt has received Novolog, Levemir, Jardiance and MEEP & [employer] )  Pharmacist:  Declined  Zone Management Tools: In Process (Comment: 9/5/18-PT UNABLE TO GIVE ANY BLOOD SUGAR READINGS AT THIS TIME )  Other Services or Interventions:  9/12/18 Wound Abcess          Goals Addressed     None          Prior to Admission medications    Medication Sig Start Date End Date Taking? Authorizing Provider   gabapentin (NEURONTIN) 300 MG capsule TAKE 1 CAPSULE THREE TIMES DAILY. 10/12/18 11/12/18  Roger Rojas MD   triamcinolone (KENALOG) 0.025 % ointment Apply topically 2 times daily. 10/11/18 10/18/18  ANTONIA Johnson   TRESIBA FLEXTOUCH 200 UNIT/ML SOPN INJECT 100 UNITS INTO THE SKIN NIGHTLY 10/10/18   ANTONIA Comer   omeprazole (PRILOSEC) 20 MG delayed release capsule TAKE (1) CAPSULE BY MOUTH TWICE DAILY AS NEEDED. 10/2/18   Roger Rojas MD   rosuvastatin (CRESTOR) 10 MG tablet Take one tablet nightly 10/2/18   ANTONIA Campos   aspirin 81 MG chewable tablet Take 1 tablet by mouth daily 9/5/18   ANTONIA Comer   HYDROcodone-acetaminophen (NORCO)  MG per tablet Take 1 tablet by mouth Daily with lunch. .    Historical Provider, MD   Insulin Pen Needle 31G X 5 MM MISC As directed 8/9/18   Roger Rojas MD   gabapentin (NEURONTIN) 300

## 2018-10-29 ENCOUNTER — CARE COORDINATION (OUTPATIENT)
Dept: CARE COORDINATION | Age: 73
End: 2018-10-29

## 2018-10-29 NOTE — CARE COORDINATION
them.   Eveline Adrienne your nose and mouth with a tissue when you cough or sneeze. Throw the tissue in the trash after you use it. [facility] your hands often with soap and water. If soap and water are not available, use an alcohol-based hand rub. Avoid touching your eyes, nose and mouth. Germs spread this way. Clean and disinfect surfaces and objects that may be contaminated with germs like the flu. If an outbreak of flu or another illness occurs, follow public health advice. This may include information about how to increase distance between people and other measures.      For more information, visit:    cdc.gov/flu   or call 8-535-DJS-INFO      If you have any questions or have flu-like symptoms please call your Care Coordinator, Patricia Cevallos, [phone].

## 2018-11-05 DIAGNOSIS — I10 ESSENTIAL HYPERTENSION: ICD-10-CM

## 2018-11-05 DIAGNOSIS — G62.9 NEUROPATHY: Primary | ICD-10-CM

## 2018-11-05 RX ORDER — LOSARTAN POTASSIUM 100 MG/1
TABLET ORAL
Qty: 30 TABLET | Refills: 0 | Status: SHIPPED | OUTPATIENT
Start: 2018-11-05 | End: 2018-12-05 | Stop reason: SDUPTHER

## 2018-11-06 RX ORDER — OMEPRAZOLE 20 MG/1
CAPSULE, DELAYED RELEASE ORAL
Qty: 60 CAPSULE | Refills: 11 | Status: SHIPPED | OUTPATIENT
Start: 2018-11-06 | End: 2019-10-30 | Stop reason: SDUPTHER

## 2018-11-06 RX ORDER — GABAPENTIN 300 MG/1
CAPSULE ORAL
Qty: 90 CAPSULE | Refills: 5 | Status: SHIPPED | OUTPATIENT
Start: 2018-11-06 | End: 2019-05-03 | Stop reason: SDUPTHER

## 2018-11-13 ENCOUNTER — CARE COORDINATION (OUTPATIENT)
Dept: PRIMARY CARE CLINIC | Age: 73
End: 2018-11-13

## 2018-11-13 ENCOUNTER — OFFICE VISIT (OUTPATIENT)
Dept: PRIMARY CARE CLINIC | Age: 73
End: 2018-11-13
Payer: MEDICARE

## 2018-11-13 VITALS
HEART RATE: 80 BPM | SYSTOLIC BLOOD PRESSURE: 134 MMHG | OXYGEN SATURATION: 95 % | HEIGHT: 62 IN | DIASTOLIC BLOOD PRESSURE: 74 MMHG | WEIGHT: 256.2 LBS | TEMPERATURE: 98.3 F | BODY MASS INDEX: 47.15 KG/M2 | RESPIRATION RATE: 14 BRPM

## 2018-11-13 DIAGNOSIS — Z79.4 TYPE 2 DIABETES MELLITUS WITH COMPLICATION, WITH LONG-TERM CURRENT USE OF INSULIN (HCC): ICD-10-CM

## 2018-11-13 DIAGNOSIS — E11.8 TYPE 2 DIABETES MELLITUS WITH COMPLICATION, WITH LONG-TERM CURRENT USE OF INSULIN (HCC): ICD-10-CM

## 2018-11-13 DIAGNOSIS — R23.8 SKIN IRRITATION: ICD-10-CM

## 2018-11-13 DIAGNOSIS — L60.9 NAIL ABNORMALITIES: ICD-10-CM

## 2018-11-13 DIAGNOSIS — R30.0 DYSURIA: Primary | ICD-10-CM

## 2018-11-13 LAB
APPEARANCE FLUID: ABNORMAL
BILIRUBIN, POC: ABNORMAL
BLOOD URINE, POC: ABNORMAL
CLARITY, POC: CLEAR
COLOR, POC: YELLOW
GLUCOSE URINE, POC: 500
HBA1C MFR BLD: 9.4 %
KETONES, POC: ABNORMAL
LEUKOCYTE EST, POC: ABNORMAL
NITRITE, POC: ABNORMAL
PH, POC: 6.5
PROTEIN, POC: ABNORMAL
SPECIFIC GRAVITY, POC: 1.01
UROBILINOGEN, POC: 0.2

## 2018-11-13 PROCEDURE — 81002 URINALYSIS NONAUTO W/O SCOPE: CPT | Performed by: NURSE PRACTITIONER

## 2018-11-13 PROCEDURE — 83036 HEMOGLOBIN GLYCOSYLATED A1C: CPT | Performed by: NURSE PRACTITIONER

## 2018-11-13 PROCEDURE — 99214 OFFICE O/P EST MOD 30 MIN: CPT | Performed by: NURSE PRACTITIONER

## 2018-11-13 RX ORDER — INSULIN DEGLUDEC 200 U/ML
INJECTION, SOLUTION SUBCUTANEOUS
Qty: 18 ML | Refills: 0 | Status: SHIPPED | OUTPATIENT
Start: 2018-11-13 | End: 2018-12-11 | Stop reason: SDUPTHER

## 2018-11-13 ASSESSMENT — ENCOUNTER SYMPTOMS
GASTROINTESTINAL NEGATIVE: 1
RESPIRATORY NEGATIVE: 1
EYES NEGATIVE: 1

## 2018-11-13 NOTE — PROGRESS NOTES
Plan:     We'll continue current diabetic medication due to A1c decreasing from 11-9.4%. UA in office was negative except for glucose. I am sending off for Diatherix to monitor for other bacteria released. We'll treat once this has been returned. Bactroban ointment sent to pharmacy due to skin irritation. Referral to podiatry for abnormal nails. Return in about 3 months (around 2/13/2019) for Diabetic Follow up, POCT A1c. Orders Placed This Encounter   Procedures    External Referral To Podiatry     Referral Priority:   Routine     Referral Type:   Eval and Treat     Referral Reason:   Specialty Services Required     Requested Specialty:   Podiatry     Number of Visits Requested:   1    POCT Urinalysis no Micro    POCT glycosylated hemoglobin (Hb A1C)       Orders Placed This Encounter   Medications    mupirocin (BACTROBAN) 2 % ointment     Sig: Apply 3 times daily. Dispense:  22 g     Refill:  1        Patient giveneducational materials - see patient instructions. Discussed use, benefit, and side effects of prescribed medications. All patient questions answered. Pt voiced understanding. Reviewed health maintenance. Instructed tocontinue current medications, diet and exercise. Patient agreed with treatment plan. Follow up as directed.            Electronically signed by ANTONIA Birch on 11/13/2018 at 4:57 PM

## 2018-11-13 NOTE — CARE COORDINATION
(CRESTOR) 10 MG tablet Take one tablet nightly 10/2/18   Esthergracielanas Bijan ANTONIA Navarro   aspirin 81 MG chewable tablet Take 1 tablet by mouth daily 9/5/18   ANTONIA Bender   HYDROcodone-acetaminophen (NORCO)  MG per tablet Take 1 tablet by mouth Daily with lunch. .    Historical Provider, MD   Insulin Pen Needle 31G X 5 MM MISC As directed 8/9/18   Mariposa Orosco MD   insulin aspart (NOVOLOG FLEXPEN) 100 UNIT/ML injection pen Inject 12 Units into the skin 2 times daily Takes with breakfast and at [address]  Provider, MD   nystatin (MYCOSTATIN) 643621 UNIT/GM powder Apply 3 times daily. Patient taking differently: Apply topically 3 times daily Apply 3 times daily. 7/17/18   ANTONIA Bender   metoprolol succinate (TOPROL XL) 50 MG extended release tablet TAKE 1 TABLET DAILY FOR BLOOD PRESSURE AND HEART PROTECTION. 7/2/18   Mariposa Orosco MD   Misc. Devices (ROLLER WALKER) MISC 1 each by Does not apply route daily Pt states that she falls at least once a month up to 3 times a month.  4/10/18   ANTONIA Bender   oxybutynin (DITROPAN-XL) 5 MG extended release tablet TAKE 1 TABLET DAILY 4/10/18   ANTONIA Bender   vitamin D (ERGOCALCIFEROL) 85928 units CAPS capsule TAKE 1 CAPSULE BY MOUTH ONCE A WEEK  Patient taking differently: TAKE 1 CAPSULE BY MOUTH ONCE A WEEK on Saturday 3/30/18   Mariposa Orosco MD   JANUMET  MG per tablet TAKE 1 TABLET TWICE DAILY WITH FOOD 3/20/18   Mariposa Orosco MD   montelukast (SINGULAIR) 10 MG tablet TAKE 1 TABLET BY MOUTH AT NIGHT 3/19/18   Mariposa Orosco MD   citalopram (CELEXA) 20 MG tablet TAKE 1 TABLET DAILY 3/6/18   Mariposa Orosco MD   loratadine (CLARITIN) 10 MG tablet TAKE 1 TABLET DAILY 3/6/18   ANTONIA Israel   INSULIN SYRINGE 1CC/29G 29G X 1/2\" 1 ML MISC Use with each dose of novolog TID 9/14/17   Mariposa Orosco MD   Insulin Pen Needle 32G X 6 MM MISC As directed 6/28/17

## 2018-11-15 RX ORDER — METRONIDAZOLE 500 MG/1
500 TABLET ORAL 2 TIMES DAILY
Qty: 14 TABLET | Refills: 0 | Status: SHIPPED | OUTPATIENT
Start: 2018-11-15 | End: 2018-11-22

## 2018-11-15 RX ORDER — AZITHROMYCIN 500 MG/1
1000 TABLET, FILM COATED ORAL ONCE
Qty: 2 TABLET | Refills: 0 | Status: SHIPPED | OUTPATIENT
Start: 2018-11-15 | End: 2018-11-15

## 2018-11-16 ENCOUNTER — TELEPHONE (OUTPATIENT)
Dept: PRIMARY CARE CLINIC | Age: 73
End: 2018-11-16

## 2018-11-29 ENCOUNTER — TELEPHONE (OUTPATIENT)
Dept: PRIMARY CARE CLINIC | Age: 73
End: 2018-11-29

## 2018-11-29 NOTE — TELEPHONE ENCOUNTER
Pt called stated that she needs orders from Dr David Cherry stating that is ok for a replacement of a new hospital bed mattress fax to [phone] any questions call [phone]

## 2018-12-05 DIAGNOSIS — I10 ESSENTIAL HYPERTENSION: ICD-10-CM

## 2018-12-05 RX ORDER — LOSARTAN POTASSIUM 100 MG/1
TABLET ORAL
Qty: 30 TABLET | Refills: 0 | Status: SHIPPED | OUTPATIENT
Start: 2018-12-05 | End: 2019-01-09 | Stop reason: SDUPTHER

## 2018-12-11 RX ORDER — INSULIN DEGLUDEC 200 U/ML
INJECTION, SOLUTION SUBCUTANEOUS
Qty: 18 ML | Refills: 0 | Status: SHIPPED | OUTPATIENT
Start: 2018-12-11 | End: 2019-01-09 | Stop reason: SDUPTHER

## 2018-12-17 ENCOUNTER — TELEPHONE (OUTPATIENT)
Dept: PRIMARY CARE CLINIC | Age: 73
End: 2018-12-17

## 2018-12-17 ENCOUNTER — DOCUMENTATION (OUTPATIENT)
Dept: PHYSICAL THERAPY | Facility: HOSPITAL | Age: 73
End: 2018-12-17

## 2018-12-17 DIAGNOSIS — I89.0 LYMPHEDEMA OF LEFT ARM: Primary | ICD-10-CM

## 2018-12-26 ENCOUNTER — CARE COORDINATION (OUTPATIENT)
Dept: CARE COORDINATION | Age: 73
End: 2018-12-26

## 2018-12-26 NOTE — CARE COORDINATION
Loulou Dyson MD   Misc. Devices (ROLLER WALKER) MISC 1 each by Does not apply route daily Pt states that she falls at least once a month up to 3 times a month.  4/10/18   ANTONIA Diaz   oxybutynin (DITROPAN-XL) 5 MG extended release tablet TAKE 1 TABLET DAILY 4/10/18   ANTONIA Diaz   vitamin D (ERGOCALCIFEROL) 59485 units CAPS capsule TAKE 1 CAPSULE BY MOUTH ONCE A WEEK  Patient taking differently: TAKE 1 CAPSULE BY MOUTH ONCE A WEEK on Saturday 3/30/18   Loulou Dyson MD   JANUMET  MG per tablet TAKE 1 TABLET TWICE DAILY WITH FOOD 3/20/18   Loulou Dyson MD   montelukast (SINGULAIR) 10 MG tablet TAKE 1 TABLET BY MOUTH AT NIGHT 3/19/18   Loulou Dyson MD   citalopram (CELEXA) 20 MG tablet TAKE 1 TABLET DAILY 3/6/18   Loulou Dyson MD   loratadine (CLARITIN) 10 MG tablet TAKE 1 TABLET DAILY 3/6/18   ANTONIA Alex   INSULIN SYRINGE 1CC/29G 29G X 1/2\" 1 ML MISC Use with each dose of novolog TID 9/14/17   Loulou Dyson MD   Insulin Pen Needle 32G X 6 MM MISC As directed 6/28/17   Loulou Dyson MD   empagliflozin (JARDIANCE) 10 MG tablet Take 1 tablet by mouth daily Samples given to pt 3 bottles [id number] 7/2019 1 bottle [id number] 3/2019 5/16/17   Loulou Dyson MD   mometasone-formoterol [facility]) 200-5 MCG/ACT inhaler Inhale 2 puffs into the lungs every 12 hours    Historical Provider, MD   anastrozole (ARIMIDEX) 1 MG tablet Take 1 mg by mouth daily    Historical Provider, MD   Multiple Vitamins-Minerals (MULTIVITAMIN ADULT PO) Take 1 tablet by mouth daily     Historical Provider, MD   magnesium oxide (MAG-OX) 400 MG tablet Take 1 tablet by mouth daily 2/23/16   ANTONIA Saucedo   albuterol (PROVENTIL HFA;VENTOLIN HFA) 108 (90 BASE) MCG/ACT inhaler Inhale 2 puffs into the lungs every 4 hours as needed for Wheezing    Bowen Storey MD   Incontinence Supply Disposable (POISE PANTILINERS) PADS Use pads as needed daily

## 2018-12-29 ENCOUNTER — TELEPHONE (OUTPATIENT)
Dept: URGENT CARE | Age: 73
End: 2018-12-29

## 2018-12-29 RX ORDER — POLYMYXIN B SULFATE AND TRIMETHOPRIM 1; 10000 MG/ML; [USP'U]/ML
1 SOLUTION OPHTHALMIC EVERY 6 HOURS
Qty: 10 ML | Refills: 0 | Status: SHIPPED | OUTPATIENT
Start: 2018-12-29 | End: 2019-01-08

## 2019-01-04 ENCOUNTER — CARE COORDINATION (OUTPATIENT)
Dept: CARE COORDINATION | Age: 74
End: 2019-01-04

## 2019-01-08 ENCOUNTER — OFFICE VISIT (OUTPATIENT)
Dept: NEUROSURGERY | Age: 74
End: 2019-01-08
Payer: MEDICARE

## 2019-01-08 VITALS
DIASTOLIC BLOOD PRESSURE: 81 MMHG | BODY MASS INDEX: 41.41 KG/M2 | WEIGHT: 225 LBS | HEIGHT: 62 IN | HEART RATE: 77 BPM | SYSTOLIC BLOOD PRESSURE: 136 MMHG

## 2019-01-08 DIAGNOSIS — G25.81 RESTLESS LEG SYNDROME: ICD-10-CM

## 2019-01-08 DIAGNOSIS — G47.33 OSA (OBSTRUCTIVE SLEEP APNEA): Primary | ICD-10-CM

## 2019-01-08 DIAGNOSIS — Z99.89 BIPAP (BIPHASIC POSITIVE AIRWAY PRESSURE) DEPENDENCE: ICD-10-CM

## 2019-01-08 PROCEDURE — 99213 OFFICE O/P EST LOW 20 MIN: CPT | Performed by: NURSE PRACTITIONER

## 2019-01-09 DIAGNOSIS — I10 ESSENTIAL HYPERTENSION: ICD-10-CM

## 2019-01-09 DIAGNOSIS — E78.5 HYPERLIPIDEMIA, UNSPECIFIED HYPERLIPIDEMIA TYPE: ICD-10-CM

## 2019-01-09 RX ORDER — ROSUVASTATIN CALCIUM 10 MG/1
TABLET, COATED ORAL
Qty: 30 TABLET | Refills: 0 | Status: SHIPPED | OUTPATIENT
Start: 2019-01-09 | End: 2019-04-15 | Stop reason: SDUPTHER

## 2019-01-09 RX ORDER — LOSARTAN POTASSIUM 100 MG/1
TABLET ORAL
Qty: 30 TABLET | Refills: 0 | Status: SHIPPED | OUTPATIENT
Start: 2019-01-09 | End: 2019-02-05 | Stop reason: SDUPTHER

## 2019-01-09 RX ORDER — INSULIN DEGLUDEC 200 U/ML
INJECTION, SOLUTION SUBCUTANEOUS
Qty: 18 ML | Refills: 0 | Status: SHIPPED | OUTPATIENT
Start: 2019-01-09 | End: 2019-02-05 | Stop reason: SDUPTHER

## 2019-01-16 ENCOUNTER — TELEPHONE (OUTPATIENT)
Dept: PRIMARY CARE CLINIC | Age: 74
End: 2019-01-16

## 2019-01-19 ENCOUNTER — OFFICE VISIT (OUTPATIENT)
Dept: URGENT CARE | Age: 74
End: 2019-01-19
Payer: MEDICAID

## 2019-01-19 ENCOUNTER — HOSPITAL ENCOUNTER (OUTPATIENT)
Dept: GENERAL RADIOLOGY | Age: 74
Discharge: HOME | End: 2019-01-19
Payer: MEDICARE

## 2019-01-19 VITALS
TEMPERATURE: 98 F | DIASTOLIC BLOOD PRESSURE: 71 MMHG | RESPIRATION RATE: 20 BRPM | OXYGEN SATURATION: 98 % | WEIGHT: 220 LBS | HEIGHT: 62 IN | HEART RATE: 79 BPM | BODY MASS INDEX: 40.48 KG/M2 | SYSTOLIC BLOOD PRESSURE: 126 MMHG

## 2019-01-19 DIAGNOSIS — M25.562 ACUTE PAIN OF LEFT KNEE: ICD-10-CM

## 2019-01-19 DIAGNOSIS — M25.562 ACUTE PAIN OF LEFT KNEE: Primary | ICD-10-CM

## 2019-01-19 DIAGNOSIS — S86.892A AVULSION OF LEFT PATELLAR TENDON, INITIAL ENCOUNTER: ICD-10-CM

## 2019-01-19 PROCEDURE — 73560 X-RAY EXAM OF KNEE 1 OR 2: CPT

## 2019-01-19 PROCEDURE — 99213 OFFICE O/P EST LOW 20 MIN: CPT | Performed by: FAMILY MEDICINE

## 2019-01-19 RX ORDER — NAPROXEN 375 MG/1
375 TABLET ORAL 2 TIMES DAILY WITH MEALS
Qty: 14 TABLET | Refills: 0 | Status: SHIPPED | OUTPATIENT
Start: 2019-01-19 | End: 2019-12-30

## 2019-01-22 ENCOUNTER — NURSE TRIAGE (OUTPATIENT)
Dept: OTHER | Facility: CLINIC | Age: 74
End: 2019-01-22

## 2019-01-23 ENCOUNTER — OFFICE VISIT (OUTPATIENT)
Dept: PRIMARY CARE CLINIC | Age: 74
End: 2019-01-23
Payer: MEDICARE

## 2019-01-23 ENCOUNTER — CARE COORDINATION (OUTPATIENT)
Dept: PRIMARY CARE CLINIC | Age: 74
End: 2019-01-23

## 2019-01-23 VITALS
TEMPERATURE: 97.5 F | BODY MASS INDEX: 46.01 KG/M2 | HEIGHT: 62 IN | SYSTOLIC BLOOD PRESSURE: 130 MMHG | DIASTOLIC BLOOD PRESSURE: 80 MMHG | WEIGHT: 250 LBS | HEART RATE: 68 BPM | OXYGEN SATURATION: 96 %

## 2019-01-23 DIAGNOSIS — S86.892S PATELLAR TENDON AVULSION, LEFT, SEQUELA: Primary | ICD-10-CM

## 2019-01-23 PROCEDURE — 99213 OFFICE O/P EST LOW 20 MIN: CPT | Performed by: NURSE PRACTITIONER

## 2019-01-23 ASSESSMENT — ENCOUNTER SYMPTOMS
GASTROINTESTINAL NEGATIVE: 1
EYES NEGATIVE: 1
RESPIRATORY NEGATIVE: 1

## 2019-01-24 DIAGNOSIS — L60.9 NAIL ABNORMALITIES: Primary | ICD-10-CM

## 2019-02-05 DIAGNOSIS — I10 ESSENTIAL HYPERTENSION: ICD-10-CM

## 2019-02-05 RX ORDER — INSULIN DEGLUDEC 200 U/ML
INJECTION, SOLUTION SUBCUTANEOUS
Qty: 18 ML | Refills: 0 | Status: SHIPPED | OUTPATIENT
Start: 2019-02-05 | End: 2019-03-11 | Stop reason: SDUPTHER

## 2019-02-05 RX ORDER — LOSARTAN POTASSIUM 100 MG/1
TABLET ORAL
Qty: 30 TABLET | Refills: 0 | Status: SHIPPED | OUTPATIENT
Start: 2019-02-05 | End: 2019-03-11 | Stop reason: SDUPTHER

## 2019-02-06 ENCOUNTER — HOSPITAL ENCOUNTER (OUTPATIENT)
Dept: MRI IMAGING | Age: 74
Discharge: HOME | End: 2019-02-06
Payer: MEDICARE

## 2019-02-06 DIAGNOSIS — S86.892S PATELLAR TENDON AVULSION, LEFT, SEQUELA: ICD-10-CM

## 2019-02-06 DIAGNOSIS — C50.412 MALIGNANT NEOPLASM OF UPPER-OUTER QUADRANT OF LEFT FEMALE BREAST, UNSPECIFIED ESTROGEN RECEPTOR STATUS (HCC): Primary | ICD-10-CM

## 2019-02-06 PROCEDURE — 73721 MRI JNT OF LWR EXTRE W/O DYE: CPT

## 2019-02-07 ENCOUNTER — APPOINTMENT (OUTPATIENT)
Dept: LAB | Facility: HOSPITAL | Age: 74
End: 2019-02-07

## 2019-02-08 ENCOUNTER — TELEPHONE (OUTPATIENT)
Dept: PRIMARY CARE CLINIC | Age: 74
End: 2019-02-08

## 2019-02-08 DIAGNOSIS — R93.89 ABNORMAL MRI: Primary | ICD-10-CM

## 2019-02-11 ENCOUNTER — NURSE TRIAGE (OUTPATIENT)
Dept: OTHER | Facility: CLINIC | Age: 74
End: 2019-02-11

## 2019-02-14 ENCOUNTER — OFFICE VISIT (OUTPATIENT)
Dept: PRIMARY CARE CLINIC | Age: 74
End: 2019-02-14
Payer: MEDICARE

## 2019-02-14 VITALS
TEMPERATURE: 98.4 F | HEART RATE: 88 BPM | WEIGHT: 253.8 LBS | HEIGHT: 62 IN | DIASTOLIC BLOOD PRESSURE: 66 MMHG | SYSTOLIC BLOOD PRESSURE: 124 MMHG | BODY MASS INDEX: 46.7 KG/M2 | OXYGEN SATURATION: 98 %

## 2019-02-14 DIAGNOSIS — L30.4 INTERTRIGO: ICD-10-CM

## 2019-02-14 DIAGNOSIS — M25.562 ACUTE PAIN OF LEFT KNEE: Primary | ICD-10-CM

## 2019-02-14 PROCEDURE — 99213 OFFICE O/P EST LOW 20 MIN: CPT | Performed by: NURSE PRACTITIONER

## 2019-02-14 RX ORDER — NYSTATIN 100000 [USP'U]/G
POWDER TOPICAL
Qty: 1 BOTTLE | Refills: 0 | Status: SHIPPED | OUTPATIENT
Start: 2019-02-14 | End: 2019-06-25

## 2019-02-14 ASSESSMENT — PATIENT HEALTH QUESTIONNAIRE - PHQ9
SUM OF ALL RESPONSES TO PHQ QUESTIONS 1-9: 2
2. FEELING DOWN, DEPRESSED OR HOPELESS: 1
1. LITTLE INTEREST OR PLEASURE IN DOING THINGS: 1
SUM OF ALL RESPONSES TO PHQ QUESTIONS 1-9: 2
SUM OF ALL RESPONSES TO PHQ9 QUESTIONS 1 & 2: 2

## 2019-03-11 DIAGNOSIS — I10 ESSENTIAL HYPERTENSION: ICD-10-CM

## 2019-03-11 DIAGNOSIS — C50.412 MALIGNANT NEOPLASM OF UPPER-OUTER QUADRANT OF LEFT FEMALE BREAST, UNSPECIFIED ESTROGEN RECEPTOR STATUS (HCC): Primary | ICD-10-CM

## 2019-03-11 RX ORDER — CITALOPRAM 20 MG/1
TABLET ORAL
Qty: 30 TABLET | Refills: 0 | Status: SHIPPED | OUTPATIENT
Start: 2019-03-11 | End: 2019-04-05 | Stop reason: SDUPTHER

## 2019-03-11 RX ORDER — LORATADINE 10 MG/1
TABLET ORAL
Qty: 30 TABLET | Refills: 0 | Status: SHIPPED | OUTPATIENT
Start: 2019-03-11 | End: 2019-04-15 | Stop reason: SDUPTHER

## 2019-03-11 RX ORDER — INSULIN DEGLUDEC 200 U/ML
INJECTION, SOLUTION SUBCUTANEOUS
Qty: 18 ML | Refills: 0 | Status: SHIPPED | OUTPATIENT
Start: 2019-03-11 | End: 2019-04-05 | Stop reason: SDUPTHER

## 2019-03-11 RX ORDER — LOSARTAN POTASSIUM 100 MG/1
TABLET ORAL
Qty: 30 TABLET | Refills: 0 | Status: SHIPPED | OUTPATIENT
Start: 2019-03-11 | End: 2019-04-05 | Stop reason: SDUPTHER

## 2019-03-12 ENCOUNTER — APPOINTMENT (OUTPATIENT)
Dept: LAB | Facility: HOSPITAL | Age: 74
End: 2019-03-12

## 2019-03-12 ENCOUNTER — OFFICE VISIT (OUTPATIENT)
Dept: ONCOLOGY | Facility: CLINIC | Age: 74
End: 2019-03-12

## 2019-03-12 VITALS
WEIGHT: 254.6 LBS | BODY MASS INDEX: 46.85 KG/M2 | SYSTOLIC BLOOD PRESSURE: 134 MMHG | DIASTOLIC BLOOD PRESSURE: 74 MMHG | HEIGHT: 62 IN | TEMPERATURE: 98.1 F | OXYGEN SATURATION: 91 % | RESPIRATION RATE: 18 BRPM | HEART RATE: 89 BPM

## 2019-03-12 DIAGNOSIS — C50.412 MALIGNANT NEOPLASM OF UPPER-OUTER QUADRANT OF LEFT FEMALE BREAST, UNSPECIFIED ESTROGEN RECEPTOR STATUS (HCC): ICD-10-CM

## 2019-03-12 DIAGNOSIS — C50.412 MALIGNANT NEOPLASM OF UPPER-OUTER QUADRANT OF LEFT FEMALE BREAST, UNSPECIFIED ESTROGEN RECEPTOR STATUS (HCC): Primary | ICD-10-CM

## 2019-03-12 LAB
ALBUMIN SERPL-MCNC: 4.5 G/DL (ref 3.5–5)
ALBUMIN/GLOB SERPL: 1.6 G/DL (ref 1.1–2.5)
ALP SERPL-CCNC: 100 U/L (ref 24–120)
ALT SERPL W P-5'-P-CCNC: 37 U/L (ref 0–54)
ANION GAP SERPL CALCULATED.3IONS-SCNC: 13 MMOL/L (ref 4–13)
AST SERPL-CCNC: 36 U/L (ref 7–45)
BASOPHILS # BLD AUTO: 0.07 10*3/MM3 (ref 0–0.2)
BASOPHILS NFR BLD AUTO: 0.8 % (ref 0–2)
BILIRUB SERPL-MCNC: 0.6 MG/DL (ref 0.1–1)
BUN BLD-MCNC: 14 MG/DL (ref 5–21)
BUN/CREAT SERPL: 20.3 (ref 7–25)
CALCIUM SPEC-SCNC: 9.3 MG/DL (ref 8.4–10.4)
CHLORIDE SERPL-SCNC: 97 MMOL/L (ref 98–110)
CO2 SERPL-SCNC: 28 MMOL/L (ref 24–31)
CREAT BLD-MCNC: 0.69 MG/DL (ref 0.5–1.4)
DEPRECATED RDW RBC AUTO: 41.2 FL (ref 40–54)
EOSINOPHIL # BLD AUTO: 0.18 10*3/MM3 (ref 0–0.7)
EOSINOPHIL NFR BLD AUTO: 2.1 % (ref 0–4)
ERYTHROCYTE [DISTWIDTH] IN BLOOD BY AUTOMATED COUNT: 14.6 % (ref 12–15)
GFR SERPL CREATININE-BSD FRML MDRD: 84 ML/MIN/1.73
GLOBULIN UR ELPH-MCNC: 2.9 GM/DL
GLUCOSE BLD-MCNC: 228 MG/DL (ref 70–100)
HCT VFR BLD AUTO: 41.8 % (ref 37–47)
HGB BLD-MCNC: 13.6 G/DL (ref 12–16)
HOLD SPECIMEN: NORMAL
IMM GRANULOCYTES # BLD AUTO: 0.05 10*3/MM3 (ref 0–0.05)
IMM GRANULOCYTES NFR BLD AUTO: 0.6 % (ref 0–5)
LYMPHOCYTES # BLD AUTO: 2.54 10*3/MM3 (ref 0.72–4.86)
LYMPHOCYTES NFR BLD AUTO: 29 % (ref 15–45)
MCH RBC QN AUTO: 25.6 PG (ref 28–32)
MCHC RBC AUTO-ENTMCNC: 32.5 G/DL (ref 33–36)
MCV RBC AUTO: 78.6 FL (ref 82–98)
MONOCYTES # BLD AUTO: 0.59 10*3/MM3 (ref 0.19–1.3)
MONOCYTES NFR BLD AUTO: 6.7 % (ref 4–12)
NEUTROPHILS # BLD AUTO: 5.33 10*3/MM3 (ref 1.87–8.4)
NEUTROPHILS NFR BLD AUTO: 60.8 % (ref 39–78)
NRBC BLD AUTO-RTO: 0 /100 WBC (ref 0–0)
PLATELET # BLD AUTO: 332 10*3/MM3 (ref 130–400)
PMV BLD AUTO: 9.8 FL (ref 6–12)
POTASSIUM BLD-SCNC: 4.9 MMOL/L (ref 3.5–5.3)
PROT SERPL-MCNC: 7.4 G/DL (ref 6.3–8.7)
RBC # BLD AUTO: 5.32 10*6/MM3 (ref 4.2–5.4)
SODIUM BLD-SCNC: 138 MMOL/L (ref 135–145)
WBC NRBC COR # BLD: 8.76 10*3/MM3 (ref 4.8–10.8)

## 2019-03-12 PROCEDURE — 99214 OFFICE O/P EST MOD 30 MIN: CPT | Performed by: INTERNAL MEDICINE

## 2019-03-12 PROCEDURE — 85025 COMPLETE CBC W/AUTO DIFF WBC: CPT | Performed by: INTERNAL MEDICINE

## 2019-03-12 PROCEDURE — 80053 COMPREHEN METABOLIC PANEL: CPT | Performed by: INTERNAL MEDICINE

## 2019-03-12 PROCEDURE — 36415 COLL VENOUS BLD VENIPUNCTURE: CPT | Performed by: INTERNAL MEDICINE

## 2019-03-28 ENCOUNTER — TELEPHONE (OUTPATIENT)
Dept: PRIMARY CARE CLINIC | Age: 74
End: 2019-03-28

## 2019-04-03 ENCOUNTER — TELEPHONE (OUTPATIENT)
Dept: PRIMARY CARE CLINIC | Age: 74
End: 2019-04-03

## 2019-04-03 DIAGNOSIS — E11.69 TYPE 2 DIABETES MELLITUS WITH OTHER SPECIFIED COMPLICATION (HCC): ICD-10-CM

## 2019-04-05 DIAGNOSIS — E11.8 TYPE 2 DIABETES MELLITUS WITH COMPLICATION, WITH LONG-TERM CURRENT USE OF INSULIN (HCC): ICD-10-CM

## 2019-04-05 DIAGNOSIS — Z79.4 TYPE 2 DIABETES MELLITUS WITH COMPLICATION, WITH LONG-TERM CURRENT USE OF INSULIN (HCC): ICD-10-CM

## 2019-04-05 RX ORDER — INSULIN DEGLUDEC 200 U/ML
INJECTION, SOLUTION SUBCUTANEOUS
Qty: 18 ML | Refills: 0 | Status: SHIPPED | OUTPATIENT
Start: 2019-04-05 | End: 2019-05-03 | Stop reason: SDUPTHER

## 2019-04-15 ENCOUNTER — OFFICE VISIT (OUTPATIENT)
Dept: PRIMARY CARE CLINIC | Age: 74
End: 2019-04-15
Payer: MEDICARE

## 2019-04-15 VITALS
HEART RATE: 79 BPM | HEIGHT: 62 IN | BODY MASS INDEX: 47.52 KG/M2 | WEIGHT: 258.25 LBS | OXYGEN SATURATION: 97 % | SYSTOLIC BLOOD PRESSURE: 130 MMHG | DIASTOLIC BLOOD PRESSURE: 70 MMHG | TEMPERATURE: 98.1 F

## 2019-04-15 DIAGNOSIS — H61.22 CERUMEN DEBRIS ON TYMPANIC MEMBRANE, LEFT: ICD-10-CM

## 2019-04-15 DIAGNOSIS — H61.21 CERUMEN DEBRIS ON TYMPANIC MEMBRANE OF RIGHT EAR: Primary | ICD-10-CM

## 2019-04-15 DIAGNOSIS — Z91.81 AT HIGH RISK FOR FALLS: ICD-10-CM

## 2019-04-15 PROCEDURE — 69210 REMOVE IMPACTED EAR WAX UNI: CPT | Performed by: NURSE PRACTITIONER

## 2019-04-15 PROCEDURE — 99213 OFFICE O/P EST LOW 20 MIN: CPT | Performed by: NURSE PRACTITIONER

## 2019-04-15 RX ORDER — ROSUVASTATIN CALCIUM 10 MG/1
10 TABLET, COATED ORAL DAILY
Qty: 30 TABLET | Refills: 3 | Status: SHIPPED | OUTPATIENT
Start: 2019-04-15 | End: 2019-08-06 | Stop reason: SDUPTHER

## 2019-04-15 ASSESSMENT — ENCOUNTER SYMPTOMS: RESPIRATORY NEGATIVE: 1

## 2019-04-15 NOTE — PATIENT INSTRUCTIONS
Patient Education        Preventing Falls: Care Instructions  Your Care Instructions    Getting around your home safely can be a challenge if you have injuries or health problems that make it easy for you to fall. Loose rugs and furniture in walkways are among the dangers for many older people who have problems walking or who have poor eyesight. People who have conditions such as arthritis, osteoporosis, or dementia also have to be careful not to fall. You can make your home safer with a few simple measures. Follow-up care is a key part of your treatment and safety. Be sure to make and go to all appointments, and call your doctor if you are having problems. It's also a good idea to know your test results and keep a list of the medicines you take. How can you care for yourself at home? Taking care of yourself  · You may get dizzy if you do not drink enough water. To prevent dehydration, drink plenty of fluids, enough so that your urine is light yellow or clear like water. Choose water and other caffeine-free clear liquids. If you have kidney, heart, or liver disease and have to limit fluids, talk with your doctor before you increase the amount of fluids you drink. · Exercise regularly to improve your strength, muscle tone, and balance. Walk if you can. Swimming may be a good choice if you cannot walk easily. · Have your vision and hearing checked each year or any time you notice a change. If you have trouble seeing and hearing, you might not be able to avoid objects and could lose your balance. · Know the side effects of the medicines you take. Ask your doctor or pharmacist whether the medicines you take can affect your balance. Sleeping pills or sedatives can affect your balance. · Limit the amount of alcohol you drink. Alcohol can impair your balance and other senses. · Ask your doctor whether calluses or corns on your feet need to be removed.  If you wear loose-fitting shoes because of calluses or corns, you can lose your balance and fall. · Talk to your doctor if you have numbness in your feet. Preventing falls at home  · Remove raised doorway thresholds, throw rugs, and clutter. Repair loose carpet or raised areas in the floor. · Move furniture and electrical cords to keep them out of walking paths. · Use nonskid floor wax, and wipe up spills right away, especially on ceramic tile floors. · If you use a walker or cane, put rubber tips on it. If you use crutches, clean the bottoms of them regularly with an abrasive pad, such as steel wool. · Keep your house well lit, especially [employer], and outside walkways. Use night-lights in areas such as hallways and bathrooms. Add extra light switches or use remote switches (such as switches that go on or off when you clap your hands) to make it easier to turn lights on if you have to get up during the night. · Install sturdy handrails on stairways. · Move items in your cabinets so that the things you use a lot are on the lower shelves (about waist level). · Keep a cordless phone and a flashlight with new batteries by your bed. If possible, put a phone in each of the main rooms of your house, or carry a cell phone in case you fall and cannot reach a phone. Or, you can wear a device around your neck or wrist. You push a button that sends a signal for help. · Wear low-heeled shoes that fit well and give your feet good support. Use footwear with nonskid soles. Check the heels and soles of your shoes for wear. Repair or replace worn heels or soles. · Do not wear socks without shoes on wood floors. · Walk on the grass when the sidewalks are slippery. If you live in an area that gets snow and ice in the winter, sprinkle salt on slippery steps and sidewalks. Preventing falls in the bath  · Install grab bars and nonskid mats inside and outside your shower or tub and near the toilet and sinks. · Use shower chairs and bath benches.   · Use a hand-held shower head

## 2019-04-15 NOTE — PROGRESS NOTES
[address] [address]     Phone:  [phone]  Fax:  [phone]      Ada Ferreira is a 72 y.o. female who presents today for hermedical conditions/complaints as noted below. Ada Ferreira is c/o of Ear Fullness      Chief Complaint   Patient presents with    Ear Fullness       HPI:     HPI    Ada Ferreira presents today for ear fullness. She states this has been worsening the last week. She has been using qtips on her ears. She states no pain, drainage, or fevers. She does state she has hearing loss when this happens, however. She is also needing medication refills.      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    Breast CA (HCC)     Left breast Nodules removed Took radiation    Chronic back pain     Chronic kidney disease     Overactive bladder     COPD (chronic obstructive pulmonary disease) (HCC)     x few years Scarring on lungs 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    Diabetes mellitus ([address]     On insulin x 10 years    Fibromyalgia     Fibromyalgia     for 20 years    GERD (gastroesophageal reflux disease)     History of blood transfusion     ?when    Hyperlipidemia     High cholesterol    Hypertension     On medications for 2 years    Joint pain 2/2/2016    Liver disease     Has fatty liver    Morbid obesity ([address]     Multiple food allergies     Neuropathic pain     Neuropathy     Neuropathy involving both lower extremities 2/2/2016    Obstructive sleep apnea     AHI: 68.1 per PSG, 5/2018    Osteoarthritis     Other disorders of kidney and ureter in diseases classified elsewhere     Pneumonia     Postmenopausal osteoporosis     Psychiatric problem     Restless leg syndrome     S/P lumpectomy, left breast 8/19/2015 8/4/2015    Sleep apnea     Type II or unspecified type diabetes mellitus with neurological manifestations, uncontrolled(250.62)         Past Surgical History:   Procedure Laterality Date    BREAST SURGERY Left 8/4/2015    left partial mastectomy on 8/4/15    EYE SURGERY      EYE SURGERY Bilateral 04/02/2017    Dr. Isauro Ring      broke L ankle due to osteoporosis, has hardware in    FRACTURE SURGERY      Left ankle     HYSTERECTOMY      [place] DRAIN SKIN ABSCESS COMPLIC N/A 4/45/9779    ABDOMINAL WALL ABSCESS INCISION AND DRAINAGE performed by Moni Cueva MD at [address] ENDOSCOPY  4/8/2016    Dr Brigitte Fortune, (-) H Pylori    WRIST SURGERY         Social History     Tobacco Use    Smoking status: Never Smoker    Smokeless tobacco: Never Used   Substance Use Topics    Alcohol use: Yes     Alcohol/week: 1.2 oz     Types: 1 Glasses of wine, 1 Cans of beer per week     Comment: occ        Current Outpatient Medications   Medication Sig Dispense Refill    loratadine (CLARITIN) 10 MG tablet TAKE 1 TABLET BY MOUTH ONCE DAILY. 30 tablet 5    sitaGLIPtan-metformin (JANUMET)  MG per tablet Take 1 tablet by mouth 2 times daily (with meals) 60 tablet 5    empagliflozin (JARDIANCE) 10 MG tablet Take 1 tablet by mouth daily 30 tablet 3    rosuvastatin (CRESTOR) 10 MG tablet Take 1 tablet by mouth daily 30 tablet 3    losartan (COZAAR) 100 MG tablet TAKE (1) TABLET DAILY FOR BLOOD PRESSURE.  30 tablet 5    citalopram (CELEXA) 20 MG tablet TAKE 1 TABLET DAILY 30 tablet 5    montelukast (SINGULAIR) 10 MG tablet TAKE 1 TABLET BY MOUTH AT NIGHT 90 tablet 5    insulin aspart (NOVOLOG FLEXPEN) 100 UNIT/ML injection pen INJECT 15 UNITS BEFORE MEALS 3 TIMES A DAY 15 mL 0    TRESIBA FLEXTOUCH 200 UNIT/ML SOPN INJECT 100 UNITS INTO THE SKIN NIGHTLY 18 mL 0    Insulin Pen Needle 32G X 6 MM MISC As directed 50 each 5    nystatin (MYCOSTATIN) 904374 UNIT/GM powder Apply 3 times EMR Dragon/transcription disclaimer: Some of this encounter note is an electronic transcription/translation of spoken language to printed text. The electronic translation of spoken language may permit erroneous, or at times, nonsensical words or phrases to be inadvertently transcribed. Although I have reviewed the note for such errors, some may still exist.      Electronically signed by ANTONIA Saha on 4/15/2019 at 2:11 PM         On the basis of positive falls risk screening, assessment and plan is as follows: home safety tips provided.

## 2019-04-23 ENCOUNTER — TELEPHONE (OUTPATIENT)
Dept: PODIATRY | Facility: CLINIC | Age: 74
End: 2019-04-23

## 2019-04-25 ENCOUNTER — TELEPHONE (OUTPATIENT)
Dept: PODIATRY | Facility: CLINIC | Age: 74
End: 2019-04-25

## 2019-04-26 ENCOUNTER — OFFICE VISIT (OUTPATIENT)
Dept: PODIATRY | Facility: CLINIC | Age: 74
End: 2019-04-26

## 2019-04-26 VITALS
DIASTOLIC BLOOD PRESSURE: 80 MMHG | BODY MASS INDEX: 46.74 KG/M2 | HEIGHT: 62 IN | OXYGEN SATURATION: 93 % | WEIGHT: 254 LBS | HEART RATE: 91 BPM | SYSTOLIC BLOOD PRESSURE: 120 MMHG

## 2019-04-26 DIAGNOSIS — E11.49 DIABETES MELLITUS TYPE 2 WITH NEUROLOGICAL MANIFESTATIONS (HCC): ICD-10-CM

## 2019-04-26 DIAGNOSIS — B35.1 ONYCHOMYCOSIS: Primary | ICD-10-CM

## 2019-04-26 PROCEDURE — 99213 OFFICE O/P EST LOW 20 MIN: CPT | Performed by: PODIATRIST

## 2019-04-26 PROCEDURE — 11721 DEBRIDE NAIL 6 OR MORE: CPT | Performed by: PODIATRIST

## 2019-05-01 ENCOUNTER — OFFICE VISIT (OUTPATIENT)
Dept: OBGYN | Age: 74
End: 2019-05-01
Payer: MEDICARE

## 2019-05-01 VITALS
HEART RATE: 88 BPM | HEIGHT: 62 IN | WEIGHT: 258 LBS | SYSTOLIC BLOOD PRESSURE: 141 MMHG | BODY MASS INDEX: 47.48 KG/M2 | DIASTOLIC BLOOD PRESSURE: 74 MMHG

## 2019-05-01 DIAGNOSIS — N76.0 ACUTE VAGINITIS: ICD-10-CM

## 2019-05-01 DIAGNOSIS — Z76.89 ENCOUNTER TO ESTABLISH CARE WITH NEW DOCTOR: Primary | ICD-10-CM

## 2019-05-01 PROCEDURE — 99203 OFFICE O/P NEW LOW 30 MIN: CPT | Performed by: ADVANCED PRACTICE MIDWIFE

## 2019-05-01 RX ORDER — FLUCONAZOLE 150 MG/1
150 TABLET ORAL
Qty: 2 TABLET | Refills: 3 | Status: SHIPPED | OUTPATIENT
Start: 2019-05-01 | End: 2019-06-24 | Stop reason: SDUPTHER

## 2019-05-01 ASSESSMENT — ENCOUNTER SYMPTOMS
EYES NEGATIVE: 1
ALLERGIC/IMMUNOLOGIC NEGATIVE: 1
GASTROINTESTINAL NEGATIVE: 1
RESPIRATORY NEGATIVE: 1

## 2019-05-01 NOTE — PROGRESS NOTES
[facility]  CNM Office Note    Chandan Brooke is a 72 y.o. female who presents today for her medical conditions/ complaints as noted below. Chief Complaint   Patient presents with    New Patient   [address] [place] [facility]     on Triamcinolone for infection, doesnt really work. Over a month ago, was getting \"scattered white seed-marti like discharge\" Is it because she is diabetic, the medication she takes? It kind of stopped a little over a month ago, but still has this slight vaginal irritation. And had stopped using the cream a month ago. HPI  Amy Kent presents c/o vaginal discharge and irritation/burning x several weeks. She has tried a topical steroid cream and had only minimal improvement. She states \"it has an odor\". She also states she is a diabetic. Patient Active Problem List   Diagnosis    Postmenopausal osteoporosis    Type 2 diabetes mellitus with complication (HCC)    Neuropathy    COPD (chronic obstructive pulmonary disease) (HCC)    Mood disorder (HCC)    Dementia    S/P lumpectomy, left breast    Malignant neoplasm of upper-outer quadrant of female breast ([address]    Joint pain    Neuropathy involving both lower extremities    Back pain    Dyspepsia    Lumbar facet arthropathy    Lumbar facet arthropathy    Lumbar facet arthropathy    Lumbar facet arthropathy    Lumbar facet arthropathy    Body mass index 45.0-49.9, adult ([address]    Essential hypertension    Hyperlipidemia    Morbid obesity (HCC)    DARSHAN (obstructive sleep apnea)    Abscess    Diabetes mellitus (HCC)    Cellulitis and abscess of trunk    BiPAP (biphasic positive airway pressure) dependence    Obstructive sleep apnea    Restless leg syndrome       No LMP recorded (lmp unknown). Patient has had a hysterectomy. No obstetric history on file.     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    Breast CA (HCC)     Left breast Nodules removed Took radiation    Chronic back pain     Chronic kidney disease     Overactive bladder     COPD (chronic obstructive pulmonary disease) (HCC)     x few years Scarring on lungs 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    Diabetes mellitus ([facility]     On insulin x 10 years    Fibromyalgia     Fibromyalgia     for 20 years    GERD (gastroesophageal reflux disease)     History of blood transfusion     ?when    Hyperlipidemia     High cholesterol    Hypertension     On medications for 2 years    Joint pain 2/2/2016    Liver disease     Has fatty liver    Morbid obesity ([address]     Multiple food allergies     Neuropathic pain     Neuropathy     Neuropathy involving both lower extremities 2/2/2016    Obstructive sleep apnea     AHI: 68.1 per PSG, 5/2018    Osteoarthritis     Other disorders of kidney and ureter in diseases classified elsewhere     Pneumonia     Postmenopausal osteoporosis     Psychiatric problem     Restless leg syndrome     S/P lumpectomy, left breast 8/19/2015 8/4/2015    Sleep apnea     Type II or unspecified type diabetes mellitus with neurological manifestations, uncontrolled(250.62)      Past Surgical History:   Procedure Laterality Date    BREAST SURGERY Left 8/4/2015    left partial mastectomy on 8/4/15    EYE SURGERY      EYE SURGERY Bilateral 04/02/2017    Dr. Eve Maya      broke L ankle due to osteoporosis, has hardware in    FRACTURE SURGERY      Left ankle     HYSTERECTOMY      [place] DRAIN SKIN ABSCESS COMPLIC N/A 3/40/2818    ABDOMINAL WALL ABSCESS INCISION AND DRAINAGE performed by Scott Tamez MD at [address] ENDOSCOPY  4/8/2016    Dr Jennie Coulter-Gastritis, (-) H Pylori    WRIST SURGERY       Family History   Problem Relation Age of Onset    High Blood Pressure Father  Heart Disease Father     Diabetes Father     High Blood Pressure Sister     Diabetes Sister     Cancer Sister     High Blood Pressure Brother     Colon Cancer Neg Hx     Colon Polyps Neg Hx     Esophageal Cancer Neg Hx     Liver Cancer Neg Hx     Liver Disease Neg Hx     Rectal Cancer Neg Hx     Stomach Cancer Neg Hx      Social History     Tobacco Use    Smoking status: Never Smoker    Smokeless tobacco: Never Used   Substance Use Topics    Alcohol use: Yes     Alcohol/week: 1.2 oz     Types: 1 Glasses of wine, 1 Cans of beer per week     Comment: occ       Current Outpatient Medications   Medication Sig Dispense Refill    fluconazole (DIFLUCAN) 150 MG tablet Take 1 tablet by mouth every 72 hours for 4 days 2 tablet 3    loratadine (CLARITIN) 10 MG tablet TAKE 1 TABLET BY MOUTH ONCE DAILY. 30 tablet 5    sitaGLIPtan-metformin (JANUMET)  MG per tablet Take 1 tablet by mouth 2 times daily (with meals) 60 tablet 5    empagliflozin (JARDIANCE) 10 MG tablet Take 1 tablet by mouth daily 30 tablet 3    rosuvastatin (CRESTOR) 10 MG tablet Take 1 tablet by mouth daily 30 tablet 3    losartan (COZAAR) 100 MG tablet TAKE (1) TABLET DAILY FOR BLOOD PRESSURE. 30 tablet 5    citalopram (CELEXA) 20 MG tablet TAKE 1 TABLET DAILY 30 tablet 5    montelukast (SINGULAIR) 10 MG tablet TAKE 1 TABLET BY MOUTH AT NIGHT 90 tablet 5    insulin aspart (NOVOLOG FLEXPEN) 100 UNIT/ML injection pen INJECT 15 UNITS BEFORE MEALS 3 TIMES A DAY 15 mL 0    TRESIBA FLEXTOUCH 200 UNIT/ML SOPN INJECT 100 UNITS INTO THE SKIN NIGHTLY 18 mL 0    Insulin Pen Needle 32G X 6 MM MISC As directed 50 each 5    nystatin (MYCOSTATIN) 587538 UNIT/GM powder Apply 3 times daily. 1 Bottle 0    omeprazole (PRILOSEC) 20 MG delayed release capsule TAKE (1) CAPSULE BY MOUTH TWICE DAILY AS NEEDED.  60 capsule 11    aspirin 81 MG chewable tablet Take 1 tablet by mouth daily 30 tablet 3    HYDROcodone-acetaminophen (NORCO)  MG per tablet Take 1 tablet by mouth Daily with lunch. Crisbrandon Isauro Insulin Pen Needle 31G X 5 MM MISC As directed 100 each 1    insulin aspart (NOVOLOG FLEXPEN) 100 UNIT/ML injection pen Inject 12 Units into the skin 2 times daily Takes with breakfast and at 7pm      metoprolol succinate (TOPROL XL) 50 MG extended release tablet TAKE 1 TABLET DAILY FOR BLOOD PRESSURE AND HEART PROTECTION. 30 tablet 11    Misc. Devices (ROLLER WALKER) MISC 1 each by Does not apply route daily Pt states that she falls at least once a month up to 3 times a month. 1 each 0    oxybutynin (DITROPAN-XL) 5 MG extended release tablet TAKE 1 TABLET DAILY 30 tablet 2    vitamin D (ERGOCALCIFEROL) 55622 units CAPS capsule TAKE 1 CAPSULE BY MOUTH ONCE A WEEK (Patient taking differently: TAKE 1 CAPSULE BY MOUTH ONCE A WEEK on Saturday) 12 capsule 3    INSULIN SYRINGE 1CC/29G 29G X 1/2\" 1 ML MISC Use with each dose of novolog TID 90 each 1    mometasone-formoterol (DULERA) 200-5 MCG/ACT inhaler Inhale 2 puffs into the lungs every 12 hours      anastrozole (ARIMIDEX) 1 MG tablet Take 1 mg by mouth daily      Multiple Vitamins-Minerals (MULTIVITAMIN ADULT PO) Take 1 tablet by mouth daily       magnesium oxide (MAG-OX) 400 MG tablet Take 1 tablet by mouth daily 30 tablet 5    albuterol (PROVENTIL HFA;VENTOLIN HFA) 108 (90 BASE) MCG/ACT inhaler Inhale 2 puffs into the lungs every 4 hours as needed for Wheezing      Incontinence Supply Disposable (POISE PANTILINERS) PADS Use pads as needed daily for urine leakage. 1 each 11    naproxen (NAPROSYN) 375 MG tablet Take 1 tablet by mouth 2 times daily (with meals) for 7 days 14 tablet 0    gabapentin (NEURONTIN) 300 MG capsule TAKE 1 CAPSULE BY MOUTH THREE TIMES DAILY. (Patient taking differently: 2 in AM 2 in PM, rarely afternoon dose.) 90 capsule 5     No current facility-administered medications for this visit.       Allergies   Allergen Reactions    Pcn [Penicillins] Hives and Itching     Patient states she can tolerate ampicillin and amoxicillin however.  Cefdinir      Vitals:    05/01/19 1335   BP: (!) 141/74   Pulse: 88     Body mass index is 47.19 kg/m². Review of Systems   Constitutional: Negative. HENT: Negative. Eyes: Negative. Respiratory: Negative. Cardiovascular: Negative. Gastrointestinal: Negative. Endocrine: Negative. Genitourinary: Positive for vaginal discharge and vaginal pain. White clumpy discharge with mild odor   Musculoskeletal: Negative. Skin: Negative. Allergic/Immunologic: Negative. Neurological: Negative. Hematological: Negative. Psychiatric/Behavioral: Negative. Physical Exam   Constitutional: She is oriented to person, place, and time. She appears well-developed and well-nourished. HENT:   Head: Normocephalic and atraumatic. Eyes: Pupils are equal, round, and reactive to light. Conjunctivae are normal.   Neck: Normal range of motion. Pulmonary/Chest: Effort normal.   Genitourinary: Cervix exhibits no motion tenderness, no discharge and no friability. Right adnexum displays no mass, no tenderness and no fullness. Left adnexum displays no mass, no tenderness and no fullness. Genitourinary Comments: EGBUS normal. Vulva reveals atrophy & erythema, white discharge in skin folds,  no lesions noted. Vagina atrophic. Cervix surgically absent. Musculoskeletal: Normal range of motion. Neurological: She is alert and oriented to person, place, and time. Skin: Skin is warm and dry. Psychiatric: She has a normal mood and affect. Diagnosis Orders   1. Encounter to establish care with new doctor     2. Acute vaginitis         MEDICATIONS:  Orders Placed This Encounter   Medications    fluconazole (DIFLUCAN) 150 MG tablet     Sig: Take 1 tablet by mouth every 72 hours for 4 days     Dispense:  2 tablet     Refill:  3       ORDERS:  No orders of the defined types were placed in this encounter.       PLAN:  Diflucan - Given her hx, I would like to treat as yeast. A diatherix swab was collected. She was encouraged to continue the cream. Atrophic vaginitis is also in the differential. Will treat according to results as indicated.

## 2019-05-03 DIAGNOSIS — G62.9 NEUROPATHY: ICD-10-CM

## 2019-05-03 DIAGNOSIS — E11.8 TYPE 2 DIABETES MELLITUS WITH COMPLICATION, WITH LONG-TERM CURRENT USE OF INSULIN (HCC): ICD-10-CM

## 2019-05-03 DIAGNOSIS — Z79.4 TYPE 2 DIABETES MELLITUS WITH COMPLICATION, WITH LONG-TERM CURRENT USE OF INSULIN (HCC): ICD-10-CM

## 2019-05-03 RX ORDER — INSULIN DEGLUDEC 200 U/ML
INJECTION, SOLUTION SUBCUTANEOUS
Qty: 18 ML | Refills: 0 | Status: SHIPPED | OUTPATIENT
Start: 2019-05-03 | End: 2019-06-05 | Stop reason: SDUPTHER

## 2019-05-05 NOTE — TELEPHONE ENCOUNTER
Irvin Zee called requesting a refill of the below medication which has been pended for you:     Requested Prescriptions     Pending Prescriptions Disp Refills    gabapentin (NEURONTIN) 300 MG capsule [Pharmacy Med Name: GABAPENTIN 300 MG CAPSULE] 90 capsule 2     Sig: TAKE 1 CAPSULE BY MOUTH THREE TIMES DAILY. Last Appointment Date: 4-  Next Appointment Date:not scheduled    Allergies   Allergen Reactions    Pcn [Penicillins] Hives and Itching     Patient states she can tolerate ampicillin and amoxicillin however.     Cefdinir

## 2019-05-06 ENCOUNTER — TELEPHONE (OUTPATIENT)
Dept: OBGYN | Age: 74
End: 2019-05-06

## 2019-05-07 RX ORDER — METRONIDAZOLE 500 MG/1
500 TABLET ORAL 2 TIMES DAILY
Qty: 14 TABLET | Refills: 0 | Status: SHIPPED | OUTPATIENT
Start: 2019-05-07 | End: 2019-05-14

## 2019-05-08 RX ORDER — GABAPENTIN 300 MG/1
CAPSULE ORAL
Qty: 90 CAPSULE | Refills: 2 | Status: SHIPPED | OUTPATIENT
Start: 2019-05-08 | End: 2019-08-09 | Stop reason: SDUPTHER

## 2019-05-13 ENCOUNTER — TELEPHONE (OUTPATIENT)
Dept: OBGYN | Age: 74
End: 2019-05-13

## 2019-06-05 RX ORDER — INSULIN ASPART 100 [IU]/ML
INJECTION, SOLUTION INTRAVENOUS; SUBCUTANEOUS
Qty: 15 ML | Refills: 0 | Status: SHIPPED | OUTPATIENT
Start: 2019-06-05 | End: 2019-06-18 | Stop reason: SDUPTHER

## 2019-06-05 RX ORDER — INSULIN DEGLUDEC 200 U/ML
INJECTION, SOLUTION SUBCUTANEOUS
Qty: 18 ML | Refills: 0 | Status: SHIPPED | OUTPATIENT
Start: 2019-06-05 | End: 2019-06-18 | Stop reason: SDUPTHER

## 2019-06-05 RX ORDER — ERGOCALCIFEROL 1.25 MG/1
CAPSULE ORAL
Qty: 12 CAPSULE | Refills: 0 | Status: SHIPPED | OUTPATIENT
Start: 2019-06-05 | End: 2019-07-08 | Stop reason: SDUPTHER

## 2019-06-18 NOTE — TELEPHONE ENCOUNTER
Anika Swan is requesting a refill of their   Requested Prescriptions     Pending Prescriptions Disp Refills    insulin aspart (NOVOLOG FLEXPEN) 100 UNIT/ML injection pen 15 mL 0    Insulin Degludec (TRESIBA FLEXTOUCH) 200 UNIT/ML SOPN 18 mL 0   . Please advise.  Patient requesting call regarding this when done      Last Appt:  4/15/2019  Next Appt:   Visit date not found  Preferred pharmacy: [facility]

## 2019-06-24 ENCOUNTER — TELEPHONE (OUTPATIENT)
Dept: OBGYN | Age: 74
End: 2019-06-24

## 2019-06-24 RX ORDER — FLUCONAZOLE 150 MG/1
150 TABLET ORAL
Qty: 8 TABLET | Refills: 0 | Status: SHIPPED | OUTPATIENT
Start: 2019-06-24 | End: 2019-07-18

## 2019-06-25 RX ORDER — NYSTATIN 100000 U/G
CREAM TOPICAL
Qty: 1 TUBE | Refills: 1 | Status: SHIPPED | OUTPATIENT
Start: 2019-06-25 | End: 2021-11-09

## 2019-06-28 DIAGNOSIS — E11.8 TYPE 2 DIABETES MELLITUS WITH COMPLICATION, WITH LONG-TERM CURRENT USE OF INSULIN (HCC): Primary | ICD-10-CM

## 2019-06-28 DIAGNOSIS — Z79.4 TYPE 2 DIABETES MELLITUS WITH COMPLICATION, WITH LONG-TERM CURRENT USE OF INSULIN (HCC): Primary | ICD-10-CM

## 2019-06-28 RX ORDER — BLOOD-GLUCOSE METER
1 EACH MISCELLANEOUS DAILY
Qty: 1 KIT | Refills: 0 | Status: SHIPPED | OUTPATIENT
Start: 2019-06-28 | End: 2019-11-25

## 2019-07-08 DIAGNOSIS — I10 ESSENTIAL HYPERTENSION: ICD-10-CM

## 2019-07-09 RX ORDER — OXYBUTYNIN CHLORIDE 5 MG/1
TABLET, EXTENDED RELEASE ORAL
Qty: 30 TABLET | Refills: 0 | Status: SHIPPED | OUTPATIENT
Start: 2019-07-09 | End: 2019-08-06 | Stop reason: SDUPTHER

## 2019-07-09 RX ORDER — METOPROLOL SUCCINATE 50 MG/1
TABLET, EXTENDED RELEASE ORAL
Qty: 30 TABLET | Refills: 0 | Status: SHIPPED | OUTPATIENT
Start: 2019-07-09 | End: 2019-08-09 | Stop reason: SDUPTHER

## 2019-07-09 RX ORDER — ERGOCALCIFEROL 1.25 MG/1
CAPSULE ORAL
Qty: 12 CAPSULE | Refills: 0 | Status: SHIPPED | OUTPATIENT
Start: 2019-07-09 | End: 2019-10-30 | Stop reason: SDUPTHER

## 2019-07-10 ENCOUNTER — TELEPHONE (OUTPATIENT)
Dept: NEUROSURGERY | Age: 74
End: 2019-07-10

## 2019-08-06 ENCOUNTER — TELEPHONE (OUTPATIENT)
Dept: PRIMARY CARE CLINIC | Age: 74
End: 2019-08-06

## 2019-08-06 RX ORDER — ROSUVASTATIN CALCIUM 10 MG/1
TABLET, COATED ORAL
Qty: 30 TABLET | Refills: 0 | Status: SHIPPED | OUTPATIENT
Start: 2019-08-06 | End: 2019-09-06 | Stop reason: SDUPTHER

## 2019-08-06 RX ORDER — EMPAGLIFLOZIN 10 MG/1
TABLET, FILM COATED ORAL
Qty: 30 TABLET | Refills: 0 | Status: SHIPPED | OUTPATIENT
Start: 2019-08-06 | End: 2019-09-06 | Stop reason: SDUPTHER

## 2019-08-06 NOTE — TELEPHONE ENCOUNTER
Called patient,informed need to establish care for further refills.  Patient will call back tomorrow for appt

## 2019-08-09 ENCOUNTER — TELEPHONE (OUTPATIENT)
Dept: PRIMARY CARE CLINIC | Age: 74
End: 2019-08-09

## 2019-08-09 DIAGNOSIS — G62.9 NEUROPATHY: ICD-10-CM

## 2019-08-09 DIAGNOSIS — I10 ESSENTIAL HYPERTENSION: ICD-10-CM

## 2019-08-09 NOTE — TELEPHONE ENCOUNTER
Rin Tejeda called and would like a call back in regards to her medication. Please call her to advise.     Last Appt:  4/15/2019  Next Appt:   8/22/2019  Preferred Pharmacy: on file

## 2019-08-13 ENCOUNTER — OFFICE VISIT (OUTPATIENT)
Dept: NEUROSURGERY | Age: 74
End: 2019-08-13
Payer: MEDICARE

## 2019-08-13 VITALS
BODY MASS INDEX: 47.48 KG/M2 | HEART RATE: 89 BPM | HEIGHT: 62 IN | DIASTOLIC BLOOD PRESSURE: 68 MMHG | SYSTOLIC BLOOD PRESSURE: 113 MMHG | WEIGHT: 258 LBS | OXYGEN SATURATION: 96 %

## 2019-08-13 DIAGNOSIS — G47.33 OSA (OBSTRUCTIVE SLEEP APNEA): Primary | ICD-10-CM

## 2019-08-13 DIAGNOSIS — Z99.89 BIPAP (BIPHASIC POSITIVE AIRWAY PRESSURE) DEPENDENCE: ICD-10-CM

## 2019-08-13 PROCEDURE — 99213 OFFICE O/P EST LOW 20 MIN: CPT | Performed by: NURSE PRACTITIONER

## 2019-08-13 RX ORDER — GABAPENTIN 300 MG/1
CAPSULE ORAL
Qty: 90 CAPSULE | Refills: 0 | OUTPATIENT
Start: 2019-08-13 | End: 2019-09-06 | Stop reason: SDUPTHER

## 2019-08-13 RX ORDER — METOPROLOL SUCCINATE 50 MG/1
TABLET, EXTENDED RELEASE ORAL
Qty: 30 TABLET | Refills: 0 | Status: SHIPPED | OUTPATIENT
Start: 2019-08-13 | End: 2019-09-06 | Stop reason: SDUPTHER

## 2019-08-13 NOTE — PROGRESS NOTES
[facility] Sleep Follow Up Encounter      Information:   Patient Name: Manfred Flores  :   1945  Age:   72 y.o. MRN:   [id number]  Account #:  [de-identified]  Today:                19    Provider:  ANTONIA Wray    Chief Complaint   Patient presents with    Follow-up    Sleep Apnea        Subjective:   Manfred Flores is a 72 y.o. female with a history of severe DARSHAN and RLS who comes in for a sleep clinic follow up. The compliance report indicates that she  is averaging  9.5 hours of BiPAP use per day. She  averages 9 hours of sleep per night. She reports that consistent BiPAP use has alleviated the previous DARSHAN symptoms. Notes that she has been having some difficulty with sleep at times r/t steroids that she has been taking.      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    Breast CA (HCC)     Left breast Nodules removed Took radiation    Chronic back pain     Chronic kidney disease     Overactive bladder     COPD (chronic obstructive pulmonary disease) (HCC)     x few years Scarring on lungs 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    Diabetes mellitus ([facility]     On insulin x 10 years    Fibromyalgia     Fibromyalgia     for 20 years    GERD (gastroesophageal reflux disease)     History of blood transfusion     ?when    Hyperlipidemia     High cholesterol    Hypertension     On medications for 2 years    Joint pain 2016    Liver disease     Has fatty liver    Morbid obesity (HCC)     Multiple food allergies     Neuropathic pain     Neuropathy     Medications   Medication Sig Dispense Refill    gabapentin (NEURONTIN) 300 MG capsule TAKE 1 CAPSULE BY MOUTH THREE TIMES DAILY. 90 capsule 0    metoprolol succinate (TOPROL XL) 50 MG extended release tablet TAKE 1 TABLET DAILY FOR BLOOD PRESSURE AND HEART PROTECTION. 30 tablet 0    oxybutynin (DITROPAN-XL) 5 MG extended release tablet TAKE 1 TABLET BY MOUTH ONCE DAILY. 30 tablet 0    rosuvastatin (CRESTOR) 10 MG tablet TAKE 1 TABLET BY MOUTH ONCE DAILY. 30 tablet 0    JARDIANCE 10 MG tablet TAKE 1 TABLET BY MOUTH ONCE DAILY. 30 tablet 0    vitamin D (ERGOCALCIFEROL) 23445 units CAPS capsule TAKE 1 CAPSULE BY MOUTH ONCE A WEEK 12 capsule 0    Insulin Degludec (TRESIBA FLEXTOUCH) 200 UNIT/ML SOPN INJECT 100 UNITS INTO THE SKIN NIGHTLY 18 mL 0    insulin aspart (NOVOLOG FLEXPEN) 100 UNIT/ML injection pen INJECT 15 UNITS BEFORE MEALS 3 TIMES A DAY 15 mL 0    Blood Glucose Monitoring Suppl (ONE TOUCH ULTRA 2) w/Device KIT 1 kit by Does not apply route daily 1 kit 0    nystatin (MYCOSTATIN) 503144 UNIT/GM cream Apply topically 2 times daily. 1 Tube 1    loratadine (CLARITIN) 10 MG tablet TAKE 1 TABLET BY MOUTH ONCE DAILY. 30 tablet 5    sitaGLIPtan-metformin (JANUMET)  MG per tablet Take 1 tablet by mouth 2 times daily (with meals) 60 tablet 5    losartan (COZAAR) 100 MG tablet TAKE (1) TABLET DAILY FOR BLOOD PRESSURE. 30 tablet 5    citalopram (CELEXA) 20 MG tablet TAKE 1 TABLET DAILY 30 tablet 5    montelukast (SINGULAIR) 10 MG tablet TAKE 1 TABLET BY MOUTH AT NIGHT 90 tablet 5    Insulin Pen Needle 32G X 6 MM MISC As directed 50 each 5    omeprazole (PRILOSEC) 20 MG delayed release capsule TAKE (1) CAPSULE BY MOUTH TWICE DAILY AS NEEDED. 60 capsule 11    aspirin 81 MG chewable tablet Take 1 tablet by mouth daily 30 tablet 3    HYDROcodone-acetaminophen (NORCO)  MG per tablet Take 1 tablet by mouth Daily with lunch. Dasha Eid Insulin Pen Needle 31G X 5 MM MISC As directed 100 each 1    Muscle Pain  [] Joint Pain  [] Arthritis [] Muscle Cramps [] Muscle Twitches  [x] Denies all unless marked  Sleep: [] Insomnia [x] Snoring [] Restless Legs [x] Sleep Apnea  [] Daytime Sleepiness  [] Denies all unless marked  Skin:[] Rash [] Skin Discoloration [x] Denies all unless marked   Neurological: []Visual Disturbance/Memory Loss [] Loss of Balance [] Slurred Speech/Weakness [] Seizures  [] Vertigo/Dizziness [x] Denies all unless marked     The MA has completed the ROS with the patient. I have reviewed it in its' entirety with the patient and agree with the documentation. Examination:  Vitals:  /68   Pulse 89   Ht 5' 2\" (1.575 m)   Wt 258 lb (117 kg)   LMP  (LMP Unknown)   SpO2 96%   BMI 47.19 kg/m²   General appearance:  alert and cooperative with exam  HEENT:  PERRLA, EOMI, Sclera clear, anicteric, Oropharynx clear, no lesions, Neck supple with midline trachea and Trachea midline  Heart[de-identified]  regular rate and rhythm  Lungs:  clear to auscultation bilaterally  Extremities:  extremities normal, atraumatic, no cyanosis or edema  Neurologic:  Extraocular movements are intact without nystagmus. Visual fields are full to confrontation. Facial movements are symmetrical and normal.  Speech is precise. Extremity strength is normal in both uppers and lowers. Deep tendon reflexes are intact and symmetrical.  Rapid alternating movements are unimpaired. Finger-to-nose testing is performed well, without dysmetria.   Gait is normal.    [place] Sleepiness Scale    0 = would never doze  1 = slight chance of dozing  2 = moderate chance of dozing  3 = high chance of dozing    Situation Chance of Dozing (0-3)    Sitting and reading                                                                              0     Watching TV                                                                                       3     Sitting, inactive in a public place (e.g. a theatre or a meeting)          2     As a passenger in a car for an hour without a break                         2     Lying down to rest in the afternoon when circumstances permit      3     Sitting and talking to someone                                                           0     Sitting quietly after a lunch without alcohol                                        2     In a [facility], [facility]     Total                                                                                                    14      I reviewed the following studies:    []  :  Clinical laboratory test results    []  :  Radiology reports    []  :  Review and summarization of medical records and/or obtain medical records     []  :  Previous/recent polysomnogram report(s)    [x]  :  [place] Sleepiness Scale- 14      [x]  :  Compliance download:  She has a BiPAP set at a pressure 21/8cm H20 with pressure support of 4cm H20. Compliance download shows that she uses device:  97% of the time;  percentage of days with usage >=4 hours:  97%. AHI:  0.8    Assessment:       ICD-10-CM    1. DARSHAN (obstructive sleep apnea) G47.33    2. BiPAP (biphasic positive airway pressure) dependence Z99.89           [x]  :  Stable     []  :  Improved                       []  :  Well controlled              []  :  Resolving     []  :  Resolved     []  :  Inadequately controlled     []  :  Worsening     []  :  Additional workup planned      Plan:     No orders of the defined types were placed in this encounter. No orders of the defined types were placed in this encounter. 1.   Patient advised of the etiology,  pathophysiology, diagnosis, treatment options, and risks of untreated DARSHAN. Risks may include, but are not limited to  hypertension, coronary artery disease, diabetes, stroke, weight gain, impaired cognition, daytime somnolence,  and motor vehicle accidents.  Advised to abstain from driving or operating heavy machinery when drowsy and the use of

## 2019-08-13 NOTE — PROGRESS NOTES
4     Sitting and talking to someone                                                           0     Sitting quietly after a lunch without alcohol                                        2     In a car, while stopped for a few minutes in the traffic                       2     Total                                                                                                    16

## 2019-08-22 ENCOUNTER — OFFICE VISIT (OUTPATIENT)
Dept: PRIMARY CARE CLINIC | Age: 74
End: 2019-08-22
Payer: MEDICARE

## 2019-08-22 VITALS
TEMPERATURE: 97.6 F | HEIGHT: 62 IN | WEIGHT: 260.8 LBS | SYSTOLIC BLOOD PRESSURE: 118 MMHG | BODY MASS INDEX: 47.99 KG/M2 | HEART RATE: 80 BPM | DIASTOLIC BLOOD PRESSURE: 70 MMHG | OXYGEN SATURATION: 97 %

## 2019-08-22 DIAGNOSIS — Z79.4 TYPE 2 DIABETES MELLITUS WITH COMPLICATION, WITH LONG-TERM CURRENT USE OF INSULIN (HCC): ICD-10-CM

## 2019-08-22 DIAGNOSIS — I89.0 LYMPH EDEMA: Primary | ICD-10-CM

## 2019-08-22 DIAGNOSIS — E11.8 TYPE 2 DIABETES MELLITUS WITH COMPLICATION, WITH LONG-TERM CURRENT USE OF INSULIN (HCC): ICD-10-CM

## 2019-08-22 DIAGNOSIS — I10 ESSENTIAL HYPERTENSION: Primary | ICD-10-CM

## 2019-08-22 DIAGNOSIS — N39.41 URGE INCONTINENCE OF URINE: ICD-10-CM

## 2019-08-22 DIAGNOSIS — E78.2 MIXED HYPERLIPIDEMIA: ICD-10-CM

## 2019-08-22 DIAGNOSIS — G62.9 NEUROPATHY: ICD-10-CM

## 2019-08-22 DIAGNOSIS — H61.21 CERUMEN DEBRIS ON TYMPANIC MEMBRANE OF RIGHT EAR: ICD-10-CM

## 2019-08-22 DIAGNOSIS — H61.22 CERUMEN DEBRIS ON TYMPANIC MEMBRANE, LEFT: ICD-10-CM

## 2019-08-22 DIAGNOSIS — Z51.81 THERAPEUTIC DRUG MONITORING: ICD-10-CM

## 2019-08-22 LAB
AMPHETAMINE SCREEN, URINE: NORMAL
APPEARANCE FLUID: CLEAR
BARBITURATE SCREEN, URINE: NORMAL
BENZODIAZEPINE SCREEN, URINE: NORMAL
BILIRUBIN, POC: NORMAL
BLOOD URINE, POC: NORMAL
BUPRENORPHINE URINE: NORMAL
CLARITY, POC: CLEAR
COCAINE METABOLITE SCREEN URINE: NORMAL
COLOR, POC: YELLOW
GABAPENTIN SCREEN, URINE: NORMAL
GLUCOSE URINE, POC: 500
KETONES, POC: NORMAL
LEUKOCYTE EST, POC: NORMAL
MDMA URINE: NORMAL
METHADONE SCREEN, URINE: NORMAL
METHAMPHETAMINE, URINE: NORMAL
NITRITE, POC: NORMAL
OPIATE SCREEN URINE: NORMAL
OXYCODONE SCREEN URINE: NORMAL
PH, POC: 6
PHENCYCLIDINE SCREEN URINE: NORMAL
PROPOXYPHENE SCREEN, URINE: NORMAL
PROTEIN, POC: NORMAL
SPECIFIC GRAVITY, POC: 1
THC SCREEN, URINE: NORMAL
TRICYCLIC ANTIDEPRESSANTS, UR: NORMAL
UROBILINOGEN, POC: 0.2

## 2019-08-22 PROCEDURE — 80305 DRUG TEST PRSMV DIR OPT OBS: CPT | Performed by: NURSE PRACTITIONER

## 2019-08-22 PROCEDURE — 69210 REMOVE IMPACTED EAR WAX UNI: CPT | Performed by: NURSE PRACTITIONER

## 2019-08-22 PROCEDURE — 99214 OFFICE O/P EST MOD 30 MIN: CPT | Performed by: NURSE PRACTITIONER

## 2019-08-22 PROCEDURE — 81002 URINALYSIS NONAUTO W/O SCOPE: CPT | Performed by: NURSE PRACTITIONER

## 2019-08-22 RX ORDER — OXYBUTYNIN CHLORIDE 10 MG/1
10 TABLET, EXTENDED RELEASE ORAL DAILY
Qty: 30 TABLET | Refills: 1 | Status: SHIPPED | OUTPATIENT
Start: 2019-08-22 | End: 2019-10-30 | Stop reason: SDUPTHER

## 2019-08-26 ENCOUNTER — OFFICE VISIT (OUTPATIENT)
Dept: SURGERY | Age: 74
End: 2019-08-26
Payer: MEDICARE

## 2019-08-26 ENCOUNTER — HOSPITAL ENCOUNTER (OUTPATIENT)
Dept: WOMENS IMAGING | Age: 74
Discharge: HOME | End: 2019-08-26
Payer: MEDICARE

## 2019-08-26 VITALS — TEMPERATURE: 98.6 F | HEIGHT: 62 IN | WEIGHT: 259 LBS | BODY MASS INDEX: 47.66 KG/M2

## 2019-08-26 DIAGNOSIS — Z85.3 PERSONAL HISTORY OF MALIGNANT NEOPLASM OF BREAST: ICD-10-CM

## 2019-08-26 DIAGNOSIS — Z85.3 PERSONAL HISTORY OF MALIGNANT NEOPLASM OF BREAST: Primary | ICD-10-CM

## 2019-08-26 PROCEDURE — 99213 OFFICE O/P EST LOW 20 MIN: CPT | Performed by: PHYSICIAN ASSISTANT

## 2019-08-26 PROCEDURE — G0279 TOMOSYNTHESIS, MAMMO: HCPCS

## 2019-08-26 RX ORDER — TRIAMCINOLONE ACETONIDE 5 MG/G
CREAM TOPICAL
Qty: 454 G | Refills: 3 | Status: SHIPPED | OUTPATIENT
Start: 2019-08-26 | End: 2019-09-02

## 2019-08-26 ASSESSMENT — ENCOUNTER SYMPTOMS
SHORTNESS OF BREATH: 0
BACK PAIN: 1
COUGH: 0
WHEEZING: 0

## 2019-08-30 ENCOUNTER — TELEPHONE (OUTPATIENT)
Dept: SURGERY | Age: 74
End: 2019-08-30

## 2019-08-30 DIAGNOSIS — Z79.4 TYPE 2 DIABETES MELLITUS WITH COMPLICATION, WITH LONG-TERM CURRENT USE OF INSULIN (HCC): ICD-10-CM

## 2019-08-30 DIAGNOSIS — I10 ESSENTIAL HYPERTENSION: ICD-10-CM

## 2019-08-30 DIAGNOSIS — E11.8 TYPE 2 DIABETES MELLITUS WITH COMPLICATION, WITH LONG-TERM CURRENT USE OF INSULIN (HCC): ICD-10-CM

## 2019-08-30 DIAGNOSIS — E78.2 MIXED HYPERLIPIDEMIA: ICD-10-CM

## 2019-08-30 LAB
ALBUMIN SERPL-MCNC: 4.2 G/DL (ref 3.5–5.2)
ALP BLD-CCNC: 92 U/L (ref 35–104)
ALT SERPL-CCNC: 47 U/L (ref 5–33)
ANION GAP SERPL CALCULATED.3IONS-SCNC: 14 MMOL/L (ref 7–19)
AST SERPL-CCNC: 38 U/L (ref 5–32)
BILIRUB SERPL-MCNC: 0.4 MG/DL (ref 0.2–1.2)
BUN BLDV-MCNC: 12 MG/DL (ref 8–23)
CALCIUM SERPL-MCNC: 9.3 MG/DL (ref 8.8–10.2)
CHLORIDE BLD-SCNC: 97 MMOL/L (ref 98–111)
CHOLESTEROL, TOTAL: 180 MG/DL (ref 160–199)
CO2: 28 MMOL/L (ref 22–29)
CREAT SERPL-MCNC: 0.7 MG/DL (ref 0.5–0.9)
GFR NON-AFRICAN AMERICAN: >60
GLUCOSE BLD-MCNC: 199 MG/DL (ref 74–109)
HBA1C MFR BLD: 10.1 % (ref 4–6)
HCT VFR BLD CALC: 45.3 % (ref 37–47)
HDLC SERPL-MCNC: 43 MG/DL (ref 65–121)
HEMOGLOBIN: 14.3 G/DL (ref 12–16)
LDL CHOLESTEROL CALCULATED: 91 MG/DL
MCH RBC QN AUTO: 26.9 PG (ref 27–31)
MCHC RBC AUTO-ENTMCNC: 31.6 G/DL (ref 33–37)
MCV RBC AUTO: 85.3 FL (ref 81–99)
PDW BLD-RTO: 14.3 % (ref 11.5–14.5)
PLATELET # BLD: 301 K/UL (ref 130–400)
PMV BLD AUTO: 10.1 FL (ref 9.4–12.3)
POTASSIUM SERPL-SCNC: 4.7 MMOL/L (ref 3.5–5)
RBC # BLD: 5.31 M/UL (ref 4.2–5.4)
SODIUM BLD-SCNC: 139 MMOL/L (ref 136–145)
TOTAL PROTEIN: 7.4 G/DL (ref 6.6–8.7)
TRIGL SERPL-MCNC: 229 MG/DL (ref 0–149)
WBC # BLD: 8.1 K/UL (ref 4.8–10.8)

## 2019-09-04 ENCOUNTER — TELEPHONE (OUTPATIENT)
Dept: PRIMARY CARE CLINIC | Age: 74
End: 2019-09-04

## 2019-09-06 DIAGNOSIS — G62.9 NEUROPATHY: ICD-10-CM

## 2019-09-06 DIAGNOSIS — I10 ESSENTIAL HYPERTENSION: ICD-10-CM

## 2019-09-08 RX ORDER — OXYBUTYNIN CHLORIDE 5 MG/1
TABLET, EXTENDED RELEASE ORAL
Qty: 30 TABLET | Refills: 0 | Status: SHIPPED | OUTPATIENT
Start: 2019-09-08 | End: 2019-09-26 | Stop reason: DRUGHIGH

## 2019-09-09 RX ORDER — GABAPENTIN 300 MG/1
CAPSULE ORAL
Qty: 90 CAPSULE | Refills: 0 | OUTPATIENT
Start: 2019-09-09 | End: 2019-10-02 | Stop reason: SDUPTHER

## 2019-09-09 RX ORDER — EMPAGLIFLOZIN 10 MG/1
TABLET, FILM COATED ORAL
Qty: 30 TABLET | Refills: 0 | Status: SHIPPED | OUTPATIENT
Start: 2019-09-09 | End: 2019-10-02 | Stop reason: SDUPTHER

## 2019-09-09 RX ORDER — METOPROLOL SUCCINATE 50 MG/1
TABLET, EXTENDED RELEASE ORAL
Qty: 30 TABLET | Refills: 0 | Status: SHIPPED | OUTPATIENT
Start: 2019-09-09 | End: 2019-10-02 | Stop reason: SDUPTHER

## 2019-09-09 RX ORDER — ROSUVASTATIN CALCIUM 10 MG/1
TABLET, COATED ORAL
Qty: 30 TABLET | Refills: 0 | Status: SHIPPED | OUTPATIENT
Start: 2019-09-09 | End: 2019-10-02 | Stop reason: SDUPTHER

## 2019-09-09 NOTE — TELEPHONE ENCOUNTER
Aung Wren called to request a refill on her medication. Last office visit : 8/22/2019   Next office visit : 11/13/2019     Last UDS:   Amphetamine Screen, Urine   Date Value Ref Range Status   08/22/2019 -  Final     Barbiturate Screen, Urine   Date Value Ref Range Status   08/22/2019 -  Final     Benzodiazepine Screen, Urine   Date Value Ref Range Status   08/22/2019 -  Final     Buprenorphine Urine   Date Value Ref Range Status   08/22/2019 -  Final     Cocaine Metabolite Screen, Urine   Date Value Ref Range Status   08/22/2019 -  Final     Gabapentin Screen, Urine   Date Value Ref Range Status   08/22/2019 -  Final     MDMA, Urine   Date Value Ref Range Status   08/22/2019 -  Final     Methamphetamine, Urine   Date Value Ref Range Status   08/22/2019 -  Final     Opiate Scrn, Ur   Date Value Ref Range Status   08/22/2019 -  Final     Oxycodone Screen, Ur   Date Value Ref Range Status   08/22/2019 -  Final     PCP Screen, Urine   Date Value Ref Range Status   08/22/2019 -  Final     Propoxyphene Screen, Urine   Date Value Ref Range Status   08/22/2019 -  Final     THC Screen, Urine   Date Value Ref Range Status   08/22/2019 -  Final     Tricyclic Antidepressants, Urine   Date Value Ref Range Status   08/22/2019 -  Final       Last Preeti Hait: 8/22/2019  Medication Contract: not on file    Last Fill: 08/13/2019    Requested Prescriptions     Pending Prescriptions Disp Refills    gabapentin (NEURONTIN) 300 MG capsule [Pharmacy Med Name: GABAPENTIN 300 MG CAPSULE] 90 capsule 0     Sig: TAKE 1 CAPSULE BY MOUTH THREE TIMES DAILY. Signed Prescriptions Disp Refills    metoprolol succinate (TOPROL XL) 50 MG extended release tablet 30 tablet 0     Sig: TAKE 1 TABLET DAILY FOR BLOOD PRESSURE AND HEART PROTECTION. Authorizing Provider: Denver Badger     Ordering User: DAIN Avelar    rosuvastatin (CRESTOR) 10 MG tablet 30 tablet 0     Sig: TAKE 1 TABLET BY MOUTH ONCE DAILY.      Authorizing Provider:

## 2019-09-12 ENCOUNTER — APPOINTMENT (OUTPATIENT)
Dept: LAB | Facility: HOSPITAL | Age: 74
End: 2019-09-12

## 2019-09-12 ENCOUNTER — TELEPHONE (OUTPATIENT)
Dept: PRIMARY CARE CLINIC | Age: 74
End: 2019-09-12
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
## 2019-09-24 ENCOUNTER — APPOINTMENT (OUTPATIENT)
Dept: LAB | Facility: HOSPITAL | Age: 74
End: 2019-09-24

## 2019-09-24 DIAGNOSIS — C50.412 MALIGNANT NEOPLASM OF UPPER-OUTER QUADRANT OF LEFT FEMALE BREAST, UNSPECIFIED ESTROGEN RECEPTOR STATUS (HCC): Primary | ICD-10-CM

## 2019-09-26 ENCOUNTER — OFFICE VISIT (OUTPATIENT)
Dept: PRIMARY CARE CLINIC | Age: 74
End: 2019-09-26
Payer: MEDICARE

## 2019-09-26 ENCOUNTER — APPOINTMENT (OUTPATIENT)
Dept: LAB | Facility: HOSPITAL | Age: 74
End: 2019-09-26

## 2019-09-26 ENCOUNTER — OFFICE VISIT (OUTPATIENT)
Dept: ONCOLOGY | Facility: CLINIC | Age: 74
End: 2019-09-26

## 2019-09-26 ENCOUNTER — OFFICE VISIT (OUTPATIENT)
Dept: URGENT CARE | Age: 74
End: 2019-09-26
Payer: MEDICARE

## 2019-09-26 VITALS
OXYGEN SATURATION: 94 % | DIASTOLIC BLOOD PRESSURE: 80 MMHG | WEIGHT: 257.5 LBS | SYSTOLIC BLOOD PRESSURE: 120 MMHG | TEMPERATURE: 98.1 F | BODY MASS INDEX: 47.39 KG/M2 | HEART RATE: 83 BPM | HEIGHT: 62 IN | RESPIRATION RATE: 17 BRPM

## 2019-09-26 VITALS
RESPIRATION RATE: 16 BRPM | DIASTOLIC BLOOD PRESSURE: 65 MMHG | HEIGHT: 62 IN | OXYGEN SATURATION: 98 % | HEART RATE: 83 BPM | TEMPERATURE: 98.6 F | SYSTOLIC BLOOD PRESSURE: 119 MMHG | BODY MASS INDEX: 47.37 KG/M2

## 2019-09-26 DIAGNOSIS — C50.412 MALIGNANT NEOPLASM OF UPPER-OUTER QUADRANT OF LEFT BREAST IN FEMALE, ESTROGEN RECEPTOR POSITIVE (HCC): Primary | ICD-10-CM

## 2019-09-26 DIAGNOSIS — R21 RASH AND NONSPECIFIC SKIN ERUPTION: Primary | ICD-10-CM

## 2019-09-26 DIAGNOSIS — Z17.0 MALIGNANT NEOPLASM OF UPPER-OUTER QUADRANT OF LEFT BREAST IN FEMALE, ESTROGEN RECEPTOR POSITIVE (HCC): Primary | ICD-10-CM

## 2019-09-26 DIAGNOSIS — C50.412 MALIGNANT NEOPLASM OF UPPER-OUTER QUADRANT OF LEFT FEMALE BREAST, UNSPECIFIED ESTROGEN RECEPTOR STATUS (HCC): Primary | ICD-10-CM

## 2019-09-26 DIAGNOSIS — Z23 IMMUNIZATION DUE: Primary | ICD-10-CM

## 2019-09-26 LAB
ALBUMIN SERPL-MCNC: 4.1 G/DL (ref 3.5–5.2)
ALBUMIN/GLOB SERPL: 1.4 G/DL
ALP SERPL-CCNC: 86 U/L (ref 39–117)
ALT SERPL W P-5'-P-CCNC: 59 U/L (ref 1–33)
ANION GAP SERPL CALCULATED.3IONS-SCNC: 12 MMOL/L (ref 5–15)
AST SERPL-CCNC: 54 U/L (ref 1–32)
BASOPHILS # BLD AUTO: 0.07 10*3/MM3 (ref 0–0.2)
BASOPHILS NFR BLD AUTO: 0.8 % (ref 0–1.5)
BILIRUB SERPL-MCNC: 0.3 MG/DL (ref 0.2–1.2)
BUN BLD-MCNC: 10 MG/DL (ref 8–23)
BUN/CREAT SERPL: 14.3 (ref 7–25)
CALCIUM SPEC-SCNC: 8.7 MG/DL (ref 8.6–10.5)
CHLORIDE SERPL-SCNC: 100 MMOL/L (ref 98–107)
CO2 SERPL-SCNC: 27 MMOL/L (ref 22–29)
CREAT BLD-MCNC: 0.7 MG/DL (ref 0.57–1)
DEPRECATED RDW RBC AUTO: 42.3 FL (ref 37–54)
EOSINOPHIL # BLD AUTO: 0.23 10*3/MM3 (ref 0–0.4)
EOSINOPHIL NFR BLD AUTO: 2.7 % (ref 0.3–6.2)
ERYTHROCYTE [DISTWIDTH] IN BLOOD BY AUTOMATED COUNT: 14.2 % (ref 12.3–15.4)
GFR SERPL CREATININE-BSD FRML MDRD: 82 ML/MIN/1.73
GLOBULIN UR ELPH-MCNC: 3 GM/DL
GLUCOSE BLD-MCNC: 210 MG/DL (ref 65–99)
HCT VFR BLD AUTO: 42.6 % (ref 34–46.6)
HGB BLD-MCNC: 13.7 G/DL (ref 12–15.9)
HOLD SPECIMEN: NORMAL
IMM GRANULOCYTES # BLD AUTO: 0.04 10*3/MM3 (ref 0–0.05)
IMM GRANULOCYTES NFR BLD AUTO: 0.5 % (ref 0–0.5)
LYMPHOCYTES # BLD AUTO: 2.43 10*3/MM3 (ref 0.7–3.1)
LYMPHOCYTES NFR BLD AUTO: 29 % (ref 19.6–45.3)
MCH RBC QN AUTO: 26.6 PG (ref 26.6–33)
MCHC RBC AUTO-ENTMCNC: 32.2 G/DL (ref 31.5–35.7)
MCV RBC AUTO: 82.7 FL (ref 79–97)
MONOCYTES # BLD AUTO: 0.66 10*3/MM3 (ref 0.1–0.9)
MONOCYTES NFR BLD AUTO: 7.9 % (ref 5–12)
NEUTROPHILS # BLD AUTO: 4.95 10*3/MM3 (ref 1.7–7)
NEUTROPHILS NFR BLD AUTO: 59.1 % (ref 42.7–76)
NRBC BLD AUTO-RTO: 0 /100 WBC (ref 0–0.2)
PLATELET # BLD AUTO: 278 10*3/MM3 (ref 140–450)
PMV BLD AUTO: 10 FL (ref 6–12)
POTASSIUM BLD-SCNC: 4.4 MMOL/L (ref 3.5–5.2)
PROT SERPL-MCNC: 7.1 G/DL (ref 6–8.5)
RBC # BLD AUTO: 5.15 10*6/MM3 (ref 3.77–5.28)
SODIUM BLD-SCNC: 139 MMOL/L (ref 136–145)
WBC NRBC COR # BLD: 8.38 10*3/MM3 (ref 3.4–10.8)

## 2019-09-26 PROCEDURE — 99213 OFFICE O/P EST LOW 20 MIN: CPT | Performed by: NURSE PRACTITIONER

## 2019-09-26 PROCEDURE — G0008 ADMIN INFLUENZA VIRUS VAC: HCPCS | Performed by: NURSE PRACTITIONER

## 2019-09-26 PROCEDURE — 36415 COLL VENOUS BLD VENIPUNCTURE: CPT

## 2019-09-26 PROCEDURE — 90653 IIV ADJUVANT VACCINE IM: CPT | Performed by: NURSE PRACTITIONER

## 2019-09-26 PROCEDURE — 85025 COMPLETE CBC W/AUTO DIFF WBC: CPT | Performed by: INTERNAL MEDICINE

## 2019-09-26 PROCEDURE — 99214 OFFICE O/P EST MOD 30 MIN: CPT | Performed by: INTERNAL MEDICINE

## 2019-09-26 PROCEDURE — 80053 COMPREHEN METABOLIC PANEL: CPT | Performed by: INTERNAL MEDICINE

## 2019-09-26 RX ORDER — CLOBETASOL PROPIONATE 0.5 MG/G
OINTMENT TOPICAL
Qty: 1 TUBE | Refills: 0 | Status: SHIPPED | OUTPATIENT
Start: 2019-09-26

## 2019-09-26 RX ORDER — HYDROCODONE BITARTRATE AND ACETAMINOPHEN 10; 325 MG/1; MG/1
1 TABLET ORAL 2 TIMES DAILY
Refills: 0 | COMMUNITY
Start: 2019-09-13 | End: 2020-06-08

## 2019-09-26 RX ORDER — ROSUVASTATIN CALCIUM 10 MG/1
1 TABLET, COATED ORAL DAILY
COMMUNITY
Start: 2019-09-09

## 2019-09-26 ASSESSMENT — ENCOUNTER SYMPTOMS
ALLERGIC/IMMUNOLOGIC NEGATIVE: 1
RHINORRHEA: 0
SORE THROAT: 0
SINUS PRESSURE: 0
EYES NEGATIVE: 1
EYE PAIN: 0

## 2019-09-26 NOTE — PROGRESS NOTES
cholesterol    Hypertension     On medications for 2 years    Joint pain 2/2/2016    Liver disease     Has fatty liver    Morbid obesity ([facility] [address]     Multiple food allergies     Neuropathic pain     Neuropathy     Neuropathy involving both lower extremities 2/2/2016    Obstructive sleep apnea     AHI: 68.1 per PSG, 5/2018    Osteoarthritis     Other disorders of kidney and ureter in diseases classified elsewhere     Pneumonia     Postmenopausal osteoporosis     Psychiatric problem     Restless leg syndrome     S/P lumpectomy, left breast 8/19/2015 8/4/2015    Sleep apnea     Type II or unspecified type diabetes mellitus with neurological manifestations, uncontrolled(250.62)      Past Surgical History:   Procedure Laterality Date    BREAST SURGERY Left 8/4/2015    left partial mastectomy on 8/4/15    EYE SURGERY      EYE SURGERY Bilateral 04/02/2017    Dr. Joseph Jolly      broke L ankle due to osteoporosis, has hardware in    FRACTURE SURGERY      Left ankle     HYSTERECTOMY      LA DRAIN SKIN ABSCESS COMPLIC N/A 6/09/1619    ABDOMINAL WALL ABSCESS INCISION AND DRAINAGE performed by Silva Vega MD at [address] ENDOSCOPY  4/8/2016    Dr Brooks Klein, (-) H Pylori    WRIST SURGERY         Family History   Problem Relation Age of Onset    High Blood Pressure Father     Heart Disease Father     Diabetes Father     High Blood Pressure Sister     Diabetes Sister     Cancer Sister     High Blood Pressure Brother     Colon Cancer Neg Hx     Colon Polyps Neg Hx     Esophageal Cancer Neg Hx     Liver Cancer Neg Hx     Liver Disease Neg Hx     Rectal Cancer Neg Hx     Stomach Cancer Neg Hx        Social History     Tobacco Use    Smoking status: Never Smoker    Smokeless tobacco: Never Used   Substance Use Topics    Alcohol use:  Yes     Alcohol/week: 2.0 standard drinks     Types: 1 Glasses of wine, 1 Cans of beer per

## 2019-10-01 ENCOUNTER — TELEPHONE (OUTPATIENT)
Dept: URGENT CARE | Age: 74
End: 2019-10-01

## 2019-10-01 RX ORDER — TRIAMCINOLONE ACETONIDE 1 MG/G
CREAM TOPICAL
Qty: 1 TUBE | Refills: 0 | Status: SHIPPED | OUTPATIENT
Start: 2019-10-01 | End: 2019-11-25

## 2019-10-02 DIAGNOSIS — I10 ESSENTIAL HYPERTENSION: ICD-10-CM

## 2019-10-02 RX ORDER — LOSARTAN POTASSIUM 100 MG/1
TABLET ORAL
Qty: 30 TABLET | Refills: 1 | Status: SHIPPED | OUTPATIENT
Start: 2019-10-02 | End: 2019-10-30 | Stop reason: SDUPTHER

## 2019-10-04 RX ORDER — CITALOPRAM 20 MG/1
TABLET ORAL
Qty: 30 TABLET | Refills: 5 | Status: SHIPPED | OUTPATIENT
Start: 2019-10-04 | End: 2019-10-08 | Stop reason: SDUPTHER

## 2019-10-08 RX ORDER — CITALOPRAM 20 MG/1
TABLET ORAL
Qty: 30 TABLET | Refills: 5 | Status: SHIPPED | OUTPATIENT
Start: 2019-10-08 | End: 2019-10-30 | Stop reason: SDUPTHER

## 2019-10-08 RX ORDER — ANASTROZOLE 1 MG/1
1 TABLET ORAL DAILY
Qty: 90 TABLET | Refills: 3 | Status: SHIPPED | OUTPATIENT
Start: 2019-10-08 | End: 2020-12-15 | Stop reason: SDUPTHER

## 2019-10-17 ENCOUNTER — TRANSCRIBE ORDERS (OUTPATIENT)
Dept: PHYSICAL THERAPY | Facility: CLINIC | Age: 74
End: 2019-10-17

## 2019-10-17 DIAGNOSIS — I89.0 LYMPHEDEMA: Primary | ICD-10-CM

## 2019-10-23 ENCOUNTER — TREATMENT (OUTPATIENT)
Dept: PHYSICAL THERAPY | Facility: CLINIC | Age: 74
End: 2019-10-23

## 2019-10-23 DIAGNOSIS — I89.0 LYMPHEDEMA OF LEFT ARM: Primary | ICD-10-CM

## 2019-10-23 DIAGNOSIS — Z17.0 MALIGNANT NEOPLASM OF OVERLAPPING SITES OF LEFT BREAST IN FEMALE, ESTROGEN RECEPTOR POSITIVE (HCC): ICD-10-CM

## 2019-10-23 DIAGNOSIS — C50.812 MALIGNANT NEOPLASM OF OVERLAPPING SITES OF LEFT BREAST IN FEMALE, ESTROGEN RECEPTOR POSITIVE (HCC): ICD-10-CM

## 2019-10-23 PROCEDURE — 97162 PT EVAL MOD COMPLEX 30 MIN: CPT | Performed by: PHYSICAL THERAPIST

## 2019-10-28 ENCOUNTER — TELEPHONE (OUTPATIENT)
Dept: PRIMARY CARE CLINIC | Age: 74
End: 2019-10-28

## 2019-10-28 DIAGNOSIS — B37.31 YEAST VAGINITIS: Primary | ICD-10-CM

## 2019-10-28 RX ORDER — FLUCONAZOLE 150 MG/1
150 TABLET ORAL ONCE
Qty: 2 TABLET | Refills: 0 | Status: SHIPPED | OUTPATIENT
Start: 2019-10-28 | End: 2019-10-28

## 2019-10-30 DIAGNOSIS — N39.41 URGE INCONTINENCE OF URINE: ICD-10-CM

## 2019-10-30 DIAGNOSIS — I10 ESSENTIAL HYPERTENSION: ICD-10-CM

## 2019-10-31 ENCOUNTER — TELEPHONE (OUTPATIENT)
Dept: PRIMARY CARE CLINIC | Age: 74
End: 2019-10-31

## 2019-10-31 DIAGNOSIS — I10 ESSENTIAL HYPERTENSION: ICD-10-CM

## 2019-10-31 DIAGNOSIS — N39.41 URGE INCONTINENCE OF URINE: ICD-10-CM

## 2019-10-31 RX ORDER — METOPROLOL SUCCINATE 50 MG/1
TABLET, EXTENDED RELEASE ORAL
Qty: 90 TABLET | Refills: 1 | Status: SHIPPED | OUTPATIENT
Start: 2019-10-31 | End: 2020-04-17 | Stop reason: SDUPTHER

## 2019-10-31 RX ORDER — LOSARTAN POTASSIUM 100 MG/1
TABLET ORAL
Qty: 90 TABLET | Refills: 1 | Status: SHIPPED | OUTPATIENT
Start: 2019-10-31 | End: 2020-04-17 | Stop reason: SDUPTHER

## 2019-10-31 RX ORDER — LORATADINE 10 MG/1
TABLET ORAL
Qty: 90 TABLET | Refills: 1 | Status: SHIPPED | OUTPATIENT
Start: 2019-10-31 | End: 2019-10-31 | Stop reason: SDUPTHER

## 2019-10-31 RX ORDER — OXYBUTYNIN CHLORIDE 10 MG/1
10 TABLET, EXTENDED RELEASE ORAL DAILY
Qty: 90 TABLET | Refills: 1 | Status: SHIPPED | OUTPATIENT
Start: 2019-10-31 | End: 2019-10-31 | Stop reason: SDUPTHER

## 2019-10-31 RX ORDER — ERGOCALCIFEROL 1.25 MG/1
CAPSULE ORAL
Qty: 12 CAPSULE | Refills: 1 | Status: SHIPPED | OUTPATIENT
Start: 2019-10-31 | End: 2019-11-18 | Stop reason: SDUPTHER

## 2019-10-31 RX ORDER — ERGOCALCIFEROL 1.25 MG/1
CAPSULE ORAL
Qty: 12 CAPSULE | Refills: 1 | Status: SHIPPED | OUTPATIENT
Start: 2019-10-31 | End: 2019-10-31 | Stop reason: SDUPTHER

## 2019-10-31 RX ORDER — ROSUVASTATIN CALCIUM 10 MG/1
TABLET, COATED ORAL
Qty: 90 TABLET | Refills: 1 | Status: SHIPPED | OUTPATIENT
Start: 2019-10-31 | End: 2020-04-17 | Stop reason: SDUPTHER

## 2019-10-31 RX ORDER — CITALOPRAM 20 MG/1
TABLET ORAL
Qty: 90 TABLET | Refills: 1 | Status: SHIPPED | OUTPATIENT
Start: 2019-10-31 | End: 2019-10-31 | Stop reason: SDUPTHER

## 2019-10-31 RX ORDER — MONTELUKAST SODIUM 10 MG/1
TABLET ORAL
Qty: 90 TABLET | Refills: 1 | Status: SHIPPED | OUTPATIENT
Start: 2019-10-31 | End: 2020-04-26

## 2019-10-31 RX ORDER — OXYBUTYNIN CHLORIDE 5 MG/1
TABLET, EXTENDED RELEASE ORAL
Qty: 90 TABLET | Refills: 1 | Status: SHIPPED | OUTPATIENT
Start: 2019-10-31 | End: 2019-11-25 | Stop reason: ALTCHOICE

## 2019-10-31 RX ORDER — OMEPRAZOLE 20 MG/1
CAPSULE, DELAYED RELEASE ORAL
Qty: 180 CAPSULE | Refills: 1 | Status: SHIPPED | OUTPATIENT
Start: 2019-10-31 | End: 2020-04-26

## 2019-10-31 RX ORDER — MONTELUKAST SODIUM 10 MG/1
TABLET ORAL
Qty: 90 TABLET | Refills: 1 | Status: SHIPPED | OUTPATIENT
Start: 2019-10-31 | End: 2019-10-31 | Stop reason: SDUPTHER

## 2019-10-31 RX ORDER — LORATADINE 10 MG/1
TABLET ORAL
Qty: 90 TABLET | Refills: 1 | Status: SHIPPED | OUTPATIENT
Start: 2019-10-31 | End: 2019-11-25 | Stop reason: SDUPTHER

## 2019-10-31 RX ORDER — OMEPRAZOLE 20 MG/1
CAPSULE, DELAYED RELEASE ORAL
Qty: 180 CAPSULE | Refills: 1 | Status: SHIPPED | OUTPATIENT
Start: 2019-10-31 | End: 2019-10-31 | Stop reason: SDUPTHER

## 2019-10-31 RX ORDER — CITALOPRAM 20 MG/1
TABLET ORAL
Qty: 90 TABLET | Refills: 1 | Status: SHIPPED | OUTPATIENT
Start: 2019-10-31 | End: 2019-11-07 | Stop reason: SDUPTHER

## 2019-10-31 RX ORDER — ROSUVASTATIN CALCIUM 10 MG/1
TABLET, COATED ORAL
Qty: 90 TABLET | Refills: 1 | Status: SHIPPED | OUTPATIENT
Start: 2019-10-31 | End: 2019-10-31 | Stop reason: SDUPTHER

## 2019-10-31 RX ORDER — LOSARTAN POTASSIUM 100 MG/1
TABLET ORAL
Qty: 90 TABLET | Refills: 1 | Status: SHIPPED | OUTPATIENT
Start: 2019-10-31 | End: 2019-10-31 | Stop reason: SDUPTHER

## 2019-10-31 RX ORDER — OXYBUTYNIN CHLORIDE 5 MG/1
TABLET, EXTENDED RELEASE ORAL
Qty: 90 TABLET | Refills: 1 | Status: SHIPPED | OUTPATIENT
Start: 2019-10-31 | End: 2019-10-31 | Stop reason: SDUPTHER

## 2019-10-31 RX ORDER — METOPROLOL SUCCINATE 50 MG/1
TABLET, EXTENDED RELEASE ORAL
Qty: 90 TABLET | Refills: 1 | Status: SHIPPED | OUTPATIENT
Start: 2019-10-31 | End: 2019-10-31 | Stop reason: SDUPTHER

## 2019-10-31 RX ORDER — OXYBUTYNIN CHLORIDE 10 MG/1
10 TABLET, EXTENDED RELEASE ORAL DAILY
Qty: 90 TABLET | Refills: 1 | Status: SHIPPED | OUTPATIENT
Start: 2019-10-31 | End: 2020-04-26

## 2019-11-06 ENCOUNTER — TREATMENT (OUTPATIENT)
Dept: PHYSICAL THERAPY | Facility: CLINIC | Age: 74
End: 2019-11-06

## 2019-11-06 DIAGNOSIS — I89.0 LYMPHEDEMA OF LEFT ARM: Primary | ICD-10-CM

## 2019-11-06 PROCEDURE — 97140 MANUAL THERAPY 1/> REGIONS: CPT | Performed by: PHYSICAL THERAPIST

## 2019-11-07 DIAGNOSIS — G62.9 NEUROPATHY: ICD-10-CM

## 2019-11-07 RX ORDER — CITALOPRAM 20 MG/1
TABLET ORAL
Qty: 90 TABLET | Refills: 3 | Status: SHIPPED | OUTPATIENT
Start: 2019-11-07 | End: 2020-04-17 | Stop reason: SDUPTHER

## 2019-11-07 RX ORDER — GABAPENTIN 300 MG/1
CAPSULE ORAL
Qty: 90 CAPSULE | Refills: 2 | Status: SHIPPED | OUTPATIENT
Start: 2019-11-07 | End: 2019-12-30

## 2019-11-07 RX ORDER — MAGNESIUM OXIDE 400 MG/1
400 TABLET ORAL DAILY
Qty: 90 TABLET | Refills: 3 | Status: SHIPPED | OUTPATIENT
Start: 2019-11-07 | End: 2020-04-17 | Stop reason: SDUPTHER

## 2019-11-13 ENCOUNTER — TELEPHONE (OUTPATIENT)
Dept: PRIMARY CARE CLINIC | Age: 74
End: 2019-11-13

## 2019-11-13 ENCOUNTER — TREATMENT (OUTPATIENT)
Dept: PHYSICAL THERAPY | Facility: CLINIC | Age: 74
End: 2019-11-13

## 2019-11-13 DIAGNOSIS — I89.0 LYMPHEDEMA OF LEFT ARM: Primary | ICD-10-CM

## 2019-11-13 DIAGNOSIS — C50.812 MALIGNANT NEOPLASM OF OVERLAPPING SITES OF LEFT BREAST IN FEMALE, ESTROGEN RECEPTOR POSITIVE (HCC): ICD-10-CM

## 2019-11-13 DIAGNOSIS — Z17.0 MALIGNANT NEOPLASM OF OVERLAPPING SITES OF LEFT BREAST IN FEMALE, ESTROGEN RECEPTOR POSITIVE (HCC): ICD-10-CM

## 2019-11-13 PROCEDURE — 97140 MANUAL THERAPY 1/> REGIONS: CPT | Performed by: PHYSICAL THERAPIST

## 2019-11-14 ENCOUNTER — TELEPHONE (OUTPATIENT)
Dept: PRIMARY CARE CLINIC | Age: 74
End: 2019-11-14

## 2019-11-18 RX ORDER — ERGOCALCIFEROL 1.25 MG/1
CAPSULE ORAL
Qty: 12 CAPSULE | Refills: 0 | Status: SHIPPED | OUTPATIENT
Start: 2019-11-18 | End: 2019-11-25 | Stop reason: SDUPTHER

## 2019-11-19 ENCOUNTER — TREATMENT (OUTPATIENT)
Dept: PHYSICAL THERAPY | Facility: CLINIC | Age: 74
End: 2019-11-19

## 2019-11-19 DIAGNOSIS — I89.0 LYMPHEDEMA OF LEFT ARM: Primary | ICD-10-CM

## 2019-11-19 PROCEDURE — 97140 MANUAL THERAPY 1/> REGIONS: CPT | Performed by: PHYSICAL THERAPIST

## 2019-11-25 ENCOUNTER — OFFICE VISIT (OUTPATIENT)
Dept: PRIMARY CARE CLINIC | Age: 74
End: 2019-11-25
Payer: MEDICARE

## 2019-11-25 VITALS
SYSTOLIC BLOOD PRESSURE: 116 MMHG | TEMPERATURE: 97 F | BODY MASS INDEX: 47.44 KG/M2 | HEIGHT: 62 IN | WEIGHT: 257.8 LBS | OXYGEN SATURATION: 97 % | HEART RATE: 73 BPM | DIASTOLIC BLOOD PRESSURE: 80 MMHG

## 2019-11-25 DIAGNOSIS — R59.0 ENLARGED LYMPH NODE IN NECK: ICD-10-CM

## 2019-11-25 DIAGNOSIS — R35.0 URINARY FREQUENCY: ICD-10-CM

## 2019-11-25 DIAGNOSIS — J44.9 CHRONIC OBSTRUCTIVE PULMONARY DISEASE, UNSPECIFIED COPD TYPE (HCC): ICD-10-CM

## 2019-11-25 DIAGNOSIS — E11.8 TYPE 2 DIABETES MELLITUS WITH COMPLICATION (HCC): ICD-10-CM

## 2019-11-25 DIAGNOSIS — R82.998 LEUKOCYTES IN URINE: ICD-10-CM

## 2019-11-25 DIAGNOSIS — R10.9 RIGHT FLANK PAIN: ICD-10-CM

## 2019-11-25 DIAGNOSIS — E11.8 TYPE 2 DIABETES MELLITUS WITH COMPLICATION (HCC): Primary | ICD-10-CM

## 2019-11-25 LAB
APPEARANCE FLUID: CLEAR
BILIRUBIN, POC: NORMAL
BLOOD URINE, POC: NORMAL
CLARITY, POC: CLEAR
COLOR, POC: YELLOW
CREATININE URINE: 16.5 MG/DL (ref 4.2–622)
GLUCOSE URINE, POC: 500
HBA1C MFR BLD: 11.3 %
KETONES, POC: NORMAL
LEUKOCYTE EST, POC: NORMAL
MICROALBUMIN UR-MCNC: 2.6 MG/DL (ref 0–19)
MICROALBUMIN/CREAT UR-RTO: 157.6 MG/G
NITRITE, POC: NORMAL
PH, POC: 6
PROTEIN, POC: NORMAL
SPECIFIC GRAVITY, POC: 1.01
UROBILINOGEN, POC: 0.2

## 2019-11-25 PROCEDURE — 81002 URINALYSIS NONAUTO W/O SCOPE: CPT | Performed by: NURSE PRACTITIONER

## 2019-11-25 PROCEDURE — 99215 OFFICE O/P EST HI 40 MIN: CPT | Performed by: NURSE PRACTITIONER

## 2019-11-25 PROCEDURE — 83036 HEMOGLOBIN GLYCOSYLATED A1C: CPT | Performed by: NURSE PRACTITIONER

## 2019-11-25 RX ORDER — GLUCOSAMINE HCL/CHONDROITIN SU 500-400 MG
CAPSULE ORAL
Qty: 100 STRIP | Refills: 5 | Status: SHIPPED | OUTPATIENT
Start: 2019-11-25 | End: 2021-07-14 | Stop reason: SDUPTHER

## 2019-11-25 RX ORDER — ERGOCALCIFEROL 1.25 MG/1
CAPSULE ORAL
Qty: 12 CAPSULE | Refills: 2 | Status: SHIPPED | OUTPATIENT
Start: 2019-11-25 | End: 2020-04-17 | Stop reason: SDUPTHER

## 2019-11-25 RX ORDER — LORATADINE 10 MG/1
TABLET ORAL
Qty: 90 TABLET | Refills: 1 | Status: SHIPPED | OUTPATIENT
Start: 2019-11-25 | End: 2020-03-31

## 2019-11-26 ENCOUNTER — TREATMENT (OUTPATIENT)
Dept: PHYSICAL THERAPY | Facility: CLINIC | Age: 74
End: 2019-11-26

## 2019-11-26 DIAGNOSIS — Z17.0 MALIGNANT NEOPLASM OF OVERLAPPING SITES OF LEFT BREAST IN FEMALE, ESTROGEN RECEPTOR POSITIVE (HCC): ICD-10-CM

## 2019-11-26 DIAGNOSIS — C50.812 MALIGNANT NEOPLASM OF OVERLAPPING SITES OF LEFT BREAST IN FEMALE, ESTROGEN RECEPTOR POSITIVE (HCC): ICD-10-CM

## 2019-11-26 DIAGNOSIS — I89.0 LYMPHEDEMA OF LEFT ARM: Primary | ICD-10-CM

## 2019-11-26 PROCEDURE — 97140 MANUAL THERAPY 1/> REGIONS: CPT | Performed by: PHYSICAL THERAPIST

## 2019-11-27 LAB — URINE CULTURE, ROUTINE: NORMAL

## 2019-11-28 ASSESSMENT — ENCOUNTER SYMPTOMS
ABDOMINAL PAIN: 0
SINUS PRESSURE: 0
SINUS PAIN: 0
EYE PAIN: 0
VOMITING: 0
NAUSEA: 0
COUGH: 0
DIARRHEA: 0
WHEEZING: 0
SHORTNESS OF BREATH: 1
BACK PAIN: 1

## 2019-12-03 ENCOUNTER — TREATMENT (OUTPATIENT)
Dept: PHYSICAL THERAPY | Facility: CLINIC | Age: 74
End: 2019-12-03

## 2019-12-03 DIAGNOSIS — Z17.0 MALIGNANT NEOPLASM OF OVERLAPPING SITES OF LEFT BREAST IN FEMALE, ESTROGEN RECEPTOR POSITIVE (HCC): ICD-10-CM

## 2019-12-03 DIAGNOSIS — C50.812 MALIGNANT NEOPLASM OF OVERLAPPING SITES OF LEFT BREAST IN FEMALE, ESTROGEN RECEPTOR POSITIVE (HCC): ICD-10-CM

## 2019-12-03 DIAGNOSIS — I89.0 LYMPHEDEMA OF LEFT ARM: Primary | ICD-10-CM

## 2019-12-03 PROCEDURE — 97140 MANUAL THERAPY 1/> REGIONS: CPT | Performed by: PHYSICAL THERAPIST

## 2019-12-04 ENCOUNTER — TELEPHONE (OUTPATIENT)
Dept: PODIATRY | Facility: CLINIC | Age: 74
End: 2019-12-04

## 2019-12-05 ENCOUNTER — HOSPITAL ENCOUNTER (OUTPATIENT)
Dept: ULTRASOUND IMAGING | Age: 74
Discharge: HOME | End: 2019-12-05
Payer: MEDICARE

## 2019-12-05 DIAGNOSIS — R10.9 RIGHT FLANK PAIN: ICD-10-CM

## 2019-12-05 DIAGNOSIS — R35.0 URINARY FREQUENCY: ICD-10-CM

## 2019-12-05 DIAGNOSIS — R82.998 LEUKOCYTES IN URINE: ICD-10-CM

## 2019-12-05 PROCEDURE — 76770 US EXAM ABDO BACK WALL COMP: CPT

## 2019-12-06 ENCOUNTER — TELEPHONE (OUTPATIENT)
Dept: PRIMARY CARE CLINIC | Age: 74
End: 2019-12-06

## 2019-12-06 DIAGNOSIS — R93.429 ABNORMAL RENAL ULTRASOUND: ICD-10-CM

## 2019-12-06 DIAGNOSIS — N28.1 RENAL CYST, RIGHT: ICD-10-CM

## 2019-12-06 DIAGNOSIS — R10.9 RIGHT FLANK PAIN: Primary | ICD-10-CM

## 2019-12-09 ENCOUNTER — TELEPHONE (OUTPATIENT)
Dept: PODIATRY | Facility: CLINIC | Age: 74
End: 2019-12-09

## 2019-12-10 ENCOUNTER — TREATMENT (OUTPATIENT)
Dept: PHYSICAL THERAPY | Facility: CLINIC | Age: 74
End: 2019-12-10

## 2019-12-10 DIAGNOSIS — C50.812 MALIGNANT NEOPLASM OF OVERLAPPING SITES OF LEFT BREAST IN FEMALE, ESTROGEN RECEPTOR POSITIVE (HCC): ICD-10-CM

## 2019-12-10 DIAGNOSIS — I89.0 LYMPHEDEMA OF LEFT ARM: Primary | ICD-10-CM

## 2019-12-10 DIAGNOSIS — Z17.0 MALIGNANT NEOPLASM OF OVERLAPPING SITES OF LEFT BREAST IN FEMALE, ESTROGEN RECEPTOR POSITIVE (HCC): ICD-10-CM

## 2019-12-10 PROCEDURE — 97140 MANUAL THERAPY 1/> REGIONS: CPT | Performed by: PHYSICAL THERAPIST

## 2019-12-11 ENCOUNTER — HOSPITAL ENCOUNTER (OUTPATIENT)
Dept: PULMONOLOGY | Age: 74
Discharge: HOME | End: 2019-12-11
Payer: MEDICARE

## 2019-12-11 ENCOUNTER — OUTSIDE FACILITY SERVICE (OUTPATIENT)
Dept: PULMONOLOGY | Facility: CLINIC | Age: 74
End: 2019-12-11

## 2019-12-11 PROCEDURE — 94010 BREATHING CAPACITY TEST: CPT

## 2019-12-11 PROCEDURE — 94729 DIFFUSING CAPACITY: CPT

## 2019-12-11 PROCEDURE — 94010 BREATHING CAPACITY TEST: CPT | Performed by: INTERNAL MEDICINE

## 2019-12-11 PROCEDURE — 94727 GAS DIL/WSHOT DETER LNG VOL: CPT

## 2019-12-11 PROCEDURE — 94727 GAS DIL/WSHOT DETER LNG VOL: CPT | Performed by: INTERNAL MEDICINE

## 2019-12-11 PROCEDURE — 94729 DIFFUSING CAPACITY: CPT | Performed by: INTERNAL MEDICINE

## 2019-12-13 ENCOUNTER — HOSPITAL ENCOUNTER (OUTPATIENT)
Dept: CT IMAGING | Age: 74
Discharge: HOME | End: 2019-12-13
Payer: MEDICARE

## 2019-12-13 ENCOUNTER — TELEPHONE (OUTPATIENT)
Dept: PRIMARY CARE CLINIC | Age: 74
End: 2019-12-13

## 2019-12-13 DIAGNOSIS — R93.429 ABNORMAL RENAL ULTRASOUND: ICD-10-CM

## 2019-12-13 DIAGNOSIS — N28.1 RENAL CYST, RIGHT: ICD-10-CM

## 2019-12-13 DIAGNOSIS — R10.9 RIGHT FLANK PAIN: ICD-10-CM

## 2019-12-13 PROCEDURE — 6360000004 HC RX CONTRAST MEDICATION: Performed by: NURSE PRACTITIONER

## 2019-12-13 PROCEDURE — 74178 CT ABD&PLV WO CNTR FLWD CNTR: CPT

## 2019-12-13 RX ADMIN — IOPAMIDOL 75 ML: 755 INJECTION, SOLUTION INTRAVENOUS at 13:06

## 2019-12-19 ENCOUNTER — TELEPHONE (OUTPATIENT)
Dept: PRIMARY CARE CLINIC | Age: 74
End: 2019-12-19

## 2019-12-19 DIAGNOSIS — R94.5 ABNORMAL RESULTS OF LIVER FUNCTION STUDIES: ICD-10-CM

## 2019-12-19 DIAGNOSIS — R93.5 ABNORMAL CT OF THE ABDOMEN: ICD-10-CM

## 2019-12-19 DIAGNOSIS — K76.89 HEPATIC CYST: Primary | ICD-10-CM

## 2019-12-19 DIAGNOSIS — R74.8 ELEVATED LIVER ENZYMES: ICD-10-CM

## 2019-12-20 ENCOUNTER — TELEPHONE (OUTPATIENT)
Dept: PRIMARY CARE CLINIC | Age: 74
End: 2019-12-20

## 2019-12-30 ENCOUNTER — OFFICE VISIT (OUTPATIENT)
Dept: UROLOGY | Age: 74
End: 2019-12-30
Payer: MEDICARE

## 2019-12-30 ENCOUNTER — TELEPHONE (OUTPATIENT)
Dept: UROLOGY | Age: 74
End: 2019-12-30

## 2019-12-30 VITALS
HEIGHT: 62 IN | DIASTOLIC BLOOD PRESSURE: 69 MMHG | WEIGHT: 252 LBS | BODY MASS INDEX: 46.38 KG/M2 | TEMPERATURE: 98.4 F | HEART RATE: 91 BPM | SYSTOLIC BLOOD PRESSURE: 139 MMHG

## 2019-12-30 DIAGNOSIS — N28.1 RENAL CYST: Primary | ICD-10-CM

## 2019-12-30 LAB
BILIRUBIN, POC: NORMAL
BLOOD URINE, POC: NORMAL
CLARITY, POC: CLEAR
COLOR, POC: NORMAL
GLUCOSE URINE, POC: 1000
KETONES, POC: 0
LEUKOCYTE EST, POC: NORMAL
NITRITE, POC: NORMAL
PH, POC: 6
PROTEIN, POC: NORMAL
SPECIFIC GRAVITY, POC: 1.01
UROBILINOGEN, POC: 0.2

## 2019-12-30 PROCEDURE — 99213 OFFICE O/P EST LOW 20 MIN: CPT | Performed by: UROLOGY

## 2019-12-30 PROCEDURE — 81003 URINALYSIS AUTO W/O SCOPE: CPT | Performed by: UROLOGY

## 2019-12-30 RX ORDER — GABAPENTIN 300 MG/1
300 CAPSULE ORAL 3 TIMES DAILY
COMMUNITY
End: 2020-01-02

## 2019-12-30 RX ORDER — FAMOTIDINE 20 MG
TABLET ORAL
COMMUNITY
End: 2021-05-20

## 2019-12-30 RX ORDER — FLUCONAZOLE 150 MG/1
TABLET ORAL
COMMUNITY
Start: 2019-10-28 | End: 2020-07-31 | Stop reason: ALTCHOICE

## 2019-12-30 ASSESSMENT — ENCOUNTER SYMPTOMS
CONSTIPATION: 0
EYE DISCHARGE: 0
WHEEZING: 0
DIARRHEA: 0
ABDOMINAL PAIN: 0
SHORTNESS OF BREATH: 0
EYE REDNESS: 0
COUGH: 0
BACK PAIN: 0

## 2020-01-02 ENCOUNTER — APPOINTMENT (OUTPATIENT)
Dept: ULTRASOUND IMAGING | Age: 75
End: 2020-01-02
Payer: MEDICARE

## 2020-01-02 ENCOUNTER — OFFICE VISIT (OUTPATIENT)
Dept: URGENT CARE | Age: 75
End: 2020-01-02
Payer: MEDICARE

## 2020-01-02 ENCOUNTER — HOSPITAL ENCOUNTER (OUTPATIENT)
Dept: ULTRASOUND IMAGING | Age: 75
Discharge: HOME | End: 2020-01-02
Payer: MEDICARE

## 2020-01-02 VITALS
SYSTOLIC BLOOD PRESSURE: 129 MMHG | BODY MASS INDEX: 36.8 KG/M2 | HEIGHT: 62 IN | TEMPERATURE: 98.3 F | HEART RATE: 90 BPM | RESPIRATION RATE: 16 BRPM | OXYGEN SATURATION: 96 % | WEIGHT: 200 LBS | DIASTOLIC BLOOD PRESSURE: 85 MMHG

## 2020-01-02 LAB
INFLUENZA A ANTIBODY: NEGATIVE
INFLUENZA B ANTIBODY: NEGATIVE
S PYO AG THROAT QL: POSITIVE

## 2020-01-02 PROCEDURE — 76705 ECHO EXAM OF ABDOMEN: CPT

## 2020-01-02 PROCEDURE — 99213 OFFICE O/P EST LOW 20 MIN: CPT | Performed by: NURSE PRACTITIONER

## 2020-01-02 PROCEDURE — 87880 STREP A ASSAY W/OPTIC: CPT | Performed by: NURSE PRACTITIONER

## 2020-01-02 PROCEDURE — 87804 INFLUENZA ASSAY W/OPTIC: CPT | Performed by: NURSE PRACTITIONER

## 2020-01-02 RX ORDER — AZITHROMYCIN 250 MG/1
250 TABLET, FILM COATED ORAL DAILY
Qty: 10 TABLET | Refills: 0 | Status: SHIPPED | OUTPATIENT
Start: 2020-01-02 | End: 2020-01-12

## 2020-01-02 ASSESSMENT — ENCOUNTER SYMPTOMS
COUGH: 0
VOMITING: 0
NAUSEA: 0
COLOR CHANGE: 0
SHORTNESS OF BREATH: 0
RHINORRHEA: 0
VOICE CHANGE: 0
BACK PAIN: 0
PHOTOPHOBIA: 0
SORE THROAT: 1

## 2020-01-02 NOTE — PROGRESS NOTES
[facility] URGENT [facility]  [address]  [address] [place] [address]  Dept: [phone]  Loc: [phone]    Teri Arrington is a 69 y.o. female who presents today for her medical conditions/complaints as noted below. Teri Arrington is c/o of Pharyngitis; Cough (chest tightness); and Congestion      HPI:       HPI   Chief Complaint   Patient presents with    Pharyngitis    Cough     chest tightness    Congestion     Patient presents today with cough, congestion, and pharyngitis. Patient relates it feels like she has strep throat and that she usually gets it once a year. Patient has a non-productive cough and \"burning pain\" in lungs. Patients symptoms started yesterday and are worsening; patient has not taken anything OTC to help relieve symptoms.        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    Breast CA (HCC)     Left breast Nodules removed Took radiation    Chronic back pain     Chronic kidney disease     Overactive bladder     COPD (chronic obstructive pulmonary disease) (HCC)     x few years Scarring on lungs 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    Diabetes mellitus ([address]     On insulin x 10 years    Fibromyalgia     Fibromyalgia     for 20 years    GERD (gastroesophageal reflux disease)     History of blood transfusion     ?when    Hyperlipidemia     High cholesterol    Hypertension     On medications for 2 years    Joint pain 2/2/2016    Liver disease     Has fatty liver    Morbid obesity ([address]     Multiple food allergies     Neuropathic pain     Neuropathy     Neuropathy involving both lower extremities 2/2/2016    Obstructive sleep apnea     AHI: 68.1 per PSG, 5/2018    Osteoarthritis     Other disorders of kidney and ureter in diseases classified elsewhere     Pneumonia     Postmenopausal osteoporosis     Psychiatric problem     Restless leg syndrome     S/P lumpectomy, left breast 8/19/2015 8/4/2015    Sleep apnea     Type II or unspecified type diabetes mellitus with neurological manifestations, uncontrolled(250.62)       Past Surgical History:   Procedure Laterality Date    BREAST SURGERY Left 8/4/2015    left partial mastectomy on 8/4/15    EYE SURGERY      EYE SURGERY Bilateral 04/02/2017    Dr. Kel Kunz      broke L ankle due to osteoporosis, has hardware in    FRACTURE SURGERY      Left ankle     HYSTERECTOMY      TN DRAIN SKIN ABSCESS COMPLIC N/A 8/94/9929    ABDOMINAL WALL ABSCESS INCISION AND DRAINAGE performed by Shannan Zazueta MD at [address] ENDOSCOPY  4/8/2016    Dr MARIANNE Coulter-Gastritis, (-) H Pylori    WRIST SURGERY         Vitals 1/2/2020 12/30/2019 11/25/2019 9/26/2019 8/26/2019 7/33/1870   SYSTOLIC 917 365 077 410 - 213   DIASTOLIC 85 69 80 65 - 70   Site - - - - - -   Position - - - - - -   Cuff Size - - - - - -   Pulse 90 91 73 83 - 80   Temp 98.3 98.4 97 98.6 98.6 97.6   Resp 16 - - 16 - -   SpO2 96 - 97 98 - 97   Weight 200 lb 252 lb 257 lb 12.8 oz (No Data) 259 lb 260 lb 12.8 oz   Height 5' 2\" 5' 2\" 5' 2\" 5' 2\" 5' 2\" 5' 2\"   BMI (wt*703/ht~2) 36.58 kg/m2 46.09 kg/m2 47.15 kg/m2 - 47.37 kg/m2 47.7 kg/m2   Some recent data might be hidden       Family History   Problem Relation Age of Onset    High Blood Pressure Father     Heart Disease Father     Diabetes Father     High Blood Pressure Sister     Diabetes Sister     Cancer Sister     High Blood Pressure Brother     Colon Cancer Neg Hx     Colon Polyps Neg Hx     Esophageal Cancer Neg Hx     Liver Cancer Neg Hx     Liver Disease Neg Hx     Rectal Cancer Neg Hx     Stomach Cancer Neg Hx        Social History     Tobacco Use    Smoking status: Never Smoker    Smokeless tobacco: Never Used Substance Use Topics    Alcohol use: Yes     Alcohol/week: 2.0 standard drinks     Types: 1 Glasses of wine, 1 Cans of beer per week     Comment: occ      Current Outpatient Medications   Medication Sig Dispense Refill    azithromycin (ZITHROMAX) 250 MG tablet Take 1 tablet by mouth daily for 10 days 10 tablet 0    Vitamin D, Cholecalciferol, 25 MCG (1000 UT) CAPS Take by mouth      tiotropium (SPIRIVA) 18 MCG inhalation capsule Inhale 1 capsule into the lungs daily.  gabapentin (NEURONTIN) 300 MG capsule Take 300 mg by mouth 3 times daily.  fluconazole (DIFLUCAN) 150 MG tablet       Insulin Degludec (TRESIBA FLEXTOUCH) 200 UNIT/ML SOPN INJECT 100 UNITS (=1/2 ML) SUBCUTANEOUSLY NIGHTLY 27 mL 0    loratadine (CLARITIN) 10 MG tablet TAKE 1 TABLET BY MOUTH ONCE DAILY. 90 tablet 1    vitamin D (ERGOCALCIFEROL) 1.25 MG (12935 UT) CAPS capsule Take once weekly 12 capsule 2    blood glucose monitor strips Test 3 times a day & as needed for symptoms of irregular blood glucose. 100 strip 5    insulin aspart (NOVOLOG FLEXPEN) 100 UNIT/ML injection pen Indications: 20 units twice a day INJECT 20 UNITS BEFORE MEALS 3 TIMES A DAY 15 pen 0    magnesium oxide (MAG-OX) 400 MG tablet Take 1 tablet by mouth daily 90 tablet 3    citalopram (CELEXA) 20 MG tablet TAKE 1 TABLET DAILY 90 tablet 3    metoprolol succinate (TOPROL XL) 50 MG extended release tablet TAKE 1 TABLET DAILY FOR BLOOD PRESSURE AND HEART PROTECTION. 90 tablet 1    losartan (COZAAR) 100 MG tablet TAKE (1) TABLET DAILY FOR BLOOD PRESSURE. 90 tablet 1    empagliflozin (JARDIANCE) 10 MG tablet TAKE 1 TABLET BY MOUTH ONCE DAILY. 90 tablet 1    rosuvastatin (CRESTOR) 10 MG tablet TAKE 1 TABLET BY MOUTH ONCE DAILY.  90 tablet 1    oxybutynin (DITROPAN-XL) 10 MG extended release tablet Take 1 tablet by mouth daily 90 tablet 1    sitaGLIPtan-metformin (JANUMET)  MG per tablet Take 1 tablet by mouth 2 times daily (with meals) 180 tablet 1    omeprazole (PRILOSEC) 20 MG delayed release capsule TAKE (1) CAPSULE BY MOUTH TWICE DAILY AS NEEDED. 180 capsule 1    montelukast (SINGULAIR) 10 MG tablet TAKE 1 TABLET BY MOUTH AT NIGHT 90 tablet 1    clobetasol (TEMOVATE) 0.05 % ointment Apply topically 2 times daily. For 10 days 1 Tube 0    Insulin Pen Needle 31G X 5 MM MISC 1 each by Does not apply route daily 100 each 3    nystatin (MYCOSTATIN) 179160 UNIT/GM cream Apply topically 2 times daily. 1 Tube 1    aspirin 81 MG chewable tablet Take 1 tablet by mouth daily 30 tablet 3    HYDROcodone-acetaminophen (NORCO)  MG per tablet Take 1 tablet by mouth Daily with lunch. João Prophet Insulin Pen Needle 31G X 5 MM MISC As directed 100 each 1    Misc. Devices (ROLLER WALKER) MISC 1 each by Does not apply route daily Pt states that she falls at least once a month up to 3 times a month. 1 each 0    INSULIN SYRINGE 1CC/29G 29G X 1/2\" 1 ML MISC Use with each dose of novolog TID 90 each 1    mometasone-formoterol (DULERA) 200-5 MCG/ACT inhaler Inhale 2 puffs into the lungs every 12 hours      anastrozole (ARIMIDEX) 1 MG tablet Take 1 mg by mouth daily      Multiple Vitamins-Minerals (MULTIVITAMIN ADULT PO) Take 1 tablet by mouth daily       albuterol (PROVENTIL HFA;VENTOLIN HFA) 108 (90 BASE) MCG/ACT inhaler Inhale 2 puffs into the lungs every 4 hours as needed for Wheezing      Incontinence Supply Disposable (POISE PANTILINERS) PADS Use pads as needed daily for urine leakage. 1 each 11     No current facility-administered medications for this visit. Allergies   Allergen Reactions    Pcn [Penicillins] Hives and Itching     Patient states she can tolerate ampicillin and amoxicillin however.     Cefdinir        Health Maintenance   Topic Date Due    Colon cancer screen colonoscopy  12/19/1997    Diabetic retinal exam  08/12/2016    Diabetic foot exam  04/10/2019    Annual Wellness Visit (AWV)  05/29/2019    Shingles Vaccine (1 of 2) 08/22/2020 neck supple. Cardiovascular:      Rate and Rhythm: Normal rate and regular rhythm. Heart sounds: Normal heart sounds, S1 normal and S2 normal.   Pulmonary:      Effort: Pulmonary effort is normal.      Breath sounds: Normal breath sounds. Abdominal:      General: Bowel sounds are normal.      Palpations: Abdomen is soft. Musculoskeletal: Normal range of motion. Skin:     General: Skin is warm and dry. Neurological:      Mental Status: She is alert and oriented to person, place, and time. Psychiatric:         Behavior: Behavior normal.       /85   Pulse 90   Temp 98.3 °F (36.8 °C)   Resp 16   Ht 5' 2\" (1.575 m)   Wt 200 lb (90.7 kg)   LMP  (LMP Unknown)   SpO2 96%   BMI 36.58 kg/m²     Assessment:       Diagnosis Orders   1. Strep pharyngitis     2. Sore throat  POCT rapid strep A   3. Cough  POCT Influenza A/B     Results for orders placed or performed in visit on 01/02/20   POCT rapid strep A   Result Value Ref Range    Strep A Ag Positive (A) None Detected   POCT Influenza A/B   Result Value Ref Range    Influenza A Ab negative     Influenza B Ab negative        Plan:       1. Begin antibiotic as directed. 2. May use tylenol and motrin alternating for fever control. 3. Dispose of toothbrush in 48 hours. 4. Increase fluids. 5. Follow-up with PCP if symptoms worsen or fail to improve. Patient given educational materials -see patient instructions. Discussed use, benefit, and side effects of prescribed medications. All patient questions answered. Pt voiced understanding. Reviewed health maintenance. Instructed to continue currentmedications, diet and exercise. Patient agreed with treatment plan. Follow up as directed.      MEDICATIONS:  Orders Placed This Encounter   Medications    azithromycin (ZITHROMAX) 250 MG tablet     Sig: Take 1 tablet by mouth daily for 10 days     Dispense:  10 tablet     Refill:  0         ORDERS:  Orders Placed This Encounter   Procedures    the label. · Try an over-the-counter anesthetic throat spray or throat lozenges, which may help relieve throat pain. · Drink plenty of fluids. Fluids may help soothe an irritated throat. Hot fluids, such as tea or soup, may help your throat feel better. · Eat soft solids and drink plenty of clear liquids. Flavored ice pops, ice cream, scrambled eggs, sherbet, and gelatin dessert (such as Jell-O) may also soothe the throat. · Get lots of rest.  · Do not smoke, and avoid secondhand smoke. If you need help quitting, talk to your doctor about stop-smoking programs and medicines. These can increase your chances of quitting for good. · Use a vaporizer or humidifier to add moisture to the air in your bedroom. Follow the directions for cleaning the machine. When should you call for help? Call your doctor now or seek immediate medical care if:    · You have a new or higher fever.     · You have a fever with a stiff neck or severe headache.     · You have new or worse trouble swallowing.     · Your sore throat gets much worse on one side.     · Your pain becomes much worse on one side of your throat.    Watch closely for changes in your health, and be sure to contact your doctor if:    · You are not getting better after 2 days (48 hours).     · You do not get better as expected. Where can you learn more? Go to https://CloudHashing.[employer]s. org and sign in to your charity: water account. Enter K625 in the [facility] box to learn more about \"Strep Throat: Care Instructions. \"     If you do not have an account, please click on the \"Sign Up Now\" link. Current as of: October 21, 2018  Content Version: 12.1  © 6230-8284 Healthwise, [employer]. Care instructions adapted under license by [facility] ([facility]). If you have questions about a medical condition or this instruction, always ask your healthcare professional. [address] any warranty or liability for your use of this information. Electronically signed by ANTONIA Crow on 1/2/2020 at 3:51 PM    EMR Dragon/transcription disclaimer:  Much of thisencounter note is electronic transcription/translation of spoken language to printed texts. The electronic translation of spoken language may be erroneous, or at times, nonsensical words or phrases may be inadvertentlytranscribed.   Although I have reviewed the note for such errors, some may still exist.

## 2020-01-02 NOTE — TELEPHONE ENCOUNTER
John Olguin called to request a refill on her medication. Last office visit : 11/25/2019   Next office visit : 1/6/2020     Last UDS:   Amphetamine Screen, Urine   Date Value Ref Range Status   08/22/2019 -  Final     Barbiturate Screen, Urine   Date Value Ref Range Status   08/22/2019 -  Final     Benzodiazepine Screen, Urine   Date Value Ref Range Status   08/22/2019 -  Final     Buprenorphine Urine   Date Value Ref Range Status   08/22/2019 -  Final     Cocaine Metabolite Screen, Urine   Date Value Ref Range Status   08/22/2019 -  Final     Gabapentin Screen, Urine   Date Value Ref Range Status   08/22/2019 -  Final     MDMA, Urine   Date Value Ref Range Status   08/22/2019 -  Final     Methamphetamine, Urine   Date Value Ref Range Status   08/22/2019 -  Final     Opiate Scrn, Ur   Date Value Ref Range Status   08/22/2019 -  Final     Oxycodone Screen, Ur   Date Value Ref Range Status   08/22/2019 -  Final     PCP Screen, Urine   Date Value Ref Range Status   08/22/2019 -  Final     Propoxyphene Screen, Urine   Date Value Ref Range Status   08/22/2019 -  Final     THC Screen, Urine   Date Value Ref Range Status   08/22/2019 -  Final     Tricyclic Antidepressants, Urine   Date Value Ref Range Status   08/22/2019 -  Final       Last Silva Grace: 1/2/2020  Medication Contract: not on file   Last Fill: 09/09/2019    Requested Prescriptions     Pending Prescriptions Disp Refills    gabapentin (NEURONTIN) 300 MG capsule [Pharmacy Med Name: GABAPENTIN 300 MG CAPSULE] 90 capsule 0     Sig: TAKE 1 CAPSULE BY MOUTH THREE TIMES DAILY. Please approve or refuse this medication.    Nancy Lopez

## 2020-01-02 NOTE — PATIENT INSTRUCTIONS
1. Begin antibiotic as directed. 2. May use tylenol and motrin alternating for fever control. 3. Dispose of toothbrush in 48 hours. 4. Increase fluids. 5. Follow-up with PCP if symptoms worsen or fail to improve. Patient Education        Strep Throat: Care Instructions  Your Care Instructions    Strep throat is a bacterial infection that causes sudden, severe sore throat and fever. Strep throat, which is caused by bacteria called streptococcus, is treated with antibiotics. Sometimes a strep test is necessary to tell if the sore throat is caused by strep bacteria. Treatment can help ease symptoms and may prevent future problems. Follow-up care is a key part of your treatment and safety. Be sure to make and go to all appointments, and call your doctor if you are having problems. It's also a good idea to know your test results and keep a list of the medicines you take. How can you care for yourself at home? · Take your antibiotics as directed. Do not stop taking them just because you feel better. You need to take the full course of antibiotics. · Strep throat can spread to others until 24 hours after you begin taking antibiotics. During this time, you should avoid contact with other people at work or home, especially infants and children. Do not sneeze or cough on others, and wash your hands often. Keep your drinking glass and eating utensils separate from those of others, and wash these items well in hot, soapy water. · Gargle with warm salt water at least once each hour to help reduce swelling and make your throat feel better. Use 1 teaspoon of salt mixed in 8 fluid ounces of warm water. · Take an over-the-counter pain medication, such as acetaminophen (Tylenol), ibuprofen (Advil, Motrin), or naproxen (Aleve). Read and follow all instructions on the label. · Try an over-the-counter anesthetic throat spray or throat lozenges, which may help relieve throat pain. · Drink plenty of fluids.  Fluids may help soothe an irritated throat. Hot fluids, such as tea or soup, may help your throat feel better. · Eat soft solids and drink plenty of clear liquids. Flavored ice pops, ice cream, scrambled eggs, sherbet, and gelatin dessert (such as Jell-O) may also soothe the throat. · Get lots of rest.  · Do not smoke, and avoid secondhand smoke. If you need help quitting, talk to your doctor about stop-smoking programs and medicines. These can increase your chances of quitting for good. · Use a vaporizer or humidifier to add moisture to the air in your bedroom. Follow the directions for cleaning the machine. When should you call for help? Call your doctor now or seek immediate medical care if:    · You have a new or higher fever.     · You have a fever with a stiff neck or severe headache.     · You have new or worse trouble swallowing.     · Your sore throat gets much worse on one side.     · Your pain becomes much worse on one side of your throat.    Watch closely for changes in your health, and be sure to contact your doctor if:    · You are not getting better after 2 days (48 hours).     · You do not get better as expected. Where can you learn more? Go to https://Happify.[employer]. org and sign in to your [email or web address] account. Enter K625 in the [employer] box to learn more about \"Strep Throat: Care Instructions. \"     If you do not have an account, please click on the \"Sign Up Now\" link. Current as of: October 21, 2018  Content Version: 12.1  © 3771-4405 Healthwise, Incorporated. Care instructions adapted under license by [facility] ([facility]). If you have questions about a medical condition or this instruction, always ask your healthcare professional. [place] [address] any warranty or liability for your use of this information.

## 2020-01-03 RX ORDER — GABAPENTIN 300 MG/1
CAPSULE ORAL
Qty: 90 CAPSULE | Refills: 0 | OUTPATIENT
Start: 2020-01-03 | End: 2020-02-07

## 2020-01-06 ENCOUNTER — OFFICE VISIT (OUTPATIENT)
Dept: PRIMARY CARE CLINIC | Age: 75
End: 2020-01-06
Payer: MEDICARE

## 2020-01-06 VITALS
SYSTOLIC BLOOD PRESSURE: 120 MMHG | HEART RATE: 79 BPM | RESPIRATION RATE: 14 BRPM | TEMPERATURE: 97.7 F | WEIGHT: 254 LBS | HEIGHT: 62 IN | OXYGEN SATURATION: 98 % | DIASTOLIC BLOOD PRESSURE: 82 MMHG | BODY MASS INDEX: 46.74 KG/M2

## 2020-01-06 PROCEDURE — 99213 OFFICE O/P EST LOW 20 MIN: CPT | Performed by: NURSE PRACTITIONER

## 2020-01-06 RX ORDER — KETOCONAZOLE 20 MG/ML
SHAMPOO TOPICAL
Qty: 1 BOTTLE | Refills: 0 | Status: SHIPPED | OUTPATIENT
Start: 2020-01-06 | End: 2020-03-05 | Stop reason: SDUPTHER

## 2020-01-06 ASSESSMENT — PATIENT HEALTH QUESTIONNAIRE - PHQ9
SUM OF ALL RESPONSES TO PHQ QUESTIONS 1-9: 0
SUM OF ALL RESPONSES TO PHQ QUESTIONS 1-9: 0
2. FEELING DOWN, DEPRESSED OR HOPELESS: 0
1. LITTLE INTEREST OR PLEASURE IN DOING THINGS: 0
SUM OF ALL RESPONSES TO PHQ9 QUESTIONS 1 & 2: 0

## 2020-01-06 NOTE — PROGRESS NOTES
Stormy Calhoun is a 69 y.o. female who presents today for   Chief Complaint   Patient presents with    Rash     back of neck, chest       HPI  Patient is here for:    Rash (back of neck, chest)    Respiratory issues   Patient reports she is having a hard time breathing and states she has been coughing a lot. Patient was seen by Megan  urgent care on 1/2/2020 and diagnosed with strep pharyngitis. Patient was prescribed azithromycin and has currently taken four doses. Patient reports if she doesn't feel better tomorrow, she may go to the ER and ask to be admitted to the hospital. Patient reports she \"feels sicker than a dog\". Rash  Patient reports she has a rash on her chest that she has been applying mupirocin ointment, triamcinolone ointment, and nystatin cream. Patient reports she has had the rash on her chest since summer. Patient reports she has had the rash in hair line for several months. Patient reports \"I know its not lice\". Diabetes   Patient also requests an order for diabetic shoes. Patient has had them prescribed previously, but needs an order now. Disability  Patient requests a renewal of her permanent disability tag. No change in PMH, family, social, or surgical history unless mentioned above. Review of Systems   Constitutional: Negative. HENT: Positive for congestion and sore throat. Eyes: Negative. Respiratory: Positive for cough. Cardiovascular: Negative. Negative for chest pain. Gastrointestinal: Negative. Endocrine: Negative. Genitourinary: Negative. Musculoskeletal: Positive for arthralgias, back pain, gait problem (uses walker) and myalgias. Skin: Positive for rash (chest and posterior neck). Allergic/Immunologic: Negative. Hematological: Negative. Psychiatric/Behavioral: Negative.         Past Medical History:   Diagnosis Date    Asthma     Has rescue inhalers    Back pain 2/2/2016    BiPAP (biphasic positive airway pressure) dependence states that she falls at least once a month up to 3 times a month. 1 each 0    INSULIN SYRINGE 1CC/29G 29G X 1/2\" 1 ML MISC Use with each dose of novolog TID 90 each 1    mometasone-formoterol (DULERA) 200-5 MCG/ACT inhaler Inhale 2 puffs into the lungs every 12 hours      anastrozole (ARIMIDEX) 1 MG tablet Take 1 mg by mouth daily      Multiple Vitamins-Minerals (MULTIVITAMIN ADULT PO) Take 1 tablet by mouth daily       albuterol (PROVENTIL HFA;VENTOLIN HFA) 108 (90 BASE) MCG/ACT inhaler Inhale 2 puffs into the lungs every 4 hours as needed for Wheezing      Incontinence Supply Disposable (POISE PANTILINERS) PADS Use pads as needed daily for urine leakage. 1 each 11    fluconazole (DIFLUCAN) 150 MG tablet        No current facility-administered medications for this visit. Allergies   Allergen Reactions    Pcn [Penicillins] Hives and Itching     Patient states she can tolerate ampicillin and amoxicillin however.  Cefdinir        Past Surgical History:   Procedure Laterality Date    BREAST SURGERY Left 8/4/2015    left partial mastectomy on 8/4/15    EYE SURGERY      EYE SURGERY Bilateral 04/02/2017    Dr. Arleen Peter      broke L ankle due to osteoporosis, has hardware in    FRACTURE SURGERY      Left ankle     HYSTERECTOMY      VA DRAIN SKIN ABSCESS COMPLIC N/A 9/84/1233    ABDOMINAL WALL ABSCESS INCISION AND DRAINAGE performed by Vickie Hicks MD at [address] ENDOSCOPY  4/8/2016    Dr Bao Ramon, (-) H Pylori    WRIST SURGERY         Social History     Tobacco Use    Smoking status: Never Smoker    Smokeless tobacco: Never Used   Substance Use Topics    Alcohol use:  Yes     Alcohol/week: 2.0 standard drinks     Types: 1 Glasses of wine, 1 Cans of beer per week     Comment: occ    Drug use: No       Family History   Problem Relation Age of Onset    High Blood Pressure Father     Heart Disease Father     Diabetes Psychiatric:         Attention and Perception: Attention and perception normal.         Mood and Affect: Mood is anxious. Speech: Speech normal.         Behavior: Behavior normal.         Thought Content: Thought content normal.         Cognition and Memory: Cognition and memory normal.         Judgment: Judgment normal.     Monofilament Exam Reveals:  Pulses: normal  Edema:trace bilateral LE  Skin Lesions:normal  Right Foot:    Left Foot:  Normal sensation at 0    Normal sensation at 0   Diminished sensation: 1,2,3,4,5,6,7,8,9,10    Diminished sensation: 1,2,3,4,5,6,7,8,9,10   No sensation at 0     No sensation at 0         Assessment:    ICD-10-CM    1. Seborrheic dermatitis of scalp L21.9 ketoconazole (NIZORAL) 2 % shampoo   2. Type 2 diabetes mellitus with complication (HCC) P31.1 CANCELED: Diabetic Shoe   3. Acute pharyngitis, unspecified etiology J02.9        Plan:   1. Seborrheic dermatitis of scalp  - ketoconazole 2% shampoo    2. Type 2 diabetes mellitus with complication (HCC)  - continue current treatment plan; FU with PCP for labs and AWV    3. Acute pharyngitis, unspecified etiology  - continue current treatment plan; ER visit is not necessary at this time; Educated on when to seek care emergently    Disability tag paperwork signed by Delfino Spatz APRN    No orders of the defined types were placed in this encounter. Orders Placed This Encounter   Medications    ketoconazole (NIZORAL) 2 % shampoo     Sig: Apply topically daily as needed. Dispense:  1 Bottle     Refill:  0     There are no discontinued medications. There are no Patient Instructions on file for this visit. Patient given educational handouts and has had all questions answered. Patient voices understanding and agrees to plans along with risks and benefits of plan. Patient isinstructed to continue prior meds, diet, and exercise plans unless instructed otherwise.  Patient agrees to follow up as instructed and sooner if needed. Patient agrees to go to ER if condition becomes emergent. Notesmay be completed with dictation device and spelling errors may occur. Return if symptoms worsen or fail to improve, for Follow-up with PCP.

## 2020-01-07 ENCOUNTER — TREATMENT (OUTPATIENT)
Dept: PHYSICAL THERAPY | Facility: CLINIC | Age: 75
End: 2020-01-07

## 2020-01-07 DIAGNOSIS — C50.812 MALIGNANT NEOPLASM OF OVERLAPPING SITES OF LEFT BREAST IN FEMALE, ESTROGEN RECEPTOR POSITIVE (HCC): ICD-10-CM

## 2020-01-07 DIAGNOSIS — Z17.0 MALIGNANT NEOPLASM OF OVERLAPPING SITES OF LEFT BREAST IN FEMALE, ESTROGEN RECEPTOR POSITIVE (HCC): ICD-10-CM

## 2020-01-07 DIAGNOSIS — I89.0 LYMPHEDEMA OF LEFT ARM: Primary | ICD-10-CM

## 2020-01-07 PROCEDURE — 97140 MANUAL THERAPY 1/> REGIONS: CPT | Performed by: PHYSICAL THERAPIST

## 2020-01-09 ASSESSMENT — ENCOUNTER SYMPTOMS
EYES NEGATIVE: 1
ALLERGIC/IMMUNOLOGIC NEGATIVE: 1
BACK PAIN: 1
SORE THROAT: 1
GASTROINTESTINAL NEGATIVE: 1
COUGH: 1

## 2020-01-09 NOTE — PROGRESS NOTES
Gaby called from [facility] with request for new order for PT for pelvic floor strengthening  Order entered

## 2020-01-13 ENCOUNTER — TELEPHONE (OUTPATIENT)
Dept: PRIMARY CARE CLINIC | Age: 75
End: 2020-01-13

## 2020-01-14 ENCOUNTER — TELEPHONE (OUTPATIENT)
Dept: PRIMARY CARE CLINIC | Age: 75
End: 2020-01-14

## 2020-01-14 NOTE — TELEPHONE ENCOUNTER
Johnson Wooten requests that someone return their call. The best time to reach her is Anytime. Johnson Wooten would like to know about her medication request from yesterday. Thank you.

## 2020-01-15 RX ORDER — BENZONATATE 100 MG/1
100 CAPSULE ORAL 3 TIMES DAILY PRN
Qty: 30 CAPSULE | Refills: 0 | Status: SHIPPED | OUTPATIENT
Start: 2020-01-15 | End: 2020-01-22

## 2020-01-21 ENCOUNTER — TREATMENT (OUTPATIENT)
Dept: PHYSICAL THERAPY | Facility: CLINIC | Age: 75
End: 2020-01-21

## 2020-01-21 ENCOUNTER — TELEPHONE (OUTPATIENT)
Dept: PRIMARY CARE CLINIC | Age: 75
End: 2020-01-21

## 2020-01-21 DIAGNOSIS — Z17.0 MALIGNANT NEOPLASM OF OVERLAPPING SITES OF LEFT BREAST IN FEMALE, ESTROGEN RECEPTOR POSITIVE (HCC): ICD-10-CM

## 2020-01-21 DIAGNOSIS — I89.0 LYMPHEDEMA OF LEFT ARM: Primary | ICD-10-CM

## 2020-01-21 DIAGNOSIS — C50.812 MALIGNANT NEOPLASM OF OVERLAPPING SITES OF LEFT BREAST IN FEMALE, ESTROGEN RECEPTOR POSITIVE (HCC): ICD-10-CM

## 2020-01-21 PROCEDURE — 97140 MANUAL THERAPY 1/> REGIONS: CPT | Performed by: PHYSICAL THERAPIST

## 2020-01-23 ENCOUNTER — TELEPHONE (OUTPATIENT)
Dept: ADMINISTRATIVE | Age: 75
End: 2020-01-23

## 2020-01-27 ENCOUNTER — HOSPITAL ENCOUNTER (OUTPATIENT)
Dept: PHYSICAL THERAPY | Age: 75
Setting detail: THERAPIES SERIES
Discharge: HOME | End: 2020-01-27
Payer: MEDICARE

## 2020-01-27 PROCEDURE — 97163 PT EVAL HIGH COMPLEX 45 MIN: CPT

## 2020-01-27 NOTE — PROGRESS NOTES
Initial Assessment  Date: 2020  Patient Name: Katie Diaz  MRN: [id number]  : 1945     Treatment Diagnosis: urinary incontinence         Subjective   General  Chart Reviewed: Yes  Patient assessed for rehabilitation services?: Yes  Response To Previous Treatment: Not applicable  Family / Caregiver Present: No  Referring Practitioner: Loren KNIGHT  Referral Date : 20  Diagnosis: N39.4 urge incontinence of urine  Follows Commands: Within Functional Limits  PT Visit Information  Onset Date: 20  PT Insurance Information: [employer]   Total # of Visits Approved: 1  Total # of Visits to Date: 1  Progress Note Due Date: 20  Subjective  Subjective: Patient states she has been having incontinence of her urine primarily and occassionally her bowels. She says that she has had this since she got sick with strep throat. She says that she coughs and she has a loss of urine. She says sometimes she  has urgency and doesn't make it to the bathroom in time. She says  that she has also felt a heaviness in her pelvic region. She says that she is having urine leakage about 20x/day.   Pain Screening  Patient Currently in Pain: No  Vital Signs  Patient Currently in Pain: No    Orientation  Orientation  Overall Orientation Status: Within Normal Limits    Social/Functional History  Social/Functional History  ADL Assistance: Independent  Homemaking Assistance: Independent  Active : Yes  Occupation: Retired  IADL Comments: leakage with forward bending activities and lifting activities, says that some things trigger her to go to the bathroom to include pulling up in her driveway, coughing causes leakage  Additional Comments: says she also has a lot of fatigue and has been sick for about 2 months     Objective          AROM RLE (degrees)  RLE AROM: WFL  AROM LLE (degrees)  LLE AROM : WFL    Strength RLE  Comment:  throughout  Strength LLE  Comment:  throughout Sensation  Overall Sensation Status: (patient reports abnormal sensation in bilateral le's and says it just feels like fluid)                   Exercises  Exercise 1: supine ta contraction alone, alt ue, alt le, alt ue/le x 0   Exercise 2: supine posterior pelvic tilts x 0    Exercise 3: supine bilateral hs 90-90 stretch x 0   Exercise 4: supine bilateral piriformis stretch > 90 x 0  Exercise 5: seated ta contraction alone, alt ue, alt le, alt ue/le x 0  Exercise 6: supine downtraining on biofeedback x 0   Exercise 7: supine pfm contraction on biofeedback x0   Exercise 11: Bladder diary issued 01/27/2020  Ortho Screen  Posture: forward shoulders and head moderately   Pelvic Alignment: normal   Ortho Screen: see above  Abdominals  Diatasis: none noted  Functional Stability: patient had difficulty performing sls with inability to stand on one le for assessment, but with attempt had decreased stability noted in pelvis  Abdominals: poor lumbopelvic stabilization in hooklying   Pelvic Floor  Perineal Descent: min   Skin Condition: slight redness between proximal thigh region  Excursion: none  Prolapse Test: unable to get good test and visual, min ability to budlge    Internal Clock: moderate tenderness throughout, left more than right with moderate tension noted and a trigger point on left obturator internus   Sensation: intact   Vaginal Vault: none  Muscle Power: 1+  Muscle Endurance (Seconds): 3 Seconds  Number Reps to Fatigue: 5  Muscle Flicks: 2  Quality of Contraction: trace   Other: patient had noted bowel leakage during testing, and slight urine leakage                   Assessment   Conditions Requiring Skilled Therapeutic Intervention  Body structures, Functions, Activity limitations: Decreased functional mobility ; Decreased ADL status; Decreased strength;Decreased endurance;Decreased posture  Assessment: Patient has decreased pfm strength with min ability to perform contraction, increased muscular tension noted throughout pfm bilaterally, difficulty with voiding and report of urine and occassional bowel incontinence. Patient will benefit from skilled PT for increased strength and function for decreased urinary incontinence. Treatment Diagnosis: urinary incontinence  Prognosis: Good  Decision Making: Medium Complexity  History: diabetes, breast cancer, htn   Exam: decreased strength, decreased lumbopelvic stabilization, urinary incontinence, difficulty voiding   Clinical Presentation: unstable   Barriers to Learning: patient did need to be redirected multiple times during eval due to asking unrelated questions and needed redirect to stay on current conversation or task   REQUIRES PT FOLLOW UP: Yes         Plan   Plan  Times per week: 2x/week   Plan weeks: 12 weeks  Current Treatment Recommendations: Strengthening, ROM, Safety Education & Training, Home Exercise Program, Manual Therapy - Soft Tissue Mobilization, Endurance Training, Modalities, Neuromuscular Re-education, Manual Therapy - Joint Manipulation, Patient/Caregiver Education & Training, Positioning                                                               Goals  Short term goals  Time Frame for Short term goals: 6 Weeks   Short term goal 1:  Increase PERF score to 2+/10/10/10  Short term goal 2: Patient to have good lumbopelvic stabilization in hooklying alone   Short term goal 3: Patient to report no more than 5 occurrances of leakage in one weeks time  Short term goal 4: Patient to be independent with HEP   Long term goals  Time Frame for Long term goals : 12 Weeks   Long term goal 1: Patient to report decreased urinary incontinence by 50%   Long term goal 2: Patient to have good lumbopelvic stabilization in hooklying alt ue/le   Long term goal 3: Patient to report decreased difficulty with voiding urine  Long term goal 4: Patient to demo increased function by scoring <= 10% impairment on urinary section of PFIQ-7  Patient Goals   Patient goals : get

## 2020-02-03 ENCOUNTER — HOSPITAL ENCOUNTER (OUTPATIENT)
Dept: PHYSICAL THERAPY | Age: 75
Setting detail: THERAPIES SERIES
Discharge: HOME | End: 2020-02-03
Payer: MEDICARE

## 2020-02-03 PROCEDURE — 97110 THERAPEUTIC EXERCISES: CPT

## 2020-02-03 NOTE — PROGRESS NOTES
did fair with tx today with mod to max v.c. for proper tech with ex's and min tactile cues for lumbopelvic ex's today. Patient had difficulty initiating pfm contraction alone and had better performance with accessory muscle use. On biofeedback she was able to relax at level 2-4 with v.c. for breathing and then contract at level 6-8 alone, but only momentarily and then with accessory muscles she maintained level 6-8. Treatment Diagnosis: urinary incontinence                                                        Goals:  Short term goals  Time Frame for Short term goals: 6 Weeks   Short term goal 1:  Increase PERF score to 2+/10/10/10  Short term goal 2: Patient to have good lumbopelvic stabilization in hooklying alone   Short term goal 3: Patient to report no more than 5 occurrances of leakage in one weeks time  Short term goal 4: Patient to be independent with HEP   Long term goals  Time Frame for Long term goals : 12 Weeks   Long term goal 1: Patient to report decreased urinary incontinence by 50%   Long term goal 2: Patient to have good lumbopelvic stabilization in hooklying alt ue/le   Long term goal 3: Patient to report decreased difficulty with voiding urine  Long term goal 4: Patient to demo increased function by scoring <= 10% impairment on urinary section of PFIQ-7  Patient Goals   Patient goals : get rid of incontinence    Plan:    Plan  Times per week: 2x/week   Plan weeks: 12 weeks  Current Treatment Recommendations: Strengthening, ROM, Safety Education & Training, Home Exercise Program, Manual Therapy - Soft Tissue Mobilization, Endurance Training, Modalities, Neuromuscular Re-education, Manual Therapy - Joint Manipulation, Patient/Caregiver Education & Training, Positioning  Timed Code Treatment Minutes: 62 Minutes     Therapy Time   Individual Concurrent Group Co-treatment   Time In 1443         Time Out 1343         Minutes 58         Timed Code Treatment Minutes: [address], PT    Electronically signed by Fidel White PT on 2/3/2020 at 1:53 PM

## 2020-02-04 ENCOUNTER — TREATMENT (OUTPATIENT)
Dept: PHYSICAL THERAPY | Facility: CLINIC | Age: 75
End: 2020-02-04

## 2020-02-04 DIAGNOSIS — I89.0 LYMPHEDEMA OF LEFT ARM: Primary | ICD-10-CM

## 2020-02-04 PROCEDURE — 97140 MANUAL THERAPY 1/> REGIONS: CPT | Performed by: PHYSICAL THERAPIST

## 2020-02-06 ENCOUNTER — HOSPITAL ENCOUNTER (OUTPATIENT)
Dept: PHYSICAL THERAPY | Age: 75
Setting detail: THERAPIES SERIES
Discharge: HOME | End: 2020-02-06
Payer: MEDICARE

## 2020-02-06 PROCEDURE — 97110 THERAPEUTIC EXERCISES: CPT

## 2020-02-06 NOTE — PROGRESS NOTES
Daily Treatment Note  Date: 2020  Patient Name: Katie Ricardo  MRN: [id number]     :   1945    Subjective:   General  Chart Reviewed: Yes  Response To Previous Treatment: Not applicable  Family / Caregiver Present: No  Referring Practitioner: Janae KNIGHT  PT Visit Information  Onset Date: 20  PT Insurance Information: [facility] Medicare   Total # of Visits Approved: 6  Total # of Visits to Date: 3  Plan of Care/Certification Expiration Date: 20  Progress Note Due Date: 20  Subjective  Subjective: Patient states her inner leg muscles are sore, but no pain. Pain Screening  Patient Currently in Pain: No  Vital Signs  Patient Currently in Pain: No       Treatment Activities:                                      Exercises  Exercise 1: supine ta contraction alone 10 sec x 10, alt ue 1 x 10, alt le 1 x 10, alt ue/le 1 x 5  Exercise 2: supine posterior pelvic tilts 1 x 10  Exercise 3: supine bilateral hs 90-90 stretch 4 x 10 sec   Exercise 4: supine bilateral piriformis stretch > 90 4 x 10 sec  Exercise 5: seated ta contraction alone 10 sec x 5, alt ue 1 x 10, alt le 1 x 10, alt ue/le 1 x 5  Exercise 6: supine downtraining on biofeedback on wedge in slight trendelenburg x 3 min   Exercise 7: supine pfm contraction on biofeedback on wedge in slight trendelenburg alone 10 sec hold x 7, with towel for hip add 10 sec x 7, with yellow t-band for hip abd 10 sec x 5, quick flicks 2 x 5  Exercise 11: Bladder diary issued 2020  Exercise 12: bladder and bowel information and nutritional sheets provided and patient educated on 2020                                   Assessment:   Conditions Requiring Skilled Therapeutic Intervention  Body structures, Functions, Activity limitations: Decreased functional mobility ; Decreased ADL status; Decreased strength;Decreased endurance;Decreased posture  Assessment: Patient did fair with tx today, but unable to get good accurrate readings on biofeedback today. Difficulty with electrodes adhering to skin due to moisture in the area and therefore intermitten readings on biofeedback machine noted. Patient did perform ex's, but with mod to max v.c. for proper tech and to not hold her breathe. patient issued ex's for home and had good understanding. Treatment Diagnosis: urinary incontinence                                                      Goals:  Short term goals  Time Frame for Short term goals: 6 Weeks   Short term goal 1:  Increase PERF score to 2+/10/10/10  Short term goal 2: Patient to have good lumbopelvic stabilization in hooklying alone   Short term goal 3: Patient to report no more than 5 occurrances of leakage in one weeks time  Short term goal 4: Patient to be independent with HEP   Long term goals  Time Frame for Long term goals : 12 Weeks   Long term goal 1: Patient to report decreased urinary incontinence by 50%   Long term goal 2: Patient to have good lumbopelvic stabilization in hooklying alt ue/le   Long term goal 3: Patient to report decreased difficulty with voiding urine  Long term goal 4: Patient to demo increased function by scoring <= 10% impairment on urinary section of PFIQ-7  Patient Goals   Patient goals : get rid of incontinence    Plan:    Plan  Times per week: 2x/week   Plan weeks: 12 weeks  Current Treatment Recommendations: Strengthening, ROM, Safety Education & Training, Home Exercise Program, Manual Therapy - Soft Tissue Mobilization, Endurance Training, Modalities, Neuromuscular Re-education, Manual Therapy - Joint Manipulation, Patient/Caregiver Education & Training, Positioning  Timed Code Treatment Minutes: 45 Minutes     Therapy Time   Individual Concurrent Group Co-treatment   Time In 3533         Time Out 1330         Minutes 45         Timed Code Treatment Minutes: [address], PT    Electronically signed by Neel Stein PT on 2/6/2020 at 2:27 PM

## 2020-02-07 ENCOUNTER — TELEPHONE (OUTPATIENT)
Dept: PRIMARY CARE CLINIC | Age: 75
End: 2020-02-07

## 2020-02-07 ENCOUNTER — OFFICE VISIT (OUTPATIENT)
Dept: PRIMARY CARE CLINIC | Age: 75
End: 2020-02-07
Payer: MEDICARE

## 2020-02-07 VITALS
HEIGHT: 64 IN | HEART RATE: 80 BPM | RESPIRATION RATE: 20 BRPM | BODY MASS INDEX: 43.36 KG/M2 | DIASTOLIC BLOOD PRESSURE: 72 MMHG | WEIGHT: 254 LBS | OXYGEN SATURATION: 98 % | TEMPERATURE: 97.8 F | SYSTOLIC BLOOD PRESSURE: 128 MMHG

## 2020-02-07 PROCEDURE — 99214 OFFICE O/P EST MOD 30 MIN: CPT | Performed by: NURSE PRACTITIONER

## 2020-02-07 RX ORDER — BENZONATATE 100 MG/1
100 CAPSULE ORAL 3 TIMES DAILY PRN
Qty: 30 CAPSULE | Refills: 0 | Status: SHIPPED | OUTPATIENT
Start: 2020-02-07 | End: 2020-02-14

## 2020-02-07 RX ORDER — GABAPENTIN 300 MG/1
CAPSULE ORAL
Qty: 90 CAPSULE | Refills: 2 | Status: CANCELLED | OUTPATIENT
Start: 2020-02-07 | End: 2020-02-07

## 2020-02-07 RX ORDER — METHYLPREDNISOLONE 4 MG/1
TABLET ORAL
Qty: 1 KIT | Refills: 0 | Status: SHIPPED | OUTPATIENT
Start: 2020-02-07 | End: 2020-02-13

## 2020-02-07 RX ORDER — GABAPENTIN 300 MG/1
CAPSULE ORAL
Qty: 90 CAPSULE | Refills: 2 | Status: SHIPPED | OUTPATIENT
Start: 2020-02-07 | End: 2020-05-27

## 2020-02-07 NOTE — PROGRESS NOTES
[facility]    [address] [address]     Phone:  [phone]  Fax:  [phone]      Phill Johnson is a 69 y.o. female who presents today for her medical conditions/complaints as noted below. Phill Johnson is c/o of Cough (thinks she has pneumonia, still having bladder issues but doing therapy ); Other (wants diabetic shoe form, states she had a stroke last week- didnt go to the ED ); and Ear Fullness      Chief Complaint   Patient presents with    Cough     thinks she has pneumonia, still having bladder issues but doing therapy     Other     wants diabetic shoe form, states she had a stroke last week- didnt go to the ED     Ear Fullness       HPI:     HPI    Phill Johnson presents today for multiple medical issues. She randomly jumps from subject to subject. She is telling me she had a stroke last week due to a headache and pereira \"in her head\" she had and she did not seek medical treatment. She has no deficit. She has no unilateral weakness. She states her facial drooping is getting better. She could not remember which side this occurred on. She is requesting diabetic shoes. She is diabetic. She was ordered shoes at her last visit, she states. She has not seen a physician. She was told by medical supplier that she needed a MD to order this. She also thinks she has pneumonia due to shortness of breath. She is short of breath on exertion. She is typically short of breath with exertion. She is wondering if she is going to have surgery on her bladder. She has urinary incontinence at times. She has not been referred to a surgeon. She is taking PT for urinary incontinence and this is helping. She is doing kegel therapy. She is also having a cough. She is using nebulizer treatments and tessalon perles. She has been coughing over 2 months. She has no fevers. She also has ear fullness. She denies ear pain.      Past Medical History:   Diagnosis Date    Asthma     Has rescue Social History     Tobacco Use    Smoking status: Never Smoker    Smokeless tobacco: Never Used   Substance Use Topics    Alcohol use: Yes     Alcohol/week: 2.0 standard drinks     Types: 1 Glasses of wine, 1 Cans of beer per week     Comment: occ        Current Outpatient Medications   Medication Sig Dispense Refill    gabapentin (NEURONTIN) 300 MG capsule TAKE 1 CAPSULE BY MOUTH 3 TIMES A DAY 90 capsule 2    Insulin Degludec (TRESIBA FLEXTOUCH) 200 UNIT/ML SOPN INJECT 100 UNITS (=1/2 ML) SUBCUTANEOUSLY NIGHTLY 27 mL 0    albuterol (PROVENTIL) (5 MG/ML) 0.5% nebulizer solution Take 1ml twice daily for 10 days 120 each 3    Nebulizers (AIR[employer] COMPACT MINI NEBULIZER) MISC Patient has COPD and problems with breathing. 1 each 0    ketoconazole (NIZORAL) 2 % shampoo Apply topically daily as needed. 1 Bottle 0    Vitamin D, Cholecalciferol, 25 MCG (1000 UT) CAPS Take by mouth      tiotropium (SPIRIVA) 18 MCG inhalation capsule Inhale 1 capsule into the lungs daily.  fluconazole (DIFLUCAN) 150 MG tablet       loratadine (CLARITIN) 10 MG tablet TAKE 1 TABLET BY MOUTH ONCE DAILY. 90 tablet 1    vitamin D (ERGOCALCIFEROL) 1.25 MG (83363 UT) CAPS capsule Take once weekly 12 capsule 2    blood glucose monitor strips Test 3 times a day & as needed for symptoms of irregular blood glucose. 100 strip 5    magnesium oxide (MAG-OX) 400 MG tablet Take 1 tablet by mouth daily 90 tablet 3    citalopram (CELEXA) 20 MG tablet TAKE 1 TABLET DAILY 90 tablet 3    metoprolol succinate (TOPROL XL) 50 MG extended release tablet TAKE 1 TABLET DAILY FOR BLOOD PRESSURE AND HEART PROTECTION. 90 tablet 1    losartan (COZAAR) 100 MG tablet TAKE (1) TABLET DAILY FOR BLOOD PRESSURE. 90 tablet 1    empagliflozin (JARDIANCE) 10 MG tablet TAKE 1 TABLET BY MOUTH ONCE DAILY. 90 tablet 1    rosuvastatin (CRESTOR) 10 MG tablet TAKE 1 TABLET BY MOUTH ONCE DAILY.  90 tablet 1    oxybutynin (DITROPAN-XL) 10 MG extended release ampicillin and amoxicillin however.  Cefdinir        Family History   Problem Relation Age of Onset    High Blood Pressure Father     Heart Disease Father     Diabetes Father     High Blood Pressure Sister     Diabetes Sister     Cancer Sister     High Blood Pressure Brother     Colon Cancer Neg Hx     Colon Polyps Neg Hx     Esophageal Cancer Neg Hx     Liver Cancer Neg Hx     Liver Disease Neg Hx     Rectal Cancer Neg Hx     Stomach Cancer Neg Hx                Review of Systems   Constitutional: Negative for appetite change, fatigue and fever. HENT: Positive for ear pain. Negative for congestion, ear discharge, sinus pressure and sinus pain. Eyes: Negative for pain and visual disturbance. Respiratory: Positive for cough, shortness of breath and wheezing. Cardiovascular: Negative for chest pain and leg swelling. Gastrointestinal: Negative for abdominal pain, diarrhea, nausea and vomiting. Endocrine: Negative for cold intolerance and heat intolerance. Genitourinary: Positive for urgency. Negative for dysuria and frequency. Musculoskeletal: Negative for arthralgias and back pain. Skin: Negative for rash and wound. Neurological: Negative for dizziness, weakness and headaches. Hematological: Negative for adenopathy. Psychiatric/Behavioral: Negative for dysphoric mood and sleep disturbance. The patient is not nervous/anxious. Objective:     Physical Exam  Vitals signs and nursing note reviewed. Constitutional:       General: She is not in acute distress. Appearance: She is well-developed. She is obese. HENT:      Head: Normocephalic and atraumatic. Right Ear: External ear normal. There is impacted cerumen. Left Ear: External ear normal.      Nose: Congestion present. No rhinorrhea. Mouth/Throat:      Dentition: Normal dentition. Eyes:      General:         Right eye: No discharge. Left eye: No discharge.       Conjunctiva/sclera: prn/medrol dosepak for continued cough. Advised to go to ED immediately with signs of increasing weakness, LOC changes. I advised her to call 911 with any signs of confusion, changes in headaches. Continue PT for incontinence. At this time, I am not sure of any appt with URO/GYN at an outlying facility. Ceruminosis is noted. Wax is removed by syringing and manual debridement. Instructions for home care to prevent wax buildup are given. [id number] [address] for diabetic shoes    Return if symptoms worsen or fail to improve, for As scheduled. No orders of the defined types were placed in this encounter. Orders Placed This Encounter   Medications    benzonatate (TESSALON) 100 MG capsule     Sig: Take 1 capsule by mouth 3 times daily as needed for Cough     Dispense:  30 capsule     Refill:  0    methylPREDNISolone (MEDROL DOSEPACK) 4 MG tablet     Sig: Take by mouth. Dispense:  1 kit     Refill:  0        Patient offered educational materials - see patient instructions for any instruction needed. Discussed use, benefit, and side effects of prescribed medications. All patient questions answered. Instructed to continue current medications, diet and exercise. Patient agreed with treatment plan. Follow up as directed. Patient was advised to go to the ED if condition ever becomes emergent.        Electronically signed by ANTONIA Mena on 2/17/2020 at 7:30 AM

## 2020-02-10 ENCOUNTER — HOSPITAL ENCOUNTER (OUTPATIENT)
Dept: PHYSICAL THERAPY | Age: 75
Setting detail: THERAPIES SERIES
Discharge: HOME | End: 2020-02-10
Payer: MEDICARE

## 2020-02-10 PROCEDURE — 97110 THERAPEUTIC EXERCISES: CPT

## 2020-02-10 NOTE — PROGRESS NOTES
Treatment Minutes: [id number] Johnathan Hirsch, PT     Electronically signed by John Hernandez PT on 2/10/2020 at 1:41 PM

## 2020-02-13 ENCOUNTER — HOSPITAL ENCOUNTER (OUTPATIENT)
Dept: PHYSICAL THERAPY | Age: 75
Setting detail: THERAPIES SERIES
Discharge: HOME | End: 2020-02-13
Payer: MEDICARE

## 2020-02-13 PROCEDURE — 97110 THERAPEUTIC EXERCISES: CPT

## 2020-02-17 ENCOUNTER — HOSPITAL ENCOUNTER (OUTPATIENT)
Dept: PHYSICAL THERAPY | Age: 75
Setting detail: THERAPIES SERIES
Discharge: HOME | End: 2020-02-17
Payer: MEDICARE

## 2020-02-17 PROCEDURE — 97110 THERAPEUTIC EXERCISES: CPT

## 2020-02-17 ASSESSMENT — ENCOUNTER SYMPTOMS
WHEEZING: 1
SINUS PAIN: 0
BACK PAIN: 0
VOMITING: 0
EYE PAIN: 0
NAUSEA: 0
ABDOMINAL PAIN: 0
SHORTNESS OF BREATH: 1
SINUS PRESSURE: 0
COUGH: 1
DIARRHEA: 0

## 2020-02-17 NOTE — PROGRESS NOTES
Therapeutic Intervention  Body structures, Functions, Activity limitations: Decreased functional mobility ; Decreased ADL status; Decreased strength;Decreased endurance;Decreased posture  Assessment: Patient did fair with tx today. She is still having great difficulty performing proper pfm contraction and uses a great deal of accessory muscles with abdominal tightening and gluteal squeezes. During PERF scoring she needed maximal cues to get a trace contraction each time. Discussed with patient the proper method of performin pfm contraction and advised her to continue to perform ex's at home with towel squeeze for accessory muscle use in hopes of achieving carry over and getting more contraction of pfm. Patient expressed understanding. She has shown slight progress with therapy with slight improvement in activity tolerance of pfm contraction and improved lumbopelvic stabilization and slight improvement in urinary incontinence reported. Patient will continue to benefit from skilled PT to increase strength and function. Treatment Diagnosis: urinary incontinence  Decision Making: Medium Complexity  REQUIRES PT FOLLOW UP: Yes                                                      Goals:  Short term goals  Time Frame for Short term goals: 6 Weeks   Short term goal 1:  Increase PERF score to 2+/10/10/10- PROGRESS(02/17/2020: Patient PERF score: 1+/5/6/7)  Short term goal 2: Patient to have good lumbopelvic stabilization in hooklying alone- MET(02/17/2020: Patient has good lumbopelvic stabilizaiton in hooklying alone)  Short term goal 3: Patient to report no more than 5 occurrances of leakage in one weeks time- PRGORESS(02/17/2020: Patient states she is averaging about 3-5 leaages a day)  Short term goal 4: Patient to be independent with HEP- MET(02/17/2020: Patient states she is doing her ex's independently at home)  Long term goals  Time Frame for Long term goals : 12 Weeks   Long term goal 1: Patient to report

## 2020-02-18 ENCOUNTER — OFFICE VISIT (OUTPATIENT)
Dept: NEUROSURGERY | Age: 75
End: 2020-02-18
Payer: MEDICARE

## 2020-02-18 VITALS
SYSTOLIC BLOOD PRESSURE: 138 MMHG | BODY MASS INDEX: 46.74 KG/M2 | OXYGEN SATURATION: 98 % | HEART RATE: 88 BPM | HEIGHT: 62 IN | DIASTOLIC BLOOD PRESSURE: 75 MMHG | WEIGHT: 254 LBS

## 2020-02-18 PROCEDURE — 99213 OFFICE O/P EST LOW 20 MIN: CPT | Performed by: NURSE PRACTITIONER

## 2020-02-18 NOTE — PROGRESS NOTES
[facility] Sleep Follow Up Encounter      Information:   Patient Name: Cam López  :   1945  Age:   69 y.o. MRN:   [id number]  Account #:  [de-identified]  Today:                20    Provider:  ANTONIA Soto    Chief Complaint   Patient presents with    Follow-up     Patient states she is doing well.  Sleep Apnea        Subjective:   Cam López is a 69 y.o. female with a history of severe DARSHAN and RLS who comes in for a sleep clinic follow up. The compliance report indicates that she  is averaging  9 hours of BiPAP use per day. She averages 8-9 hours of sleep per night. She reports that consistent BiPAP use has alleviated the previous DARSHAN symptoms.      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    Breast CA (HCC)     Left breast Nodules removed Took radiation    Chronic back pain     Chronic kidney disease     Overactive bladder     COPD (chronic obstructive pulmonary disease) (HCC)     x few years Scarring on lungs 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    Diabetes mellitus ([address]     On insulin x 10 years    Fibromyalgia     Fibromyalgia     for 20 years    GERD (gastroesophageal reflux disease)     History of blood transfusion     ?when    Hyperlipidemia     High cholesterol    Hypertension     On medications for 2 years    Joint pain 2016    Liver disease     Has fatty liver    Morbid obesity ([address]     Multiple food allergies     Neuropathic pain     Neuropathy     Neuropathy involving both lower extremities 2016    Obstructive UNIT/ML injection pen INJECT 15 UNITS            SUBCUTANEOUSLY 3 TIMES A   DAY BEFORE MEALS. 45 mL 1    gabapentin (NEURONTIN) 300 MG capsule TAKE 1 CAPSULE BY MOUTH 3 TIMES A DAY 90 capsule 2    Insulin Degludec (TRESIBA FLEXTOUCH) 200 UNIT/ML SOPN INJECT 100 UNITS (=1/2 ML) SUBCUTANEOUSLY NIGHTLY 27 mL 0    albuterol (PROVENTIL) (5 MG/ML) 0.5% nebulizer solution Take 1ml twice daily for 10 days 120 each 3    Nebulizers ([employer] COMPACT MINI NEBULIZER) MISC Patient has COPD and problems with breathing. 1 each 0    ketoconazole (NIZORAL) 2 % shampoo Apply topically daily as needed. 1 Bottle 0    Vitamin D, Cholecalciferol, 25 MCG (1000 UT) CAPS Take by mouth      tiotropium (SPIRIVA) 18 MCG inhalation capsule Inhale 1 capsule into the lungs daily.  fluconazole (DIFLUCAN) 150 MG tablet       loratadine (CLARITIN) 10 MG tablet TAKE 1 TABLET BY MOUTH ONCE DAILY. 90 tablet 1    vitamin D (ERGOCALCIFEROL) 1.25 MG (96415 UT) CAPS capsule Take once weekly 12 capsule 2    blood glucose monitor strips Test 3 times a day & as needed for symptoms of irregular blood glucose. 100 strip 5    magnesium oxide (MAG-OX) 400 MG tablet Take 1 tablet by mouth daily 90 tablet 3    citalopram (CELEXA) 20 MG tablet TAKE 1 TABLET DAILY 90 tablet 3    metoprolol succinate (TOPROL XL) 50 MG extended release tablet TAKE 1 TABLET DAILY FOR BLOOD PRESSURE AND HEART PROTECTION. 90 tablet 1    losartan (COZAAR) 100 MG tablet TAKE (1) TABLET DAILY FOR BLOOD PRESSURE. 90 tablet 1    empagliflozin (JARDIANCE) 10 MG tablet TAKE 1 TABLET BY MOUTH ONCE DAILY. 90 tablet 1    rosuvastatin (CRESTOR) 10 MG tablet TAKE 1 TABLET BY MOUTH ONCE DAILY.  90 tablet 1    oxybutynin (DITROPAN-XL) 10 MG extended release tablet Take 1 tablet by mouth daily 90 tablet 1    sitaGLIPtan-metformin (JANUMET)  MG per tablet Take 1 tablet by mouth 2 times daily (with meals) 180 tablet 1    omeprazole (PRILOSEC) 20 MG delayed release capsule TAKE

## 2020-02-18 NOTE — PROGRESS NOTES
REVIEW OF SYSTEMS    Constitutional: []Fever []Sweat []Chills [] Recent Injury [x] Denies all unless marked  HEENT:[]Headache  [] Head Injury/Hearing Loss  [] Sore Throat  [] Ear Ache/Dizziness  [x] Denies all unless marked  Spine:  [] Neck pain  [] Back pain  [] Sciaticia  [x] Denies all unless marked  Cardiovascular:[]Heart Disease []Chest Pain [] Palpitations  [x] Denies all unless marked  Pulmonary: []Shortness of Breath []Cough   [x] Denies all unless marke  Gastrointestinal: []Nausea  []Vomiting  []Abdominal Pain  []Constipation  []Diarrhea  []Dark Bloody Stools  [x] Denies all unless marked  Psychiatric/Behavioral:[] Depression [] Anxiety [x] Denies all unless marked  Genitourinary:   [] Frequency  [] Urgency  [] Incontinence [] Pain with Urination  [x] Denies all unless marked  Extremities: []Pain  []Swelling  [x] Denies all unless marked  Musculoskeletal: [] Muscle Pain  [] Joint Pain  [] Arthritis [] Muscle Cramps [] Muscle Twitches  [x] Denies all unless marked  Sleep: [] Insomnia [] Snoring [] Restless Legs [x] Sleep Apnea  [] Daytime Sleepiness  [] Denies all unless marked  Skin:[] Rash [] Skin Discoloration [x] Denies all unless marked   Neurological: []Visual Disturbance/Memory Loss [] Loss of Balance [] Slurred Speech/Weakness [] Seizures  [] Vertigo/Dizziness [x] Denies all unless marked

## 2020-02-18 NOTE — PATIENT INSTRUCTIONS
list of the medicines you take. How can you care for yourself at home? · Lose weight, if needed. It may reduce the number of times you stop breathing or have slowed breathing. · Sleep on your side. It may stop mild apnea. If you tend to roll onto your back, sew a pocket in the back of your pajama top. Put a tennis ball into the pocket, and stitch the pocket shut. This will help keep you from sleeping on your back. · Avoid alcohol and medicines such as sleeping pills and sedatives before bed. · Do not smoke. Smoking can make sleep apnea worse. If you need help quitting, talk to your doctor about stop-smoking programs and medicines. These can increase your chances of quitting for good. · Prop up the head of your bed 4 to 6 inches by putting bricks under the legs of the bed. · Treat breathing problems, such as a stuffy nose, caused by a cold or allergies. · Try a continuous positive airway pressure (CPAP) breathing machine if your doctor recommends it. The machine keeps your airway open when you sleep. · If CPAP does not work for you, ask your doctor if you can try other breathing machines. A bilevel positive airway pressure machine uses one type of air pressure for breathing in and another type for breathing out. Another device raises or lowers air pressure as needed while you breathe. · Talk to your doctor if:  ? Your nose feels dry or bleeds when you use one of these machines. You may need to increase moisture in the air. A humidifier may help. ? Your nose is runny or stuffy from using a breathing machine. Decongestants or a corticosteroid nasal spray may help. ? You are sleepy during the day and it gets in the way of the normal things you do. Do not drive when you are drowsy. When should you call for help?   Watch closely for changes in your health, and be sure to contact your doctor if:    · You still have sleep apnea even though you have made lifestyle changes.     · You are thinking of trying a device such as CPAP.     · You are having problems using a CPAP or similar machine. Where can you learn more? Go to https://[email or web address] org and sign in to your [employer] account. Enter Z144 in the [employer] box to learn more about \"Sleep Apnea: Care Instructions. \"     If you do not have an account, please click on the \"Sign Up Now\" link. Current as of: June 9, 2019  Content Version: 12.3  © 5066-2573 [employer]. Care instructions adapted under license by [facility] ([facility]). If you have questions about a medical condition or this instruction, always ask your healthcare professional. [address] any warranty or liability for your use of this information.

## 2020-02-27 ENCOUNTER — TELEPHONE (OUTPATIENT)
Dept: PRIMARY CARE CLINIC | Age: 75
End: 2020-02-27

## 2020-03-05 ENCOUNTER — OFFICE VISIT (OUTPATIENT)
Dept: PRIMARY CARE CLINIC | Age: 75
End: 2020-03-05
Payer: MEDICARE

## 2020-03-05 VITALS
HEIGHT: 62 IN | BODY MASS INDEX: 47.73 KG/M2 | WEIGHT: 259.4 LBS | DIASTOLIC BLOOD PRESSURE: 70 MMHG | OXYGEN SATURATION: 96 % | HEART RATE: 76 BPM | SYSTOLIC BLOOD PRESSURE: 134 MMHG | TEMPERATURE: 97.9 F

## 2020-03-05 LAB — HBA1C MFR BLD: 9.4 %

## 2020-03-05 PROCEDURE — G0439 PPPS, SUBSEQ VISIT: HCPCS | Performed by: NURSE PRACTITIONER

## 2020-03-05 PROCEDURE — 83036 HEMOGLOBIN GLYCOSYLATED A1C: CPT | Performed by: NURSE PRACTITIONER

## 2020-03-05 RX ORDER — KETOCONAZOLE 20 MG/ML
SHAMPOO TOPICAL
Qty: 1 BOTTLE | Refills: 0 | Status: SHIPPED | OUTPATIENT
Start: 2020-03-05 | End: 2022-05-16

## 2020-03-05 ASSESSMENT — PATIENT HEALTH QUESTIONNAIRE - PHQ9
SUM OF ALL RESPONSES TO PHQ QUESTIONS 1-9: 1
SUM OF ALL RESPONSES TO PHQ QUESTIONS 1-9: 1

## 2020-03-05 NOTE — PROGRESS NOTES
Chief Complaint   Patient presents with   [facility]       Medicare Annual Wellness Visit  Name: Shayy Cam Date: 3/5/2020   MRN: [id number] Sex: Female   Age: 69 y.o. Ethnicity: Non-/Non    : 1945 Race: Pj Andrade is here for Medicare AWV    Screenings for behavioral, psychosocial and functional/safety risks, and cognitive dysfunction are all negative except as indicated below. These results, as well as other patient data from the [address] form, are documented in Flowsheets linked to this Encounter. Allergies   Allergen Reactions    Pcn [Penicillins] Hives and Itching     Patient states she can tolerate ampicillin and amoxicillin however.  Cefdinir          Prior to Visit Medications    Medication Sig Taking? Authorizing Provider   ketoconazole (NIZORAL) 2 % shampoo Apply topically daily as needed. Yes ANTONIA Young   diclofenac (VOLTAREN) 50 MG EC tablet Take 1 tablet by mouth 2 times daily as needed for Pain (tooth pain) Yes ANTONIA Young   NOVOLOG FLEXPEN 100 UNIT/ML injection pen INJECT 15 UNITS            SUBCUTANEOUSLY 3 TIMES A   DAY BEFORE MEALS. Yes ANTONIA Young   gabapentin (NEURONTIN) 300 MG capsule TAKE 1 CAPSULE BY MOUTH 3 TIMES A DAY Yes ANTONIA Young   Insulin Degludec (TRESIBA FLEXTOUCH) 200 UNIT/ML SOPN INJECT 100 UNITS (=1/2 ML) SUBCUTANEOUSLY NIGHTLY Yes ANTONIA Young   albuterol (PROVENTIL) (5 MG/ML) 0.5% nebulizer solution Take 1ml twice daily for 10 days Yes ANTONIA Whyte   Nebulizers (AIRIAL COMPACT MINI NEBULIZER) MISC Patient has COPD and problems with breathing. Yes ANTONIA Young   Vitamin D, Cholecalciferol, 25 MCG (1000 UT) CAPS Take by mouth Yes Historical Provider, MD   tiotropium (SPIRIVA) 18 MCG inhalation capsule Inhale 1 capsule into the lungs daily.  Yes Historical Provider, MD   fluconazole (DIFLUCAN) 150 MG tablet  Yes Historical Provider, MD Provider, MD   Insulin Pen Needle 31G X 5 MM MISC As directed Yes Ayala Taylor MD   Misc. Devices (ROLLER WALKER) MISC 1 each by Does not apply route daily Pt states that she falls at least once a month up to 3 times a month. Yes ANTONIA Perry   INSULIN SYRINGE 1CC/29G 29G X 1/2\" 1 ML MISC Use with each dose of novolog TID Yes Ayala Taylor MD   mometasone-formoterol [facility]) 200-5 MCG/ACT inhaler Inhale 2 puffs into the lungs every 12 hours Yes Historical Provider, MD   anastrozole (ARIMIDEX) 1 MG tablet Take 1 mg by mouth daily Yes Historical Provider, MD   Multiple Vitamins-Minerals (MULTIVITAMIN ADULT PO) Take 1 tablet by mouth daily  Yes Historical Provider, MD   albuterol (PROVENTIL HFA;VENTOLIN HFA) 108 (90 BASE) MCG/ACT inhaler Inhale 2 puffs into the lungs every 4 hours as needed for Wheezing Yes De Thornton MD   Incontinence Supply Disposable (POISE PANTILINERS) PADS Use pads as needed daily for urine leakage.  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    Breast CA (HCC)     Left breast Nodules removed Took radiation    Chronic back pain     Chronic kidney disease     Overactive bladder     COPD (chronic obstructive pulmonary disease) (HCC)     x few years Scarring on lungs 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    Diabetes mellitus ([facility] [address]     On insulin x 10 years    Fibromyalgia     Fibromyalgia     for 20 years    GERD (gastroesophageal reflux disease)     History of blood transfusion     ?when    Hyperlipidemia     High cholesterol    Hypertension     On medications for 2 years    Joint pain 2/2/2016    Liver disease     Has fatty liver    Morbid obesity ([address]     Multiple food allergies     Neuropathic pain     Neuropathy     Neuropathy involving both lower extremities 2/2/2016    Obstructive sleep apnea     AHI: 68.1 per PSG, 5/2018    Osteoarthritis     Other disorders of kidney and ureter in diseases classified elsewhere     Pneumonia     Postmenopausal osteoporosis     Psychiatric problem     Restless leg syndrome     S/P lumpectomy, left breast 8/19/2015 8/4/2015    Sleep apnea     Type II or unspecified type diabetes mellitus with neurological manifestations, 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 Stefano Morrison      broke L ankle due to osteoporosis, has hardware in    FRACTURE SURGERY      Left ankle     HYSTERECTOMY      VA DRAIN SKIN ABSCESS COMPLIC N/A 3/12/0568    ABDOMINAL WALL ABSCESS INCISION AND DRAINAGE performed by Sagrario Alaniz MD at [address] ENDOSCOPY  4/8/2016    Dr Gaytan Code, (-) H Pylori    WRIST SURGERY           Family History   Problem Relation Age of Onset    High Blood Pressure Father     Heart Disease Father     Diabetes Father     High Blood Pressure Sister     Diabetes Sister     Cancer Sister     High Blood Pressure Brother     Colon Cancer Neg Hx     Colon Polyps Neg Hx     Esophageal Cancer Neg Hx     Liver Cancer Neg Hx     Liver Disease Neg Hx     Rectal Cancer Neg Hx     Stomach Cancer Neg Hx        CareTeam (Including outside providers/suppliers regularly involved in providing care):   Patient Care Team:  Donold Halsted, APRN as PCP - General (Nurse Practitioner)  Donold Halsted, APRN as PCP - [facility] Empaneled Provider  ANTONIA Crooks as Nurse Practitioner (Family Nurse Practitioner)  ANTONIA Boles as Advanced Practice Nurse (Neurology)    Wt Readings from Last 3 Encounters:   03/05/20 259 lb 6.4 oz (117.7 kg)   02/18/20 254 lb specialist    Personalized Preventive Plan   Current Health Maintenance Status  Immunization History   Administered Date(s) Administered    Influenza Vaccine, unspecified formulation 09/03/2015, 09/03/2015    Influenza Virus Vaccine 10/10/2013, 11/21/2016    Influenza, Triv, inactivated, subunit, adjuvanted, IM (Fluad 65 yrs and older) 09/26/2019    Pneumococcal Conjugate 13-valent ([id number]) 11/01/2016    Pneumococcal Polysaccharide ([id number]) 10/10/2013, 11/16/2015    Tdap (Boostrix, Adacel) 12/31/2014        Health Maintenance   Topic Date Due    Hepatitis B vaccine (1 of 3 - Risk 3-dose series) 12/19/1966    Colon cancer screen colonoscopy  12/19/1997    Diabetic retinal exam  08/12/2016    Diabetic foot exam  04/10/2019    Annual Wellness Visit (AWV)  05/29/2019    A1C test (Diabetic or Prediabetic)  02/25/2020    Shingles Vaccine (1 of 2) 08/22/2020 (Originally 12/19/1997)    Breast cancer screen  08/26/2020    Lipid screen  08/30/2020    Potassium monitoring  08/30/2020    Creatinine monitoring  08/30/2020    Diabetic microalbuminuria test  11/25/2020    DTaP/Tdap/Td vaccine (2 - Td) 12/31/2024    DEXA (modify frequency per FRAX score)  Completed    Flu vaccine  Completed    Pneumococcal 65+ years Vaccine  Completed    Hepatitis C screen  Completed    Hepatitis A vaccine  Aged Out    Hib vaccine  Aged Out    Meningococcal (ACWY) vaccine  Aged Out     Recommendations for Osteoplastics Due: see orders and patient instructions/AVS.  . Recommended screening schedule for the next 5-10 years is provided to the patient in written form: see Patient Glenda Flores was seen today for medicare aw. Diagnoses and all orders for this visit:    Routine general medical examination at a health care facility    Seborrheic dermatitis of scalp  -     ketoconazole (NIZORAL) 2 % shampoo; Apply topically daily as needed.     Pain due to dental caries  -     diclofenac (VOLTAREN) 50 MG EC tablet; Take 1 tablet by mouth 2 times daily as needed for Pain (tooth pain)    Essential hypertension  -     CBC Auto Differential; Future  -     Comprehensive Metabolic Panel; Future    Type 2 diabetes mellitus with complication (HCC)  -     POCT glycosylated hemoglobin (Hb A1C)  -     Diabetic Foot Exam          Refusing colon screening. Medications refilled. Diabetic foot exam complete. A1c was 9.4. This is an improvement from 11.3 at last visit. Continue current diabetic regimen. Work on diabetic diet. Labs ordered. Patient to get these before next appointment. Return in about 3 months (around 6/5/2020), or if symptoms worsen or fail to improve, for Medicare Annual Wellness Visit in 1 year, Diabetes, chronic conditions.

## 2020-03-09 ENCOUNTER — HOSPITAL ENCOUNTER (OUTPATIENT)
Dept: PHYSICAL THERAPY | Age: 75
Setting detail: THERAPIES SERIES
Discharge: HOME | End: 2020-03-09
Payer: MEDICARE

## 2020-03-09 PROCEDURE — 97110 THERAPEUTIC EXERCISES: CPT

## 2020-03-09 NOTE — PROGRESS NOTES
urinary section of PFIQ-7 )  Patient Goals   Patient goals : get rid of incontinence    Plan:    Plan  Times per week: 2x/week   Plan weeks: 12 weeks  Current Treatment Recommendations: Strengthening, ROM, Safety Education & Training, Home Exercise Program, Manual Therapy - Soft Tissue Mobilization, Endurance Training, Modalities, Neuromuscular Re-education, Manual Therapy - Joint Manipulation, Patient/Caregiver Education & Training, Positioning  Plan Comment: dependent on insurance authorization  Timed Code Treatment Minutes: [facility] Time   Individual Concurrent Group Co-treatment   Time In 6743         Time Out 2988         Minutes 49         Timed Code Treatment Minutes: 6 Ej Wade PT     Electronically signed by Zuly Millard PT on 3/9/2020 at 2:04 PM

## 2020-03-12 ENCOUNTER — HOSPITAL ENCOUNTER (OUTPATIENT)
Dept: PHYSICAL THERAPY | Age: 75
Setting detail: THERAPIES SERIES
Discharge: HOME | End: 2020-03-12
Payer: MEDICARE

## 2020-03-12 PROCEDURE — 97110 THERAPEUTIC EXERCISES: CPT

## 2020-03-18 ENCOUNTER — TELEPHONE (OUTPATIENT)
Dept: PODIATRY | Facility: CLINIC | Age: 75
End: 2020-03-18

## 2020-03-23 ENCOUNTER — APPOINTMENT (OUTPATIENT)
Dept: PHYSICAL THERAPY | Age: 75
End: 2020-03-23
Payer: MEDICARE

## 2020-03-23 NOTE — PROGRESS NOTES
[facility]  OUTPATIENT PHYSICAL THERAPY  DISCHARGE SUMMARY    Date: 3/23/2020  Patient Name: Stormy Calhoun        MRN: [id number]    ACCOUNT #: [de-identified]  : 1945  (75 y.o.)  Gender: female   Referring Practitioner: Du KNIGHT  Diagnosis: N39.4 urge incontinence of urine  Referral Date : 20  Treatment Diagnosis: urinary incontinence    Total # of Visits Approved: 12  Total # of Visits to Date: 9    Subjective  Subjective: Patient states she has no pain today and she hasn't had any leaks in the last couple of weeks. Objective  Treatments received include: ther-ex, biofeedback  See objective/subjective data in goals    Assessment  Assessment: Patient did well with tx today with mod v.c. for proper tech with ex's. She was only able to achieve level 4-6 on biofeedback today and had continued difficulty without accessory muscle use, documented last attended visit. Due to patient being unable to return due to closure of office all goal data is from most recent reassessment. Patient will have to have new orders and be evaluated again to resume therapy due to insurance authorization date. Plan  D/C secondary to temporary closure of Outpatient Clinic due to covid-19         Goals  Short term goals  Time Frame for Short term goals: 6 Weeks   Short term goal 1:  Increase PERF score to 2+/10/10/10- PROGRESS(2020: Patient PERF score: 1+/5/6/7)  Short term goal 2: Patient to have good lumbopelvic stabilization in hooklying alone- MET(2020: Patient has good lumbopelvic stabilizaiton in hooklying alone)  Short term goal 3: Patient to report no more than 5 occurrances of leakage in one weeks time- PRGORESS(2020: Patient states she is averaging about 3-5 leaages a day)  Short term goal 4: Patient to be independent with HEP- MET(2020: Patient states she is doing her ex's independently at home)    Long term goals  Time Frame for Long term goals : 12 Weeks
posture  Assessment: Patient did well with tx today with mod v.c. for proper tech with ex's. She was only able to achieve level 4-6 on biofeedback today and had continued difficulty without accessory muscle use. Treatment Diagnosis: urinary incontinence                                                           Goals:  Short term goals  Time Frame for Short term goals: 6 Weeks   Short term goal 1:  Increase PERF score to 2+/10/10/10- PROGRESS(02/17/2020: Patient PERF score: 1+/5/6/7)  Short term goal 2: Patient to have good lumbopelvic stabilization in hooklying alone- MET(02/17/2020: Patient has good lumbopelvic stabilizaiton in hooklying alone)  Short term goal 3: Patient to report no more than 5 occurrances of leakage in one weeks time- PRGORESS(02/17/2020: Patient states she is averaging about 3-5 leaages a day)  Short term goal 4: Patient to be independent with HEP- MET(02/17/2020: Patient states she is doing her ex's independently at home)  Long term goals  Time Frame for Long term goals : 12 Weeks   Long term goal 1: Patient to report decreased urinary incontinence by 50%- PROGRESS(02/17/2020: Patient states she feels like her urinary incontinence is about 25% improved)  Long term goal 2: Patient to have good lumbopelvic stabilization in hooklying alt ue/le- PROGRESS(02/17/2020: Patient has fair lumbopelvic stabilizaiton in hooklying with alt ue/le activity )  Long term goal 3: Patient to report decreased difficulty with voiding urine- MET(02/17/2020: Patient states she has decreased diffiuclyt with voiding her urine and says no difficulty with this now)  Long term goal 4: Patient to demo increased function by scoring <= 10% impairment on urinary section of PFIQ-7- PROGRESS(02/17/2020: patient scored 33.33% impairment on urinary section of PFIQ-7 )  Patient Goals   Patient goals : get rid of incontinence    Plan:    Plan  Times per week: 2x/week   Plan weeks: 12 weeks  Current Treatment Recommendations:

## 2020-03-26 ENCOUNTER — APPOINTMENT (OUTPATIENT)
Dept: PHYSICAL THERAPY | Age: 75
End: 2020-03-26
Payer: MEDICARE

## 2020-03-26 ENCOUNTER — OFFICE VISIT (OUTPATIENT)
Dept: URGENT CARE | Age: 75
End: 2020-03-26
Payer: MEDICARE

## 2020-03-26 ENCOUNTER — HOSPITAL ENCOUNTER (OUTPATIENT)
Dept: GENERAL RADIOLOGY | Age: 75
Discharge: HOME | End: 2020-03-26
Payer: MEDICARE

## 2020-03-26 VITALS
OXYGEN SATURATION: 98 % | HEART RATE: 78 BPM | DIASTOLIC BLOOD PRESSURE: 67 MMHG | RESPIRATION RATE: 18 BRPM | SYSTOLIC BLOOD PRESSURE: 115 MMHG | TEMPERATURE: 98.3 F

## 2020-03-26 PROCEDURE — 71046 X-RAY EXAM CHEST 2 VIEWS: CPT

## 2020-03-26 PROCEDURE — 99213 OFFICE O/P EST LOW 20 MIN: CPT | Performed by: NURSE PRACTITIONER

## 2020-03-26 RX ORDER — DOXYCYCLINE HYCLATE 100 MG/1
100 CAPSULE ORAL 2 TIMES DAILY
Qty: 20 CAPSULE | Refills: 0 | Status: SHIPPED | OUTPATIENT
Start: 2020-03-26 | End: 2020-04-05

## 2020-03-26 RX ORDER — BENZONATATE 100 MG/1
100 CAPSULE ORAL 3 TIMES DAILY PRN
Qty: 21 CAPSULE | Refills: 0 | Status: SHIPPED | OUTPATIENT
Start: 2020-03-26 | End: 2020-04-02

## 2020-03-26 ASSESSMENT — ENCOUNTER SYMPTOMS
SHORTNESS OF BREATH: 1
SORE THROAT: 1
COUGH: 1
ABDOMINAL PAIN: 0
RHINORRHEA: 1
DIARRHEA: 0
VOMITING: 0
NAUSEA: 0

## 2020-03-26 NOTE — PATIENT INSTRUCTIONS
acetaminophen (Tylenol) can be harmful. · Get plenty of rest.  · Do not smoke or allow others to smoke around you. If you need help quitting, talk to your doctor about stop-smoking programs and medicines. These can increase your chances of quitting for good. When should you call for help? Call 911 anytime you think you may need emergency care. For example, call if:    · You have severe trouble breathing.    Call your doctor now or seek immediate medical care if:    · You seem to be getting much sicker.     · You have new or worse trouble breathing.     · You have a new or higher fever.     · You have a new rash.    Watch closely for changes in your health, and be sure to contact your doctor if:    · You have a new symptom, such as a sore throat, an earache, or sinus pain.     · You cough more deeply or more often, especially if you notice more mucus or a change in the color of your mucus.     · You do not get better as expected. Where can you learn more? Go to https://Stribe.EnzySurge. org and sign in to your ResponseTap (formerly [employer]ight) account. Enter P694 in the Cogbooks box to learn more about \"Upper Respiratory Infection (Cold): Care Instructions. \"     If you do not have an account, please click on the \"Sign Up Now\" link. Current as of: June 9, 2019Content Version: 12.4  © 0789-3751 Healthwise, Incorporated. Care instructions adapted under license by [facility] If you have questions about a medical condition or this instruction, always ask your healthcare professional. [place] [address] any warranty or liability for your use of this information. Patient Education        Cough: Care Instructions  Your Care Instructions    A cough is your body's response to something that bothers your throat or airways. Many things can cause a cough. You might cough because of a cold or the flu, bronchitis, or asthma.  Smoking, postnasal drip, allergies, and stomach acid that backs up into your throat also can cause coughs. A cough is a symptom, not a disease. Most coughs stop when the cause, such as a cold, goes away. You can take a few steps at home to cough less and feel better. Follow-up care is a key part of your treatment and safety. Be sure to make and go to all appointments, and call your doctor if you are having problems. It's also a good idea to know your test results and keep a list of the medicines you take. How can you care for yourself at home? · Drink lots of water and other fluids. This helps thin the mucus and soothes a dry or sore throat. Honey or lemon juice in hot water or tea may ease a dry cough. · Take cough medicine as directed by your doctor. · Prop up your head on pillows to help you breathe and ease a dry cough. · Try cough drops to soothe a dry or sore throat. Cough drops don't stop a cough. Medicine-flavored cough drops are no better than candy-flavored drops or hard candy. · Do not smoke. Avoid secondhand smoke. If you need help quitting, talk to your doctor about stop-smoking programs and medicines. These can increase your chances of quitting for good. When should you call for help? Call 911 anytime you think you may need emergency care. For example, call if:    · You have severe trouble breathing.    Call your doctor now or seek immediate medical care if:    · You cough up blood.     · You have new or worse trouble breathing.     · You have a new or higher fever.     · You have a new rash.    Watch closely for changes in your health, and be sure to contact your doctor if:    · You cough more deeply or more often, especially if you notice more mucus or a change in the color of your mucus.     · You have new symptoms, such as a sore throat, an earache, or sinus pain.     · You do not get better as expected. Where can you learn more? Go to https://kiki.Clupedia. org and sign in to your Tookitaki account.  Enter D279 in the [facility]

## 2020-03-26 NOTE — PROGRESS NOTES
[facility] URGENT CARE  [address]  [address] [place] [address] [address]  Dept: [phone]  Loc: [phone]    Luis Walker is a 69 y.o. female who presents today for her medical conditions/complaintsas noted below. Luis Walker is c/o of Cough        HPI:     HPI  Mrs. Ramone Santos presents today with a complaint of cough. She reports she has had the cough for 2 months. It recently worsened a week and a half ago when her daughter returned from [place]. The daughter that went to [place] has not had any symptoms. Another daughter has had pneumonia. The patient states that she has had fever low grade 99.4. Has had some shortness of breathing. Has tried otc medicine without relief. Does have a history of asthma. She was dx with bronchitis 2/7/2020 by her PCP. She also reports some congestion and sore throat and runny nose.    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    Breast CA (HCC)     Left breast Nodules removed Took radiation    Chronic back pain     Chronic kidney disease     Overactive bladder     COPD (chronic obstructive pulmonary disease) (HCC)     x few years Scarring on lungs 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 [place]    Diabetes mellitus ([address]     On insulin x 10 years    Fibromyalgia     Fibromyalgia     for 20 years    GERD (gastroesophageal reflux disease)     History of blood transfusion     ?when    Hyperlipidemia     High cholesterol    Hypertension     On medications for 2 years    Joint pain 2/2/2016    Liver disease     Has fatty liver    Morbid obesity ([address]     Multiple food allergies     Neuropathic pain     Neuropathy     Neuropathy involving both lower extremities 2/2/2016    Obstructive sleep apnea     AHI: 68.1 per PSG, 5/2018    Osteoarthritis     Other disorders of kidney and ureter in diseases classified elsewhere     Pneumonia     Postmenopausal osteoporosis     Psychiatric problem     Restless leg syndrome     S/P lumpectomy, left breast 8/19/2015 8/4/2015    Sleep apnea     Type II or unspecified type diabetes mellitus with neurological manifestations, uncontrolled(250.62)      Past Surgical History:   Procedure Laterality Date    BREAST SURGERY Left 8/4/2015    left partial mastectomy on 8/4/15    EYE SURGERY      EYE SURGERY Bilateral 04/02/2017    Dr. Linda Gould      broke L ankle due to osteoporosis, has hardware in    FRACTURE SURGERY      Left ankle     HYSTERECTOMY      ME DRAIN SKIN ABSCESS COMPLIC N/A 5/88/7558    ABDOMINAL WALL ABSCESS INCISION AND DRAINAGE performed by Jumana Bowden MD at [address] ENDOSCOPY  4/8/2016    Dr Willa Lucas, (-) H Pylori    WRIST SURGERY         Family History   Problem Relation Age of Onset    High Blood Pressure Father     Heart Disease Father     Diabetes Father     High Blood Pressure Sister     Diabetes Sister     Cancer Sister     High Blood Pressure Brother     Colon Cancer Neg Hx     Colon Polyps Neg Hx     Esophageal Cancer Neg Hx     Liver Cancer Neg Hx     Liver Disease Neg Hx     Rectal Cancer Neg Hx     Stomach Cancer Neg Hx        Social History     Tobacco Use    Smoking status: Never Smoker    Smokeless tobacco: Never Used   Substance Use Topics    Alcohol use:  Yes     Alcohol/week: 2.0 standard drinks     Types: 1 Glasses of wine, 1 Cans of beer per week     Comment: occ      Current Outpatient Medications   Medication Sig Dispense Refill    doxycycline hyclate (VIBRAMYCIN) 100 MG capsule Take 1 capsule by mouth 2 times daily for 10 days 20 capsule 0    benzonatate (TESSALON) 100 MG capsule Take 1 capsule by mouth 3 times daily as needed for Cough 21 capsule 0    ketoconazole (NIZORAL) 2 % 12/19/1997)    A1C test (Diabetic or Prediabetic)  06/05/2020    Breast cancer screen  08/26/2020    Lipid screen  08/30/2020    Potassium monitoring  08/30/2020    Creatinine monitoring  08/30/2020    Diabetic microalbuminuria test  11/25/2020    Diabetic foot exam  03/05/2021    Annual Wellness Visit (AWV)  03/06/2021    DTaP/Tdap/Td vaccine (2 - Td) 12/31/2024    DEXA (modify frequency per FRAX score)  Completed    Flu vaccine  Completed    Pneumococcal 65+ years Vaccine  Completed    Hepatitis C screen  Completed    Hepatitis A vaccine  Aged Out    Hib vaccine  Aged Out    Meningococcal (ACWY) vaccine  Aged Out       Subjective:     Review of Systems   Constitutional: Positive for fatigue and fever (99.4). Negative for chills. HENT: Positive for congestion, rhinorrhea and sore throat. Negative for ear pain. Respiratory: Positive for cough and shortness of breath. Gastrointestinal: Negative for abdominal pain, diarrhea, nausea and vomiting. Skin: Negative for rash. Neurological: Negative for headaches. All other systems reviewed and are negative.      :Objective      Physical Exam  Vitals signs and nursing note reviewed. Constitutional:       General: She is not in acute distress. Appearance: Normal appearance. She is well-developed. She is ill-appearing. She is not diaphoretic. HENT:      Head: Normocephalic and atraumatic. Eyes:      Conjunctiva/sclera: Conjunctivae normal.      Pupils: Pupils are equal, round, and reactive to light. Neck:      Musculoskeletal: Normal range of motion. Cardiovascular:      Rate and Rhythm: Normal rate and regular rhythm. Heart sounds: Normal heart sounds. No murmur. Pulmonary:      Effort: Pulmonary effort is normal. No respiratory distress. Breath sounds: Decreased breath sounds present. No wheezing. Skin:     General: Skin is warm and dry. Findings: No rash.    Neurological:      Mental Status: She is alert and oriented to person, place, and time. Psychiatric:         Behavior: Behavior normal.       /67   Pulse 78   Temp 98.3 °F (36.8 °C) (Oral)   Resp 18   LMP  (LMP Unknown)   SpO2 98%     :Assessment       Diagnosis Orders   1. Upper respiratory infection with cough and congestion  doxycycline hyclate (VIBRAMYCIN) 100 MG capsule    benzonatate (TESSALON) 100 MG capsule   2. Cough  XR CHEST STANDARD (2 VW)       :Plan   Xray:   No significant interval change. The lungs are poorly expanded. The chronic appearing interstitial infiltrate in the lungs  bilaterally, similar to the previous study. There is no pleural effusion, pulmonary congestion or pneumothorax. Heart size is in the normal range. The atheromatous changes of  thoracic aorta are noted. There is no bony abnormality. IMPRESSION:  No active cardiopulmonary disease. Chronic interstitial lung changes. 1. Rest and take antibiotic as prescribed   2. Take tessalon as needed for coughing. 3. Monitor for fever and treat as needed with tylenol or ibuprofen  4. If patient is not improving or developing any new/worsening symptoms then follow up with PCP     Orders Placed This Encounter   Procedures    XR CHEST STANDARD (2 VW)     Standing Status:   Future     Number of Occurrences:   1     Standing Expiration Date:   3/26/2021     Order Specific Question:   Reason for exam:     Answer:   cough x 2 months. worsened when her daughter returned from [place] a week and a half ago. No follow-ups on file. Orders Placed This Encounter   Medications    doxycycline hyclate (VIBRAMYCIN) 100 MG capsule     Sig: Take 1 capsule by mouth 2 times daily for 10 days     Dispense:  20 capsule     Refill:  0    benzonatate (TESSALON) 100 MG capsule     Sig: Take 1 capsule by mouth 3 times daily as needed for Cough     Dispense:  21 capsule     Refill:  0       Patient given educational materials- see patient instructions.   Discussed use, benefit, and side effects of prescribedmedications. All patient questions answered. Pt voiced understanding. Patient Instructions       Patient Education        Upper Respiratory Infection (Cold): Care Instructions  Your Care Instructions    An upper respiratory infection, or URI, is an infection of the nose, sinuses, or throat. URIs are spread by coughs, sneezes, and direct contact. The common cold is the most frequent kind of URI. The flu and sinus infections are other kinds of URIs. Almost all URIs are caused by viruses. Antibiotics won't cure them. But you can treat most infections with home care. This may include drinking lots of fluids and taking over-the-counter pain medicine. You will probably feel better in 4 to 10 days. The doctor has checked you carefully, but problems can develop later. If you notice any problems or new symptoms, get medical treatment right away. Follow-up care is a key part of your treatment and safety. Be sure to make and go to all appointments, and call your doctor if you are having problems. It's also a good idea to know your test results and keep a list of the medicines you take. How can you care for yourself at home? · To prevent dehydration, drink plenty of fluids, enough so that your urine is light yellow or clear like water. Choose water and other caffeine-free clear liquids until you feel better. If you have kidney, heart, or liver disease and have to limit fluids, talk with your doctor before you increase the amount of fluids you drink. · Take an over-the-counter pain medicine, such as acetaminophen (Tylenol), ibuprofen (Advil, Motrin), or naproxen (Aleve). Read and follow all instructions on the label. · Before you use cough and cold medicines, check the label. These medicines may not be safe for young children or for people with certain health problems. · Be careful when taking over-the-counter cold or flu medicines and Tylenol at the same time.  Many of these medicines have acetaminophen, which is Tylenol. Read the labels to make sure that you are not taking more than the recommended dose. Too much acetaminophen (Tylenol) can be harmful. · Get plenty of rest.  · Do not smoke or allow others to smoke around you. If you need help quitting, talk to your doctor about stop-smoking programs and medicines. These can increase your chances of quitting for good. When should you call for help? Call 911 anytime you think you may need emergency care. For example, call if:    · You have severe trouble breathing.    Call your doctor now or seek immediate medical care if:    · You seem to be getting much sicker.     · You have new or worse trouble breathing.     · You have a new or higher fever.     · You have a new rash.    Watch closely for changes in your health, and be sure to contact your doctor if:    · You have a new symptom, such as a sore throat, an earache, or sinus pain.     · You cough more deeply or more often, especially if you notice more mucus or a change in the color of your mucus.     · You do not get better as expected. Where can you learn more? Go to https://[employer]. org and sign in to your [employer] account. Enter F359 in the GiveNext box to learn more about \"Upper Respiratory Infection (Cold): Care Instructions. \"     If you do not have an account, please click on the \"Sign Up Now\" link. Current as of: June 9, 2019Content Version: 12.4  © 0072-8177 Healthwise, Incorporated. Care instructions adapted under license by [facility] [employer] [facility] ([facility]). If you have questions about a medical condition or this instruction, always ask your healthcare professional. [place] [address] any warranty or liability for your use of this information. Patient Education        Cough: Care Instructions  Your Care Instructions    A cough is your body's response to something that bothers your throat or airways. Many things can cause a cough.  You might cough because of a cold or the flu, bronchitis, or asthma. Smoking, postnasal drip, allergies, and stomach acid that backs up into your throat also can cause coughs. A cough is a symptom, not a disease. Most coughs stop when the cause, such as a cold, goes away. You can take a few steps at home to cough less and feel better. Follow-up care is a key part of your treatment and safety. Be sure to make and go to all appointments, and call your doctor if you are having problems. It's also a good idea to know your test results and keep a list of the medicines you take. How can you care for yourself at home? · Drink lots of water and other fluids. This helps thin the mucus and soothes a dry or sore throat. Honey or lemon juice in hot water or tea may ease a dry cough. · Take cough medicine as directed by your doctor. · Prop up your head on pillows to help you breathe and ease a dry cough. · Try cough drops to soothe a dry or sore throat. Cough drops don't stop a cough. Medicine-flavored cough drops are no better than candy-flavored drops or hard candy. · Do not smoke. Avoid secondhand smoke. If you need help quitting, talk to your doctor about stop-smoking programs and medicines. These can increase your chances of quitting for good. When should you call for help? Call 911 anytime you think you may need emergency care. For example, call if:    · You have severe trouble breathing.    Call your doctor now or seek immediate medical care if:    · You cough up blood.     · You have new or worse trouble breathing.     · You have a new or higher fever.     · You have a new rash.    Watch closely for changes in your health, and be sure to contact your doctor if:    · You cough more deeply or more often, especially if you notice more mucus or a change in the color of your mucus.     · You have new symptoms, such as a sore throat, an earache, or sinus pain.     · You do not get better as expected.    Where can you learn more?  Go to https://chpepiceweb.healthGiftxoxopartners. org and sign in to your [employer] account. Enter D279 in the [facility] box to learn more about \"Cough: Care Instructions. \"     If you do not have an account, please click on the \"Sign Up Now\" link. Current as of: June 9, 2019Content Version: 12.4  © 4772-3154 Healthwise, Incorporated. Care instructions adapted under license by [facility] ([facility]). If you have questions about a medical condition or this instruction, always ask your healthcare professional. [place] [address] any warranty or liability for your use of this information. 1. Rest and take antibiotic as prescribed   2. Take tessalon as needed for coughing. 3. Monitor for fever and treat as needed with tylenol or ibuprofen  4.  If patient is not improving or developing any new/worsening symptoms then follow up with PCP           Electronically signed by ANTONIA Webster on 3/26/2020 at 12:16 PM

## 2020-03-27 ENCOUNTER — APPOINTMENT (OUTPATIENT)
Dept: LAB | Facility: HOSPITAL | Age: 75
End: 2020-03-27

## 2020-03-30 ENCOUNTER — APPOINTMENT (OUTPATIENT)
Dept: PHYSICAL THERAPY | Age: 75
End: 2020-03-30
Payer: MEDICARE

## 2020-04-06 ENCOUNTER — TELEPHONE (OUTPATIENT)
Dept: PODIATRY | Facility: CLINIC | Age: 75
End: 2020-04-06

## 2020-04-06 ENCOUNTER — TELEPHONE (OUTPATIENT)
Dept: PRIMARY CARE CLINIC | Age: 75
End: 2020-04-06

## 2020-04-07 ENCOUNTER — OFFICE VISIT (OUTPATIENT)
Age: 75
End: 2020-04-07
Payer: MEDICARE

## 2020-04-07 VITALS
WEIGHT: 215 LBS | HEART RATE: 86 BPM | HEIGHT: 62 IN | RESPIRATION RATE: 18 BRPM | OXYGEN SATURATION: 92 % | SYSTOLIC BLOOD PRESSURE: 130 MMHG | TEMPERATURE: 98.6 F | BODY MASS INDEX: 39.56 KG/M2 | DIASTOLIC BLOOD PRESSURE: 80 MMHG

## 2020-04-07 PROCEDURE — 99213 OFFICE O/P EST LOW 20 MIN: CPT | Performed by: PHYSICIAN ASSISTANT

## 2020-04-07 RX ORDER — CYPROHEPTADINE HYDROCHLORIDE 4 MG/1
4 TABLET ORAL 3 TIMES DAILY PRN
Qty: 21 TABLET | Refills: 0 | Status: SHIPPED | OUTPATIENT
Start: 2020-04-07 | End: 2022-05-16 | Stop reason: ALTCHOICE

## 2020-04-07 RX ORDER — TRIAMCINOLONE ACETONIDE 1 MG/G
CREAM TOPICAL
Qty: 45 G | Refills: 0 | Status: SHIPPED | OUTPATIENT
Start: 2020-04-07 | End: 2021-05-20

## 2020-04-10 RX ORDER — INSULIN DEGLUDEC 200 U/ML
INJECTION, SOLUTION SUBCUTANEOUS
Qty: 27 ML | Refills: 0 | Status: SHIPPED | OUTPATIENT
Start: 2020-04-10 | End: 2020-06-18 | Stop reason: SDUPTHER

## 2020-04-17 RX ORDER — ROSUVASTATIN CALCIUM 10 MG/1
TABLET, COATED ORAL
Qty: 90 TABLET | Refills: 1 | Status: SHIPPED | OUTPATIENT
Start: 2020-04-17 | End: 2020-10-11

## 2020-04-17 RX ORDER — SITAGLIPTIN AND METFORMIN HYDROCHLORIDE 1000; 50 MG/1; MG/1
1 TABLET, FILM COATED ORAL 2 TIMES DAILY WITH MEALS
Qty: 180 TABLET | Refills: 1 | Status: SHIPPED | OUTPATIENT
Start: 2020-04-17 | End: 2020-10-11

## 2020-04-17 RX ORDER — CITALOPRAM 20 MG/1
TABLET ORAL
Qty: 90 TABLET | Refills: 3 | Status: SHIPPED | OUTPATIENT
Start: 2020-04-17 | End: 2021-04-27

## 2020-04-17 RX ORDER — METOPROLOL SUCCINATE 50 MG/1
TABLET, EXTENDED RELEASE ORAL
Qty: 90 TABLET | Refills: 1 | Status: SHIPPED | OUTPATIENT
Start: 2020-04-17 | End: 2020-10-11

## 2020-04-17 RX ORDER — LORATADINE 10 MG/1
TABLET ORAL
Qty: 30 TABLET | Refills: 1 | Status: SHIPPED | OUTPATIENT
Start: 2020-04-17 | End: 2020-07-30

## 2020-04-17 RX ORDER — EMPAGLIFLOZIN 10 MG/1
TABLET, FILM COATED ORAL
Qty: 90 TABLET | Refills: 1 | Status: SHIPPED | OUTPATIENT
Start: 2020-04-17 | End: 2020-10-06

## 2020-04-17 RX ORDER — ERGOCALCIFEROL 1.25 MG/1
CAPSULE ORAL
Qty: 12 CAPSULE | Refills: 2 | Status: SHIPPED | OUTPATIENT
Start: 2020-04-17 | End: 2020-11-25

## 2020-04-17 RX ORDER — MAGNESIUM OXIDE 400 MG/1
400 TABLET ORAL DAILY
Qty: 90 TABLET | Refills: 3 | Status: SHIPPED | OUTPATIENT
Start: 2020-04-17 | End: 2021-03-22

## 2020-04-17 RX ORDER — LOSARTAN POTASSIUM 100 MG/1
TABLET ORAL
Qty: 90 TABLET | Refills: 1 | Status: SHIPPED | OUTPATIENT
Start: 2020-04-17 | End: 2020-10-11

## 2020-04-26 RX ORDER — MONTELUKAST SODIUM 10 MG/1
TABLET ORAL
Qty: 90 TABLET | Refills: 1 | Status: SHIPPED | OUTPATIENT
Start: 2020-04-26 | End: 2020-10-11

## 2020-04-26 RX ORDER — OMEPRAZOLE 20 MG/1
CAPSULE, DELAYED RELEASE ORAL
Qty: 180 CAPSULE | Refills: 1 | Status: SHIPPED | OUTPATIENT
Start: 2020-04-26 | End: 2020-10-11

## 2020-04-26 RX ORDER — OXYBUTYNIN CHLORIDE 10 MG/1
TABLET, EXTENDED RELEASE ORAL
Qty: 90 TABLET | Refills: 1 | Status: SHIPPED | OUTPATIENT
Start: 2020-04-26 | End: 2020-10-26

## 2020-05-01 NOTE — PROGRESS NOTES
Received letter from [facility] - DANIEL BRAUN with request for referral related to office closure from COVID-new order faxed

## 2020-05-04 ENCOUNTER — OFFICE VISIT (OUTPATIENT)
Dept: URGENT CARE | Age: 75
End: 2020-05-04
Payer: MEDICARE

## 2020-05-04 VITALS
TEMPERATURE: 98.8 F | DIASTOLIC BLOOD PRESSURE: 72 MMHG | SYSTOLIC BLOOD PRESSURE: 136 MMHG | BODY MASS INDEX: 45.73 KG/M2 | WEIGHT: 250 LBS | OXYGEN SATURATION: 96 % | RESPIRATION RATE: 18 BRPM | HEART RATE: 85 BPM

## 2020-05-04 LAB
BILIRUBIN, POC: ABNORMAL
BLOOD URINE, POC: ABNORMAL
CHP ED QC CHECK: NORMAL
CLARITY, POC: CLEAR
COLOR, POC: YELLOW
GLUCOSE BLD-MCNC: 236 MG/DL
GLUCOSE URINE, POC: 1000
KETONES, POC: ABNORMAL
LEUKOCYTE EST, POC: ABNORMAL
NITRITE, POC: ABNORMAL
PH, POC: 6.5
PROTEIN, POC: ABNORMAL
SPECIFIC GRAVITY, POC: 1.01
UROBILINOGEN, POC: 0.2

## 2020-05-04 PROCEDURE — 99213 OFFICE O/P EST LOW 20 MIN: CPT | Performed by: NURSE PRACTITIONER

## 2020-05-04 PROCEDURE — 82962 GLUCOSE BLOOD TEST: CPT | Performed by: NURSE PRACTITIONER

## 2020-05-04 PROCEDURE — 81002 URINALYSIS NONAUTO W/O SCOPE: CPT | Performed by: NURSE PRACTITIONER

## 2020-05-04 RX ORDER — METRONIDAZOLE 7.5 MG/G
1 GEL VAGINAL DAILY
Qty: 1 TUBE | Refills: 0 | Status: SHIPPED | OUTPATIENT
Start: 2020-05-04 | End: 2020-05-09

## 2020-05-04 ASSESSMENT — ENCOUNTER SYMPTOMS
ALLERGIC/IMMUNOLOGIC NEGATIVE: 1
NAUSEA: 0
ABDOMINAL PAIN: 0
CONSTIPATION: 0
ABDOMINAL DISTENTION: 0
VOMITING: 0
SHORTNESS OF BREATH: 0
EYES NEGATIVE: 1

## 2020-05-04 NOTE — PATIENT INSTRUCTIONS
1. Use vaginal cream as prescribed  2. Will call with culture results for further treatment recommendations  3. Follow up as needed with PCP  4.  Follow up with UC as needed

## 2020-05-04 NOTE — PROGRESS NOTES
[address] [facility] [place]  [address]  [address] [place] [address]  Dept: [phone]  Dept Fax: [phone]  Loc: [phone]     Rosmery Douglas is a 69 y.o. female who presents today for her medical conditions/complaintsas noted below. Rosmery Douglas is c/o of Dysuria; Vaginal Itching; and Insect Bite        HPI:     HPI    Dysuria   This is a new problem. The current episode started in the past 7 days. The problem occurs every urination. The problem has been unchanged. The quality of the pain is described as burning. The pain is at a severity of 5/10. The pain is moderate. There has been no fever. There is no history of pyelonephritis. Associated symptoms include frequency and urgency. Pertinent negatives include no chills, discharge, flank pain, hematuria, hesitancy, nausea, possible pregnancy, sweats or vomiting. She has tried increased fluids for the symptoms. The treatment provided no relief. Results for orders placed or performed in visit on 05/04/20   POCT urinalysis dipstick   Result Value Ref Range    Color, UA yellow     Clarity, UA clear     Glucose, UA POC 1,000     Bilirubin, UA neg     Ketones, UA neg     Spec Grav, UA 1.010     Blood, UA POC trace     pH, UA 6.5     Protein, UA POC neg     Urobilinogen, UA 0.2     Leukocytes, UA neg     Nitrite, UA neg    POCT Glucose   Result Value Ref Range    Glucose 236 mg/dL    QC OK?             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

## 2020-05-06 LAB — URINE CULTURE, ROUTINE: NORMAL

## 2020-05-11 ENCOUNTER — TELEPHONE (OUTPATIENT)
Dept: PODIATRY | Facility: CLINIC | Age: 75
End: 2020-05-11

## 2020-05-12 ENCOUNTER — HOSPITAL ENCOUNTER (OUTPATIENT)
Dept: PHYSICAL THERAPY | Age: 75
Setting detail: THERAPIES SERIES
Discharge: HOME | End: 2020-05-12
Payer: MEDICARE

## 2020-05-12 PROCEDURE — 97162 PT EVAL MOD COMPLEX 30 MIN: CPT

## 2020-05-12 NOTE — PROGRESS NOTES
33.33% impairment  Sensation  Overall Sensation Status: (patient reports numbness in bilateral feet, but throughout le's normal)                   Exercises  Exercise 1: supine ta contraction alone 10 sec x 0, alt ue 1 x 0, alt le 1 x 0, alt ue/le 1 x 0  Exercise 2: supine posterior pelvic tilts 1 x 0  Exercise 3: supine bilateral hs 90-90 stretch 4 x 0 sec   Exercise 4: supine bilateral piriformis stretch > 90 4 x 0 sec  Exercise 5: seated ta contraction alone 10 sec x 0, alt ue 1 x 0, alt le 1 x 0, alt ue/le 1 x 0  Exercise 6: supine downtraining on biofeedback on wedge in slight trendelenburg x 0 min   Exercise 7: supine pfm contraction on biofeedback on wedge in slight trendelenburg alone 10 sec x 0, with towel squeeze 10 sec x 0,with yellow t-band for hip abd 10 sec x 0, hooklying alone 10 sec hold x 0, with towel for hip add 10 sec x 0, quick flicks 2 x 0  Ortho Screen  Posture: forward shoulders and head moderately   Pelvic Alignment: normal   Ortho Screen: see above  Abdominals  Diatasis: none noted  Functional Stability: unable to perform sls  Abdominals: fair lumbopelvic stabilization in hooklying   Pelvic Floor  Perineal Descent: min   Skin Condition: normal, small rash noted on left medial knee, which patient states she is using medication on   Excursion: none  Prolapse Test: unable to get good test and visual, min ability to budlge    Internal Clock: moderate tenderness throughout, left more than right with moderate tension noted   Sensation: intact   Vaginal Vault: none  Muscle Power: 1+, but with max cues she is occassionally able to have 2/5 to 2+/5  Muscle Endurance (Seconds): 5 Seconds  Number Reps to Fatigue: 6  Muscle Flicks: 5  Quality of Contraction: trace to symmetrical with no lift  Other: patient uses abdominals and hip adductors to perform pfm contraction                   Assessment   Conditions Requiring Skilled Therapeutic Intervention  Body structures, Functions, Activity limitations:

## 2020-05-13 ENCOUNTER — HOSPITAL ENCOUNTER (OUTPATIENT)
Dept: MRI IMAGING | Age: 75
Discharge: HOME | End: 2020-05-13
Payer: MEDICARE

## 2020-05-13 ENCOUNTER — HOSPITAL ENCOUNTER (OUTPATIENT)
Dept: GENERAL RADIOLOGY | Age: 75
Discharge: HOME | End: 2020-05-13
Payer: MEDICARE

## 2020-05-13 PROCEDURE — 72040 X-RAY EXAM NECK SPINE 2-3 VW: CPT

## 2020-05-13 PROCEDURE — 72141 MRI NECK SPINE W/O DYE: CPT

## 2020-05-14 ENCOUNTER — APPOINTMENT (OUTPATIENT)
Dept: PHYSICAL THERAPY | Age: 75
End: 2020-05-14
Payer: MEDICARE

## 2020-05-14 ENCOUNTER — TELEPHONE (OUTPATIENT)
Dept: PRIMARY CARE CLINIC | Age: 75
End: 2020-05-14

## 2020-05-19 ENCOUNTER — HOSPITAL ENCOUNTER (OUTPATIENT)
Dept: PHYSICAL THERAPY | Age: 75
Setting detail: THERAPIES SERIES
Discharge: HOME | End: 2020-05-19
Payer: MEDICARE

## 2020-05-19 PROCEDURE — 97110 THERAPEUTIC EXERCISES: CPT

## 2020-05-21 ENCOUNTER — HOSPITAL ENCOUNTER (OUTPATIENT)
Dept: PHYSICAL THERAPY | Age: 75
Setting detail: THERAPIES SERIES
Discharge: HOME | End: 2020-05-21
Payer: MEDICARE

## 2020-05-21 PROCEDURE — 97110 THERAPEUTIC EXERCISES: CPT

## 2020-05-21 NOTE — PROGRESS NOTES
Daily Treatment Note  Date: 2020  Patient Name: Ajay Graham  MRN: [id number]     :   1945    Subjective:   General  Chart Reviewed: Yes  Response To Previous Treatment: Not applicable  Family / Caregiver Present: No  Referring Practitioner: Remi KNIGHT  PT Visit Information  Onset Date: 20  PT Insurance Information: [facility]   Total # of Visits Approved: 5  Total # of Visits to Date: 3  Plan of Care/Certification Expiration Date: 08/10/20  Progress Note Due Date: 20  Subjective  Subjective: Patient states she is doing about the same, no new changes. Pain Screening  Patient Currently in Pain: No( )  Vital Signs  Patient Currently in Pain: No( )       Treatment Activities:                                      Exercises  Exercise 1: supine ta contraction alone 10 sec x 10, alt ue 1 x 10, alt le 1 x 10, alt ue/le 1 x 10  Exercise 2: supine posterior pelvic tilts 1 x 10  Exercise 3: supine bilateral hs 90-90 stretch 4 x 15 sec   Exercise 4: supine bilateral piriformis stretch > 90 4 x 15 sec  Exercise 5: seated ta contraction alone 10 sec x 10, alt ue 1 x 10, alt le 1 x 10, alt ue/le 1 x 10  Exercise 6: supine downtraining on biofeedback x 3 min   Exercise 7: supine pfm contraction on biofeedback hooklying with yellow t-band10 sec x 10, hooklying alone 10 sec hold x 10, with towel for hip add 10 sec x 10, quick flicks  2 x 5                                   Assessment:   Conditions Requiring Skilled Therapeutic Intervention  Body structures, Functions, Activity limitations: Decreased functional mobility ; Decreased ADL status; Decreased strength;Decreased endurance;Decreased posture  Assessment: Patient did well with ex's today with min to mod v.c. for proper tech with ex's today. She appeard to have increased mobility in bilateral hs and piriformis today.   Patient had pfm contraction of level 4-6 alone on biofeedback and 6-15 with accessory muscle use, but did appear to fatigue at end of session. Treatment Diagnosis: urinary incontinence                                                           Goals:  Short term goals  Time Frame for Short term goals: 6 Weeks   Short term goal 1:  Increase PERF score to 2+/10/10/10  Short term goal 2: Patient to have good lumbopelvic stabilization in hooklying alone  Short term goal 3: Patient to report no more than 3 occurrances of leakage in one weeks time  Short term goal 4: Patient to be independent with HEP  Long term goals  Time Frame for Long term goals : 12 Weeks   Long term goal 1: Patient to report decreased urinary incontinence by 50%  Long term goal 2: Patient to have good lumbopelvic stabilization in hooklying alt ue/le  Long term goal 3: Patient to report decreased difficulty with voiding urine  Long term goal 4: Patient to demo increased function by scoring <= 10% impairment on urinary section of PFIQ-7  Patient Goals   Patient goals : get rid of incontinence    Plan:    Plan  Times per week: 2x/week   Plan weeks: 6 weeks  Current Treatment Recommendations: Strengthening, Manual Therapy - Joint Manipulation, ROM, Positioning, Home Exercise Program, Manual Therapy - Soft Tissue Mobilization, Endurance Training, Neuromuscular Re-education, Pain Management, Modalities, Patient/Caregiver Education & Training, ADL/Self-care Training  Timed Code Treatment Minutes: 57 Minutes     Therapy Time   Individual Concurrent Group Co-treatment   Time In 6066         Time Out 0552         Minutes 57         Timed Code Treatment Minutes: 39089 Nikhil Mendoza PT    Electronically signed by Kerrie Dawkins PT on 5/21/2020 at 2:07 PM

## 2020-05-26 ENCOUNTER — HOSPITAL ENCOUNTER (OUTPATIENT)
Dept: PHYSICAL THERAPY | Age: 75
Setting detail: THERAPIES SERIES
Discharge: HOME | End: 2020-05-26
Payer: MEDICARE

## 2020-05-26 PROCEDURE — 90913 BFB TRAINING EA ADDL 15 MIN: CPT

## 2020-05-26 PROCEDURE — 97110 THERAPEUTIC EXERCISES: CPT

## 2020-05-26 PROCEDURE — 90912 BFB TRAINING 1ST 15 MIN: CPT

## 2020-05-26 NOTE — PROGRESS NOTES
Daily Treatment Note  Date: 2020  Patient Name: Cristina Payne  MRN: [id number]     :   1945    Subjective:   General  Chart Reviewed: Yes  Response To Previous Treatment: Not applicable  Family / Caregiver Present: No  Referring Practitioner: Cynthia KNIGHT  PT Visit Information  Onset Date: 20  PT Insurance Information: [facility]   Total # of Visits Approved: 5  Total # of Visits to Date: 4  Plan of Care/Certification Expiration Date: 08/10/20  Progress Note Due Date: 20  Subjective  Subjective: Patient states she has had no leakage over the weekend. She says that she isn't having pain. Pain Screening  Patient Currently in Pain: No( )  Vital Signs  Patient Currently in Pain: No( )       Treatment Activities:                                      Exercises  Exercise 1: supine ta contraction alone 10 sec x 10, alt ue 1 x 10, alt le 1 x 10, alt ue/le 1 x 10  Exercise 2: supine posterior pelvic tilts 1 x 10  Exercise 3: supine bilateral hs 90-90 stretch 4 x 15 sec   Exercise 4: supine bilateral piriformis stretch > 90 4 x 15 sec  Exercise 5: seated ta contraction alone 10 sec x 10, alt ue 1 x 10, alt le 1 x 10, alt ue/le 1 x 10  Exercise 6: supine downtraining on biofeedback x 3 min   Exercise 7: supine pfm contraction on biofeedback hooklying with yellow t-band10 sec x 10, hooklying alone 10 sec hold x 10, with towel for hip add 10 sec x 10, quick flicks  2 x 5                                   Assessment:   Conditions Requiring Skilled Therapeutic Intervention  Body structures, Functions, Activity limitations: Decreased functional mobility ; Decreased ADL status; Decreased strength;Decreased endurance;Decreased posture  Assessment: Patient did well with tx today with mod v.c. for proper tech with ex's. Patient still continues to have difficulty with pfm contraction even with max v.c.  She was able to achieve level 4-8 throughout tx today and had an easier tiem achieving level 8

## 2020-05-27 ENCOUNTER — OFFICE VISIT (OUTPATIENT)
Dept: URGENT CARE | Age: 75
End: 2020-05-27
Payer: MEDICARE

## 2020-05-27 ENCOUNTER — TELEPHONE (OUTPATIENT)
Dept: PODIATRY | Facility: CLINIC | Age: 75
End: 2020-05-27

## 2020-05-27 VITALS
SYSTOLIC BLOOD PRESSURE: 139 MMHG | HEART RATE: 84 BPM | RESPIRATION RATE: 20 BRPM | BODY MASS INDEX: 39.56 KG/M2 | OXYGEN SATURATION: 95 % | WEIGHT: 215 LBS | DIASTOLIC BLOOD PRESSURE: 70 MMHG | TEMPERATURE: 98.7 F | HEIGHT: 62 IN

## 2020-05-27 PROCEDURE — 99213 OFFICE O/P EST LOW 20 MIN: CPT | Performed by: FAMILY MEDICINE

## 2020-05-27 RX ORDER — AMOXICILLIN AND CLAVULANATE POTASSIUM 875; 125 MG/1; MG/1
1 TABLET, FILM COATED ORAL 2 TIMES DAILY
Qty: 20 TABLET | Refills: 0 | Status: SHIPPED | OUTPATIENT
Start: 2020-05-27 | End: 2020-06-06

## 2020-05-27 RX ORDER — MICONAZOLE NITRATE 1200MG-2%
1 KIT VAGINAL NIGHTLY
Qty: 1 KIT | Refills: 0 | Status: SHIPPED | OUTPATIENT
Start: 2020-05-27 | End: 2020-06-03

## 2020-05-27 ASSESSMENT — ENCOUNTER SYMPTOMS
DIARRHEA: 0
CONSTIPATION: 0
EYE DISCHARGE: 0
NAUSEA: 0
EYE PAIN: 0
RHINORRHEA: 0
ABDOMINAL PAIN: 0
COUGH: 0
SHORTNESS OF BREATH: 0
VOMITING: 0

## 2020-05-27 NOTE — PROGRESS NOTES
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    Breast CA (HCC)     Left breast Nodules removed Took radiation    Chronic back pain     Chronic kidney disease     Overactive bladder     COPD (chronic obstructive pulmonary disease) (HCC)     x few years Scarring on lungs 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    Diabetes mellitus ([facility] [address]     On insulin x 10 years    Fibromyalgia     Fibromyalgia     for 20 years    GERD (gastroesophageal reflux disease)     History of blood transfusion     ?when    Hyperlipidemia     High cholesterol    Hypertension     On medications for 2 years    Joint pain 2/2/2016    Liver disease     Has fatty liver    Morbid obesity ([address]     Multiple food allergies     Neuropathic pain     Neuropathy     Neuropathy involving both lower extremities 2/2/2016    Obstructive sleep apnea     AHI: 68.1 per PSG, 5/2018    Osteoarthritis     Other disorders of kidney and ureter in diseases classified elsewhere     Pneumonia     Postmenopausal osteoporosis     Psychiatric problem     Restless leg syndrome     S/P lumpectomy, left breast 8/19/2015 8/4/2015    Sleep apnea     Type II or unspecified type diabetes mellitus with neurological manifestations, uncontrolled(250.62)        Current Outpatient Medications   Medication Sig Dispense Refill    Miconazole Nitrate-Wipes (MONISTAT 7 COMPLETE THERAPY) 100-2 MG-% KIT Place 1 Applicatorful vaginally nightly for 7 days 1 kit 0    amoxicillin-clavulanate (AUGMENTIN) 875-125 MG per tablet Take 1 tablet by mouth 2 times daily for 10 days 20 tablet 0    omeprazole (PRILOSEC) 20 MG delayed release capsule TAKE 1 CAPSULE 2 TIMES     DAILY AS NEEDED 180 capsule 1    oxybutynin (DITROPAN-XL) 10 MG extended release tablet TAKE 1 TABLET DAILY 90 tablet 1    SURGERY Bilateral 04/02/2017    Dr. Linda Finney      broke L ankle due to osteoporosis, has hardware in    FRACTURE SURGERY      Left ankle     HYSTERECTOMY      RI DRAIN SKIN ABSCESS COMPLIC N/A 6/35/2920    ABDOMINAL WALL ABSCESS INCISION AND DRAINAGE performed by Jimy Chance MD at [address] ENDOSCOPY  4/8/2016    Dr Hoa Ford, (-) H Pylori    WRIST SURGERY         Social History     Tobacco Use    Smoking status: Never Smoker    Smokeless tobacco: Never Used   Substance Use Topics    Alcohol use: Yes     Alcohol/week: 2.0 standard drinks     Types: 1 Glasses of wine, 1 Cans of beer per week     Comment: occ    Drug use: No       Family History   Problem Relation Age of Onset    High Blood Pressure Father     Heart Disease Father     Diabetes Father     High Blood Pressure Sister     Diabetes Sister     Cancer Sister     High Blood Pressure Brother     Colon Cancer Neg Hx     Colon Polyps Neg Hx     Esophageal Cancer Neg Hx     Liver Cancer Neg Hx     Liver Disease Neg Hx     Rectal Cancer Neg Hx     Stomach Cancer Neg Hx        /70   Pulse 84   Temp 98.7 °F (37.1 °C) (Oral)   Resp 20   Ht 5' 2\" (1.575 m)   Wt 215 lb (97.5 kg)   LMP  (LMP Unknown)   SpO2 95%   BMI 39.32 kg/m²     Physical Exam  Vitals signs and nursing note reviewed. Constitutional:       General: She is not in acute distress. Appearance: Normal appearance. She is well-developed. She is not diaphoretic. HENT:      Head: Normocephalic and atraumatic. Right Ear: External ear normal.      Left Ear: External ear normal.      Nose: Nose normal.      Mouth/Throat:      Mouth: Mucous membranes are moist.      Pharynx: Oropharynx is clear. No oropharyngeal exudate. Comments: Right lower molar is noted to have surrounding erythema and swelling without any appreciable drainage. Eyes:      General: No scleral icterus.         Right

## 2020-05-27 NOTE — PATIENT INSTRUCTIONS
you feel better. You need to take the full course of antibiotics. To prevent tooth abscess  · Brush and floss every day. Have regular dental checkups. · Eat a healthy diet. Avoid sugary foods and drinks. · Do not smoke or vape with nicotine. And don't use spit tobacco. Tobacco and nicotine slow your ability to heal. They increase your risk for gum disease and cancer of the mouth and throat. If you need help quitting, talk to your doctor about stop-smoking programs and medicines. These can increase your chances of quitting for good. When should you call for help? [id number] anytime you think you may need emergency care. For example, call if:  · You have trouble breathing. Call your doctor now or seek immediate medical care if:  · You have new or worse symptoms of infection, such as:  ? Increased pain, swelling, warmth, or redness. ? Red streaks leading from the area. ? Pus draining from the area. ? A fever. Watch closely for changes in your health, and be sure to contact your doctor if:  · You do not get better as expected. Where can you learn more? Go to [email or web address] org and sign in to your [employer] account. Enter Y049 in the [facility] box to learn more about \"Abscessed Tooth: Care Instructions. \"     If you do not have an account, please click on the \"Sign Up Now\" link. Current as of: March 25, 2020               Content Version: 12.5  © 3889-3000 Healthwise, Incorporated. Care instructions adapted under license by [facility] ([facility]). If you have questions about a medical condition or this instruction, always ask your healthcare professional. [place] [address] any warranty or liability for your use of this information.

## 2020-05-28 ENCOUNTER — HOSPITAL ENCOUNTER (OUTPATIENT)
Dept: PHYSICAL THERAPY | Age: 75
Setting detail: THERAPIES SERIES
Discharge: HOME | End: 2020-05-28
Payer: MEDICARE

## 2020-05-28 PROCEDURE — 97110 THERAPEUTIC EXERCISES: CPT

## 2020-05-28 RX ORDER — GABAPENTIN 300 MG/1
CAPSULE ORAL
Qty: 90 CAPSULE | Refills: 2 | Status: SHIPPED | OUTPATIENT
Start: 2020-05-28 | End: 2020-07-16 | Stop reason: SDUPTHER

## 2020-05-28 NOTE — PROGRESS NOTES
Daily Treatment Note  Date: 2020  Patient Name: Mark Barnes  MRN: [id number]     :   1945    Subjective:   General  Chart Reviewed: Yes  Response To Previous Treatment: Not applicable  Family / Caregiver Present: No  Referring Practitioner: Eulalia KNIGHT  PT Visit Information  Onset Date: 20  PT Insurance Information: [facility]   Total # of Visits Approved: 5  Total # of Visits to Date: 5  Plan of Care/Certification Expiration Date: 08/10/20  Progress Note Due Date: 20  Subjective  Subjective: Patient staets she has not had any leakage in the last week and that it is much better. She says she isn't hurting, but has some soreness from doing her ex's. Pain Screening  Patient Currently in Pain: No( )  Vital Signs  Patient Currently in Pain: No( )       Treatment Activities:                                      Exercises  Exercise 1: supine ta contraction alone 10 sec x 10, alt ue 1 x 10, alt le 1 x 10, alt ue/le 1 x 10  Exercise 2: supine posterior pelvic tilts 1 x 10  Exercise 3: supine bilateral hs 90-90 stretch 4 x 15 sec   Exercise 4: supine bilateral piriformis stretch > 90 4 x 15 sec  Exercise 5: seated ta contraction alone 10 sec x 10, alt ue 1 x 10, alt le 1 x 10, alt ue/le 1 x 10  Exercise 6: supine downtraining on biofeedback x 3 min   Exercise 7: supine pfm contraction on biofeedback hooklying with yellow t-band10 sec x 10, hooklying alone 10 sec hold x 10, with towel for hip add 10 sec x 10, quick flicks  2 x 5- not today   Exercise 8: PERF scoring 2 without accessory muscle use and 2+ with accessory muscule use/11                                   Assessment:   Conditions Requiring Skilled Therapeutic Intervention  Assessment: Patient did well with tx today with increased lumbopelvic stabilization, improved ability to perform pfm contractions especially with accessory muscle use and decreased urinary incontinence reported.   patient has good understanding of Patient/Caregiver Education & Training, ADL/Self-care Training  Timed Code Treatment Minutes: 50 Minutes     Therapy Time   Individual Concurrent Group Co-treatment   Time In 1240         Time Out 1330         Minutes 50         Timed Code Treatment Minutes: [address] [facility], PT       Electronically signed by Kelsey Pepper PT on 5/28/2020 at 1:49 PM

## 2020-05-29 ENCOUNTER — TELEPHONE (OUTPATIENT)
Dept: ONCOLOGY | Facility: CLINIC | Age: 75
End: 2020-05-29

## 2020-06-05 ENCOUNTER — TELEPHONE (OUTPATIENT)
Dept: PODIATRY | Facility: CLINIC | Age: 75
End: 2020-06-05

## 2020-06-08 ENCOUNTER — OFFICE VISIT (OUTPATIENT)
Dept: ONCOLOGY | Facility: CLINIC | Age: 75
End: 2020-06-08

## 2020-06-08 ENCOUNTER — OFFICE VISIT (OUTPATIENT)
Dept: PODIATRY | Facility: CLINIC | Age: 75
End: 2020-06-08

## 2020-06-08 ENCOUNTER — LAB (OUTPATIENT)
Dept: LAB | Facility: HOSPITAL | Age: 75
End: 2020-06-08

## 2020-06-08 VITALS
OXYGEN SATURATION: 99 % | WEIGHT: 256 LBS | HEIGHT: 62 IN | SYSTOLIC BLOOD PRESSURE: 125 MMHG | HEART RATE: 79 BPM | DIASTOLIC BLOOD PRESSURE: 60 MMHG | BODY MASS INDEX: 47.11 KG/M2

## 2020-06-08 VITALS
BODY MASS INDEX: 39.56 KG/M2 | OXYGEN SATURATION: 91 % | TEMPERATURE: 99.1 F | RESPIRATION RATE: 20 BRPM | SYSTOLIC BLOOD PRESSURE: 118 MMHG | HEART RATE: 88 BPM | HEIGHT: 62 IN | DIASTOLIC BLOOD PRESSURE: 68 MMHG | WEIGHT: 215 LBS

## 2020-06-08 DIAGNOSIS — M79.671 FOOT PAIN, BILATERAL: ICD-10-CM

## 2020-06-08 DIAGNOSIS — C50.412 MALIGNANT NEOPLASM OF UPPER-OUTER QUADRANT OF LEFT BREAST IN FEMALE, ESTROGEN RECEPTOR POSITIVE (HCC): Primary | ICD-10-CM

## 2020-06-08 DIAGNOSIS — M20.12 VALGUS DEFORMITY OF BOTH GREAT TOES: ICD-10-CM

## 2020-06-08 DIAGNOSIS — M20.41 HAMMERTOE, BILATERAL: ICD-10-CM

## 2020-06-08 DIAGNOSIS — B35.1 ONYCHOMYCOSIS: Primary | ICD-10-CM

## 2020-06-08 DIAGNOSIS — M20.11 VALGUS DEFORMITY OF BOTH GREAT TOES: ICD-10-CM

## 2020-06-08 DIAGNOSIS — Z79.4 TYPE 2 DIABETES MELLITUS WITH DIABETIC POLYNEUROPATHY, WITH LONG-TERM CURRENT USE OF INSULIN (HCC): ICD-10-CM

## 2020-06-08 DIAGNOSIS — C50.412 MALIGNANT NEOPLASM OF UPPER-OUTER QUADRANT OF LEFT FEMALE BREAST, UNSPECIFIED ESTROGEN RECEPTOR STATUS (HCC): Primary | ICD-10-CM

## 2020-06-08 DIAGNOSIS — M79.672 FOOT PAIN, BILATERAL: ICD-10-CM

## 2020-06-08 DIAGNOSIS — M20.42 HAMMERTOE, BILATERAL: ICD-10-CM

## 2020-06-08 DIAGNOSIS — Z17.0 MALIGNANT NEOPLASM OF UPPER-OUTER QUADRANT OF LEFT BREAST IN FEMALE, ESTROGEN RECEPTOR POSITIVE (HCC): Primary | ICD-10-CM

## 2020-06-08 DIAGNOSIS — E11.42 TYPE 2 DIABETES MELLITUS WITH DIABETIC POLYNEUROPATHY, WITH LONG-TERM CURRENT USE OF INSULIN (HCC): ICD-10-CM

## 2020-06-08 LAB
ALBUMIN SERPL-MCNC: 4.1 G/DL (ref 3.5–5.2)
ALBUMIN/GLOB SERPL: 1.4 G/DL
ALP SERPL-CCNC: 91 U/L (ref 39–117)
ALT SERPL W P-5'-P-CCNC: 38 U/L (ref 1–33)
ANION GAP SERPL CALCULATED.3IONS-SCNC: 14 MMOL/L (ref 5–15)
AST SERPL-CCNC: 28 U/L (ref 1–32)
BASOPHILS # BLD AUTO: 0.06 10*3/MM3 (ref 0–0.2)
BASOPHILS NFR BLD AUTO: 0.7 % (ref 0–1.5)
BILIRUB SERPL-MCNC: 0.3 MG/DL (ref 0.2–1.2)
BUN BLD-MCNC: 10 MG/DL (ref 8–23)
BUN/CREAT SERPL: 14.3 (ref 7–25)
CALCIUM SPEC-SCNC: 9.5 MG/DL (ref 8.6–10.5)
CHLORIDE SERPL-SCNC: 97 MMOL/L (ref 98–107)
CO2 SERPL-SCNC: 25 MMOL/L (ref 22–29)
CREAT BLD-MCNC: 0.7 MG/DL (ref 0.57–1)
DEPRECATED RDW RBC AUTO: 42.5 FL (ref 37–54)
EOSINOPHIL # BLD AUTO: 0.34 10*3/MM3 (ref 0–0.4)
EOSINOPHIL NFR BLD AUTO: 3.8 % (ref 0.3–6.2)
ERYTHROCYTE [DISTWIDTH] IN BLOOD BY AUTOMATED COUNT: 14.3 % (ref 12.3–15.4)
GFR SERPL CREATININE-BSD FRML MDRD: 82 ML/MIN/1.73
GLOBULIN UR ELPH-MCNC: 2.9 GM/DL
GLUCOSE BLD-MCNC: 257 MG/DL (ref 65–99)
HCT VFR BLD AUTO: 42.1 % (ref 34–46.6)
HGB BLD-MCNC: 13.4 G/DL (ref 12–15.9)
HOLD SPECIMEN: NORMAL
HOLD SPECIMEN: NORMAL
IMM GRANULOCYTES # BLD AUTO: 0.04 10*3/MM3 (ref 0–0.05)
IMM GRANULOCYTES NFR BLD AUTO: 0.4 % (ref 0–0.5)
LYMPHOCYTES # BLD AUTO: 2.89 10*3/MM3 (ref 0.7–3.1)
LYMPHOCYTES NFR BLD AUTO: 32.4 % (ref 19.6–45.3)
MCH RBC QN AUTO: 26.2 PG (ref 26.6–33)
MCHC RBC AUTO-ENTMCNC: 31.8 G/DL (ref 31.5–35.7)
MCV RBC AUTO: 82.2 FL (ref 79–97)
MONOCYTES # BLD AUTO: 0.71 10*3/MM3 (ref 0.1–0.9)
MONOCYTES NFR BLD AUTO: 8 % (ref 5–12)
NEUTROPHILS # BLD AUTO: 4.89 10*3/MM3 (ref 1.7–7)
NEUTROPHILS NFR BLD AUTO: 54.7 % (ref 42.7–76)
NRBC BLD AUTO-RTO: 0 /100 WBC (ref 0–0.2)
PLATELET # BLD AUTO: 282 10*3/MM3 (ref 140–450)
PMV BLD AUTO: 9.7 FL (ref 6–12)
POTASSIUM BLD-SCNC: 4.4 MMOL/L (ref 3.5–5.2)
PROT SERPL-MCNC: 7 G/DL (ref 6–8.5)
RBC # BLD AUTO: 5.12 10*6/MM3 (ref 3.77–5.28)
SODIUM BLD-SCNC: 136 MMOL/L (ref 136–145)
WBC NRBC COR # BLD: 8.93 10*3/MM3 (ref 3.4–10.8)

## 2020-06-08 PROCEDURE — 99214 OFFICE O/P EST MOD 30 MIN: CPT | Performed by: INTERNAL MEDICINE

## 2020-06-08 PROCEDURE — 11721 DEBRIDE NAIL 6 OR MORE: CPT | Performed by: NURSE PRACTITIONER

## 2020-06-08 PROCEDURE — 85025 COMPLETE CBC W/AUTO DIFF WBC: CPT | Performed by: INTERNAL MEDICINE

## 2020-06-08 PROCEDURE — 36415 COLL VENOUS BLD VENIPUNCTURE: CPT | Performed by: INTERNAL MEDICINE

## 2020-06-08 PROCEDURE — 99213 OFFICE O/P EST LOW 20 MIN: CPT | Performed by: NURSE PRACTITIONER

## 2020-06-08 PROCEDURE — 80053 COMPREHEN METABOLIC PANEL: CPT | Performed by: INTERNAL MEDICINE

## 2020-06-17 ENCOUNTER — TRANSCRIBE ORDERS (OUTPATIENT)
Dept: ADMINISTRATIVE | Facility: HOSPITAL | Age: 75
End: 2020-06-17

## 2020-06-17 DIAGNOSIS — Z01.818 PREOP TESTING: Primary | ICD-10-CM

## 2020-06-18 ENCOUNTER — VIRTUAL VISIT (OUTPATIENT)
Dept: PRIMARY CARE CLINIC | Age: 75
End: 2020-06-18
Payer: MEDICARE

## 2020-06-18 PROCEDURE — 99442 PR PHYS/QHP TELEPHONE EVALUATION 11-20 MIN: CPT | Performed by: NURSE PRACTITIONER

## 2020-06-18 RX ORDER — INSULIN DEGLUDEC 200 U/ML
100 INJECTION, SOLUTION SUBCUTANEOUS NIGHTLY
Qty: 27 ML | Refills: 5 | Status: SHIPPED | OUTPATIENT
Start: 2020-06-18 | End: 2020-06-18 | Stop reason: SDUPTHER

## 2020-06-18 RX ORDER — INSULIN ASPART 100 [IU]/ML
22 INJECTION, SOLUTION INTRAVENOUS; SUBCUTANEOUS
Qty: 45 ML | Refills: 5 | Status: SHIPPED | OUTPATIENT
Start: 2020-06-18 | End: 2020-06-18 | Stop reason: SDUPTHER

## 2020-06-18 RX ORDER — INSULIN DEGLUDEC 200 U/ML
100 INJECTION, SOLUTION SUBCUTANEOUS NIGHTLY
Qty: 27 ML | Refills: 5 | Status: SHIPPED | OUTPATIENT
Start: 2020-06-18 | End: 2020-07-10

## 2020-06-18 RX ORDER — CLINDAMYCIN HYDROCHLORIDE 300 MG/1
300 CAPSULE ORAL 2 TIMES DAILY
Qty: 14 CAPSULE | Refills: 0 | Status: SHIPPED | OUTPATIENT
Start: 2020-06-18 | End: 2020-06-25

## 2020-06-18 RX ORDER — INSULIN ASPART 100 [IU]/ML
22 INJECTION, SOLUTION INTRAVENOUS; SUBCUTANEOUS
Qty: 45 ML | Refills: 5 | Status: SHIPPED | OUTPATIENT
Start: 2020-06-18 | End: 2020-09-05

## 2020-06-18 ASSESSMENT — ENCOUNTER SYMPTOMS
VOMITING: 0
DIARRHEA: 0
SINUS PRESSURE: 0
ABDOMINAL PAIN: 0
COUGH: 0
SINUS PAIN: 0
NAUSEA: 0
EYE PAIN: 0
SHORTNESS OF BREATH: 0
BACK PAIN: 1
WHEEZING: 0

## 2020-06-20 ENCOUNTER — LAB (OUTPATIENT)
Dept: LAB | Facility: HOSPITAL | Age: 75
End: 2020-06-20

## 2020-06-20 PROCEDURE — U0003 INFECTIOUS AGENT DETECTION BY NUCLEIC ACID (DNA OR RNA); SEVERE ACUTE RESPIRATORY SYNDROME CORONAVIRUS 2 (SARS-COV-2) (CORONAVIRUS DISEASE [COVID-19]), AMPLIFIED PROBE TECHNIQUE, MAKING USE OF HIGH THROUGHPUT TECHNOLOGIES AS DESCRIBED BY CMS-2020-01-R: HCPCS | Performed by: ANESTHESIOLOGY

## 2020-06-21 LAB
COVID LABCORP PRIORITY: NORMAL
SARS-COV-2 RNA RESP QL NAA+PROBE: NOT DETECTED

## 2020-07-04 ENCOUNTER — LAB (OUTPATIENT)
Dept: LAB | Facility: HOSPITAL | Age: 75
End: 2020-07-04

## 2020-07-04 DIAGNOSIS — Z20.822 COVID-19 RULED OUT: Primary | ICD-10-CM

## 2020-07-04 PROCEDURE — U0003 INFECTIOUS AGENT DETECTION BY NUCLEIC ACID (DNA OR RNA); SEVERE ACUTE RESPIRATORY SYNDROME CORONAVIRUS 2 (SARS-COV-2) (CORONAVIRUS DISEASE [COVID-19]), AMPLIFIED PROBE TECHNIQUE, MAKING USE OF HIGH THROUGHPUT TECHNOLOGIES AS DESCRIBED BY CMS-2020-01-R: HCPCS

## 2020-07-04 PROCEDURE — C9803 HOPD COVID-19 SPEC COLLECT: HCPCS

## 2020-07-05 LAB
COVID LABCORP PRIORITY: NORMAL
SARS-COV-2 RNA RESP QL NAA+PROBE: NOT DETECTED

## 2020-07-10 RX ORDER — INSULIN DEGLUDEC 200 U/ML
INJECTION, SOLUTION SUBCUTANEOUS
Qty: 27 ML | Refills: 0 | Status: SHIPPED | OUTPATIENT
Start: 2020-07-10 | End: 2020-10-11

## 2020-07-17 RX ORDER — GABAPENTIN 300 MG/1
CAPSULE ORAL
Qty: 90 CAPSULE | Refills: 0 | Status: SHIPPED | OUTPATIENT
Start: 2020-07-17 | End: 2020-12-17

## 2020-07-30 ENCOUNTER — NURSE TRIAGE (OUTPATIENT)
Dept: OTHER | Facility: CLINIC | Age: 75
End: 2020-07-30

## 2020-07-30 ENCOUNTER — TELEPHONE (OUTPATIENT)
Dept: PRIMARY CARE CLINIC | Age: 75
End: 2020-07-30

## 2020-07-30 RX ORDER — LORATADINE 10 MG/1
TABLET ORAL
Qty: 30 TABLET | Refills: 0 | Status: SHIPPED | OUTPATIENT
Start: 2020-07-30 | End: 2020-09-03

## 2020-07-30 NOTE — TELEPHONE ENCOUNTER
Diana Solomon called RN Triage this afternoon, at first she advised that she had a UTI, but, the RN advised that the patient has a yeast infection. The patient did not want an appt, she wanted something called into her Pharmacy. Please contact patient to discuss. Thank you.

## 2020-07-30 NOTE — TELEPHONE ENCOUNTER
Called patient,informed will need VV for evaluation and aptient will call back \"on phone with PATS now\"

## 2020-07-30 NOTE — TELEPHONE ENCOUNTER
Reason for Disposition   All other females with painful urination, or patient wants to be seen    Answer Assessment - Initial Assessment Questions  1. SEVERITY: \"How bad is the pain? \"  (e.g., Scale 1-10; mild, moderate, or severe)    - MILD (1-3): complains slightly about urination hurting    - MODERATE (4-7): interferes with normal activities      - SEVERE (8-10): excruciating, unwilling or unable to urinate because of the pain       *No Answer*  2. FREQUENCY: \"How many times have you had painful urination today?\"         3. PATTERN: \"Is pain present every time you urinate or just sometimes? \"       *No Answer*  4. ONSET: \"When did the painful urination start?\"       1 week   5. FEVER: \"Do you have a fever? \" If so, ask: \"What is your temperature, how was it measured, and when did it start? \"      *No Answer*  6. PAST UTI: \"Have you had a urine infection before? \" If so, ask: \"When was the last time? \" and \"What happened that time? \"       yes  7. CAUSE: \"What do you think is causing the painful urination? \"  (e.g., UTI, scratch, Herpes sore)      *  8. OTHER SYMPTOMS: \"Do you have any other symptoms? \" (e.g., flank pain, vaginal discharge, genital sores, urgency, blood in urine)      *No Answer*  9. PREGNANCY: \"Is there any chance you are pregnant? \" \"When was your last menstrual period? \"      *No Answer*    Protocols used: URINATION PAIN - FEMALE-ADULT-[place]    Pt wanting RX called in for Yeast infection, pt only wanting RX      Call dropped, called [facility] in [place] talked with Ju Scherer to send message

## 2020-07-31 ENCOUNTER — VIRTUAL VISIT (OUTPATIENT)
Dept: PRIMARY CARE CLINIC | Age: 75
End: 2020-07-31
Payer: MEDICARE

## 2020-07-31 PROCEDURE — 99442 PR PHYS/QHP TELEPHONE EVALUATION 11-20 MIN: CPT | Performed by: NURSE PRACTITIONER

## 2020-07-31 RX ORDER — FLUCONAZOLE 150 MG/1
150 TABLET ORAL
Qty: 2 TABLET | Refills: 0 | Status: SHIPPED | OUTPATIENT
Start: 2020-07-31 | End: 2021-05-21 | Stop reason: SDUPTHER

## 2020-07-31 ASSESSMENT — ENCOUNTER SYMPTOMS
EYES NEGATIVE: 1
RESPIRATORY NEGATIVE: 1
DIARRHEA: 0
SHORTNESS OF BREATH: 0
ABDOMINAL PAIN: 0
NAUSEA: 0
BACK PAIN: 0
GASTROINTESTINAL NEGATIVE: 1
CONSTIPATION: 0

## 2020-07-31 NOTE — PROGRESS NOTES
Andrew Garcia is a 69 y.o. female evaluated via telephone on 7/31/2020. Consent:  She and/or health care decision maker is aware that that she may receive a bill for this telephone service, depending on her insurance coverage, and has provided verbal consent to proceed: Yes      Documentation:  I communicated with the patient and/or health care decision maker about yeast.   Details of this discussion including any medical advice provided: see below    Vaginal Itching   The patient's primary symptoms include genital itching and vaginal discharge. The patient's pertinent negatives include no genital lesions, genital odor, genital rash or vaginal bleeding. This is a new problem. The current episode started in the past 7 days. The problem occurs 2 to 4 times per day. The problem has been gradually worsening. The pain is mild. She is not pregnant. Pertinent negatives include no abdominal pain, back pain, constipation, diarrhea, dysuria, fever, flank pain, frequency, nausea or urgency. The vaginal discharge was milky and white. There has been no bleeding. She has not been passing clots. She has not been passing tissue. Nothing aggravates the symptoms. She is not sexually active. No change in PMH, family, social, or surgical history unless mentioned above. Review of Systems   Constitutional: Negative. Negative for fever. HENT: Negative. Eyes: Negative. Respiratory: Negative. Negative for shortness of breath. Cardiovascular: Negative. Negative for chest pain. Gastrointestinal: Negative. Negative for abdominal pain, constipation, diarrhea and nausea. Endocrine: Negative. Genitourinary: Positive for vaginal discharge. Negative for dysuria, flank pain, frequency and urgency. Genital itching   Musculoskeletal: Positive for arthralgias. Negative for back pain. Skin: Negative. Neurological: Negative. Psychiatric/Behavioral: Negative.         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    Breast CA (HCC)     Left breast Nodules removed Took radiation    Chronic back pain     Chronic kidney disease     Overactive bladder     COPD (chronic obstructive pulmonary disease) (HCC)     x few years Scarring on lungs 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    Diabetes mellitus ([facility] [address]     On insulin x 10 years    Fibromyalgia     Fibromyalgia     for 20 years    GERD (gastroesophageal reflux disease)     History of blood transfusion     ?when    Hyperlipidemia     High cholesterol    Hypertension     On medications for 2 years    Joint pain 2/2/2016    Liver disease     Has fatty liver    Morbid obesity ([address]     Multiple food allergies     Neuropathic pain     Neuropathy     Neuropathy involving both lower extremities 2/2/2016    Obstructive sleep apnea     AHI: 68.1 per PSG, 5/2018    Osteoarthritis     Other disorders of kidney and ureter in diseases classified elsewhere     Pneumonia     Postmenopausal osteoporosis     Psychiatric problem     Restless leg syndrome     S/P lumpectomy, left breast 8/19/2015 8/4/2015    Sleep apnea     Type II or unspecified type diabetes mellitus with neurological manifestations, uncontrolled(250.62)        Current Outpatient Medications   Medication Sig Dispense Refill    fluconazole (DIFLUCAN) 150 MG tablet Take 1 tablet by mouth every 72 hours for 6 days 2 tablet 0    loratadine (CLARITIN) 10 MG tablet TAKE 1 TABLET BY MOUTH ONCE DAILY.  30 tablet 0    gabapentin (NEURONTIN) 300 MG capsule TAKE 1 CAPSULE BY MOUTH 3 TIMES A DAY 90 capsule 0    TRESIBA FLEXTOUCH 200 UNIT/ML SOPN INJECT 100 UNITS (=1/2 ML) SUBCUTANEOUSLY NIGHTLY 27 mL 0    insulin aspart (NOVOLOG FLEXPEN) 100 UNIT/ML injection pen Inject 22 Units into the skin 3 times daily (before meals) 45 mL 5    omeprazole (PRILOSEC) 20 MG delayed release capsule TAKE 1 CAPSULE 2 TIMES     DAILY AS NEEDED 180 capsule 1    oxybutynin (DITROPAN-XL) 10 MG extended release tablet TAKE 1 TABLET DAILY 90 tablet 1    montelukast (SINGULAIR) 10 MG tablet TAKE 1 TABLET NIGHTLY 90 tablet 1    magnesium oxide (MAG-OX) 400 MG tablet Take 1 tablet by mouth daily 90 tablet 3    citalopram (CELEXA) 20 MG tablet TAKE 1 TABLET DAILY 90 tablet 3    metoprolol succinate (TOPROL XL) 50 MG extended release tablet TAKE 1 TABLET DAILY FOR BLOOD PRESSURE AND HEART PROTECTION. 90 tablet 1    empagliflozin (JARDIANCE) 10 MG tablet TAKE 1 TABLET BY MOUTH ONCE DAILY. 90 tablet 1    losartan (COZAAR) 100 MG tablet TAKE (1) TABLET DAILY FOR BLOOD PRESSURE. 90 tablet 1    rosuvastatin (CRESTOR) 10 MG tablet TAKE 1 TABLET BY MOUTH ONCE DAILY. 90 tablet 1    sitaGLIPtan-metformin (JANUMET)  MG per tablet Take 1 tablet by mouth 2 times daily (with meals) 180 tablet 1    vitamin D (ERGOCALCIFEROL) 1.25 MG (37600 UT) CAPS capsule Take once weekly 12 capsule 2    triamcinolone (KENALOG) 0.1 % cream Apply topically 2 times daily. 45 g 0    cyproheptadine (PERIACTIN) 4 MG tablet Take 1 tablet by mouth 3 times daily as needed (itching or rash) Hold the claritin on the days this is used for itching 21 tablet 0    ketoconazole (NIZORAL) 2 % shampoo Apply topically daily as needed. 1 Bottle 0    diclofenac (VOLTAREN) 50 MG EC tablet Take 1 tablet by mouth 2 times daily as needed for Pain (tooth pain) 60 tablet 1    albuterol (PROVENTIL) (5 MG/ML) 0.5% nebulizer solution Take 1ml twice daily for 10 days 120 each 3    Nebulizers (AIRIAL COMPACT MINI NEBULIZER) MISC Patient has COPD and problems with breathing. 1 each 0    Vitamin D, Cholecalciferol, 25 MCG (1000 UT) CAPS Take by mouth      tiotropium (SPIRIVA) 18 MCG inhalation capsule Inhale 1 capsule into the lungs daily.       blood glucose monitor strips Test 3 times a day & as ABSCESS INCISION AND DRAINAGE performed by Roby Nowak MD at [address] ENDOSCOPY  4/8/2016    Dr Demarcus Stafford, (-) H Pylori    WRIST SURGERY         Social History     Tobacco Use    Smoking status: Never Smoker    Smokeless tobacco: Never Used   Substance Use Topics    Alcohol use: Yes     Alcohol/week: 2.0 standard drinks     Types: 1 Glasses of wine, 1 Cans of beer per week     Comment: occ    Drug use: No       Family History   Problem Relation Age of Onset    High Blood Pressure Father     Heart Disease Father     Diabetes Father     High Blood Pressure Sister     Diabetes Sister     Cancer Sister     High Blood Pressure Brother     Colon Cancer Neg Hx     Colon Polyps Neg Hx     Esophageal Cancer Neg Hx     Liver Cancer Neg Hx     Liver Disease Neg Hx     Rectal Cancer Neg Hx     Stomach Cancer Neg Hx        Assessment:    ICD-10-CM    1. Vaginal yeast infection  B37.3 fluconazole (DIFLUCAN) 150 MG tablet   2. Encounter for counseling  Z71.9        Plan:   1. Vaginal yeast infection  - fluconazole (DIFLUCAN) 150 MG tablet; Take 1 tablet by mouth every 72 hours for 6 days  Dispense: 2 tablet; Refill: 0    2. Encounter for counseling  - medication and follow-up        No orders of the defined types were placed in this encounter. Orders Placed This Encounter   Medications    fluconazole (DIFLUCAN) 150 MG tablet     Sig: Take 1 tablet by mouth every 72 hours for 6 days     Dispense:  2 tablet     Refill:  0     Medications Discontinued During This Encounter   Medication Reason    fluconazole (DIFLUCAN) 150 MG tablet Therapy completed     There are no Patient Instructions on file for this visit. Patient given educational handouts and has had all questions answered. Patient voices understanding and agrees to plans along with risks and benefits of plan.  Patient isinstructed to continue prior meds, diet, and exercise plans unless

## 2020-09-05 RX ORDER — INSULIN ASPART 100 [IU]/ML
INJECTION, SOLUTION INTRAVENOUS; SUBCUTANEOUS
Qty: 45 ML | Refills: 1 | Status: SHIPPED | OUTPATIENT
Start: 2020-09-05 | End: 2021-02-27

## 2020-09-08 ENCOUNTER — OFFICE VISIT (OUTPATIENT)
Dept: SURGERY | Age: 75
End: 2020-09-08
Payer: MEDICARE

## 2020-09-08 ENCOUNTER — HOSPITAL ENCOUNTER (OUTPATIENT)
Dept: WOMENS IMAGING | Age: 75
Discharge: HOME | End: 2020-09-08
Payer: MEDICARE

## 2020-09-08 VITALS — WEIGHT: 215 LBS | TEMPERATURE: 99.2 F | BODY MASS INDEX: 39.56 KG/M2 | HEIGHT: 62 IN

## 2020-09-08 PROCEDURE — 99213 OFFICE O/P EST LOW 20 MIN: CPT | Performed by: PHYSICIAN ASSISTANT

## 2020-09-08 PROCEDURE — G0279 TOMOSYNTHESIS, MAMMO: HCPCS

## 2020-09-08 NOTE — PROGRESS NOTES
Ms. Jersey Hilton is status post left partial mastectomy for 1.2 cm grade 3 invasive ductal carcinoma on 8-4-15. Her margins and SNB were negative. ER+, WV+, [id number] -.  Mammoprint high risk. She completed radiation. Examination: Bilateral Digital Diagnostic Mammogram with Computer    Aided Detection,    Bilateral Digital Breast Tomosynthesis    Date: 9/8/2020    Indication: Annual examination    Comparison: Mammogram dated 8/26/2019, 8/20/2018,    Self-reported family history of breast cancer: Personal history of    left breast cancer status post lumpectomy 5 years ago    Technique: 2-D and 3-D images through both breasts obtained. Additionally, magnification view of the treatment site in the left    breast obtained. Findings:    Breast Density Category = B    There are scattered areas of fibroglandular density. No suspicious    findings are present. Posttreatment changes in the left breast without    developing calcifications or mass.         Impression    Impression:    1.   No mammographic evidence of malignancy. 2.  BI-RADS Final Assessment Category 2: Benign. 3.  Recommend annual screening mammography. BREAST EXAM:  There are fibrocystic changes throughout both breasts. There are no dominant masses, skin dimpling or nipple retraction. There is no axillary lymphadenopathy bilaterally. There are appropriate post op and post radiation changes on the left. IMPRESSION:  History of left breast cancer    PLAN:  We will see her in 1 year with bilateral mammograms. She will call sooner with any concerns. 15 minutes spent, which includes face to face with patient, record review, evaluation, planning, and education. I spent over 50% of this visit counseling patient.

## 2020-09-14 ENCOUNTER — OFFICE VISIT (OUTPATIENT)
Dept: PODIATRY | Facility: CLINIC | Age: 75
End: 2020-09-14

## 2020-09-14 VITALS
HEIGHT: 62 IN | WEIGHT: 200 LBS | BODY MASS INDEX: 36.8 KG/M2 | OXYGEN SATURATION: 95 % | HEART RATE: 73 BPM | DIASTOLIC BLOOD PRESSURE: 70 MMHG | SYSTOLIC BLOOD PRESSURE: 110 MMHG

## 2020-09-14 DIAGNOSIS — E11.42 TYPE 2 DIABETES MELLITUS WITH DIABETIC POLYNEUROPATHY, WITH LONG-TERM CURRENT USE OF INSULIN (HCC): ICD-10-CM

## 2020-09-14 DIAGNOSIS — M20.11 VALGUS DEFORMITY OF BOTH GREAT TOES: ICD-10-CM

## 2020-09-14 DIAGNOSIS — M79.671 FOOT PAIN, BILATERAL: ICD-10-CM

## 2020-09-14 DIAGNOSIS — M20.41 HAMMERTOE, BILATERAL: ICD-10-CM

## 2020-09-14 DIAGNOSIS — M20.42 HAMMERTOE, BILATERAL: ICD-10-CM

## 2020-09-14 DIAGNOSIS — M20.12 VALGUS DEFORMITY OF BOTH GREAT TOES: ICD-10-CM

## 2020-09-14 DIAGNOSIS — B35.1 ONYCHOMYCOSIS: Primary | ICD-10-CM

## 2020-09-14 DIAGNOSIS — Z79.4 TYPE 2 DIABETES MELLITUS WITH DIABETIC POLYNEUROPATHY, WITH LONG-TERM CURRENT USE OF INSULIN (HCC): ICD-10-CM

## 2020-09-14 DIAGNOSIS — M79.672 FOOT PAIN, BILATERAL: ICD-10-CM

## 2020-09-14 PROCEDURE — 11721 DEBRIDE NAIL 6 OR MORE: CPT | Performed by: NURSE PRACTITIONER

## 2020-09-14 RX ORDER — SITAGLIPTIN AND METFORMIN HYDROCHLORIDE 1000; 50 MG/1; MG/1
1 TABLET, FILM COATED ORAL 2 TIMES DAILY
COMMUNITY
Start: 2020-06-23 | End: 2022-03-07

## 2020-09-14 RX ORDER — OLOPATADINE HYDROCHLORIDE 2 MG/ML
1 SOLUTION/ DROPS OPHTHALMIC TAKE AS DIRECTED
COMMUNITY
Start: 2020-09-08

## 2020-09-14 RX ORDER — HYDROCODONE BITARTRATE AND ACETAMINOPHEN 10; 325 MG/1; MG/1
1 TABLET ORAL AS NEEDED
COMMUNITY
Start: 2020-09-09 | End: 2022-03-07

## 2020-09-14 RX ORDER — ASPIRIN 81 MG/1
81 TABLET ORAL DAILY
COMMUNITY

## 2020-09-16 ENCOUNTER — TRANSCRIBE ORDERS (OUTPATIENT)
Dept: ADMINISTRATIVE | Facility: HOSPITAL | Age: 75
End: 2020-09-16

## 2020-09-16 DIAGNOSIS — Z01.818 PREOP TESTING: Primary | ICD-10-CM

## 2020-09-18 ENCOUNTER — OFFICE VISIT (OUTPATIENT)
Dept: PRIMARY CARE CLINIC | Age: 75
End: 2020-09-18
Payer: MEDICARE

## 2020-09-18 ENCOUNTER — LAB (OUTPATIENT)
Dept: LAB | Facility: HOSPITAL | Age: 75
End: 2020-09-18

## 2020-09-18 PROCEDURE — 90694 VACC AIIV4 NO PRSRV 0.5ML IM: CPT | Performed by: NURSE PRACTITIONER

## 2020-09-18 PROCEDURE — C9803 HOPD COVID-19 SPEC COLLECT: HCPCS | Performed by: ANESTHESIOLOGY

## 2020-09-18 PROCEDURE — U0003 INFECTIOUS AGENT DETECTION BY NUCLEIC ACID (DNA OR RNA); SEVERE ACUTE RESPIRATORY SYNDROME CORONAVIRUS 2 (SARS-COV-2) (CORONAVIRUS DISEASE [COVID-19]), AMPLIFIED PROBE TECHNIQUE, MAKING USE OF HIGH THROUGHPUT TECHNOLOGIES AS DESCRIBED BY CMS-2020-01-R: HCPCS | Performed by: ANESTHESIOLOGY

## 2020-09-18 PROCEDURE — G0008 ADMIN INFLUENZA VIRUS VAC: HCPCS | Performed by: NURSE PRACTITIONER

## 2020-09-18 NOTE — PROGRESS NOTES
After obtaining consent, and per orders of Kristyn Ryan  injection of Fluad was given in the right deltoid by Tiffany Trinidad LPN  Pt tolerated well and had no reactions.

## 2020-09-19 LAB
COVID LABCORP PRIORITY: NORMAL
SARS-COV-2 RNA RESP QL NAA+PROBE: NOT DETECTED

## 2020-10-05 ENCOUNTER — TRANSCRIBE ORDERS (OUTPATIENT)
Dept: ADMINISTRATIVE | Facility: HOSPITAL | Age: 75
End: 2020-10-05

## 2020-10-05 DIAGNOSIS — Z01.818 PREOP TESTING: Primary | ICD-10-CM

## 2020-10-06 ENCOUNTER — OFFICE VISIT (OUTPATIENT)
Dept: PRIMARY CARE CLINIC | Age: 75
End: 2020-10-06
Payer: MEDICARE

## 2020-10-06 VITALS
OXYGEN SATURATION: 96 % | WEIGHT: 256 LBS | SYSTOLIC BLOOD PRESSURE: 130 MMHG | HEIGHT: 62 IN | TEMPERATURE: 97.3 F | HEART RATE: 84 BPM | BODY MASS INDEX: 47.11 KG/M2 | RESPIRATION RATE: 18 BRPM | DIASTOLIC BLOOD PRESSURE: 82 MMHG

## 2020-10-06 LAB — HBA1C MFR BLD: 9.6 %

## 2020-10-06 PROCEDURE — 83036 HEMOGLOBIN GLYCOSYLATED A1C: CPT | Performed by: NURSE PRACTITIONER

## 2020-10-06 PROCEDURE — 99214 OFFICE O/P EST MOD 30 MIN: CPT | Performed by: NURSE PRACTITIONER

## 2020-10-06 RX ORDER — EMPAGLIFLOZIN 25 MG/1
1 TABLET, FILM COATED ORAL DAILY
Qty: 30 TABLET | Refills: 5 | Status: SHIPPED | OUTPATIENT
Start: 2020-10-06 | End: 2021-06-24 | Stop reason: SDUPTHER

## 2020-10-06 RX ORDER — OLOPATADINE HYDROCHLORIDE 1 MG/ML
1 SOLUTION/ DROPS OPHTHALMIC 2 TIMES DAILY
Qty: 1 BOTTLE | Refills: 5 | Status: SHIPPED | OUTPATIENT
Start: 2020-10-06 | End: 2020-11-05

## 2020-10-06 ASSESSMENT — ENCOUNTER SYMPTOMS
WHEEZING: 0
DIARRHEA: 0
SHORTNESS OF BREATH: 0
SINUS PRESSURE: 0
VOMITING: 0
NAUSEA: 0
ABDOMINAL PAIN: 0
SINUS PAIN: 0
BACK PAIN: 1
COUGH: 0
EYE PAIN: 0

## 2020-10-06 NOTE — PROGRESS NOTES
[address] [address]     Phone:  [phone]  Fax:  [phone]      Jen Morocho is a 69 y.o. female who presents today for her medical conditions/complaints as noted below. Jen Morocho is c/o of Hypertension (3 month follow up - no concerns ); Diabetes (3 month follow up - no concerns); Referral - General ([facility] lymphed[facility] ); and Other (Requesting refill on Pataday )      Chief Complaint   Patient presents with    Hypertension     3 month follow up - no concerns     Diabetes     3 month follow up - no concerns    Referral - General     [facility] lymphedema clinic     Other     Requesting refill on Pataday        HPI:     HPI    Jen Morocho presents today for a 3 month follow up on chronic conditions. HTN: stable. Ms. Gab Gonzalez is taking her medications as prescribed. She denies any chest pain, shortness of breath. She does complain of swelling in her legs most days. She does have a history of lymphedema. She is wanting to return to the lymphedema clinic. She does not eat a low-sodium diet. She is wearing compression stockings. DM: Has not been taking afternoon Novolog. NOt eating DM diet. She states she is only been taking NovoLog twice a day. She is taking [place], Jardiance 10 mg daily as prescribed. She admits to drinking sugary drinks and eating sugary snacks. She denies any new wounds that are not healing. She does have chronic pain and sees pain management for this. She walks with a rolling walker due to the pain. She has chronic pain in her back. She is requesting referral for physical therapy. She has done physical therapy before and this helped. She continues to take hydrocodone, gabapentin, and diclofenac for pain. She states is effective, but physical therapy helps her more. She is also needing a refill on Pataday for her dry eyes.     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    Breast CA (HCC)     Left breast Nodules removed Took radiation    Chronic back pain     Chronic kidney disease     Overactive bladder     COPD (chronic obstructive pulmonary disease) (HCC)     x few years Scarring on lungs 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    Diabetes mellitus ([facility] [address]     On insulin x 10 years    Fibromyalgia     Fibromyalgia     for 20 years    GERD (gastroesophageal reflux disease)     History of blood transfusion     ?when    Hyperlipidemia     High cholesterol    Hypertension     On medications for 2 years    Joint pain 2/2/2016    Liver disease     Has fatty liver    Morbid obesity ([address]     Multiple food allergies     Neuropathic pain     Neuropathy     Neuropathy involving both lower extremities 2/2/2016    Obstructive sleep apnea     AHI: 68.1 per [facility], 5/2018    Osteoarthritis     Other disorders of kidney and ureter in diseases classified elsewhere     Pneumonia     Postmenopausal osteoporosis     Psychiatric problem     Restless leg syndrome     S/P lumpectomy, left breast 8/19/2015 8/4/2015    Sleep apnea     Type II or unspecified type diabetes mellitus with neurological manifestations, uncontrolled(250.62)         Past Surgical History:   Procedure Laterality Date    BREAST SURGERY Left 8/4/2015    left partial mastectomy on 8/4/15    EYE SURGERY      EYE SURGERY Bilateral 04/02/2017    Dr. Ada Borjas      broke L ankle due to osteoporosis, has hardware in    FRACTURE SURGERY      Left ankle     HYSTERECTOMY      [place] DRAIN SKIN ABSCESS COMPLIC N/A 3/49/9485    ABDOMINAL WALL ABSCESS INCISION AND DRAINAGE performed by Zhao Holcomb MD at [address] ENDOSCOPY  4/8/2016    Dr Wade Coulter-Gastritis, (-) H Pylori  WRIST SURGERY         Social History     Tobacco Use    Smoking status: Never Smoker    Smokeless tobacco: Never Used   Substance Use Topics    Alcohol use: Yes     Alcohol/week: 2.0 standard drinks     Types: 1 Glasses of wine, 1 Cans of beer per week     Comment: occ        Current Outpatient Medications   Medication Sig Dispense Refill    empagliflozin (JARDIANCE) 25 MG tablet Take 1 tablet by mouth daily 30 tablet 5    olopatadine (PATANOL) 0.1 % ophthalmic solution Place 1 drop into both eyes 2 times daily 1 Bottle 5    NOVOLOG FLEXPEN 100 UNIT/ML injection pen INJECT 15 UNITS            SUBCUTANEOUSLY 3 TIMES A   DAY BEFORE MEALS. 45 mL 1    loratadine (CLARITIN) 10 MG tablet TAKE 1 TABLET BY MOUTH ONCE DAILY. 30 tablet 3    TRESIBA FLEXTOUCH 200 UNIT/ML SOPN INJECT 100 UNITS (=1/2 ML) SUBCUTANEOUSLY NIGHTLY 27 mL 0    omeprazole (PRILOSEC) 20 MG delayed release capsule TAKE 1 CAPSULE 2 TIMES     DAILY AS NEEDED 180 capsule 1    oxybutynin (DITROPAN-XL) 10 MG extended release tablet TAKE 1 TABLET DAILY 90 tablet 1    montelukast (SINGULAIR) 10 MG tablet TAKE 1 TABLET NIGHTLY 90 tablet 1    magnesium oxide (MAG-OX) 400 MG tablet Take 1 tablet by mouth daily 90 tablet 3    citalopram (CELEXA) 20 MG tablet TAKE 1 TABLET DAILY 90 tablet 3    metoprolol succinate (TOPROL XL) 50 MG extended release tablet TAKE 1 TABLET DAILY FOR BLOOD PRESSURE AND HEART PROTECTION. 90 tablet 1    losartan (COZAAR) 100 MG tablet TAKE (1) TABLET DAILY FOR BLOOD PRESSURE. 90 tablet 1    rosuvastatin (CRESTOR) 10 MG tablet TAKE 1 TABLET BY MOUTH ONCE DAILY. 90 tablet 1    sitaGLIPtan-metformin (JANUMET)  MG per tablet Take 1 tablet by mouth 2 times daily (with meals) 180 tablet 1    vitamin D (ERGOCALCIFEROL) 1.25 MG (76380 UT) CAPS capsule Take once weekly 12 capsule 2    triamcinolone (KENALOG) 0.1 % cream Apply topically 2 times daily.  45 g 0    cyproheptadine (PERIACTIN) 4 MG tablet Take 1 tablet by mouth 3 times daily as needed (itching or rash) Hold the claritin on the days this is used for itching 21 tablet 0    ketoconazole (NIZORAL) 2 % shampoo Apply topically daily as needed. 1 Bottle 0    diclofenac (VOLTAREN) 50 MG EC tablet Take 1 tablet by mouth 2 times daily as needed for Pain (tooth pain) 60 tablet 1    albuterol (PROVENTIL) (5 MG/ML) 0.5% nebulizer solution Take 1ml twice daily for 10 days 120 each 3    Nebulizers (AIRRedeem COMPACT MINI NEBULIZER) MISC Patient has COPD and problems with breathing. 1 each 0    Vitamin D, Cholecalciferol, 25 MCG (1000 UT) CAPS Take by mouth      tiotropium (SPIRIVA) 18 MCG inhalation capsule Inhale 1 capsule into the lungs daily.  blood glucose monitor strips Test 3 times a day & as needed for symptoms of irregular blood glucose. 100 strip 5    clobetasol (TEMOVATE) 0.05 % ointment Apply topically 2 times daily. For 10 days 1 Tube 0    Insulin Pen Needle 31G X 5 MM MISC 1 each by Does not apply route daily 100 each 3    nystatin (MYCOSTATIN) 110873 UNIT/GM cream Apply topically 2 times daily. 1 Tube 1    aspirin 81 MG chewable tablet Take 1 tablet by mouth daily 30 tablet 3    HYDROcodone-acetaminophen (NORCO)  MG per tablet Take 1 tablet by mouth Daily with lunch. [employer] Insulin Pen Needle 31G X 5 MM MISC As directed 100 each 1    Misc. Devices (ROLLER WALKER) MISC 1 each by Does not apply route daily Pt states that she falls at least once a month up to 3 times a month.  1 each 0    INSULIN SYRINGE 1CC/29G 29G X 1/2\" 1 ML MISC Use with each dose of novolog TID 90 each 1    mometasone-formoterol (DULERA) 200-5 MCG/ACT inhaler Inhale 2 puffs into the lungs every 12 hours      anastrozole (ARIMIDEX) 1 MG tablet Take 1 mg by mouth daily      Multiple Vitamins-Minerals (MULTIVITAMIN ADULT PO) Take 1 tablet by mouth daily       albuterol (PROVENTIL HFA;VENTOLIN HFA) 108 (90 BASE) MCG/ACT inhaler Inhale 2 puffs into the lungs every 4 hours as distress. Appearance: She is well-developed. She is obese. She is not ill-appearing. HENT:      Head: Normocephalic and atraumatic. Right Ear: External ear normal.      Left Ear: External ear normal.      Nose: Nose normal. No congestion or rhinorrhea. Mouth/Throat:      Mouth: Mucous membranes are moist.      Dentition: Normal dentition. Eyes:      General:         Right eye: No discharge. Left eye: No discharge. Extraocular Movements: Extraocular movements intact. Conjunctiva/sclera: Conjunctivae normal.      Pupils: Pupils are equal, round, and reactive to light. Neck:      Musculoskeletal: Normal range of motion and neck supple. Cardiovascular:      Rate and Rhythm: Normal rate and regular rhythm. Pulses: Normal pulses. Heart sounds: Normal heart sounds. No murmur. Pulmonary:      Effort: Pulmonary effort is normal. No respiratory distress. Breath sounds: Normal breath sounds. No stridor. No wheezing, rhonchi or rales. Abdominal:      General: Bowel sounds are normal. There is no distension. Palpations: Abdomen is soft. Tenderness: There is no abdominal tenderness. Musculoskeletal:         General: Injury: 1+ edema. Lumbar back: She exhibits decreased range of motion and pain. Right lower leg: Edema present. Left lower leg: Edema (1+ edema) present. Comments: Walks with walker   Lymphadenopathy:      Cervical: No cervical adenopathy. Skin:     General: Skin is warm and dry. Capillary Refill: Capillary refill takes less than 2 seconds. Findings: No rash. Neurological:      General: No focal deficit present. Mental Status: She is alert and oriented to person, place, and time. Mental status is at baseline.    Psychiatric:         Mood and Affect: Mood normal.         Behavior: Behavior normal.         /82   Pulse 84   Temp 97.3 °F (36.3 °C) (Temporal)   Resp 18   Ht 5' 2\" (1.575 m)   Wt 256 lb (116.1 kg)   LMP  (LMP Unknown)   SpO2 96%   BMI 46.82 kg/m²     Assessment:      Diagnosis Orders   1. Type 2 diabetes mellitus with complication (HCC)  POCT glycosylated hemoglobin (Hb A1C)    empagliflozin (JARDIANCE) 25 MG tablet   2. Essential hypertension  CBC Auto Differential    Comprehensive Metabolic Panel   3. Neuropathy involving both lower extremities  External Referral To Physical Therapy   4. Lymphedema  External Referral To Physical Therapy   5. Chronic bilateral low back pain with bilateral sciatica  External Referral To Physical Therapy   6. At high risk for falls     7. Screening for hyperlipidemia  Lipid, Fasting       Results for orders placed or performed in visit on 10/06/20   POCT glycosylated hemoglobin (Hb A1C)   Result Value Ref Range    Hemoglobin A1C 9.6 %       Plan:     I have advised her to elevate legs, continue compression stockings, and decrease sodium intake and this should help the minimal swelling in her legs. She desires to see [facility] at the lymphedema clinic. I have placed referral.    Referral to [facility] for physical therapy for back pain. A1c was 9.6. Encourage diabetic diet and increase Jardiance to 25 mg. Diet handouts and instructions given. She is also to take NovoLog with every meal.    Labs ordered. Patient to get these while fasting. Will call with results. Medications refilled. Return in about 4 months (around 2/6/2021), or if symptoms worsen or fail to improve, for HTN, Diabetes.     Orders Placed This Encounter   Procedures    CBC Auto Differential     Standing Status:   Future     Standing Expiration Date:   10/6/2021    Comprehensive Metabolic Panel     Standing Status:   Future     Standing Expiration Date:   10/6/2021    Lipid, Fasting     Standing Status:   Future     Standing Expiration Date:   10/6/2021    External Referral To Physical Therapy     Referral Priority:   Routine     Referral Type:   Eval and Treat     Referral Reason:   Specialty Services Required     Requested Specialty:   Physical Therapy     Number of Visits Requested:   1    External Referral To Physical Therapy     Referral Priority:   Routine     Referral Type:   Eval and Treat     Referral Reason:   Specialty Services Required     Requested Specialty:   Physical Therapy     Number of Visits Requested:   1    POCT glycosylated hemoglobin (Hb A1C)       Orders Placed This Encounter   Medications    empagliflozin (JARDIANCE) 25 MG tablet     Sig: Take 1 tablet by mouth daily     Dispense:  30 tablet     Refill:  5    olopatadine (PATANOL) 0.1 % ophthalmic solution     Sig: Place 1 drop into both eyes 2 times daily     Dispense:  1 Bottle     Refill:  5        Patient offered educational materials - see patient instructions for any instruction needed. Discussed use, benefit, and side effects of prescribed medications. All patient questions answered. Instructed to continue current medications, diet and exercise. Patient agreed with treatment plan. Follow up as directed. Patient was advised to go to the ED if condition ever becomes emergent. EMR Dragon/transcription disclaimer: Some of this encounter note is an electronic transcription/translation of spoken language to printed text. The electronic translation of spoken language may permit erroneous, or at times, nonsensical words or phrases to be inadvertently transcribed. Although I have reviewed the note for such errors, some may still exist.      Electronically signed by ANTONIA Fish on 10/9/2020 at 7:09 AM      On the basis of positive falls risk screening, assessment and plan is as follows: home safety tips provided, referral to physical therapy provided for strength and balance training.

## 2020-10-06 NOTE — PATIENT INSTRUCTIONS
Take all diabetic medications as prescribed. Work on diabetic diet    Patient Education        Learning About Diabetes Food Guidelines  Your Care Instructions     Meal planning is important to manage diabetes. It helps keep your blood sugar at a target level (which you set with your doctor). You don't have to eat special foods. You can eat what your family eats, including sweets once in a while. But you do have to pay attention to how often you eat and how much you eat of certain foods. You may want to work with a dietitian or a certified diabetes educator (CDE) to help you plan meals and snacks. A dietitian or CDE can also help you lose weight if that is one of your goals. What should you know about eating carbs? Managing the amount of carbohydrate (carbs) you eat is an important part of healthy meals when you have diabetes. Carbohydrate is found in many foods. · Learn which foods have carbs. And learn the amounts of carbs in different foods. ? Bread, cereal, pasta, and rice have about 15 grams of carbs in a serving. A serving is 1 slice of bread (1 ounce), ½ cup of cooked cereal, or 1/3 cup of cooked pasta or rice. ? Fruits have 15 grams of carbs in a serving. A serving is 1 small fresh fruit, such as an apple or orange; ½ of a banana; ½ cup of cooked or canned fruit; ½ cup of fruit juice; 1 cup of melon or raspberries; or 2 tablespoons of dried fruit. ? Milk and no-sugar-added yogurt have 15 grams of carbs in a serving. A serving is 1 cup of milk or 2/3 cup of no-sugar-added yogurt. ? Starchy vegetables have 15 grams of carbs in a serving. A serving is ½ cup of mashed potatoes or sweet potato; 1 cup winter squash; ½ of a small baked potato; ½ cup of cooked beans; or ½ cup cooked corn or green peas. · Learn how much carbs to eat each day and at each meal. A dietitian or CDE can teach you how to keep track of the amount of carbs you eat. This is called carbohydrate counting.   · If you are not sure how low-fat dairy products. Use olive or canola oil instead of butter or shortening when cooking. · Don't skip meals. Your blood sugar may drop too low if you skip meals and take insulin or certain medicines for diabetes. · Check with your doctor before you drink alcohol. Alcohol can cause your blood sugar to drop too low. Alcohol can also cause a bad reaction if you take certain diabetes medicines. Follow-up care is a key part of your treatment and safety. Be sure to make and go to all appointments, and call your doctor if you are having problems. It's also a good idea to know your test results and keep a list of the medicines you take. Where can you learn more? Go to https://[employer]. org and sign in to your [employer] account. Enter B569 in the myDrugCosts box to learn more about \"Learning About Diabetes Food Guidelines. \"     If you do not have an account, please click on the \"Sign Up Now\" link. Current as of: December 20, 2019               Content Version: 12.5  © 2713-0603 [employer]. Care instructions adapted under license by [facility] If you have questions about a medical condition or this instruction, always ask your healthcare professional. [address] any warranty or liability for your use of this information.

## 2020-10-09 ASSESSMENT — ENCOUNTER SYMPTOMS: EYE ITCHING: 1

## 2020-10-10 ENCOUNTER — APPOINTMENT (OUTPATIENT)
Dept: CT IMAGING | Age: 75
End: 2020-10-10
Payer: MEDICARE

## 2020-10-10 ENCOUNTER — HOSPITAL ENCOUNTER (EMERGENCY)
Age: 75
Discharge: HOME | End: 2020-10-10
Attending: EMERGENCY MEDICINE
Payer: MEDICARE

## 2020-10-10 ENCOUNTER — LAB (OUTPATIENT)
Dept: LAB | Facility: HOSPITAL | Age: 75
End: 2020-10-10

## 2020-10-10 VITALS
WEIGHT: 256 LBS | HEART RATE: 88 BPM | HEIGHT: 62 IN | BODY MASS INDEX: 47.11 KG/M2 | RESPIRATION RATE: 16 BRPM | DIASTOLIC BLOOD PRESSURE: 62 MMHG | TEMPERATURE: 97.6 F | SYSTOLIC BLOOD PRESSURE: 140 MMHG | OXYGEN SATURATION: 90 %

## 2020-10-10 LAB
ALBUMIN SERPL-MCNC: 4 G/DL (ref 3.5–5.2)
ALP BLD-CCNC: 101 U/L (ref 35–104)
ALT SERPL-CCNC: 62 U/L (ref 5–33)
ANION GAP SERPL CALCULATED.3IONS-SCNC: 12 MMOL/L (ref 7–19)
AST SERPL-CCNC: 59 U/L (ref 5–32)
BASOPHILS ABSOLUTE: 0.1 K/UL (ref 0–0.2)
BASOPHILS RELATIVE PERCENT: 0.6 % (ref 0–1)
BILIRUB SERPL-MCNC: 0.3 MG/DL (ref 0.2–1.2)
BILIRUBIN URINE: NEGATIVE
BLOOD, URINE: NEGATIVE
BUN BLDV-MCNC: 16 MG/DL (ref 8–23)
CALCIUM SERPL-MCNC: 9.6 MG/DL (ref 8.8–10.2)
CHLORIDE BLD-SCNC: 98 MMOL/L (ref 98–111)
CLARITY: CLEAR
CO2: 26 MMOL/L (ref 22–29)
COLOR: YELLOW
CREAT SERPL-MCNC: 1 MG/DL (ref 0.5–0.9)
EOSINOPHILS ABSOLUTE: 0.2 K/UL (ref 0–0.6)
EOSINOPHILS RELATIVE PERCENT: 1.9 % (ref 0–5)
GFR AFRICAN AMERICAN: >59
GFR NON-AFRICAN AMERICAN: 54
GLUCOSE BLD-MCNC: 205 MG/DL (ref 74–109)
GLUCOSE URINE: =>1000 MG/DL
HCT VFR BLD CALC: 45.2 % (ref 37–47)
HEMOGLOBIN: 14.2 G/DL (ref 12–16)
IMMATURE GRANULOCYTES #: 0 K/UL
KETONES, URINE: ABNORMAL MG/DL
LEUKOCYTE ESTERASE, URINE: NEGATIVE
LYMPHOCYTES ABSOLUTE: 3.3 K/UL (ref 1.1–4.5)
LYMPHOCYTES RELATIVE PERCENT: 33.8 % (ref 20–40)
MCH RBC QN AUTO: 26.4 PG (ref 27–31)
MCHC RBC AUTO-ENTMCNC: 31.4 G/DL (ref 33–37)
MCV RBC AUTO: 84.2 FL (ref 81–99)
MONOCYTES ABSOLUTE: 0.8 K/UL (ref 0–0.9)
MONOCYTES RELATIVE PERCENT: 7.9 % (ref 0–10)
NEUTROPHILS ABSOLUTE: 5.4 K/UL (ref 1.5–7.5)
NEUTROPHILS RELATIVE PERCENT: 55.5 % (ref 50–65)
NITRITE, URINE: NEGATIVE
PDW BLD-RTO: 14.9 % (ref 11.5–14.5)
PH UA: 6 (ref 5–8)
PLATELET # BLD: 293 K/UL (ref 130–400)
PMV BLD AUTO: 10 FL (ref 9.4–12.3)
POTASSIUM REFLEX MAGNESIUM: 4.8 MMOL/L (ref 3.5–5)
PROTEIN UA: ABNORMAL MG/DL
RBC # BLD: 5.37 M/UL (ref 4.2–5.4)
SODIUM BLD-SCNC: 136 MMOL/L (ref 136–145)
SPECIFIC GRAVITY UA: 1.04 (ref 1–1.03)
TOTAL PROTEIN: 7.1 G/DL (ref 6.6–8.7)
UROBILINOGEN, URINE: 1 E.U./DL
WBC # BLD: 9.8 K/UL (ref 4.8–10.8)

## 2020-10-10 PROCEDURE — C9803 HOPD COVID-19 SPEC COLLECT: HCPCS

## 2020-10-10 PROCEDURE — 6360000002 HC RX W HCPCS: Performed by: PHYSICIAN ASSISTANT

## 2020-10-10 PROCEDURE — 72131 CT LUMBAR SPINE W/O DYE: CPT

## 2020-10-10 PROCEDURE — C9803 HOPD COVID-19 SPEC COLLECT: HCPCS | Performed by: ANESTHESIOLOGY

## 2020-10-10 PROCEDURE — 36415 COLL VENOUS BLD VENIPUNCTURE: CPT

## 2020-10-10 PROCEDURE — 6370000000 HC RX 637 (ALT 250 FOR IP): Performed by: PHYSICIAN ASSISTANT

## 2020-10-10 PROCEDURE — 80053 COMPREHEN METABOLIC PANEL: CPT

## 2020-10-10 PROCEDURE — U0003 INFECTIOUS AGENT DETECTION BY NUCLEIC ACID (DNA OR RNA); SEVERE ACUTE RESPIRATORY SYNDROME CORONAVIRUS 2 (SARS-COV-2) (CORONAVIRUS DISEASE [COVID-19]), AMPLIFIED PROBE TECHNIQUE, MAKING USE OF HIGH THROUGHPUT TECHNOLOGIES AS DESCRIBED BY CMS-2020-01-R: HCPCS | Performed by: ANESTHESIOLOGY

## 2020-10-10 PROCEDURE — 99283 EMERGENCY DEPT VISIT LOW MDM: CPT

## 2020-10-10 PROCEDURE — 99284 EMERGENCY DEPT VISIT MOD MDM: CPT

## 2020-10-10 PROCEDURE — 85025 COMPLETE CBC W/AUTO DIFF WBC: CPT

## 2020-10-10 PROCEDURE — 99999 PR OFFICE/OUTPT VISIT,PROCEDURE ONLY: CPT | Performed by: PHYSICIAN ASSISTANT

## 2020-10-10 PROCEDURE — 2580000003 HC RX 258: Performed by: PHYSICIAN ASSISTANT

## 2020-10-10 PROCEDURE — 81003 URINALYSIS AUTO W/O SCOPE: CPT

## 2020-10-10 PROCEDURE — 96372 THER/PROPH/DIAG INJ SC/IM: CPT

## 2020-10-10 RX ORDER — 0.9 % SODIUM CHLORIDE 0.9 %
1000 INTRAVENOUS SOLUTION INTRAVENOUS ONCE
Status: COMPLETED | OUTPATIENT
Start: 2020-10-10 | End: 2020-10-10

## 2020-10-10 RX ORDER — ONDANSETRON 4 MG/1
4 TABLET, ORALLY DISINTEGRATING ORAL ONCE
Status: COMPLETED | OUTPATIENT
Start: 2020-10-10 | End: 2020-10-10

## 2020-10-10 RX ORDER — HYDROMORPHONE HYDROCHLORIDE 1 MG/ML
0.5 INJECTION, SOLUTION INTRAMUSCULAR; INTRAVENOUS; SUBCUTANEOUS ONCE
Status: COMPLETED | OUTPATIENT
Start: 2020-10-10 | End: 2020-10-10

## 2020-10-10 RX ORDER — PREDNISONE 10 MG/1
10 TABLET ORAL DAILY
Qty: 10 TABLET | Refills: 0 | Status: SHIPPED | OUTPATIENT
Start: 2020-10-10 | End: 2020-10-20

## 2020-10-10 RX ADMIN — ONDANSETRON 4 MG: 4 TABLET, ORALLY DISINTEGRATING ORAL at 14:54

## 2020-10-10 RX ADMIN — HYDROMORPHONE HYDROCHLORIDE 0.5 MG: 1 INJECTION, SOLUTION INTRAMUSCULAR; INTRAVENOUS; SUBCUTANEOUS at 14:54

## 2020-10-10 RX ADMIN — SODIUM CHLORIDE 1000 ML: 9 INJECTION, SOLUTION INTRAVENOUS at 16:49

## 2020-10-10 ASSESSMENT — ENCOUNTER SYMPTOMS
NAUSEA: 0
APNEA: 0
RHINORRHEA: 0
SHORTNESS OF BREATH: 0
EYE DISCHARGE: 0
PHOTOPHOBIA: 0
EYE PAIN: 0
COUGH: 0
ABDOMINAL PAIN: 0
SORE THROAT: 0
ABDOMINAL DISTENTION: 0
BACK PAIN: 1
COLOR CHANGE: 0

## 2020-10-10 ASSESSMENT — PAIN DESCRIPTION - DESCRIPTORS: DESCRIPTORS: PATIENT UNABLE TO DESCRIBE

## 2020-10-10 ASSESSMENT — PAIN DESCRIPTION - LOCATION: LOCATION: BACK

## 2020-10-10 ASSESSMENT — PAIN SCALES - GENERAL
PAINLEVEL_OUTOF10: 10
PAINLEVEL_OUTOF10: 0
PAINLEVEL_OUTOF10: 10

## 2020-10-10 NOTE — ED NOTES
Patient resting with eyes closed, snoring. Resting comfortably with no distress.      Casper Rose RN  10/10/20 [id number]

## 2020-10-10 NOTE — ED PROVIDER NOTES
Attending Supervisory Note/Shared Visit    I have reviewed the mid-levels findings and agree. We have discussed the case and reviewed the diagnostic data. I am in agreement with questioning patient on anti-inflammatory medication she should avoid with the increase in her creatinine. Otherwise plan and disposition as discussed. Outpatient follow-up for her abnormal creatinine.   Marsha Arriaga MD  Attending Emergency Physician        Cb Garcia MD  10/10/20 6930

## 2020-10-10 NOTE — ED PROVIDER NOTES
[address] [facility] EMERGENCY DEPT  eMERGENCYdEPARTMENT eNCOUnter      Pt Name: Shree Butler  MRN: [id number]  [place] 1945  Date of evaluation: 10/10/2020  Provider:PAIGE Chaparro    CHIEF COMPLAINT       Chief Complaint   Patient presents with    Back Pain     low back radiating down legs         HISTORY OF PRESENT ILLNESS  (Location/Symptom, Timing/Onset, Context/Setting, Quality, Duration, Modifying Factors, Severity.)   Shree Butler is a 69 y.o. female who presents to the emergency department presenting with sciatic like symptoms involving both legs. The pain does not go down past the knees its more so buttock questionably flank related denies any respiratory symptoms she is ambulatory with her rolling walker popped right up when asked if she can walk. She denies any bowel bladder incontinence she is a diabetic and wanting to make sure this is in a kidney function related issue. She has a history of lumbar facet arthropathy admits to some heavy lifting 2 days ago with this all started yesterday. She states twisting laterally makes pain worse my guess is this really is related to her back but we will make sure nothing else going on. She is very pleasant denying any fever or COVID symptoms. Nica Zhu request #[id number]    Active cumulative morphine equivalent 20    Looks like she sees Dr. sanchez gets Norco tens last filled September 9 for a 30-day supply. HPI    Nursing Notes were reviewed and I agree. REVIEW OF SYSTEMS    (2-9 systems for level 4, 10 or more for level 5)     Review of Systems   Constitutional: Negative for activity change, appetite change, chills and fever. HENT: Negative for congestion, postnasal drip, rhinorrhea and sore throat. Eyes: Negative for photophobia, pain, discharge and visual disturbance. Respiratory: Negative for apnea, cough and shortness of breath. Cardiovascular: Negative for chest pain and leg swelling.    Gastrointestinal: Negative for abdominal distention, abdominal pain and nausea. Genitourinary: Negative for vaginal bleeding. Musculoskeletal: Positive for back pain and myalgias. Negative for arthralgias, joint swelling, neck pain and neck stiffness. Skin: Negative for color change and rash. Neurological: Negative for dizziness, syncope, facial asymmetry and headaches. Hematological: Negative for adenopathy. Does not bruise/bleed easily. Psychiatric/Behavioral: Negative for agitation, behavioral problems and confusion. Except as noted above the remainder of the review of systems was reviewed and negative.        PAST MEDICAL HISTORY     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    Breast CA (HCC)     Left breast Nodules removed Took radiation    Chronic back pain     Chronic kidney disease     Overactive bladder     COPD (chronic obstructive pulmonary disease) (HCC)     x few years Scarring on lungs 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    Diabetes mellitus ([address]     On insulin x 10 years    Fibromyalgia     Fibromyalgia     for 20 years    GERD (gastroesophageal reflux disease)     History of blood transfusion     ?when    Hyperlipidemia     High cholesterol    Hypertension     On medications for 2 years    Joint pain 2/2/2016    Liver disease     Has fatty liver    Morbid obesity ([address]     Multiple food allergies     Neuropathic pain     Neuropathy     Neuropathy involving both lower extremities 2/2/2016    Obstructive sleep apnea     AHI: 68.1 per PSG, 5/2018    Osteoarthritis     Other disorders of kidney and ureter in diseases classified elsewhere     Pneumonia     Postmenopausal osteoporosis     Psychiatric problem     Restless leg syndrome     S/P lumpectomy, left breast 8/19/2015 8/4/2015    Sleep apnea     Type II or unspecified MM MISC DAILY Starting Thu 8/22/2019, Disp-100 each, R-3, Normal      nystatin (MYCOSTATIN) 086190 UNIT/GM cream Apply topically 2 times daily. , Disp-1 Tube, R-1, Normal      aspirin 81 MG chewable tablet Take 1 tablet by mouth daily, Disp-30 tablet, R-3Normal      HYDROcodone-acetaminophen (NORCO)  MG per tablet Take 1 tablet by mouth Daily with lunch. .Historical Med      !! Insulin Pen Needle 31G X 5 MM MISC Disp-100 each, R-1, PrintAs directed      Misc. Devices (ROLLER WALKER) MISC DAILY Starting Tue 4/10/2018, Disp-1 each, R-0, PrintPt states that she falls at least once a month up to 3 times a month. INSULIN SYRINGE 1CC/29G 29G X 1/2\" 1 ML MISC Disp-90 each, R-1, NormalUse with each dose of novolog TID      mometasone-formoterol (DULERA) 200-5 MCG/ACT inhaler Inhale 2 puffs into the lungs every 12 hoursHistorical Med      anastrozole (ARIMIDEX) 1 MG tablet Take 1 mg by mouth dailyHistorical Med      Multiple Vitamins-Minerals (MULTIVITAMIN ADULT PO) Take 1 tablet by mouth daily Historical Med      albuterol (PROVENTIL HFA;VENTOLIN HFA) 108 (90 BASE) MCG/ACT inhaler Inhale 2 puffs into the lungs every 4 hours as needed for WheezingHistorical Med      Incontinence Supply Disposable (POISE PANTILINERS) PADS Disp-1 each, R-11, NormalUse pads as needed daily for urine leakage. !! - Potential duplicate medications found. Please discuss with provider.           ALLERGIES     Pcn [penicillins] and Cefdinir    FAMILY HISTORY       Family History   Problem Relation Age of Onset    High Blood Pressure Father     Heart Disease Father     Diabetes Father     High Blood Pressure Sister     Diabetes Sister     Cancer Sister     High Blood Pressure Brother     Colon Cancer Neg Hx     Colon Polyps Neg Hx     Esophageal Cancer Neg Hx     Liver Cancer Neg Hx     Liver Disease Neg Hx     Rectal Cancer Neg Hx     Stomach Cancer Neg Hx           SOCIAL HISTORY       Social History     Socioeconomic History    Marital status:      Spouse name: None    Number of children: None    Years of education: None    Highest education level: None   Occupational History    None   Social Needs    Financial resource strain: None    Food insecurity     Worry: None     Inability: None    Transportation needs     Medical: None     Non-medical: None   Tobacco Use    Smoking status: Never Smoker    Smokeless tobacco: Never Used   Substance and Sexual Activity    Alcohol use: Yes     Alcohol/week: 2.0 standard drinks     Types: 1 Glasses of wine, 1 Cans of beer per week     Comment: occ    Drug use: No    Sexual activity: None   Lifestyle    Physical activity     Days per week: None     Minutes per session: None    Stress: None   Relationships    Social connections     Talks on phone: None     Gets together: None     Attends Gnosticist service: None     Active member of club or organization: None     Attends meetings of clubs or organizations: None     Relationship status: None    Intimate partner violence     Fear of current or ex partner: None     Emotionally abused: None     Physically abused: None     Forced sexual activity: None   Other Topics Concern    None   Social History Narrative    None       SCREENINGS           PHYSICAL EXAM    (up to 7 forlevel 4, 8 or more for level 5)     ED Triage Vitals [10/10/20 1347]   BP Temp Temp src Pulse Resp SpO2 Height Weight   (!) 145/68 97.6 °F (36.4 °C) -- 88 20 90 % 5' 2\" (1.575 m) 256 lb (116.1 kg)       Physical Exam  Vitals signs and nursing note reviewed. Constitutional:       General: She is not in acute distress. Appearance: She is well-developed. She is obese. She is not diaphoretic. HENT:      Head: Normocephalic and atraumatic.       Right Ear: Tympanic membrane, ear canal and external ear normal.      Left Ear: Tympanic membrane, ear canal and external ear normal.      Nose: Nose normal.      Mouth/Throat:      Mouth: Mucous membranes are moist.      Pharynx: No oropharyngeal exudate. Eyes:      General:         Right eye: No discharge. Left eye: No discharge. Pupils: Pupils are equal, round, and reactive to light. Neck:      Musculoskeletal: Normal range of motion and neck supple. Thyroid: No thyromegaly. Cardiovascular:      Rate and Rhythm: Normal rate and regular rhythm. Pulses: Normal pulses. Heart sounds: Normal heart sounds. No murmur. No friction rub. Pulmonary:      Effort: Pulmonary effort is normal. No respiratory distress. Breath sounds: Normal breath sounds. No stridor. No wheezing. Abdominal:      General: Bowel sounds are normal. There is no distension. Palpations: Abdomen is soft. Tenderness: There is no abdominal tenderness. Musculoskeletal: Normal range of motion. General: Tenderness present. No signs of injury. Arms:    Skin:     General: Skin is warm and dry. Capillary Refill: Capillary refill takes less than 2 seconds. Findings: No rash. Neurological:      General: No focal deficit present. Mental Status: She is alert and oriented to person, place, and time. Mental status is at baseline. Cranial Nerves: No cranial nerve deficit. Sensory: No sensory deficit. Coordination: Coordination normal.   Psychiatric:         Mood and Affect: Mood normal.         Behavior: Behavior normal.         Thought Content: Thought content normal.         Judgment: Judgment normal.           DIAGNOSTIC RESULTS     RADIOLOGY:   Non-plain film images such as CT, Ultrasound and MRI are read by the radiologist. Servando Smoker radiographic images are visualized and preliminarilyinterpreted by No att. providers found with the below findings:      Interpretation per the Radiologist below, if available at the time of this note:    CT Lumbar Spine WO Contrast   Final Result   1.   Degenerative changes, worse at the spinal canal level at L2-3,   unchanged from CT abdomen pelvis dated 12/15/2019.   2.  Foraminal stenosis is worst at L5-S1, also unchanged from CT   abdomen pelvis dated 12/13/2019. Signed by Dr Jose Fong on 10/10/2020 5:51 PM          LABS:  Labs Reviewed   URINE RT REFLEX TO CULTURE - Abnormal; Notable for the following components:       Result Value    Ketones, Urine TRACE (*)     Protein, UA TRACE (*)     All other components within normal limits   CBC WITH AUTO DIFFERENTIAL - Abnormal; Notable for the following components:    MCH 26.4 (*)     MCHC 31.4 (*)     RDW 14.9 (*)     All other components within normal limits   COMPREHENSIVE METABOLIC PANEL W/ REFLEX TO MG FOR LOW K - Abnormal; Notable for the following components:    Glucose 205 (*)     CREATININE 1.0 (*)     GFR Non- 54 (*)     ALT 62 (*)     AST 59 (*)     All other components within normal limits       All other labs were within normal range or notreturned as of this dictation. RE-ASSESSMENT        EMERGENCY DEPARTMENT COURSE and DIFFERENTIAL DIAGNOSIS/MDM:   Vitals:    Vitals:    10/10/20 1347 10/10/20 1830   BP: (!) 145/68 (!) 140/62   Pulse: 88 88   Resp: 20 16   Temp: 97.6 °F (36.4 °C) 97.6 °F (36.4 °C)   TempSrc:  Temporal   SpO2: 90% 90%   Weight: 256 lb (116.1 kg)    Height: 5' 2\" (1.575 m)        MDM  Patient does admit to taking NSAIDs more as of late for pain. This may be correlation to her kidney function. Not high enough to admit at this time she is already had a liter bolus. Patient has no acute findings of infection no stone supported with lack of RBCs in urine and no true flank pain on exam I think this is likely an issue from her C-spine findings of her disc. She is educated to follow with her doctor as needed she keeps her pain management appointment for Wednesday. Ambulatory without issue and continues to have no bowel bladder incontinence. PROCEDURES:    Procedures      FINAL IMPRESSION      1. Acute exacerbation of chronic low back pain    2.  DDD (degenerative disc disease), lumbar          DISPOSITION/PLAN   DISPOSITION Decision To Discharge 10/10/2020 06:30:45 PM      PATIENT REFERRED TO:  [facility] EMERGENCY DEPT  Ramon Michaels  [phone]    If symptoms worsen    Jillian Gillis, ANTONIA  [address] [facility] [address] [facility] [phone]      As needed      DISCHARGE MEDICATIONS:  Discharge Medication List as of 10/10/2020  6:55 PM          (Please note that portions of this note were completed with a voice recognition program.  Efforts were made to edit the dictations but occasionallywords are mis-transcribed.)    Amos Reagan [address], [address]  10/10/20 1282

## 2020-10-11 LAB
COVID LABCORP PRIORITY: NORMAL
SARS-COV-2 RNA RESP QL NAA+PROBE: NOT DETECTED

## 2020-10-11 RX ORDER — EMPAGLIFLOZIN 10 MG/1
TABLET, FILM COATED ORAL
Qty: 90 TABLET | Refills: 1 | OUTPATIENT
Start: 2020-10-11

## 2020-10-11 RX ORDER — MONTELUKAST SODIUM 10 MG/1
TABLET ORAL
Qty: 90 TABLET | Refills: 1 | Status: SHIPPED | OUTPATIENT
Start: 2020-10-11 | End: 2021-04-16

## 2020-10-11 RX ORDER — OMEPRAZOLE 20 MG/1
CAPSULE, DELAYED RELEASE ORAL
Qty: 180 CAPSULE | Refills: 1 | Status: SHIPPED | OUTPATIENT
Start: 2020-10-11 | End: 2021-04-16

## 2020-10-11 RX ORDER — SITAGLIPTIN AND METFORMIN HYDROCHLORIDE 1000; 50 MG/1; MG/1
TABLET, FILM COATED ORAL
Qty: 180 TABLET | Refills: 1 | Status: SHIPPED | OUTPATIENT
Start: 2020-10-11 | End: 2021-04-16

## 2020-10-11 RX ORDER — INSULIN DEGLUDEC 200 U/ML
INJECTION, SOLUTION SUBCUTANEOUS
Qty: 27 ML | Refills: 0 | Status: SHIPPED | OUTPATIENT
Start: 2020-10-11 | End: 2020-11-25

## 2020-10-11 RX ORDER — ROSUVASTATIN CALCIUM 10 MG/1
TABLET, COATED ORAL
Qty: 90 TABLET | Refills: 1 | Status: SHIPPED | OUTPATIENT
Start: 2020-10-11 | End: 2021-04-16

## 2020-10-11 RX ORDER — LOSARTAN POTASSIUM 100 MG/1
TABLET ORAL
Qty: 90 TABLET | Refills: 1 | Status: SHIPPED | OUTPATIENT
Start: 2020-10-11 | End: 2021-04-16

## 2020-10-11 RX ORDER — METOPROLOL SUCCINATE 50 MG/1
TABLET, EXTENDED RELEASE ORAL
Qty: 90 TABLET | Refills: 1 | Status: SHIPPED | OUTPATIENT
Start: 2020-10-11 | End: 2021-04-16

## 2020-10-11 NOTE — ED NOTES
RN has explained Narcotic Policy to patient. Patient understands that they are not allowed to operate a motor vehicle for a minimum of 4 hours after administration. Patient is aware and understands that law enforcement will be contacted if the patient attempts to operate a motor vehicle prior to the stated 4 hours.       Helen Swenson RN  10/10/20 0621

## 2020-10-12 ENCOUNTER — CARE COORDINATION (OUTPATIENT)
Dept: CARE COORDINATION | Age: 75
End: 2020-10-12

## 2020-10-12 NOTE — CARE COORDINATION
Patient contacted regarding recent discharge and COVID-19 risk. Care Transition Nurse/ Ambulatory Care Manager contacted the patient by telephone to perform post discharge assessment. Verified name and  with patient as identifiers. Patient has following risk factors of: COPD, asthma and diabetes. CTN/ACM reviewed discharge instructions, medical action plan and red flags related to discharge diagnosis. Reviewed and educated them on any new and changed medications related to discharge diagnosis. Advised obtaining a 90-day supply of all daily and as-needed medications. Education provided regarding infection prevention, and signs and symptoms of COVID-19 and when to seek medical attention with patient who verbalized understanding. Discussed exposure protocols and quarantine from [address] [facility] [address] you at higher risk for severe illness  and given an opportunity for questions and concerns. The patient agrees to contact the COVID-19 hotline [phone] or PCP office for questions related to their healthcare. CTN/ACM provided contact information for future reference. From CDC: Are you at higher risk for severe illness?  Wash your hands often.  Avoid close contact (6 feet, which is about two arm lengths) with people who are sick.  Put distance between yourself and other people if COVID-19 is spreading in your community.  Clean and disinfect frequently touched surfaces.  Avoid all cruise travel and non-essential air travel.  Call your healthcare professional if you have concerns about COVID-19 and your underlying condition or if you are sick. For more information on steps you can take to protect yourself, see CDC's How to [id number] [place] [address] for follow-up call in 7-14 days based on severity of symptoms and risk factors. Patient states she continues with back pain, however her pain has not worsened.   Patient states she has appointment with pain management on 10/14/2020 for follow up. Patient denies fever, cough, or shortness of breath. Patient agrees to call PCP if experiencing new or worsening symptoms or will call 911 for severe or life threatening symptoms. Patient agrees to follow COVID-19 precautions.

## 2020-10-29 ENCOUNTER — TRANSCRIBE ORDERS (OUTPATIENT)
Dept: ADMINISTRATIVE | Facility: HOSPITAL | Age: 75
End: 2020-10-29

## 2020-10-29 DIAGNOSIS — Z01.818 PREOP TESTING: Primary | ICD-10-CM

## 2020-10-30 ENCOUNTER — LAB (OUTPATIENT)
Dept: LAB | Facility: HOSPITAL | Age: 75
End: 2020-10-30

## 2020-10-30 PROCEDURE — U0003 INFECTIOUS AGENT DETECTION BY NUCLEIC ACID (DNA OR RNA); SEVERE ACUTE RESPIRATORY SYNDROME CORONAVIRUS 2 (SARS-COV-2) (CORONAVIRUS DISEASE [COVID-19]), AMPLIFIED PROBE TECHNIQUE, MAKING USE OF HIGH THROUGHPUT TECHNOLOGIES AS DESCRIBED BY CMS-2020-01-R: HCPCS | Performed by: ANESTHESIOLOGY

## 2020-10-30 PROCEDURE — C9803 HOPD COVID-19 SPEC COLLECT: HCPCS | Performed by: ANESTHESIOLOGY

## 2020-10-31 LAB
COVID LABCORP PRIORITY: NORMAL
SARS-COV-2 RNA RESP QL NAA+PROBE: NOT DETECTED

## 2020-11-02 ENCOUNTER — CARE COORDINATION (OUTPATIENT)
Dept: CARE COORDINATION | Age: 75
End: 2020-11-02

## 2020-11-02 NOTE — CARE COORDINATION
You Patient resolved from the Care Transitions episode on 11/2/2020      Patient/family has been provided the following resources and education related to COVID-19:                         Signs, symptoms and red flags related to COVID-19            CDC exposure and quarantine guidelines            Con[employer]it exposure contact - [phone]            Contact for their local Department of Health                 Patient currently reports that the following symptoms have improved:  no new/worsening symptoms     No further outreach scheduled with this CTN/ACM. Episode of Care resolved. Patient has this CTN/ACM contact information if future needs arise. Patient denies fever, cough, or shortness of breath. Patient agrees to follow COVID-19 precautions.

## 2020-11-05 ENCOUNTER — TELEPHONE (OUTPATIENT)
Dept: ADMINISTRATIVE | Age: 75
End: 2020-11-05

## 2020-11-05 NOTE — TELEPHONE ENCOUNTER
Patient called regarding referral to [address] [place] [address] and PT.   Patient reports \"still waiting on call from [address] [place] [address]

## 2020-11-05 NOTE — TELEPHONE ENCOUNTER
Sharon De Guzman requests that nurse return their call. pt wants referrel to go to the [address] Fax Number: [phone]. Plus exercise physical therapy The best time to reach her is Anytime. Thank you.

## 2020-11-11 ENCOUNTER — TELEPHONE (OUTPATIENT)
Dept: PRIMARY CARE CLINIC | Age: 75
End: 2020-11-11

## 2020-11-11 NOTE — TELEPHONE ENCOUNTER
Patient called regarding referral to physical therapy \"the one across from the [facility] for the [facility]\" Discussed problems with walking and need  need for physical therapy  Discussed with Yvonne Loyd and she will investigate and let patient know options

## 2020-11-25 RX ORDER — ERGOCALCIFEROL 1.25 MG/1
CAPSULE ORAL
Qty: 12 CAPSULE | Refills: 2 | Status: SHIPPED | OUTPATIENT
Start: 2020-11-25 | End: 2021-10-06 | Stop reason: SDUPTHER

## 2020-11-25 RX ORDER — INSULIN DEGLUDEC 200 U/ML
INJECTION, SOLUTION SUBCUTANEOUS
Qty: 27 ML | Refills: 0 | Status: SHIPPED | OUTPATIENT
Start: 2020-11-25 | End: 2021-01-11

## 2020-12-02 ENCOUNTER — TELEPHONE (OUTPATIENT)
Dept: ADMINISTRATIVE | Age: 75
End: 2020-12-02

## 2020-12-14 ENCOUNTER — OFFICE VISIT (OUTPATIENT)
Dept: PODIATRY | Facility: CLINIC | Age: 75
End: 2020-12-14

## 2020-12-14 VITALS
DIASTOLIC BLOOD PRESSURE: 65 MMHG | BODY MASS INDEX: 46.7 KG/M2 | HEIGHT: 62 IN | SYSTOLIC BLOOD PRESSURE: 125 MMHG | HEART RATE: 85 BPM | OXYGEN SATURATION: 98 % | WEIGHT: 253.8 LBS

## 2020-12-14 DIAGNOSIS — Z79.4 TYPE 2 DIABETES MELLITUS WITH DIABETIC POLYNEUROPATHY, WITH LONG-TERM CURRENT USE OF INSULIN (HCC): ICD-10-CM

## 2020-12-14 DIAGNOSIS — M20.42 HAMMERTOE, BILATERAL: ICD-10-CM

## 2020-12-14 DIAGNOSIS — M20.41 HAMMERTOE, BILATERAL: ICD-10-CM

## 2020-12-14 DIAGNOSIS — B35.1 ONYCHOMYCOSIS: Primary | ICD-10-CM

## 2020-12-14 DIAGNOSIS — M79.672 FOOT PAIN, BILATERAL: ICD-10-CM

## 2020-12-14 DIAGNOSIS — M79.671 FOOT PAIN, BILATERAL: ICD-10-CM

## 2020-12-14 DIAGNOSIS — M20.11 VALGUS DEFORMITY OF BOTH GREAT TOES: ICD-10-CM

## 2020-12-14 DIAGNOSIS — E66.01 OBESITY, MORBID, BMI 40.0-49.9 (HCC): ICD-10-CM

## 2020-12-14 DIAGNOSIS — M20.12 VALGUS DEFORMITY OF BOTH GREAT TOES: ICD-10-CM

## 2020-12-14 DIAGNOSIS — E11.42 TYPE 2 DIABETES MELLITUS WITH DIABETIC POLYNEUROPATHY, WITH LONG-TERM CURRENT USE OF INSULIN (HCC): ICD-10-CM

## 2020-12-14 PROCEDURE — 99213 OFFICE O/P EST LOW 20 MIN: CPT | Performed by: NURSE PRACTITIONER

## 2020-12-14 PROCEDURE — 11721 DEBRIDE NAIL 6 OR MORE: CPT | Performed by: NURSE PRACTITIONER

## 2020-12-15 RX ORDER — ANASTROZOLE 1 MG/1
1 TABLET ORAL DAILY
Qty: 90 TABLET | Refills: 3 | Status: SHIPPED | OUTPATIENT
Start: 2020-12-15 | End: 2021-09-13 | Stop reason: SDUPTHER

## 2020-12-17 RX ORDER — GABAPENTIN 300 MG/1
CAPSULE ORAL
Qty: 90 CAPSULE | Refills: 1 | Status: SHIPPED | OUTPATIENT
Start: 2020-12-17 | End: 2021-05-04

## 2020-12-17 NOTE — TELEPHONE ENCOUNTER
Nicolasa Mac called to request a refill on her medication. Last office visit : 10/6/2020   Next office visit : 2/12/2021     Last UDS:   Amphetamine Screen, Urine   Date Value Ref Range Status   08/22/2019 -  Final     Barbiturate Screen, Urine   Date Value Ref Range Status   08/22/2019 -  Final     Benzodiazepine Screen, Urine   Date Value Ref Range Status   08/22/2019 -  Final     Buprenorphine Urine   Date Value Ref Range Status   08/22/2019 -  Final     Cocaine Metabolite Screen, Urine   Date Value Ref Range Status   08/22/2019 -  Final     Gabapentin Screen, Urine   Date Value Ref Range Status   08/22/2019 -  Final     MDMA, Urine   Date Value Ref Range Status   08/22/2019 -  Final     Methamphetamine, Urine   Date Value Ref Range Status   08/22/2019 -  Final     Opiate Scrn, Ur   Date Value Ref Range Status   08/22/2019 -  Final     Oxycodone Screen, Ur   Date Value Ref Range Status   08/22/2019 -  Final     PCP Screen, Urine   Date Value Ref Range Status   08/22/2019 -  Final     Propoxyphene Screen, Urine   Date Value Ref Range Status   08/22/2019 -  Final     THC Screen, Urine   Date Value Ref Range Status   08/22/2019 -  Final     Tricyclic Antidepressants, Urine   Date Value Ref Range Status   08/22/2019 -  Final       [facility]  Medication Contract: 10-05-18   Last Fill: 07-17-20    Requested Prescriptions     Pending Prescriptions Disp Refills    gabapentin (NEURONTIN) 300 MG capsule [Pharmacy Med Name: GABAPENTIN 300 MG CAPSULE] 90 capsule 0     Sig: TAKE 1 CAPSULE BY MOUTH 3 TIMES A DAY         Please approve or refuse this medication.    Ruby Munguia MA

## 2020-12-28 RX ORDER — PEN NEEDLE, DIABETIC 29 G X1/2"
NEEDLE, DISPOSABLE MISCELLANEOUS
Qty: 100 EACH | Refills: 0 | Status: SHIPPED | OUTPATIENT
Start: 2020-12-28

## 2021-01-11 DIAGNOSIS — E11.8 TYPE 2 DIABETES MELLITUS WITH COMPLICATION (HCC): ICD-10-CM

## 2021-01-11 RX ORDER — INSULIN DEGLUDEC 200 U/ML
INJECTION, SOLUTION SUBCUTANEOUS
Qty: 27 ML | Refills: 0 | Status: SHIPPED | OUTPATIENT
Start: 2021-01-11 | End: 2021-07-14 | Stop reason: SDUPTHER

## 2021-02-12 DIAGNOSIS — Z17.0 MALIGNANT NEOPLASM OF UPPER-OUTER QUADRANT OF LEFT BREAST IN FEMALE, ESTROGEN RECEPTOR POSITIVE (HCC): Primary | ICD-10-CM

## 2021-02-12 DIAGNOSIS — C50.412 MALIGNANT NEOPLASM OF UPPER-OUTER QUADRANT OF LEFT BREAST IN FEMALE, ESTROGEN RECEPTOR POSITIVE (HCC): Primary | ICD-10-CM

## 2021-02-15 ENCOUNTER — APPOINTMENT (OUTPATIENT)
Dept: LAB | Facility: HOSPITAL | Age: 76
End: 2021-02-15

## 2021-02-23 ENCOUNTER — TELEPHONE (OUTPATIENT)
Dept: ADMINISTRATIVE | Age: 76
End: 2021-02-23

## 2021-02-24 ENCOUNTER — TELEPHONE (OUTPATIENT)
Dept: PRIMARY CARE CLINIC | Age: 76
End: 2021-02-24

## 2021-02-24 DIAGNOSIS — N89.8 VAGINAL ITCHING: Primary | ICD-10-CM

## 2021-02-24 RX ORDER — DIAPER,BRIEF,INFANT-TODD,DISP
EACH MISCELLANEOUS
Qty: 1 TUBE | Refills: 1 | Status: SHIPPED | OUTPATIENT
Start: 2021-02-24 | End: 2021-03-03

## 2021-02-24 NOTE — TELEPHONE ENCOUNTER
Called pt she is scheduled with a APRN and we would like her to stay with a MD due to issues, left message with daughter to have her call so we can change the appt

## 2021-02-25 ENCOUNTER — TELEPHONE (OUTPATIENT)
Dept: ADMINISTRATIVE | Age: 76
End: 2021-02-25

## 2021-02-25 NOTE — TELEPHONE ENCOUNTER
Needs order faxed to [facility] along with letter of medical necessity as soon as possible. She currently does not have a bed to sleep in. She needs a twin sized hospital bed for a handicap senior. Please fax orders to: [phone] and/or [phone] as soon as possible and please advise the patient once this has been sent over. The Medicare number if needed is [phone].

## 2021-02-25 NOTE — TELEPHONE ENCOUNTER
Spoke with patient in regards to request. I informed her we are not able to send in any documentation for her to get a bed until she has her appointment on 03/16 with  to establish care.  Patient gave VU

## 2021-02-26 ENCOUNTER — TELEPHONE (OUTPATIENT)
Dept: PRIMARY CARE CLINIC | Age: 76
End: 2021-02-26

## 2021-02-26 NOTE — TELEPHONE ENCOUNTER
I lmvm for pt to call us back but that we needed her to come to appt on [email or web address] instead of 12:40. But if she needs a later appt we can reschedule her but needed her to gvie us a call back. If pt wants to reschedule at a later time, she is to be a 60 mins with Dr Althea Beasley or can be schedule with Dr Poornima Mcclure. I spoke to pt and told her since she can't be here on 3/16 that she can be seen on [email or web address]. Pt vu and will be here.

## 2021-03-01 ENCOUNTER — TELEPHONE (OUTPATIENT)
Dept: ADMINISTRATIVE | Age: 76
End: 2021-03-01

## 2021-03-01 DIAGNOSIS — B37.9 YEAST INFECTION: Primary | ICD-10-CM

## 2021-03-01 RX ORDER — DIAPER,BRIEF,INFANT-TODD,DISP
EACH MISCELLANEOUS
Qty: 1 TUBE | Refills: 1 | Status: SHIPPED | OUTPATIENT
Start: 2021-03-01 | End: 2021-03-08

## 2021-03-08 ENCOUNTER — OFFICE VISIT (OUTPATIENT)
Dept: ONCOLOGY | Facility: CLINIC | Age: 76
End: 2021-03-08

## 2021-03-08 ENCOUNTER — LAB (OUTPATIENT)
Dept: LAB | Facility: HOSPITAL | Age: 76
End: 2021-03-08

## 2021-03-08 VITALS
DIASTOLIC BLOOD PRESSURE: 68 MMHG | OXYGEN SATURATION: 98 % | TEMPERATURE: 97.7 F | HEART RATE: 76 BPM | SYSTOLIC BLOOD PRESSURE: 138 MMHG | HEIGHT: 62 IN | RESPIRATION RATE: 16 BRPM | BODY MASS INDEX: 46.42 KG/M2

## 2021-03-08 DIAGNOSIS — C50.412 MALIGNANT NEOPLASM OF UPPER-OUTER QUADRANT OF LEFT BREAST IN FEMALE, ESTROGEN RECEPTOR POSITIVE (HCC): ICD-10-CM

## 2021-03-08 DIAGNOSIS — G89.29 CHRONIC BACK PAIN, UNSPECIFIED BACK LOCATION, UNSPECIFIED BACK PAIN LATERALITY: ICD-10-CM

## 2021-03-08 DIAGNOSIS — G47.30 SLEEP APNEA, UNSPECIFIED TYPE: ICD-10-CM

## 2021-03-08 DIAGNOSIS — M79.7 FIBROMYALGIA: ICD-10-CM

## 2021-03-08 DIAGNOSIS — C50.412 MALIGNANT NEOPLASM OF UPPER-OUTER QUADRANT OF LEFT FEMALE BREAST, UNSPECIFIED ESTROGEN RECEPTOR STATUS (HCC): ICD-10-CM

## 2021-03-08 DIAGNOSIS — Z79.811 LONG TERM (CURRENT) USE OF AROMATASE INHIBITORS: ICD-10-CM

## 2021-03-08 DIAGNOSIS — M54.9 CHRONIC BACK PAIN, UNSPECIFIED BACK LOCATION, UNSPECIFIED BACK PAIN LATERALITY: ICD-10-CM

## 2021-03-08 DIAGNOSIS — Z78.0 POST-MENOPAUSAL: ICD-10-CM

## 2021-03-08 DIAGNOSIS — Z17.0 MALIGNANT NEOPLASM OF UPPER-OUTER QUADRANT OF LEFT BREAST IN FEMALE, ESTROGEN RECEPTOR POSITIVE (HCC): ICD-10-CM

## 2021-03-08 DIAGNOSIS — Z20.822 COVID-19 RULED OUT: Primary | ICD-10-CM

## 2021-03-08 DIAGNOSIS — J44.9 CHRONIC OBSTRUCTIVE PULMONARY DISEASE, UNSPECIFIED COPD TYPE (HCC): Primary | ICD-10-CM

## 2021-03-08 LAB
ALBUMIN SERPL-MCNC: 4.3 G/DL (ref 3.5–5.2)
ALBUMIN/GLOB SERPL: 1.3 G/DL
ALP SERPL-CCNC: 107 U/L (ref 39–117)
ALT SERPL W P-5'-P-CCNC: 58 U/L (ref 1–33)
ANION GAP SERPL CALCULATED.3IONS-SCNC: 10 MMOL/L (ref 5–15)
AST SERPL-CCNC: 54 U/L (ref 1–32)
BASOPHILS # BLD AUTO: 0.06 10*3/MM3 (ref 0–0.2)
BASOPHILS NFR BLD AUTO: 0.6 % (ref 0–1.5)
BILIRUB SERPL-MCNC: 0.4 MG/DL (ref 0–1.2)
BUN SERPL-MCNC: 14 MG/DL (ref 8–23)
BUN/CREAT SERPL: 18.4 (ref 7–25)
CALCIUM SPEC-SCNC: 9.5 MG/DL (ref 8.6–10.5)
CHLORIDE SERPL-SCNC: 98 MMOL/L (ref 98–107)
CO2 SERPL-SCNC: 29 MMOL/L (ref 22–29)
CREAT SERPL-MCNC: 0.76 MG/DL (ref 0.57–1)
DEPRECATED RDW RBC AUTO: 42.2 FL (ref 37–54)
EOSINOPHIL # BLD AUTO: 0.24 10*3/MM3 (ref 0–0.4)
EOSINOPHIL NFR BLD AUTO: 2.3 % (ref 0.3–6.2)
ERYTHROCYTE [DISTWIDTH] IN BLOOD BY AUTOMATED COUNT: 14.6 % (ref 12.3–15.4)
GFR SERPL CREATININE-BSD FRML MDRD: 75 ML/MIN/1.73
GLOBULIN UR ELPH-MCNC: 3.3 GM/DL
GLUCOSE SERPL-MCNC: 182 MG/DL (ref 65–99)
HCT VFR BLD AUTO: 44.7 % (ref 34–46.6)
HGB BLD-MCNC: 14.3 G/DL (ref 12–15.9)
HOLD SPECIMEN: NORMAL
HOLD SPECIMEN: NORMAL
IMM GRANULOCYTES # BLD AUTO: 0.03 10*3/MM3 (ref 0–0.05)
IMM GRANULOCYTES NFR BLD AUTO: 0.3 % (ref 0–0.5)
LYMPHOCYTES # BLD AUTO: 3.45 10*3/MM3 (ref 0.7–3.1)
LYMPHOCYTES NFR BLD AUTO: 33.6 % (ref 19.6–45.3)
MCH RBC QN AUTO: 26.2 PG (ref 26.6–33)
MCHC RBC AUTO-ENTMCNC: 32 G/DL (ref 31.5–35.7)
MCV RBC AUTO: 81.9 FL (ref 79–97)
MONOCYTES # BLD AUTO: 0.72 10*3/MM3 (ref 0.1–0.9)
MONOCYTES NFR BLD AUTO: 7 % (ref 5–12)
NEUTROPHILS NFR BLD AUTO: 5.76 10*3/MM3 (ref 1.7–7)
NEUTROPHILS NFR BLD AUTO: 56.2 % (ref 42.7–76)
NRBC BLD AUTO-RTO: 0 /100 WBC (ref 0–0.2)
PLATELET # BLD AUTO: 293 10*3/MM3 (ref 140–450)
PMV BLD AUTO: 9.8 FL (ref 6–12)
POTASSIUM SERPL-SCNC: 4.4 MMOL/L (ref 3.5–5.2)
PROT SERPL-MCNC: 7.6 G/DL (ref 6–8.5)
RBC # BLD AUTO: 5.46 10*6/MM3 (ref 3.77–5.28)
SODIUM SERPL-SCNC: 137 MMOL/L (ref 136–145)
WBC # BLD AUTO: 10.26 10*3/MM3 (ref 3.4–10.8)

## 2021-03-08 PROCEDURE — 36415 COLL VENOUS BLD VENIPUNCTURE: CPT

## 2021-03-08 PROCEDURE — 85025 COMPLETE CBC W/AUTO DIFF WBC: CPT

## 2021-03-08 PROCEDURE — 80053 COMPREHEN METABOLIC PANEL: CPT

## 2021-03-08 PROCEDURE — 99214 OFFICE O/P EST MOD 30 MIN: CPT | Performed by: NURSE PRACTITIONER

## 2021-03-10 ENCOUNTER — TELEPHONE (OUTPATIENT)
Dept: ONCOLOGY | Facility: CLINIC | Age: 76
End: 2021-03-10

## 2021-03-12 ENCOUNTER — TELEPHONE (OUTPATIENT)
Dept: PODIATRY | Facility: CLINIC | Age: 76
End: 2021-03-12

## 2021-03-15 ENCOUNTER — OFFICE VISIT (OUTPATIENT)
Dept: PODIATRY | Facility: CLINIC | Age: 76
End: 2021-03-15

## 2021-03-15 VITALS
WEIGHT: 252.6 LBS | BODY MASS INDEX: 46.48 KG/M2 | HEART RATE: 78 BPM | HEIGHT: 62 IN | OXYGEN SATURATION: 92 % | SYSTOLIC BLOOD PRESSURE: 122 MMHG | DIASTOLIC BLOOD PRESSURE: 66 MMHG

## 2021-03-15 DIAGNOSIS — M20.41 HAMMERTOE, BILATERAL: ICD-10-CM

## 2021-03-15 DIAGNOSIS — M20.11 VALGUS DEFORMITY OF BOTH GREAT TOES: ICD-10-CM

## 2021-03-15 DIAGNOSIS — E11.42 TYPE 2 DIABETES MELLITUS WITH DIABETIC POLYNEUROPATHY, WITH LONG-TERM CURRENT USE OF INSULIN (HCC): ICD-10-CM

## 2021-03-15 DIAGNOSIS — I89.0 LYMPHEDEMA: ICD-10-CM

## 2021-03-15 DIAGNOSIS — E66.01 OBESITY, MORBID, BMI 40.0-49.9 (HCC): ICD-10-CM

## 2021-03-15 DIAGNOSIS — Z79.4 TYPE 2 DIABETES MELLITUS WITH DIABETIC POLYNEUROPATHY, WITH LONG-TERM CURRENT USE OF INSULIN (HCC): ICD-10-CM

## 2021-03-15 DIAGNOSIS — M79.671 FOOT PAIN, BILATERAL: ICD-10-CM

## 2021-03-15 DIAGNOSIS — B35.1 ONYCHOMYCOSIS: Primary | ICD-10-CM

## 2021-03-15 DIAGNOSIS — M20.42 HAMMERTOE, BILATERAL: ICD-10-CM

## 2021-03-15 DIAGNOSIS — M79.672 FOOT PAIN, BILATERAL: ICD-10-CM

## 2021-03-15 DIAGNOSIS — M20.12 VALGUS DEFORMITY OF BOTH GREAT TOES: ICD-10-CM

## 2021-03-15 PROCEDURE — 11721 DEBRIDE NAIL 6 OR MORE: CPT | Performed by: NURSE PRACTITIONER

## 2021-03-15 PROCEDURE — 99213 OFFICE O/P EST LOW 20 MIN: CPT | Performed by: NURSE PRACTITIONER

## 2021-03-18 ENCOUNTER — HOSPITAL ENCOUNTER (OUTPATIENT)
Dept: WOMENS IMAGING | Age: 76
Discharge: HOME | End: 2021-03-18
Payer: MEDICARE

## 2021-03-18 ENCOUNTER — OFFICE VISIT (OUTPATIENT)
Dept: PRIMARY CARE CLINIC | Age: 76
End: 2021-03-18
Payer: MEDICARE

## 2021-03-18 VITALS
HEART RATE: 74 BPM | OXYGEN SATURATION: 95 % | SYSTOLIC BLOOD PRESSURE: 110 MMHG | TEMPERATURE: 96.9 F | HEIGHT: 62 IN | RESPIRATION RATE: 22 BRPM | DIASTOLIC BLOOD PRESSURE: 60 MMHG | BODY MASS INDEX: 47.11 KG/M2 | WEIGHT: 256 LBS

## 2021-03-18 DIAGNOSIS — Z78.0 POSTMENOPAUSE: ICD-10-CM

## 2021-03-18 DIAGNOSIS — I10 ESSENTIAL HYPERTENSION: ICD-10-CM

## 2021-03-18 DIAGNOSIS — Z85.3 HISTORY OF BREAST CANCER: ICD-10-CM

## 2021-03-18 DIAGNOSIS — M25.572 CHRONIC PAIN OF BOTH ANKLES: ICD-10-CM

## 2021-03-18 DIAGNOSIS — R29.898 WEAKNESS OF FOOT, UNSPECIFIED LATERALITY: ICD-10-CM

## 2021-03-18 DIAGNOSIS — E08.42 DIABETES MELLITUS DUE TO UNDERLYING CONDITION WITH DIABETIC POLYNEUROPATHY, WITH LONG-TERM CURRENT USE OF INSULIN (HCC): ICD-10-CM

## 2021-03-18 DIAGNOSIS — Z79.811 LONG TERM (CURRENT) USE OF AROMATASE INHIBITORS: ICD-10-CM

## 2021-03-18 DIAGNOSIS — E66.01 CLASS 3 SEVERE OBESITY DUE TO EXCESS CALORIES WITH SERIOUS COMORBIDITY AND BODY MASS INDEX (BMI) OF 45.0 TO 49.9 IN ADULT (HCC): ICD-10-CM

## 2021-03-18 DIAGNOSIS — M25.571 CHRONIC PAIN OF BOTH ANKLES: ICD-10-CM

## 2021-03-18 DIAGNOSIS — E78.2 MIXED HYPERLIPIDEMIA: ICD-10-CM

## 2021-03-18 DIAGNOSIS — E11.8 TYPE 2 DIABETES MELLITUS WITH COMPLICATION (HCC): ICD-10-CM

## 2021-03-18 DIAGNOSIS — R29.898 WEAKNESS OF BOTH LEGS: Primary | ICD-10-CM

## 2021-03-18 DIAGNOSIS — Z79.4 DIABETES MELLITUS DUE TO UNDERLYING CONDITION WITH DIABETIC POLYNEUROPATHY, WITH LONG-TERM CURRENT USE OF INSULIN (HCC): ICD-10-CM

## 2021-03-18 DIAGNOSIS — G89.29 CHRONIC PAIN OF BOTH ANKLES: ICD-10-CM

## 2021-03-18 LAB
ALBUMIN SERPL-MCNC: 4.2 G/DL (ref 3.5–5.2)
ALP BLD-CCNC: 106 U/L (ref 35–104)
ALT SERPL-CCNC: 58 U/L (ref 5–33)
ANION GAP SERPL CALCULATED.3IONS-SCNC: 13 MMOL/L (ref 7–19)
AST SERPL-CCNC: 62 U/L (ref 5–32)
BILIRUB SERPL-MCNC: 0.3 MG/DL (ref 0.2–1.2)
BUN BLDV-MCNC: 14 MG/DL (ref 8–23)
CALCIUM SERPL-MCNC: 9.4 MG/DL (ref 8.8–10.2)
CHLORIDE BLD-SCNC: 97 MMOL/L (ref 98–111)
CHOLESTEROL, TOTAL: 166 MG/DL (ref 160–199)
CO2: 27 MMOL/L (ref 22–29)
CREAT SERPL-MCNC: 0.7 MG/DL (ref 0.5–0.9)
GFR AFRICAN AMERICAN: >59
GFR NON-AFRICAN AMERICAN: >60
GLUCOSE BLD-MCNC: 135 MG/DL (ref 74–109)
HBA1C MFR BLD: 10.3 % (ref 4–6)
HDLC SERPL-MCNC: 43 MG/DL (ref 65–121)
LDL CHOLESTEROL CALCULATED: 80 MG/DL
POTASSIUM SERPL-SCNC: 4.4 MMOL/L (ref 3.5–5)
SODIUM BLD-SCNC: 137 MMOL/L (ref 136–145)
TOTAL PROTEIN: 7.1 G/DL (ref 6.6–8.7)
TRIGL SERPL-MCNC: 216 MG/DL (ref 0–149)
TSH REFLEX FT4: 2.47 UIU/ML (ref 0.35–5.5)
VITAMIN B-12: 620 PG/ML (ref 211–946)

## 2021-03-18 PROCEDURE — 99214 OFFICE O/P EST MOD 30 MIN: CPT | Performed by: STUDENT IN AN ORGANIZED HEALTH CARE EDUCATION/TRAINING PROGRAM

## 2021-03-18 PROCEDURE — 77080 DXA BONE DENSITY AXIAL: CPT

## 2021-03-18 ASSESSMENT — PATIENT HEALTH QUESTIONNAIRE - PHQ9
SUM OF ALL RESPONSES TO PHQ QUESTIONS 1-9: 1
DEPRESSION UNABLE TO ASSESS: FUNCTIONAL CAPACITY MOTIVATION LIMITS ACCURACY
SUM OF ALL RESPONSES TO PHQ9 QUESTIONS 1 & 2: 1
2. FEELING DOWN, DEPRESSED OR HOPELESS: 1

## 2021-03-18 NOTE — PROGRESS NOTES
[address] [facility]  [address] [address]  [address] [address]  Dept: [phone]  Dept Fax: [phone]  Loc: [phone]      Subjective:     Chief Complaint   Patient presents with   Dewayne Lund Establish Care    Neck Pain     she thinks its due to clogged arteries    Other     she thinks bandages would help with swelling in her ankles       HPI:  Lui Camarillo is a 70 y.o. 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    Breast CA (HCC)     Left breast Nodules removed Took radiation    Chronic back pain     Chronic kidney disease     Overactive bladder     COPD (chronic obstructive pulmonary disease) (HCC)     x few years Scarring on lungs 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 [place]    Diabetes mellitus ([address]     On insulin x 10 years    Fibromyalgia     Fibromyalgia     for 20 years    GERD (gastroesophageal reflux disease)     History of blood transfusion     ?when    Hyperlipidemia     High cholesterol    Hypertension     On medications for 2 years    Joint pain 2/2/2016    Liver disease     Has fatty liver    Morbid obesity ([address]     Multiple food allergies     Neuropathic pain     Neuropathy     Neuropathy involving both lower extremities 2/2/2016    Obstructive sleep apnea     AHI: 68.1 per PSG, 5/2018    Osteoarthritis     Other disorders of kidney and ureter in diseases classified elsewhere     Pneumonia     Postmenopausal osteoporosis     Psychiatric problem     Restless leg syndrome     S/P lumpectomy, left breast 8/19/2015 8/4/2015    Sleep apnea     Type II or unspecified type diabetes mellitus with neurological manifestations, uncontrolled(250.62)      Pt sees several specialists. Notably she has h/o L breast cancer treated in 2015 w/ lumpectomy and radiation. She is on arimidex for 10 y course. She is followed by Dr. Gely Covington gen surg and [facility]. She is due for labs for monitoring her chronic illnesses. Additionally today she wants referral to ortho  Inquiring about ortho referral to see [facility] again and for PT. She notes BL ankle pain. She has h/o L ankle surgery. She notes that she [facility]  Is scheduled for dexa today.       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    Breast CA (HCC)     Left breast Nodules removed Took radiation    Chronic back pain     Chronic kidney disease     Overactive bladder     COPD (chronic obstructive pulmonary disease) (HCC)     x few years Scarring on lungs 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    Diabetes mellitus ([address]     On insulin x 10 years    Fibromyalgia     Fibromyalgia     for 20 years    GERD (gastroesophageal reflux disease)     History of blood transfusion     ?when    Hyperlipidemia     High cholesterol    Hypertension     On medications for 2 years    Joint pain 2/2/2016    Liver disease     Has fatty liver    Morbid obesity ([address]     Multiple food allergies     Neuropathic pain     Neuropathy     Neuropathy involving both lower extremities 2/2/2016    Obstructive sleep apnea     AHI: 68.1 per PSG, 5/2018    Osteoarthritis     Other disorders of kidney and ureter in diseases classified elsewhere     Pneumonia     Postmenopausal osteoporosis     Psychiatric problem     Restless leg syndrome     S/P lumpectomy, left breast 8/19/2015 8/4/2015    Sleep apnea  Type II or unspecified type diabetes mellitus with neurological manifestations, uncontrolled(250.62)      Patient Active Problem List   Diagnosis    Postmenopausal osteoporosis    Type 2 diabetes mellitus with complication [facility]    COPD (chronic obstructive pulmonary disease) ([address]    Mood disorder ([facility]    Dementia (HCC)    Malignant neoplasm of upper-outer quadrant of female breast ([address]    Joint pain    Neuropathy involving both lower extremities    Back pain    Dyspepsia    Lumbar facet arthropathy    Body mass index 45.0-49.9, adult (HCC)    Essential hypertension    Hyperlipidemia    Morbid obesity (HCC)    DARSHAN (obstructive sleep apnea)    BiPAP (biphasic positive airway pressure) dependence    Restless leg syndrome       PSHx:  Past Surgical History:   Procedure Laterality Date    BREAST SURGERY Left 8/4/2015    left partial mastectomy on 8/4/15    EYE SURGERY      EYE SURGERY Bilateral 04/02/2017    Dr. Sylvia Sykes      broke L ankle due to osteoporosis, has hardware in    FRACTURE SURGERY      Left ankle     HYSTERECTOMY      IL DRAIN SKIN ABSCESS COMPLIC N/A 7/99/7550    ABDOMINAL WALL ABSCESS INCISION AND DRAINAGE performed by Anam Bush MD at [address] ENDOSCOPY  4/8/2016    Dr Lucero Castillo, (-) H Pylori    WRIST SURGERY         PFHx:  Family History   Problem Relation Age of Onset    High Blood Pressure Father     Heart Disease Father     Diabetes Father     High Blood Pressure Sister     Diabetes Sister     Cancer Sister     High Blood Pressure Brother     Colon Cancer Neg Hx     Colon Polyps Neg Hx     Esophageal Cancer Neg Hx     Liver Cancer Neg Hx     Liver Disease Neg Hx     Rectal Cancer Neg Hx     Stomach Cancer Neg Hx        SocialHx:  Social History     Tobacco Use    Smoking status: Never Smoker    Smokeless tobacco: Never Used   Substance Use Topics    Alcohol use:  Yes Alcohol/week: 2.0 standard drinks     Types: 1 Glasses of wine, 1 Cans of beer per week     Comment: occ       Allergies: Allergies   Allergen Reactions    Pcn [Penicillins] Hives and Itching     Patient states she can tolerate ampicillin and amoxicillin however.  Cefdinir        Medications:  Current Outpatient Medications   Medication Sig Dispense Refill    NOVOLOG FLEXPEN 100 UNIT/ML injection pen INJECT 15 UNITS            SUBCUTANEOUSLY 3 TIMES A   DAY BEFORE MEALS. 45 mL 2    TRESIBA FLEXTOUCH 200 UNIT/ML SOPN INJECT 100 UNITS (=1/2 ML) SUBCUTANEOUSLY NIGHTLY 27 mL 0    ULTICARE MINI PEN NEEDLES 31G X 6 MM MISC USE 2 TIMES A  each 0    loratadine (CLARITIN) 10 MG tablet TAKE 1 TABLET BY MOUTH ONCE DAILY. 30 tablet 5    gabapentin (NEURONTIN) 300 MG capsule TAKE 1 CAPSULE BY MOUTH 3 TIMES A DAY 90 capsule 1    vitamin D (ERGOCALCIFEROL) 1.25 MG (64764 UT) CAPS capsule TAKE 1 CAPSULE ONCE WEEKLY 12 capsule 2    oxybutynin (DITROPAN-XL) 10 MG extended release tablet TAKE 1 TABLET DAILY 90 tablet 1    JANUMET  MG per tablet TAKE 1 TABLET TWICE DAILY  WITH MEALS 180 tablet 1    montelukast (SINGULAIR) 10 MG tablet TAKE 1 TABLET NIGHTLY 90 tablet 1    omeprazole (PRILOSEC) 20 MG delayed release capsule TAKE 1 CAPSULE 2 TIMES     DAILY AS NEEDED 180 capsule 1    losartan (COZAAR) 100 MG tablet TAKE 1 TABLET DAILY FOR    BLOOD PRESSURE 90 tablet 1    rosuvastatin (CRESTOR) 10 MG tablet TAKE 1 TABLET ONCE DAILY 90 tablet 1    metoprolol succinate (TOPROL XL) 50 MG extended release tablet TAKE 1 TABLET DAILY FOR    BLOOD PRESSURE AND HEART   PROTECTION 90 tablet 1    empagliflozin (JARDIANCE) 25 MG tablet Take 1 tablet by mouth daily 30 tablet 5    magnesium oxide (MAG-OX) 400 MG tablet Take 1 tablet by mouth daily 90 tablet 3    citalopram (CELEXA) 20 MG tablet TAKE 1 TABLET DAILY 90 tablet 3    triamcinolone (KENALOG) 0.1 % cream Apply topically 2 times daily.  45 g 0    cyproheptadine (PERIACTIN) 4 MG tablet Take 1 tablet by mouth 3 times daily as needed (itching or rash) Hold the claritin on the days this is used for itching 21 tablet 0    ketoconazole (NIZORAL) 2 % shampoo Apply topically daily as needed. 1 Bottle 0    diclofenac (VOLTAREN) 50 MG EC tablet Take 1 tablet by mouth 2 times daily as needed for Pain (tooth pain) 60 tablet 1    albuterol (PROVENTIL) (5 MG/ML) 0.5% nebulizer solution Take 1ml twice daily for 10 days 120 each 3    Nebulizers ([employer] COMPACT MINI NEBULIZER) MISC Patient has COPD and problems with breathing. 1 each 0    Vitamin D, Cholecalciferol, 25 MCG (1000 UT) CAPS Take by mouth      tiotropium (SPIRIVA) 18 MCG inhalation capsule Inhale 1 capsule into the lungs daily.  blood glucose monitor strips Test 3 times a day & as needed for symptoms of irregular blood glucose. 100 strip 5    clobetasol (TEMOVATE) 0.05 % ointment Apply topically 2 times daily. For 10 days 1 Tube 0    nystatin (MYCOSTATIN) 060217 UNIT/GM cream Apply topically 2 times daily. 1 Tube 1    aspirin 81 MG chewable tablet Take 1 tablet by mouth daily 30 tablet 3    HYDROcodone-acetaminophen (NORCO)  MG per tablet Take 1 tablet by mouth Daily with lunch. Eduard Dodd Insulin Pen Needle 31G X 5 MM MISC As directed 100 each 1    Misc. Devices (ROLLER WALKER) MISC 1 each by Does not apply route daily Pt states that she falls at least once a month up to 3 times a month.  1 each 0    INSULIN SYRINGE 1CC/29G 29G X 1/2\" 1 ML MISC Use with each dose of novolog TID 90 each 1    mometasone-formoterol (DULERA) 200-5 MCG/ACT inhaler Inhale 2 puffs into the lungs every 12 hours      anastrozole (ARIMIDEX) 1 MG tablet Take 1 mg by mouth daily      Multiple Vitamins-Minerals (MULTIVITAMIN ADULT PO) Take 1 tablet by mouth daily       albuterol (PROVENTIL HFA;VENTOLIN HFA) 108 (90 BASE) MCG/ACT inhaler Inhale 2 puffs into the lungs every 4 hours as needed for Wheezing      Incontinence Supply Disposable (POISE PANTILINERS) PADS Use pads as needed daily for urine leakage. 1 each 11     No current facility-administered medications for this visit. Objective:   PE:  /60   Pulse 74   Temp 96.9 °F (36.1 °C) (Temporal)   Resp 22   Ht 5' 2\" (1.575 m)   Wt 256 lb (116.1 kg)   LMP  (LMP Unknown)   SpO2 95%   BMI 46.82 kg/m²   Physical Exam  Constitutional:       General: She is not in acute distress. Appearance: Normal appearance. HENT:      Head: Normocephalic. Nose: Nose normal.      Mouth/Throat:      Mouth: Mucous membranes are moist.      Pharynx: Oropharynx is clear. No oropharyngeal exudate or posterior oropharyngeal erythema. Eyes:      General: No scleral icterus. Extraocular Movements: Extraocular movements intact. Conjunctiva/sclera: Conjunctivae normal.   Neck:      Musculoskeletal: Normal range of motion. Cardiovascular:      Rate and Rhythm: Normal rate and regular rhythm. Pulses: Normal pulses. Heart sounds: No murmur. Pulmonary:      Effort: Pulmonary effort is normal.      Breath sounds: Normal breath sounds. Abdominal:      General: There is no distension. Palpations: Abdomen is soft. There is no mass. Tenderness: There is no abdominal tenderness. Musculoskeletal: Normal range of motion. Skin:     General: Skin is warm and dry. Capillary Refill: Capillary refill takes less than 2 seconds. Neurological:      General: No focal deficit present. Mental Status: She is alert and oriented to person, place, and time. Psychiatric:         Mood and Affect: Mood normal.         Behavior: Behavior normal.         Thought Content: Thought content normal.            Assessment & Plan   Scott Love was seen today for establish care, neck pain and other.     Diagnoses and all orders for this visit:    Obtain labs as below, will follow up shortly after to discuss T2DM management and address other medical needs    Weakness of both legs  -     External Referral To Physical Therapy    Chronic pain of both ankles  -     Sofie Bhatt MD, Orthopaedic Surgery, [place]    Weakness of foot, unspecified laterality  -     External Referral To Physical Therapy    Type 2 diabetes mellitus with complication (HCC)  -     Hemoglobin A1C; Future  -     Microalbumin, Ur; Future  -     Vitamin B12; Future    Essential hypertension  -At goal. Continue current medications  -     Comprehensive Metabolic Panel; Future    Mixed hyperlipidemia  -     Lipid Panel; Future    Class 3 severe obesity due to excess calories with serious comorbidity and body mass index (BMI) of 45.0 to 49.9 in adult (HCC)  -     TSH WITH REFLEX TO FT4; Future    History of breast cancer  -Followed by gen surgery and heme/onc, on arimdex      Return in about 1 week (around 3/25/2021). All questions were answered. Medications, including possible adverse effects, and instructions were reviewed and  understanding was confirmed. Follow-up recommendations, including when to contact or return to office (ie; if symptoms worsen or fail to improve), were discussed and acknowledged.     Electronically signed by Winnie Avila MD on 3/18/21 at 12:29 PM CDT

## 2021-03-24 ENCOUNTER — TREATMENT (OUTPATIENT)
Dept: PHYSICAL THERAPY | Facility: CLINIC | Age: 76
End: 2021-03-24

## 2021-03-24 DIAGNOSIS — R26.9 GAIT DISTURBANCE: Primary | ICD-10-CM

## 2021-03-24 DIAGNOSIS — I89.0 LYMPHEDEMA OF BOTH LOWER EXTREMITIES: ICD-10-CM

## 2021-03-24 PROCEDURE — 97161 PT EVAL LOW COMPLEX 20 MIN: CPT | Performed by: PHYSICAL THERAPIST

## 2021-03-31 ENCOUNTER — TREATMENT (OUTPATIENT)
Dept: PHYSICAL THERAPY | Facility: CLINIC | Age: 76
End: 2021-03-31

## 2021-03-31 DIAGNOSIS — I89.0 LYMPHEDEMA OF BOTH LOWER EXTREMITIES: ICD-10-CM

## 2021-03-31 DIAGNOSIS — R26.9 GAIT DISTURBANCE: Primary | ICD-10-CM

## 2021-03-31 DIAGNOSIS — I89.0 LYMPHEDEMA OF LEFT ARM: ICD-10-CM

## 2021-03-31 PROCEDURE — 97140 MANUAL THERAPY 1/> REGIONS: CPT | Performed by: PHYSICAL THERAPIST

## 2021-04-06 ENCOUNTER — TREATMENT (OUTPATIENT)
Dept: PHYSICAL THERAPY | Facility: CLINIC | Age: 76
End: 2021-04-06

## 2021-04-06 DIAGNOSIS — R26.9 GAIT DISTURBANCE: Primary | ICD-10-CM

## 2021-04-06 DIAGNOSIS — I89.0 LYMPHEDEMA OF BOTH LOWER EXTREMITIES: ICD-10-CM

## 2021-04-06 PROCEDURE — 97140 MANUAL THERAPY 1/> REGIONS: CPT | Performed by: PHYSICAL THERAPIST

## 2021-04-09 ENCOUNTER — TREATMENT (OUTPATIENT)
Dept: PHYSICAL THERAPY | Facility: CLINIC | Age: 76
End: 2021-04-09

## 2021-04-09 DIAGNOSIS — R26.9 GAIT DISTURBANCE: Primary | ICD-10-CM

## 2021-04-09 DIAGNOSIS — I89.0 LYMPHEDEMA OF BOTH LOWER EXTREMITIES: ICD-10-CM

## 2021-04-09 PROCEDURE — 97140 MANUAL THERAPY 1/> REGIONS: CPT | Performed by: PHYSICAL THERAPIST

## 2021-04-13 ENCOUNTER — TREATMENT (OUTPATIENT)
Dept: PHYSICAL THERAPY | Facility: CLINIC | Age: 76
End: 2021-04-13

## 2021-04-13 DIAGNOSIS — I89.0 LYMPHEDEMA OF BOTH LOWER EXTREMITIES: Primary | ICD-10-CM

## 2021-04-13 DIAGNOSIS — R26.9 GAIT DISTURBANCE: ICD-10-CM

## 2021-04-13 PROCEDURE — 97140 MANUAL THERAPY 1/> REGIONS: CPT | Performed by: PHYSICAL THERAPIST

## 2021-04-16 ENCOUNTER — TREATMENT (OUTPATIENT)
Dept: PHYSICAL THERAPY | Facility: CLINIC | Age: 76
End: 2021-04-16

## 2021-04-16 DIAGNOSIS — I89.0 LYMPHEDEMA OF BOTH LOWER EXTREMITIES: Primary | ICD-10-CM

## 2021-04-16 DIAGNOSIS — R26.9 GAIT DISTURBANCE: ICD-10-CM

## 2021-04-16 PROCEDURE — 97140 MANUAL THERAPY 1/> REGIONS: CPT | Performed by: PHYSICAL THERAPIST

## 2021-04-27 ENCOUNTER — TREATMENT (OUTPATIENT)
Dept: PHYSICAL THERAPY | Facility: CLINIC | Age: 76
End: 2021-04-27

## 2021-04-27 DIAGNOSIS — R26.9 GAIT DISTURBANCE: ICD-10-CM

## 2021-04-27 DIAGNOSIS — I89.0 LYMPHEDEMA OF BOTH LOWER EXTREMITIES: Primary | ICD-10-CM

## 2021-04-27 PROCEDURE — 97140 MANUAL THERAPY 1/> REGIONS: CPT | Performed by: PHYSICAL THERAPIST

## 2021-04-27 RX ORDER — CITALOPRAM 20 MG/1
TABLET ORAL
Qty: 90 TABLET | Refills: 3 | Status: SHIPPED | OUTPATIENT
Start: 2021-04-27 | End: 2021-05-05

## 2021-04-30 ENCOUNTER — TREATMENT (OUTPATIENT)
Dept: PHYSICAL THERAPY | Facility: CLINIC | Age: 76
End: 2021-04-30

## 2021-04-30 ENCOUNTER — TELEPHONE (OUTPATIENT)
Dept: PRIMARY CARE CLINIC | Age: 76
End: 2021-04-30

## 2021-04-30 DIAGNOSIS — I89.0 LYMPHEDEMA OF BOTH LOWER EXTREMITIES: Primary | ICD-10-CM

## 2021-04-30 DIAGNOSIS — R26.9 GAIT DISTURBANCE: ICD-10-CM

## 2021-04-30 DIAGNOSIS — I89.0 LYMPHEDEMA: Primary | ICD-10-CM

## 2021-04-30 DIAGNOSIS — I89.0 LYMPH EDEMA: Primary | ICD-10-CM

## 2021-04-30 PROCEDURE — 97140 MANUAL THERAPY 1/> REGIONS: CPT | Performed by: PHYSICAL THERAPIST

## 2021-04-30 NOTE — TELEPHONE ENCOUNTER
Pt is requesting that orders for her water - based physical therapy (12 sessions) and her normal physical therapy be sent to [address] [place] [address] so that she may continue her treatment for her Lymphedema. Please call pt back with any questions or updates. Thank you!

## 2021-05-03 ENCOUNTER — TELEPHONE (OUTPATIENT)
Dept: PRIMARY CARE CLINIC | Age: 76
End: 2021-05-03

## 2021-05-04 ENCOUNTER — TELEPHONE (OUTPATIENT)
Dept: PRIMARY CARE CLINIC | Age: 76
End: 2021-05-04

## 2021-05-04 ENCOUNTER — TRANSCRIBE ORDERS (OUTPATIENT)
Dept: PHYSICAL THERAPY | Facility: CLINIC | Age: 76
End: 2021-05-04

## 2021-05-04 DIAGNOSIS — I89.0 LYMPHEDEMA: Primary | ICD-10-CM

## 2021-05-04 DIAGNOSIS — G57.93 NEUROPATHY INVOLVING BOTH LOWER EXTREMITIES: Chronic | ICD-10-CM

## 2021-05-04 RX ORDER — EMPAGLIFLOZIN 10 MG/1
TABLET, FILM COATED ORAL
Qty: 30 TABLET | Refills: 0 | Status: SHIPPED | OUTPATIENT
Start: 2021-05-04 | End: 2021-05-21 | Stop reason: SDUPTHER

## 2021-05-04 RX ORDER — SITAGLIPTIN AND METFORMIN HYDROCHLORIDE 1000; 50 MG/1; MG/1
TABLET, FILM COATED ORAL
Qty: 60 TABLET | Refills: 0 | Status: SHIPPED | OUTPATIENT
Start: 2021-05-04 | End: 2021-05-21 | Stop reason: SDUPTHER

## 2021-05-04 NOTE — TELEPHONE ENCOUNTER
Tonja Huggins called to request a refill on her medication. Last office visit : 3/18/2021   Next office visit : 5/4/2021     Last UDS:   Amphetamine Screen, Urine   Date Value Ref Range Status   08/22/2019 -  Final     Barbiturate Screen, Urine   Date Value Ref Range Status   08/22/2019 -  Final     Benzodiazepine Screen, Urine   Date Value Ref Range Status   08/22/2019 -  Final     Buprenorphine Urine   Date Value Ref Range Status   08/22/2019 -  Final     Cocaine Metabolite Screen, Urine   Date Value Ref Range Status   08/22/2019 -  Final     Gabapentin Screen, Urine   Date Value Ref Range Status   08/22/2019 -  Final     MDMA, Urine   Date Value Ref Range Status   08/22/2019 -  Final     Methamphetamine, Urine   Date Value Ref Range Status   08/22/2019 -  Final     Opiate Scrn, Ur   Date Value Ref Range Status   08/22/2019 -  Final     Oxycodone Screen, Ur   Date Value Ref Range Status   08/22/2019 -  Final     PCP Screen, Urine   Date Value Ref Range Status   08/22/2019 -  Final     Propoxyphene Screen, Urine   Date Value Ref Range Status   08/22/2019 -  Final     THC Screen, Urine   Date Value Ref Range Status   08/22/2019 -  Final     Tricyclic Antidepressants, Urine   Date Value Ref Range Status   08/22/2019 -  Final       Last Viki Hathaway: 10-06-20  Medication Contract: 10-05-18   Last Fill: 12-17-20    Requested Prescriptions     Pending Prescriptions Disp Refills    gabapentin (NEURONTIN) 300 MG capsule [Pharmacy Med Name: GABAPENTIN 300 MG CAPSULE *DD] 90 capsule 0     Sig: TAKE 1 CAPSULE BY MOUTH 3 TIMES A DAY         Please approve or refuse this medication.    Anette Lopez MA

## 2021-05-04 NOTE — TELEPHONE ENCOUNTER
Referral has been faxed to [facility] MARILYN BURCIAGA @ [phone]. I made contact with pt daughter as pt was sleeping and notified her the order has been sent as she requested.

## 2021-05-05 ENCOUNTER — TRANSCRIBE ORDERS (OUTPATIENT)
Dept: ADMINISTRATIVE | Facility: HOSPITAL | Age: 76
End: 2021-05-05

## 2021-05-05 DIAGNOSIS — Z01.818 PREOP TESTING: Primary | ICD-10-CM

## 2021-05-05 RX ORDER — GABAPENTIN 300 MG/1
CAPSULE ORAL
Qty: 90 CAPSULE | Refills: 3 | Status: SHIPPED | OUTPATIENT
Start: 2021-05-05 | End: 2021-11-05

## 2021-05-05 RX ORDER — CITALOPRAM 20 MG/1
TABLET ORAL
Qty: 30 TABLET | Refills: 0 | Status: SHIPPED | OUTPATIENT
Start: 2021-05-05 | End: 2021-05-24 | Stop reason: SDUPTHER

## 2021-05-08 ENCOUNTER — LAB (OUTPATIENT)
Dept: LAB | Facility: HOSPITAL | Age: 76
End: 2021-05-08

## 2021-05-08 LAB — SARS-COV-2 ORF1AB RESP QL NAA+PROBE: NOT DETECTED

## 2021-05-08 PROCEDURE — U0004 COV-19 TEST NON-CDC HGH THRU: HCPCS | Performed by: ANESTHESIOLOGY

## 2021-05-08 PROCEDURE — C9803 HOPD COVID-19 SPEC COLLECT: HCPCS | Performed by: ANESTHESIOLOGY

## 2021-05-13 ENCOUNTER — TELEPHONE (OUTPATIENT)
Dept: PRIMARY CARE CLINIC | Age: 76
End: 2021-05-13

## 2021-05-13 NOTE — TELEPHONE ENCOUNTER
I 've called the pt [facility] about sending her pt orders over to [facility] again today and if she needed anything else to give us a call back. I've refaxed referral to [phone].  Also the pt was requesting medication she will need to request her refills thru her pharmacy

## 2021-05-18 DIAGNOSIS — I10 ESSENTIAL HYPERTENSION: ICD-10-CM

## 2021-05-18 NOTE — TELEPHONE ENCOUNTER
Patient is out of both medications and needs them called into [facility]. [phone]. Please advise patient @ [phone] or [phone]. She needs Losartan 100 mg and Rosuvastatin 10 mg.

## 2021-05-19 ENCOUNTER — TELEPHONE (OUTPATIENT)
Dept: PRIMARY CARE CLINIC | Age: 76
End: 2021-05-19

## 2021-05-19 RX ORDER — LOSARTAN POTASSIUM 100 MG/1
TABLET ORAL
Qty: 90 TABLET | Refills: 1 | Status: SHIPPED | OUTPATIENT
Start: 2021-05-19 | End: 2021-11-16

## 2021-05-19 RX ORDER — ROSUVASTATIN CALCIUM 10 MG/1
TABLET, COATED ORAL
Qty: 30 TABLET | Refills: 0 | Status: SHIPPED | OUTPATIENT
Start: 2021-05-19 | End: 2021-06-21

## 2021-05-19 NOTE — TELEPHONE ENCOUNTER
Pt needs an order for 12 sessions with Fernando at [address] [facility] [address] [facility]. She needs them  for swelling of legs and ankles-back and stomach.

## 2021-05-19 NOTE — TELEPHONE ENCOUNTER
This has already been sent but I resent it today.   I tried to get a hold of the out patient PT department but couldn't reach anyone but I sent back over the referral to them at [phone]

## 2021-05-20 ENCOUNTER — OFFICE VISIT (OUTPATIENT)
Dept: NEUROSURGERY | Age: 76
End: 2021-05-20
Payer: MEDICARE

## 2021-05-20 VITALS
OXYGEN SATURATION: 97 % | BODY MASS INDEX: 47.11 KG/M2 | WEIGHT: 256 LBS | TEMPERATURE: 96.7 F | DIASTOLIC BLOOD PRESSURE: 68 MMHG | SYSTOLIC BLOOD PRESSURE: 132 MMHG | HEART RATE: 90 BPM | HEIGHT: 62 IN

## 2021-05-20 DIAGNOSIS — Z99.89 BIPAP (BIPHASIC POSITIVE AIRWAY PRESSURE) DEPENDENCE: ICD-10-CM

## 2021-05-20 DIAGNOSIS — G47.33 OSA (OBSTRUCTIVE SLEEP APNEA): Primary | ICD-10-CM

## 2021-05-20 PROCEDURE — 99213 OFFICE O/P EST LOW 20 MIN: CPT | Performed by: NURSE PRACTITIONER

## 2021-05-20 NOTE — PROGRESS NOTES
REVIEW OF SYSTEMS    Constitutional: []Fever []Sweats []Chills [] Recent Injury [x] Denies all unless marked  HEENT:[]Headache  [] Head Injury [] Hearing Loss  [] Sore Throat  [] Ear Ache [x] Denies all unless marked  Spine:  [] Neck pain  [] Back pain  [] Sciaticia  [x] Denies all unless marked  Cardiovascular:[x]Heart Disease []Palpitations [] Chest Pain   [x] Denies all unless marked  Pulmonary: [x]Shortness of Breath []Cough   [x] Denies all unless marked  Psychiatric/Behavioral:[] Depression [] Anxiety [x] Denies all unless marked  Gastrointestinal: []Nausea  []Vomiting  []Abdominal Pain  []Constipation  []Diarrhea  [x] Denies all unless marked  Genitourinary:   [] Frequency  [] Urgency  [] Dysuria [] Incontinence  [x] Denies all unless marked  Extremities: []Pain  []Swelling  [x] Denies all unless marked  Musculoskeletal: [] Myalgias  [] Joint Pain  [x] Arthritis [] Muscle Cramps [] Muscle Twitches  [x] Denies all unless marked  Sleep: [x]Insomnia[]Snoring []Restless Legs  [x]Sleep Apnea  []Daytime Sleepiness  [x] Denies all unless marked  Skin:[] Rash [] Color Change [x] Denies all unless marked   Neurological:[]Visual Disturbance [] Memory Loss []Loss of Balance []Slurred Speech []Weakness []Seizures  [] Dizziness [x] Denies all unless marked

## 2021-05-20 NOTE — PROGRESS NOTES
[facility] Follow Up Encounter      Information:   Patient Name: Rachael Romo  :   1945  Age:   70 y.o. MRN:   [id number]  Account #:  [de-identified]  Today:                21    Provider:  Eufemia Gustafson DNP, APRN    Chief Complaint   Patient presents with    1 Year Follow Up     c/o staying awake     Sleep Apnea     want to discuss new self cleaning machine         Subjective:   Rachael Romo is a 70 y.o. female  with a history of severe DARSHAN and RLS who comes in for a sleep clinic follow up. The compliance report indicates that she  is averaging  8 hours of BiPAP use per day. She  averages 8 hours of sleep per night. She reports that consistent BiPAP use has alleviated the previous DARSHAN symptoms. Wanting to start using a [employer] machine.      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    Breast CA (HCC)     Left breast Nodules removed Took radiation    Chronic back pain     Chronic kidney disease     Overactive bladder     COPD (chronic obstructive pulmonary disease) (HCC)     x few years Scarring on lungs 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    Diabetes mellitus ([facility]     On insulin x 10 years    Fibromyalgia     Fibromyalgia     for 20 years    GERD (gastroesophageal reflux disease)     History of blood transfusion     ?when    Hyperlipidemia     High cholesterol    Hypertension     On medications for 2 years    Joint pain 2016    Liver disease     Has fatty liver    Morbid obesity (HCC)     Multiple food allergies     Neuropathic pain     Outpatient Medications   Medication Sig Dispense Refill    rosuvastatin (CRESTOR) 10 MG tablet TAKE 1 TABLET BY MOUTH ONCE DAILY 30 tablet 0    losartan (COZAAR) 100 MG tablet TAKE 1 TABLET DAILY FOR    BLOOD PRESSURE 90 tablet 1    gabapentin (NEURONTIN) 300 MG capsule TAKE 1 CAPSULE BY MOUTH 3 TIMES A DAY 90 capsule 3    citalopram (CELEXA) 20 MG tablet TAKE 1 TABLET BY MOUTH ONCE DAILY 30 tablet 0    JARDIANCE 10 MG tablet TAKE 1 TABLET BY MOUTH ONCE DAILY 30 tablet 0    JANUMET  MG per tablet TAKE 1 TABLET BY MOUTH 2 TIMES A DAY WITH MEALS 60 tablet 0    metoprolol succinate (TOPROL XL) 50 MG extended release tablet TAKE 1 TABLET DAILY FOR    BLOOD PRESSURE AND HEART   PROTECTION 90 tablet 1    omeprazole (PRILOSEC) 20 MG delayed release capsule TAKE 1 CAPSULE 2 TIMES     DAILY AS NEEDED 180 capsule 1    oxybutynin (DITROPAN-XL) 10 MG extended release tablet TAKE 1 TABLET DAILY 90 tablet 1    montelukast (SINGULAIR) 10 MG tablet TAKE 1 TABLET NIGHTLY 90 tablet 1    magnesium oxide (MAG-OX) 400 MG tablet TAKE 1 TABLET DAILY 90 tablet 3    NOVOLOG FLEXPEN 100 UNIT/ML injection pen INJECT 15 UNITS            SUBCUTANEOUSLY 3 TIMES A   DAY BEFORE MEALS. 45 mL 2    TRESIBA FLEXTOUCH 200 UNIT/ML SOPN INJECT 100 UNITS (=1/2 ML) SUBCUTANEOUSLY NIGHTLY 27 mL 0    ULTICARE MINI PEN NEEDLES 31G X 6 MM MISC USE 2 TIMES A  each 0    loratadine (CLARITIN) 10 MG tablet TAKE 1 TABLET BY MOUTH ONCE DAILY. 30 tablet 5    vitamin D (ERGOCALCIFEROL) 1.25 MG (35712 UT) CAPS capsule TAKE 1 CAPSULE ONCE WEEKLY 12 capsule 2    empagliflozin (JARDIANCE) 25 MG tablet Take 1 tablet by mouth daily 30 tablet 5    cyproheptadine (PERIACTIN) 4 MG tablet Take 1 tablet by mouth 3 times daily as needed (itching or rash) Hold the claritin on the days this is used for itching 21 tablet 0    ketoconazole (NIZORAL) 2 % shampoo Apply topically daily as needed.  1 Bottle 0    albuterol (PROVENTIL) (5 MG/ML) 0.5% nebulizer solution Take 1ml twice daily for 10 days 120 each 3    Nebulizers ([employer] COMPACT MINI NEBULIZER) MISC Patient has COPD and problems with breathing. 1 each 0    tiotropium (SPIRIVA) 18 MCG inhalation capsule Inhale 1 capsule into the lungs daily.  blood glucose monitor strips Test 3 times a day & as needed for symptoms of irregular blood glucose. 100 strip 5    clobetasol (TEMOVATE) 0.05 % ointment Apply topically 2 times daily. For 10 days 1 Tube 0    nystatin (MYCOSTATIN) 715975 UNIT/GM cream Apply topically 2 times daily. 1 Tube 1    aspirin 81 MG chewable tablet Take 1 tablet by mouth daily 30 tablet 3    HYDROcodone-acetaminophen (NORCO)  MG per tablet Take 1 tablet by mouth Daily with lunch. Kyleigh Mosesel Insulin Pen Needle 31G X 5 MM MISC As directed 100 each 1    Misc. Devices (ROLLER WALKER) MISC 1 each by Does not apply route daily Pt states that she falls at least once a month up to 3 times a month. 1 each 0    INSULIN SYRINGE 1CC/29G 29G X 1/2\" 1 ML MISC Use with each dose of novolog TID 90 each 1    mometasone-formoterol (DULERA) 200-5 MCG/ACT inhaler Inhale 2 puffs into the lungs every 12 hours      anastrozole (ARIMIDEX) 1 MG tablet Take 1 mg by mouth daily      Multiple Vitamins-Minerals (MULTIVITAMIN ADULT PO) Take 1 tablet by mouth daily       albuterol (PROVENTIL HFA;VENTOLIN HFA) 108 (90 BASE) MCG/ACT inhaler Inhale 2 puffs into the lungs every 4 hours as needed for Wheezing      Incontinence Supply Disposable (POISE PANTILINERS) PADS Use pads as needed daily for urine leakage. 1 each 11     No current facility-administered medications for this visit. Allergies:  Pcn [penicillins] and Cefdinir    Review of Systems:   Constitutional: []? Fever []? Sweats []? Chills []? Recent Injury [x]? Denies all unless marked  HEENT:[]? Headache  []? Head Injury []? Hearing Loss  []? Sore Throat  []? Ear Ache [x]? Denies all unless marked  Spine:  []? Neck pain  []? Back pain  []? not cover   5. Return in about 1 year (around 5/20/2022) for follow up, sooner if worsening, Sleep patient follow up.       Nancie Vasquez DNP, APRN

## 2021-05-20 NOTE — PATIENT INSTRUCTIONS
Patient Education        Learning About CPAP for Sleep Apnea  What is CPAP? CPAP is a small machine that you use at home every night while you sleep. It increases air pressure in your throat to keep your airway open. When you have sleep apnea, this can help you sleep better so you feel much better. CPAP stands for \"continuous positive airway pressure. \"  The CPAP machine will have one of the following:  · A mask that covers your nose and mouth  · Prongs that fit into your nose  · A mask that covers your nose only, which is the most common type. This type is called NCPAP. The N stands for \"nasal.\"  Why is it done? CPAP is usually the best treatment for obstructive sleep apnea. It is the first treatment choice and the most widely used. CPAP:  · Helps you have more normal sleep, so you feel less sleepy and more alert during the daytime. · May help keep heart failure or other heart problems from getting worse. · May help lower your blood pressure. If you use CPAP, your bed partner may also sleep better. That's because you aren't snoring or restless. Your doctor may suggest CPAP if you have:  · Moderate to severe sleep apnea. · Sleep apnea and coronary artery disease (CAD). · Sleep apnea and heart failure. What are the side effects? Some people who use CPAP have:  · A dry or stuffy nose and a sore throat. · Irritated skin on the face. · Sore eyes. · Bloating. How can you care for yourself? If using CPAP is not comfortable, or if you have certain side effects, work with your doctor to fix them. Here are some things you can try:  · Be sure the mask or nasal prongs fit well. · See if your doctor can adjust the pressure of your CPAP. · If your nose is dry, try a humidifier. · If your nose is runny or stuffy, try decongestant medicine or a steroid nasal spray. Be safe with medicines. Read and follow all instructions on the label. Do not use the medicine longer than the label says.   If these things don't professional. [address] any warranty or liability for your use of this information.

## 2021-05-21 ENCOUNTER — TELEPHONE (OUTPATIENT)
Dept: PRIMARY CARE CLINIC | Age: 76
End: 2021-05-21

## 2021-05-21 DIAGNOSIS — E11.8 TYPE 2 DIABETES MELLITUS WITH COMPLICATION (HCC): ICD-10-CM

## 2021-05-21 DIAGNOSIS — B37.31 VAGINAL YEAST INFECTION: ICD-10-CM

## 2021-05-21 RX ORDER — FLUCONAZOLE 150 MG/1
150 TABLET ORAL
Qty: 2 TABLET | Refills: 0 | Status: SHIPPED | OUTPATIENT
Start: 2021-05-21 | End: 2021-05-27

## 2021-05-21 NOTE — TELEPHONE ENCOUNTER
Im leaving and havent been able to get a hold of her if you get a min to call her and let her know I sent medication I would appreciate it

## 2021-05-21 NOTE — TELEPHONE ENCOUNTER
Piedad Holt is requesting a refill of their   Requested Prescriptions     Pending Prescriptions Disp Refills    sitaGLIPtan-metFORMIN (JANUMET)  MG per tablet 60 tablet 0    empagliflozin (JARDIANCE) 10 MG tablet 30 tablet 0    citalopram (CELEXA) 20 MG tablet 30 tablet 0     Sig: TAKE 1 TABLET BY MOUTH ONCE DAILY    insulin aspart (NOVOLOG FLEXPEN) 100 UNIT/ML injection pen 45 mL 2   . Please advise.       Last Appt:  3/18/2021  Next Appt:   6/16/2021  Preferred pharmacy: [id number]

## 2021-05-21 NOTE — TELEPHONE ENCOUNTER
Gala Weiss requests that a nurse return their call. The best time to reach her is Anytime. Thank you. *Patient called and is requesting medication for a yeast infection.

## 2021-05-24 RX ORDER — CITALOPRAM 20 MG/1
TABLET ORAL
Qty: 30 TABLET | Refills: 0 | Status: SHIPPED | OUTPATIENT
Start: 2021-05-24 | End: 2021-07-14

## 2021-05-24 RX ORDER — EMPAGLIFLOZIN 10 MG/1
TABLET, FILM COATED ORAL
Qty: 30 TABLET | Refills: 0 | Status: SHIPPED | OUTPATIENT
Start: 2021-05-24 | End: 2021-06-23

## 2021-05-24 RX ORDER — INSULIN ASPART 100 [IU]/ML
INJECTION, SOLUTION INTRAVENOUS; SUBCUTANEOUS
Qty: 45 ML | Refills: 0 | Status: SHIPPED | OUTPATIENT
Start: 2021-05-24 | End: 2021-08-11 | Stop reason: SDUPTHER

## 2021-05-24 RX ORDER — SITAGLIPTIN AND METFORMIN HYDROCHLORIDE 1000; 50 MG/1; MG/1
TABLET, FILM COATED ORAL
Qty: 60 TABLET | Refills: 0 | Status: SHIPPED | OUTPATIENT
Start: 2021-05-24 | End: 2021-07-26

## 2021-05-28 DIAGNOSIS — I10 ESSENTIAL HYPERTENSION: ICD-10-CM

## 2021-05-28 RX ORDER — METOPROLOL SUCCINATE 50 MG/1
TABLET, EXTENDED RELEASE ORAL
Qty: 30 TABLET | Refills: 0 | Status: SHIPPED | OUTPATIENT
Start: 2021-05-28 | End: 2021-06-23

## 2021-06-02 ENCOUNTER — TRANSCRIBE ORDERS (OUTPATIENT)
Dept: LAB | Facility: HOSPITAL | Age: 76
End: 2021-06-02

## 2021-06-02 DIAGNOSIS — Z01.818 PREOPERATIVE TESTING: Primary | ICD-10-CM

## 2021-06-04 ENCOUNTER — TELEPHONE (OUTPATIENT)
Dept: PODIATRY | Facility: CLINIC | Age: 76
End: 2021-06-04

## 2021-06-05 ENCOUNTER — LAB (OUTPATIENT)
Dept: LAB | Facility: HOSPITAL | Age: 76
End: 2021-06-05

## 2021-06-05 DIAGNOSIS — Z01.818 PREOPERATIVE TESTING: ICD-10-CM

## 2021-06-05 LAB — SARS-COV-2 ORF1AB RESP QL NAA+PROBE: NOT DETECTED

## 2021-06-05 PROCEDURE — U0004 COV-19 TEST NON-CDC HGH THRU: HCPCS

## 2021-06-05 PROCEDURE — C9803 HOPD COVID-19 SPEC COLLECT: HCPCS

## 2021-06-16 ENCOUNTER — OFFICE VISIT (OUTPATIENT)
Dept: PRIMARY CARE CLINIC | Age: 76
End: 2021-06-16
Payer: MEDICARE

## 2021-06-16 VITALS
DIASTOLIC BLOOD PRESSURE: 62 MMHG | TEMPERATURE: 96.6 F | BODY MASS INDEX: 47.48 KG/M2 | SYSTOLIC BLOOD PRESSURE: 118 MMHG | OXYGEN SATURATION: 97 % | HEART RATE: 77 BPM | RESPIRATION RATE: 22 BRPM | HEIGHT: 62 IN | WEIGHT: 258 LBS

## 2021-06-16 DIAGNOSIS — Z12.11 COLON CANCER SCREENING: ICD-10-CM

## 2021-06-16 DIAGNOSIS — Z00.00 ROUTINE GENERAL MEDICAL EXAMINATION AT A HEALTH CARE FACILITY: Primary | ICD-10-CM

## 2021-06-16 PROCEDURE — G0439 PPPS, SUBSEQ VISIT: HCPCS | Performed by: STUDENT IN AN ORGANIZED HEALTH CARE EDUCATION/TRAINING PROGRAM

## 2021-06-16 SDOH — ECONOMIC STABILITY: FOOD INSECURITY: WITHIN THE PAST 12 MONTHS, THE FOOD YOU BOUGHT JUST DIDN'T LAST AND YOU DIDN'T HAVE MONEY TO GET MORE.: NEVER TRUE

## 2021-06-16 SDOH — ECONOMIC STABILITY: FOOD INSECURITY: WITHIN THE PAST 12 MONTHS, YOU WORRIED THAT YOUR FOOD WOULD RUN OUT BEFORE YOU GOT MONEY TO BUY MORE.: NEVER TRUE

## 2021-06-16 ASSESSMENT — LIFESTYLE VARIABLES
AUDIT TOTAL SCORE: INCOMPLETE
HOW OFTEN DO YOU HAVE A DRINK CONTAINING ALCOHOL: 0
AUDIT-C TOTAL SCORE: INCOMPLETE
HOW OFTEN DO YOU HAVE A DRINK CONTAINING ALCOHOL: NEVER

## 2021-06-16 ASSESSMENT — PATIENT HEALTH QUESTIONNAIRE - PHQ9
SUM OF ALL RESPONSES TO PHQ QUESTIONS 1-9: 1
SUM OF ALL RESPONSES TO PHQ9 QUESTIONS 1 & 2: 1
1. LITTLE INTEREST OR PLEASURE IN DOING THINGS: 0
SUM OF ALL RESPONSES TO PHQ QUESTIONS 1-9: 1
SUM OF ALL RESPONSES TO PHQ QUESTIONS 1-9: 1
2. FEELING DOWN, DEPRESSED OR HOPELESS: 1

## 2021-06-16 ASSESSMENT — SOCIAL DETERMINANTS OF HEALTH (SDOH): HOW HARD IS IT FOR YOU TO PAY FOR THE VERY BASICS LIKE FOOD, HOUSING, MEDICAL CARE, AND HEATING?: NOT HARD AT ALL

## 2021-06-16 NOTE — PATIENT INSTRUCTIONS
Personalized Preventive Plan for Candi Rothman - 6/16/2021  Medicare offers a range of preventive health benefits. Some of the tests and screenings are paid in full while other may be subject to a deductible, co-insurance, and/or copay. Some of these benefits include a comprehensive review of your medical history including lifestyle, illnesses that may run in your family, and various assessments and screenings as appropriate. After reviewing your medical record and screening and assessments performed today your provider may have ordered immunizations, labs, imaging, and/or referrals for you. A list of these orders (if applicable) as well as your Preventive Care list are included within your After Visit Summary for your review. Other Preventive Recommendations:    · A preventive eye exam performed by an eye specialist is recommended every 1-2 years to screen for glaucoma; cataracts, macular degeneration, and other eye disorders. · A preventive dental visit is recommended every 6 months. · Try to get at least 150 minutes of exercise per week or 10,000 steps per day on a pedometer . · Order or download the FREE \"Exercise & Physical Activity: Your Everyday Guide\" from [employer]. Call 5-912.698.6051 or search [employer] online. · You need 9241-1638 mg of calcium and 3879-6660 IU of vitamin D per day. It is possible to meet your calcium requirement with diet alone, but a vitamin D supplement is usually necessary to meet this goal.  · When exposed to the sun, use a sunscreen that protects against both UVA and UVB radiation with an SPF of 30 or greater. Reapply every 2 to 3 hours or after sweating, drying off with a towel, or swimming. · Always wear a seat belt when traveling in a car. Always wear a helmet when riding a bicycle or motorcycle. Personalized Preventive Plan for Candi Rothman - 6/16/2021  Medicare offers a range of preventive health benefits. Some of the tests and screenings are paid in full while other may be subject to a deductible, co-insurance, and/or copay. Some of these benefits include a comprehensive review of your medical history including lifestyle, illnesses that may run in your family, and various assessments and screenings as appropriate. After reviewing your medical record and screening and assessments performed today your provider may have ordered immunizations, labs, imaging, and/or referrals for you. A list of these orders (if applicable) as well as your Preventive Care list are included within your After Visit Summary for your review. Other Preventive Recommendations:    A preventive eye exam performed by an eye specialist is recommended every 1-2 years to screen for glaucoma; cataracts, macular degeneration, and other eye disorders. A preventive dental visit is recommended every 6 months. Try to get at least 150 minutes of exercise per week or 10,000 steps per day on a pedometer . Order or download the FREE \"Exercise & Physical Activity: Your Everyday Guide\" from [employer]. Call 3-378.120.7684 or search [employer] online. You need 0966-6839 mg of calcium and 2764-8599 IU of vitamin D per day. It is possible to meet your calcium requirement with diet alone, but a vitamin D supplement is usually necessary to meet this goal.  When exposed to the sun, use a sunscreen that protects against both UVA and UVB radiation with an SPF of 30 or greater. Reapply every 2 to 3 hours or after sweating, drying off with a towel, or swimming. Always wear a seat belt when traveling in a car. Always wear a helmet when riding a bicycle or motorcycle.

## 2021-06-16 NOTE — PROGRESS NOTES
Medicare Annual Wellness Visit  Name: Comfort Hernandez Date: 2021   MRN: [id number] Sex: Female   Age: 70 y.o. Ethnicity: Non-/Non    : 1945 Race: Renaldo Browne is here for Medicare AWV (other) and Other (needs referral)    Screenings for behavioral, psychosocial and functional/safety risks, and cognitive dysfunction are all negative except as indicated below. These results, as well as other patient data from the [address] form, are documented in Flowsheets linked to this Encounter. Allergies   Allergen Reactions    Pcn [Penicillins] Hives and Itching     Patient states she can tolerate ampicillin and amoxicillin however.  Cefdinir          Prior to Visit Medications    Medication Sig Taking? Authorizing Provider   metoprolol succinate (TOPROL XL) 50 MG extended release tablet TAKE 1 TABLET DAILY FOR BLOOD PRESSURE AND HEART PROTECTION. Yes ANTONIA Young   sitaGLIPtan-metFORMIN (JANUMET)  MG per tablet TAKE 1 TABLET BY MOUTH 2 TIMES A DAY WITH MEALS no further refills until seen in office. Yes Manjinder Yoon MD   empagliflozin (JARDIANCE) 10 MG tablet TAKE 1 TABLET BY MOUTH ONCE DAILY no further refills until seen in office. Yes Manjinder Yoon MD   citalopram (CELEXA) 20 MG tablet TAKE 1 TABLET BY MOUTH ONCE DAILY no further refills until seen in office. Yes Manjinder Yoon MD   insulin aspart (NOVOLOG FLEXPEN) 100 UNIT/ML injection pen INJECT 15 UNITS            SUBCUTANEOUSLY 3 TIMES A   DAY BEFORE MEALS. No further refills until seen in office.  Yes Manjinder Yoon MD   rosuvastatin (CRESTOR) 10 MG tablet TAKE 1 TABLET BY MOUTH ONCE DAILY Yes ANTONIA Young   losartan (COZAAR) 100 MG tablet TAKE 1 TABLET DAILY FOR    BLOOD PRESSURE Yes ANTONIA Young   omeprazole (PRILOSEC) 20 MG delayed release capsule TAKE 1 CAPSULE 2 TIMES     DAILY AS NEEDED Yes ANTONIA Young   oxybutynin (DITROPAN-XL) 10 MG extended release tablet TAKE 1 MM MISC As directed Yes Mariama Dorantes MD   Misc. Devices (ROLLER WALKER) MISC 1 each by Does not apply route daily Pt states that she falls at least once a month up to 3 times a month. Yes ANTONIA Canales   INSULIN SYRINGE 1CC/29G 29G X 1/2\" 1 ML MISC Use with each dose of novolog TID Yes Mariama Dorantes MD   mometasone-formoterol [facility]) 200-5 MCG/ACT inhaler Inhale 2 puffs into the lungs every 12 hours Yes Historical Provider, MD   anastrozole (ARIMIDEX) 1 MG tablet Take 1 mg by mouth daily Yes Historical Provider, MD   Multiple Vitamins-Minerals (MULTIVITAMIN ADULT PO) Take 1 tablet by mouth daily  Yes Historical Provider, MD   albuterol (PROVENTIL HFA;VENTOLIN HFA) 108 (90 BASE) MCG/ACT inhaler Inhale 2 puffs into the lungs every 4 hours as needed for Wheezing Yes María Cornelius MD   Incontinence Supply Disposable (POISE PANTILINERS) PADS Use pads as needed daily for urine leakage.  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    Breast CA (HCC)     Left breast Nodules removed Took radiation    Chronic back pain     Chronic kidney disease     Overactive bladder     COPD (chronic obstructive pulmonary disease) (HCC)     x few years Scarring on lungs 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    Diabetes mellitus ([address]     On insulin x 10 years    Fibromyalgia     Fibromyalgia     for 20 years    GERD (gastroesophageal reflux disease)     History of blood transfusion     ?when    Hyperlipidemia     High cholesterol    Hypertension     On medications for 2 years    Joint pain 2/2/2016    Liver disease     Has fatty liver    Morbid obesity ([address]     Multiple food allergies     (117 kg)   05/20/21 256 lb (116.1 kg)   03/18/21 256 lb (116.1 kg)     Vitals:    06/16/21 1311   BP: 118/62   Pulse: 77   Resp: 22   Temp: 96.6 °F (35.9 °C)   TempSrc: Temporal   SpO2: 97%   Weight: 258 lb (117 kg)   Height: 5' 2\" (1.575 m)     Body mass index is 47.19 kg/m². Based upon direct observation of the patient, evaluation of cognition reveals recent and remote memory intact. Patient's complete Health Risk Assessment and screening values have been reviewed and are found in Flowsheets. The following problems were reviewed today and where indicated follow up appointments were made and/or referrals ordered. Positive Risk Factor Screenings with Interventions:     Fall Risk:  2 or more falls in past year?: (!) yes  Fall with injury in past year?: no  Fall Risk Interventions:    · Home safety tips provided  · Home exercises provided to promote strength and balance  · Physical therapy referral ordered for strength and balance training          General Health and ACP:  General  In general, how would you say your health is?: Good  In the past 7 days, have you experienced any of the following?  New or Increased Pain, New or Increased Fatigue, Loneliness, Social Isolation, Stress or Anger?: (!) Stress  Do you get the social and emotional support that you need?: Yes  Do you have a Living Will?: (!) No  Advance Directives     Power of [employer] Will ACP-Advance Directive ACP-Power of     Not on File Not on File Not on File Not on File      General Health Risk Interventions:  · No Living Will: Advance Care Planning addressed with patient today    Health Habits/Nutrition:  Health Habits/Nutrition  Do you exercise for at least 20 minutes 2-3 times per week?: Yes  Have you lost any weight without trying in the past 3 months?: No  Do you eat only one meal per day?: No  Have you seen the dentist within the past year?: Yes  Body mass index: (!) 47.18  Health Habits/Nutrition Interventions:  · Inadequate Breast cancer screen  09/08/2021    Lipid screen  03/18/2022    Potassium monitoring  03/18/2022    Creatinine monitoring  03/18/2022    DTaP/Tdap/Td vaccine (2 - Td or Tdap) 12/31/2024    DEXA (modify frequency per FRAX score)  Completed    Flu vaccine  Completed    Pneumococcal 65+ years Vaccine  Completed    Hepatitis C screen  Completed    Hepatitis A vaccine  Aged Out    Hib vaccine  Aged Out    Meningococcal (ACWY) vaccine  Aged Out     Recommendations for [employer] Due: see orders and patient instructions/AVS.  . Recommended screening schedule for the next 5-10 years is provided to the patient in written form: see Patient Abi Lawrence was seen today for medicare awv and other. Diagnoses and all orders for this visit:    Routine general medical examination at a health care facility  -Follow up in 2 weeks to go over previous labs and address DMT2/other chronic conditions. Colon cancer screening  -     POCT Fecal Immunochemical Test (FIT);  Future

## 2021-06-23 DIAGNOSIS — I10 ESSENTIAL HYPERTENSION: ICD-10-CM

## 2021-06-23 RX ORDER — METOPROLOL SUCCINATE 50 MG/1
TABLET, EXTENDED RELEASE ORAL
Qty: 30 TABLET | Refills: 0 | Status: SHIPPED | OUTPATIENT
Start: 2021-06-23 | End: 2021-07-22

## 2021-06-23 RX ORDER — MONTELUKAST SODIUM 10 MG/1
TABLET ORAL
Qty: 90 TABLET | Refills: 0 | Status: SHIPPED | OUTPATIENT
Start: 2021-06-23 | End: 2021-09-27

## 2021-06-23 RX ORDER — EMPAGLIFLOZIN 10 MG/1
TABLET, FILM COATED ORAL
Qty: 30 TABLET | Refills: 0 | Status: SHIPPED | OUTPATIENT
Start: 2021-06-23 | End: 2021-06-24

## 2021-06-23 NOTE — TELEPHONE ENCOUNTER
Ricardo Marie called to request a refill on her medication.       Last office visit : 6/16/2021   Next office visit : 6/24/2021     Requested Prescriptions     Pending Prescriptions Disp Refills    montelukast (SINGULAIR) 10 MG tablet [Pharmacy Med Name: MONTELUKAST 10MG TAB *DD] 90 tablet 0     Sig: TAKE 1 TABLET BY MOUTH AT NIGHT            Janny Douglas

## 2021-06-24 ENCOUNTER — OFFICE VISIT (OUTPATIENT)
Dept: PRIMARY CARE CLINIC | Age: 76
End: 2021-06-24
Payer: MEDICARE

## 2021-06-24 ENCOUNTER — HOSPITAL ENCOUNTER (OUTPATIENT)
Dept: GENERAL RADIOLOGY | Age: 76
Discharge: HOME | End: 2021-06-24
Payer: MEDICARE

## 2021-06-24 VITALS
TEMPERATURE: 97.5 F | OXYGEN SATURATION: 98 % | SYSTOLIC BLOOD PRESSURE: 122 MMHG | HEIGHT: 62 IN | WEIGHT: 258 LBS | DIASTOLIC BLOOD PRESSURE: 68 MMHG | BODY MASS INDEX: 47.48 KG/M2 | HEART RATE: 86 BPM | RESPIRATION RATE: 22 BRPM

## 2021-06-24 DIAGNOSIS — G89.29 CHRONIC PAIN OF BOTH ANKLES: ICD-10-CM

## 2021-06-24 DIAGNOSIS — E11.8 TYPE 2 DIABETES MELLITUS WITH COMPLICATION (HCC): ICD-10-CM

## 2021-06-24 DIAGNOSIS — G89.29 CHRONIC PAIN OF BOTH KNEES: ICD-10-CM

## 2021-06-24 DIAGNOSIS — M25.561 CHRONIC PAIN OF BOTH KNEES: ICD-10-CM

## 2021-06-24 DIAGNOSIS — M25.572 CHRONIC PAIN OF BOTH ANKLES: Primary | ICD-10-CM

## 2021-06-24 DIAGNOSIS — M25.562 CHRONIC PAIN OF BOTH KNEES: ICD-10-CM

## 2021-06-24 DIAGNOSIS — M25.571 CHRONIC PAIN OF BOTH ANKLES: ICD-10-CM

## 2021-06-24 DIAGNOSIS — M25.571 CHRONIC PAIN OF BOTH ANKLES: Primary | ICD-10-CM

## 2021-06-24 DIAGNOSIS — M25.572 CHRONIC PAIN OF BOTH ANKLES: ICD-10-CM

## 2021-06-24 DIAGNOSIS — G89.29 CHRONIC PAIN OF BOTH ANKLES: Primary | ICD-10-CM

## 2021-06-24 LAB
HBA1C MFR BLD: 10.4 %
MICROALBUMIN UR-MCNC: 6.9 MG/DL (ref 0–19)

## 2021-06-24 PROCEDURE — 73610 X-RAY EXAM OF ANKLE: CPT

## 2021-06-24 PROCEDURE — 73562 X-RAY EXAM OF KNEE 3: CPT

## 2021-06-24 PROCEDURE — 83036 HEMOGLOBIN GLYCOSYLATED A1C: CPT | Performed by: STUDENT IN AN ORGANIZED HEALTH CARE EDUCATION/TRAINING PROGRAM

## 2021-06-24 PROCEDURE — 99214 OFFICE O/P EST MOD 30 MIN: CPT | Performed by: STUDENT IN AN ORGANIZED HEALTH CARE EDUCATION/TRAINING PROGRAM

## 2021-06-24 RX ORDER — EMPAGLIFLOZIN 25 MG/1
1 TABLET, FILM COATED ORAL DAILY
Qty: 30 TABLET | Refills: 5 | Status: SHIPPED | OUTPATIENT
Start: 2021-06-24 | End: 2021-09-13 | Stop reason: SDUPTHER

## 2021-06-24 NOTE — PROGRESS NOTES
[address] [facility]  [address] [address]  [address] [address]  Dept: [phone]  Dept Fax: [phone]  Loc: [phone]      Subjective:     Chief Complaint   Patient presents with    Knee Pain     both knees    Joint Swelling     both ankles    Other     requesting new and different referrals as well as xrays    Immunizations     discuss to get or not to        HPI:  Melissa Cortez is a 70 y.o. female presenting for    DMT2   Overdue for follow-up. Last a1c 03/2021, was 10.3. Currently on metformin 1000mg BID, sitagliptan 50mg BID, jardiance 10mg, tresiba 100u qhs, novolog 27u w/ meals. Requesting PT referral for knees and ankles. She notes chronic pain in these joints. Has received [facility] previously for her back. I see previous xray of L knee from 2019 which showed mild degenerative changes and likely patellar tendon injury. Pt stating she has had chronic BL ankle painequesting R ankle xray for chronic pain. She has h/o prior orthopedic surgery in L ankle. She takes norco 10mg daily.   Pain is worse w/ activity, better at rest.      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    Breast CA (HCC)     Left breast Nodules removed Took radiation    Chronic back pain     Chronic kidney disease     Overactive bladder     COPD (chronic obstructive pulmonary disease) ([facility])     x few years Scarring on lungs 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    Diabetes mellitus ([facility]     On insulin x 10 years    Fibromyalgia     Fibromyalgia     for 20 years    GERD (gastroesophageal reflux disease)     History of blood transfusion     ?when    Hyperlipidemia     High cholesterol  Hypertension     On medications for 2 years    Joint pain 2/2/2016    Liver disease     Has fatty liver    Morbid obesity ([address]     Multiple food allergies     Neuropathic pain     Neuropathy     Neuropathy involving both lower extremities 2/2/2016    Obstructive sleep apnea     AHI: 68.1 per [facility], 5/2018    Osteoarthritis     Other disorders of kidney and ureter in diseases classified elsewhere     Pneumonia     Postmenopausal osteoporosis     Psychiatric problem     Restless leg syndrome     S/P lumpectomy, left breast 8/19/2015 8/4/2015    Sleep apnea     Type II or unspecified type diabetes mellitus with neurological manifestations, uncontrolled(250.62)      Patient Active Problem List   Diagnosis    Postmenopausal osteoporosis    Type 2 diabetes mellitus with complication (HCC)    COPD (chronic obstructive pulmonary disease) (HCC)    Mood disorder ([address]    Dementia ([address]    Malignant neoplasm of upper-outer quadrant of female breast ([address]    Joint pain    Neuropathy involving both lower extremities    Back pain    Dyspepsia    Lumbar facet arthropathy    Body mass index 45.0-49.9, adult ([address]    Essential hypertension    Hyperlipidemia    Morbid obesity ([address]    DARSHAN (obstructive sleep apnea)    BiPAP (biphasic positive airway pressure) dependence    Restless leg syndrome    History of breast cancer       PSHx:  Past Surgical History:   Procedure Laterality Date    BREAST SURGERY Left 8/4/2015    left partial mastectomy on 8/4/15    EYE SURGERY      EYE SURGERY Bilateral 04/02/2017    Dr. Avel Bumpers      broke L ankle due to osteoporosis, has hardware in    FRACTURE SURGERY      Left ankle     HYSTERECTOMY      NM DRAIN SKIN ABSCESS COMPLIC N/A 8/94/8128    ABDOMINAL WALL ABSCESS INCISION AND DRAINAGE performed by Guillaume Henning MD at [address] ENDOSCOPY  4/8/2016    Dr Russel Coulter-Gastritis, (-) H Pylori  WRIST SURGERY         PFHx:  Family History   Problem Relation Age of Onset    High Blood Pressure Father     Heart Disease Father     Diabetes Father     High Blood Pressure Sister     Diabetes Sister     Cancer Sister     High Blood Pressure Brother     Colon Cancer Neg Hx     Colon Polyps Neg Hx     Esophageal Cancer Neg Hx     Liver Cancer Neg Hx     Liver Disease Neg Hx     Rectal Cancer Neg Hx     Stomach Cancer Neg Hx        SocialHx:  Social History     Tobacco Use    Smoking status: Never Smoker    Smokeless tobacco: Never Used   Substance Use Topics    Alcohol use: Yes     Alcohol/week: 2.0 standard drinks     Types: 1 Glasses of wine, 1 Cans of beer per week     Comment: occ       Allergies: Allergies   Allergen Reactions    Pcn [Penicillins] Hives and Itching     Patient states she can tolerate ampicillin and amoxicillin however.  Cefdinir        Medications:  Current Outpatient Medications   Medication Sig Dispense Refill    empagliflozin (JARDIANCE) 25 MG tablet Take 1 tablet by mouth daily 30 tablet 5    metoprolol succinate (TOPROL XL) 50 MG extended release tablet TAKE 1 TABLET DAILY FOR BLOOD PRESSURE AND HEART PROTECTION. 30 tablet 0    montelukast (SINGULAIR) 10 MG tablet TAKE 1 TABLET BY MOUTH AT NIGHT 90 tablet 0    rosuvastatin (CRESTOR) 10 MG tablet TAKE 1 TABLET BY MOUTH ONCE DAILY 30 tablet 0    sitaGLIPtan-metFORMIN (JANUMET)  MG per tablet TAKE 1 TABLET BY MOUTH 2 TIMES A DAY WITH MEALS no further refills until seen in office. 60 tablet 0    citalopram (CELEXA) 20 MG tablet TAKE 1 TABLET BY MOUTH ONCE DAILY no further refills until seen in office. 30 tablet 0    insulin aspart (NOVOLOG FLEXPEN) 100 UNIT/ML injection pen INJECT 15 UNITS            SUBCUTANEOUSLY 3 TIMES A   DAY BEFORE MEALS. No further refills until seen in office.  45 mL 0    losartan (COZAAR) 100 MG tablet TAKE 1 TABLET DAILY FOR    BLOOD PRESSURE 90 tablet 1    omeprazole (PRILOSEC) 20 MG delayed release capsule TAKE 1 CAPSULE 2 TIMES     DAILY AS NEEDED 180 capsule 1    oxybutynin (DITROPAN-XL) 10 MG extended release tablet TAKE 1 TABLET DAILY 90 tablet 1    magnesium oxide (MAG-OX) 400 MG tablet TAKE 1 TABLET DAILY 90 tablet 3    TRESIBA FLEXTOUCH 200 UNIT/ML SOPN INJECT 100 UNITS (=1/2 ML) SUBCUTANEOUSLY NIGHTLY 27 mL 0    ULTICARE MINI PEN NEEDLES 31G X 6 MM MISC USE 2 TIMES A  each 0    loratadine (CLARITIN) 10 MG tablet TAKE 1 TABLET BY MOUTH ONCE DAILY. 30 tablet 5    vitamin D (ERGOCALCIFEROL) 1.25 MG (44149 UT) CAPS capsule TAKE 1 CAPSULE ONCE WEEKLY 12 capsule 2    cyproheptadine (PERIACTIN) 4 MG tablet Take 1 tablet by mouth 3 times daily as needed (itching or rash) Hold the claritin on the days this is used for itching 21 tablet 0    ketoconazole (NIZORAL) 2 % shampoo Apply topically daily as needed. 1 Bottle 0    albuterol (PROVENTIL) (5 MG/ML) 0.5% nebulizer solution Take 1ml twice daily for 10 days 120 each 3    Nebulizers (AIRIAL COMPACT MINI NEBULIZER) MISC Patient has COPD and problems with breathing. 1 each 0    tiotropium (SPIRIVA) 18 MCG inhalation capsule Inhale 1 capsule into the lungs daily.  blood glucose monitor strips Test 3 times a day & as needed for symptoms of irregular blood glucose. 100 strip 5    clobetasol (TEMOVATE) 0.05 % ointment Apply topically 2 times daily. For 10 days 1 Tube 0    nystatin (MYCOSTATIN) 657717 UNIT/GM cream Apply topically 2 times daily. 1 Tube 1    aspirin 81 MG chewable tablet Take 1 tablet by mouth daily 30 tablet 3    HYDROcodone-acetaminophen (NORCO)  MG per tablet Take 1 tablet by mouth Daily with lunch. Sergo Viramontes Insulin Pen Needle 31G X 5 MM MISC As directed 100 each 1    Misc. Devices (ROLLER WALKER) MISC 1 each by Does not apply route daily Pt states that she falls at least once a month up to 3 times a month.  1 each 0    INSULIN SYRINGE 1CC/29G 29G X 1/2\" 1 ML MISC Use with each dose of novolog TID 90 each 1    mometasone-formoterol (DULERA) 200-5 MCG/ACT inhaler Inhale 2 puffs into the lungs every 12 hours      anastrozole (ARIMIDEX) 1 MG tablet Take 1 mg by mouth daily      Multiple Vitamins-Minerals (MULTIVITAMIN ADULT PO) Take 1 tablet by mouth daily       albuterol (PROVENTIL HFA;VENTOLIN HFA) 108 (90 BASE) MCG/ACT inhaler Inhale 2 puffs into the lungs every 4 hours as needed for Wheezing      Incontinence Supply Disposable (POISE PANTILINERS) PADS Use pads as needed daily for urine leakage. 1 each 11    gabapentin (NEURONTIN) 300 MG capsule TAKE 1 CAPSULE BY MOUTH 3 TIMES A DAY 90 capsule 3     No current facility-administered medications for this visit. Objective:   PE:  /68   Pulse 86   Temp 97.5 °F (36.4 °C) (Temporal)   Resp 22   Ht 5' 2\" (1.575 m)   Wt 258 lb (117 kg)   LMP  (LMP Unknown)   SpO2 98%   BMI 47.19 kg/m²   Physical Exam  Constitutional:       General: She is not in acute distress. Appearance: Normal appearance. HENT:      Head: Normocephalic. Nose: Nose normal.      Mouth/Throat:      Mouth: Mucous membranes are moist.      Pharynx: Oropharynx is clear. No oropharyngeal exudate or posterior oropharyngeal erythema. Eyes:      General: No scleral icterus. Extraocular Movements: Extraocular movements intact. Conjunctiva/sclera: Conjunctivae normal.   Cardiovascular:      Rate and Rhythm: Normal rate and regular rhythm. Pulses: Normal pulses. Heart sounds: No murmur heard. Pulmonary:      Effort: Pulmonary effort is normal.      Breath sounds: Normal breath sounds. Abdominal:      General: There is no distension. Palpations: Abdomen is soft. There is no mass. Tenderness: There is no abdominal tenderness. Musculoskeletal:         General: Normal range of motion. Cervical back: Normal range of motion. Comments: BL knee joint line TTP. FROM.  Normal gait w/ 4ww  Generalized BL ankle mild TTP, trace edema. FROM   Skin:     General: Skin is warm and dry. Capillary Refill: Capillary refill takes less than 2 seconds. Neurological:      General: No focal deficit present. Mental Status: She is alert and oriented to person, place, and time. Psychiatric:         Mood and Affect: Mood normal.         Behavior: Behavior normal.         Thought Content: Thought content normal.            Assessment & Plan   Joe Armenta was seen today for knee pain, joint swelling, other and immunizations. Diagnoses and all orders for this visit:    Chronic pain of both ankles  -     External Referral To Physical Therapy  -     XR ANKLE RIGHT (MIN 3 VIEWS); Future  -     XR ANKLE LEFT (MIN 3 VIEWS); Future    Chronic pain of both knees  -Suspect chronic degenerative changes. -     External Referral To Physical Therapy  -     XR KNEE RIGHT (3 VIEWS); Future  -     XR KNEE LEFT (3 VIEWS); Future    Type 2 diabetes mellitus with complication (HCC)        -     POCT glycosylated hemoglobin (Hb A1C)  -Uncontrolled. Increase jardiance dose. Discussed titrating up tresiba to 103u tonight and to increase by 2-3u every 2-3 nights to achieve fasting BG within goal range   -     Microalbumin, Ur; Future  -     empagliflozin (JARDIANCE) 25 MG tablet; Take 1 tablet by mouth daily    Return in about 3 weeks (around 7/15/2021). All questions were answered. Medications, including possible adverse effects, and instructions were reviewed and  understanding was confirmed. Follow-up recommendations, including when to contact or return to office (ie; if symptoms worsen or fail to improve), were discussed and acknowledged.     Electronically signed by Candia Meigs, MD on 6/24/21 at 12:29 PM CDT

## 2021-06-24 NOTE — PATIENT INSTRUCTIONS
Fasting blood sugar goal . Please check every morning.   Increase tresiba by 3 units every 3 days until you reach goal fasting BG levels  Free phone dietician:  [phone], enter [[email or web address]] when prompted

## 2021-07-13 ENCOUNTER — TELEPHONE (OUTPATIENT)
Dept: PRIMARY CARE CLINIC | Age: 76
End: 2021-07-13

## 2021-07-13 ENCOUNTER — TREATMENT (OUTPATIENT)
Dept: PHYSICAL THERAPY | Facility: CLINIC | Age: 76
End: 2021-07-13

## 2021-07-13 DIAGNOSIS — R26.9 GAIT DISTURBANCE: ICD-10-CM

## 2021-07-13 DIAGNOSIS — I89.0 LYMPHEDEMA OF BOTH LOWER EXTREMITIES: Primary | ICD-10-CM

## 2021-07-13 PROCEDURE — 97140 MANUAL THERAPY 1/> REGIONS: CPT | Performed by: PHYSICAL THERAPIST

## 2021-07-13 NOTE — TELEPHONE ENCOUNTER
Ophelia Weir called because she is supposed to have physical therapy done but none of the paperwork needed from her dr was sent over. Please call back and advise when sent.

## 2021-07-14 ENCOUNTER — OFFICE VISIT (OUTPATIENT)
Dept: PRIMARY CARE CLINIC | Age: 76
End: 2021-07-14
Payer: MEDICARE

## 2021-07-14 VITALS
SYSTOLIC BLOOD PRESSURE: 128 MMHG | HEIGHT: 62 IN | OXYGEN SATURATION: 98 % | WEIGHT: 256 LBS | HEART RATE: 76 BPM | TEMPERATURE: 97.6 F | BODY MASS INDEX: 47.11 KG/M2 | RESPIRATION RATE: 22 BRPM | DIASTOLIC BLOOD PRESSURE: 68 MMHG

## 2021-07-14 DIAGNOSIS — M17.0 PRIMARY OSTEOARTHRITIS OF BOTH KNEES: ICD-10-CM

## 2021-07-14 DIAGNOSIS — E11.8 TYPE 2 DIABETES MELLITUS WITH COMPLICATION (HCC): Primary | ICD-10-CM

## 2021-07-14 DIAGNOSIS — N39.41 URGE INCONTINENCE OF URINE: ICD-10-CM

## 2021-07-14 PROCEDURE — 99214 OFFICE O/P EST MOD 30 MIN: CPT | Performed by: STUDENT IN AN ORGANIZED HEALTH CARE EDUCATION/TRAINING PROGRAM

## 2021-07-14 PROCEDURE — 20610 DRAIN/INJ JOINT/BURSA W/O US: CPT | Performed by: STUDENT IN AN ORGANIZED HEALTH CARE EDUCATION/TRAINING PROGRAM

## 2021-07-14 RX ORDER — LANCETS 30 GAUGE
1 EACH MISCELLANEOUS 4 TIMES DAILY
Qty: 600 EACH | Refills: 1 | Status: SHIPPED | OUTPATIENT
Start: 2021-07-14 | End: 2021-09-21 | Stop reason: SDUPTHER

## 2021-07-14 RX ORDER — CITALOPRAM 20 MG/1
TABLET ORAL
Qty: 30 TABLET | Refills: 0 | Status: SHIPPED | OUTPATIENT
Start: 2021-07-14 | End: 2021-11-03

## 2021-07-14 RX ORDER — GLUCOSAMINE HCL/CHONDROITIN SU 500-400 MG
CAPSULE ORAL
Qty: 100 STRIP | Refills: 5 | Status: SHIPPED | OUTPATIENT
Start: 2021-07-14 | End: 2021-09-07

## 2021-07-14 RX ORDER — OXYBUTYNIN CHLORIDE 10 MG/1
TABLET, EXTENDED RELEASE ORAL
Qty: 90 TABLET | Refills: 1 | Status: SHIPPED | OUTPATIENT
Start: 2021-07-14 | End: 2021-12-29 | Stop reason: SDUPTHER

## 2021-07-14 RX ORDER — INSULIN DEGLUDEC 200 U/ML
INJECTION, SOLUTION SUBCUTANEOUS
Qty: 27 ML | Refills: 0 | Status: SHIPPED | OUTPATIENT
Start: 2021-07-14 | End: 2021-08-11 | Stop reason: SDUPTHER

## 2021-07-14 RX ORDER — CITALOPRAM 20 MG/1
TABLET ORAL
Qty: 30 TABLET | Refills: 0 | Status: SHIPPED | OUTPATIENT
Start: 2021-07-14 | End: 2021-07-14 | Stop reason: SDUPTHER

## 2021-07-14 RX ORDER — BLOOD-GLUCOSE METER
1 KIT MISCELLANEOUS DAILY
Qty: 1 KIT | Refills: 0 | Status: SHIPPED | OUTPATIENT
Start: 2021-07-14 | End: 2022-07-20

## 2021-07-14 NOTE — PROGRESS NOTES
[address] [facility]  [address] [address] [place] [id number] [address] [address]  Dept: [phone]  Dept Fax: [phone]  Loc: [phone]      Subjective:     Chief Complaint   Patient presents with    Diabetes       HPI:  David Brennan is a 70 y.o. female presenting for    T2DM  -Here for follow up. A1c recently worsened to 10.3. Currently on metformin 1000mg BID, januvia 50mg BID, jardiance (increased to 25mg last visit). Pt mixed medicine from old rx and new together and isn't sure which is which now.  -Insulin regimen: tresiba (discussed increasing tresiba to 103u last visit and titrating upwards to meet fasting BG goal ). On novolog 27u w/ meals  -Doesn't have BG log with her today, forgot. States she actually doesn't have BG monitor since moving.  -Has not titrated up insulin dose since last visit. -Got 1st moderna covid shot 2 weeks ago    OA  -Here for BL knee injections. Xrays obtained of both knees show significant osteoarthritis. She would like to proceed w/ steroid injections. PT is in process of being arranged. Urge incontinence  -Needs refill of ditropan. States medicine helps well with urinary symptoms.     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    Breast CA (HCC)     Left breast Nodules removed Took radiation    Chronic back pain     Chronic kidney disease     Overactive bladder     COPD [facility]chronic obstructive pulmonary disease) ([facility])     x few years Scarring on lungs 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    Diabetes mellitus ([address]     On insulin x 10 years    Fibromyalgia     Fibromyalgia     for 20 years    GERD (gastroesophageal reflux disease)     History of blood transfusion     ?when    Hyperlipidemia     High cholesterol    Hypertension     On medications for 2 years    Joint pain 2/2/2016    Liver disease     Has fatty liver    Morbid obesity ([address]     Multiple food allergies     Neuropathic pain     Neuropathy     Neuropathy involving both lower extremities 2/2/2016    Obstructive sleep apnea     AHI: 68.1 per PSG, 5/2018    Osteoarthritis     Other disorders of kidney and ureter in diseases classified elsewhere     Pneumonia     Postmenopausal osteoporosis     Psychiatric problem     Restless leg syndrome     S/P lumpectomy, left breast 8/19/2015 8/4/2015    Sleep apnea     Type II or unspecified type diabetes mellitus with neurological manifestations, uncontrolled(250.62)      Patient Active Problem List   Diagnosis    Postmenopausal osteoporosis    Type 2 diabetes mellitus with complication (HCC)    COPD (chronic obstructive pulmonary disease) (HCC)    Mood disorder ([address]    Dementia ([address]    Malignant neoplasm of upper-outer quadrant of female breast ([address]    Joint pain    Neuropathy involving both lower extremities    Back pain    Dyspepsia    Lumbar facet arthropathy    Body mass index 45.0-49.9, adult ([address]    Essential hypertension    Hyperlipidemia    Morbid obesity ([address]    DARSHAN (obstructive sleep apnea)    BiPAP (biphasic positive airway pressure) dependence    Restless leg syndrome    History of breast cancer       PSHx:  Past Surgical History:   Procedure Laterality Date    BREAST SURGERY Left 8/4/2015    left partial mastectomy on 8/4/15    EYE SURGERY      EYE SURGERY Bilateral 04/02/2017    Dr. Mel Levi      broke L ankle due to osteoporosis, has hardware in    FRACTURE SURGERY      Left ankle     HYSTERECTOMY      NV DRAIN SKIN ABSCESS COMPLIC N/A 1/06/8125    ABDOMINAL WALL ABSCESS INCISION AND DRAINAGE performed by Azra Prabhakar MD at [address] [facility] UPPER GASTROINTESTINAL ENDOSCOPY  4/8/2016    Dr Rachael Rowan, (-) H Pylori    WRIST SURGERY         PFHx:  Family History   Problem Relation Age of Onset    High Blood Pressure Father     Heart Disease Father     Diabetes Father     High Blood Pressure Sister     Diabetes Sister     Cancer Sister     High Blood Pressure Brother     Colon Cancer Neg Hx     Colon Polyps Neg Hx     Esophageal Cancer Neg Hx     Liver Cancer Neg Hx     Liver Disease Neg Hx     Rectal Cancer Neg Hx     Stomach Cancer Neg Hx        SocialHx:  Social History     Tobacco Use    Smoking status: Never Smoker    Smokeless tobacco: Never Used   Substance Use Topics    Alcohol use: Yes     Alcohol/week: 2.0 standard drinks     Types: 1 Glasses of wine, 1 Cans of beer per week     Comment: occ       Allergies: Allergies   Allergen Reactions    Pcn [Penicillins] Hives and Itching     Patient states she can tolerate ampicillin and amoxicillin however.  Cefdinir        Medications:  Current Outpatient Medications   Medication Sig Dispense Refill    citalopram (CELEXA) 20 MG tablet TAKE 1 TABLET BY MOUTH ONCE DAILY 30 tablet 0    oxybutynin (DITROPAN-XL) 10 MG extended release tablet TAKE 1 TABLET DAILY 90 tablet 1    glucose monitoring (FREESTYLE FREEDOM) kit 1 kit by Does not apply route daily 1 kit 0    Insulin Pen Needle 31G X 5 MM MISC As directed 100 each 1    Insulin Degludec (TRESIBA FLEXTOUCH) 200 UNIT/ML SOPN INJECT 103 UNITS SUBCUTANEOUSLY NIGHTLY. Increase tresiba dose by 2-3units every 2-3 days to achieve fasting AM blood glucose . 27 mL 0    blood glucose monitor strips Test 3 times a day & as needed for symptoms of irregular blood glucose.  100 strip 5    Lancets MISC 1 each by Does not apply route 4 times daily 600 each 1    empagliflozin (JARDIANCE) 25 MG tablet Take 1 tablet by mouth daily 30 tablet 5    metoprolol succinate (TOPROL XL) 50 MG extended release tablet TAKE 1 TABLET DAILY FOR BLOOD PRESSURE AND HEART PROTECTION. 30 tablet 0    montelukast (SINGULAIR) 10 MG tablet TAKE 1 TABLET BY MOUTH AT NIGHT 90 tablet 0    rosuvastatin (CRESTOR) 10 MG tablet TAKE 1 TABLET BY MOUTH ONCE DAILY 30 tablet 0    sitaGLIPtan-metFORMIN (JANUMET)  MG per tablet TAKE 1 TABLET BY MOUTH 2 TIMES A DAY WITH MEALS no further refills until seen in office. 60 tablet 0    insulin aspart (NOVOLOG FLEXPEN) 100 UNIT/ML injection pen INJECT 15 UNITS            SUBCUTANEOUSLY 3 TIMES A   DAY BEFORE MEALS. No further refills until seen in office. 45 mL 0    losartan (COZAAR) 100 MG tablet TAKE 1 TABLET DAILY FOR    BLOOD PRESSURE 90 tablet 1    omeprazole (PRILOSEC) 20 MG delayed release capsule TAKE 1 CAPSULE 2 TIMES     DAILY AS NEEDED 180 capsule 1    magnesium oxide (MAG-OX) 400 MG tablet TAKE 1 TABLET DAILY 90 tablet 3    ULTICARE MINI PEN NEEDLES 31G X 6 MM MISC USE 2 TIMES A  each 0    loratadine (CLARITIN) 10 MG tablet TAKE 1 TABLET BY MOUTH ONCE DAILY. 30 tablet 5    vitamin D (ERGOCALCIFEROL) 1.25 MG (92320 UT) CAPS capsule TAKE 1 CAPSULE ONCE WEEKLY 12 capsule 2    cyproheptadine (PERIACTIN) 4 MG tablet Take 1 tablet by mouth 3 times daily as needed (itching or rash) Hold the claritin on the days this is used for itching 21 tablet 0    ketoconazole (NIZORAL) 2 % shampoo Apply topically daily as needed. 1 Bottle 0    albuterol (PROVENTIL) (5 MG/ML) 0.5% nebulizer solution Take 1ml twice daily for 10 days 120 each 3    Nebulizers (AIRIAL COMPACT MINI NEBULIZER) MISC Patient has COPD and problems with breathing. 1 each 0    tiotropium (SPIRIVA) 18 MCG inhalation capsule Inhale 1 capsule into the lungs daily.  clobetasol (TEMOVATE) 0.05 % ointment Apply topically 2 times daily. For 10 days 1 Tube 0    nystatin (MYCOSTATIN) 477268 UNIT/GM cream Apply topically 2 times daily.  1 Tube 1    aspirin 81 MG chewable tablet Take 1 tablet by mouth daily 30 tablet 3    HYDROcodone-acetaminophen (NORCO)  MG per tablet Take 1 tablet by mouth Daily with lunch. Theador Broderick Misc. Devices (ROLLER WALKER) MISC 1 each by Does not apply route daily Pt states that she falls at least once a month up to 3 times a month. 1 each 0    INSULIN SYRINGE 1CC/29G 29G X 1/2\" 1 ML MISC Use with each dose of novolog TID 90 each 1    mometasone-formoterol (DULERA) 200-5 MCG/ACT inhaler Inhale 2 puffs into the lungs every 12 hours      anastrozole (ARIMIDEX) 1 MG tablet Take 1 mg by mouth daily      Multiple Vitamins-Minerals (MULTIVITAMIN ADULT PO) Take 1 tablet by mouth daily       albuterol (PROVENTIL HFA;VENTOLIN HFA) 108 (90 BASE) MCG/ACT inhaler Inhale 2 puffs into the lungs every 4 hours as needed for Wheezing      Incontinence Supply Disposable (POISE PANTILINERS) PADS Use pads as needed daily for urine leakage. 1 each 11    gabapentin (NEURONTIN) 300 MG capsule TAKE 1 CAPSULE BY MOUTH 3 TIMES A DAY 90 capsule 3     No current facility-administered medications for this visit. Objective:   PE:  /68   Pulse 76   Temp 97.6 °F (36.4 °C) (Temporal)   Resp 22   Ht 5' 2\" (1.575 m)   Wt 256 lb (116.1 kg)   LMP  (LMP Unknown)   SpO2 98%   BMI 46.82 kg/m²   Physical Exam  Constitutional:       General: She is not in acute distress. Appearance: Normal appearance. HENT:      Head: Normocephalic. Nose: Nose normal.      Mouth/Throat:      Mouth: Mucous membranes are moist.      Pharynx: Oropharynx is clear. No oropharyngeal exudate or posterior oropharyngeal erythema. Eyes:      General: No scleral icterus. Extraocular Movements: Extraocular movements intact. Conjunctiva/sclera: Conjunctivae normal.   Cardiovascular:      Rate and Rhythm: Normal rate and regular rhythm. Pulses: Normal pulses. Heart sounds: No murmur heard. Pulmonary:      Effort: Pulmonary effort is normal.      Breath sounds: Normal breath sounds.    Abdominal:      General: There is no distension. Palpations: Abdomen is soft. There is no mass. Tenderness: There is no abdominal tenderness. Musculoskeletal:         General: Normal range of motion. Cervical back: Normal range of motion. Comments: Knee JLT BL   Skin:     General: Skin is warm and dry. Capillary Refill: Capillary refill takes less than 2 seconds. Neurological:      General: No focal deficit present. Mental Status: She is alert and oriented to person, place, and time. Psychiatric:         Mood and Affect: Mood normal.         Behavior: Behavior normal.         Thought Content: Thought content normal.            Assessment & Plan   Brandin Nugent was seen today for diabetes. Diagnoses and all orders for this visit:    Type 2 diabetes mellitus with complication ([facility]  -Uncontrolled. No progress since last visit as she has not been able to check her BG. Will re-prescribe BG monitor, discussed w/ her checking fasting BG and to adjust upwards her tresiba dose to get fasting BG in goal range . Continue metformin 1000mg BID, januvia 50mg BID, jardiance 25mg. Recheck a1c around 9/25/2021  -     glucose monitoring (FREESTYLE FREEDOM) kit; 1 kit by Does not apply route daily  -     Insulin Pen Needle 31G X 5 MM MISC; As directed  -     Insulin Degludec (TRESIBA FLEXTOUCH) 200 UNIT/ML SOPN; INJECT 103 UNITS SUBCUTANEOUSLY NIGHTLY. Increase tresiba dose by 2-3units every 2-3 days to achieve fasting AM blood glucose .  -     blood glucose monitor strips; Test 3 times a day & as needed for symptoms of irregular blood glucose.   -     Lancets MISC; 1 each by Does not apply route 4 times daily    Primary osteoarthritis of both knees  -See procedure notes below    Urge incontinence of urine  -     oxybutynin (DITROPAN-XL) 10 MG extended release tablet; TAKE 1 TABLET DAILY    Other orders  -     citalopram (CELEXA) 20 MG tablet; TAKE 1 TABLET BY MOUTH ONCE DAILY    KNEE INJECTION: ANTEROLATERAL APPROACH - left  Patient was given verbal informed consent and agreed verbally and with signed consent to the risks and benefits of procedure. Risks discussed included bleeding, infection, damage to structures, and potentially other yet unlikely unforeseen consequences of those risks. An impression was made with a pen for injection site left knee. The area was cleaned w/ betadine and alcohol swab. It was then sprayed w/ ethyl chloride and injected w/ a 22 gauge 1.5\" needle into the the tissue lateral to the patellar tendon approximately 1 cm above tibial plateau and directed 15 degrees from the anterior knee surface vertical midline toward the intra-articular joint space. It was aspirated and no bloody return into syringe. The medicine was administered w/o issue or resistance. 8.0 cc of bupivacaine  and 1.0 cc of 40 mg/mL kenalog was administered. A bandage was applied directly thereafter. All bleeding stopped. EBL - 0 cc. Pt was instructed on basic cleansing and rest of affected area. Pt noted some relief prior to leaving the office. KNEE INJECTION: ANTEROLATERAL APPROACH - right  Patient was given verbal informed consent and agreed verbally and with signed consent to the risks and benefits of procedure. Risks discussed included bleeding, infection, damage to structures, and potentially other yet unlikely unforeseen consequences of those risks. An impression was made with a pen for injection site right knee. The area was cleaned w/ betadine and alcohol swab. It was then sprayed w/ ethyl chloride and injected w/ a 22 gauge 1.5\" needle into the the tissue lateral to the patellar tendon approximately 1 cm above tibial plateau and directed 15 degrees from the anterior knee surface vertical midline toward the intra-articular joint space. It was aspirated and no bloody return into syringe. The medicine was administered w/o issue or resistance.   8.0 cc of bupivacaine  and 1.0 cc of 40 mg/mL kenalog was administered. A bandage was applied directly thereafter. All bleeding stopped. EBL - 0 cc. Pt was instructed on basic cleansing and rest of affected area. Pt noted some relief prior to leaving the office. Return in about 2 weeks (around 7/28/2021). All questions were answered. Medications, including possible adverse effects, and instructions were reviewed and  understanding was confirmed. Follow-up recommendations, including when to contact or return to office (ie; if symptoms worsen or fail to improve), were discussed and acknowledged.     Electronically signed by Sanya Burden MD on 7/14/21 at 12:54 PM CDT

## 2021-07-14 NOTE — TELEPHONE ENCOUNTER
Was there supposed to be some kind of paperwork for her PT she has had some paperwork in the past they have sent.   Can you check on this please

## 2021-07-16 ENCOUNTER — TREATMENT (OUTPATIENT)
Dept: PHYSICAL THERAPY | Facility: CLINIC | Age: 76
End: 2021-07-16

## 2021-07-16 DIAGNOSIS — R26.9 GAIT DISTURBANCE: ICD-10-CM

## 2021-07-16 DIAGNOSIS — I89.0 LYMPHEDEMA OF BOTH LOWER EXTREMITIES: Primary | ICD-10-CM

## 2021-07-16 PROCEDURE — 97140 MANUAL THERAPY 1/> REGIONS: CPT | Performed by: PHYSICAL THERAPIST

## 2021-07-20 ENCOUNTER — TELEPHONE (OUTPATIENT)
Dept: PODIATRY | Facility: CLINIC | Age: 76
End: 2021-07-20

## 2021-07-20 ENCOUNTER — TREATMENT (OUTPATIENT)
Dept: PHYSICAL THERAPY | Facility: CLINIC | Age: 76
End: 2021-07-20

## 2021-07-20 DIAGNOSIS — I89.0 LYMPHEDEMA OF BOTH LOWER EXTREMITIES: Primary | ICD-10-CM

## 2021-07-20 DIAGNOSIS — I89.0 LYMPHEDEMA OF LEFT ARM: ICD-10-CM

## 2021-07-20 DIAGNOSIS — R26.9 GAIT DISTURBANCE: ICD-10-CM

## 2021-07-20 PROCEDURE — 97140 MANUAL THERAPY 1/> REGIONS: CPT | Performed by: PHYSICAL THERAPIST

## 2021-07-21 ENCOUNTER — OFFICE VISIT (OUTPATIENT)
Dept: PODIATRY | Facility: CLINIC | Age: 76
End: 2021-07-21

## 2021-07-21 VITALS
DIASTOLIC BLOOD PRESSURE: 66 MMHG | HEIGHT: 62 IN | SYSTOLIC BLOOD PRESSURE: 142 MMHG | OXYGEN SATURATION: 99 % | HEART RATE: 68 BPM | WEIGHT: 251.2 LBS | BODY MASS INDEX: 46.22 KG/M2

## 2021-07-21 DIAGNOSIS — E11.42 TYPE 2 DIABETES MELLITUS WITH DIABETIC POLYNEUROPATHY, WITH LONG-TERM CURRENT USE OF INSULIN (HCC): ICD-10-CM

## 2021-07-21 DIAGNOSIS — M79.671 FOOT PAIN, BILATERAL: ICD-10-CM

## 2021-07-21 DIAGNOSIS — M20.42 HAMMERTOE, BILATERAL: ICD-10-CM

## 2021-07-21 DIAGNOSIS — M20.11 VALGUS DEFORMITY OF BOTH GREAT TOES: ICD-10-CM

## 2021-07-21 DIAGNOSIS — E66.01 OBESITY, MORBID, BMI 40.0-49.9 (HCC): ICD-10-CM

## 2021-07-21 DIAGNOSIS — Z79.4 TYPE 2 DIABETES MELLITUS WITH DIABETIC POLYNEUROPATHY, WITH LONG-TERM CURRENT USE OF INSULIN (HCC): ICD-10-CM

## 2021-07-21 DIAGNOSIS — L84 FOOT CALLUS: ICD-10-CM

## 2021-07-21 DIAGNOSIS — M20.12 VALGUS DEFORMITY OF BOTH GREAT TOES: ICD-10-CM

## 2021-07-21 DIAGNOSIS — B35.1 ONYCHOMYCOSIS: Primary | ICD-10-CM

## 2021-07-21 DIAGNOSIS — M20.41 HAMMERTOE, BILATERAL: ICD-10-CM

## 2021-07-21 DIAGNOSIS — M79.672 FOOT PAIN, BILATERAL: ICD-10-CM

## 2021-07-21 PROCEDURE — 11055 PARING/CUTG B9 HYPRKER LES 1: CPT | Performed by: NURSE PRACTITIONER

## 2021-07-21 PROCEDURE — 11721 DEBRIDE NAIL 6 OR MORE: CPT | Performed by: NURSE PRACTITIONER

## 2021-07-22 DIAGNOSIS — I10 ESSENTIAL HYPERTENSION: ICD-10-CM

## 2021-07-22 RX ORDER — ROSUVASTATIN CALCIUM 10 MG/1
TABLET, COATED ORAL
Qty: 30 TABLET | Refills: 0 | Status: SHIPPED | OUTPATIENT
Start: 2021-07-22 | End: 2021-08-16 | Stop reason: SDUPTHER

## 2021-07-22 RX ORDER — METOPROLOL SUCCINATE 50 MG/1
TABLET, EXTENDED RELEASE ORAL
Qty: 30 TABLET | Refills: 0 | Status: SHIPPED | OUTPATIENT
Start: 2021-07-22 | End: 2021-08-24

## 2021-07-22 NOTE — TELEPHONE ENCOUNTER
Ophelia Carmella called to request a refill on her medication.       Last office visit : 7/14/2021   Next office visit : 7/29/2021     Requested Prescriptions     Pending Prescriptions Disp Refills    rosuvastatin (CRESTOR) 10 MG tablet [Pharmacy Med Name: ROSUVASTATIN CALCIUM 10 MG TAB] 30 tablet 0     Sig: TAKE 1 TABLET BY MOUTH ONCE DAILY            Janny Douglas

## 2021-07-22 NOTE — TELEPHONE ENCOUNTER
Biju Santos called to request a refill on her medication. Last office visit : 7/14/2021   Next office visit : 7/22/2021     Requested Prescriptions     Pending Prescriptions Disp Refills    metoprolol succinate (TOPROL XL) 50 MG extended release tablet [Pharmacy Med Name: METOPROLOL SUCC ER 50 MG TAB] 30 tablet 0     Sig: TAKE 1 TABLET BY MOUTH ONCE DAILY FOR BLOOD PRESSURE AND HEART PROTECTION.             Benoit Ferreira

## 2021-07-23 ENCOUNTER — TREATMENT (OUTPATIENT)
Dept: PHYSICAL THERAPY | Facility: CLINIC | Age: 76
End: 2021-07-23

## 2021-07-23 DIAGNOSIS — R26.9 GAIT DISTURBANCE: ICD-10-CM

## 2021-07-23 DIAGNOSIS — I89.0 LYMPHEDEMA OF BOTH LOWER EXTREMITIES: Primary | ICD-10-CM

## 2021-07-23 PROCEDURE — 97140 MANUAL THERAPY 1/> REGIONS: CPT | Performed by: PHYSICAL THERAPIST

## 2021-07-26 DIAGNOSIS — E11.8 TYPE 2 DIABETES MELLITUS WITH COMPLICATION (HCC): Primary | ICD-10-CM

## 2021-07-26 RX ORDER — SITAGLIPTIN AND METFORMIN HYDROCHLORIDE 1000; 50 MG/1; MG/1
TABLET, FILM COATED ORAL
Qty: 60 TABLET | Refills: 0 | Status: SHIPPED | OUTPATIENT
Start: 2021-07-26 | End: 2021-08-23

## 2021-07-29 ENCOUNTER — OFFICE VISIT (OUTPATIENT)
Dept: PRIMARY CARE CLINIC | Age: 76
End: 2021-07-29
Payer: MEDICARE

## 2021-07-29 VITALS
RESPIRATION RATE: 20 BRPM | WEIGHT: 258.13 LBS | HEART RATE: 83 BPM | DIASTOLIC BLOOD PRESSURE: 64 MMHG | SYSTOLIC BLOOD PRESSURE: 128 MMHG | BODY MASS INDEX: 47.5 KG/M2 | TEMPERATURE: 97.2 F | HEIGHT: 62 IN | OXYGEN SATURATION: 97 %

## 2021-07-29 DIAGNOSIS — M17.0 PRIMARY OSTEOARTHRITIS OF BOTH KNEES: ICD-10-CM

## 2021-07-29 DIAGNOSIS — E11.8 TYPE 2 DIABETES MELLITUS WITH COMPLICATION (HCC): Primary | ICD-10-CM

## 2021-07-29 DIAGNOSIS — M54.9 MUSCULOSKELETAL BACK PAIN: ICD-10-CM

## 2021-07-29 PROCEDURE — 99214 OFFICE O/P EST MOD 30 MIN: CPT | Performed by: STUDENT IN AN ORGANIZED HEALTH CARE EDUCATION/TRAINING PROGRAM

## 2021-07-29 RX ORDER — BACLOFEN 10 MG/1
10 TABLET ORAL 3 TIMES DAILY
Qty: 30 TABLET | Refills: 0 | Status: SHIPPED | OUTPATIENT
Start: 2021-07-29 | End: 2021-08-19

## 2021-07-29 NOTE — PROGRESS NOTES
[address] [facility]  [address] [address]  [address] [address]  Dept: [phone]  Dept Fax: [phone]  Loc: [phone]      Subjective:     Chief Complaint   Patient presents with    Diabetes     Pt states discuss medication and changes    Lower Back Pain     lower right side of back       HPI:  Jose Bundy is a 70 y.o. female presenting for    T2DM  -Following up. A1c recently 10.4 last month. Now up to tresiba 136u qhs w/ 34-36u novolog, only taking in morning. On metformin 1000mg BID, januvia 50mg BID, jardiance 25mg. AM fasting levels are in mid 200's. She says she is having glass of wine in evenings. R lower back pain   -Started over last several days. Occurring laterally over R lower back. Worse w/ activity, better w/ rest. No radicular sx. Denies urinary burning, frequency or urgency. No urinary/bowel incontinence. Knee OA  -Pt states she is doing much better after injections done last visit.  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    Breast CA (HCC)     Left breast Nodules removed Took radiation    Chronic back pain     Chronic kidney disease     Overactive bladder     COPD (chronic obstructive pulmonary disease) (HCC)     x few years Scarring on lungs 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    Diabetes mellitus ([facility] [address]     On insulin x 10 years    Fibromyalgia     Fibromyalgia     for 20 years    GERD (gastroesophageal reflux disease)     History of blood transfusion     ?when    Hyperlipidemia     High cholesterol    Hypertension     On medications for 2 years    Joint pain 2/2/2016    Liver disease     Has fatty Father     Heart Disease Father     Diabetes Father     High Blood Pressure Sister     Diabetes Sister     Cancer Sister     High Blood Pressure Brother     Colon Cancer Neg Hx     Colon Polyps Neg Hx     Esophageal Cancer Neg Hx     Liver Cancer Neg Hx     Liver Disease Neg Hx     Rectal Cancer Neg Hx     Stomach Cancer Neg Hx        SocialHx:  Social History     Tobacco Use    Smoking status: Never Smoker    Smokeless tobacco: Never Used   Substance Use Topics    Alcohol use: Yes     Alcohol/week: 2.0 standard drinks     Types: 1 Glasses of wine, 1 Cans of beer per week     Comment: occ       Allergies: Allergies   Allergen Reactions    Pcn [Penicillins] Hives and Itching     Patient states she can tolerate ampicillin and amoxicillin however.  Cefdinir        Medications:  Current Outpatient Medications   Medication Sig Dispense Refill    baclofen (LIORESAL) 10 MG tablet Take 1 tablet by mouth 3 times daily 30 tablet 0    sitaGLIPtan-metFORMIN (JANUMET)  MG per tablet TAKE 1 TABLET BY MOUTH 2 TIMES A DAY WITH MEALS 60 tablet 0    metoprolol succinate (TOPROL XL) 50 MG extended release tablet TAKE 1 TABLET BY MOUTH ONCE DAILY FOR BLOOD PRESSURE AND HEART PROTECTION. 30 tablet 0    rosuvastatin (CRESTOR) 10 MG tablet TAKE 1 TABLET BY MOUTH ONCE DAILY 30 tablet 0    citalopram (CELEXA) 20 MG tablet TAKE 1 TABLET BY MOUTH ONCE DAILY 30 tablet 0    oxybutynin (DITROPAN-XL) 10 MG extended release tablet TAKE 1 TABLET DAILY 90 tablet 1    glucose monitoring (FREESTYLE FREEDOM) kit 1 kit by Does not apply route daily 1 kit 0    Insulin Pen Needle 31G X 5 MM MISC As directed 100 each 1    Insulin Degludec (TRESIBA FLEXTOUCH) 200 UNIT/ML SOPN INJECT 103 UNITS SUBCUTANEOUSLY NIGHTLY. Increase tresiba dose by 2-3units every 2-3 days to achieve fasting AM blood glucose . 27 mL 0    blood glucose monitor strips Test 3 times a day & as needed for symptoms of irregular blood glucose. 100 strip 5    Lancets MISC 1 each by Does not apply route 4 times daily 600 each 1    empagliflozin (JARDIANCE) 25 MG tablet Take 1 tablet by mouth daily 30 tablet 5    insulin aspart (NOVOLOG FLEXPEN) 100 UNIT/ML injection pen INJECT 15 UNITS            SUBCUTANEOUSLY 3 TIMES A   DAY BEFORE MEALS. No further refills until seen in office. 45 mL 0    losartan (COZAAR) 100 MG tablet TAKE 1 TABLET DAILY FOR    BLOOD PRESSURE 90 tablet 1    omeprazole (PRILOSEC) 20 MG delayed release capsule TAKE 1 CAPSULE 2 TIMES     DAILY AS NEEDED 180 capsule 1    magnesium oxide (MAG-OX) 400 MG tablet TAKE 1 TABLET DAILY 90 tablet 3    ULTICARE MINI PEN NEEDLES 31G X 6 MM MISC USE 2 TIMES A  each 0    loratadine (CLARITIN) 10 MG tablet TAKE 1 TABLET BY MOUTH ONCE DAILY. 30 tablet 5    vitamin D (ERGOCALCIFEROL) 1.25 MG (88191 UT) CAPS capsule TAKE 1 CAPSULE ONCE WEEKLY 12 capsule 2    cyproheptadine (PERIACTIN) 4 MG tablet Take 1 tablet by mouth 3 times daily as needed (itching or rash) Hold the claritin on the days this is used for itching 21 tablet 0    ketoconazole (NIZORAL) 2 % shampoo Apply topically daily as needed. 1 Bottle 0    albuterol (PROVENTIL) (5 MG/ML) 0.5% nebulizer solution Take 1ml twice daily for 10 days 120 each 3    Nebulizers (AIRIAL COMPACT MINI NEBULIZER) MISC Patient has COPD and problems with breathing. 1 each 0    tiotropium (SPIRIVA) 18 MCG inhalation capsule Inhale 1 capsule into the lungs daily.  clobetasol (TEMOVATE) 0.05 % ointment Apply topically 2 times daily. For 10 days 1 Tube 0    nystatin (MYCOSTATIN) 871914 UNIT/GM cream Apply topically 2 times daily. 1 Tube 1    aspirin 81 MG chewable tablet Take 1 tablet by mouth daily 30 tablet 3    HYDROcodone-acetaminophen (NORCO)  MG per tablet Take 1 tablet by mouth Daily with lunch. Narendra Luque [place].  Devices (ROLLER WALKER) MISC 1 each by Does not apply route daily Pt states that she falls at least once a month up to 3 times a month. 1 each 0    INSULIN SYRINGE 1CC/29G 29G X 1/2\" 1 ML MISC Use with each dose of novolog TID 90 each 1    mometasone-formoterol (DULERA) 200-5 MCG/ACT inhaler Inhale 2 puffs into the lungs every 12 hours      anastrozole (ARIMIDEX) 1 MG tablet Take 1 mg by mouth daily      Multiple Vitamins-Minerals (MULTIVITAMIN ADULT PO) Take 1 tablet by mouth daily       albuterol (PROVENTIL HFA;VENTOLIN HFA) 108 (90 BASE) MCG/ACT inhaler Inhale 2 puffs into the lungs every 4 hours as needed for Wheezing      Incontinence Supply Disposable (POISE PANTILINERS) PADS Use pads as needed daily for urine leakage. 1 each 11    montelukast (SINGULAIR) 10 MG tablet TAKE 1 TABLET BY MOUTH AT NIGHT 90 tablet 0    gabapentin (NEURONTIN) 300 MG capsule TAKE 1 CAPSULE BY MOUTH 3 TIMES A DAY 90 capsule 3     No current facility-administered medications for this visit. Objective:   PE:  /64   Pulse 83   Temp 97.2 °F (36.2 °C) (Temporal)   Resp 20   Ht 5' 2\" (1.575 m)   Wt 258 lb 2 oz (117.1 kg)   LMP  (LMP Unknown)   SpO2 97%   BMI 47.21 kg/m²   Physical Exam  Constitutional:       General: She is not in acute distress. Appearance: Normal appearance. HENT:      Head: Normocephalic. Nose: Nose normal.      Mouth/Throat:      Mouth: Mucous membranes are moist.      Pharynx: Oropharynx is clear. No oropharyngeal exudate or posterior oropharyngeal erythema. Eyes:      General: No scleral icterus. Extraocular Movements: Extraocular movements intact. Conjunctiva/sclera: Conjunctivae normal.   Cardiovascular:      Rate and Rhythm: Normal rate and regular rhythm. Pulses: Normal pulses. Heart sounds: No murmur heard. Pulmonary:      Effort: Pulmonary effort is normal.      Breath sounds: Normal breath sounds. Abdominal:      General: There is no distension. Palpations: Abdomen is soft. There is no mass. Tenderness: There is no abdominal tenderness.    Musculoskeletal: General: Normal range of motion. Cervical back: Normal range of motion. Comments: Mild TTP R paraspinal   Skin:     General: Skin is warm and dry. Capillary Refill: Capillary refill takes less than 2 seconds. Neurological:      General: No focal deficit present. Mental Status: She is alert and oriented to person, place, and time. Psychiatric:         Mood and Affect: Mood normal.         Behavior: Behavior normal.         Thought Content: Thought content normal.            Assessment & Plan   Rachael Willett was seen today for diabetes and lower back pain. Diagnoses and all orders for this visit:    Type 2 diabetes mellitus with complication ([facility] [address]  -Uncontrolled. Fasting levels are slightly improved. Need to go up on long-acting insulin. Pt to continue titrating up tresiba to achieve fasting BG in  range. Appears to be some confusion on novolog use. For now advised pt to take 34 u about 15 min before meals and will focus primarily on controlling fasting sugars. Counseled on wine usage and impact on sugars. Phone nutritionist info given. -Continue janumet and jardiance  -Recheck a1c in 2 mo. Consider d/c DPP4 and GLP-1 addition, possibly soliqua    Musculoskeletal back pain  - baclofen (LIORESAL) 10 MG tablet; Take 1 tablet by mouth 3 times daily    Primary osteoarthritis of both knees  -Improved after triamcinolone injections. Continue to monitor      Return in about 3 weeks (around 8/19/2021) for T2DM. All questions were answered. Medications, including possible adverse effects, and instructions were reviewed and  understanding was confirmed. Follow-up recommendations, including when to contact or return to office (ie; if symptoms worsen or fail to improve), were discussed and acknowledged.     Electronically signed by Mt Qiu MD on 7/29/21 at 1:34 PM CDT

## 2021-08-06 RX ORDER — LORATADINE 10 MG/1
TABLET ORAL
Qty: 30 TABLET | Refills: 0 | Status: SHIPPED | OUTPATIENT
Start: 2021-08-06 | End: 2021-09-07

## 2021-08-10 ENCOUNTER — TELEPHONE (OUTPATIENT)
Dept: PRIMARY CARE CLINIC | Age: 76
End: 2021-08-10

## 2021-08-10 NOTE — TELEPHONE ENCOUNTER
Pharmacy called office stating needs new prescriptions and new dosing's she was changed to for the Insulin Degludec (TRESIBA FLEXTOUCH) 200 UNIT/ML SOPN and  insulin aspart (NOVOLOG FLEXPEN) 100 UNIT/ML injection pen

## 2021-08-11 DIAGNOSIS — E11.8 TYPE 2 DIABETES MELLITUS WITH COMPLICATION (HCC): ICD-10-CM

## 2021-08-11 RX ORDER — INSULIN DEGLUDEC 200 U/ML
INJECTION, SOLUTION SUBCUTANEOUS
Qty: 27 ML | Refills: 0 | Status: SHIPPED | OUTPATIENT
Start: 2021-08-11 | End: 2021-09-30 | Stop reason: SDUPTHER

## 2021-08-11 RX ORDER — INSULIN ASPART 100 [IU]/ML
INJECTION, SOLUTION INTRAVENOUS; SUBCUTANEOUS
Qty: 45 ML | Refills: 0 | Status: SHIPPED | OUTPATIENT
Start: 2021-08-11 | End: 2021-11-03 | Stop reason: SDUPTHER

## 2021-08-16 RX ORDER — ROSUVASTATIN CALCIUM 10 MG/1
TABLET, COATED ORAL
Qty: 90 TABLET | Refills: 1 | Status: SHIPPED | OUTPATIENT
Start: 2021-08-16 | End: 2021-08-19 | Stop reason: SDUPTHER

## 2021-08-16 NOTE — TELEPHONE ENCOUNTER
Jen Morocho called to request a refill on her medication.       Last office visit : 7/29/2021   Next office visit : 8/19/2021     Requested Prescriptions     Signed Prescriptions Disp Refills    rosuvastatin (CRESTOR) 10 MG tablet 90 tablet 1     Sig: TAKE 1 TABLET BY MOUTH ONCE DAILY     Authorizing Provider: Zain Quiroga     Ordering User: Erin Sandhu MA

## 2021-08-19 ENCOUNTER — OFFICE VISIT (OUTPATIENT)
Dept: PRIMARY CARE CLINIC | Age: 76
End: 2021-08-19
Payer: MEDICARE

## 2021-08-19 VITALS — BODY MASS INDEX: 47.52 KG/M2 | WEIGHT: 259.8 LBS

## 2021-08-19 DIAGNOSIS — M54.9 MUSCULOSKELETAL BACK PAIN: ICD-10-CM

## 2021-08-19 DIAGNOSIS — E78.2 MIXED HYPERLIPIDEMIA: ICD-10-CM

## 2021-08-19 DIAGNOSIS — E11.8 TYPE 2 DIABETES MELLITUS WITH COMPLICATION (HCC): Primary | ICD-10-CM

## 2021-08-19 DIAGNOSIS — R30.0 DYSURIA: ICD-10-CM

## 2021-08-19 PROCEDURE — 99214 OFFICE O/P EST MOD 30 MIN: CPT | Performed by: STUDENT IN AN ORGANIZED HEALTH CARE EDUCATION/TRAINING PROGRAM

## 2021-08-19 RX ORDER — BACLOFEN 10 MG/1
TABLET ORAL
Qty: 30 TABLET | Refills: 0 | Status: SHIPPED | OUTPATIENT
Start: 2021-08-19 | End: 2021-08-19 | Stop reason: SDUPTHER

## 2021-08-19 RX ORDER — ROSUVASTATIN CALCIUM 10 MG/1
TABLET, COATED ORAL
Qty: 90 TABLET | Refills: 1 | Status: SHIPPED | OUTPATIENT
Start: 2021-08-19 | End: 2021-11-22 | Stop reason: SDUPTHER

## 2021-08-19 RX ORDER — BACLOFEN 10 MG/1
TABLET ORAL
Qty: 30 TABLET | Refills: 0 | Status: SHIPPED | OUTPATIENT
Start: 2021-08-19 | End: 2022-05-16 | Stop reason: SDUPTHER

## 2021-08-19 NOTE — PROGRESS NOTES
[address] [facility]  [address] [address] [address] [address]  Dept: [phone]  Dept Fax: [phone]  Loc: [phone]      Subjective:     Chief Complaint   Patient presents with    Diabetes       HPI:  Marie Gonzalez is a 70 y.o. female presenting for    T2DM  -A1c due to check next month. Last 10.4 On janumet , jardiance 25mg, tresiba, now 156u, novolog 34 u w/ meals.    -Fasting sugars still in high 180's-low 220's. -Getting nutrition handout from [facility] [address] phone nutritionist I provided her contact info for, says they told her they would call back  -Pt states she eats a lot of fruit and thinks there may be more sugar coming from that than she realized.       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    Breast CA (HCC)     Left breast Nodules removed Took radiation    Chronic back pain     Chronic kidney disease     Overactive bladder     COPD (chronic obstructive pulmonary disease) (HCC)     x few years Scarring on lungs 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    Diabetes mellitus ([address]     On insulin x 10 years    Fibromyalgia     Fibromyalgia     for 20 years    GERD (gastroesophageal reflux disease)     History of blood transfusion     ?when    Hyperlipidemia     High cholesterol    Hypertension     On medications for 2 years    Joint pain 2/2/2016    Liver disease     Has fatty liver    Morbid obesity ([address]     Multiple food allergies     Neuropathic pain     Neuropathy     Neuropathy involving both lower extremities 2/2/2016    Obstructive sleep apnea     AHI: 68.1 per PSG, 5/2018    Osteoarthritis     Other disorders of kidney and ureter in diseases classified elsewhere     Pneumonia     Postmenopausal osteoporosis     Psychiatric problem     Restless leg syndrome     S/P lumpectomy, left breast 8/19/2015 8/4/2015    Sleep apnea     Type II or unspecified type diabetes mellitus with neurological manifestations, uncontrolled(250.62)      Patient Active Problem List   Diagnosis    Postmenopausal osteoporosis    Type 2 diabetes mellitus with complication ([facility] [address]    COPD (chronic obstructive pulmonary disease) (HCC)    Mood disorder ([facility] [address]    Dementia (HCC)    Malignant neoplasm of upper-outer quadrant of female breast ([facility] [address]    Joint pain    Neuropathy involving both lower extremities    Back pain    Dyspepsia    Lumbar facet arthropathy    Body mass index 45.0-49.9, adult ([facility] [address]    Essential hypertension    Hyperlipidemia    Morbid obesity (HCC)    DARSHAN (obstructive sleep apnea)    BiPAP (biphasic positive airway pressure) dependence    Restless leg syndrome    History of breast cancer       PSHx:  Past Surgical History:   Procedure Laterality Date    BREAST SURGERY Left 8/4/2015    left partial mastectomy on 8/4/15    EYE SURGERY      EYE SURGERY Bilateral 04/02/2017    Dr. Brad Tee      broke L ankle due to osteoporosis, has hardware in    FRACTURE SURGERY      Left ankle     HYSTERECTOMY      NC DRAIN SKIN ABSCESS COMPLIC N/A 6/62/0264    ABDOMINAL WALL ABSCESS INCISION AND DRAINAGE performed by Anam Bazan MD at [address] ENDOSCOPY  4/8/2016    Dr Letty Goodson, (-) H Pylori    WRIST SURGERY         PFHx:  Family History   Problem Relation Age of Onset    High Blood Pressure Father     Heart Disease Father     Diabetes Father     High Blood Pressure Sister     Diabetes Sister     Cancer Sister     High Blood Pressure Brother     Colon Cancer Neg Hx     Colon Polyps Neg Hx     Esophageal Cancer Neg Hx     Liver Cancer Neg Hx     Liver Disease Neg Hx     Rectal Cancer Neg Hx     Stomach Cancer Neg Hx        SocialHx:  Social History     Tobacco Use    Smoking status: Never Smoker    Smokeless tobacco: Never Used   Substance Use Topics    Alcohol use: Yes     Alcohol/week: 2.0 standard drinks     Types: 1 Glasses of wine, 1 Cans of beer per week     Comment: occ       Allergies: Allergies   Allergen Reactions    Pcn [Penicillins] Hives and Itching     Patient states she can tolerate ampicillin and amoxicillin however.  Cefdinir        Medications:  Current Outpatient Medications   Medication Sig Dispense Refill    baclofen (LIORESAL) 10 MG tablet TAKE 1 TABLET BY MOUTH 3 TIMES A DAY 30 tablet 0    rosuvastatin (CRESTOR) 10 MG tablet TAKE 1 TABLET BY MOUTH ONCE DAILY 90 tablet 1    Insulin Degludec (TRESIBA FLEXTOUCH) 200 UNIT/ML SOPN INJECT 139 UNITS SUBCUTANEOUSLY NIGHTLY. Increase tresiba dose by 2-3units every 2-3 days to achieve fasting AM blood glucose . 27 mL 0    insulin aspart (NOVOLOG FLEXPEN) 100 UNIT/ML injection pen INJECT 34 UNITS SUBCUTANEOUSLY 3 TIMES A   DAY BEFORE MEALS. 45 mL 0    loratadine (CLARITIN) 10 MG tablet TAKE 1 TABLET BY MOUTH ONCE DAILY. 30 tablet 0    sitaGLIPtan-metFORMIN (JANUMET)  MG per tablet TAKE 1 TABLET BY MOUTH 2 TIMES A DAY WITH MEALS 60 tablet 0    metoprolol succinate (TOPROL XL) 50 MG extended release tablet TAKE 1 TABLET BY MOUTH ONCE DAILY FOR BLOOD PRESSURE AND HEART PROTECTION. 30 tablet 0    citalopram (CELEXA) 20 MG tablet TAKE 1 TABLET BY MOUTH ONCE DAILY 30 tablet 0    oxybutynin (DITROPAN-XL) 10 MG extended release tablet TAKE 1 TABLET DAILY 90 tablet 1    glucose monitoring (FREESTYLE FREEDOM) kit 1 kit by Does not apply route daily 1 kit 0    Insulin Pen Needle 31G X 5 MM MISC As directed 100 each 1    blood glucose monitor strips Test 3 times a day & as needed for symptoms of irregular blood glucose.  100 strip 5    Lancets MISC hours      anastrozole (ARIMIDEX) 1 MG tablet Take 1 mg by mouth daily      Multiple Vitamins-Minerals (MULTIVITAMIN ADULT PO) Take 1 tablet by mouth daily       albuterol (PROVENTIL HFA;VENTOLIN HFA) 108 (90 BASE) MCG/ACT inhaler Inhale 2 puffs into the lungs every 4 hours as needed for Wheezing      Incontinence Supply Disposable (POISE PANTILINERS) PADS Use pads as needed daily for urine leakage. 1 each 11    gabapentin (NEURONTIN) 300 MG capsule TAKE 1 CAPSULE BY MOUTH 3 TIMES A DAY 90 capsule 3     No current facility-administered medications for this visit. Objective:   PE:  Wt 259 lb 12.8 oz (117.8 kg)   LMP  (LMP Unknown)   BMI 47.52 kg/m²   Physical Exam  Constitutional:       General: She is not in acute distress. Appearance: Normal appearance. Comments: 4ww chair-side   HENT:      Head: Normocephalic. Nose: Nose normal.      Mouth/Throat:      Mouth: Mucous membranes are moist.      Pharynx: Oropharynx is clear. No oropharyngeal exudate or posterior oropharyngeal erythema. Eyes:      General: No scleral icterus. Extraocular Movements: Extraocular movements intact. Conjunctiva/sclera: Conjunctivae normal.   Cardiovascular:      Rate and Rhythm: Normal rate and regular rhythm. Pulses: Normal pulses. Heart sounds: No murmur heard. Pulmonary:      Effort: Pulmonary effort is normal.      Breath sounds: Normal breath sounds. Abdominal:      General: There is no distension. Palpations: Abdomen is soft. There is no mass. Tenderness: There is no abdominal tenderness. Musculoskeletal:         General: Normal range of motion. Cervical back: Normal range of motion. Skin:     General: Skin is warm and dry. Capillary Refill: Capillary refill takes less than 2 seconds. Neurological:      General: No focal deficit present. Mental Status: She is alert and oriented to person, place, and time.    Psychiatric:         Mood and Affect: Mood normal.         Behavior: Behavior normal.         Thought Content: Thought content normal.            Assessment & Plan   Tenisha Luu was seen today for diabetes. Diagnoses and all orders for this visit:    Type 2 diabetes mellitus with complication ([address]  -Given pt's individual situation and lack of desired improvement recommended endocrine eval by [facility] program. Discussed cutting back fruits as they do have natural sugar in them. Physical exercise is difficult w/ pt's multiple physical co-morbidities. Continue current regimen for now. -     rosuvastatin (CRESTOR) 10 MG tablet; TAKE 1 TABLET BY MOUTH ONCE DAILY  -     [facility]    Mixed hyperlipidemia  -     rosuvastatin (CRESTOR) 10 MG tablet; TAKE 1 TABLET BY MOUTH ONCE DAILY    Musculoskeletal back pain  -     baclofen (LIORESAL) 10 MG tablet; TAKE 1 TABLET BY MOUTH 3 TIMES A DAY    Dysuria  -Pt indicated continued BL paraspinal discomfort which has appearance of MSK strain or arthritic pain. Denies any over urinary sx or f/c or other constitutional sx. Will check for urinary infection to be sure. -     Culture, Urine; Future  -     Urinalysis; Future        Return in about 5 weeks (around 9/23/2021) for follow up. All questions were answered. Medications, including possible adverse effects, and instructions were reviewed and  understanding was confirmed. Follow-up recommendations, including when to contact or return to office (ie; if symptoms worsen or fail to improve), were discussed and acknowledged.     Electronically signed by Louie Prader, MD on 8/19/21 at 1:12 PM CDT

## 2021-08-23 DIAGNOSIS — E11.8 TYPE 2 DIABETES MELLITUS WITH COMPLICATION (HCC): ICD-10-CM

## 2021-08-23 RX ORDER — SITAGLIPTIN AND METFORMIN HYDROCHLORIDE 1000; 50 MG/1; MG/1
TABLET, FILM COATED ORAL
Qty: 60 TABLET | Refills: 0 | Status: SHIPPED | OUTPATIENT
Start: 2021-08-23 | End: 2021-09-08

## 2021-08-24 RX ORDER — NITROFURANTOIN 25; 75 MG/1; MG/1
100 CAPSULE ORAL 2 TIMES DAILY
Qty: 10 CAPSULE | Refills: 0 | Status: SHIPPED | OUTPATIENT
Start: 2021-08-24 | End: 2021-08-29

## 2021-09-07 ENCOUNTER — TELEPHONE (OUTPATIENT)
Dept: PRIMARY CARE CLINIC | Age: 76
End: 2021-09-07

## 2021-09-07 DIAGNOSIS — E11.8 TYPE 2 DIABETES MELLITUS WITH COMPLICATION (HCC): ICD-10-CM

## 2021-09-07 RX ORDER — LORATADINE 10 MG/1
TABLET ORAL
Qty: 30 TABLET | Refills: 0 | Status: SHIPPED | OUTPATIENT
Start: 2021-09-07 | End: 2021-10-05

## 2021-09-07 RX ORDER — BLOOD SUGAR DIAGNOSTIC
STRIP MISCELLANEOUS
Qty: 100 EACH | Refills: 0 | Status: SHIPPED | OUTPATIENT
Start: 2021-09-07 | End: 2021-09-21 | Stop reason: SDUPTHER

## 2021-09-07 NOTE — TELEPHONE ENCOUNTER
Pt has swollen feet for a week now. When she comes in on the 21st, she would like to know if they can be drained.

## 2021-09-08 DIAGNOSIS — Z17.0 MALIGNANT NEOPLASM OF UPPER-OUTER QUADRANT OF LEFT BREAST IN FEMALE, ESTROGEN RECEPTOR POSITIVE (HCC): Primary | ICD-10-CM

## 2021-09-08 DIAGNOSIS — C50.412 MALIGNANT NEOPLASM OF UPPER-OUTER QUADRANT OF LEFT BREAST IN FEMALE, ESTROGEN RECEPTOR POSITIVE (HCC): Primary | ICD-10-CM

## 2021-09-08 DIAGNOSIS — E11.8 TYPE 2 DIABETES MELLITUS WITH COMPLICATION (HCC): ICD-10-CM

## 2021-09-08 RX ORDER — SITAGLIPTIN AND METFORMIN HYDROCHLORIDE 1000; 50 MG/1; MG/1
TABLET, FILM COATED ORAL
Qty: 60 TABLET | Refills: 0 | Status: SHIPPED | OUTPATIENT
Start: 2021-09-08 | End: 2021-10-11 | Stop reason: SDUPTHER

## 2021-09-09 NOTE — TELEPHONE ENCOUNTER
I'm not sure what she means by getting legs drained. I believe she has had on-and-off leg swelling before so do not believe it is new.  If pt is significantly concerned I can see her sooner

## 2021-09-13 ENCOUNTER — OFFICE VISIT (OUTPATIENT)
Dept: ONCOLOGY | Facility: CLINIC | Age: 76
End: 2021-09-13

## 2021-09-13 ENCOUNTER — TELEPHONE (OUTPATIENT)
Dept: PRIMARY CARE CLINIC | Age: 76
End: 2021-09-13

## 2021-09-13 ENCOUNTER — LAB (OUTPATIENT)
Dept: LAB | Facility: HOSPITAL | Age: 76
End: 2021-09-13

## 2021-09-13 VITALS
HEIGHT: 62 IN | HEART RATE: 92 BPM | TEMPERATURE: 98 F | OXYGEN SATURATION: 92 % | SYSTOLIC BLOOD PRESSURE: 128 MMHG | RESPIRATION RATE: 16 BRPM | DIASTOLIC BLOOD PRESSURE: 76 MMHG | BODY MASS INDEX: 44.16 KG/M2 | WEIGHT: 240 LBS

## 2021-09-13 DIAGNOSIS — K21.9 GASTROESOPHAGEAL REFLUX DISEASE, UNSPECIFIED WHETHER ESOPHAGITIS PRESENT: ICD-10-CM

## 2021-09-13 DIAGNOSIS — E11.8 TYPE 2 DIABETES MELLITUS WITH COMPLICATION (HCC): ICD-10-CM

## 2021-09-13 DIAGNOSIS — Z17.0 MALIGNANT NEOPLASM OF UPPER-OUTER QUADRANT OF LEFT BREAST IN FEMALE, ESTROGEN RECEPTOR POSITIVE (HCC): Primary | ICD-10-CM

## 2021-09-13 DIAGNOSIS — I10 ESSENTIAL HYPERTENSION: ICD-10-CM

## 2021-09-13 DIAGNOSIS — C50.412 MALIGNANT NEOPLASM OF UPPER-OUTER QUADRANT OF LEFT BREAST IN FEMALE, ESTROGEN RECEPTOR POSITIVE (HCC): Primary | ICD-10-CM

## 2021-09-13 LAB
ALBUMIN SERPL-MCNC: 4.3 G/DL (ref 3.5–5.2)
ALBUMIN/GLOB SERPL: 1.8 G/DL
ALP SERPL-CCNC: 93 U/L (ref 39–117)
ALT SERPL W P-5'-P-CCNC: 89 U/L (ref 1–33)
ANION GAP SERPL CALCULATED.3IONS-SCNC: 11 MMOL/L (ref 5–15)
AST SERPL-CCNC: 69 U/L (ref 1–32)
BASOPHILS # BLD AUTO: 0.05 10*3/MM3 (ref 0–0.2)
BASOPHILS NFR BLD AUTO: 0.6 % (ref 0–1.5)
BILIRUB SERPL-MCNC: 0.3 MG/DL (ref 0–1.2)
BUN SERPL-MCNC: 11 MG/DL (ref 8–23)
BUN/CREAT SERPL: 17.7 (ref 7–25)
CALCIUM SPEC-SCNC: 8.9 MG/DL (ref 8.6–10.5)
CHLORIDE SERPL-SCNC: 99 MMOL/L (ref 98–107)
CO2 SERPL-SCNC: 25 MMOL/L (ref 22–29)
CREAT SERPL-MCNC: 0.62 MG/DL (ref 0.57–1)
DEPRECATED RDW RBC AUTO: 46.3 FL (ref 37–54)
EOSINOPHIL # BLD AUTO: 0.24 10*3/MM3 (ref 0–0.4)
EOSINOPHIL NFR BLD AUTO: 3 % (ref 0.3–6.2)
ERYTHROCYTE [DISTWIDTH] IN BLOOD BY AUTOMATED COUNT: 15.3 % (ref 12.3–15.4)
GFR SERPL CREATININE-BSD FRML MDRD: 94 ML/MIN/1.73
GLOBULIN UR ELPH-MCNC: 2.4 GM/DL
GLUCOSE SERPL-MCNC: 225 MG/DL (ref 65–99)
HCT VFR BLD AUTO: 41.4 % (ref 34–46.6)
HGB BLD-MCNC: 13.2 G/DL (ref 12–15.9)
HOLD SPECIMEN: NORMAL
IMM GRANULOCYTES # BLD AUTO: 0.05 10*3/MM3 (ref 0–0.05)
IMM GRANULOCYTES NFR BLD AUTO: 0.6 % (ref 0–0.5)
LYMPHOCYTES # BLD AUTO: 2.42 10*3/MM3 (ref 0.7–3.1)
LYMPHOCYTES NFR BLD AUTO: 30.4 % (ref 19.6–45.3)
MCH RBC QN AUTO: 26.8 PG (ref 26.6–33)
MCHC RBC AUTO-ENTMCNC: 31.9 G/DL (ref 31.5–35.7)
MCV RBC AUTO: 84.1 FL (ref 79–97)
MONOCYTES # BLD AUTO: 0.5 10*3/MM3 (ref 0.1–0.9)
MONOCYTES NFR BLD AUTO: 6.3 % (ref 5–12)
NEUTROPHILS NFR BLD AUTO: 4.69 10*3/MM3 (ref 1.7–7)
NEUTROPHILS NFR BLD AUTO: 59.1 % (ref 42.7–76)
NRBC BLD AUTO-RTO: 0 /100 WBC (ref 0–0.2)
PLATELET # BLD AUTO: 259 10*3/MM3 (ref 140–450)
PMV BLD AUTO: 9.6 FL (ref 6–12)
POTASSIUM SERPL-SCNC: 4.6 MMOL/L (ref 3.5–5.2)
PROT SERPL-MCNC: 6.7 G/DL (ref 6–8.5)
RBC # BLD AUTO: 4.92 10*6/MM3 (ref 3.77–5.28)
SODIUM SERPL-SCNC: 135 MMOL/L (ref 136–145)
WBC # BLD AUTO: 7.95 10*3/MM3 (ref 3.4–10.8)

## 2021-09-13 PROCEDURE — 85025 COMPLETE CBC W/AUTO DIFF WBC: CPT

## 2021-09-13 PROCEDURE — 36415 COLL VENOUS BLD VENIPUNCTURE: CPT

## 2021-09-13 PROCEDURE — 99214 OFFICE O/P EST MOD 30 MIN: CPT | Performed by: NURSE PRACTITIONER

## 2021-09-13 PROCEDURE — 80053 COMPREHEN METABOLIC PANEL: CPT

## 2021-09-13 RX ORDER — EMPAGLIFLOZIN 25 MG/1
1 TABLET, FILM COATED ORAL DAILY
Qty: 30 TABLET | Refills: 5 | Status: SHIPPED | OUTPATIENT
Start: 2021-09-13 | End: 2021-10-11

## 2021-09-13 RX ORDER — ANASTROZOLE 1 MG/1
1 TABLET ORAL DAILY
Qty: 90 TABLET | Refills: 3 | Status: SHIPPED | OUTPATIENT
Start: 2021-09-13 | End: 2022-12-14 | Stop reason: SDUPTHER

## 2021-09-13 RX ORDER — MULTIPLE VITAMINS W/ MINERALS TAB 9MG-400MCG
1 TAB ORAL DAILY
COMMUNITY

## 2021-09-13 NOTE — TELEPHONE ENCOUNTER
Called pt's daughter to let her know that pt medication got sent in to pharmacy. Pt's daughter VIU and said she would let pt know.

## 2021-09-13 NOTE — TELEPHONE ENCOUNTER
----- Message from Susan Clinton sent at 9/13/2021 11:36 AM CDT -----  Subject: Refill Request    QUESTIONS  Name of Medication? empagliflozin (JARDIANCE) 25 MG tablet  Patient-reported dosage and instructions? 1 tablet per day  How many days do you have left? 0  Preferred Pharmacy? [facility] phone number (if available)? [phone]  Additional Information for Provider? pt wants this asap. please refill and   call or text pt.   ---------------------------------------------------------------------------  --------------  CALL BACK INFO  What is the best way for the office to contact you? OK to leave message on   voicemail  Preferred Call Back Phone Number?  [phone]

## 2021-09-15 ENCOUNTER — NURSE TRIAGE (OUTPATIENT)
Dept: OTHER | Facility: CLINIC | Age: 76
End: 2021-09-15

## 2021-09-15 ENCOUNTER — TELEPHONE (OUTPATIENT)
Dept: PRIMARY CARE CLINIC | Age: 76
End: 2021-09-15

## 2021-09-15 NOTE — TELEPHONE ENCOUNTER
Received call from Marilin Hernandez 63 with Red Flag Complaint. Brief description of triage: as above pt calling with a lot of c/os mainly lower leg swelling in feet and ankles both ankles pitting and jerking over the past 2 weeks, states always sob, wants to get o2,, wants to see a rheumatologist    Triage indicates for patient to be seen today pt states cant today so called the office and kandy took the call     Care advice provided, patient verbalizes understanding; denies any other questions or concerns; instructed to call back for any new or worsening symptoms. Attention Provider: Thank you for allowing me to participate in the care of your patient. The patient was connected to triage in response to information provided to the [facility]. Please do not respond through this encounter as the response is not directed to a shared pool. Reason for Disposition   MODERATE swelling of both ankles (e.g., swelling extends up to the knees) AND new onset or worsening    Answer Assessment - Initial Assessment Questions  1. ONSET: \"When did the swelling start? \" (e.g., minutes, hours, days)      2 weeks    2. LOCATION: \"What part of the leg is swollen? \"  \"Are both legs swollen or just one leg? \"      Both ankles and feet    3. SEVERITY: \"How bad is the swelling? \" (e.g., localized; mild, moderate, severe)   - Localized - small area of swelling localized to one leg   - MILD pedal edema - swelling limited to foot and ankle, pitting edema < 1/4 inch (6 mm) deep, rest and elevation eliminate most or all swelling   - MODERATE edema - swelling of lower leg to knee, pitting edema > 1/4 inch (6 mm) deep, rest and elevation only partially reduce swelling   - SEVERE edema - swelling extends above knee, facial or hand swelling present       Mild to mod    4. REDNESS: \"Does the swelling look red or infected? \"      Sl red    5. PAIN: \"Is the swelling painful to touch? \" If so, ask: \"How painful is it? \"   (Scale 1-10; mild, moderate or severe)      6/10    6. FEVER: \"Do you have a fever? \" If so, ask: \"What is it, how was it measured, and when did it start? \"       Denies    7. CAUSE: \"What do you think is causing the leg swelling? \"     Unsure    8. MEDICAL HISTORY: \"Do you have a history of heart failure, kidney disease, liver failure, or cancer? \"      Denies    9. RECURRENT SYMPTOM: \"Have you had leg swelling before? \" If so, ask: \"When was the last time? \" \"What happened that time? \"      Hx after sitting long periods    10. OTHER SYMPTOMS: \"Do you have any other symptoms? \" (e.g., chest pain, difficulty breathing)        Sob  All the time hx copd    11. PREGNANCY: \"Is there any chance you are pregnant? \" \"When was your last menstrual period? \"        N/a    Protocols used: LEG SWELLING AND EDEMA-ADULT-OH

## 2021-09-15 NOTE — TELEPHONE ENCOUNTER
Jeana Luzma contacted the office and reported that her legs and feet are swelling. She said that it is not the normal swelling that she usually has. Jeana Luzma has an appointment for Tuesday, 09/21/2021 but wondered if Dr. Paulino Brannon thought she might need something to get the fluid off. Her phone number is [phone]. She couldn't come in today and already had two other appointments tomorrow as well.

## 2021-09-16 ENCOUNTER — HOSPITAL ENCOUNTER (OUTPATIENT)
Dept: WOMENS IMAGING | Age: 76
Discharge: HOME | End: 2021-09-16
Payer: MEDICARE

## 2021-09-16 ENCOUNTER — OFFICE VISIT (OUTPATIENT)
Dept: SURGERY | Age: 76
End: 2021-09-16

## 2021-09-16 VITALS
HEART RATE: 87 BPM | DIASTOLIC BLOOD PRESSURE: 88 MMHG | TEMPERATURE: 98.1 F | HEIGHT: 62 IN | BODY MASS INDEX: 47.84 KG/M2 | SYSTOLIC BLOOD PRESSURE: 127 MMHG | WEIGHT: 260 LBS

## 2021-09-16 DIAGNOSIS — Z85.3 PERSONAL HISTORY OF MALIGNANT NEOPLASM OF BREAST: ICD-10-CM

## 2021-09-16 DIAGNOSIS — Z12.31 VISIT FOR SCREENING MAMMOGRAM: ICD-10-CM

## 2021-09-16 DIAGNOSIS — Z12.31 VISIT FOR SCREENING MAMMOGRAM: Primary | ICD-10-CM

## 2021-09-16 PROCEDURE — 77063 BREAST TOMOSYNTHESIS BI: CPT

## 2021-09-16 PROCEDURE — 99213 OFFICE O/P EST LOW 20 MIN: CPT | Performed by: PHYSICIAN ASSISTANT

## 2021-09-17 ENCOUNTER — TELEPHONE (OUTPATIENT)
Dept: PRIMARY CARE CLINIC | Age: 76
End: 2021-09-17

## 2021-09-17 NOTE — TELEPHONE ENCOUNTER
Patient called on the 13th and twice on the 15th to preservice routed messages to Dr. Natalie Munguia for [facility] Practice Staff  Subject: Message to Provider     QUESTIONS   Information for Provider? pt want a referral for an oxygen and want a   referral for that. pt will be coming in to see provider on tuesday. and   will talk about it then as well. [address] [facility] [address] Practice Staff  Subject: Message to Provider     QUESTIONS   Information for Provider? pt wanted the phone number to the diabetic   specialist. pt also wanted a a rheumatology referral from dr Anshu Gutierrez. she has   her appt on tuesday to [address] from 12:15 to 1 pm.   ---------------------------------------------------------------------------                   ----- Message from Prerna Mueller sent at 9/13/2021 12:37 PM CDT -----  Subject: Message to Provider    QUESTIONS  Information for Provider? Wants to let Dr. Natalie Munguia know that the diabetes   specialist did not call her, wants Diabetes specialist address and phone   number. Also call her back to schedule an appointment with Rheumatologist   wants their phone number and address too.   ---------------------------------------------------------------------------  --------------  CALL BACK INFO  What is the best way for the office to contact you? OK to leave message on   voicemail  Preferred Call Back Phone Number? [phone]  ---------------------------------------------------------------------------  --------------  SCRIPT ANSWERS  Relationship to Patient?  Self

## 2021-09-21 ENCOUNTER — OFFICE VISIT (OUTPATIENT)
Dept: PRIMARY CARE CLINIC | Age: 76
End: 2021-09-21
Payer: MEDICARE

## 2021-09-21 VITALS
OXYGEN SATURATION: 96 % | HEART RATE: 83 BPM | TEMPERATURE: 99.4 F | WEIGHT: 267.8 LBS | BODY MASS INDEX: 49.28 KG/M2 | HEIGHT: 62 IN | SYSTOLIC BLOOD PRESSURE: 124 MMHG | DIASTOLIC BLOOD PRESSURE: 70 MMHG

## 2021-09-21 DIAGNOSIS — E11.8 TYPE 2 DIABETES MELLITUS WITH COMPLICATION (HCC): ICD-10-CM

## 2021-09-21 DIAGNOSIS — E11.8 TYPE 2 DIABETES MELLITUS WITH COMPLICATION (HCC): Primary | ICD-10-CM

## 2021-09-21 DIAGNOSIS — M25.50 ARTHRALGIA, UNSPECIFIED JOINT: ICD-10-CM

## 2021-09-21 DIAGNOSIS — L29.9 PRURITUS, UNSPECIFIED: ICD-10-CM

## 2021-09-21 DIAGNOSIS — R06.09 CHRONIC DYSPNEA: ICD-10-CM

## 2021-09-21 DIAGNOSIS — R06.09 DYSPNEA ON EXERTION: ICD-10-CM

## 2021-09-21 DIAGNOSIS — I83.893 VARICOSE VEINS OF LEG WITH SWELLING, BILATERAL: ICD-10-CM

## 2021-09-21 PROCEDURE — 99214 OFFICE O/P EST MOD 30 MIN: CPT | Performed by: STUDENT IN AN ORGANIZED HEALTH CARE EDUCATION/TRAINING PROGRAM

## 2021-09-21 RX ORDER — LANCETS 30 GAUGE
1 EACH MISCELLANEOUS 4 TIMES DAILY
Qty: 600 EACH | Refills: 1 | Status: SHIPPED | OUTPATIENT
Start: 2021-09-21

## 2021-09-21 RX ORDER — FUROSEMIDE 20 MG/1
20 TABLET ORAL DAILY
Qty: 60 TABLET | Refills: 3 | Status: SHIPPED | OUTPATIENT
Start: 2021-09-21 | End: 2022-04-17

## 2021-09-21 RX ORDER — BLOOD SUGAR DIAGNOSTIC
STRIP MISCELLANEOUS
Qty: 100 EACH | Refills: 0 | Status: SHIPPED | OUTPATIENT
Start: 2021-09-21 | End: 2021-11-10

## 2021-09-21 NOTE — PATIENT INSTRUCTIONS
[facility] [address] [facility]    [address]   [address], [address]   [place], [address]   Phone: [phone]

## 2021-09-21 NOTE — TELEPHONE ENCOUNTER
Biju Santos called to request a refill on her medication.       Last office visit : 9/21/2021   Next office visit : Visit date not found     Requested Prescriptions     Pending Prescriptions Disp Refills    Insulin Pen Needle 31G X 5 MM MISC 100 each 1     Sig: As directed            [employer]

## 2021-09-21 NOTE — PROGRESS NOTES
[address] [facility]  [address] [address]  [address] [address]  Dept: [phone]  Dept Fax: [phone]  Loc: [phone]      Subjective:     Chief Complaint   Patient presents with    Follow-up     DM; pt reports BS is still running between 200-210.  Leg Swelling     pt reports she got bit by something and has little red spots on both legs     Referral - General     for rheumatology and oxygen       HPI:  Carolina Anne is a 70 y.o. female presenting for    Requesting rheumatology referral for osteoarthritis   Requesting supplemental oxygen however does not specifically articulate clear pattern of dysnea. Denies cp, palpitation. Does endorse chronic leg swelling as noted below. Doesn't smoke or have any known chronic lung disease. PFT done 12/2019 suggested restrictive physiology. Her BMI is 49. BL lower leg swelling ankles/feet x3 weeks. Legs have been swollen intermittently previously. No calf tenderness or recent immobility    DMT2- Fasting BG average 170-210, takes 160u tresiba, novalog  w/ meals. Last a1c 10.4 06/2021. She states she is watching her diet but has endorsed eating a lot of fruits. I have referred her to [facility] endocrinology. Pt is unaware of efforts by staff to reach her.     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    Breast CA (HCC)     Left breast Nodules removed Took radiation    Breast cancer (HCC)     Chronic back pain     Chronic kidney disease     Overactive bladder     COPD (chronic obstructive pulmonary disease) ([facility])     x few years Scarring on lungs 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    Diabetes mellitus ([address]     On insulin x 10 years    Fibromyalgia     Fibromyalgia     for 20 years    GERD (gastroesophageal reflux disease)     History of blood transfusion     ?when    History of therapeutic radiation     Hyperlipidemia     High cholesterol    Hypertension     On medications for 2 years    Joint pain 2/2/2016    Liver disease     Has fatty liver    Morbid obesity ([address]     Multiple food allergies     Neuropathic pain     Neuropathy     Neuropathy involving both lower extremities 2/2/2016    Obstructive sleep apnea     AHI: 68.1 per PSG, 5/2018    Osteoarthritis     Other disorders of kidney and ureter in diseases classified elsewhere     Pneumonia     Postmenopausal osteoporosis     Psychiatric problem     Restless leg syndrome     S/P lumpectomy, left breast 8/19/2015 8/4/2015    Sleep apnea     Type II or unspecified type diabetes mellitus with neurological manifestations, uncontrolled(250.62)      Patient Active Problem List   Diagnosis    Postmenopausal osteoporosis    Type 2 diabetes mellitus with complication (HCC)    COPD (chronic obstructive pulmonary disease) (HCC)    Mood disorder ([address]    Dementia ([address]    Malignant neoplasm of upper-outer quadrant of female breast ([address]    Joint pain    Neuropathy involving both lower extremities    Back pain    Dyspepsia    Lumbar facet arthropathy    Body mass index 45.0-49.9, adult ([address]    Essential hypertension    Hyperlipidemia    Morbid obesity ([address]    DARSHAN (obstructive sleep apnea)    BiPAP (biphasic positive airway pressure) dependence    Restless leg syndrome    History of breast cancer       PSHx:  Past Surgical History:   Procedure Laterality Date    BREAST SURGERY Left 8/4/2015    left partial mastectomy on 8/4/15    EYE SURGERY      EYE SURGERY Bilateral 04/02/2017    Dr. Isis Martinez      broke L ankle due to osteoporosis, has hardware in    FRACTURE SURGERY      Left ankle     HYSTERECTOMY      MD DRAIN SKIN ABSCESS COMPLIC N/A 9/11/8950    ABDOMINAL WALL ABSCESS INCISION AND DRAINAGE performed by Vick Palm MD at [address] EXTRACTION      UPPER GASTROINTESTINAL ENDOSCOPY  4/8/2016    Dr Andrew Callahan, (-) H Pylori    WRIST SURGERY         PFHx:  Family History   Problem Relation Age of Onset    High Blood Pressure Father     Heart Disease Father     Diabetes Father     High Blood Pressure Sister     Diabetes Sister     Ovarian Cancer Sister 43    High Blood Pressure Brother     Colon Cancer Neg Hx     Colon Polyps Neg Hx     Esophageal Cancer Neg Hx     Liver Cancer Neg Hx     Liver Disease Neg Hx     Rectal Cancer Neg Hx     Stomach Cancer Neg Hx        SocialHx:  Social History     Tobacco Use    Smoking status: Never Smoker    Smokeless tobacco: Never Used   Substance Use Topics    Alcohol use: Yes     Alcohol/week: 2.0 standard drinks     Types: 1 Glasses of wine, 1 Cans of beer per week     Comment: occ       Allergies: Allergies   Allergen Reactions    Pcn [Penicillins] Hives and Itching     Patient states she can tolerate ampicillin and amoxicillin however.  Cefdinir        Medications:  Current Outpatient Medications   Medication Sig Dispense Refill    furosemide (LASIX) 20 MG tablet Take 1 tablet by mouth daily 60 tablet 3    blood glucose test strips (ONETOUCH ULTRA) strip TEST 3 TIMES A  each 0    Lancets MISC 1 each by Does not apply route 4 times daily 600 each 1    empagliflozin (JARDIANCE) 25 MG tablet Take 1 tablet by mouth daily 30 tablet 5    JANUMET  MG per tablet TAKE 1 TABLET BY MOUTH 2 TIMES A DAY WITH MEALS  60 tablet 0    metoprolol succinate (TOPROL XL) 50 MG extended release tablet TAKE 1 TABLET BY MOUTH ONCE DAILY FOR BLOOD PRESSURE AND HEART PROTECTION.   30 tablet 2    baclofen (LIORESAL) 10 MG tablet TAKE 1 TABLET BY MOUTH 3 TIMES A DAY 30 tablet 0    rosuvastatin (CRESTOR) 10 MG tablet TAKE 1 TABLET BY MOUTH ONCE DAILY 90 tablet 1    insulin aspart (NOVOLOG FLEXPEN) 100 UNIT/ML injection pen INJECT 34 UNITS SUBCUTANEOUSLY 3 TIMES A   DAY BEFORE MEALS. 45 mL 0    citalopram (CELEXA) 20 MG tablet TAKE 1 TABLET BY MOUTH ONCE DAILY 30 tablet 0    oxybutynin (DITROPAN-XL) 10 MG extended release tablet TAKE 1 TABLET DAILY 90 tablet 1    glucose monitoring (FREESTYLE FREEDOM) kit 1 kit by Does not apply route daily 1 kit 0    losartan (COZAAR) 100 MG tablet TAKE 1 TABLET DAILY FOR    BLOOD PRESSURE 90 tablet 1    gabapentin (NEURONTIN) 300 MG capsule TAKE 1 CAPSULE BY MOUTH 3 TIMES A DAY 90 capsule 3    omeprazole (PRILOSEC) 20 MG delayed release capsule TAKE 1 CAPSULE 2 TIMES     DAILY AS NEEDED 180 capsule 1    magnesium oxide (MAG-OX) 400 MG tablet TAKE 1 TABLET DAILY 90 tablet 3    ULTICARE MINI PEN NEEDLES 31G X 6 MM MISC USE 2 TIMES A  each 0    cyproheptadine (PERIACTIN) 4 MG tablet Take 1 tablet by mouth 3 times daily as needed (itching or rash) Hold the claritin on the days this is used for itching 21 tablet 0    ketoconazole (NIZORAL) 2 % shampoo Apply topically daily as needed. 1 Bottle 0    albuterol (PROVENTIL) (5 MG/ML) 0.5% nebulizer solution Take 1ml twice daily for 10 days 120 each 3    Nebulizers (AIRIAL COMPACT MINI NEBULIZER) MISC Patient has COPD and problems with breathing. 1 each 0    tiotropium (SPIRIVA) 18 MCG inhalation capsule Inhale 1 capsule into the lungs daily.  clobetasol (TEMOVATE) 0.05 % ointment Apply topically 2 times daily. For 10 days 1 Tube 0    nystatin (MYCOSTATIN) 098900 UNIT/GM cream Apply topically 2 times daily. 1 Tube 1    aspirin 81 MG chewable tablet Take 1 tablet by mouth daily 30 tablet 3    HYDROcodone-acetaminophen (NORCO)  MG per tablet Take 1 tablet by mouth Daily with lunch. Mohsen Reilly [facility]. Devices (ROLLER WALKER) MISC 1 each by Does not apply route daily Pt states that she falls at least once a month up to 3 times a month.  1 each 0    INSULIN SYRINGE 1CC/29G 29G X 1/2\" 1 ML MISC Use with each dose of novolog TID 90 each 1    mometasone-formoterol (DULERA) 200-5 MCG/ACT inhaler Inhale 2 puffs into the lungs every 12 hours      anastrozole (ARIMIDEX) 1 MG tablet Take 1 mg by mouth daily      Multiple Vitamins-Minerals (MULTIVITAMIN ADULT PO) Take 1 tablet by mouth daily       albuterol (PROVENTIL HFA;VENTOLIN HFA) 108 (90 BASE) MCG/ACT inhaler Inhale 2 puffs into the lungs every 4 hours as needed for Wheezing      Incontinence Supply Disposable (POISE PANTILINERS) PADS Use pads as needed daily for urine leakage. 1 each 11    vitamin D (ERGOCALCIFEROL) 1.25 MG (57942 UT) CAPS capsule TAKE 1 CAPSULE ONCE WEEKLY 12 capsule 2    loratadine (CLARITIN) 10 MG tablet TAKE 1 TABLET BY MOUTH ONCE DAILY. 30 tablet 0    Insulin Degludec (TRESIBA FLEXTOUCH) 200 UNIT/ML SOPN INJECT 139 UNITS SUBCUTANEOUSLY NIGHTLY. Increase tresiba dose by 2-3units every 2-3 days to achieve fasting AM blood glucose . 27 mL 0    montelukast (SINGULAIR) 10 MG tablet TAKE 1 TABLET BY MOUTH AT NIGHT  30 tablet 2    Insulin Pen Needle 31G X 5 MM MISC As directed 100 each 1     No current facility-administered medications for this visit. Objective:   PE:  /70   Pulse 83   Temp 99.4 °F (37.4 °C) (Temporal)   Ht 5' 2\" (1.575 m)   Wt 267 lb 12.8 oz (121.5 kg)   LMP  (LMP Unknown)   SpO2 96%   BMI 48.98 kg/m²   Physical Exam  Constitutional:       General: She is not in acute distress. Appearance: Normal appearance. HENT:      Head: Normocephalic. Nose: Nose normal.      Mouth/Throat:      Mouth: Mucous membranes are moist.      Pharynx: Oropharynx is clear. No oropharyngeal exudate or posterior oropharyngeal erythema. Eyes:      General: No scleral icterus. Extraocular Movements: Extraocular movements intact. Conjunctiva/sclera: Conjunctivae normal.   Cardiovascular:      Rate and Rhythm: Normal rate and regular rhythm.       Pulses: Normal pulses. Heart sounds: No murmur heard. Comments: No JVD  Pulmonary:      Effort: Pulmonary effort is normal.      Breath sounds: Normal breath sounds. Abdominal:      General: There is no distension. Palpations: Abdomen is soft. There is no mass. Tenderness: There is no abdominal tenderness. Musculoskeletal:         General: Normal range of motion. Cervical back: Normal range of motion. Right lower leg: Edema present. Left lower leg: Edema present. Skin:     General: Skin is warm and dry. Capillary Refill: Capillary refill takes less than 2 seconds. Neurological:      General: No focal deficit present. Mental Status: She is alert and oriented to person, place, and time. Psychiatric:         Mood and Affect: Mood normal.         Behavior: Behavior normal.         Thought Content: Thought content normal.            Assessment & Plan   Michael Hampton was seen today for follow-up, leg swelling and referral - general.    Diagnoses and all orders for this visit:    Type 2 diabetes mellitus with complication ([address]  -Uncontrolled. Discussed titrating up insulin to reach fasting goals. Discussed dietary recommendations, specifically limiting excess carbohydrates. Provided patient contact number for [facility] endocrinology and recommended she reach out to them for appointment  -     blood glucose test strips (ONETOUCH ULTRA) strip; TEST 3 TIMES A DAY  -     Lancets MISC; 1 each by Does not apply route 4 times daily    Varicose veins of leg with swelling, bilateral  -     Reflux study/Reflux Venous Insufficiency Bilateral; Future  -     Brain Natriuretic Peptide; Future  -     TSH WITH REFLEX TO FT4; Future  -     Comprehensive Metabolic Panel; Future  -     Microalbumin / Creatinine Urine Ratio; Future  -     furosemide (LASIX) 20 MG tablet;  Take 1 tablet by mouth daily    Chronic dyspnea  -Likely related to patient's body habitus and chronic deconditioning as suggested by restrictive physiology on previous PFT. No signs of obvious heart failure on exam.  Explained to patient no clear indication for oxygen therapy at this time. Recommended starting evaluation with PFT to evaluate lung function better. Discussed recommendation for weight loss and how to achieve with diet  -     Full PFT Study With Bronchodilator; Future    Arthralgia, unspecified joint  -     External Referral To Rheumatology    Dyspnea on exertion   -     Brain Natriuretic Peptide; Future    Pruritus, unspecified   -     TSH WITH REFLEX TO FT4; Future      Return in about 2 weeks (around 10/5/2021). All questions were answered. Medications, including possible adverse effects, and instructions were reviewed and  understanding was confirmed. Follow-up recommendations, including when to contact or return to office (ie; if symptoms worsen or fail to improve), were discussed and acknowledged.     Electronically signed by Maynor Shaikh MD on 9/21/21 at 1:59 PM CDT

## 2021-09-27 RX ORDER — MONTELUKAST SODIUM 10 MG/1
TABLET ORAL
Qty: 30 TABLET | Refills: 2 | Status: SHIPPED | OUTPATIENT
Start: 2021-09-27 | End: 2022-01-04

## 2021-09-30 ENCOUNTER — TELEPHONE (OUTPATIENT)
Dept: PRIMARY CARE CLINIC | Age: 76
End: 2021-09-30

## 2021-09-30 DIAGNOSIS — E11.8 TYPE 2 DIABETES MELLITUS WITH COMPLICATION (HCC): ICD-10-CM

## 2021-09-30 RX ORDER — INSULIN DEGLUDEC 200 U/ML
INJECTION, SOLUTION SUBCUTANEOUS
Qty: 27 ML | Refills: 0 | Status: SHIPPED | OUTPATIENT
Start: 2021-09-30 | End: 2021-11-03

## 2021-09-30 NOTE — TELEPHONE ENCOUNTER
----- Message from ronnie Cameron sent at 9/29/2021  1:56 PM CDT -----  Subject: Refill Request    QUESTIONS  Name of Medication? vitamin D (ERGOCALCIFEROL) 1.25 MG (53956 UT) CAPS   capsule  Patient-reported dosage and instructions? 1 weekly  How many days do you have left? 1  Preferred Pharmacy? [facility] phone number (if available)? [phone]  ---------------------------------------------------------------------------  --------------  CALL BACK INFO  What is the best way for the office to contact you? OK to leave message on   voicemail  Preferred Call Back Phone Number?  [phone]

## 2021-09-30 NOTE — TELEPHONE ENCOUNTER
----- Message from Vivi Cariasuccarlos enrique [address] sent at 9/29/2021  1:59 PM CDT -----  Subject: Refill Request    QUESTIONS  Name of Medication? Insulin Degludec (TRESIBA FLEXTOUCH) 200 UNIT/ML SOPN  Patient-reported dosage and instructions? INJECT 139 UNITS SUBCUTANEOUSLY   NIGHTLY. Increase tresiba dose by 2-3units every 2-3 days to achieve   fasting AM blood glucose . How many days do you have left? 0  Preferred Pharmacy? [facility] phone number (if available)? [phone]  ---------------------------------------------------------------------------  --------------  CALL BACK INFO  What is the best way for the office to contact you? OK to leave message on   voicemail  Preferred Call Back Phone Number?  [phone]

## 2021-09-30 NOTE — TELEPHONE ENCOUNTER
Christina Love called to request a refill on her medication. Last office visit : 9/21/2021   Next office visit : 10/11/2021     Requested Prescriptions     Pending Prescriptions Disp Refills    Insulin Degludec (TRESIBA FLEXTOUCH) 200 UNIT/ML SOPN 27 mL 0     Sig: INJECT 139 UNITS SUBCUTANEOUSLY NIGHTLY. Increase tresiba dose by 2-3units every 2-3 days to achieve fasting AM blood glucose .             Karine Merida

## 2021-10-04 ENCOUNTER — TELEPHONE (OUTPATIENT)
Dept: PRIMARY CARE CLINIC | Age: 76
End: 2021-10-04

## 2021-10-05 RX ORDER — LORATADINE 10 MG/1
TABLET ORAL
Qty: 30 TABLET | Refills: 0 | Status: SHIPPED | OUTPATIENT
Start: 2021-10-05 | End: 2021-11-04

## 2021-10-06 ENCOUNTER — TELEPHONE (OUTPATIENT)
Dept: PRIMARY CARE CLINIC | Age: 76
End: 2021-10-06

## 2021-10-06 RX ORDER — ERGOCALCIFEROL 1.25 MG/1
CAPSULE ORAL
Qty: 12 CAPSULE | Refills: 2 | Status: SHIPPED | OUTPATIENT
Start: 2021-10-06 | End: 2022-07-07

## 2021-10-06 NOTE — TELEPHONE ENCOUNTER
Spoke with patient and informed her of her upcoming appointment for reflux study. Also told patient she had a refill at the pharmacy for the medication she requested. Patient thanked me and ANNA.

## 2021-10-11 ENCOUNTER — OFFICE VISIT (OUTPATIENT)
Dept: PRIMARY CARE CLINIC | Age: 76
End: 2021-10-11
Payer: MEDICARE

## 2021-10-11 VITALS
DIASTOLIC BLOOD PRESSURE: 70 MMHG | WEIGHT: 268.4 LBS | HEART RATE: 83 BPM | HEIGHT: 62 IN | OXYGEN SATURATION: 96 % | SYSTOLIC BLOOD PRESSURE: 130 MMHG | BODY MASS INDEX: 49.39 KG/M2 | TEMPERATURE: 98.6 F

## 2021-10-11 DIAGNOSIS — M79.89 LEG SWELLING: ICD-10-CM

## 2021-10-11 DIAGNOSIS — E11.8 TYPE 2 DIABETES MELLITUS WITH COMPLICATION (HCC): Primary | ICD-10-CM

## 2021-10-11 DIAGNOSIS — R06.09 CHRONIC DYSPNEA: ICD-10-CM

## 2021-10-11 DIAGNOSIS — Z23 FLU VACCINE NEED: ICD-10-CM

## 2021-10-11 DIAGNOSIS — R30.0 DYSURIA: ICD-10-CM

## 2021-10-11 LAB
APPEARANCE FLUID: CLEAR
BILIRUBIN, POC: NORMAL
BLOOD URINE, POC: NORMAL
CLARITY, POC: CLEAR
COLOR, POC: YELLOW
GLUCOSE URINE, POC: 500
HBA1C MFR BLD: 8.4 %
KETONES, POC: NORMAL
LEUKOCYTE EST, POC: NORMAL
NITRITE, POC: NORMAL
PH, POC: 7
PROTEIN, POC: NORMAL
SPECIFIC GRAVITY, POC: 1.03
UROBILINOGEN, POC: 0.2

## 2021-10-11 PROCEDURE — G0008 ADMIN INFLUENZA VIRUS VAC: HCPCS | Performed by: STUDENT IN AN ORGANIZED HEALTH CARE EDUCATION/TRAINING PROGRAM

## 2021-10-11 PROCEDURE — 83036 HEMOGLOBIN GLYCOSYLATED A1C: CPT | Performed by: STUDENT IN AN ORGANIZED HEALTH CARE EDUCATION/TRAINING PROGRAM

## 2021-10-11 PROCEDURE — 99214 OFFICE O/P EST MOD 30 MIN: CPT | Performed by: STUDENT IN AN ORGANIZED HEALTH CARE EDUCATION/TRAINING PROGRAM

## 2021-10-11 PROCEDURE — 3052F HG A1C>EQUAL 8.0%<EQUAL 9.0%: CPT | Performed by: STUDENT IN AN ORGANIZED HEALTH CARE EDUCATION/TRAINING PROGRAM

## 2021-10-11 PROCEDURE — 90694 VACC AIIV4 NO PRSRV 0.5ML IM: CPT | Performed by: STUDENT IN AN ORGANIZED HEALTH CARE EDUCATION/TRAINING PROGRAM

## 2021-10-11 PROCEDURE — 81002 URINALYSIS NONAUTO W/O SCOPE: CPT | Performed by: STUDENT IN AN ORGANIZED HEALTH CARE EDUCATION/TRAINING PROGRAM

## 2021-10-11 RX ORDER — MAGNESIUM OXIDE 400 MG/1
400 TABLET ORAL DAILY
Qty: 90 TABLET | Refills: 3 | Status: SHIPPED | OUTPATIENT
Start: 2021-10-11

## 2021-10-11 RX ORDER — SITAGLIPTIN AND METFORMIN HYDROCHLORIDE 1000; 50 MG/1; MG/1
1 TABLET, FILM COATED ORAL 2 TIMES DAILY WITH MEALS
Qty: 180 TABLET | Refills: 3 | Status: SHIPPED | OUTPATIENT
Start: 2021-10-11 | End: 2021-12-16

## 2021-10-11 NOTE — PATIENT INSTRUCTIONS
[facility] Endocrinology for diabetes    Lamar Espinoza. [email or web address]  [phone]

## 2021-10-12 ENCOUNTER — HOSPITAL ENCOUNTER (OUTPATIENT)
Dept: VASCULAR LAB | Age: 76
Discharge: HOME | End: 2021-10-12
Payer: MEDICARE

## 2021-10-12 PROCEDURE — 93970 EXTREMITY STUDY: CPT

## 2021-10-13 ENCOUNTER — TELEPHONE (OUTPATIENT)
Dept: PRIMARY CARE CLINIC | Age: 76
End: 2021-10-13

## 2021-10-13 NOTE — TELEPHONE ENCOUNTER
Called patient back she states the oxybutynin isn't doing enough and would like it increased  Please advise. Delgado Wilson

## 2021-10-14 ENCOUNTER — TELEPHONE (OUTPATIENT)
Dept: PRIMARY CARE CLINIC | Age: 76
End: 2021-10-14

## 2021-10-14 NOTE — TELEPHONE ENCOUNTER
Please advise pt she can increase oxybutynin dose to 20mg (take 2 tablets).  I will renew rx when we find dose that works best for her

## 2021-10-20 ENCOUNTER — TELEPHONE (OUTPATIENT)
Dept: PODIATRY | Facility: CLINIC | Age: 76
End: 2021-10-20

## 2021-10-21 ENCOUNTER — TELEPHONE (OUTPATIENT)
Dept: PRIMARY CARE CLINIC | Age: 76
End: 2021-10-21

## 2021-10-21 NOTE — TELEPHONE ENCOUNTER
Please let pt know that lower leg vein study showed signs of venous insufficiency, or leaky veins, which is probably a large part of her lower leg swelling. At this time I recommend getting fitted for compression stockings (can be had at local pharmacy), limiting salt intake w/ processed foods, and elevating her legs. I attempted to reach pt to discuss results and LM requestin THANG.

## 2021-10-21 NOTE — TELEPHONE ENCOUNTER
----- Message from Agustín Ramachandran sent at 10/21/2021  1:01 PM CDT -----  Subject: Results Request    QUESTIONS  Which lab or imaging result is the patient calling about? Reflux   study/Reflux Venous Insufficiency Bilateral  Which provider ordered the test? Siobhan Kraft   At what location was the test performed? Date the test was performed? 2021-10-12  Additional Information for Provider? PT is wanting results from venous   study. Please advise.   ---------------------------------------------------------------------------  --------------  CALL BACK INFO  What is the best way for the office to contact you? OK to leave message on   voicemail, OK to respond with electronic message via [employer] portal (only   for patients who have registered [employer] account)  Preferred Call Back Phone Number?  [phone]

## 2021-10-22 ENCOUNTER — OFFICE VISIT (OUTPATIENT)
Dept: PODIATRY | Facility: CLINIC | Age: 76
End: 2021-10-22

## 2021-10-22 VITALS
OXYGEN SATURATION: 95 % | SYSTOLIC BLOOD PRESSURE: 160 MMHG | BODY MASS INDEX: 43.9 KG/M2 | DIASTOLIC BLOOD PRESSURE: 60 MMHG | HEIGHT: 62 IN | HEART RATE: 84 BPM

## 2021-10-22 DIAGNOSIS — M20.42 HAMMERTOE, BILATERAL: ICD-10-CM

## 2021-10-22 DIAGNOSIS — M20.12 VALGUS DEFORMITY OF BOTH GREAT TOES: ICD-10-CM

## 2021-10-22 DIAGNOSIS — Z79.4 TYPE 2 DIABETES MELLITUS WITH DIABETIC POLYNEUROPATHY, WITH LONG-TERM CURRENT USE OF INSULIN (HCC): ICD-10-CM

## 2021-10-22 DIAGNOSIS — M20.11 VALGUS DEFORMITY OF BOTH GREAT TOES: ICD-10-CM

## 2021-10-22 DIAGNOSIS — B35.1 ONYCHOMYCOSIS: Primary | ICD-10-CM

## 2021-10-22 DIAGNOSIS — M79.671 FOOT PAIN, BILATERAL: ICD-10-CM

## 2021-10-22 DIAGNOSIS — L84 FOOT CALLUS: ICD-10-CM

## 2021-10-22 DIAGNOSIS — E11.42 TYPE 2 DIABETES MELLITUS WITH DIABETIC POLYNEUROPATHY, WITH LONG-TERM CURRENT USE OF INSULIN (HCC): ICD-10-CM

## 2021-10-22 DIAGNOSIS — M20.41 HAMMERTOE, BILATERAL: ICD-10-CM

## 2021-10-22 DIAGNOSIS — M79.672 FOOT PAIN, BILATERAL: ICD-10-CM

## 2021-10-22 DIAGNOSIS — E66.01 OBESITY, MORBID, BMI 40.0-49.9 (HCC): ICD-10-CM

## 2021-10-22 PROCEDURE — 11055 PARING/CUTG B9 HYPRKER LES 1: CPT | Performed by: NURSE PRACTITIONER

## 2021-10-22 PROCEDURE — 11721 DEBRIDE NAIL 6 OR MORE: CPT | Performed by: NURSE PRACTITIONER

## 2021-10-22 PROCEDURE — 99213 OFFICE O/P EST LOW 20 MIN: CPT | Performed by: NURSE PRACTITIONER

## 2021-10-22 RX ORDER — BACLOFEN 10 MG/1
10 TABLET ORAL 3 TIMES DAILY
COMMUNITY

## 2021-10-22 RX ORDER — FUROSEMIDE 20 MG/1
20 TABLET ORAL 2 TIMES DAILY
COMMUNITY

## 2021-10-22 RX ORDER — CYPROHEPTADINE HYDROCHLORIDE 4 MG/1
4 TABLET ORAL 3 TIMES DAILY PRN
COMMUNITY

## 2021-10-22 RX ORDER — CLOBETASOL PROPIONATE 0.5 MG/G
1 OINTMENT TOPICAL 2 TIMES DAILY
COMMUNITY

## 2021-10-28 ENCOUNTER — TELEPHONE (OUTPATIENT)
Dept: PRIMARY CARE CLINIC | Age: 76
End: 2021-10-28

## 2021-10-28 NOTE — TELEPHONE ENCOUNTER
2nd time called left pt message this time to call to go over results    Kaylyn Knuckles, MD Tyra Epley, MA  Please let pt know that lower leg vein study showed signs of venous insufficiency, or leaky veins, which is probably a large part of her lower leg swelling.  At this time I recommend getting fitted for compression stockings (can be had at local pharmacy), limiting salt intake w/ processed foods, and elevating her legs.

## 2021-10-29 ENCOUNTER — TELEPHONE (OUTPATIENT)
Dept: PRIMARY CARE CLINIC | Age: 76
End: 2021-10-29

## 2021-11-02 DIAGNOSIS — E11.8 TYPE 2 DIABETES MELLITUS WITH COMPLICATION (HCC): ICD-10-CM

## 2021-11-03 DIAGNOSIS — E11.8 TYPE 2 DIABETES MELLITUS WITH COMPLICATION (HCC): ICD-10-CM

## 2021-11-03 RX ORDER — INSULIN DEGLUDEC 200 U/ML
INJECTION, SOLUTION SUBCUTANEOUS
Qty: 27 ML | Refills: 0 | Status: SHIPPED | OUTPATIENT
Start: 2021-11-03 | End: 2021-11-10

## 2021-11-03 RX ORDER — CITALOPRAM 20 MG/1
TABLET ORAL
Qty: 30 TABLET | Refills: 1 | Status: SHIPPED | OUTPATIENT
Start: 2021-11-03 | End: 2022-03-04

## 2021-11-03 RX ORDER — INSULIN ASPART 100 [IU]/ML
INJECTION, SOLUTION INTRAVENOUS; SUBCUTANEOUS
Qty: 45 ML | Refills: 5 | Status: SHIPPED | OUTPATIENT
Start: 2021-11-03 | End: 2022-01-19 | Stop reason: SDUPTHER

## 2021-11-03 RX ORDER — INSULIN ASPART 100 [IU]/ML
INJECTION, SOLUTION INTRAVENOUS; SUBCUTANEOUS
Qty: 30 ML | Refills: 0 | Status: SHIPPED | OUTPATIENT
Start: 2021-11-03 | End: 2021-12-01

## 2021-11-03 NOTE — TELEPHONE ENCOUNTER
----- Message from Stef Schofield sent at 11/3/2021 12:18 PM CDT -----  Subject: Refill Request    QUESTIONS  Name of Medication? insulin aspart (NOVOLOG FLEXPEN) 100 UNIT/ML injection   pen  Patient-reported dosage and instructions? 240 ml a day  How many days do you have left? 2  Preferred Pharmacy? [address] [facility] phone number (if available)? [phone]  ---------------------------------------------------------------------------  --------------,  Name of Medication? Insulin Degludec (TRESIBA FLEXTOUCH) 200 UNIT/ML SOPN  Patient-reported dosage and instructions? 220 a day  How many days do you have left? 0  Preferred Pharmacy? [address] [facility] phone number (if available)? [phone]  ---------------------------------------------------------------------------  --------------  CALL BACK INFO  What is the best way for the office to contact you? OK to leave message on   voicemail  Preferred Call Back Phone Number?  [phone]

## 2021-11-04 DIAGNOSIS — G57.93 NEUROPATHY INVOLVING BOTH LOWER EXTREMITIES: Chronic | ICD-10-CM

## 2021-11-04 RX ORDER — LORATADINE 10 MG/1
TABLET ORAL
Qty: 30 TABLET | Refills: 0 | Status: SHIPPED | OUTPATIENT
Start: 2021-11-04 | End: 2021-12-01

## 2021-11-05 RX ORDER — GABAPENTIN 300 MG/1
CAPSULE ORAL
Qty: 90 CAPSULE | Refills: 3 | Status: SHIPPED | OUTPATIENT
Start: 2021-11-05 | End: 2022-05-16 | Stop reason: SDUPTHER

## 2021-11-05 NOTE — TELEPHONE ENCOUNTER
JARROD was reviewed today per office protocol. Report shows No discrepancies. Fill pattern is consistent from single provider(s) at single pharmacy(s).     Presciption Escribed

## 2021-11-09 ENCOUNTER — OFFICE VISIT (OUTPATIENT)
Dept: PRIMARY CARE CLINIC | Age: 76
End: 2021-11-09
Payer: MEDICARE

## 2021-11-09 VITALS
OXYGEN SATURATION: 97 % | HEART RATE: 80 BPM | DIASTOLIC BLOOD PRESSURE: 72 MMHG | SYSTOLIC BLOOD PRESSURE: 128 MMHG | WEIGHT: 260 LBS | HEIGHT: 62 IN | TEMPERATURE: 98 F | BODY MASS INDEX: 47.84 KG/M2

## 2021-11-09 DIAGNOSIS — E11.8 TYPE 2 DIABETES MELLITUS WITH COMPLICATION (HCC): Primary | ICD-10-CM

## 2021-11-09 DIAGNOSIS — I87.2 VENOUS INSUFFICIENCY OF BOTH LOWER EXTREMITIES: ICD-10-CM

## 2021-11-09 PROCEDURE — 3052F HG A1C>EQUAL 8.0%<EQUAL 9.0%: CPT | Performed by: STUDENT IN AN ORGANIZED HEALTH CARE EDUCATION/TRAINING PROGRAM

## 2021-11-09 PROCEDURE — 99214 OFFICE O/P EST MOD 30 MIN: CPT | Performed by: STUDENT IN AN ORGANIZED HEALTH CARE EDUCATION/TRAINING PROGRAM

## 2021-11-09 NOTE — PROGRESS NOTES
[address] [facility]  [address] [address] [place] [address]  [address] [address]  Dept: [phone]  Dept Fax: [phone]  Loc: [phone]      Subjective:     Chief Complaint   Patient presents with    Diabetes     Patient is here for type 2 diabeties     Leg Pain     Bilateral leg swelling-        HPI:  Marisabel Langford is a 70 y.o. female presenting for    Pt here for follow up. Pt has T2DM, uncontrolled however improved recently (A1c 10.4->8. 4). She is supposed to be taking tresiba (now 160u in evening-titratable) and novolog (starting 34u w/ meals). Pt states she decided to give herself 60u tresiba extra in the morning in addition to evening dose. I did not prescribe her to administer this. She gives herself novolog (however 60 u and not predictably w/ meals as prescribed. She has her BG log with her which shows improved fasting BG in last week to levels in 120's. She was referred to [facility] endocrinology however per chart review they have not been able to get a hold of pt or leave her VM. Pt says phone isn't working right. C/o lower leg swelling. Recent imaging has shown evidence of BL reflux. She only wears compressive hose occasionally. She elevates legs. She states sx are worse at end of day, better in the morning.       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    Breast CA (HCC)     Left breast Nodules removed Took radiation    Breast cancer (HCC)     Chronic back pain     Chronic kidney disease     Overactive bladder     COPD (chronic obstructive pulmonary disease) (HCC)     x few years Scarring on lungs 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 [place]    Diabetes mellitus ([facility] [address] SURGERY      Left ankle     HYSTERECTOMY      CA DRAIN SKIN ABSCESS COMPLIC N/A 3/06/8992    ABDOMINAL WALL ABSCESS INCISION AND DRAINAGE performed by Rajani Avelar MD at [address], [address] EXTRACTION      UPPER GASTROINTESTINAL ENDOSCOPY  4/8/2016    Dr Karen White, (-) H Pylori    WRIST SURGERY         PFHx:  Family History   Problem Relation Age of Onset    High Blood Pressure Father     Heart Disease Father     Diabetes Father     High Blood Pressure Sister     Diabetes Sister     Ovarian Cancer Sister 43    High Blood Pressure Brother     Colon Cancer Neg Hx     Colon Polyps Neg Hx     Esophageal Cancer Neg Hx     Liver Cancer Neg Hx     Liver Disease Neg Hx     Rectal Cancer Neg Hx     Stomach Cancer Neg Hx        SocialHx:  Social History     Tobacco Use    Smoking status: Never Smoker    Smokeless tobacco: Never Used   Substance Use Topics    Alcohol use: Yes     Alcohol/week: 2.0 standard drinks     Types: 1 Glasses of wine, 1 Cans of beer per week     Comment: occ       Allergies: Allergies   Allergen Reactions    Pcn [Penicillins] Hives and Itching     Patient states she can tolerate ampicillin and amoxicillin however.  Cefdinir        Medications:  Current Outpatient Medications   Medication Sig Dispense Refill    gabapentin (NEURONTIN) 300 MG capsule TAKE 1 CAPSULE BY MOUTH 3 TIMES A DAY  90 capsule 3    loratadine (CLARITIN) 10 MG tablet TAKE 1 TABLET BY MOUTH ONCE DAILY. 30 tablet 0    insulin aspart (NOVOLOG FLEXPEN) 100 UNIT/ML injection pen INJECT 34 UNITS INTO THE SKIN THREE TIMES DAILY BEFORE MEALS 30 mL 0    citalopram (CELEXA) 20 MG tablet TAKE 1 TABLET BY MOUTH ONCE DAILY  30 tablet 1    insulin aspart (NOVOLOG FLEXPEN) 100 UNIT/ML injection pen INJECT 34 UNITS SUBCUTANEOUSLY 3 TIMES A   DAY BEFORE MEALS.  45 mL 5    sitaGLIPtan-metFORMIN (JANUMET)  MG per tablet Take 1 tablet by mouth 2 times daily (with meals) 180 tablet 3    magnesium oxide (MAG-OX) 400 MG tablet Take 1 tablet by mouth daily 90 tablet 3    vitamin D (ERGOCALCIFEROL) 1.25 MG (40442 UT) CAPS capsule TAKE 1 CAPSULE ONCE WEEKLY 12 capsule 2    montelukast (SINGULAIR) 10 MG tablet TAKE 1 TABLET BY MOUTH AT NIGHT  30 tablet 2    Insulin Pen Needle 31G X 5 MM MISC As directed 100 each 1    furosemide (LASIX) 20 MG tablet Take 1 tablet by mouth daily 60 tablet 3    metoprolol succinate (TOPROL XL) 50 MG extended release tablet TAKE 1 TABLET BY MOUTH ONCE DAILY FOR BLOOD PRESSURE AND HEART PROTECTION. 30 tablet 2    baclofen (LIORESAL) 10 MG tablet TAKE 1 TABLET BY MOUTH 3 TIMES A DAY 30 tablet 0    rosuvastatin (CRESTOR) 10 MG tablet TAKE 1 TABLET BY MOUTH ONCE DAILY 90 tablet 1    oxybutynin (DITROPAN-XL) 10 MG extended release tablet TAKE 1 TABLET DAILY 90 tablet 1    glucose monitoring (FREESTYLE FREEDOM) kit 1 kit by Does not apply route daily 1 kit 0    losartan (COZAAR) 100 MG tablet TAKE 1 TABLET DAILY FOR    BLOOD PRESSURE 90 tablet 1    omeprazole (PRILOSEC) 20 MG delayed release capsule TAKE 1 CAPSULE 2 TIMES     DAILY AS NEEDED 180 capsule 1    ULTICARE MINI PEN NEEDLES 31G X 6 MM MISC USE 2 TIMES A  each 0    cyproheptadine (PERIACTIN) 4 MG tablet Take 1 tablet by mouth 3 times daily as needed (itching or rash) Hold the claritin on the days this is used for itching 21 tablet 0    ketoconazole (NIZORAL) 2 % shampoo Apply topically daily as needed. 1 Bottle 0    tiotropium (SPIRIVA) 18 MCG inhalation capsule Inhale 1 capsule into the lungs daily.  clobetasol (TEMOVATE) 0.05 % ointment Apply topically 2 times daily. For 10 days 1 Tube 0    aspirin 81 MG chewable tablet Take 1 tablet by mouth daily 30 tablet 3    HYDROcodone-acetaminophen (NORCO)  MG per tablet Take 1 tablet by mouth Daily with lunch. Quiroz Rebeca Misc. Devices (ROLLER WALKER) MISC 1 each by Does not apply route daily Pt states that she falls at least once a month up to 3 times a month.  1 each 0    INSULIN SYRINGE 1CC/29G 29G X 1/2\" 1 ML MISC Use with each dose of novolog TID 90 each 1    mometasone-formoterol (DULERA) 200-5 MCG/ACT inhaler Inhale 2 puffs into the lungs every 12 hours      anastrozole (ARIMIDEX) 1 MG tablet Take 1 mg by mouth daily      Multiple Vitamins-Minerals (MULTIVITAMIN ADULT PO) Take 1 tablet by mouth daily       albuterol (PROVENTIL HFA;VENTOLIN HFA) 108 (90 BASE) MCG/ACT inhaler Inhale 2 puffs into the lungs every 4 hours as needed for Wheezing      Incontinence Supply Disposable (POISE PANTILINERS) PADS Use pads as needed daily for urine leakage. 1 each 11    TRESIBA FLEXTOUCH 200 UNIT/ML SOPN INJECT 139 UNITS SUBCUTANEOUSLY NIGHTLY. INCREASE TRESIBA DOSE BY 2-3UNITS EVERY 2-3 DAYS TO ACHIEVE FASTING AM BLOOD GLUCOSE . 9 mL 0    blood glucose test strips (ONETOUCH VERIO) strip TEST 3 TIMES A  each 3    Lancets MISC 1 each by Does not apply route 4 times daily 600 each 1     No current facility-administered medications for this visit. Objective:   PE:  /72   Pulse 80   Temp 98 °F (36.7 °C)   Ht 5' 2\" (1.575 m)   Wt 260 lb (117.9 kg)   LMP  (LMP Unknown)   SpO2 97%   BMI 47.55 kg/m²   Physical Exam  Constitutional:       General: She is not in acute distress. Appearance: Normal appearance. HENT:      Head: Normocephalic. Nose: Nose normal.      Mouth/Throat:      Mouth: Mucous membranes are moist.      Pharynx: Oropharynx is clear. No oropharyngeal exudate or posterior oropharyngeal erythema. Eyes:      General: No scleral icterus. Extraocular Movements: Extraocular movements intact. Conjunctiva/sclera: Conjunctivae normal.   Cardiovascular:      Rate and Rhythm: Normal rate and regular rhythm. Pulses: Normal pulses. Heart sounds: No murmur heard. Pulmonary:      Effort: Pulmonary effort is normal.      Breath sounds: Normal breath sounds. Abdominal:      General: There is no distension. Palpations: Abdomen is soft. There is no mass. Tenderness: There is no abdominal tenderness. Musculoskeletal:         General: Swelling present. Normal range of motion. Cervical back: Normal range of motion. Comments: +1BL LE edema at lower legs/ankles   Skin:     General: Skin is warm and dry. Capillary Refill: Capillary refill takes less than 2 seconds. Neurological:      General: No focal deficit present. Mental Status: She is alert and oriented to person, place, and time. Psychiatric:         Mood and Affect: Mood normal.         Behavior: Behavior normal.         Thought Content: Thought content normal.            Assessment & Plan   Samy Valdez was seen today for diabetes and leg pain. Diagnoses and all orders for this visit:    Type 2 diabetes mellitus with complication ([address]  -Uncontrolled but improved. Discussed her insulin regimen. Advised her not to make large/significant insulin dose adjustments prior to discussing with me. For now, as fasting BG of late are at goal, continue tresiba 160 u qhs, not to continue taking this in AM. Continue fasting bg readings. Advised her to start checking BG 2 hours after meals and to start taking novolog 34 u 10-15 min prior to each meal. Discussed pp goal <180 and need to make mild upward titration in novolog dose to get pp reading at goal.Pt has called phone nutritionist. Provided her [facility] endocrine phone number and recommended she call them to set up appt. Venous insufficiency of both lower extremities  -Advised pt to start wearing compression hose more consistently. Return in about 4 weeks (around 12/7/2021) for DM follow up. All questions were answered. Medications, including possible adverse effects, and instructions were reviewed and  understanding was confirmed.    Follow-up recommendations, including when to contact or return to office (ie; if symptoms worsen or fail to improve), were discussed and

## 2021-11-10 DIAGNOSIS — E11.8 TYPE 2 DIABETES MELLITUS WITH COMPLICATION (HCC): ICD-10-CM

## 2021-11-10 PROBLEM — I87.2 VENOUS INSUFFICIENCY OF BOTH LOWER EXTREMITIES: Status: ACTIVE | Noted: 2021-11-10

## 2021-11-10 RX ORDER — BLOOD SUGAR DIAGNOSTIC
STRIP MISCELLANEOUS
Qty: 100 EACH | Refills: 3 | Status: SHIPPED | OUTPATIENT
Start: 2021-11-10 | End: 2022-07-20

## 2021-11-10 RX ORDER — INSULIN DEGLUDEC 200 U/ML
INJECTION, SOLUTION SUBCUTANEOUS
Qty: 9 ML | Refills: 0 | Status: SHIPPED | OUTPATIENT
Start: 2021-11-10 | End: 2021-12-01

## 2021-11-10 NOTE — TELEPHONE ENCOUNTER
Nohemilaureen Terra called to request a refill on her medication.       Last office visit : 11/9/2021   Next office visit : 12/7/2021     Requested Prescriptions     Pending Prescriptions Disp Refills    blood glucose test strips (ONETOUCH VERIO) strip [Pharmacy Med Name: Allison Crook TEST STRIPS  STRIP] 100 each 0     Sig: TEST 3 TIMES A DAY            [address] [place] [address]

## 2021-11-16 DIAGNOSIS — I10 ESSENTIAL HYPERTENSION: ICD-10-CM

## 2021-11-16 RX ORDER — LOSARTAN POTASSIUM 100 MG/1
TABLET ORAL
Qty: 30 TABLET | Refills: 5 | Status: SHIPPED | OUTPATIENT
Start: 2021-11-16 | End: 2022-05-19

## 2021-11-22 DIAGNOSIS — E11.8 TYPE 2 DIABETES MELLITUS WITH COMPLICATION (HCC): ICD-10-CM

## 2021-11-22 DIAGNOSIS — I10 ESSENTIAL HYPERTENSION: ICD-10-CM

## 2021-11-22 DIAGNOSIS — E78.2 MIXED HYPERLIPIDEMIA: ICD-10-CM

## 2021-11-22 RX ORDER — METOPROLOL SUCCINATE 50 MG/1
TABLET, EXTENDED RELEASE ORAL
Qty: 30 TABLET | Refills: 2 | Status: SHIPPED | OUTPATIENT
Start: 2021-11-22 | End: 2022-02-22

## 2021-11-22 RX ORDER — ROSUVASTATIN CALCIUM 10 MG/1
TABLET, COATED ORAL
Qty: 90 TABLET | Refills: 1 | Status: SHIPPED | OUTPATIENT
Start: 2021-11-22 | End: 2022-07-12 | Stop reason: SDUPTHER

## 2021-11-22 NOTE — TELEPHONE ENCOUNTER
Greyson Andrade called to request a refill on her medication. Last office visit : 11/9/2021   Next office visit : 12/7/2021     Requested Prescriptions     Signed Prescriptions Disp Refills    metoprolol succinate (TOPROL XL) 50 MG extended release tablet 30 tablet 2     Sig: TAKE 1 TABLET BY MOUTH ONCE DAILY FOR BLOOD PRESSURE AND HEART PROTECTION.      Authorizing Provider: Lauro Black     Ordering User: DAIN Seay    rosuvastatin (CRESTOR) 10 MG tablet 90 tablet 1     Sig: TAKE 1 TABLET BY MOUTH ONCE DAILY     Authorizing Provider: Lauro Black     Ordering User: Nam Vargas

## 2021-11-22 NOTE — TELEPHONE ENCOUNTER
----- Message from Nahomy Forbes sent at 11/22/2021  2:57 PM CST -----  Subject: Refill Request    QUESTIONS  Name of Medication? rosuvastatin (CRESTOR) 10 MG tablet  Patient-reported dosage and instructions? 10mg one per day  How many days do you have left? 0  Preferred Pharmacy? [facility] phone number (if available)? [phone]  ---------------------------------------------------------------------------  --------------,  Name of Medication? metoprolol succinate (TOPROL XL) 50 MG extended   release tablet  Patient-reported dosage and instructions? 50mg once per day  How many days do you have left? 0  Preferred Pharmacy? [facility] phone number (if available)? [phone]  ---------------------------------------------------------------------------  --------------  CALL BACK INFO  What is the best way for the office to contact you? OK to leave message on   voicemail  Preferred Call Back Phone Number?  [phone]

## 2021-12-01 DIAGNOSIS — E11.8 TYPE 2 DIABETES MELLITUS WITH COMPLICATION (HCC): ICD-10-CM

## 2021-12-01 RX ORDER — INSULIN DEGLUDEC 200 U/ML
INJECTION, SOLUTION SUBCUTANEOUS
Qty: 27 ML | Refills: 0 | Status: SHIPPED | OUTPATIENT
Start: 2021-12-01 | End: 2022-01-11

## 2021-12-01 RX ORDER — LORATADINE 10 MG/1
TABLET ORAL
Qty: 30 TABLET | Refills: 0 | Status: SHIPPED | OUTPATIENT
Start: 2021-12-01 | End: 2021-12-01

## 2021-12-01 RX ORDER — LORATADINE 10 MG/1
TABLET ORAL
Qty: 30 TABLET | Refills: 0 | Status: SHIPPED | OUTPATIENT
Start: 2021-12-01 | End: 2022-02-01

## 2021-12-01 RX ORDER — INSULIN ASPART 100 [IU]/ML
INJECTION, SOLUTION INTRAVENOUS; SUBCUTANEOUS
Qty: 30 ML | Refills: 0 | Status: SHIPPED | OUTPATIENT
Start: 2021-12-01 | End: 2022-04-16

## 2021-12-16 ENCOUNTER — TELEPHONE (OUTPATIENT)
Dept: PULMONOLOGY | Age: 76
End: 2021-12-16

## 2021-12-16 ENCOUNTER — OFFICE VISIT (OUTPATIENT)
Dept: PRIMARY CARE CLINIC | Age: 76
End: 2021-12-16
Payer: MEDICARE

## 2021-12-16 VITALS
TEMPERATURE: 97.5 F | SYSTOLIC BLOOD PRESSURE: 128 MMHG | HEIGHT: 62 IN | BODY MASS INDEX: 49.1 KG/M2 | HEART RATE: 83 BPM | OXYGEN SATURATION: 96 % | WEIGHT: 266.8 LBS | DIASTOLIC BLOOD PRESSURE: 78 MMHG

## 2021-12-16 DIAGNOSIS — E78.2 MIXED HYPERLIPIDEMIA: ICD-10-CM

## 2021-12-16 DIAGNOSIS — R31.9 URINARY TRACT INFECTION WITH HEMATURIA, SITE UNSPECIFIED: ICD-10-CM

## 2021-12-16 DIAGNOSIS — E11.8 TYPE 2 DIABETES MELLITUS WITH COMPLICATION (HCC): Primary | ICD-10-CM

## 2021-12-16 DIAGNOSIS — N39.0 URINARY TRACT INFECTION WITH HEMATURIA, SITE UNSPECIFIED: ICD-10-CM

## 2021-12-16 DIAGNOSIS — I10 ESSENTIAL HYPERTENSION: ICD-10-CM

## 2021-12-16 DIAGNOSIS — N32.81 OVERACTIVE BLADDER: ICD-10-CM

## 2021-12-16 DIAGNOSIS — Z91.81 AT HIGH RISK FOR FALLS: ICD-10-CM

## 2021-12-16 LAB
APPEARANCE FLUID: CLEAR
BILIRUBIN, POC: NEGATIVE
BLOOD URINE, POC: NORMAL
CLARITY, POC: CLEAR
COLOR, POC: YELLOW
GLUCOSE URINE, POC: 500
KETONES, POC: NEGATIVE
LEUKOCYTE EST, POC: NORMAL
NITRITE, POC: NEGATIVE
PH, POC: 7
PROTEIN, POC: 30
SPECIFIC GRAVITY, POC: 1.02
UROBILINOGEN, POC: 0.2

## 2021-12-16 PROCEDURE — 81002 URINALYSIS NONAUTO W/O SCOPE: CPT | Performed by: STUDENT IN AN ORGANIZED HEALTH CARE EDUCATION/TRAINING PROGRAM

## 2021-12-16 PROCEDURE — 3052F HG A1C>EQUAL 8.0%<EQUAL 9.0%: CPT | Performed by: STUDENT IN AN ORGANIZED HEALTH CARE EDUCATION/TRAINING PROGRAM

## 2021-12-16 PROCEDURE — 99214 OFFICE O/P EST MOD 30 MIN: CPT | Performed by: STUDENT IN AN ORGANIZED HEALTH CARE EDUCATION/TRAINING PROGRAM

## 2021-12-16 RX ORDER — EMPAGLIFLOZIN 25 MG/1
TABLET, FILM COATED ORAL
COMMUNITY
Start: 2021-12-13 | End: 2022-04-08

## 2021-12-16 RX ORDER — DULAGLUTIDE 0.75 MG/.5ML
0.75 INJECTION, SOLUTION SUBCUTANEOUS WEEKLY
Qty: 4 PEN | Refills: 1 | Status: SHIPPED | OUTPATIENT
Start: 2021-12-16 | End: 2022-02-23

## 2021-12-16 NOTE — TELEPHONE ENCOUNTER
Called to return patients call regarding her PFT test on 12/22/2021 @ 2:30 PM. Patient did not answer; voice-mail was left.

## 2021-12-16 NOTE — PROGRESS NOTES
[address] [facility]  [address] [address] [place] [address]  [address] [address]  Dept: [phone]  Dept Fax: [phone]  Loc: [phone]      Subjective:     Chief Complaint   Patient presents with    1 Month Follow-Up       HPI:  Denton Pichardo is a 70 y.o. female presenting for    Type 2 diabetes-we are following up. Most recent A1c 10/2028 8.4. From her last visit patient was taking Tresiba 160 units. She has continued on this. Fasting blood glucose has been in low 100s. Also on NovoLog with meals 34 units. At last visit I provided her [facility] endocrinology contact number, she says she is called but unable to reach them.   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    Breast CA (HCC)     Left breast Nodules removed Took radiation    Breast cancer (HCC)     Chronic back pain     Chronic kidney disease     Overactive bladder     COPD (chronic obstructive pulmonary disease) (HCC)     x few years Scarring on lungs 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 [place]    Diabetes mellitus ([address]     On insulin x 10 years    Fibromyalgia     Fibromyalgia     for 20 years    GERD (gastroesophageal reflux disease)     History of blood transfusion     ?when    History of therapeutic radiation     Hyperlipidemia     High cholesterol    Hypertension     On medications for 2 years    Joint pain 2/2/2016    Liver disease     Has fatty liver    Morbid obesity ([address]     Multiple food allergies     Neuropathic pain     Neuropathy     Neuropathy involving both lower extremities 2/2/2016    Obstructive sleep apnea     AHI: 68.1 per PSG, 5/2018    Osteoarthritis     Other disorders of kidney and ureter in diseases classified elsewhere     Pneumonia     Postmenopausal osteoporosis     Psychiatric problem     Restless leg syndrome     S/P lumpectomy, left breast 8/19/2015 8/4/2015    Sleep apnea     Type II or unspecified type diabetes mellitus with neurological manifestations, uncontrolled(250.62)      Patient Active Problem List   Diagnosis    Postmenopausal osteoporosis    Type 2 diabetes mellitus with complication (HCC)    COPD (chronic obstructive pulmonary disease) (HCC)    Mood disorder (HCC)    Dementia (HCC)    Malignant neoplasm of upper-outer quadrant of female breast ([address]    Joint pain    Neuropathy involving both lower extremities    Back pain    Dyspepsia    Lumbar facet arthropathy    Body mass index 45.0-49.9, adult ([address]    Essential hypertension    Hyperlipidemia    Morbid obesity (HCC)    DARSHAN (obstructive sleep apnea)    BiPAP (biphasic positive airway pressure) dependence    Restless leg syndrome    History of breast cancer    Venous insufficiency of both lower extremities       PSHx:  Past Surgical History:   Procedure Laterality Date    BREAST SURGERY Left 8/4/2015    left partial mastectomy on 8/4/15    EYE SURGERY      EYE SURGERY Bilateral 04/02/2017    Dr. Michel Lyons      broke L ankle due to osteoporosis, has hardware in    FRACTURE SURGERY      Left ankle     HYSTERECTOMY      SC DRAIN SKIN ABSCESS COMPLIC N/A 6/11/5062    ABDOMINAL WALL ABSCESS INCISION AND DRAINAGE performed by Sandy Johnson MD at [address] ENDOSCOPY  4/8/2016    Dr Santiago Rangel, (-) H Pylori    WRIST SURGERY         PFHx:  Family History   Problem Relation Age of Onset    High Blood Pressure Father     Heart Disease Father     Diabetes Father     High Blood Pressure Sister    Lucille Cabral Diabetes Sister     Ovarian Cancer Sister 43    High Blood Pressure Brother     Colon Cancer Neg Hx     Colon Polyps Neg Hx     Esophageal Cancer Neg Hx     Liver Cancer Neg Hx     Liver Disease Neg Hx     Rectal Cancer Neg Hx     Stomach Cancer Neg Hx        SocialHx:  Social History     Tobacco Use    Smoking status: Never Smoker    Smokeless tobacco: Never Used   Substance Use Topics    Alcohol use: Yes     Alcohol/week: 2.0 standard drinks     Types: 1 Glasses of wine, 1 Cans of beer per week     Comment: occ       Allergies: Allergies   Allergen Reactions    Pcn [Penicillins] Hives and Itching     Patient states she can tolerate ampicillin and amoxicillin however.  Cefdinir        Medications:  Current Outpatient Medications   Medication Sig Dispense Refill    JARDIANCE 25 MG tablet       Dulaglutide (TRULICITY) 5.40 OI/2.0LX SOPN Inject 0.75 mg into the skin once a week 4 pen 1    metFORMIN (GLUCOPHAGE) 1000 MG tablet Take 1 tablet by mouth 2 times daily (with meals) 180 tablet 1    loratadine (CLARITIN) 10 MG tablet TAKE 1 TABLET BY MOUTH ONCE DAILY. 30 tablet 0    TRESIBA FLEXTOUCH 200 UNIT/ML SOPN INJECT 139 UNITS SUBCUTANEOUSLY NIGHTLY. INCREASE TRESIBA DOSE BY 2-3UNITS EVERY 2-3 DAYS TO ACHIEVE FASTING AM BLOOD GLUCOSE . 27 mL 0    NOVOLOG FLEXPEN 100 UNIT/ML injection pen INJECT 34 UNITS INTO THE SKIN THREE TIMES DAILY BEFORE MEALS 30 mL 0    metoprolol succinate (TOPROL XL) 50 MG extended release tablet TAKE 1 TABLET BY MOUTH ONCE DAILY FOR BLOOD PRESSURE AND HEART PROTECTION.  30 tablet 2    rosuvastatin (CRESTOR) 10 MG tablet TAKE 1 TABLET BY MOUTH ONCE DAILY 90 tablet 1    losartan (COZAAR) 100 MG tablet TAKE 1 TABLET BY MOUTH ONCE DAILY FOR BLOOD PRESSURE 30 tablet 5    blood glucose test strips (ONETOUCH VERIO) strip TEST 3 TIMES A  each 3    gabapentin (NEURONTIN) 300 MG capsule TAKE 1 CAPSULE BY MOUTH 3 TIMES A DAY  90 capsule 3    citalopram (CELEXA) 20 MG tablet TAKE 1 TABLET BY MOUTH ONCE DAILY  30 tablet 1    insulin aspart (NOVOLOG FLEXPEN) 100 UNIT/ML injection pen INJECT 34 UNITS SUBCUTANEOUSLY 3 TIMES A   DAY BEFORE MEALS. 45 mL 5    sitaGLIPtan-metFORMIN (JANUMET)  MG per tablet Take 1 tablet by mouth 2 times daily (with meals) 180 tablet 3    magnesium oxide (MAG-OX) 400 MG tablet Take 1 tablet by mouth daily 90 tablet 3    vitamin D (ERGOCALCIFEROL) 1.25 MG (89315 UT) CAPS capsule TAKE 1 CAPSULE ONCE WEEKLY 12 capsule 2    montelukast (SINGULAIR) 10 MG tablet TAKE 1 TABLET BY MOUTH AT NIGHT  30 tablet 2    Insulin Pen Needle 31G X 5 MM MISC As directed 100 each 1    furosemide (LASIX) 20 MG tablet Take 1 tablet by mouth daily 60 tablet 3    Lancets MISC 1 each by Does not apply route 4 times daily 600 each 1    baclofen (LIORESAL) 10 MG tablet TAKE 1 TABLET BY MOUTH 3 TIMES A DAY 30 tablet 0    oxybutynin (DITROPAN-XL) 10 MG extended release tablet TAKE 1 TABLET DAILY 90 tablet 1    glucose monitoring (FREESTYLE FREEDOM) kit 1 kit by Does not apply route daily 1 kit 0    omeprazole (PRILOSEC) 20 MG delayed release capsule TAKE 1 CAPSULE 2 TIMES     DAILY AS NEEDED 180 capsule 1    ULTICARE MINI PEN NEEDLES 31G X 6 MM MISC USE 2 TIMES A  each 0    cyproheptadine (PERIACTIN) 4 MG tablet Take 1 tablet by mouth 3 times daily as needed (itching or rash) Hold the claritin on the days this is used for itching 21 tablet 0    ketoconazole (NIZORAL) 2 % shampoo Apply topically daily as needed. 1 Bottle 0    tiotropium (SPIRIVA) 18 MCG inhalation capsule Inhale 1 capsule into the lungs daily.  clobetasol (TEMOVATE) 0.05 % ointment Apply topically 2 times daily. For 10 days 1 Tube 0    aspirin 81 MG chewable tablet Take 1 tablet by mouth daily 30 tablet 3    HYDROcodone-acetaminophen (NORCO)  MG per tablet Take 1 tablet by mouth Daily with lunch. Deysi Loya Misc.  Devices (ROLLER WALKER) MISC 1 each by Does not apply route daily Pt states that she falls at least once a month up to 3 times a month. 1 each 0    INSULIN SYRINGE 1CC/29G 29G X 1/2\" 1 ML MISC Use with each dose of novolog TID 90 each 1    mometasone-formoterol (DULERA) 200-5 MCG/ACT inhaler Inhale 2 puffs into the lungs every 12 hours      anastrozole (ARIMIDEX) 1 MG tablet Take 1 mg by mouth daily      Multiple Vitamins-Minerals (MULTIVITAMIN ADULT PO) Take 1 tablet by mouth daily       albuterol (PROVENTIL HFA;VENTOLIN HFA) 108 (90 BASE) MCG/ACT inhaler Inhale 2 puffs into the lungs every 4 hours as needed for Wheezing      Incontinence Supply Disposable (POISE PANTILINERS) PADS Use pads as needed daily for urine leakage. 1 each 11     No current facility-administered medications for this visit. Objective:   PE:  /78   Pulse 83   Temp 97.5 °F (36.4 °C)   Ht 5' 2\" (1.575 m)   Wt 266 lb 12.8 oz (121 kg)   LMP  (LMP Unknown)   SpO2 96%   BMI 48.80 kg/m²   Physical Exam  Constitutional:       General: She is not in acute distress. Appearance: Normal appearance. HENT:      Head: Normocephalic. Nose: Nose normal.      Mouth/Throat:      Mouth: Mucous membranes are moist.      Pharynx: Oropharynx is clear. No oropharyngeal exudate or posterior oropharyngeal erythema. Eyes:      General: No scleral icterus. Extraocular Movements: Extraocular movements intact. Conjunctiva/sclera: Conjunctivae normal.   Cardiovascular:      Rate and Rhythm: Normal rate and regular rhythm. Pulses: Normal pulses. Heart sounds: No murmur heard. Pulmonary:      Effort: Pulmonary effort is normal.      Breath sounds: Normal breath sounds. Abdominal:      General: There is no distension. Palpations: Abdomen is soft. There is no mass. Tenderness: There is no abdominal tenderness. Musculoskeletal:         General: Normal range of motion. Cervical back: Normal range of motion. Skin:     General: Skin is warm and dry.       Capillary Refill: Capillary refill takes less than 2 seconds. Neurological:      General: No focal deficit present. Mental Status: She is alert and oriented to person, place, and time. Psychiatric:         Mood and Affect: Mood normal.         Behavior: Behavior normal.         Thought Content: Thought content normal.            Assessment & Plan   Delonte Meyers was seen today for 1 month follow-up. Diagnoses and all orders for this visit:    Type 2 diabetes mellitus with complication ([address]  -Above goal.  Recheck A1c. Had my MA, Rosi Dior coordinate with patient making appointment with [facility] endocr[facility]ology. Discontinue Janumet. Continue Metformin 1000 mg twice daily. Continue Jardiance 25 mg. Add Trulicity  -     Dulaglutide (TRULICITY) 4.36 CE/2.8VQ SOPN; Inject 0.75 mg into the skin once a week  -     metFORMIN (GLUCOPHAGE) 1000 MG tablet; Take 1 tablet by mouth 2 times daily (with meals)    Mixed hyperlipidemia  -Continue Crestor 10 mg    Essential hypertension  -At goal continue losartan 100 mg    Overactive bladder  -Continue oxybutynin 10 mg. Can increase to 20 mg if she feels she needs to due to symptoms. Patient concern for UTI however based on her description of symptoms sounds more like aggravation of overactive bladder. I will recheck urine today    Covid booster given today    Return in about 1 month (around 1/16/2022). All questions were answered. Medications, including possible adverse effects, and instructions were reviewed and  understanding was confirmed. Follow-up recommendations, including when to contact or return to office (ie; if symptoms worsen or fail to improve), were discussed and acknowledged. Electronically signed by Carlos Franco MD on 12/16/21 at 1:38 PM CST    On the basis of positive falls risk screening, assessment and plan is as follows: home safety tips provided.

## 2021-12-18 LAB — URINE CULTURE, ROUTINE: NORMAL

## 2021-12-21 ENCOUNTER — TELEPHONE (OUTPATIENT)
Dept: PRIMARY CARE CLINIC | Age: 76
End: 2021-12-21

## 2021-12-21 NOTE — TELEPHONE ENCOUNTER
Patient called asking questions on medication. I have attempted to call patient back twice and have not been able to reach her.  It was on metformin patient is to take it twice a day along with her trulicity that was prescribed

## 2021-12-22 ENCOUNTER — HOSPITAL ENCOUNTER (OUTPATIENT)
Dept: PULMONOLOGY | Age: 76
Discharge: HOME | End: 2021-12-22
Payer: MEDICARE

## 2021-12-22 DIAGNOSIS — R06.09 CHRONIC DYSPNEA: ICD-10-CM

## 2021-12-22 PROCEDURE — 94729 DIFFUSING CAPACITY: CPT | Performed by: INTERNAL MEDICINE

## 2021-12-22 PROCEDURE — 94726 PLETHYSMOGRAPHY LUNG VOLUMES: CPT | Performed by: INTERNAL MEDICINE

## 2021-12-22 PROCEDURE — 94060 EVALUATION OF WHEEZING: CPT | Performed by: INTERNAL MEDICINE

## 2021-12-22 PROCEDURE — 6360000002 HC RX W HCPCS: Performed by: STUDENT IN AN ORGANIZED HEALTH CARE EDUCATION/TRAINING PROGRAM

## 2021-12-22 RX ORDER — ALBUTEROL SULFATE 2.5 MG/3ML
2.5 SOLUTION RESPIRATORY (INHALATION) EVERY 6 HOURS PRN
Status: DISCONTINUED | OUTPATIENT
Start: 2021-12-22 | End: 2021-12-24 | Stop reason: HOSPADM

## 2021-12-22 RX ADMIN — ALBUTEROL SULFATE 2.5 MG: 2.5 SOLUTION RESPIRATORY (INHALATION) at 14:57

## 2021-12-22 NOTE — PROCEDURES
Media Information             Pulmonary Function Study    Interpretation:        Non diagnostic study. Impression:    1. Non diagnostic pulmonary function study due to the fact that the patient could not perform the study to the satisfaction of the [employer] standards.         Jhoan Keenan MD, FCCP, [facility]

## 2021-12-22 NOTE — LETTER
Pulmonary Fuction Testing Lab  [facility], [address] [place] [address]  Office [phone] Fax [phone]    December 6, 2021    Dear Mike Lam,      This is to reminder for your upcoming appointment on 12/22/2021 at 2:30 PM. Our current protocol states if you have had a Covid vaccine and it has been at least two weeks since your last dose, you will not be required to have a Covid test. Please bring your Covid card with you. If you are unvaccinated or it has been less than two weeks since last dose, you must have your COVID-19 test on 12/20/2021 between 9 AM and 12 PM. You need to quarantine once you have done your Covid test until your appointment. We are required to have results back prior to your appointment. If your test is positive you will be called with those results. If you choose to have your Covid test done elsewhere rather than at one of our [facility]) locations, you must have the printed test results in hand with you on the day of your appointment. Below are the addresses for the testing locations:    [address] [place] [address] Ave, [address]  Mondays, Wednesdays, and Fridays 9 AM- 12 PM    [facility]) Urgent Care next to [address]  Monday thru Friday 8 AM - 5 PM     If it has been less than two weeks and you do not wish to have the Covid test please call [phone] to be rescheduled. Please do not use any bronchodilators (rescue inhalers, breathing treatments, ect.) 4 hours prior to test and no long acting respiratory medications 12 hours before test. These medications will cause test results to be inaccurate. On the day of your appointment you will come through the Main Entrance of [facility] in order to register at the [address] registration. Please note that if you are more than 15 minutes late, we will need to reschedule.  Upon entering the building, you will take an immediate left. Please sign in to be registered. Thank you for entrusting us at [facility] with your care. We look forward to seeing for you.     [facility]) Pulmonary Function Lab declines

## 2021-12-29 DIAGNOSIS — N39.41 URGE INCONTINENCE OF URINE: ICD-10-CM

## 2021-12-29 RX ORDER — OXYBUTYNIN CHLORIDE 10 MG/1
TABLET, EXTENDED RELEASE ORAL
Qty: 90 TABLET | Refills: 1 | Status: SHIPPED | OUTPATIENT
Start: 2021-12-29 | End: 2022-01-12 | Stop reason: SDUPTHER

## 2021-12-30 ENCOUNTER — TELEPHONE (OUTPATIENT)
Dept: PRIMARY CARE CLINIC | Age: 76
End: 2021-12-30

## 2021-12-30 NOTE — TELEPHONE ENCOUNTER
----- Message from Earlene Frias sent at 12/28/2021  3:36 PM CST -----  Subject: Medication Problem    QUESTIONS  Name of Medication? oxybutynin (DITROPAN-XL) 10 MG extended release tablet  Patient-reported dosage and instructions? oxybutynin (DITROPAN-XL) 10 MG   extended release tablet and TAKE 1 TABLET DAILY  What question or problem do you have with the medication? The Pharmacy is   unclear about the instructions on taking the medication since the patient   told them she was allowed to take 2 instead of one by mouth which causing   her to use them quickly   Preferred Pharmacy? [facility] phone number (if available)? [phone]  Additional Information for Provider? would like a call back to clear   instructions on the medication as well   ---------------------------------------------------------------------------  --------------  CALL BACK INFO  What is the best way for the office to contact you? Do not leave any   message, patient will call back for answer  Preferred Call Back Phone Number? [phone]  ---------------------------------------------------------------------------  --------------  SCRIPT ANSWERS  Relationship to Patient?  Self

## 2022-01-04 RX ORDER — MONTELUKAST SODIUM 10 MG/1
TABLET ORAL
Qty: 30 TABLET | Refills: 5 | Status: SHIPPED | OUTPATIENT
Start: 2022-01-04 | End: 2022-07-12 | Stop reason: SDUPTHER

## 2022-01-04 NOTE — TELEPHONE ENCOUNTER
Christa Tsai called to request a refill on her medication.       Last office visit : 12/16/2021   Next office visit : 1/19/2022     Requested Prescriptions     Signed Prescriptions Disp Refills    montelukast (SINGULAIR) 10 MG tablet 30 tablet 5     Sig: TAKE 1 TABLET BY MOUTH AT NIGHT     Authorizing Provider: Jonnathan Abreu     Ordering User: Alexei Neves MA

## 2022-01-10 DIAGNOSIS — E11.8 TYPE 2 DIABETES MELLITUS WITH COMPLICATION (HCC): ICD-10-CM

## 2022-01-11 RX ORDER — INSULIN DEGLUDEC 200 U/ML
INJECTION, SOLUTION SUBCUTANEOUS
Qty: 27 ML | Refills: 0 | Status: SHIPPED | OUTPATIENT
Start: 2022-01-11 | End: 2022-02-18

## 2022-01-12 DIAGNOSIS — N39.41 URGE INCONTINENCE OF URINE: ICD-10-CM

## 2022-01-12 RX ORDER — OXYBUTYNIN CHLORIDE 10 MG/1
TABLET, EXTENDED RELEASE ORAL
Qty: 180 TABLET | Refills: 1 | Status: SHIPPED | OUTPATIENT
Start: 2022-01-12 | End: 2022-06-23

## 2022-01-19 ENCOUNTER — OFFICE VISIT (OUTPATIENT)
Dept: PRIMARY CARE CLINIC | Age: 77
End: 2022-01-19
Payer: MEDICARE

## 2022-01-19 VITALS
SYSTOLIC BLOOD PRESSURE: 120 MMHG | WEIGHT: 267.2 LBS | DIASTOLIC BLOOD PRESSURE: 86 MMHG | BODY MASS INDEX: 49.17 KG/M2 | HEART RATE: 80 BPM | OXYGEN SATURATION: 95 % | TEMPERATURE: 96.9 F | HEIGHT: 62 IN

## 2022-01-19 DIAGNOSIS — E11.8 TYPE 2 DIABETES MELLITUS WITH COMPLICATION (HCC): Primary | ICD-10-CM

## 2022-01-19 DIAGNOSIS — I83.893 VARICOSE VEINS OF LEG WITH SWELLING, BILATERAL: ICD-10-CM

## 2022-01-19 DIAGNOSIS — R06.09 CHRONIC DYSPNEA: ICD-10-CM

## 2022-01-19 DIAGNOSIS — M79.89 LEG SWELLING: ICD-10-CM

## 2022-01-19 LAB — HBA1C MFR BLD: 7.6 %

## 2022-01-19 PROCEDURE — 99214 OFFICE O/P EST MOD 30 MIN: CPT | Performed by: STUDENT IN AN ORGANIZED HEALTH CARE EDUCATION/TRAINING PROGRAM

## 2022-01-19 PROCEDURE — 83036 HEMOGLOBIN GLYCOSYLATED A1C: CPT | Performed by: STUDENT IN AN ORGANIZED HEALTH CARE EDUCATION/TRAINING PROGRAM

## 2022-01-19 RX ORDER — INSULIN ASPART 100 [IU]/ML
INJECTION, SOLUTION INTRAVENOUS; SUBCUTANEOUS
Qty: 45 ML | Refills: 5 | Status: SHIPPED | OUTPATIENT
Start: 2022-01-19 | End: 2022-01-21 | Stop reason: SDUPTHER

## 2022-01-19 NOTE — PROGRESS NOTES
On medications for 2 years    Joint pain 2/2/2016    Liver disease     Has fatty liver    Morbid obesity ([address]     Multiple food allergies     Neuropathic pain     Neuropathy     Neuropathy involving both lower extremities 2/2/2016    Obstructive sleep apnea     AHI: 68.1 per PSG, 5/2018    Osteoarthritis     Other disorders of kidney and ureter in diseases classified elsewhere     Pneumonia     Postmenopausal osteoporosis     Psychiatric problem     Restless leg syndrome     S/P lumpectomy, left breast 8/19/2015 8/4/2015    Sleep apnea     Type II or unspecified type diabetes mellitus with neurological manifestations, uncontrolled(250.62)      Patient Active Problem List   Diagnosis    Postmenopausal osteoporosis    Type 2 diabetes mellitus with complication (HCC)    COPD (chronic obstructive pulmonary disease) (HCC)    Mood disorder ([address]    Dementia ([address]    Malignant neoplasm of upper-outer quadrant of female breast ([address]    Joint pain    Neuropathy involving both lower extremities    Back pain    Dyspepsia    Lumbar facet arthropathy    Body mass index 45.0-49.9, adult ([address]    Essential hypertension    Hyperlipidemia    Morbid obesity ([address]    DARSHAN (obstructive sleep apnea)    BiPAP (biphasic positive airway pressure) dependence    Restless leg syndrome    History of breast cancer    Venous insufficiency of both lower extremities       PSHx:  Past Surgical History:   Procedure Laterality Date    BREAST SURGERY Left 8/4/2015    left partial mastectomy on 8/4/15    EYE SURGERY      EYE SURGERY Bilateral 04/02/2017    Dr. Dejuan Hugo      broke L ankle due to osteoporosis, has hardware in    FRACTURE SURGERY      Left ankle     HYSTERECTOMY      DC DRAIN SKIN ABSCESS COMPLIC N/A 1/98/2957    ABDOMINAL WALL ABSCESS INCISION AND DRAINAGE performed by India Odom MD at [address] ENDOSCOPY  4/8/2016     R Coulter-Gastritis, (-) H Pylori    WRIST SURGERY         PFHx:  Family History   Problem Relation Age of Onset    High Blood Pressure Father     Heart Disease Father     Diabetes Father     High Blood Pressure Sister     Diabetes Sister     Ovarian Cancer Sister 43    High Blood Pressure Brother     Colon Cancer Neg Hx     Colon Polyps Neg Hx     Esophageal Cancer Neg Hx     Liver Cancer Neg Hx     Liver Disease Neg Hx     Rectal Cancer Neg Hx     Stomach Cancer Neg Hx        SocialHx:  Social History     Tobacco Use    Smoking status: Never Smoker    Smokeless tobacco: Never Used   Substance Use Topics    Alcohol use: Yes     Alcohol/week: 2.0 standard drinks     Types: 1 Glasses of wine, 1 Cans of beer per week     Comment: occ       Allergies: Allergies   Allergen Reactions    Pcn [Penicillins] Hives and Itching     Patient states she can tolerate ampicillin and amoxicillin however.  Cefdinir        Medications:  Current Outpatient Medications   Medication Sig Dispense Refill    insulin aspart (NOVOLOG FLEXPEN) 100 UNIT/ML injection pen INJECT 34 UNITS SUBCUTANEOUSLY 3 TIMES A   DAY BEFORE MEALS. 45 mL 5    oxybutynin (DITROPAN-XL) 10 MG extended release tablet TAKE 2 TABLET DAILY as needed 180 tablet 1    TRESIBA FLEXTOUCH 200 UNIT/ML SOPN INJECT 139 UNITS SUBCUTANEOUSLY NIGHTLY. INCREASE TRESIBA DOSE BY 2-3UNITS EVERY 2-3 DAYS TO ACHIEVE FASTING AM BLOOD GLUCOSE . 27 mL 0    montelukast (SINGULAIR) 10 MG tablet TAKE 1 TABLET BY MOUTH AT NIGHT 30 tablet 5    JARDIANCE 25 MG tablet       Dulaglutide (TRULICITY) 0.30 BA/5.6XZ SOPN Inject 0.75 mg into the skin once a week 4 pen 1    metFORMIN (GLUCOPHAGE) 1000 MG tablet Take 1 tablet by mouth 2 times daily (with meals) 180 tablet 1    loratadine (CLARITIN) 10 MG tablet TAKE 1 TABLET BY MOUTH ONCE DAILY.  30 tablet 0    NOVOLOG FLEXPEN 100 UNIT/ML injection pen INJECT 34 UNITS INTO THE SKIN THREE TIMES DAILY BEFORE MEALS 30 mL 0  metoprolol succinate (TOPROL XL) 50 MG extended release tablet TAKE 1 TABLET BY MOUTH ONCE DAILY FOR BLOOD PRESSURE AND HEART PROTECTION. 30 tablet 2    rosuvastatin (CRESTOR) 10 MG tablet TAKE 1 TABLET BY MOUTH ONCE DAILY 90 tablet 1    losartan (COZAAR) 100 MG tablet TAKE 1 TABLET BY MOUTH ONCE DAILY FOR BLOOD PRESSURE 30 tablet 5    blood glucose test strips (ONETOUCH VERIO) strip TEST 3 TIMES A  each 3    gabapentin (NEURONTIN) 300 MG capsule TAKE 1 CAPSULE BY MOUTH 3 TIMES A DAY  90 capsule 3    citalopram (CELEXA) 20 MG tablet TAKE 1 TABLET BY MOUTH ONCE DAILY  30 tablet 1    magnesium oxide (MAG-OX) 400 MG tablet Take 1 tablet by mouth daily 90 tablet 3    vitamin D (ERGOCALCIFEROL) 1.25 MG (06702 UT) CAPS capsule TAKE 1 CAPSULE ONCE WEEKLY 12 capsule 2    Insulin Pen Needle 31G X 5 MM MISC As directed 100 each 1    furosemide (LASIX) 20 MG tablet Take 1 tablet by mouth daily 60 tablet 3    Lancets MISC 1 each by Does not apply route 4 times daily 600 each 1    baclofen (LIORESAL) 10 MG tablet TAKE 1 TABLET BY MOUTH 3 TIMES A DAY 30 tablet 0    glucose monitoring (FREESTYLE FREEDOM) kit 1 kit by Does not apply route daily 1 kit 0    omeprazole (PRILOSEC) 20 MG delayed release capsule TAKE 1 CAPSULE 2 TIMES     DAILY AS NEEDED 180 capsule 1    ULTICARE MINI PEN NEEDLES 31G X 6 MM MISC USE 2 TIMES A  each 0    cyproheptadine (PERIACTIN) 4 MG tablet Take 1 tablet by mouth 3 times daily as needed (itching or rash) Hold the claritin on the days this is used for itching 21 tablet 0    ketoconazole (NIZORAL) 2 % shampoo Apply topically daily as needed. 1 Bottle 0    tiotropium (SPIRIVA) 18 MCG inhalation capsule Inhale 1 capsule into the lungs daily.  clobetasol (TEMOVATE) 0.05 % ointment Apply topically 2 times daily.  For 10 days 1 Tube 0    aspirin 81 MG chewable tablet Take 1 tablet by mouth daily 30 tablet 3    HYDROcodone-acetaminophen (NORCO)  MG per tablet Take Musculoskeletal:         General: Normal range of motion. Cervical back: Normal range of motion. Skin:     General: Skin is warm and dry. Capillary Refill: Capillary refill takes less than 2 seconds. Neurological:      General: No focal deficit present. Mental Status: She is alert and oriented to person, place, and time. Psychiatric:         Mood and Affect: Mood normal.         Behavior: Behavior normal.         Thought Content: Thought content normal.            Assessment & Plan   Daniella Sánchez was seen today for 1 month follow-up, referral - general, medication refill and insect bite. Diagnoses and all orders for this visit:    Type 2 diabetes mellitus with complication ([facility]  -     POCT glycosylated hemoglobin (Hb A1C)-->7.6  -Although a1c is better, I discussed with patient that herself self-prescribing puts her at higher risk for adverse events/hypoglycemic episodes. We will continue patient's current regimen with metformin, Jardiance, Trulicity, Tresiba. For NovoLog, recommended she continue with 34 units and start checking dedicated blood glucose values to rise after meals. We can follow-up in 1 week to discuss these values and to titrate up NovoLog safely. -     insulin aspart (NOVOLOG FLEXPEN) 100 UNIT/ML injection pen; INJECT 34 UNITS SUBCUTANEOUSLY 3 TIMES A   DAY BEFORE MEALS. -     POCT glycosylated hemoglobin (Hb A1C)    Chronic dyspnea  -I have discussed with patient her symptoms likely related to obesity/restrictive cause. She does not smoke. Given inability to complete PFT recommend pulm input  -     Arie Reid MD, Pulmonary Disease, [facility]    Leg swelling  Varicose veins of leg with swelling, bilateral  -     ANTONIA Vilchis, Vascular Surgery, [place]        Return in about 1 week (around 1/26/2022). All questions were answered.   Medications, including possible adverse effects, and instructions were reviewed

## 2022-01-21 DIAGNOSIS — E11.8 TYPE 2 DIABETES MELLITUS WITH COMPLICATION (HCC): ICD-10-CM

## 2022-01-21 RX ORDER — INSULIN ASPART 100 [IU]/ML
INJECTION, SOLUTION INTRAVENOUS; SUBCUTANEOUS
Qty: 45 ML | Refills: 5
Start: 2022-01-21 | End: 2022-01-25 | Stop reason: SDUPTHER

## 2022-01-21 NOTE — PROGRESS NOTES
Patient called and stated her insulin was not going to be covered under her insurance, it was going to be early because patient was using more insulin that instructed. Spoke with pharmacy and they stated the sig was the same amount as last time, so with that it would not go through until the 30th of this month. She stated you can adjust sig slightly and the insurance may let it go through early.

## 2022-01-25 DIAGNOSIS — E11.8 TYPE 2 DIABETES MELLITUS WITH COMPLICATION (HCC): ICD-10-CM

## 2022-01-25 RX ORDER — INSULIN ASPART 100 [IU]/ML
INJECTION, SOLUTION INTRAVENOUS; SUBCUTANEOUS
Qty: 45 ML | Refills: 5 | Status: SHIPPED | OUTPATIENT
Start: 2022-01-25 | End: 2022-07-12 | Stop reason: SDUPTHER

## 2022-01-25 NOTE — PROGRESS NOTES
Patient called and stated she still did not have her novolog insulin. Patient ran out of her script and insurance was not going to cover a refill until the 30th of this month. I sent prescription in the 21st for an adjustment in the script to see if it would be covered under her insurance. [facility] and they stated they did not receive the prescription and asked me to resend. I resent script and Farnaz Cornelius from pharmacy stated they would run through to see if its covered and give patient a call and let her know.

## 2022-01-27 ENCOUNTER — TELEPHONE (OUTPATIENT)
Dept: PRIMARY CARE CLINIC | Age: 77
End: 2022-01-27

## 2022-01-27 ENCOUNTER — TELEPHONE (OUTPATIENT)
Dept: PODIATRY | Facility: CLINIC | Age: 77
End: 2022-01-27

## 2022-01-27 NOTE — TELEPHONE ENCOUNTER
----- Message from Jeana Gaxiola sent at 1/26/2022  3:34 PM CST -----  Subject: Message to Provider    QUESTIONS  Information for Provider? Pt sees Dr. Loyda Lopez and is calling to find out the   status of her previous request to send a new Rx to [address]   increasing her insulin to 40 units so her insurance will cover it. Pt   indicates she will take the insulin as advised by Dr. Loyda Lopez but insurance   requires it to be increased to the 40 units. Pt hasn't had her insulin for   7 days, please call back asa. If unable to reach at mobile #, please call   [phone]  ---------------------------------------------------------------------------  --------------  CALL BACK INFO  What is the best way for the office to contact you? OK to leave message on   voicemail  Preferred Call Back Phone Number? [phone]  ---------------------------------------------------------------------------  --------------  SCRIPT ANSWERS  Relationship to Patient?  Self

## 2022-02-01 RX ORDER — LORATADINE 10 MG/1
TABLET ORAL
Qty: 30 TABLET | Refills: 3 | Status: SHIPPED | OUTPATIENT
Start: 2022-02-01 | End: 2022-04-24

## 2022-02-01 NOTE — TELEPHONE ENCOUNTER
Patient needed refill on Loratadine the last provider to send it was Emeterio KNIGHT. Changed provider over to PCP and provided 3 additional refills. Sent medication to [facility].

## 2022-02-14 ENCOUNTER — TELEPHONE (OUTPATIENT)
Dept: PRIMARY CARE CLINIC | Age: 77
End: 2022-02-14

## 2022-02-14 ENCOUNTER — HOSPITAL ENCOUNTER (OUTPATIENT)
Dept: GENERAL RADIOLOGY | Age: 77
Discharge: HOME | End: 2022-02-14
Payer: MEDICARE

## 2022-02-14 DIAGNOSIS — M25.50 ARTHRALGIA OF MULTIPLE JOINTS: ICD-10-CM

## 2022-02-14 PROCEDURE — 73130 X-RAY EXAM OF HAND: CPT

## 2022-02-14 NOTE — TELEPHONE ENCOUNTER
----- Message from [facility] sent at 2/14/2022 12:50 PM CST -----  Subject: Message to Provider    QUESTIONS  Information for Provider? Pt requesting a call back to reschedule missed   appt. Couldn't stay on the line as she was at hospital, please advise.  ---------------------------------------------------------------------------  --------------  CALL BACK INFO  What is the best way for the office to contact you? Do not leave any   message, patient will call back for answer  Preferred Call Back Phone Number? [phone]  ---------------------------------------------------------------------------  --------------  SCRIPT ANSWERS  Relationship to Patient?  Self

## 2022-02-18 ENCOUNTER — TELEPHONE (OUTPATIENT)
Dept: PRIMARY CARE CLINIC | Age: 77
End: 2022-02-18

## 2022-02-18 DIAGNOSIS — E11.8 TYPE 2 DIABETES MELLITUS WITH COMPLICATION (HCC): ICD-10-CM

## 2022-02-18 RX ORDER — INSULIN DEGLUDEC 200 U/ML
INJECTION, SOLUTION SUBCUTANEOUS
Qty: 27 ML | Refills: 0 | Status: SHIPPED | OUTPATIENT
Start: 2022-02-18 | End: 2022-04-16 | Stop reason: SDUPTHER

## 2022-02-18 NOTE — TELEPHONE ENCOUNTER
----- Message from Shagufta Epps sent at 2/18/2022  4:31 PM CST -----  Subject: Medication Problem    QUESTIONS  Name of Medication? NOVOLOG FLEXPEN 100 UNIT/ML injection pen  Patient-reported dosage and instructions? 100 unit 34 unit in the morning   34 unit at night   What question or problem do you have with the medication? blood agus is   still to high she wants to know if she should increase her medication   Preferred Pharmacy? [facility] phone number (if available)? [phone]  Additional Information for Provider?   ---------------------------------------------------------------------------  --------------  [address]  What is the best way for the office to contact you? [place] to leave message on   voicemail  Preferred Call Back Phone Number? [phone]  ---------------------------------------------------------------------------  --------------  SCRIPT ANSWERS  Relationship to Patient?  Self

## 2022-02-21 ENCOUNTER — TELEPHONE (OUTPATIENT)
Dept: PRIMARY CARE CLINIC | Age: 77
End: 2022-02-21

## 2022-02-21 NOTE — TELEPHONE ENCOUNTER
Have her set up telephone visit so I can talk to her and see what she means by bs \"too high\". I believe I advised her to set up f/u virtual appt w/ me previously.

## 2022-02-22 DIAGNOSIS — I10 ESSENTIAL HYPERTENSION: ICD-10-CM

## 2022-02-22 RX ORDER — METOPROLOL SUCCINATE 50 MG/1
TABLET, EXTENDED RELEASE ORAL
Qty: 30 TABLET | Refills: 2 | Status: SHIPPED | OUTPATIENT
Start: 2022-02-22 | End: 2022-05-19

## 2022-02-23 ENCOUNTER — TELEMEDICINE (OUTPATIENT)
Dept: PRIMARY CARE CLINIC | Age: 77
End: 2022-02-23
Payer: MEDICARE

## 2022-02-23 ENCOUNTER — TELEPHONE (OUTPATIENT)
Dept: PODIATRY | Facility: CLINIC | Age: 77
End: 2022-02-23

## 2022-02-23 DIAGNOSIS — E11.8 TYPE 2 DIABETES MELLITUS WITH COMPLICATION (HCC): Primary | ICD-10-CM

## 2022-02-23 PROCEDURE — 99213 OFFICE O/P EST LOW 20 MIN: CPT | Performed by: STUDENT IN AN ORGANIZED HEALTH CARE EDUCATION/TRAINING PROGRAM

## 2022-02-23 PROCEDURE — 3051F HG A1C>EQUAL 7.0%<8.0%: CPT | Performed by: STUDENT IN AN ORGANIZED HEALTH CARE EDUCATION/TRAINING PROGRAM

## 2022-02-23 RX ORDER — DULAGLUTIDE 1.5 MG/.5ML
1.5 INJECTION, SOLUTION SUBCUTANEOUS WEEKLY
Qty: 4 PEN | Refills: 1 | Status: SHIPPED | OUTPATIENT
Start: 2022-02-23 | End: 2022-06-23

## 2022-02-23 NOTE — PROGRESS NOTES
Yovany Anthony is a 78 y.o. female evaluated via telephone on 2/23/2022. Consent:  She and/or health care decision maker is aware that that she may receive a bill for this telephone service, which includes applicable co-pays, depending on her insurance coverage, and has provided verbal consent to proceed. Documentation:  I communicated with the patient and/or health care decision maker about     T2DM  -Most recent a1c 7.6 last month. She is on metformin 1000mg bid, jardiance 47ZU, trulicity 1.24XL, toujeo 160 u, novolog 34u w/ meals. We are following up to  on meal time insulin titration. Fasting BG have been at goal.  Mealtime levels are above goal.    Details of this discussion including any medical advice provided:     T2DM  -Improving, slightly above goal.  -Continue metformin, jardiance. -Increase trulicity to 1.0CT/NFET  -Continue toujeo  -Titrate up novolog to achieve pp <180  -Next a1c check 4/2022      I affirm this is a Patient Initiated Episode with a Patient who has not had a related appointment within my department in the past 7 days or scheduled within the next 24 hours. Patient identification was verified at the start of the visit: Yes    Total Time: minutes: 11-20 minutes    Yovany Anthony was evaluated through a synchronous (real-time) audio encounter. The patient was located at home in a state where the provider was licensed to provide care.     Note: not billable if this call serves to triage the patient into an appointment for the relevant concern      Sunita Bradley MD

## 2022-03-04 RX ORDER — CITALOPRAM 20 MG/1
TABLET ORAL
Qty: 30 TABLET | Refills: 1 | Status: SHIPPED | OUTPATIENT
Start: 2022-03-04 | End: 2022-05-08

## 2022-03-07 ENCOUNTER — OFFICE VISIT (OUTPATIENT)
Dept: VASCULAR SURGERY | Facility: CLINIC | Age: 77
End: 2022-03-07

## 2022-03-07 ENCOUNTER — TELEPHONE (OUTPATIENT)
Dept: VASCULAR SURGERY | Facility: CLINIC | Age: 77
End: 2022-03-07

## 2022-03-07 VITALS
WEIGHT: 264 LBS | DIASTOLIC BLOOD PRESSURE: 60 MMHG | RESPIRATION RATE: 18 BRPM | HEIGHT: 62 IN | BODY MASS INDEX: 48.58 KG/M2 | HEART RATE: 79 BPM | OXYGEN SATURATION: 93 % | SYSTOLIC BLOOD PRESSURE: 130 MMHG

## 2022-03-07 DIAGNOSIS — Z79.4 TYPE 2 DIABETES MELLITUS WITH HYPERGLYCEMIA, WITH LONG-TERM CURRENT USE OF INSULIN: ICD-10-CM

## 2022-03-07 DIAGNOSIS — R60.0 BILATERAL LOWER EXTREMITY EDEMA: Primary | ICD-10-CM

## 2022-03-07 DIAGNOSIS — E66.01 MORBID OBESITY: ICD-10-CM

## 2022-03-07 DIAGNOSIS — I83.11 VARICOSE VEINS OF BOTH LOWER EXTREMITIES WITH INFLAMMATION: ICD-10-CM

## 2022-03-07 DIAGNOSIS — E11.65 TYPE 2 DIABETES MELLITUS WITH HYPERGLYCEMIA, WITH LONG-TERM CURRENT USE OF INSULIN: ICD-10-CM

## 2022-03-07 DIAGNOSIS — I83.12 VARICOSE VEINS OF BOTH LOWER EXTREMITIES WITH INFLAMMATION: ICD-10-CM

## 2022-03-07 DIAGNOSIS — E78.5 HYPERLIPIDEMIA, UNSPECIFIED HYPERLIPIDEMIA TYPE: ICD-10-CM

## 2022-03-07 DIAGNOSIS — I10 ESSENTIAL HYPERTENSION: ICD-10-CM

## 2022-03-07 PROBLEM — I87.2 VENOUS INSUFFICIENCY OF BOTH LOWER EXTREMITIES: Status: ACTIVE | Noted: 2021-11-10

## 2022-03-07 PROBLEM — M51.36 DEGENERATION OF LUMBAR INTERVERTEBRAL DISC: Status: ACTIVE | Noted: 2022-03-07

## 2022-03-07 PROBLEM — M17.12 OSTEOARTHRITIS OF LEFT KNEE: Status: ACTIVE | Noted: 2019-07-09

## 2022-03-07 PROBLEM — Z99.89 BIPAP (BIPHASIC POSITIVE AIRWAY PRESSURE) DEPENDENCE: Status: ACTIVE | Noted: 2022-03-07

## 2022-03-07 PROBLEM — G89.4 CHRONIC PAIN DISORDER: Status: ACTIVE | Noted: 2022-03-07

## 2022-03-07 PROBLEM — M51.369 DEGENERATION OF LUMBAR INTERVERTEBRAL DISC: Status: ACTIVE | Noted: 2022-03-07

## 2022-03-07 PROBLEM — Z85.3 HISTORY OF BREAST CANCER: Status: ACTIVE | Noted: 2021-03-18

## 2022-03-07 PROCEDURE — 99204 OFFICE O/P NEW MOD 45 MIN: CPT | Performed by: SURGERY

## 2022-03-21 ENCOUNTER — APPOINTMENT (OUTPATIENT)
Dept: LAB | Facility: HOSPITAL | Age: 77
End: 2022-03-21

## 2022-03-25 ENCOUNTER — OFFICE VISIT (OUTPATIENT)
Dept: PODIATRY | Facility: CLINIC | Age: 77
End: 2022-03-25

## 2022-03-25 VITALS — WEIGHT: 267.6 LBS | HEIGHT: 62 IN | BODY MASS INDEX: 49.24 KG/M2 | HEART RATE: 88 BPM | OXYGEN SATURATION: 97 %

## 2022-03-25 DIAGNOSIS — M20.42 HAMMERTOE, BILATERAL: ICD-10-CM

## 2022-03-25 DIAGNOSIS — E11.9 ENCOUNTER FOR DIABETIC FOOT EXAM: ICD-10-CM

## 2022-03-25 DIAGNOSIS — L84 FOOT CALLUS: ICD-10-CM

## 2022-03-25 DIAGNOSIS — B35.1 ONYCHOMYCOSIS: Primary | ICD-10-CM

## 2022-03-25 DIAGNOSIS — M20.12 VALGUS DEFORMITY OF BOTH GREAT TOES: ICD-10-CM

## 2022-03-25 DIAGNOSIS — Z79.4 TYPE 2 DIABETES MELLITUS WITH DIABETIC POLYNEUROPATHY, WITH LONG-TERM CURRENT USE OF INSULIN: ICD-10-CM

## 2022-03-25 DIAGNOSIS — E66.01 OBESITY, MORBID, BMI 40.0-49.9: ICD-10-CM

## 2022-03-25 DIAGNOSIS — E11.42 TYPE 2 DIABETES MELLITUS WITH DIABETIC POLYNEUROPATHY, WITH LONG-TERM CURRENT USE OF INSULIN: ICD-10-CM

## 2022-03-25 DIAGNOSIS — M20.41 HAMMERTOE, BILATERAL: ICD-10-CM

## 2022-03-25 DIAGNOSIS — M20.11 VALGUS DEFORMITY OF BOTH GREAT TOES: ICD-10-CM

## 2022-03-25 PROCEDURE — 11721 DEBRIDE NAIL 6 OR MORE: CPT | Performed by: NURSE PRACTITIONER

## 2022-03-25 PROCEDURE — 99213 OFFICE O/P EST LOW 20 MIN: CPT | Performed by: NURSE PRACTITIONER

## 2022-03-25 PROCEDURE — 11055 PARING/CUTG B9 HYPRKER LES 1: CPT | Performed by: NURSE PRACTITIONER

## 2022-04-06 DIAGNOSIS — M54.9 MUSCULOSKELETAL BACK PAIN: ICD-10-CM

## 2022-04-06 RX ORDER — BACLOFEN 10 MG/1
TABLET ORAL
Qty: 30 TABLET | Refills: 0 | OUTPATIENT
Start: 2022-04-06

## 2022-04-07 DIAGNOSIS — M54.9 MUSCULOSKELETAL BACK PAIN: ICD-10-CM

## 2022-04-07 RX ORDER — BACLOFEN 10 MG/1
TABLET ORAL
Qty: 30 TABLET | Refills: 0 | OUTPATIENT
Start: 2022-04-07

## 2022-04-08 RX ORDER — EMPAGLIFLOZIN 25 MG/1
TABLET, FILM COATED ORAL
Qty: 30 TABLET | Refills: 0 | Status: SHIPPED | OUTPATIENT
Start: 2022-04-08 | End: 2022-05-04

## 2022-04-12 ENCOUNTER — APPOINTMENT (OUTPATIENT)
Dept: LAB | Facility: HOSPITAL | Age: 77
End: 2022-04-12

## 2022-04-15 DIAGNOSIS — E11.8 TYPE 2 DIABETES MELLITUS WITH COMPLICATION (HCC): ICD-10-CM

## 2022-04-16 DIAGNOSIS — E11.8 TYPE 2 DIABETES MELLITUS WITH COMPLICATION (HCC): ICD-10-CM

## 2022-04-16 RX ORDER — INSULIN DEGLUDEC 200 U/ML
INJECTION, SOLUTION SUBCUTANEOUS
Qty: 42 ML | Refills: 0 | Status: SHIPPED | OUTPATIENT
Start: 2022-04-16 | End: 2022-05-25

## 2022-04-16 RX ORDER — INSULIN ASPART 100 [IU]/ML
INJECTION, SOLUTION INTRAVENOUS; SUBCUTANEOUS
Qty: 42 ML | Refills: 0 | Status: SHIPPED | OUTPATIENT
Start: 2022-04-16 | End: 2022-05-16

## 2022-04-17 RX ORDER — INSULIN DEGLUDEC 200 U/ML
INJECTION, SOLUTION SUBCUTANEOUS
Qty: 9 ML | Refills: 0 | Status: SHIPPED | OUTPATIENT
Start: 2022-04-17 | End: 2022-05-16

## 2022-04-17 RX ORDER — FUROSEMIDE 20 MG/1
TABLET ORAL
Qty: 30 TABLET | Refills: 0 | Status: SHIPPED | OUTPATIENT
Start: 2022-04-17 | End: 2022-05-19

## 2022-04-18 NOTE — TELEPHONE ENCOUNTER
----- Message from [facility] sent at 4/16/2022  4:00 PM CDT -----  Subject: Refill Request    QUESTIONS  Name of Medication? furosemide (LASIX) 20 MG tablet  Patient-reported dosage and instructions? Take 1 tablet by mouth daily  How many days do you have left? 0  [facility] [phone]  ---------------------------------------------------------------------------  --------------  CALL BACK INFO  What is the best way for the office to contact you? Do not leave any   message, patient will call back for answer  Preferred Call Back Phone Number? [phone]  ---------------------------------------------------------------------------  --------------  SCRIPT ANSWERS  Relationship to Patient?  Self

## 2022-04-24 RX ORDER — LORATADINE 10 MG/1
TABLET ORAL
Qty: 30 TABLET | Refills: 1 | Status: SHIPPED | OUTPATIENT
Start: 2022-04-24

## 2022-05-04 RX ORDER — EMPAGLIFLOZIN 25 MG/1
TABLET, FILM COATED ORAL
Qty: 30 TABLET | Refills: 0 | Status: SHIPPED | OUTPATIENT
Start: 2022-05-04 | End: 2022-06-02

## 2022-05-08 RX ORDER — CITALOPRAM 20 MG/1
TABLET ORAL
Qty: 30 TABLET | Refills: 1 | Status: SHIPPED | OUTPATIENT
Start: 2022-05-08 | End: 2022-06-10

## 2022-05-10 ENCOUNTER — TELEPHONE (OUTPATIENT)
Dept: PRIMARY CARE CLINIC | Age: 77
End: 2022-05-10

## 2022-05-10 DIAGNOSIS — G57.93 NEUROPATHY INVOLVING BOTH LOWER EXTREMITIES: Chronic | ICD-10-CM

## 2022-05-10 NOTE — TELEPHONE ENCOUNTER
----- Message from [facility] sent at 5/10/2022  2:23 PM CDT -----  Subject: Refill Request    QUESTIONS  Name of Medication? HYDROcodone-acetaminophen (NORCO)  MG per tablet  Patient-reported dosage and instructions? Take 1 tablet by mouth Daily   with lunch. .  How many days do you have left? 0  Preferred Pharmacy? [address] [place] phone number (if available)? [phone]  ---------------------------------------------------------------------------  --------------  CALL BACK INFO  What is the best way for the office to contact you? OK to leave message on   voicemail  Preferred Call Back Phone Number? [phone]  ---------------------------------------------------------------------------  --------------  SCRIPT ANSWERS  Relationship to Patient?  Self

## 2022-05-16 ENCOUNTER — OFFICE VISIT (OUTPATIENT)
Dept: PRIMARY CARE CLINIC | Age: 77
End: 2022-05-16
Payer: MEDICARE

## 2022-05-16 VITALS
OXYGEN SATURATION: 97 % | WEIGHT: 268 LBS | TEMPERATURE: 97.2 F | HEART RATE: 94 BPM | BODY MASS INDEX: 49.32 KG/M2 | DIASTOLIC BLOOD PRESSURE: 80 MMHG | SYSTOLIC BLOOD PRESSURE: 130 MMHG | HEIGHT: 62 IN

## 2022-05-16 DIAGNOSIS — E11.8 TYPE 2 DIABETES MELLITUS WITH COMPLICATION (HCC): Primary | ICD-10-CM

## 2022-05-16 DIAGNOSIS — M54.9 MUSCULOSKELETAL BACK PAIN: ICD-10-CM

## 2022-05-16 DIAGNOSIS — J30.2 SEASONAL ALLERGIES: ICD-10-CM

## 2022-05-16 DIAGNOSIS — G57.93 NEUROPATHY INVOLVING BOTH LOWER EXTREMITIES: Chronic | ICD-10-CM

## 2022-05-16 DIAGNOSIS — Z79.899 DRUG THERAPY: ICD-10-CM

## 2022-05-16 PROCEDURE — 3051F HG A1C>EQUAL 7.0%<8.0%: CPT | Performed by: NURSE PRACTITIONER

## 2022-05-16 PROCEDURE — 1123F ACP DISCUSS/DSCN MKR DOCD: CPT | Performed by: NURSE PRACTITIONER

## 2022-05-16 PROCEDURE — 99214 OFFICE O/P EST MOD 30 MIN: CPT | Performed by: NURSE PRACTITIONER

## 2022-05-16 PROCEDURE — 80305 DRUG TEST PRSMV DIR OPT OBS: CPT | Performed by: NURSE PRACTITIONER

## 2022-05-16 RX ORDER — GABAPENTIN 300 MG/1
CAPSULE ORAL
Qty: 90 CAPSULE | Refills: 0 | Status: SHIPPED | OUTPATIENT
Start: 2022-05-16 | End: 2022-06-29

## 2022-05-16 RX ORDER — LEVOCETIRIZINE DIHYDROCHLORIDE 5 MG/1
5 TABLET, FILM COATED ORAL NIGHTLY
Qty: 30 TABLET | Refills: 2 | Status: SHIPPED | OUTPATIENT
Start: 2022-05-16 | End: 2022-08-15

## 2022-05-16 RX ORDER — BACLOFEN 10 MG/1
TABLET ORAL
Qty: 30 TABLET | Refills: 0 | Status: SHIPPED | OUTPATIENT
Start: 2022-05-16 | End: 2022-05-31

## 2022-05-16 NOTE — PROGRESS NOTES
[address] [address]     Phone:  [phone]  Fax:  [phone]      Macarena Holder is a 78 y.o. female who presents today for her medical conditions/complaints as noted below. Macarena Holder is c/o of Medication Problem and Otalgia      Chief Complaint   Patient presents with    Medication Problem    Otalgia       HPI:     HPI  Patient present today for ear discomfort/request ears flushed out. Denies any other trouble. Patient request refill on medication that \"I am out of. \" reports blood sugars have been 150's. Reports continued back pain and neuropathy in her legs. Are well controlled with baclofen and gabapentin.      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    Breast CA (HCC)     Left breast Nodules removed Took radiation    Breast cancer (HCC)     Chronic back pain     Chronic kidney disease     Overactive bladder     COPD (chronic obstructive pulmonary disease) (HCC)     x few years Scarring on lungs 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    Diabetes mellitus ([address]     On insulin x 10 years    Fibromyalgia     Fibromyalgia     for 20 years    GERD (gastroesophageal reflux disease)     History of blood transfusion     ?when    History of therapeutic radiation     Hyperlipidemia     High cholesterol    Hypertension     On medications for 2 years    Joint pain 2/2/2016    Liver disease     Has fatty liver    Morbid obesity ([address]     Multiple food allergies     Neuropathic pain     Neuropathy     Neuropathy involving both lower extremities 2/2/2016    Obstructive sleep apnea     AHI: 68.1 per [facility], 5/2018    Osteoarthritis     Other disorders of kidney and ureter in diseases classified elsewhere     Pneumonia     Postmenopausal osteoporosis     Psychiatric problem     Restless leg syndrome     S/P lumpectomy, left breast 8/19/2015 8/4/2015    Sleep apnea     Type II or unspecified type diabetes mellitus with neurological manifestations, uncontrolled(250.62)         Past Surgical History:   Procedure Laterality Date    BREAST SURGERY Left 8/4/2015    left partial mastectomy on 8/4/15    EYE SURGERY      EYE SURGERY Bilateral 04/02/2017    Dr. Salome Thorpe      broke L ankle due to osteoporosis, has hardware in    FRACTURE SURGERY      Left ankle     HYSTERECTOMY      NE DRAIN SKIN ABSCESS COMPLIC N/A 7/40/5844    ABDOMINAL WALL ABSCESS INCISION AND DRAINAGE performed by Dusty Saint, MD at [address] ENDOSCOPY  4/8/2016    Dr Marisa Cortez, (-) H Pylori    WRIST SURGERY         Social History     Tobacco Use    Smoking status: Never Smoker    Smokeless tobacco: Never Used   Substance Use Topics    Alcohol use: Yes     Alcohol/week: 2.0 standard drinks     Types: 1 Glasses of wine, 1 Cans of beer per week     Comment: occ        Current Outpatient Medications   Medication Sig Dispense Refill    metFORMIN (GLUCOPHAGE) 1000 MG tablet Take 1 tablet by mouth 2 times daily (with meals) 180 tablet 1    gabapentin (NEURONTIN) 300 MG capsule TAKE 1 CAPSULE BY MOUTH 3 TIMES A DAY 90 capsule 0    baclofen (LIORESAL) 10 MG tablet TAKE 1 TABLET BY MOUTH 3 TIMES A DAY 30 tablet 0    levocetirizine (XYZAL) 5 MG tablet Take 1 tablet by mouth nightly 30 tablet 2    citalopram (CELEXA) 20 MG tablet TAKE 1 TABLET BY MOUTH ONCE DAILY 30 tablet 1    JARDIANCE 25 MG tablet TAKE 1 TABLET BY MOUTH ONCE DAILY 30 tablet 0    loratadine (CLARITIN) 10 MG tablet TAKE 1 TABLET BY MOUTH ONCE DAILY.  30 tablet 1    Dulaglutide (TRULICITY) 1.5 WV/3.3FK SOPN Inject 1.5 mg into the skin once a week 4 pen 1    insulin aspart (NOVOLOG FLEXPEN) 100 UNIT/ML injection pen INJECT 40 UNITS SUBCUTANEOUSLY 3 TIMES A   DAY BEFORE MEALS. (Patient taking differently: INJECT 40 UNITS SUBCUTANEOUSLY 3 TIMES A   DAY BEFORE MEALS. Patient reports taking 80 units twice daily) 45 mL 5    oxybutynin (DITROPAN-XL) 10 MG extended release tablet TAKE 2 TABLET DAILY as needed 180 tablet 1    montelukast (SINGULAIR) 10 MG tablet TAKE 1 TABLET BY MOUTH AT NIGHT 30 tablet 5    rosuvastatin (CRESTOR) 10 MG tablet TAKE 1 TABLET BY MOUTH ONCE DAILY 90 tablet 1    blood glucose test strips (ONETOUCH VERIO) strip TEST 3 TIMES A  each 3    magnesium oxide (MAG-OX) 400 MG tablet Take 1 tablet by mouth daily 90 tablet 3    vitamin D (ERGOCALCIFEROL) 1.25 MG (17854 UT) CAPS capsule TAKE 1 CAPSULE ONCE WEEKLY 12 capsule 2    Insulin Pen Needle 31G X 5 MM MISC As directed 100 each 1    Lancets MISC 1 each by Does not apply route 4 times daily 600 each 1    glucose monitoring (FREESTYLE FREEDOM) kit 1 kit by Does not apply route daily 1 kit 0    omeprazole (PRILOSEC) 20 MG delayed release capsule TAKE 1 CAPSULE 2 TIMES     DAILY AS NEEDED 180 capsule 1    ULTICARE MINI PEN NEEDLES 31G X 6 MM MISC USE 2 TIMES A  each 0    clobetasol (TEMOVATE) 0.05 % ointment Apply topically 2 times daily. For 10 days 1 Tube 0    aspirin 81 MG chewable tablet Take 1 tablet by mouth daily 30 tablet 3    HYDROcodone-acetaminophen (NORCO)  MG per tablet Take 1 tablet by mouth Daily with lunch. Yulisa Mendez Misc. Devices (ROLLER WALKER) MISC 1 each by Does not apply route daily Pt states that she falls at least once a month up to 3 times a month.  1 each 0    INSULIN SYRINGE 1CC/29G 29G X 1/2\" 1 ML MISC Use with each dose of novolog TID 90 each 1    mometasone-formoterol (DULERA) 200-5 MCG/ACT inhaler Inhale 2 puffs into the lungs every 12 hours      anastrozole (ARIMIDEX) 1 MG tablet Take 1 mg by mouth daily      albuterol (PROVENTIL HFA;VENTOLIN HFA) 108 (90 BASE) MCG/ACT inhaler Inhale 2 puffs into the lungs every 4 hours as needed for Wheezing  Incontinence Supply Disposable (POISE PANTILINERS) PADS Use pads as needed daily for urine leakage. 1 each 11    TRESIBA FLEXTOUCH 200 UNIT/ML SOPN TAKE 160 UNITS AT NIGHT SUBCUTANEOUSLY. ENSURE FASTING BLOOD SUGARS IN ARE ARE . 18 mL 1    losartan (COZAAR) 100 MG tablet TAKE 1 TABLET BY MOUTH ONCE DAILY FOR BLOOD PRESSURE 30 tablet 1    metoprolol succinate (TOPROL XL) 50 MG extended release tablet TAKE 1 TABLET BY MOUTH ONCE DAILY FOR BLOOD PRESSURE AND HEART PROTECTION 30 tablet 1    furosemide (LASIX) 20 MG tablet TAKE 1 TABLET BY MOUTH ONCE DAILY 30 tablet 0     No current facility-administered medications for this visit. Allergies   Allergen Reactions    Pcn [Penicillins] Hives and Itching     Patient states she can tolerate ampicillin and amoxicillin however.  Cefdinir        Family History   Problem Relation Age of Onset    High Blood Pressure Father     Heart Disease Father     Diabetes Father     High Blood Pressure Sister     Diabetes Sister     Ovarian Cancer Sister 43    High Blood Pressure Brother     Colon Cancer Neg Hx     Colon Polyps Neg Hx     Esophageal Cancer Neg Hx     Liver Cancer Neg Hx     Liver Disease Neg Hx     Rectal Cancer Neg Hx     Stomach Cancer Neg Hx                Subjective:      Review of Systems   Constitutional: Negative for activity change and fever. HENT: Positive for ear pain. Negative for congestion and sore throat. Respiratory: Negative for cough, chest tightness and shortness of breath. Cardiovascular: Negative for chest pain. Gastrointestinal: Negative for abdominal pain, diarrhea, nausea and vomiting. Genitourinary: Negative for frequency and urgency. Musculoskeletal: Negative for arthralgias and myalgias. Skin: Negative for color change. Neurological: Negative for dizziness, weakness and numbness. Psychiatric/Behavioral: Negative for agitation. The patient is not nervous/anxious.         Objective:     Physical Exam  Vitals reviewed. Constitutional:       Appearance: Normal appearance. HENT:      Head: Normocephalic. Right Ear: Hearing and tympanic membrane normal. Tympanic membrane is not injected. Left Ear: Hearing and tympanic membrane normal. Tympanic membrane is not injected. Nose: Nose normal.      Mouth/Throat:      Mouth: Mucous membranes are moist.      Pharynx: Oropharynx is clear. Eyes:      Extraocular Movements: Extraocular movements intact. Pupils: Pupils are equal, round, and reactive to light. Cardiovascular:      Rate and Rhythm: Normal rate and regular rhythm. Pulses: Normal pulses. Heart sounds: Normal heart sounds. Pulmonary:      Effort: Pulmonary effort is normal.      Breath sounds: Normal breath sounds. Abdominal:      General: Bowel sounds are normal.      Palpations: Abdomen is soft. Musculoskeletal:         General: Normal range of motion. Cervical back: Normal range of motion. Skin:     General: Skin is warm and dry. Neurological:      Mental Status: She is alert and oriented to person, place, and time. Psychiatric:         Mood and Affect: Mood normal.         Behavior: Behavior normal.         /80   Pulse 94   Temp 97.2 °F (36.2 °C) (Temporal)   Ht 5' 2\" (1.575 m)   Wt 268 lb (121.6 kg)   LMP  (LMP Unknown)   SpO2 97%   BMI 49.02 kg/m²     Assessment:      Diagnosis Orders   1. Type 2 diabetes mellitus with complication (HCC)  metFORMIN (GLUCOPHAGE) 1000 MG tablet   2. Neuropathy involving both lower extremities  gabapentin (NEURONTIN) 300 MG capsule   3. Musculoskeletal back pain  baclofen (LIORESAL) 10 MG tablet   4. Drug therapy  POCT Rapid Drug Screen   5.  Seasonal allergies  levocetirizine (XYZAL) 5 MG tablet       Results for orders placed or performed in visit on 05/16/22   POCT Rapid Drug Screen   Result Value Ref Range    Alcohol, Urine neg     Amphetamine Screen, Urine neg     Barbiturate Screen, Urine neg Benzodiazepine Screen, Urine neg     Buprenorphine Urine neg     Cocaine Metabolite Screen, Urine neg     FENTANYL SCREEN, URINE neg     Gabapentin Screen, Urine neg     MDMA, Urine neg     Methadone Screen, Urine neg     Methamphetamine, Urine neg     Opiate Scrn, Ur neg     Oxycodone Screen, Ur neg     PCP Screen, Urine neg     Propoxyphene Screen, Urine neg     Synthetic Cannabinoids (K2) Screen, Urine neg     THC Screen, Urine neg     Tramadol Scrn, Ur neg     Tricyclic Antidepressants, Urine neg        Plan:     1. Type 2 diabetes mellitus with complication (HCC)    - metFORMIN (GLUCOPHAGE) 1000 MG tablet; Take 1 tablet by mouth 2 times daily (with meals)  Dispense: 180 tablet; Refill: 1    2. Neuropathy involving both lower extremities    - gabapentin (NEURONTIN) 300 MG capsule; TAKE 1 CAPSULE BY MOUTH 3 TIMES A DAY  Dispense: 90 capsule; Refill: 0    3. Musculoskeletal back pain    - baclofen (LIORESAL) 10 MG tablet; TAKE 1 TABLET BY MOUTH 3 TIMES A DAY  Dispense: 30 tablet; Refill: 0    4. Drug therapy    - POCT Rapid Drug Screen    5. Seasonal allergies    - levocetirizine (XYZAL) 5 MG tablet; Take 1 tablet by mouth nightly  Dispense: 30 tablet; Refill: 2       Return in about 3 months (around 8/16/2022) for 3 month f/u dm.     Orders Placed This Encounter   Procedures    POCT Rapid Drug Screen       Orders Placed This Encounter   Medications    metFORMIN (GLUCOPHAGE) 1000 MG tablet     Sig: Take 1 tablet by mouth 2 times daily (with meals)     Dispense:  180 tablet     Refill:  1    gabapentin (NEURONTIN) 300 MG capsule     Sig: TAKE 1 CAPSULE BY MOUTH 3 TIMES A DAY     Dispense:  90 capsule     Refill:  0    baclofen (LIORESAL) 10 MG tablet     Sig: TAKE 1 TABLET BY MOUTH 3 TIMES A DAY     Dispense:  30 tablet     Refill:  0    levocetirizine (XYZAL) 5 MG tablet     Sig: Take 1 tablet by mouth nightly     Dispense:  30 tablet     Refill:  2            Patient offered educational handouts and has had all questions answered. Patient voices understanding and agrees to plans along with risks and benefits of plan. Patient is instructed to continue prior meds, diet, and exercise plans as instructed. Patient agrees to follow up as instructed and sooner if needed. Patient agrees to go to ER if condition becomes emergent. EMR Dragon/transcription disclaimer: Some of this encounter note is an electronic transcription/translation of spoken language to printed text. The electronic translation of spoken language may permit erroneous, or at times, nonsensical words or phrases to be inadvertently transcribed.  Although I have reviewed the note for such errors, some may still exist.    Electronically signed by ANTONIA Salmeron CNP on 5/27/2022 at 3:26 AM

## 2022-05-19 DIAGNOSIS — I10 ESSENTIAL HYPERTENSION: ICD-10-CM

## 2022-05-19 RX ORDER — FUROSEMIDE 20 MG/1
TABLET ORAL
Qty: 30 TABLET | Refills: 0 | Status: SHIPPED | OUTPATIENT
Start: 2022-05-19 | End: 2022-06-18

## 2022-05-19 RX ORDER — METOPROLOL SUCCINATE 50 MG/1
TABLET, EXTENDED RELEASE ORAL
Qty: 30 TABLET | Refills: 1 | Status: SHIPPED | OUTPATIENT
Start: 2022-05-19 | End: 2022-07-12 | Stop reason: SDUPTHER

## 2022-05-19 RX ORDER — LOSARTAN POTASSIUM 100 MG/1
TABLET ORAL
Qty: 30 TABLET | Refills: 1 | Status: SHIPPED | OUTPATIENT
Start: 2022-05-19 | End: 2022-07-12 | Stop reason: SDUPTHER

## 2022-05-25 DIAGNOSIS — E11.8 TYPE 2 DIABETES MELLITUS WITH COMPLICATION (HCC): ICD-10-CM

## 2022-05-25 RX ORDER — INSULIN DEGLUDEC 200 U/ML
INJECTION, SOLUTION SUBCUTANEOUS
Qty: 18 ML | Refills: 1 | Status: SHIPPED | OUTPATIENT
Start: 2022-05-25 | End: 2022-07-07

## 2022-05-25 NOTE — TELEPHONE ENCOUNTER
Tessy Roberto called to request a refill on her medication. Last office visit : 5/16/2022   Next office visit : 8/16/2022     Requested Prescriptions     Pending Prescriptions Disp Refills    TRESIBA FLEXTOUCH 200 UNIT/ML SOPN [Pharmacy Med Name: Loan Cola 200UNIT/ML SOLUTION PEN-INJECTOR] 18 mL 1     Sig: TAKE 160 UNITS AT NIGHT SUBCUTANEOUSLY. ENSURE FASTING BLOOD SUGARS IN ARE ARE .             Verito Orlando

## 2022-05-27 ASSESSMENT — ENCOUNTER SYMPTOMS
ABDOMINAL PAIN: 0
COLOR CHANGE: 0
COUGH: 0
DIARRHEA: 0
CHEST TIGHTNESS: 0
NAUSEA: 0
VOMITING: 0
SORE THROAT: 0
SHORTNESS OF BREATH: 0

## 2022-05-28 DIAGNOSIS — M54.9 MUSCULOSKELETAL BACK PAIN: ICD-10-CM

## 2022-05-31 RX ORDER — BACLOFEN 10 MG/1
TABLET ORAL
Qty: 30 TABLET | Refills: 0 | Status: SHIPPED | OUTPATIENT
Start: 2022-05-31 | End: 2022-06-18

## 2022-06-02 RX ORDER — EMPAGLIFLOZIN 25 MG/1
TABLET, FILM COATED ORAL
Qty: 30 TABLET | Refills: 0 | Status: SHIPPED | OUTPATIENT
Start: 2022-06-02 | End: 2022-06-29

## 2022-06-07 ENCOUNTER — TELEPHONE (OUTPATIENT)
Dept: VASCULAR SURGERY | Facility: CLINIC | Age: 77
End: 2022-06-07

## 2022-06-08 ENCOUNTER — OFFICE VISIT (OUTPATIENT)
Dept: VASCULAR SURGERY | Facility: CLINIC | Age: 77
End: 2022-06-08

## 2022-06-08 ENCOUNTER — HOSPITAL ENCOUNTER (OUTPATIENT)
Dept: ULTRASOUND IMAGING | Facility: HOSPITAL | Age: 77
Discharge: HOME | End: 2022-06-08
Admitting: SURGERY

## 2022-06-08 VITALS
HEIGHT: 62 IN | HEART RATE: 77 BPM | SYSTOLIC BLOOD PRESSURE: 126 MMHG | RESPIRATION RATE: 18 BRPM | DIASTOLIC BLOOD PRESSURE: 60 MMHG | WEIGHT: 269 LBS | BODY MASS INDEX: 49.5 KG/M2 | OXYGEN SATURATION: 93 %

## 2022-06-08 DIAGNOSIS — I87.2 VENOUS INSUFFICIENCY OF BOTH LOWER EXTREMITIES: Primary | ICD-10-CM

## 2022-06-08 DIAGNOSIS — E11.65 TYPE 2 DIABETES MELLITUS WITH HYPERGLYCEMIA, WITH LONG-TERM CURRENT USE OF INSULIN: ICD-10-CM

## 2022-06-08 DIAGNOSIS — I10 ESSENTIAL HYPERTENSION: ICD-10-CM

## 2022-06-08 DIAGNOSIS — E78.2 MIXED HYPERLIPIDEMIA: ICD-10-CM

## 2022-06-08 DIAGNOSIS — Z79.4 TYPE 2 DIABETES MELLITUS WITH HYPERGLYCEMIA, WITH LONG-TERM CURRENT USE OF INSULIN: ICD-10-CM

## 2022-06-08 DIAGNOSIS — I83.12 VARICOSE VEINS OF BOTH LOWER EXTREMITIES WITH INFLAMMATION: ICD-10-CM

## 2022-06-08 DIAGNOSIS — I83.11 VARICOSE VEINS OF BOTH LOWER EXTREMITIES WITH INFLAMMATION: ICD-10-CM

## 2022-06-08 DIAGNOSIS — R60.0 BILATERAL LOWER EXTREMITY EDEMA: ICD-10-CM

## 2022-06-08 PROCEDURE — 99214 OFFICE O/P EST MOD 30 MIN: CPT | Performed by: SURGERY

## 2022-06-08 PROCEDURE — 93970 EXTREMITY STUDY: CPT

## 2022-06-08 PROCEDURE — 93970 EXTREMITY STUDY: CPT | Performed by: SURGERY

## 2022-06-08 RX ORDER — DULAGLUTIDE 1.5 MG/.5ML
INJECTION, SOLUTION SUBCUTANEOUS
COMMUNITY
Start: 2022-05-25

## 2022-06-08 RX ORDER — SITAGLIPTIN AND METFORMIN HYDROCHLORIDE 1000; 50 MG/1; MG/1
1 TABLET, FILM COATED ORAL 2 TIMES DAILY WITH MEALS
COMMUNITY
Start: 2022-03-23 | End: 2022-06-08

## 2022-06-08 RX ORDER — OMEPRAZOLE 20 MG/1
CAPSULE, DELAYED RELEASE ORAL
Qty: 60 CAPSULE | Refills: 6 | Status: SHIPPED | OUTPATIENT
Start: 2022-06-08 | End: 2022-07-12 | Stop reason: SDUPTHER

## 2022-06-08 RX ORDER — HYDROCODONE BITARTRATE AND ACETAMINOPHEN 10; 325 MG/1; MG/1
1 TABLET ORAL 2 TIMES DAILY PRN
COMMUNITY
Start: 2022-06-01

## 2022-06-08 RX ORDER — DULOXETIN HYDROCHLORIDE 30 MG/1
30 CAPSULE, DELAYED RELEASE ORAL DAILY
COMMUNITY
Start: 2022-05-25

## 2022-06-08 RX ORDER — EMPAGLIFLOZIN 25 MG/1
25 TABLET, FILM COATED ORAL DAILY
COMMUNITY
Start: 2022-06-02

## 2022-06-10 RX ORDER — CITALOPRAM 20 MG/1
TABLET ORAL
Qty: 30 TABLET | Refills: 1 | Status: SHIPPED | OUTPATIENT
Start: 2022-06-10 | End: 2022-08-23 | Stop reason: SDUPTHER

## 2022-06-15 DIAGNOSIS — M54.9 MUSCULOSKELETAL BACK PAIN: ICD-10-CM

## 2022-06-18 RX ORDER — BACLOFEN 10 MG/1
TABLET ORAL
Qty: 30 TABLET | Refills: 1 | Status: SHIPPED | OUTPATIENT
Start: 2022-06-18 | End: 2022-07-12 | Stop reason: SDUPTHER

## 2022-06-18 RX ORDER — FUROSEMIDE 20 MG/1
TABLET ORAL
Qty: 30 TABLET | Refills: 1 | Status: SHIPPED | OUTPATIENT
Start: 2022-06-18 | End: 2022-07-12 | Stop reason: SDUPTHER

## 2022-06-21 DIAGNOSIS — E11.8 TYPE 2 DIABETES MELLITUS WITH COMPLICATION (HCC): ICD-10-CM

## 2022-06-22 DIAGNOSIS — N39.41 URGE INCONTINENCE OF URINE: ICD-10-CM

## 2022-06-23 RX ORDER — OXYBUTYNIN CHLORIDE 10 MG/1
TABLET, EXTENDED RELEASE ORAL
Qty: 90 TABLET | Refills: 0 | Status: SHIPPED | OUTPATIENT
Start: 2022-06-23 | End: 2022-07-12 | Stop reason: SDUPTHER

## 2022-06-23 RX ORDER — DULAGLUTIDE 1.5 MG/.5ML
1.5 INJECTION, SOLUTION SUBCUTANEOUS WEEKLY
Qty: 2 ML | Refills: 2 | Status: SHIPPED | OUTPATIENT
Start: 2022-06-23 | End: 2022-07-12 | Stop reason: SDUPTHER

## 2022-06-28 NOTE — TELEPHONE ENCOUNTER
----- Message from Sandra Canor sent at 6/23/2022  2:57 PM CDT -----  Subject: Medication Problem    QUESTIONS  Name of Medication? TRULICITY 1.5 DO/4.2FY SOPN  Patient-reported dosage and instructions? n/a  What question or problem do you have with the medication? Was supposed to   receive Trulicity on Tuesday but the pharmacy is still waiting for the   order. Also needs a refill on oxybutynin  Preferred Pharmacy? [facility], [address] phone number (if available)? [phone]  Additional Information for Provider?   ---------------------------------------------------------------------------  --------------  [address]  What is the best way for the office to contact you? OK to leave message on   voicemail  Preferred Call Back Phone Number? [phone]  ---------------------------------------------------------------------------  --------------  SCRIPT ANSWERS  Relationship to Patient?  Self

## 2022-06-28 NOTE — TELEPHONE ENCOUNTER
Sent 6-23-22 Total time spent on advanced care planning, excluding time spent on daily care: 16 minutes.    1st 30 minutes [id number]   Each add’l 30 minutes 01500        I discussed extensively with patient and patient's family is regarding code status and plan of care. We also discussed advanced care planning including diagnosis, prognosis, plan of care, risks and benefits of any therapies that could be offered, as well as alternatives including palliation and hospice, as appropriate. My discussion is summarized above in detail. Confirmed with DNR

## 2022-06-29 DIAGNOSIS — G57.93 NEUROPATHY INVOLVING BOTH LOWER EXTREMITIES: Chronic | ICD-10-CM

## 2022-06-29 RX ORDER — EMPAGLIFLOZIN 25 MG/1
TABLET, FILM COATED ORAL
Qty: 30 TABLET | Refills: 0 | Status: SHIPPED | OUTPATIENT
Start: 2022-06-29 | End: 2022-07-12 | Stop reason: SDUPTHER

## 2022-06-29 RX ORDER — GABAPENTIN 300 MG/1
CAPSULE ORAL
Qty: 90 CAPSULE | Refills: 0 | Status: SHIPPED | OUTPATIENT
Start: 2022-06-29 | End: 2022-07-12 | Stop reason: SDUPTHER

## 2022-06-29 NOTE — TELEPHONE ENCOUNTER
Rosita Cormier called to request a refill on her medication. Last office visit : 5/16/2022   Next office visit : 6/29/2022     Last UDS:   Amphetamine Screen, Urine   Date Value Ref Range Status   05/16/2022 neg  Final     Barbiturate Screen, Urine   Date Value Ref Range Status   05/16/2022 neg  Final     Benzodiazepine Screen, Urine   Date Value Ref Range Status   05/16/2022 neg  Final     Buprenorphine Urine   Date Value Ref Range Status   05/16/2022 neg  Final     Cocaine Metabolite Screen, Urine   Date Value Ref Range Status   05/16/2022 neg  Final     Gabapentin Screen, Urine   Date Value Ref Range Status   05/16/2022 neg  Final     MDMA, Urine   Date Value Ref Range Status   05/16/2022 neg  Final     Methamphetamine, Urine   Date Value Ref Range Status   05/16/2022 neg  Final     Opiate Scrn, Ur   Date Value Ref Range Status   05/16/2022 neg  Final     Oxycodone Screen, Ur   Date Value Ref Range Status   05/16/2022 neg  Final     PCP Screen, Urine   Date Value Ref Range Status   05/16/2022 neg  Final     Propoxyphene Screen, Urine   Date Value Ref Range Status   05/16/2022 neg  Final     THC Screen, Urine   Date Value Ref Range Status   05/16/2022 neg  Final     Tricyclic Antidepressants, Urine   Date Value Ref Range Status   05/16/2022 neg  Final       Last Jinx File:    Medication Contract: 05-15-22   Last Fill: 05-16-22    Requested Prescriptions     Pending Prescriptions Disp Refills    gabapentin (NEURONTIN) 300 MG capsule [Pharmacy Med Name: GABAPENTIN 300MG CAPSULE] 90 capsule 0     Sig: TAKE 1 CAPSULE BY MOUTH 3 TIMES A DAY         Please approve or refuse this medication.    Sindy Wallace MA

## 2022-07-07 DIAGNOSIS — E11.8 TYPE 2 DIABETES MELLITUS WITH COMPLICATION (HCC): ICD-10-CM

## 2022-07-07 RX ORDER — INSULIN DEGLUDEC 200 U/ML
INJECTION, SOLUTION SUBCUTANEOUS
Qty: 18 ML | Refills: 1 | Status: SHIPPED | OUTPATIENT
Start: 2022-07-07 | End: 2022-07-12 | Stop reason: SDUPTHER

## 2022-07-07 RX ORDER — ERGOCALCIFEROL 1.25 MG/1
CAPSULE ORAL
Qty: 4 CAPSULE | Refills: 2 | Status: SHIPPED | OUTPATIENT
Start: 2022-07-07 | End: 2022-10-21

## 2022-07-08 ENCOUNTER — TELEPHONE (OUTPATIENT)
Dept: PODIATRY | Facility: CLINIC | Age: 77
End: 2022-07-08

## 2022-07-12 DIAGNOSIS — N39.41 URGE INCONTINENCE OF URINE: ICD-10-CM

## 2022-07-12 DIAGNOSIS — E11.9 TYPE 2 DIABETES MELLITUS WITHOUT COMPLICATION, WITH LONG-TERM CURRENT USE OF INSULIN (HCC): ICD-10-CM

## 2022-07-12 DIAGNOSIS — G57.93 NEUROPATHY INVOLVING BOTH LOWER EXTREMITIES: Chronic | ICD-10-CM

## 2022-07-12 DIAGNOSIS — E78.2 MIXED HYPERLIPIDEMIA: ICD-10-CM

## 2022-07-12 DIAGNOSIS — M54.9 MUSCULOSKELETAL BACK PAIN: ICD-10-CM

## 2022-07-12 DIAGNOSIS — Z79.4 TYPE 2 DIABETES MELLITUS WITHOUT COMPLICATION, WITH LONG-TERM CURRENT USE OF INSULIN (HCC): ICD-10-CM

## 2022-07-12 DIAGNOSIS — I10 ESSENTIAL HYPERTENSION: ICD-10-CM

## 2022-07-12 DIAGNOSIS — E11.8 TYPE 2 DIABETES MELLITUS WITH COMPLICATION (HCC): ICD-10-CM

## 2022-07-12 RX ORDER — METOPROLOL SUCCINATE 50 MG/1
TABLET, EXTENDED RELEASE ORAL
Qty: 90 TABLET | Refills: 2 | Status: SHIPPED | OUTPATIENT
Start: 2022-07-12 | End: 2022-07-27

## 2022-07-12 RX ORDER — INSULIN ASPART 100 [IU]/ML
INJECTION, SOLUTION INTRAVENOUS; SUBCUTANEOUS
Qty: 45 ML | Refills: 5 | Status: SHIPPED | OUTPATIENT
Start: 2022-07-12 | End: 2022-08-03

## 2022-07-12 RX ORDER — BACLOFEN 10 MG/1
TABLET ORAL
Qty: 90 TABLET | Refills: 2 | Status: SHIPPED | OUTPATIENT
Start: 2022-07-12 | End: 2022-10-02

## 2022-07-12 RX ORDER — ROSUVASTATIN CALCIUM 10 MG/1
TABLET, COATED ORAL
Qty: 90 TABLET | Refills: 2 | Status: SHIPPED | OUTPATIENT
Start: 2022-07-12

## 2022-07-12 RX ORDER — MONTELUKAST SODIUM 10 MG/1
TABLET ORAL
Qty: 90 TABLET | Refills: 2 | Status: SHIPPED | OUTPATIENT
Start: 2022-07-12

## 2022-07-12 RX ORDER — IBUPROFEN 200 MG
TABLET ORAL
Qty: 90 EACH | Refills: 1 | Status: SHIPPED | OUTPATIENT
Start: 2022-07-12 | End: 2022-08-23

## 2022-07-12 RX ORDER — OXYBUTYNIN CHLORIDE 10 MG/1
TABLET, EXTENDED RELEASE ORAL
Qty: 90 TABLET | Refills: 2 | Status: SHIPPED | OUTPATIENT
Start: 2022-07-12

## 2022-07-12 RX ORDER — GABAPENTIN 300 MG/1
CAPSULE ORAL
Qty: 180 CAPSULE | Refills: 1 | Status: SHIPPED | OUTPATIENT
Start: 2022-07-26 | End: 2022-11-01

## 2022-07-12 RX ORDER — MONTELUKAST SODIUM 10 MG/1
TABLET ORAL
Qty: 90 TABLET | Refills: 2 | Status: SHIPPED | OUTPATIENT
Start: 2022-07-12 | End: 2022-07-12 | Stop reason: SDUPTHER

## 2022-07-12 RX ORDER — DULAGLUTIDE 1.5 MG/.5ML
1.5 INJECTION, SOLUTION SUBCUTANEOUS WEEKLY
Qty: 2 ML | Refills: 5 | Status: SHIPPED | OUTPATIENT
Start: 2022-07-12 | End: 2022-09-16

## 2022-07-12 RX ORDER — LOSARTAN POTASSIUM 100 MG/1
TABLET ORAL
Qty: 90 TABLET | Refills: 2 | Status: SHIPPED | OUTPATIENT
Start: 2022-07-12 | End: 2022-07-27

## 2022-07-12 RX ORDER — INSULIN DEGLUDEC 200 U/ML
INJECTION, SOLUTION SUBCUTANEOUS
Qty: 18 ML | Refills: 5 | Status: SHIPPED | OUTPATIENT
Start: 2022-07-12 | End: 2022-09-16 | Stop reason: SDUPTHER

## 2022-07-12 RX ORDER — FUROSEMIDE 20 MG/1
20 TABLET ORAL DAILY
Qty: 90 TABLET | Refills: 2 | Status: SHIPPED | OUTPATIENT
Start: 2022-07-12

## 2022-07-12 RX ORDER — OMEPRAZOLE 20 MG/1
CAPSULE, DELAYED RELEASE ORAL
Qty: 180 CAPSULE | Refills: 2 | Status: SHIPPED | OUTPATIENT
Start: 2022-07-12

## 2022-07-12 NOTE — TELEPHONE ENCOUNTER
Don Sebastian called to request a refill on her medication. Last office visit : 5/16/2022   Next office visit : 8/16/2022     Last UDS:   Amphetamine Screen, Urine   Date Value Ref Range Status   05/16/2022 neg  Final     Barbiturate Screen, Urine   Date Value Ref Range Status   05/16/2022 neg  Final     Benzodiazepine Screen, Urine   Date Value Ref Range Status   05/16/2022 neg  Final     Buprenorphine Urine   Date Value Ref Range Status   05/16/2022 neg  Final     Cocaine Metabolite Screen, Urine   Date Value Ref Range Status   05/16/2022 neg  Final     Gabapentin Screen, Urine   Date Value Ref Range Status   05/16/2022 neg  Final     MDMA, Urine   Date Value Ref Range Status   05/16/2022 neg  Final     Methamphetamine, Urine   Date Value Ref Range Status   05/16/2022 neg  Final     Opiate Scrn, Ur   Date Value Ref Range Status   05/16/2022 neg  Final     Oxycodone Screen, Ur   Date Value Ref Range Status   05/16/2022 neg  Final     PCP Screen, Urine   Date Value Ref Range Status   05/16/2022 neg  Final     Propoxyphene Screen, Urine   Date Value Ref Range Status   05/16/2022 neg  Final     THC Screen, Urine   Date Value Ref Range Status   05/16/2022 neg  Final     Tricyclic Antidepressants, Urine   Date Value Ref Range Status   05/16/2022 neg  Final       Last Roly Gums: 07-12-22  Medication Contract: 05-15-22   Last Fill: [id number]    Requested Prescriptions     Signed Prescriptions Disp Refills    baclofen (LIORESAL) 10 MG tablet 90 tablet 2     Sig: TAKE 1 TABLET BY MOUTH THREE TIMES DAILY.      Authorizing Provider: Manuel Golden     Ordering User: Rober Zamorano furosemide (LASIX) 20 MG tablet 90 tablet 2     Sig: Take 1 tablet by mouth daily     Authorizing Provider: Manuel Golden     Ordering User: Rober Zamorano empagliflozin (JARDIANCE) 25 MG tablet 90 tablet 2     Sig: Take 1 tablet by mouth daily     Authorizing Provider: Manuel Golden     Ordering User: Ryan Ashley DAY BEFORE MEALS. Authorizing Provider: Johan Aiken     Ordering User: Tahir Langlade Insulin Pen Needle 31G X 5 MM MISC 100 each 1     Sig: As directed     Authorizing Provider: Johan Aiken     Ordering User: Haro Justus INSULIN SYRINGE 1CC/29G 29G X 1/2\" 1 ML MISC 90 each 1     Sig: Use with each dose of novolog TID     Authorizing Provider: Johan Bowen User: Roma Queen         Please approve or refuse this medication.    Flako Vargas MA

## 2022-07-13 ENCOUNTER — TELEPHONE (OUTPATIENT)
Dept: PODIATRY | Facility: CLINIC | Age: 77
End: 2022-07-13

## 2022-07-14 ENCOUNTER — OFFICE VISIT (OUTPATIENT)
Dept: PODIATRY | Facility: CLINIC | Age: 77
End: 2022-07-14

## 2022-07-14 VITALS
BODY MASS INDEX: 49.5 KG/M2 | HEIGHT: 62 IN | DIASTOLIC BLOOD PRESSURE: 85 MMHG | SYSTOLIC BLOOD PRESSURE: 145 MMHG | HEART RATE: 91 BPM | OXYGEN SATURATION: 95 % | WEIGHT: 269 LBS

## 2022-07-14 DIAGNOSIS — M79.672 FOOT PAIN, BILATERAL: ICD-10-CM

## 2022-07-14 DIAGNOSIS — M20.12 VALGUS DEFORMITY OF BOTH GREAT TOES: ICD-10-CM

## 2022-07-14 DIAGNOSIS — E11.42 TYPE 2 DIABETES MELLITUS WITH DIABETIC POLYNEUROPATHY, WITH LONG-TERM CURRENT USE OF INSULIN: ICD-10-CM

## 2022-07-14 DIAGNOSIS — M79.671 FOOT PAIN, BILATERAL: ICD-10-CM

## 2022-07-14 DIAGNOSIS — Z79.4 TYPE 2 DIABETES MELLITUS WITH DIABETIC POLYNEUROPATHY, WITH LONG-TERM CURRENT USE OF INSULIN: ICD-10-CM

## 2022-07-14 DIAGNOSIS — L84 FOOT CALLUS: ICD-10-CM

## 2022-07-14 DIAGNOSIS — E11.9 ENCOUNTER FOR DIABETIC FOOT EXAM: ICD-10-CM

## 2022-07-14 DIAGNOSIS — M20.11 VALGUS DEFORMITY OF BOTH GREAT TOES: ICD-10-CM

## 2022-07-14 DIAGNOSIS — E66.01 OBESITY, MORBID, BMI 40.0-49.9: ICD-10-CM

## 2022-07-14 DIAGNOSIS — B35.1 ONYCHOMYCOSIS: Primary | ICD-10-CM

## 2022-07-14 PROCEDURE — 11055 PARING/CUTG B9 HYPRKER LES 1: CPT | Performed by: NURSE PRACTITIONER

## 2022-07-14 PROCEDURE — 11721 DEBRIDE NAIL 6 OR MORE: CPT | Performed by: NURSE PRACTITIONER

## 2022-07-14 RX ORDER — MONTELUKAST SODIUM 10 MG/1
TABLET ORAL
Qty: 30 TABLET | Refills: 3 | OUTPATIENT
Start: 2022-07-14

## 2022-07-14 NOTE — PLAN OF CARE
Problem: Falls - Risk of:  Goal: Will remain free from falls  Will remain free from falls   Outcome: Ongoing No

## 2022-07-18 DIAGNOSIS — E11.8 TYPE 2 DIABETES MELLITUS WITH COMPLICATION (HCC): ICD-10-CM

## 2022-07-18 NOTE — TELEPHONE ENCOUNTER
Eunice Solomon called to request a refill on her medication. Last office visit : 5/16/2022   Next office visit : 8/16/2022     Requested Prescriptions     Pending Prescriptions Disp Refills    Blood Glucose Monitoring Suppl ([address]) w/Device KIT [Pharmacy Med Name: Yash Wilmer FLEX BLOOD GLUCOSE MONITORING SYSTEM  KIT]  0     Sig: USE TO CHECK BLOOD GLUCOSE DAILY.             Shavon Celis

## 2022-07-19 DIAGNOSIS — E11.8 TYPE 2 DIABETES MELLITUS WITH COMPLICATION (HCC): ICD-10-CM

## 2022-07-20 RX ORDER — BLOOD SUGAR DIAGNOSTIC
STRIP MISCELLANEOUS
Qty: 100 EACH | Refills: 6 | Status: SHIPPED | OUTPATIENT
Start: 2022-07-20

## 2022-07-20 RX ORDER — BLOOD-GLUCOSE METER
EACH MISCELLANEOUS
Qty: 1 KIT | Refills: 0 | Status: SHIPPED | OUTPATIENT
Start: 2022-07-20

## 2022-07-20 NOTE — TELEPHONE ENCOUNTER
Giorgio Lopez called requesting a refill of the below medication which has been pended for you:     Requested Prescriptions     Pending Prescriptions Disp Refills    blood glucose test strips (ONETOUCH VERIO) strip [Pharmacy Med Name: Tony Kaylene TEST STRIPS  STRIP] 100 each 3     Sig: USE TO TEST BLOOD GLUCOSE 4 TIMES DAILY. Last Appointment Date: 5/16/2022  Next Appointment Date: 8/16/2022    Allergies   Allergen Reactions    Pcn [Penicillins] Hives and Itching     Patient states she can tolerate ampicillin and amoxicillin however.     Cefdinir

## 2022-07-26 DIAGNOSIS — I10 ESSENTIAL HYPERTENSION: ICD-10-CM

## 2022-07-27 RX ORDER — METOPROLOL SUCCINATE 50 MG/1
TABLET, EXTENDED RELEASE ORAL
Qty: 30 TABLET | Refills: 1 | Status: SHIPPED | OUTPATIENT
Start: 2022-07-27

## 2022-07-27 RX ORDER — LOSARTAN POTASSIUM 100 MG/1
TABLET ORAL
Qty: 30 TABLET | Refills: 1 | Status: SHIPPED | OUTPATIENT
Start: 2022-07-27

## 2022-07-27 NOTE — TELEPHONE ENCOUNTER
Marina Mcguire called requesting a refill of the below medication which has been pended for you:     Requested Prescriptions     Pending Prescriptions Disp Refills    metoprolol succinate (TOPROL XL) 50 MG extended release tablet [Pharmacy Med Name: METOPROLOL SUCCINATE ER 50MG TABLET EXTENDED RELEASE 24 HOUR] 30 tablet 1     Sig: TAKE 1 TABLET BY MOUTH ONCE DAILY FOR BLOOD PRESSURE AND HEART PROTECTION    losartan (COZAAR) 100 MG tablet [Pharmacy Med Name: LOSARTAN POTASSIUM 100MG TABLET] 30 tablet 1     Sig: TAKE 1 TABLET BY MOUTH ONCE DAILY FOR BLOOD PRESSURE       Last Appointment Date: 5/16/2022  Next Appointment Date: 8/16/2022    Allergies   Allergen Reactions    Pcn [Penicillins] Hives and Itching     Patient states she can tolerate ampicillin and amoxicillin however.     Cefdinir

## 2022-07-28 DIAGNOSIS — M54.9 MUSCULOSKELETAL BACK PAIN: ICD-10-CM

## 2022-07-28 RX ORDER — BACLOFEN 10 MG/1
TABLET ORAL
Qty: 30 TABLET | Refills: 1 | OUTPATIENT
Start: 2022-07-28

## 2022-08-01 DIAGNOSIS — E11.8 TYPE 2 DIABETES MELLITUS WITH COMPLICATION (HCC): ICD-10-CM

## 2022-08-01 NOTE — TELEPHONE ENCOUNTER
Katalina Grimaldo called to request a refill on her medication. Last office visit : 5/16/2022   Next office visit : 8/16/2022     Requested Prescriptions     Pending Prescriptions Disp Refills    insulin aspart (NOVOLOG FLEXPEN) 100 UNIT/ML injection pen [Pharmacy Med Name: Blakebecky Cárdenas 100UNIT/ML SOLUTION PEN-INJECTOR] 45 mL 2     Sig: INJECT 40 UNITS SUB-Q 3 TIMES DAILY BEFORE MEALS.             Domingo Flores

## 2022-08-03 ENCOUNTER — TELEPHONE (OUTPATIENT)
Dept: PRIMARY CARE CLINIC | Age: 77
End: 2022-08-03

## 2022-08-03 RX ORDER — INSULIN ASPART 100 [IU]/ML
INJECTION, SOLUTION INTRAVENOUS; SUBCUTANEOUS
Qty: 45 ML | Refills: 2 | Status: SHIPPED | OUTPATIENT
Start: 2022-08-03 | End: 2022-08-07 | Stop reason: SDUPTHER

## 2022-08-03 NOTE — TELEPHONE ENCOUNTER
----- Message from Rahul Romina sent at 8/3/2022  9:18 AM CDT -----  Subject: Message to Provider    QUESTIONS  Information for Provider? patient is needing something for her mouth and   jaw she has 3 teeth that are bothering her with an infection and she can   not get into the dentist for a couple weeks. . She is in a lot of pain and   can not stand it. please call her. Is is possible to call her out an   antibiotic for this.  ---------------------------------------------------------------------------  --------------  Maximino SALDANA  [phone]; OK to leave message on voicemail  ---------------------------------------------------------------------------  --------------  SCRIPT ANSWERS  Relationship to Patient?  Self

## 2022-08-03 NOTE — TELEPHONE ENCOUNTER
Please set pt up for a virtual appt tomorrow anytime.  Need to discuss this before sending medication

## 2022-08-06 ENCOUNTER — OFFICE VISIT (OUTPATIENT)
Age: 77
End: 2022-08-06
Payer: MEDICARE

## 2022-08-06 VITALS
OXYGEN SATURATION: 96 % | SYSTOLIC BLOOD PRESSURE: 120 MMHG | DIASTOLIC BLOOD PRESSURE: 62 MMHG | TEMPERATURE: 97.1 F | WEIGHT: 269 LBS | HEART RATE: 106 BPM | RESPIRATION RATE: 18 BRPM | BODY MASS INDEX: 49.2 KG/M2

## 2022-08-06 DIAGNOSIS — B37.2 CANDIDAL SKIN INFECTION: ICD-10-CM

## 2022-08-06 DIAGNOSIS — K04.7 DENTAL ABSCESS: Primary | ICD-10-CM

## 2022-08-06 PROCEDURE — 99214 OFFICE O/P EST MOD 30 MIN: CPT

## 2022-08-06 PROCEDURE — 1123F ACP DISCUSS/DSCN MKR DOCD: CPT

## 2022-08-06 RX ORDER — CHLORHEXIDINE GLUCONATE 0.12 MG/ML
15 RINSE ORAL 2 TIMES DAILY
COMMUNITY

## 2022-08-06 RX ORDER — NYSTATIN 100000 [USP'U]/G
POWDER TOPICAL
Qty: 30 G | Refills: 0 | Status: SHIPPED | OUTPATIENT
Start: 2022-08-06 | End: 2022-08-25

## 2022-08-06 RX ORDER — CHLORHEXIDINE GLUCONATE 0.12 MG/ML
15 RINSE ORAL 2 TIMES DAILY
Qty: 420 ML | Refills: 0 | Status: SHIPPED | OUTPATIENT
Start: 2022-08-06 | End: 2022-08-20

## 2022-08-06 RX ORDER — LEVOFLOXACIN 500 MG/1
500 TABLET, FILM COATED ORAL DAILY
Qty: 7 TABLET | Refills: 0 | Status: SHIPPED | OUTPATIENT
Start: 2022-08-06 | End: 2022-08-13

## 2022-08-06 ASSESSMENT — ENCOUNTER SYMPTOMS
SHORTNESS OF BREATH: 1
NAUSEA: 0
FACIAL SWELLING: 1
VOMITING: 0

## 2022-08-06 NOTE — PATIENT INSTRUCTIONS
1. Take antibiotics as prescribed- finish all of them and increase water intake while taking them  2. Warm salt water gargles  3. Orajel or tylenol and motrin for pain 4. F/U with dentist as soon as possible  [facility] in [place] is a good option if you are not established with another dentist in the area - [phone]  6. Go to ER if you develop severe jaw pain, spreading redness and warmth in your neck or cheek or a high fever.

## 2022-08-06 NOTE — PROGRESS NOTES
[id number]  [address] [address] [place] [address]  Dept: [phone]  Dept Fax: [phone]  Loc: [phone]    Suleiman Ellison is a 78 y.o. female who presents today for her medical conditions/complaints as noted below. Suleiman Ellison is c/o of Dental Pain and Rash (Pt reports rash under both breasts.)        HPI:     HPI  Andrea Mendieta presents with complaints of dental pain and swelling for 2 weeks and a rash under right breasts. She states she has had fever and broken teeth ith facial swelling. She has history of DM2- last A1C 7.4 in may 2022. Next PCP appt 8/16. OTC treatment includes  Salt water gargles, chlorhexidine oral rinses and orajel but she cannot take the pain any longer. Dental appt next weds at 1 PM. Denies recent antibiotics or steroids. She reports normal glucose levels but was out of novalog for 4 days due to mix up at pharmacy.     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    Breast CA (HCC)     Left breast Nodules removed Took radiation    Breast cancer (HCC)     Chronic back pain     Chronic kidney disease     Overactive bladder     COPD (chronic obstructive pulmonary disease) (HCC)     x few years Scarring on lungs 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    Diabetes mellitus ([address]     On insulin x 10 years    Fibromyalgia     Fibromyalgia     for 20 years    GERD (gastroesophageal reflux disease)     History of blood transfusion     ?when    History of therapeutic radiation     Hyperlipidemia     High cholesterol    Hypertension     On medications for 2 years    Joint pain 2/2/2016    Liver disease     Has fatty liver    Morbid obesity ([address]     Multiple food allergies     Neuropathic pain     Neuropathy     Neuropathy involving both lower extremities 2/2/2016    Obstructive sleep apnea     AHI: 68.1 per [facility], 5/2018    Osteoarthritis     Other disorders of kidney and ureter in diseases classified elsewhere     Pneumonia     Postmenopausal osteoporosis     Psychiatric problem     Restless leg syndrome     S/P lumpectomy, left breast 8/19/2015 8/4/2015    Sleep apnea     Type II or unspecified type diabetes mellitus with neurological manifestations, uncontrolled(250.62)      Past Surgical History:   Procedure Laterality Date    BREAST SURGERY Left 8/4/2015    left partial mastectomy on 8/4/15    EYE SURGERY      EYE SURGERY Bilateral 04/02/2017    Dr. Carmine Arshad      broke L ankle due to osteoporosis, has hardware in    FRACTURE SURGERY      Left ankle     HYSTERECTOMY (CERVIX STATUS UNKNOWN)      PA DRAIN SKIN ABSCESS COMPLIC N/A 7/68/2767    ABDOMINAL WALL ABSCESS INCISION AND DRAINAGE performed by Bebeto Perry MD at [address] EXTRACTION      UPPER GASTROINTESTINAL ENDOSCOPY  4/8/2016    Dr Aristides Quinones, (-) H Pylori    WRIST SURGERY         Family History   Problem Relation Age of Onset    High Blood Pressure Father     Heart Disease Father     Diabetes Father     High Blood Pressure Sister     Diabetes Sister     Ovarian Cancer Sister 43    High Blood Pressure Brother     Colon Cancer Neg Hx     Colon Polyps Neg Hx     Esophageal Cancer Neg Hx     Liver Cancer Neg Hx     Liver Disease Neg Hx     Rectal Cancer Neg Hx     Stomach Cancer Neg Hx        Social History     Tobacco Use    Smoking status: Never    Smokeless tobacco: Never   Substance Use Topics    Alcohol use: Yes     Alcohol/week: 2.0 standard drinks     Types: 1 Glasses of wine, 1 Cans of beer per week     Comment: occ      Current Outpatient Medications   Medication Sig Dispense Refill    chlorhexidine (PERIDEX) 0.12 % solution Take 15 mLs by mouth in the morning and 15 mLs before bedtime.       levoFLOXacin (LEVAQUIN) 500 MG tablet Take 1 tablet by mouth in the morning for 7 days. 7 tablet 0    chlorhexidine (PERIDEX) 0.12 % solution Take 15 mLs by mouth in the morning and 15 mLs before bedtime. Do all this for 14 days. 420 mL 0    nystatin (MYCOSTATIN) 815213 UNIT/GM powder Apply 3 times daily. 30 g 0    insulin aspart (NOVOLOG FLEXPEN) 100 UNIT/ML injection pen INJECT 40 UNITS SUB-Q 3 TIMES DAILY BEFORE MEALS. 45 mL 2    metoprolol succinate (TOPROL XL) 50 MG extended release tablet TAKE 1 TABLET BY MOUTH ONCE DAILY FOR BLOOD PRESSURE AND HEART PROTECTION 30 tablet 1    losartan (COZAAR) 100 MG tablet TAKE 1 TABLET BY MOUTH ONCE DAILY FOR BLOOD PRESSURE 30 tablet 1    Blood Glucose Monitoring Suppl ([address]) w/Device KIT USE TO CHECK BLOOD GLUCOSE DAILY. 1 kit 0    blood glucose test strips (ONETOUCH VERIO) strip USE TO TEST BLOOD GLUCOSE 4 TIMES DAILY. 100 each 6    baclofen (LIORESAL) 10 MG tablet TAKE 1 TABLET BY MOUTH THREE TIMES DAILY. 90 tablet 2    furosemide (LASIX) 20 MG tablet Take 1 tablet by mouth daily 90 tablet 2    empagliflozin (JARDIANCE) 25 MG tablet Take 1 tablet by mouth daily 90 tablet 2    metFORMIN (GLUCOPHAGE) 1000 MG tablet Take 1 tablet by mouth 2 times daily (with meals) 180 tablet 2    omeprazole (PRILOSEC) 20 MG delayed release capsule TAKE (1) CAPSULE BY MOUTH TWICE DAILY AS NEEDED. 180 capsule 2    oxybutynin (DITROPAN-XL) 10 MG extended release tablet TAKE 1 TABLET BY MOUTH ONCE DAILY 90 tablet 2    rosuvastatin (CRESTOR) 10 MG tablet TAKE 1 TABLET BY MOUTH ONCE DAILY 90 tablet 2    Insulin Degludec (TRESIBA FLEXTOUCH) 200 UNIT/ML SOPN TAKE 160 UNITS AT NIGHT SUBCUTANEOUSLY.  ENSURE FASTING BLOOD SUGARS IN ARE ARE . 18 mL 5    Dulaglutide (TRULICITY) 1.5 FN/7.0KU SOPN Inject 1.5 mg into the skin once a week 2 mL 5    Insulin Pen Needle 31G X 5 MM MISC As directed 100 each 1    INSULIN SYRINGE 1CC/29G 29G X 1/2\" 1 ML MISC Use with each dose of novolog TID 90 each 1    gabapentin (NEURONTIN) 300 MG capsule TAKE 1 CAPSULE BY MOUTH 3 TIMES A  capsule 1    montelukast (SINGULAIR) 10 MG tablet TAKE 1 TABLET BY MOUTH AT NIGHT 90 tablet 2    vitamin D (ERGOCALCIFEROL) 1.25 MG (66627 UT) CAPS capsule TAKE 1 CAPSULE BY MOUTH ONCE A WEEK 4 capsule 2    citalopram (CELEXA) 20 mg tablet TAKE 1 TABLET BY MOUTH ONCE DAILY 30 tablet 1    levocetirizine (XYZAL) 5 MG tablet Take 1 tablet by mouth nightly 30 tablet 2    loratadine (CLARITIN) 10 MG tablet TAKE 1 TABLET BY MOUTH ONCE DAILY. 30 tablet 1    magnesium oxide (MAG-OX) 400 MG tablet Take 1 tablet by mouth daily 90 tablet 3    Lancets MISC 1 each by Does not apply route 4 times daily 600 each 1    ULTICARE MINI PEN NEEDLES 31G X 6 MM MISC USE 2 TIMES A  each 0    clobetasol (TEMOVATE) 0.05 % ointment Apply topically 2 times daily. For 10 days 1 Tube 0    aspirin 81 MG chewable tablet Take 1 tablet by mouth daily 30 tablet 3    HYDROcodone-acetaminophen (NORCO)  MG per tablet Take 1 tablet by mouth Daily with lunch. .      Misc. Devices (ROLLER WALKER) MISC 1 each by Does not apply route daily Pt states that she falls at least once a month up to 3 times a month. 1 each 0    mometasone-formoterol (DULERA) 200-5 MCG/ACT inhaler Inhale 2 puffs into the lungs every 12 hours      anastrozole (ARIMIDEX) 1 MG tablet Take 1 mg by mouth daily      albuterol (PROVENTIL HFA;VENTOLIN HFA) 108 (90 BASE) MCG/ACT inhaler Inhale 2 puffs into the lungs every 4 hours as needed for Wheezing      Incontinence Supply Disposable (POISE PANTILINERS) PADS Use pads as needed daily for urine leakage. 1 each 11     No current facility-administered medications for this visit. Allergies   Allergen Reactions    Pcn [Penicillins] Hives and Itching     Patient states she can tolerate ampicillin and amoxicillin however.     Cefdinir        Health Maintenance   Topic Date Due    Colorectal Cancer Screen  Never done    Shingles vaccine (1 of 2) Never done    Diabetic retinal exam  08/12/2016    Diabetic foot exam  03/05/2021    COVID-19 Vaccine (3 - Booster for Moderna series) 03/03/2022    Lipids  03/18/2022    Depression Screen  06/16/2022    Annual Wellness Visit (AWV)  06/17/2022    Diabetic microalbuminuria test  06/24/2022    Flu vaccine (1) 09/01/2022    Breast cancer screen  09/16/2022    A1C test (Diabetic or Prediabetic)  05/09/2023    DTaP/Tdap/Td vaccine (2 - Td or Tdap) 12/31/2024    DEXA (modify frequency per FRAX score)  Completed    Pneumococcal 65+ years Vaccine  Completed    Hepatitis C screen  Completed    Hepatitis A vaccine  Aged Out    Hib vaccine  Aged Out    Meningococcal (ACWY) vaccine  Aged Out       Subjective:     Review of Systems   Constitutional:  Positive for appetite change and fever. HENT:  Positive for dental problem and facial swelling. Negative for mouth sores. Respiratory:  Positive for shortness of breath (chronic). Gastrointestinal:  Negative for nausea and vomiting. Skin:  Positive for rash. Negative for wound.     :Objective      Physical Exam  Constitutional:       General: She is not in acute distress. Appearance: Normal appearance. She is not toxic-appearing. HENT:      Head: Normocephalic and atraumatic. Right Ear: External ear normal.      Left Ear: External ear normal.      Nose: Nose normal.      Mouth/Throat:      Mouth: Mucous membranes are moist.      Dentition: Abnormal dentition. Dental tenderness, gingival swelling and dental caries present. Pharynx: Oropharynx is clear. No oropharyngeal exudate. Eyes:      General:         Right eye: No discharge. Left eye: No discharge. Conjunctiva/sclera: Conjunctivae normal.   Cardiovascular:      Rate and Rhythm: Normal rate and regular rhythm. Pulmonary:      Effort: Pulmonary effort is normal. No respiratory distress. Chest:       Abdominal:      General: Abdomen is flat. Palpations: Abdomen is soft. Musculoskeletal:         General: Normal range of motion.       Cervical back: Normal range of motion. Lymphadenopathy:      Cervical: No cervical adenopathy. Skin:     General: Skin is warm and dry. Capillary Refill: Capillary refill takes less than 2 seconds. Findings: No rash. Neurological:      General: No focal deficit present. Mental Status: She is alert. Psychiatric:         Mood and Affect: Mood normal.     /62   Pulse (!) 106   Temp 97.1 °F (36.2 °C) (Temporal)   Resp 18   Wt 269 lb (122 kg)   LMP  (LMP Unknown)   SpO2 96%   BMI 49.20 kg/m²     :Assessment       Diagnosis Orders   1. Dental abscess  levoFLOXacin (LEVAQUIN) 500 MG tablet    chlorhexidine (PERIDEX) 0.12 % solution      2. Candidal skin infection  nystatin (MYCOSTATIN) 868636 UNIT/GM powder          :Plan    No orders of the defined types were placed in this encounter. Last labs sept last year showed normal renal function. Reports allergy to PCN and cefdinir, will cover oral infection with levaquin and refer to [address]. She reports several other chronic complaints and agrees to follow up with PCP regarding those. Does not feel as though any of her complaints are life threatening and does not wish to go to the ER for evaluation. Patient requests refill on oral chlorhexidine rinses, sent to pharmacy. Nystatin powder TID and glucose control recommended for candidal infection. Discussed cotton or gauze to keep skin from touching and moisture control. Extensive review of history completed. Education on diagnosis, home instructions and return precautions provided. Patient verbalized understanding. More than 50% of the time was spent counseling and coordinating care for a total time of 30 min face to face. No follow-ups on file. Orders Placed This Encounter   Medications    levoFLOXacin (LEVAQUIN) 500 MG tablet     Sig: Take 1 tablet by mouth in the morning for 7 days.      Dispense:  7 tablet     Refill:  0    chlorhexidine (PERIDEX) 0.12 % solution     Sig: Take 15 mLs by mouth in the morning and 15 mLs before bedtime. Do all this for 14 days. Dispense:  420 mL     Refill:  0    nystatin (MYCOSTATIN) 062236 UNIT/GM powder     Sig: Apply 3 times daily. Dispense:  30 g     Refill:  0       Patient given educational materials- see patient instructions. Discussed use, benefit, and side effects of prescribed medications. All patient questions answered. Pt voiced understanding. Patient Instructions   1. Take antibiotics as prescribed- finish all of them and increase water intake while taking them  2. Warm salt water gargles  3. Orajel or tylenol and motrin for pain 4. F/U with dentist as soon as possible  [facility] in [place] is a good option if you are not established with another dentist in the area - [phone]  6. Go to ER if you develop severe jaw pain, spreading redness and warmth in your neck or cheek or a high fever.        Electronically signed by ANTONIA Murphy CNP on 8/6/2022 at 12:44 PM

## 2022-08-07 ENCOUNTER — TELEPHONE (OUTPATIENT)
Dept: PRIMARY CARE CLINIC | Age: 77
End: 2022-08-07

## 2022-08-07 DIAGNOSIS — E11.8 TYPE 2 DIABETES MELLITUS WITH COMPLICATION (HCC): ICD-10-CM

## 2022-08-07 RX ORDER — INSULIN ASPART 100 [IU]/ML
INJECTION, SOLUTION INTRAVENOUS; SUBCUTANEOUS
Qty: 45 ML | Refills: 2 | Status: SHIPPED | OUTPATIENT
Start: 2022-08-07 | End: 2022-09-16 | Stop reason: SDUPTHER

## 2022-08-07 NOTE — TELEPHONE ENCOUNTER
----- Message from [facility] sent at 8/3/2022  9:12 AM CDT -----  Subject: Refill Request    QUESTIONS  Name of Medication? insulin aspart (NOVOLOG FLEXPEN) 100 UNIT/ML injection   pen  Patient-reported dosage and instructions? injection pen / 3 x a day  How many days do you have left? 0  Preferred Pharmacy? [facility] phone number (if available)? [phone]  Additional Information for Provider? patient has been out of this for 3   days. There should be no charge for this she wanted to make sure.  ---------------------------------------------------------------------------  --------------  CALL BACK INFO  What is the best way for the office to contact you? OK to leave message on   voicemail  Preferred Call Back Phone Number? [phone]  ---------------------------------------------------------------------------  --------------  SCRIPT ANSWERS  Relationship to Patient?  Self

## 2022-08-15 DIAGNOSIS — J30.2 SEASONAL ALLERGIES: ICD-10-CM

## 2022-08-15 RX ORDER — LEVOCETIRIZINE DIHYDROCHLORIDE 5 MG/1
5 TABLET, FILM COATED ORAL NIGHTLY
Qty: 30 TABLET | Refills: 2 | Status: SHIPPED | OUTPATIENT
Start: 2022-08-15

## 2022-08-15 NOTE — TELEPHONE ENCOUNTER
Katalina Dillan called to request a refill on her medication.       Last office visit : 5/16/2022   Next office visit : 8/16/2022     Requested Prescriptions     Pending Prescriptions Disp Refills    levocetirizine (XYZAL) 5 MG tablet [Pharmacy Med Name: LEVOCETIRIZINE DIHYDROCHLORIDE 5MG TABLET] 30 tablet 2     Sig: TAKE 1 TABLET BY MOUTH NIGHTLY            ahyden Spencer [place]

## 2022-08-18 DIAGNOSIS — E11.8 TYPE 2 DIABETES MELLITUS WITH COMPLICATION (HCC): ICD-10-CM

## 2022-08-19 RX ORDER — INSULIN ASPART 100 [IU]/ML
INJECTION, SOLUTION INTRAVENOUS; SUBCUTANEOUS
Qty: 45 ML | Refills: 5 | OUTPATIENT
Start: 2022-08-19

## 2022-08-22 DIAGNOSIS — E11.8 TYPE 2 DIABETES MELLITUS WITH COMPLICATION (HCC): ICD-10-CM

## 2022-08-22 DIAGNOSIS — Z79.4 TYPE 2 DIABETES MELLITUS WITHOUT COMPLICATION, WITH LONG-TERM CURRENT USE OF INSULIN (HCC): ICD-10-CM

## 2022-08-22 DIAGNOSIS — E11.9 TYPE 2 DIABETES MELLITUS WITHOUT COMPLICATION, WITH LONG-TERM CURRENT USE OF INSULIN (HCC): ICD-10-CM

## 2022-08-23 RX ORDER — IBUPROFEN 200 MG
TABLET ORAL
Qty: 90 EACH | Refills: 1 | Status: SHIPPED | OUTPATIENT
Start: 2022-08-23

## 2022-08-23 RX ORDER — CITALOPRAM 20 MG/1
TABLET ORAL
Qty: 90 TABLET | Refills: 1 | Status: SHIPPED | OUTPATIENT
Start: 2022-08-23

## 2022-08-23 RX ORDER — INSULIN ASPART 100 [IU]/ML
INJECTION, SOLUTION INTRAVENOUS; SUBCUTANEOUS
Qty: 45 ML | Refills: 5 | OUTPATIENT
Start: 2022-08-23

## 2022-08-23 RX ORDER — PEN NEEDLE, DIABETIC 31 GX5/16"
NEEDLE, DISPOSABLE MISCELLANEOUS
Qty: 100 EACH | Refills: 1 | Status: SHIPPED | OUTPATIENT
Start: 2022-08-23

## 2022-08-24 DIAGNOSIS — B37.2 CANDIDAL SKIN INFECTION: ICD-10-CM

## 2022-08-24 RX ORDER — NYSTATIN 100000 [USP'U]/G
POWDER TOPICAL
Qty: 30 G | Refills: 0 | OUTPATIENT
Start: 2022-08-24

## 2022-08-25 ENCOUNTER — OUTSIDE SERVICES (OUTPATIENT)
Dept: PRIMARY CARE CLINIC | Age: 77
End: 2022-08-25

## 2022-08-25 DIAGNOSIS — B37.2 CANDIDAL SKIN INFECTION: ICD-10-CM

## 2022-08-25 RX ORDER — NYSTATIN 100000 [USP'U]/G
POWDER TOPICAL
Qty: 30 G | Refills: 2 | Status: SHIPPED | OUTPATIENT
Start: 2022-08-25

## 2022-08-25 NOTE — TELEPHONE ENCOUNTER
Abdirizak Snowden called requesting a refill of the below medication which has been pended for you:     Requested Prescriptions     Pending Prescriptions Disp Refills    nystatin 575281 UNIT/GM powder [Pharmacy Med Name: NYSTOP 826471AQZD/GM POWDER] 30 g 0     Sig: APPLY TO AFFECTED AREA 3 TIMES DAILY. Last Appointment Date: 5/16/2022  Next Appointment Date: Visit date not found    Allergies   Allergen Reactions    Pcn [Penicillins] Hives and Itching     Patient states she can tolerate ampicillin and amoxicillin however.     Cefdinir

## 2022-08-30 ENCOUNTER — TELEPHONE (OUTPATIENT)
Dept: PRIMARY CARE CLINIC | Age: 77
End: 2022-08-30

## 2022-08-30 DIAGNOSIS — E11.8 TYPE 2 DIABETES MELLITUS WITH COMPLICATION (HCC): ICD-10-CM

## 2022-08-30 RX ORDER — INSULIN ASPART 100 [IU]/ML
INJECTION, SOLUTION INTRAVENOUS; SUBCUTANEOUS
Qty: 45 ML | Refills: 5 | OUTPATIENT
Start: 2022-08-30

## 2022-08-30 RX ORDER — CITALOPRAM 20 MG/1
TABLET ORAL
Qty: 30 TABLET | Refills: 1 | OUTPATIENT
Start: 2022-08-30

## 2022-08-30 NOTE — TELEPHONE ENCOUNTER
Pt has called a couple times now about her teeth and them being pulled she is needing a okay for this but I havent received any papers on this, I have tried to call her back now several times and there is no vm to leave a message to and no one answers

## 2022-09-01 ENCOUNTER — TELEPHONE (OUTPATIENT)
Dept: PRIMARY CARE CLINIC | Age: 77
End: 2022-09-01

## 2022-09-01 NOTE — TELEPHONE ENCOUNTER
Pt called and left another vm tried to call again no answer and no vm available she states I have paperwork Im not returning for her to get her teeth pulled I have no paperwork on this she did leave me the name this time I will try to call [facility] in the morning due to them being closed at this time

## 2022-09-02 ENCOUNTER — TELEPHONE (OUTPATIENT)
Dept: PRIMARY CARE CLINIC | Age: 77
End: 2022-09-02

## 2022-09-02 NOTE — TELEPHONE ENCOUNTER
I have called [facility] today they are having issues with their new computers per  she is emailing me the paperwork today

## 2022-09-16 ENCOUNTER — OFFICE VISIT (OUTPATIENT)
Dept: PRIMARY CARE CLINIC | Age: 77
End: 2022-09-16
Payer: MEDICARE

## 2022-09-16 VITALS
HEIGHT: 62 IN | TEMPERATURE: 97.2 F | RESPIRATION RATE: 20 BRPM | DIASTOLIC BLOOD PRESSURE: 70 MMHG | HEART RATE: 89 BPM | SYSTOLIC BLOOD PRESSURE: 126 MMHG | BODY MASS INDEX: 49.2 KG/M2 | OXYGEN SATURATION: 94 %

## 2022-09-16 DIAGNOSIS — E11.8 TYPE 2 DIABETES MELLITUS WITH COMPLICATION (HCC): Primary | ICD-10-CM

## 2022-09-16 DIAGNOSIS — R06.09 CHRONIC DYSPNEA: ICD-10-CM

## 2022-09-16 PROCEDURE — 1123F ACP DISCUSS/DSCN MKR DOCD: CPT | Performed by: STUDENT IN AN ORGANIZED HEALTH CARE EDUCATION/TRAINING PROGRAM

## 2022-09-16 PROCEDURE — 99214 OFFICE O/P EST MOD 30 MIN: CPT | Performed by: STUDENT IN AN ORGANIZED HEALTH CARE EDUCATION/TRAINING PROGRAM

## 2022-09-16 PROCEDURE — 3051F HG A1C>EQUAL 7.0%<8.0%: CPT | Performed by: STUDENT IN AN ORGANIZED HEALTH CARE EDUCATION/TRAINING PROGRAM

## 2022-09-16 RX ORDER — INSULIN DEGLUDEC 200 U/ML
INJECTION, SOLUTION SUBCUTANEOUS
Qty: 18 ML | Refills: 5 | Status: SHIPPED | OUTPATIENT
Start: 2022-09-16

## 2022-09-16 RX ORDER — INSULIN ASPART 100 [IU]/ML
INJECTION, SOLUTION INTRAVENOUS; SUBCUTANEOUS
Qty: 45 ML | Refills: 2 | Status: SHIPPED | OUTPATIENT
Start: 2022-09-16

## 2022-09-16 RX ORDER — DULAGLUTIDE 3 MG/.5ML
3 INJECTION, SOLUTION SUBCUTANEOUS WEEKLY
Qty: 4 ADJUSTABLE DOSE PRE-FILLED PEN SYRINGE | Refills: 5 | Status: SHIPPED | OUTPATIENT
Start: 2022-09-16

## 2022-09-16 SDOH — ECONOMIC STABILITY: FOOD INSECURITY: WITHIN THE PAST 12 MONTHS, THE FOOD YOU BOUGHT JUST DIDN'T LAST AND YOU DIDN'T HAVE MONEY TO GET MORE.: NEVER TRUE

## 2022-09-16 SDOH — ECONOMIC STABILITY: FOOD INSECURITY: WITHIN THE PAST 12 MONTHS, YOU WORRIED THAT YOUR FOOD WOULD RUN OUT BEFORE YOU GOT MONEY TO BUY MORE.: NEVER TRUE

## 2022-09-16 ASSESSMENT — PATIENT HEALTH QUESTIONNAIRE - PHQ9
1. LITTLE INTEREST OR PLEASURE IN DOING THINGS: 0
SUM OF ALL RESPONSES TO PHQ QUESTIONS 1-9: 0
SUM OF ALL RESPONSES TO PHQ9 QUESTIONS 1 & 2: 0
SUM OF ALL RESPONSES TO PHQ QUESTIONS 1-9: 0
DEPRESSION UNABLE TO ASSESS: FUNCTIONAL CAPACITY MOTIVATION LIMITS ACCURACY
2. FEELING DOWN, DEPRESSED OR HOPELESS: 0

## 2022-09-16 ASSESSMENT — SOCIAL DETERMINANTS OF HEALTH (SDOH): HOW HARD IS IT FOR YOU TO PAY FOR THE VERY BASICS LIKE FOOD, HOUSING, MEDICAL CARE, AND HEATING?: NOT HARD AT ALL

## 2022-09-16 NOTE — PROGRESS NOTES
[address] [facility]  [address] [address] [address] [place] [address]  Dept: [phone]  Dept Fax: [phone]  Loc: [phone]      Subjective:     Chief Complaint   Patient presents with    Diabetes       HPI:  Mike Lam is a 78 y.o. female presenting for    Pt here to follow up on DMT2. I have not seen her in some time. Last a1c 7.4 5/2022. She has been seen by endocrinology at [facility]. She has been taking 60u novolog with meals. Reports following BG readings:  Fasting 100-130.  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    Breast CA (HCC)     Left breast Nodules removed Took radiation    Breast cancer (HCC)     Chronic back pain     Chronic kidney disease     Overactive bladder     COPD (chronic obstructive pulmonary disease) (HCC)     x few years Scarring on lungs 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    Diabetes mellitus ([address]     On insulin x 10 years    Fibromyalgia     Fibromyalgia     for 20 years    GERD (gastroesophageal reflux disease)     History of blood transfusion     ?when    History of therapeutic radiation     Hyperlipidemia     High cholesterol    Hypertension     On medications for 2 years    Joint pain 2/2/2016    Liver disease     Has fatty liver    Morbid obesity ([address]     Multiple food allergies     Neuropathic pain     Neuropathy     Neuropathy involving both lower extremities 2/2/2016    Obstructive sleep apnea     AHI: 68.1 per PSG, 5/2018    Osteoarthritis     Other disorders of kidney and ureter in diseases classified elsewhere     Pneumonia     Postmenopausal osteoporosis     Psychiatric problem     Restless leg syndrome     S/P lumpectomy, left breast 8/19/2015 8/4/2015    Sleep apnea     Type II or unspecified type diabetes mellitus with neurological manifestations, uncontrolled(250.62)      Patient Active Problem List   Diagnosis    Postmenopausal osteoporosis    Type 2 diabetes mellitus with complication (HCC)    COPD (chronic obstructive pulmonary disease) (HCC)    Mood disorder (HCC)    Dementia (HCC)    Malignant neoplasm of upper-outer quadrant of female breast ([address]    Joint pain    Neuropathy involving both lower extremities    Back pain    Dyspepsia    Lumbar facet arthropathy    Body mass index 45.0-49.9, adult (HCC)    Essential hypertension    Hyperlipidemia    Morbid obesity (HCC)    DARSHAN (obstructive sleep apnea)    BiPAP (biphasic positive airway pressure) dependence    Restless leg syndrome    History of breast cancer    Venous insufficiency of both lower extremities       PSHx:  Past Surgical History:   Procedure Laterality Date    BREAST SURGERY Left 8/4/2015    left partial mastectomy on 8/4/15    EYE SURGERY      EYE SURGERY Bilateral 04/02/2017    Dr. Halie Shrestha      broke L ankle due to osteoporosis, has hardware in    FRACTURE SURGERY      Left ankle     HYSTERECTOMY (CERVIX STATUS UNKNOWN)      ID DRAIN SKIN ABSCESS COMPLIC N/A 7/78/2091    ABDOMINAL WALL ABSCESS INCISION AND DRAINAGE performed by Yoni Wellington MD at [facility] ENDOSCOPY  4/8/2016    Dr Candis Koehler, (-) H Pylori    WRIST SURGERY         PFHx:  Family History   Problem Relation Age of Onset    High Blood Pressure Father     Heart Disease Father     Diabetes Father     High Blood Pressure Sister     Diabetes Sister     Ovarian Cancer Sister 43    High Blood Pressure Brother     Colon Cancer Neg Hx     Colon Polyps Neg Hx     Esophageal Cancer Neg Hx     Liver Cancer Neg Hx Liver Disease Neg Hx     Rectal Cancer Neg Hx     Stomach Cancer Neg Hx        SocialHx:  Social History     Tobacco Use    Smoking status: Never    Smokeless tobacco: Never   Substance Use Topics    Alcohol use: Yes     Alcohol/week: 2.0 standard drinks     Types: 1 Glasses of wine, 1 Cans of beer per week     Comment: occ       Allergies: Allergies   Allergen Reactions    Pcn [Penicillins] Hives and Itching     Patient states she can tolerate ampicillin and amoxicillin however. Cefdinir        Medications:  Current Outpatient Medications   Medication Sig Dispense Refill    insulin aspart (NOVOLOG FLEXPEN) 100 UNIT/ML injection pen INJECT 60 UNITS with breakfast, Inject 80 units with dinner 45 mL 2    Dulaglutide (TRULICITY) 3 XH/7.0RB SOPN Inject 3 mg into the skin once a week 4 Adjustable Dose Pre-filled Pen Syringe 5    Insulin Degludec (TRESIBA FLEXTOUCH) 200 UNIT/ML SOPN TAKE 160 UNITS AT NIGHT SUBCUTANEOUSLY. ENSURE FASTING BLOOD SUGARS IN ARE ARE . 18 mL 5    nystatin 548718 UNIT/GM powder APPLY TO AFFECTED AREA 3 TIMES DAILY. 30 g 2    Insulin Pen Needle (B-D UF III MINI PEN NEEDLES) 31G X 5 MM MISC **CLARIFY** USE AS DIRECTED (BULK) 100 each 1    INSULIN SYRINGE .5CC/29G (ULTICARE INSULIN SYRINGE) 29G X 1/2\" 0.5 ML MISC **NEED QTY UPDATED TO FULL BOX** USE WITH EACH DOSE OF NOVOLOG THREE TIMES DAILY (BULK) 90 each 1    citalopram (CELEXA) 20 MG tablet TAKE 1 TABLET BY MOUTH ONCE DAILY 90 tablet 1    levocetirizine (XYZAL) 5 MG tablet TAKE 1 TABLET BY MOUTH NIGHTLY 30 tablet 2    chlorhexidine (PERIDEX) 0.12 % solution Take 15 mLs by mouth in the morning and 15 mLs before bedtime.       metoprolol succinate (TOPROL XL) 50 MG extended release tablet TAKE 1 TABLET BY MOUTH ONCE DAILY FOR BLOOD PRESSURE AND HEART PROTECTION 30 tablet 1    losartan (COZAAR) 100 MG tablet TAKE 1 TABLET BY MOUTH ONCE DAILY FOR BLOOD PRESSURE 30 tablet 1    Blood Glucose Monitoring Suppl ([address]) w/Device KIT USE TO CHECK BLOOD GLUCOSE DAILY. 1 kit 0    blood glucose test strips (ONETOUCH VERIO) strip USE TO TEST BLOOD GLUCOSE 4 TIMES DAILY. 100 each 6    baclofen (LIORESAL) 10 MG tablet TAKE 1 TABLET BY MOUTH THREE TIMES DAILY. 90 tablet 2    furosemide (LASIX) 20 MG tablet Take 1 tablet by mouth daily 90 tablet 2    empagliflozin (JARDIANCE) 25 MG tablet Take 1 tablet by mouth daily 90 tablet 2    metFORMIN (GLUCOPHAGE) 1000 MG tablet Take 1 tablet by mouth 2 times daily (with meals) 180 tablet 2    omeprazole (PRILOSEC) 20 MG delayed release capsule TAKE (1) CAPSULE BY MOUTH TWICE DAILY AS NEEDED. 180 capsule 2    oxybutynin (DITROPAN-XL) 10 MG extended release tablet TAKE 1 TABLET BY MOUTH ONCE DAILY 90 tablet 2    rosuvastatin (CRESTOR) 10 MG tablet TAKE 1 TABLET BY MOUTH ONCE DAILY 90 tablet 2    gabapentin (NEURONTIN) 300 MG capsule TAKE 1 CAPSULE BY MOUTH 3 TIMES A  capsule 1    montelukast (SINGULAIR) 10 MG tablet TAKE 1 TABLET BY MOUTH AT NIGHT 90 tablet 2    vitamin D (ERGOCALCIFEROL) 1.25 MG (75729 UT) CAPS capsule TAKE 1 CAPSULE BY MOUTH ONCE A WEEK 4 capsule 2    loratadine (CLARITIN) 10 MG tablet TAKE 1 TABLET BY MOUTH ONCE DAILY. 30 tablet 1    magnesium oxide (MAG-OX) 400 MG tablet Take 1 tablet by mouth daily 90 tablet 3    Lancets MISC 1 each by Does not apply route 4 times daily 600 each 1    ULTICARE MINI PEN NEEDLES 31G X 6 MM MISC USE 2 TIMES A  each 0    clobetasol (TEMOVATE) 0.05 % ointment Apply topically 2 times daily. For 10 days 1 Tube 0    aspirin 81 MG chewable tablet Take 1 tablet by mouth daily 30 tablet 3    HYDROcodone-acetaminophen (NORCO)  MG per tablet Take 1 tablet by mouth Daily with lunch. .      Misc. Devices (ROLLER WALKER) MISC 1 each by Does not apply route daily Pt states that she falls at least once a month up to 3 times a month.  1 each 0    mometasone-formoterol (DULERA) 200-5 MCG/ACT inhaler Inhale 2 puffs into the lungs every 12 hours anastrozole (ARIMIDEX) 1 MG tablet Take 1 mg by mouth daily      albuterol (PROVENTIL HFA;VENTOLIN HFA) 108 (90 BASE) MCG/ACT inhaler Inhale 2 puffs into the lungs every 4 hours as needed for Wheezing      Incontinence Supply Disposable (POISE PANTILINERS) PADS Use pads as needed daily for urine leakage. 1 each 11     No current facility-administered medications for this visit. Objective:   PE:  /70   Pulse 89   Temp 97.2 °F (36.2 °C) (Temporal)   Resp 20   Ht 5' 2\" (1.575 m)   LMP  (LMP Unknown)   SpO2 94%   BMI 49.20 kg/m²   Physical Exam  Constitutional:       General: She is not in acute distress. Appearance: Normal appearance. HENT:      Head: Normocephalic. Nose: Nose normal.      Mouth/Throat:      Mouth: Mucous membranes are moist.      Pharynx: Oropharynx is clear. No oropharyngeal exudate or posterior oropharyngeal erythema. Eyes:      General: No scleral icterus. Extraocular Movements: Extraocular movements intact. Conjunctiva/sclera: Conjunctivae normal.   Cardiovascular:      Rate and Rhythm: Normal rate and regular rhythm. Pulses: Normal pulses. Heart sounds: No murmur heard. Pulmonary:      Effort: Pulmonary effort is normal.      Breath sounds: Normal breath sounds. Musculoskeletal:         General: Normal range of motion. Cervical back: Normal range of motion. Skin:     General: Skin is warm and dry. Capillary Refill: Capillary refill takes less than 2 seconds. Neurological:      General: No focal deficit present. Mental Status: She is alert and oriented to person, place, and time. Comments: Using 4ww   Psychiatric:         Mood and Affect: Mood normal.         Behavior: Behavior normal.         Thought Content: Thought content normal.          Assessment & Plan   Marina Mcguire was seen today for diabetes.     Diagnoses and all orders for this visit:    Type 2 diabetes mellitus with complication (HCC)  -C0D slightly above goal  -Continue metformin and jardiance at current doses  -Increase trulicity to 3mg  -Advised may need to adjust tresiba down if fasting BG decrease (<80), otherwise continue tresiba 160u  -Continue novolog 60u w/ breakfast, increase to 80u w/ dinner  -     Comprehensive Metabolic Panel; Future  -     Hemoglobin A1C; Future  -     Vitamin B12 & Folate; Future  -     TSH with Reflex to FT4; Future  -     Microalbumin, Ur; Future  -     Lipid, Fasting; Future  -     insulin aspart (NOVOLOG FLEXPEN) 100 UNIT/ML injection pen; INJECT 60 UNITS with breakfast, Inject 80 units with dinner  -     Dulaglutide (TRULICITY) 3 LEVINE/4.8NE SOPN; Inject 3 mg into the skin once a week  -     Insulin Degludec (TRESIBA FLEXTOUCH) 200 UNIT/ML SOPN; TAKE 160 UNITS AT NIGHT SUBCUTANEOUSLY. ENSURE FASTING BLOOD SUGARS IN ARE ARE . Chronic dyspnea  -     Scooter      Return in about 3 weeks (around 10/7/2022) for F/u after labs. All questions were answered. Medications, including possible adverse effects, and instructions were reviewed and  understanding was confirmed. Follow-up recommendations, including when to contact or return to office (ie; if symptoms worsen or fail to improve), were discussed and acknowledged.     Electronically signed by Bee Dickerson MD on 9/16/22 at 1:38 PM CDT

## 2022-09-16 NOTE — PATIENT INSTRUCTIONS
Novolog-60 units with breakfast, 80 units with dinner  Increase trulicity to 3mg  Keep eye on fasting morning blood sugar.  If starts to decrease <80, adjust down tresiba by 2-3 units every 2 days as needed

## 2022-09-19 NOTE — TELEPHONE ENCOUNTER
Last ov 7/29/2022  Next ov 10/28/2022 Pharmacy called with request for refill on Gabapentin. Last office visit 12/5 with next scheduled appointment 3/7  Dose verified.    Last UDS  2/28  negative  Last fill per 8/30

## 2022-09-20 DIAGNOSIS — E11.8 TYPE 2 DIABETES MELLITUS WITH COMPLICATION (HCC): ICD-10-CM

## 2022-09-20 LAB
ALBUMIN SERPL-MCNC: 4.3 G/DL (ref 3.5–5.2)
ALP BLD-CCNC: 113 U/L (ref 35–104)
ALT SERPL-CCNC: 69 U/L (ref 5–33)
ANION GAP SERPL CALCULATED.3IONS-SCNC: 15 MMOL/L (ref 7–19)
AST SERPL-CCNC: 52 U/L (ref 5–32)
BILIRUB SERPL-MCNC: 0.3 MG/DL (ref 0.2–1.2)
BUN BLDV-MCNC: 19 MG/DL (ref 8–23)
CALCIUM SERPL-MCNC: 9.5 MG/DL (ref 8.8–10.2)
CHLORIDE BLD-SCNC: 99 MMOL/L (ref 98–111)
CHOLESTEROL, FASTING: 177 MG/DL (ref 160–199)
CO2: 24 MMOL/L (ref 22–29)
CREAT SERPL-MCNC: 0.7 MG/DL (ref 0.5–0.9)
CREATININE URINE: 12 MG/DL (ref 4.2–622)
FOLATE: 16.8 NG/ML (ref 4.8–37.3)
GFR AFRICAN AMERICAN: >59
GFR NON-AFRICAN AMERICAN: >60
GLUCOSE BLD-MCNC: 229 MG/DL (ref 74–109)
HBA1C MFR BLD: 8.2 % (ref 4–6)
HDLC SERPL-MCNC: 47 MG/DL (ref 65–121)
LDL CHOLESTEROL CALCULATED: 93 MG/DL
MICROALBUMIN UR-MCNC: 3.6 MG/DL (ref 0–19)
MICROALBUMIN/CREAT UR-RTO: 300 MG/G
POTASSIUM SERPL-SCNC: 4.8 MMOL/L (ref 3.5–5)
SODIUM BLD-SCNC: 138 MMOL/L (ref 136–145)
TOTAL PROTEIN: 7.5 G/DL (ref 6.6–8.7)
TRIGLYCERIDE, FASTING: 186 MG/DL (ref 0–149)
TSH REFLEX FT4: 1.41 UIU/ML (ref 0.35–5.5)
VITAMIN B-12: 549 PG/ML (ref 211–946)

## 2022-09-28 ENCOUNTER — HOSPITAL ENCOUNTER (OUTPATIENT)
Dept: WOMENS IMAGING | Age: 77
Discharge: HOME | End: 2022-09-28
Payer: MEDICARE

## 2022-09-28 ENCOUNTER — OFFICE VISIT (OUTPATIENT)
Dept: SURGERY | Age: 77
End: 2022-09-28
Payer: MEDICARE

## 2022-09-28 VITALS
TEMPERATURE: 97.6 F | HEART RATE: 80 BPM | DIASTOLIC BLOOD PRESSURE: 72 MMHG | SYSTOLIC BLOOD PRESSURE: 118 MMHG | WEIGHT: 270 LBS | BODY MASS INDEX: 49.69 KG/M2 | HEIGHT: 62 IN

## 2022-09-28 DIAGNOSIS — Z85.3 PERSONAL HISTORY OF MALIGNANT NEOPLASM OF BREAST: ICD-10-CM

## 2022-09-28 DIAGNOSIS — Z12.31 VISIT FOR SCREENING MAMMOGRAM: Primary | ICD-10-CM

## 2022-09-28 DIAGNOSIS — Z12.31 VISIT FOR SCREENING MAMMOGRAM: ICD-10-CM

## 2022-09-28 PROCEDURE — 77063 BREAST TOMOSYNTHESIS BI: CPT

## 2022-09-28 PROCEDURE — 1123F ACP DISCUSS/DSCN MKR DOCD: CPT | Performed by: PHYSICIAN ASSISTANT

## 2022-09-28 PROCEDURE — 99213 OFFICE O/P EST LOW 20 MIN: CPT | Performed by: PHYSICIAN ASSISTANT

## 2022-09-28 NOTE — PROGRESS NOTES
Ms. Fransisca Guzman is status post left partial mastectomy for 1.2 cm grade 3 invasive ductal carcinoma on 8-4-15. Her margins and SNB were negative. ER+, PA+, [id number] -.  Mammoprint high risk. She completed radiation. 9/28/2022 2:57 PM   TERRELL DIGITAL SCREEN W OR WO CAD BILATERAL   SCREENING MAMMOGRAPHY:  Today's examination consists of 2-D/3-D combo   standard craniocaudal and oblique views of both breasts. Comparison is   made  with prior mammograms since August 15, 2016 . Breast parenchyma   is heterogeneously dense. There is stable postsurgical scarring with   architectural distortion at the location of previous lumpectomy in the   left breast upper outer quadrant. Numerous bilateral scattered benign   calcifications are present. There is no suspicious mammographic   finding seen in either breast. The mammograms were evaluated using   computer aided detection. Impression   Impression:   Bilateral Breasts, BI-RADS 2, benign. Recommendation is annual   screening mammography. BREAST EXAM:  There are fibrocystic changes throughout both breasts. There are no dominant masses, skin dimpling or nipple retraction. There is no axillary lymphadenopathy bilaterally. There are appropriate post op and post radiation changes on the left. IMPRESSION:  History of left breast cancer    PLAN:  We will see her in 1 year with bilateral mammograms. She will call sooner with any concerns. 20 minutes spent, which includes face to face with patient, record review, evaluation, planning, and education.

## 2022-10-02 DIAGNOSIS — M54.9 MUSCULOSKELETAL BACK PAIN: ICD-10-CM

## 2022-10-02 RX ORDER — BACLOFEN 10 MG/1
TABLET ORAL
Qty: 90 TABLET | Refills: 2 | Status: SHIPPED | OUTPATIENT
Start: 2022-10-02

## 2022-10-05 ENCOUNTER — TELEPHONE (OUTPATIENT)
Dept: PRIMARY CARE CLINIC | Age: 77
End: 2022-10-05

## 2022-10-05 NOTE — TELEPHONE ENCOUNTER
Pt notified of increased HA1C and to please monitor glucose more closely. Pt also stated \"I guess I need to increase my Trulicity as he told me too\". I explained the dynamics of diabetes and the risk of continued elevated values of blood glucose. Pt verbalized understanding.

## 2022-10-17 ENCOUNTER — TELEPHONE (OUTPATIENT)
Dept: PODIATRY | Facility: CLINIC | Age: 77
End: 2022-10-17

## 2022-10-21 RX ORDER — ERGOCALCIFEROL 1.25 MG/1
CAPSULE ORAL
Qty: 4 CAPSULE | Refills: 2 | Status: SHIPPED | OUTPATIENT
Start: 2022-10-21

## 2022-10-24 ENCOUNTER — OFFICE VISIT (OUTPATIENT)
Age: 77
End: 2022-10-24
Payer: MEDICARE

## 2022-10-24 VITALS
HEART RATE: 96 BPM | BODY MASS INDEX: 49.69 KG/M2 | DIASTOLIC BLOOD PRESSURE: 70 MMHG | WEIGHT: 270 LBS | TEMPERATURE: 97.7 F | RESPIRATION RATE: 18 BRPM | SYSTOLIC BLOOD PRESSURE: 122 MMHG | HEIGHT: 62 IN | OXYGEN SATURATION: 95 %

## 2022-10-24 DIAGNOSIS — K04.7 DENTAL ABSCESS: Primary | ICD-10-CM

## 2022-10-24 PROCEDURE — 1123F ACP DISCUSS/DSCN MKR DOCD: CPT | Performed by: PHYSICIAN ASSISTANT

## 2022-10-24 PROCEDURE — 99213 OFFICE O/P EST LOW 20 MIN: CPT | Performed by: PHYSICIAN ASSISTANT

## 2022-10-24 RX ORDER — CLINDAMYCIN HYDROCHLORIDE 150 MG/1
450 CAPSULE ORAL 3 TIMES DAILY
Qty: 63 CAPSULE | Refills: 0 | Status: SHIPPED | OUTPATIENT
Start: 2022-10-24 | End: 2022-10-31

## 2022-10-24 ASSESSMENT — ENCOUNTER SYMPTOMS
EYE PAIN: 0
SINUS PRESSURE: 0
COUGH: 0
ALLERGIC/IMMUNOLOGIC NEGATIVE: 1
SORE THROAT: 0
SHORTNESS OF BREATH: 0
VOMITING: 0
DIARRHEA: 0
ABDOMINAL PAIN: 0
NAUSEA: 0
SINUS PAIN: 0

## 2022-10-24 NOTE — PROGRESS NOTES
Pneumonia     Postmenopausal osteoporosis     Psychiatric problem     Restless leg syndrome     S/P lumpectomy, left breast 8/19/2015 8/4/2015    Sleep apnea     Type II or unspecified type diabetes mellitus with neurological manifestations, uncontrolled(250.62)        Past Surgical History:   Procedure Laterality Date    BREAST SURGERY Left 8/4/2015    left partial mastectomy on 8/4/15    EYE SURGERY      EYE SURGERY Bilateral 04/02/2017    Dr. Gustavo Simpson      broke L ankle due to osteoporosis, has hardware in    FRACTURE SURGERY      Left ankle     HYSTERECTOMY (CERVIX STATUS UNKNOWN)      [place] DRAIN SKIN ABSCESS COMPLIC N/A 3/49/9745    ABDOMINAL WALL ABSCESS INCISION AND DRAINAGE performed by Nicole Romero MD at [address]  4/8/2016    Dr Tatianna Dye, (-) H Pylori    WRIST SURGERY         Family History   Problem Relation Age of Onset    High Blood Pressure Father     Heart Disease Father     Diabetes Father     High Blood Pressure Sister     Diabetes Sister     Ovarian Cancer Sister 43    High Blood Pressure Brother     Colon Cancer Neg Hx     Colon Polyps Neg Hx     Esophageal Cancer Neg Hx     Liver Cancer Neg Hx     Liver Disease Neg Hx     Rectal Cancer Neg Hx     Stomach Cancer Neg Hx        Social History     Tobacco Use    Smoking status: Never    Smokeless tobacco: Never   Substance Use Topics    Alcohol use: Yes     Alcohol/week: 2.0 standard drinks     Types: 1 Glasses of wine, 1 Cans of beer per week     Comment: occ        Current Outpatient Medications   Medication Sig Dispense Refill    clindamycin (CLEOCIN) 150 MG capsule Take 3 capsules by mouth 3 times daily for 7 days 63 capsule 0    benzocaine (ORAJEL) 10 % mucosal gel Take by mouth as needed.  7 g 0    vitamin D (ERGOCALCIFEROL) 1.25 MG (79469 UT) CAPS capsule TAKE 1 CAPSULE BY MOUTH ONCE A WEEK 4 capsule 2    baclofen (LIORESAL) 10 MG tablet TAKE ONE TABLET BY MOUTH THREE TIMES DAILY (VIAL) 90 tablet 2    insulin aspart (NOVOLOG FLEXPEN) 100 UNIT/ML injection pen INJECT 60 UNITS with breakfast, Inject 80 units with dinner 45 mL 2    Dulaglutide (TRULICITY) 3 LS/6.6YQ SOPN Inject 3 mg into the skin once a week 4 Adjustable Dose Pre-filled Pen Syringe 5    Insulin Degludec (TRESIBA FLEXTOUCH) 200 UNIT/ML SOPN TAKE 160 UNITS AT NIGHT SUBCUTANEOUSLY. ENSURE FASTING BLOOD SUGARS IN ARE ARE . 18 mL 5    nystatin 704459 UNIT/GM powder APPLY TO AFFECTED AREA 3 TIMES DAILY. 30 g 2    Insulin Pen Needle (B-D UF III MINI PEN NEEDLES) 31G X 5 MM MISC **CLARIFY** USE AS DIRECTED (BULK) 100 each 1    INSULIN SYRINGE .5CC/29G (ULTICARE INSULIN SYRINGE) 29G X 1/2\" 0.5 ML MISC **NEED QTY UPDATED TO FULL BOX** USE WITH EACH DOSE OF NOVOLOG THREE TIMES DAILY (BULK) 90 each 1    citalopram (CELEXA) 20 MG tablet TAKE 1 TABLET BY MOUTH ONCE DAILY 90 tablet 1    levocetirizine (XYZAL) 5 MG tablet TAKE 1 TABLET BY MOUTH NIGHTLY 30 tablet 2    chlorhexidine (PERIDEX) 0.12 % solution Take 15 mLs by mouth in the morning and 15 mLs before bedtime. metoprolol succinate (TOPROL XL) 50 MG extended release tablet TAKE 1 TABLET BY MOUTH ONCE DAILY FOR BLOOD PRESSURE AND HEART PROTECTION 30 tablet 1    losartan (COZAAR) 100 MG tablet TAKE 1 TABLET BY MOUTH ONCE DAILY FOR BLOOD PRESSURE 30 tablet 1    Blood Glucose Monitoring Suppl ([address]) w/Device KIT USE TO CHECK BLOOD GLUCOSE DAILY. 1 kit 0    blood glucose test strips (ONETOUCH VERIO) strip USE TO TEST BLOOD GLUCOSE 4 TIMES DAILY.  100 each 6    furosemide (LASIX) 20 MG tablet Take 1 tablet by mouth daily 90 tablet 2    empagliflozin (JARDIANCE) 25 MG tablet Take 1 tablet by mouth daily 90 tablet 2    metFORMIN (GLUCOPHAGE) 1000 MG tablet Take 1 tablet by mouth 2 times daily (with meals) 180 tablet 2    omeprazole (PRILOSEC) 20 MG delayed release capsule TAKE (1) CAPSULE BY MOUTH TWICE DAILY AS NEEDED. 180 capsule 2 oxybutynin (DITROPAN-XL) 10 MG extended release tablet TAKE 1 TABLET BY MOUTH ONCE DAILY 90 tablet 2    rosuvastatin (CRESTOR) 10 MG tablet TAKE 1 TABLET BY MOUTH ONCE DAILY 90 tablet 2    montelukast (SINGULAIR) 10 MG tablet TAKE 1 TABLET BY MOUTH AT NIGHT 90 tablet 2    loratadine (CLARITIN) 10 MG tablet TAKE 1 TABLET BY MOUTH ONCE DAILY. 30 tablet 1    magnesium oxide (MAG-OX) 400 MG tablet Take 1 tablet by mouth daily 90 tablet 3    Lancets MISC 1 each by Does not apply route 4 times daily 600 each 1    ULTICARE MINI PEN NEEDLES 31G X 6 MM MISC USE 2 TIMES A  each 0    clobetasol (TEMOVATE) 0.05 % ointment Apply topically 2 times daily. For 10 days 1 Tube 0    aspirin 81 MG chewable tablet Take 1 tablet by mouth daily 30 tablet 3    HYDROcodone-acetaminophen (NORCO)  MG per tablet Take 1 tablet by mouth Daily with lunch. .      Misc. Devices (ROLLER WALKER) MISC 1 each by Does not apply route daily Pt states that she falls at least once a month up to 3 times a month. 1 each 0    mometasone-formoterol (DULERA) 200-5 MCG/ACT inhaler Inhale 2 puffs into the lungs every 12 hours      anastrozole (ARIMIDEX) 1 MG tablet Take 1 mg by mouth daily      albuterol (PROVENTIL HFA;VENTOLIN HFA) 108 (90 BASE) MCG/ACT inhaler Inhale 2 puffs into the lungs every 4 hours as needed for Wheezing      Incontinence Supply Disposable (POISE PANTILINERS) PADS Use pads as needed daily for urine leakage. 1 each 11    gabapentin (NEURONTIN) 300 MG capsule TAKE 1 CAPSULE BY MOUTH 3 TIMES A  capsule 1     No current facility-administered medications for this visit. Allergies   Allergen Reactions    Pcn [Penicillins] Hives and Itching     Patient states she can tolerate ampicillin and amoxicillin however.     Cefdinir        Health Maintenance   Topic Date Due    Colorectal Cancer Screen  Never done    Shingles vaccine (1 of 2) Never done    Diabetic retinal exam  08/12/2016    Diabetic foot exam  03/05/2021 COVID-19 Vaccine (3 - Booster for Moderna series) 11/28/2021    Annual Wellness Visit (AWV)  06/17/2022    Flu vaccine (1) 08/01/2022    Depression Screen  09/16/2023    A1C test (Diabetic or Prediabetic)  09/20/2023    Diabetic microalbuminuria test  09/20/2023    Lipids  09/20/2023    Breast cancer screen  09/28/2023    DTaP/Tdap/Td vaccine (2 - Td or Tdap) 12/31/2024    DEXA (modify frequency per FRAX score)  Completed    Pneumococcal 65+ years Vaccine  Completed    Hepatitis C screen  Completed    Hepatitis A vaccine  Aged Out    Hib vaccine  Aged Out    Meningococcal (ACWY) vaccine  Aged Out       Subjective:   Review of Systems   Constitutional:  Negative for chills, fatigue and fever. HENT:  Positive for dental problem. Negative for congestion, postnasal drip, sinus pressure, sinus pain and sore throat. Eyes:  Negative for pain and visual disturbance. Respiratory:  Negative for cough and shortness of breath. Cardiovascular:  Negative for chest pain. Gastrointestinal:  Negative for abdominal pain, diarrhea, nausea and vomiting. Endocrine: Negative for cold intolerance and heat intolerance. Genitourinary:  Negative for frequency, hematuria and urgency. Musculoskeletal:  Negative for myalgias. Skin:  Negative for rash. Allergic/Immunologic: Negative. Neurological:  Negative for syncope, weakness, light-headedness and headaches. Hematological: Negative. Psychiatric/Behavioral: Negative. Objective    Physical Exam  Vitals and nursing note reviewed. Constitutional:       General: She is not in acute distress. Appearance: Normal appearance. HENT:      Head: Normocephalic and atraumatic. Right Ear: External ear normal.      Left Ear: External ear normal.      Nose: Nose normal.      Mouth/Throat:      Mouth: Mucous membranes are moist.      Dentition: Abnormal dentition. Dental caries and dental abscesses present. Pharynx: Oropharynx is clear.    Eyes: Extraocular Movements: Extraocular movements intact. Conjunctiva/sclera: Conjunctivae normal.   Cardiovascular:      Rate and Rhythm: Normal rate and regular rhythm. Pulses: Normal pulses. Heart sounds: Normal heart sounds. Pulmonary:      Effort: Pulmonary effort is normal.      Breath sounds: Normal breath sounds. No wheezing. Abdominal:      General: Abdomen is flat. Bowel sounds are normal. There is no distension. Palpations: Abdomen is soft. Tenderness: There is no abdominal tenderness. Musculoskeletal:         General: Normal range of motion. Cervical back: Normal range of motion and neck supple. No tenderness. Skin:     General: Skin is warm and dry. Findings: No erythema. Neurological:      General: No focal deficit present. Mental Status: She is alert and oriented to person, place, and time. Psychiatric:         Mood and Affect: Mood normal.         Behavior: Behavior normal.       /70   Pulse 96   Temp 97.7 °F (36.5 °C) (Temporal)   Resp 18   Ht 5' 2\" (1.575 m)   Wt 270 lb (122.5 kg)   LMP  (LMP Unknown)   SpO2 95%   BMI 49.38 kg/m²     Assessment         Diagnosis Orders   1. Dental abscess  clindamycin (CLEOCIN) 150 MG capsule    benzocaine (ORAJEL) 10 % mucosal gel          Plan   1. Take antibiotics as prescribed- finish all of them and increase water intake while taking them  2. Warm salt water gargles  3. Orajel or tylenol and motrin for pain   4. F/U with dentist as soon as possible  [facility] a good option if you are not established with another dentist in the area - [phone]  6. Go to ER if you develop severe jaw pain, spreading redness and warmth in your neck or cheek or a high fever. Patient states she has a dentist appointment scheduled for 10/28/22. No orders of the defined types were placed in this encounter. No results found for this visit on 10/24/22.     Orders Placed This Encounter Medications    clindamycin (CLEOCIN) 150 MG capsule     Sig: Take 3 capsules by mouth 3 times daily for 7 days     Dispense:  63 capsule     Refill:  0    benzocaine (ORAJEL) 10 % mucosal gel     Sig: Take by mouth as needed. Dispense:  7 g     Refill:  0      New Prescriptions    BENZOCAINE (ORAJEL) 10 % MUCOSAL GEL    Take by mouth as needed. CLINDAMYCIN (CLEOCIN) 150 MG CAPSULE    Take 3 capsules by mouth 3 times daily for 7 days        Return if symptoms worsen or fail to improve. Discussed use, benefits, and side effects of any prescribed medications. All patient questions were answered. Patient voiced understanding of care plan. Patient was given educational materials - see patient instructions below. Patient Instructions   1. Take antibiotics as prescribed- finish all of them and increase water intake while taking them  2. Warm salt water gargles  3. Orajel or tylenol and motrin for pain   4. F/U with dentist as soon as possible  [facility] [place] [facility] a good option if you are not established with another dentist in the area - [phone]  6. Go to ER if you develop severe jaw pain, spreading redness and warmth in your neck or cheek or a high fever. Patient states she has a dentist appointment scheduled for 10/28/22.        Electronically signed by Bob Ba PA-C on 10/24/2022 at 4:35 PM

## 2022-10-24 NOTE — PATIENT INSTRUCTIONS
1. Take antibiotics as prescribed- finish all of them and increase water intake while taking them  2. Warm salt water gargles  3. Orajel or tylenol and motrin for pain   4. F/U with dentist as soon as possible  [facility] is a good option if you are not established with another dentist in the area - [phone]  6. Go to ER if you develop severe jaw pain, spreading redness and warmth in your neck or cheek or a high fever. Patient states she has a dentist appointment scheduled for 10/28/22.

## 2022-10-27 ENCOUNTER — HOSPITAL ENCOUNTER (EMERGENCY)
Age: 77
Discharge: HOME | End: 2022-10-27
Payer: MEDICARE

## 2022-10-27 ENCOUNTER — APPOINTMENT (OUTPATIENT)
Dept: GENERAL RADIOLOGY | Age: 77
End: 2022-10-27
Payer: MEDICARE

## 2022-10-27 VITALS
HEIGHT: 62 IN | HEART RATE: 86 BPM | DIASTOLIC BLOOD PRESSURE: 75 MMHG | WEIGHT: 268 LBS | BODY MASS INDEX: 49.32 KG/M2 | TEMPERATURE: 97.9 F | RESPIRATION RATE: 26 BRPM | SYSTOLIC BLOOD PRESSURE: 146 MMHG | OXYGEN SATURATION: 94 %

## 2022-10-27 DIAGNOSIS — K02.9 PAIN DUE TO DENTAL CARIES: Primary | ICD-10-CM

## 2022-10-27 DIAGNOSIS — R06.02 SHORTNESS OF BREATH: ICD-10-CM

## 2022-10-27 LAB
ALBUMIN SERPL-MCNC: 4.9 G/DL (ref 3.5–5.2)
ALP BLD-CCNC: 114 U/L (ref 35–104)
ALT SERPL-CCNC: 61 U/L (ref 5–33)
ANION GAP SERPL CALCULATED.3IONS-SCNC: 13 MMOL/L (ref 7–19)
AST SERPL-CCNC: 48 U/L (ref 5–32)
BASOPHILS ABSOLUTE: 0.1 K/UL (ref 0–0.2)
BASOPHILS RELATIVE PERCENT: 0.7 % (ref 0–1)
BILIRUB SERPL-MCNC: 0.4 MG/DL (ref 0.2–1.2)
BUN BLDV-MCNC: 13 MG/DL (ref 8–23)
CALCIUM SERPL-MCNC: 9.8 MG/DL (ref 8.8–10.2)
CHLORIDE BLD-SCNC: 93 MMOL/L (ref 98–111)
CO2: 27 MMOL/L (ref 22–29)
CREAT SERPL-MCNC: 0.7 MG/DL (ref 0.5–0.9)
EOSINOPHILS ABSOLUTE: 0.2 K/UL (ref 0–0.6)
EOSINOPHILS RELATIVE PERCENT: 2.2 % (ref 0–5)
GFR SERPL CREATININE-BSD FRML MDRD: >60 ML/MIN/{1.73_M2}
GLUCOSE BLD-MCNC: 149 MG/DL (ref 74–109)
HCT VFR BLD CALC: 48.2 % (ref 37–47)
HEMOGLOBIN: 15.3 G/DL (ref 12–16)
IMMATURE GRANULOCYTES #: 0 K/UL
LYMPHOCYTES ABSOLUTE: 3.7 K/UL (ref 1.1–4.5)
LYMPHOCYTES RELATIVE PERCENT: 37.5 % (ref 20–40)
MCH RBC QN AUTO: 27 PG (ref 27–31)
MCHC RBC AUTO-ENTMCNC: 31.7 G/DL (ref 33–37)
MCV RBC AUTO: 85.2 FL (ref 81–99)
MONOCYTES ABSOLUTE: 0.7 K/UL (ref 0–0.9)
MONOCYTES RELATIVE PERCENT: 7 % (ref 0–10)
NEUTROPHILS ABSOLUTE: 5.2 K/UL (ref 1.5–7.5)
NEUTROPHILS RELATIVE PERCENT: 52.3 % (ref 50–65)
PDW BLD-RTO: 14.3 % (ref 11.5–14.5)
PLATELET # BLD: 302 K/UL (ref 130–400)
PMV BLD AUTO: 9.2 FL (ref 9.4–12.3)
POTASSIUM REFLEX MAGNESIUM: 5.1 MMOL/L (ref 3.5–5)
PRO-BNP: 45 PG/ML (ref 0–900)
RBC # BLD: 5.66 M/UL (ref 4.2–5.4)
SODIUM BLD-SCNC: 133 MMOL/L (ref 136–145)
TOTAL PROTEIN: 7.4 G/DL (ref 6.6–8.7)
TROPONIN: <0.01 NG/ML (ref 0–0.03)
WBC # BLD: 9.9 K/UL (ref 4.8–10.8)

## 2022-10-27 PROCEDURE — 80053 COMPREHEN METABOLIC PANEL: CPT

## 2022-10-27 PROCEDURE — 83880 ASSAY OF NATRIURETIC PEPTIDE: CPT

## 2022-10-27 PROCEDURE — 6370000000 HC RX 637 (ALT 250 FOR IP): Performed by: NURSE PRACTITIONER

## 2022-10-27 PROCEDURE — 99285 EMERGENCY DEPT VISIT HI MDM: CPT

## 2022-10-27 PROCEDURE — 36415 COLL VENOUS BLD VENIPUNCTURE: CPT

## 2022-10-27 PROCEDURE — 71045 X-RAY EXAM CHEST 1 VIEW: CPT

## 2022-10-27 PROCEDURE — 85025 COMPLETE CBC W/AUTO DIFF WBC: CPT

## 2022-10-27 PROCEDURE — 93005 ELECTROCARDIOGRAM TRACING: CPT | Performed by: NURSE PRACTITIONER

## 2022-10-27 PROCEDURE — 84484 ASSAY OF TROPONIN QUANT: CPT

## 2022-10-27 RX ORDER — ALBUTEROL SULFATE 90 UG/1
2 AEROSOL, METERED RESPIRATORY (INHALATION) 4 TIMES DAILY PRN
Qty: 54 G | Refills: 0 | Status: SHIPPED | OUTPATIENT
Start: 2022-10-27

## 2022-10-27 RX ORDER — NAPROXEN 500 MG/1
500 TABLET ORAL 2 TIMES DAILY PRN
Qty: 30 TABLET | Refills: 0 | Status: SHIPPED | OUTPATIENT
Start: 2022-10-27

## 2022-10-27 RX ORDER — HYDROCODONE BITARTRATE AND ACETAMINOPHEN 7.5; 325 MG/1; MG/1
1 TABLET ORAL ONCE
Status: COMPLETED | OUTPATIENT
Start: 2022-10-27 | End: 2022-10-27

## 2022-10-27 RX ADMIN — HYDROCODONE BITARTRATE AND ACETAMINOPHEN 1 TABLET: 7.5; 325 TABLET ORAL at 16:36

## 2022-10-27 ASSESSMENT — PAIN DESCRIPTION - DESCRIPTORS
DESCRIPTORS: ACHING
DESCRIPTORS: ACHING;THROBBING

## 2022-10-27 ASSESSMENT — PAIN DESCRIPTION - ORIENTATION
ORIENTATION: LEFT;LOWER
ORIENTATION: LEFT;LOWER

## 2022-10-27 ASSESSMENT — ENCOUNTER SYMPTOMS: SHORTNESS OF BREATH: 1

## 2022-10-27 ASSESSMENT — PAIN SCALES - GENERAL
PAINLEVEL_OUTOF10: 7
PAINLEVEL_OUTOF10: 5

## 2022-10-27 ASSESSMENT — PAIN - FUNCTIONAL ASSESSMENT: PAIN_FUNCTIONAL_ASSESSMENT: 0-10

## 2022-10-27 ASSESSMENT — PAIN DESCRIPTION - LOCATION
LOCATION: TEETH
LOCATION: MOUTH

## 2022-10-27 ASSESSMENT — PAIN DESCRIPTION - PAIN TYPE: TYPE: ACUTE PAIN

## 2022-10-27 NOTE — ED PROVIDER NOTES
[facility]  eMERGENCY dEPARTMENT eNCOUnter      Pt Name: Itz Kirk  MRN: [id number]  [place] 1945  Date of evaluation: 10/27/2022  Provider: ANTONIA Mackenzie    CHIEF COMPLAINT       Chief Complaint   Patient presents with    Dental Pain     Pt reports pain to left lower teeth that have \"rotted from my diabetes medications\". Pt taking clindamycin x4 days but stats pain is not tolerable. HISTORY OF PRESENT ILLNESS   (Location/Symptom, Timing/Onset,Context/Setting, Quality, Duration, Modifying Factors, Severity)  Note limiting factors. Itz Kirk is a 78 y.o. female who presents to the emergency department with dental pain x 1 month. HAS poor dentition. Is on antibiotics and has an appointment with a dentist next week. Just having pain. Needs all her teeth pulled. Is also short of breath. This has been going on for years. Asks for O2. Tells me she has cpap but wearing it hurts her teeth. No chest pain. No leg swelling    The history is provided by the patient. Dental Pain  Location:  Upper and lower  Quality:  Aching  Severity:  Severe  Onset quality:  Sudden  Duration:  1 month  Timing:  Constant  Progression:  Worsening  Chronicity:  Chronic  Context: dental caries    Associated symptoms: no fever    Risk factors: diabetes and lack of dental care      NursingNotes were reviewed. REVIEW OF SYSTEMS    (2-9 systems for level 4, 10 or more for level 5)     Review of Systems   Constitutional:  Negative for fever. HENT:  Positive for dental problem. Respiratory:  Positive for shortness of breath. Cardiovascular:  Negative for chest pain. Except as noted above the remainder of the review of systems was reviewed and negative.        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    Breast CA (HCC)     Left breast Nodules removed Took radiation    Breast cancer ([facility] [address]     Chronic back pain     Chronic kidney disease     Overactive bladder     COPD (chronic obstructive pulmonary disease) (HCC)     x few years Scarring on lungs 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    Diabetes mellitus ([address]     On insulin x 10 years    Fibromyalgia     Fibromyalgia     for 20 years    GERD (gastroesophageal reflux disease)     History of blood transfusion     ?when    History of therapeutic radiation     Hyperlipidemia     High cholesterol    Hypertension     On medications for 2 years    Joint pain 2/2/2016    Liver disease     Has fatty liver    Morbid obesity ([facility] [address]     Multiple food allergies     Neuropathic pain     Neuropathy     Neuropathy involving both lower extremities 2/2/2016    Obstructive sleep apnea     AHI: 68.1 per PSG, 5/2018    Osteoarthritis     Other disorders of kidney and ureter in diseases classified elsewhere     Pneumonia     Postmenopausal osteoporosis     Psychiatric problem     Restless leg syndrome     S/P lumpectomy, left breast 8/19/2015 8/4/2015    Sleep apnea     Type II or unspecified type diabetes mellitus with neurological manifestations, uncontrolled(250.62)          SURGICALHISTORY       Past Surgical History:   Procedure Laterality Date    BREAST SURGERY Left 8/4/2015    left partial mastectomy on 8/4/15    EYE SURGERY      EYE SURGERY Bilateral 04/02/2017    Dr. Fabi Nava      broke L ankle due to osteoporosis, has hardware in    FRACTURE SURGERY      Left ankle     HYSTERECTOMY (CERVIX STATUS UNKNOWN)      NH DRAIN SKIN ABSCESS COMPLIC N/A 4/83/6213    ABDOMINAL WALL ABSCESS INCISION AND DRAINAGE performed by Rom Merrill MD at [facility]  4/8/2016    Dr Berlin Wright, (-) H Pylori    WRIST SURGERY           CURRENT MEDICATIONS       Discharge Medication List as of 10/27/2022 5:31 PM        CONTINUE these medications which have NOT CHANGED    Details   clindamycin (CLEOCIN) 150 MG capsule Take 3 capsules by mouth 3 times daily for 7 days, Disp-63 capsule, R-0Normal      benzocaine (ORAJEL) 10 % mucosal gel Take by mouth as needed. , Disp-7 g, R-0, Normal      vitamin D (ERGOCALCIFEROL) 1.25 MG (53466 UT) CAPS capsule TAKE 1 CAPSULE BY MOUTH ONCE A WEEK, Disp-4 capsule, R-2Normal      baclofen (LIORESAL) 10 MG tablet TAKE ONE TABLET BY MOUTH THREE TIMES DAILY (VIAL), Disp-90 tablet, R-2This prescription was filled on 9/15/2022. Any refills authorized will be placed on file. Normal      insulin aspart (NOVOLOG FLEXPEN) 100 UNIT/ML injection pen INJECT 60 UNITS with breakfast, Inject 80 units with dinner, Disp-45 mL, R-2Normal      Dulaglutide (TRULICITY) 3 SG/7.7AX SOPN Inject 3 mg into the skin once a week, Disp-4 Adjustable Dose Pre-filled Pen Syringe, R-5Normal      Insulin Degludec (TRESIBA FLEXTOUCH) 200 UNIT/ML SOPN TAKE 160 UNITS AT NIGHT SUBCUTANEOUSLY. ENSURE FASTING BLOOD SUGARS IN ARE ARE ., Disp-18 mL, R-5Normal      nystatin 272214 UNIT/GM powder APPLY TO AFFECTED AREA 3 TIMES DAILY. , Disp-30 g, R-2, Normal      !! Insulin Pen Needle (B-D UF III MINI PEN NEEDLES) 31G X 5 MM MISC Disp-100 each, R-1, Normal**CLARIFY** USE AS DIRECTED (BULK)      INSULIN SYRINGE .5CC/29G (ULTICARE INSULIN SYRINGE) 29G X 1/2\" 0.5 ML MISC Disp-90 each, R-1, Normal**NEED QTY UPDATED TO FULL BOX** USE WITH EACH DOSE OF NOVOLOG THREE TIMES DAILY (BULK)      citalopram (CELEXA) 20 MG tablet TAKE 1 TABLET BY MOUTH ONCE DAILY, Disp-90 tablet, R-1Normal      levocetirizine (XYZAL) 5 MG tablet TAKE 1 TABLET BY MOUTH NIGHTLY, Disp-30 tablet, R-2Normal      chlorhexidine (PERIDEX) 0.12 % solution Take 15 mLs by mouth in the morning and 15 mLs before bedtime. Historical Med      metoprolol succinate (TOPROL XL) 50 MG extended release tablet TAKE 1 TABLET BY MOUTH ONCE DAILY FOR BLOOD PRESSURE AND HEART PROTECTION, Disp-30 tablet, R-1Normal      losartan (COZAAR) 100 MG tablet TAKE 1 TABLET BY MOUTH ONCE DAILY FOR BLOOD PRESSURE, Disp-30 tablet, R-1Normal      Blood Glucose Monitoring Suppl (ONETOUCH VERIO FLEX SYSTEM) w/Device KIT USE TO CHECK BLOOD GLUCOSE DAILY. , Disp-1 kit, R-0Normal      blood glucose test strips (ONETOUCH VERIO) strip USE TO TEST BLOOD GLUCOSE 4 TIMES DAILY. , Disp-100 each, R-6Normal      furosemide (LASIX) 20 MG tablet Take 1 tablet by mouth daily, Disp-90 tablet, R-2Normal      empagliflozin (JARDIANCE) 25 MG tablet Take 1 tablet by mouth daily, Disp-90 tablet, R-2Normal      metFORMIN (GLUCOPHAGE) 1000 MG tablet Take 1 tablet by mouth 2 times daily (with meals), Disp-180 tablet, R-2Normal      omeprazole (PRILOSEC) 20 MG delayed release capsule TAKE (1) CAPSULE BY MOUTH TWICE DAILY AS NEEDED., Disp-180 capsule, R-2Normal      oxybutynin (DITROPAN-XL) 10 MG extended release tablet TAKE 1 TABLET BY MOUTH ONCE DAILY, Disp-90 tablet, R-2Normal      rosuvastatin (CRESTOR) 10 MG tablet TAKE 1 TABLET BY MOUTH ONCE DAILY, Disp-90 tablet, R-2Normal      gabapentin (NEURONTIN) 300 MG capsule TAKE 1 CAPSULE BY MOUTH 3 TIMES A DAY, Disp-180 capsule, R-1Normal      montelukast (SINGULAIR) 10 MG tablet TAKE 1 TABLET BY MOUTH AT NIGHT, Disp-90 tablet, R-2Normal      loratadine (CLARITIN) 10 MG tablet TAKE 1 TABLET BY MOUTH ONCE DAILY. , Disp-30 tablet, R-1Normal      magnesium oxide (MAG-OX) 400 MG tablet Take 1 tablet by mouth daily, Disp-90 tablet, R-3Normal      Lancets MISC 4 TIMES DAILY Starting Tue 9/21/2021, Disp-600 each, R-1, Normal      !! ULTICARE MINI PEN NEEDLES 31G X 6 MM MISC Disp-100 each, R-0, Normal      clobetasol (TEMOVATE) 0.05 % ointment Apply topically 2 times daily.  For 10 days, Disp-1 Tube, R-0, Normal      aspirin 81 MG chewable tablet Take 1 tablet by mouth daily, Disp-30 tablet, R-3Normal      HYDROcodone-acetaminophen (NORCO)  MG per tablet Take 1 tablet by mouth Daily with lunch. .Historical Med      Misc. Devices (ROLLER WALKER) MISC DAILY Starting Tue 4/10/2018, Disp-1 each, R-0, PrintPt states that she falls at least once a month up to 3 times a month.      mometasone-formoterol (DULERA) 200-5 MCG/ACT inhaler Inhale 2 puffs into the lungs every 12 hoursHistorical Med      anastrozole (ARIMIDEX) 1 MG tablet Take 1 mg by mouth dailyHistorical Med      Incontinence Supply Disposable (POISE PANTILINERS) PADS Disp-1 each, R-11, NormalUse pads as needed daily for urine leakage. !! - Potential duplicate medications found. Please discuss with provider. ALLERGIES     Pcn [penicillins] and Cefdinir    FAMILY HISTORY       Family History   Problem Relation Age of Onset    High Blood Pressure Father     Heart Disease Father     Diabetes Father     High Blood Pressure Sister     Diabetes Sister     Ovarian Cancer Sister 43    High Blood Pressure Brother     Colon Cancer Neg Hx     Colon Polyps Neg Hx     Esophageal Cancer Neg Hx     Liver Cancer Neg Hx     Liver Disease Neg Hx     Rectal Cancer Neg Hx     Stomach Cancer Neg Hx           SOCIAL HISTORY       Social History     Socioeconomic History    Marital status:      Spouse name: None    Number of children: None    Years of education: None    Highest education level: None   Tobacco Use    Smoking status: Never    Smokeless tobacco: Never   Vaping Use    Vaping Use: Never used   Substance and Sexual Activity    Alcohol use:  Yes     Alcohol/week: 2.0 standard drinks     Types: 1 Glasses of wine, 1 Cans of beer per week     Comment: occ    Drug use: No     Social Determinants of Health     Financial Resource Strain: Low Risk     Difficulty of Paying Living Expenses: Not hard at all   Food Insecurity: No Food Insecurity    Worried About Running Out of Food in the Last Year: Never true    Ran Out of Food in the Last Year: Never true       SCREENINGS    [place] Coma Scale  Eye Opening: Spontaneous  Best Verbal Response: Oriented  Best Motor Response: Obeys commands  Luis Coma Scale Score: 15 @FLOW([id number])@      PHYSICAL EXAM    (up to 7 for level 4, 8 or more for level 5)     ED Triage Vitals [10/27/22 1245]   BP Temp Temp Source Heart Rate Resp SpO2 Height Weight   130/66 97.9 °F (36.6 °C) Oral 93 18 96 % 5' 2\" (1.575 m) 268 lb (121.6 kg)       Physical Exam  Vitals and nursing note reviewed. Constitutional:       Appearance: Normal appearance. She is well-developed. She is obese. HENT:      Head: Normocephalic and atraumatic. Jaw: There is normal jaw occlusion. Mouth/Throat:      Mouth: Mucous membranes are moist.      Dentition: Dental caries present. Eyes:      General: No scleral icterus. Right eye: No discharge. Left eye: No discharge. Cardiovascular:      Rate and Rhythm: Regular rhythm. Tachycardia present. Pulmonary:      Effort: No respiratory distress. Musculoskeletal:      Cervical back: Normal range of motion and neck supple. Neurological:      Mental Status: She is alert and oriented to person, place, and time. Psychiatric:         Mood and Affect: Mood is anxious. Speech: Speech is rapid and pressured. Behavior: Behavior normal.       DIAGNOSTIC RESULTS     EKG: All EKG's are interpreted by the Emergency Department Physician who either signs or Co-signsthis chart in the absence of a cardiologist.  85 sinus rhythm nonspecific changes read at 1522 by Dr. Smiley Wiggins no T wave changes      RADIOLOGY:   Non-plain filmimages such as CT, Ultrasound and MRI are read by the radiologist. Plain radiographic images are visualized and preliminarily interpreted by the emergency physician with the below findings:      Interpretation per the Radiologist below, if available at the time of this note:    XR CHEST PORTABLE   Final Result   Mild vascular congestion without other acute abnormality.             ED BEDSIDEULTRASOUND:   Performed by ED Physician -none    LABS:  Labs Reviewed   CBC WITH AUTO DIFFERENTIAL - Abnormal; Notable for the following components:       Result Value    RBC 5.66 (*)     Hematocrit 48.2 (*)     MCHC 31.7 (*)     MPV 9.2 (*)     All other components within normal limits   COMPREHENSIVE METABOLIC PANEL W/ REFLEX TO MG FOR LOW K - Abnormal; Notable for the following components:    Sodium 133 (*)     Potassium reflex Magnesium 5.1 (*)     Chloride 93 (*)     Glucose 149 (*)     Alkaline Phosphatase 114 (*)     ALT 61 (*)     AST 48 (*)     All other components within normal limits   BRAIN NATRIURETIC PEPTIDE   TROPONIN       All other labs were within normal range or not returned as of this dictation. EMERGENCY DEPARTMENT COURSE and DIFFERENTIALDIAGNOSIS/MDM:   Vitals:    Vitals:    10/27/22 1532 10/27/22 1601 10/27/22 1632 10/27/22 1747   BP: 136/62 (!) 143/75 109/71 (!) 146/75   Pulse: 85 87 79 86   Resp: 19 19 20 26   Temp:    97.9 °F (36.6 °C)   TempSrc:    Oral   SpO2: 92% 93% 94%    Weight:       Height:               MDM  Pt has been out of her inhaler a long time. Encouraged continued use of Cpap at night. Will give naproxen for pain      CONSULTS:  None    PROCEDURES:  Unless otherwise noted below, none     Procedures    FINAL IMPRESSION      1. Pain due to dental caries    2.  Shortness of breath        DISPOSITION/PLAN   DISPOSITION Decision To Discharge 10/27/2022 04:57:13 PM      PATIENT REFERRED TO:  Tenny Nissen, MD  [address] [place] [address] Dr Earnesteen Nageotte [address] [place] [address] [phone]          DISCHARGE MEDICATIONS:  Discharge Medication List as of 10/27/2022  5:31 PM        START taking these medications    Details   naproxen (NAPROSYN) 500 MG tablet Take 1 tablet by mouth 2 times daily as needed for Pain, Disp-30 tablet, R-0Normal                (Please note that portions of this note were completed with a voice recognitionprogram.  Efforts were made to edit the dictations but occasionally words are mis-transcribed.)    Raymondo Closs, ANTONIA (electronically signed)          Eladia Jurado, APRN  10/27/22 3495

## 2022-10-28 LAB
EKG P AXIS: 57 DEGREES
EKG P-R INTERVAL: 146 MS
EKG Q-T INTERVAL: 382 MS
EKG QRS DURATION: 82 MS
EKG QTC CALCULATION (BAZETT): 424 MS
EKG T AXIS: 65 DEGREES

## 2022-10-28 PROCEDURE — 93010 ELECTROCARDIOGRAM REPORT: CPT | Performed by: INTERNAL MEDICINE

## 2022-11-01 ENCOUNTER — OFFICE VISIT (OUTPATIENT)
Dept: PRIMARY CARE CLINIC | Age: 77
End: 2022-11-01
Payer: MEDICARE

## 2022-11-01 VITALS
OXYGEN SATURATION: 95 % | WEIGHT: 268 LBS | DIASTOLIC BLOOD PRESSURE: 64 MMHG | HEIGHT: 62 IN | TEMPERATURE: 96.9 F | HEART RATE: 75 BPM | BODY MASS INDEX: 49.32 KG/M2 | SYSTOLIC BLOOD PRESSURE: 102 MMHG

## 2022-11-01 DIAGNOSIS — E11.8 TYPE 2 DIABETES MELLITUS WITH COMPLICATION (HCC): Primary | ICD-10-CM

## 2022-11-01 DIAGNOSIS — E66.01 MORBID OBESITY (HCC): ICD-10-CM

## 2022-11-01 DIAGNOSIS — I10 ESSENTIAL HYPERTENSION: ICD-10-CM

## 2022-11-01 DIAGNOSIS — Z23 INFLUENZA VACCINE NEEDED: ICD-10-CM

## 2022-11-01 DIAGNOSIS — J45.909 UNCOMPLICATED ASTHMA, UNSPECIFIED ASTHMA SEVERITY, UNSPECIFIED WHETHER PERSISTENT: ICD-10-CM

## 2022-11-01 DIAGNOSIS — Z85.3 HISTORY OF CANCER OF LEFT BREAST: ICD-10-CM

## 2022-11-01 DIAGNOSIS — E55.9 VITAMIN D DEFICIENCY: ICD-10-CM

## 2022-11-01 DIAGNOSIS — Z86.73 HISTORY OF STROKE: ICD-10-CM

## 2022-11-01 DIAGNOSIS — E78.2 MIXED HYPERLIPIDEMIA: ICD-10-CM

## 2022-11-01 LAB — HBA1C MFR BLD: 7.3 %

## 2022-11-01 PROCEDURE — 99215 OFFICE O/P EST HI 40 MIN: CPT | Performed by: FAMILY MEDICINE

## 2022-11-01 PROCEDURE — 1123F ACP DISCUSS/DSCN MKR DOCD: CPT | Performed by: FAMILY MEDICINE

## 2022-11-01 PROCEDURE — 3078F DIAST BP <80 MM HG: CPT | Performed by: FAMILY MEDICINE

## 2022-11-01 PROCEDURE — G0008 ADMIN INFLUENZA VIRUS VAC: HCPCS | Performed by: FAMILY MEDICINE

## 2022-11-01 PROCEDURE — 3051F HG A1C>EQUAL 7.0%<8.0%: CPT | Performed by: FAMILY MEDICINE

## 2022-11-01 PROCEDURE — 3074F SYST BP LT 130 MM HG: CPT | Performed by: FAMILY MEDICINE

## 2022-11-01 PROCEDURE — 90694 VACC AIIV4 NO PRSRV 0.5ML IM: CPT | Performed by: FAMILY MEDICINE

## 2022-11-01 PROCEDURE — 83036 HEMOGLOBIN GLYCOSYLATED A1C: CPT | Performed by: FAMILY MEDICINE

## 2022-11-01 ASSESSMENT — ENCOUNTER SYMPTOMS
DIARRHEA: 0
SHORTNESS OF BREATH: 0
COLOR CHANGE: 0
VOMITING: 0
SORE THROAT: 0
NAUSEA: 0
COUGH: 0
CHEST TIGHTNESS: 0
ABDOMINAL PAIN: 0

## 2022-11-01 NOTE — PROGRESS NOTES
[address] [facility]  [address] [address] [place] [address]  Dept: [phone]  Dept Fax: [phone]  Loc: [phone]      Subjective:     Chief Complaint   Patient presents with    New Patient       HPI:  Sherrell Palacio is a 78 y.o. female presents today as a new patient to me. She was a former pt of Dr Mauro Toledo. Pt is a type 2 diabetic. She takes Trulicity 3.5 units once a week and Novolog 60 units in am and 70 units in pm, Tresiba 160 units in pm  She has Vitamin D deficiency, She has not had her level checked in a while  FBS this am is 92 mg/dl  A1c today =   She states she has an appointment to see her foot doctor in the next few weeks  She goes to the pain clinic (Dr Marcelino Gamez) every 3 months  Pt states she has Astma and denies hx of smoking. She does live with her daughter who smokes  Pt has a hx of L breast caner, she is s/p lumpectomy and axillary (5) lymph node resection. She is 7 years cancer free. She still sees Dr Paco Lozada every 6 -12 months. She is up to date on her mammogram.     ROS:   Review of Systems   Constitutional:  Negative for activity change and fever. HENT:  Negative for congestion, ear pain and sore throat. Respiratory:  Negative for cough, chest tightness and shortness of breath. Cardiovascular:  Negative for chest pain. Gastrointestinal:  Negative for abdominal pain, diarrhea, nausea and vomiting. Genitourinary:  Negative for frequency and urgency. Musculoskeletal:  Negative for arthralgias (foot pain - has appt to see her podiatrist) and myalgias. Skin:  Negative for color change. Neurological:  Negative for dizziness, weakness and numbness. Psychiatric/Behavioral:  Negative for agitation. The patient is not nervous/anxious.       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    Breast CA (HCC)     Left breast Nodules removed Took radiation    Breast cancer (HCC)     Chronic back pain     Chronic kidney disease     Overactive bladder     COPD (chronic obstructive pulmonary disease) (HCC)     x few years Scarring on lungs 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    Diabetes mellitus ([address]     On insulin x 10 years    Fibromyalgia     Fibromyalgia     for 20 years    GERD (gastroesophageal reflux disease)     History of blood transfusion     ?when    History of therapeutic radiation     Hyperlipidemia     High cholesterol    Hypertension     On medications for 2 years    Joint pain 2/2/2016    Liver disease     Has fatty liver    Morbid obesity ([address]     Multiple food allergies     Neuropathic pain     Neuropathy     Neuropathy involving both lower extremities 2/2/2016    Obstructive sleep apnea     AHI: 68.1 per PSG, 5/2018    Osteoarthritis     Other disorders of kidney and ureter in diseases classified elsewhere     Pneumonia     Postmenopausal osteoporosis     Psychiatric problem     Restless leg syndrome     S/P lumpectomy, left breast 8/19/2015 8/4/2015    Sleep apnea     Type II or unspecified type diabetes mellitus with neurological manifestations, uncontrolled(250.62)      Patient Active Problem List   Diagnosis    Postmenopausal osteoporosis    Type 2 diabetes mellitus with complication (HCC)    COPD (chronic obstructive pulmonary disease) (HCC)    Mood disorder (HCC)    Dementia ([address]    Malignant neoplasm of upper-outer quadrant of female breast ([address]    Joint pain    Neuropathy involving both lower extremities    Back pain    Dyspepsia    Lumbar facet arthropathy    Body mass index 45.0-49.9, adult ([address]    Essential hypertension    Hyperlipidemia    Morbid obesity (HCC)    DARSHAN (obstructive sleep apnea)    BiPAP (biphasic positive airway pressure) dependence    Restless leg syndrome    History of breast cancer    Venous insufficiency of both lower extremities    History of stroke    Uncomplicated asthma    Vitamin D deficiency       PSHx:  Past Surgical History:   Procedure Laterality Date    BREAST SURGERY Left 8/4/2015    left partial mastectomy on 8/4/15    EYE SURGERY      EYE SURGERY Bilateral 04/02/2017    Dr. Jonatan Magana      broke L ankle due to osteoporosis, has hardware in    FRACTURE SURGERY      Left ankle     HYSTERECTOMY (CERVIX STATUS UNKNOWN)      [place] DRAIN SKIN ABSCESS COMPLIC N/A 9/16/3879    ABDOMINAL WALL ABSCESS INCISION AND DRAINAGE performed by Lisa Castaneda MD at [facility] END[facility]  4/8/2016    Dr Brandon Castillo, (-) H Pylori    WRIST SURGERY         PFHx:  Family History   Problem Relation Age of Onset    High Blood Pressure Father     Heart Disease Father     Diabetes Father     High Blood Pressure Sister     Diabetes Sister     Ovarian Cancer Sister 43    High Blood Pressure Brother     Colon Cancer Neg Hx     Colon Polyps Neg Hx     Esophageal Cancer Neg Hx     Liver Cancer Neg Hx     Liver Disease Neg Hx     Rectal Cancer Neg Hx     Stomach Cancer Neg Hx        SocialHx:  Social History     Tobacco Use    Smoking status: Never    Smokeless tobacco: Never   Substance Use Topics    Alcohol use: Yes     Alcohol/week: 2.0 standard drinks     Types: 1 Glasses of wine, 1 Cans of beer per week     Comment: occ       Allergies: Allergies   Allergen Reactions    Pcn [Penicillins] Hives and Itching     Patient states she can tolerate ampicillin and amoxicillin however.     Cefdinir        Medications:  Current Outpatient Medications   Medication Sig Dispense Refill    albuterol sulfate HFA (VENTOLIN HFA) 108 (90 Base) MCG/ACT inhaler Inhale 2 puffs into the lungs 4 times daily as needed for Wheezing 54 g 0    naproxen (NAPROSYN) 500 MG tablet Take 1 tablet by mouth 2 times daily as needed for Pain 30 tablet 0    benzocaine (ORAJEL) 10 % mucosal gel Take by mouth as needed. 7 g 0    vitamin D (ERGOCALCIFEROL) 1.25 MG (96151 UT) CAPS capsule TAKE 1 CAPSULE BY MOUTH ONCE A WEEK 4 capsule 2    baclofen (LIORESAL) 10 MG tablet TAKE ONE TABLET BY MOUTH THREE TIMES DAILY (VIAL) 90 tablet 2    insulin aspart (NOVOLOG FLEXPEN) 100 UNIT/ML injection pen INJECT 60 UNITS with breakfast, Inject 80 units with dinner 45 mL 2    Dulaglutide (TRULICITY) 3 IB/8.5OZ SOPN Inject 3 mg into the skin once a week 4 Adjustable Dose Pre-filled Pen Syringe 5    Insulin Degludec (TRESIBA FLEXTOUCH) 200 UNIT/ML SOPN TAKE 160 UNITS AT NIGHT SUBCUTANEOUSLY. ENSURE FASTING BLOOD SUGARS IN ARE ARE . 18 mL 5    nystatin 856490 UNIT/GM powder APPLY TO AFFECTED AREA 3 TIMES DAILY. 30 g 2    Insulin Pen Needle (B-D UF III MINI PEN NEEDLES) 31G X 5 MM MISC **CLARIFY** USE AS DIRECTED (BULK) 100 each 1    INSULIN SYRINGE .5CC/29G (ULTICARE INSULIN SYRINGE) 29G X 1/2\" 0.5 ML MISC **NEED QTY UPDATED TO FULL BOX** USE WITH EACH DOSE OF NOVOLOG THREE TIMES DAILY (BULK) 90 each 1    citalopram (CELEXA) 20 MG tablet TAKE 1 TABLET BY MOUTH ONCE DAILY 90 tablet 1    levocetirizine (XYZAL) 5 MG tablet TAKE 1 TABLET BY MOUTH NIGHTLY 30 tablet 2    chlorhexidine (PERIDEX) 0.12 % solution Take 15 mLs by mouth in the morning and 15 mLs before bedtime. metoprolol succinate (TOPROL XL) 50 MG extended release tablet TAKE 1 TABLET BY MOUTH ONCE DAILY FOR BLOOD PRESSURE AND HEART PROTECTION 30 tablet 1    losartan (COZAAR) 100 MG tablet TAKE 1 TABLET BY MOUTH ONCE DAILY FOR BLOOD PRESSURE 30 tablet 1    Blood Glucose Monitoring Suppl ([address]) w/Device KIT USE TO CHECK BLOOD GLUCOSE DAILY. 1 kit 0    blood glucose test strips (ONETOUCH VERIO) strip USE TO TEST BLOOD GLUCOSE 4 TIMES DAILY.  100 each 6    furosemide (LASIX) 20 MG tablet Take 1 tablet by mouth daily 90 tablet 2    empagliflozin (JARDIANCE) 25 MG tablet Take 1 tablet by mouth daily 90 tablet 2    metFORMIN (GLUCOPHAGE) 1000 MG tablet Take 1 tablet by mouth 2 times daily (with meals) 180 tablet 2    omeprazole (PRILOSEC) 20 MG delayed release capsule TAKE (1) CAPSULE BY MOUTH TWICE DAILY AS NEEDED. 180 capsule 2    oxybutynin (DITROPAN-XL) 10 MG extended release tablet TAKE 1 TABLET BY MOUTH ONCE DAILY 90 tablet 2    rosuvastatin (CRESTOR) 10 MG tablet TAKE 1 TABLET BY MOUTH ONCE DAILY 90 tablet 2    gabapentin (NEURONTIN) 300 MG capsule TAKE 1 CAPSULE BY MOUTH 3 TIMES A  capsule 1    montelukast (SINGULAIR) 10 MG tablet TAKE 1 TABLET BY MOUTH AT NIGHT 90 tablet 2    loratadine (CLARITIN) 10 MG tablet TAKE 1 TABLET BY MOUTH ONCE DAILY. 30 tablet 1    magnesium oxide (MAG-OX) 400 MG tablet Take 1 tablet by mouth daily 90 tablet 3    Lancets MISC 1 each by Does not apply route 4 times daily 600 each 1    ULTICARE MINI PEN NEEDLES 31G X 6 MM MISC USE 2 TIMES A  each 0    aspirin 81 MG chewable tablet Take 1 tablet by mouth daily 30 tablet 3    HYDROcodone-acetaminophen (NORCO)  MG per tablet Take 1 tablet by mouth Daily with lunch. .      Misc. Devices (ROLLER WALKER) MISC 1 each by Does not apply route daily Pt states that she falls at least once a month up to 3 times a month. 1 each 0    mometasone-formoterol (DULERA) 200-5 MCG/ACT inhaler Inhale 2 puffs into the lungs every 12 hours      anastrozole (ARIMIDEX) 1 MG tablet Take 1 mg by mouth daily      Incontinence Supply Disposable (POISE PANTILINERS) PADS Use pads as needed daily for urine leakage. 1 each 11    clobetasol (TEMOVATE) 0.05 % ointment Apply topically 2 times daily. For 10 days 1 Tube 0     No current facility-administered medications for this visit. Objective:   PE:  /64   Pulse 75   Temp 96.9 °F (36.1 °C)   Ht 5' 2\" (1.575 m)   Wt 268 lb (121.6 kg)   LMP  (LMP Unknown)   SpO2 95%   BMI 49.02 kg/m²   Physical Exam  Constitutional:       General: She is not in acute distress. Appearance: Normal appearance. She is morbidly obese. maintenance medications as prescribed  Continue diabetic diet  Continue attempts at weight loss. Engaged in regular exercise as tolerated - at least 30 minutes/day  Low fat/low calorie diet  Daily foot care  Recommend annual diabetic eye exam  Avoid send hand smoke exposure  Stay well hydrated - 64 oz of water a day  Keep scheduled follow-up visit with me and with other specialist  Labs as ordered  Call with new concerns     Return in about 7 weeks (around 12/19/2022). All questions were answered. Medications, including possible adverse effects, and instructions were reviewed and  understanding was confirmed. Follow-up recommendations, including when to contact or return to office (ie; if symptoms worsen or fail to improve), were discussed and acknowledged.     Electronically signed by Klaudia Aguilar MD on 11/1/22 at 2:07 PM CDT

## 2022-11-01 NOTE — PROGRESS NOTES
After obtaining consent, and per orders of Dr. Klaudia Aguilar, injection of Fluad given in Right deltoid by Fiorella Rodriguez. Patient signed consent scanned into patients chart.

## 2022-11-10 DIAGNOSIS — E11.8 TYPE 2 DIABETES MELLITUS WITH COMPLICATION (HCC): ICD-10-CM

## 2022-11-11 RX ORDER — INSULIN DEGLUDEC 200 U/ML
INJECTION, SOLUTION SUBCUTANEOUS
Qty: 18 ML | Refills: 5 | OUTPATIENT
Start: 2022-11-11

## 2022-11-15 DIAGNOSIS — Z51.81 MEDICATION MONITORING ENCOUNTER: Primary | ICD-10-CM

## 2022-11-15 DIAGNOSIS — J30.2 SEASONAL ALLERGIES: ICD-10-CM

## 2022-11-15 RX ORDER — LEVOCETIRIZINE DIHYDROCHLORIDE 5 MG/1
5 TABLET, FILM COATED ORAL NIGHTLY
Qty: 30 TABLET | Refills: 2 | Status: SHIPPED | OUTPATIENT
Start: 2022-11-15

## 2022-11-15 RX ORDER — LANOLIN ALCOHOL/MO/W.PET/CERES
CREAM (GRAM) TOPICAL
Qty: 30 TABLET | Refills: 6 | OUTPATIENT
Start: 2022-11-15

## 2022-11-15 RX ORDER — DULOXETIN HYDROCHLORIDE 30 MG/1
CAPSULE, DELAYED RELEASE ORAL
Qty: 30 CAPSULE | Refills: 5 | OUTPATIENT
Start: 2022-11-15

## 2022-11-15 NOTE — TELEPHONE ENCOUNTER
Day Farley called to request a refill on her medication.       Last office visit : 11/1/2022   Next office visit : 12/20/2022     Requested Prescriptions     Pending Prescriptions Disp Refills    levocetirizine (XYZAL) 5 MG tablet [Pharmacy Med Name: LEVOCETIRIZINE DIHYDROCHLORIDE 5MG TABLET] 30 tablet 2     Sig: TAKE 1 TABLET BY MOUTH NIGHTLY            Twila Girard LPN

## 2022-11-15 NOTE — TELEPHONE ENCOUNTER
Itz Kirk called to request a refill on her medication.       Last office visit : 11/1/2022   Next office visit : 11/15/2022     Requested Prescriptions     Pending Prescriptions Disp Refills    magnesium oxide (MAG-OX) 400 (240 Mg) MG tablet [Pharmacy Med Name: MAGNESIUM OXIDE 400MG TABLET] 30 tablet 6     Sig: TAKE 1 TABLET BY MOUTH ONCE DAILY            LANETTE Link

## 2022-11-16 DIAGNOSIS — E11.8 TYPE 2 DIABETES MELLITUS WITH COMPLICATION (HCC): ICD-10-CM

## 2022-11-16 DIAGNOSIS — G57.93 NEUROPATHY INVOLVING BOTH LOWER EXTREMITIES: Chronic | ICD-10-CM

## 2022-11-16 NOTE — TELEPHONE ENCOUNTER
Rahat Lo called to request a refill on her medication. Last office visit : 11/1/2022   Next office visit : 12/20/2022     Requested Prescriptions     Pending Prescriptions Disp Refills    DULoxetine (CYMBALTA) 30 MG extended release capsule [Pharmacy Med Name: DULOXETINE HYDROCHLORIDE 30MG CAPSULE DELAYED RELEASE PARTICLES] 30 capsule 5     Sig: TAKE 1 CAPSULE BY MOUTH DAILY    magnesium oxide (MAG-OX) 400 MG tablet 90 tablet 3     Sig: Take 1 tablet by mouth daily       Patient is also asking for nebulizer to be sent to [facility], patient does not have medication to go with nebulizer, is this something that was discussed in office visit.       Bob Cortez MA

## 2022-11-18 RX ORDER — DULOXETIN HYDROCHLORIDE 30 MG/1
CAPSULE, DELAYED RELEASE ORAL
Qty: 30 CAPSULE | Refills: 5 | Status: SHIPPED | OUTPATIENT
Start: 2022-11-18

## 2022-11-18 RX ORDER — MAGNESIUM OXIDE 400 MG/1
400 TABLET ORAL DAILY
Qty: 90 TABLET | Refills: 3 | Status: SHIPPED | OUTPATIENT
Start: 2022-11-18

## 2022-11-21 RX ORDER — INSULIN DEGLUDEC 200 U/ML
INJECTION, SOLUTION SUBCUTANEOUS
Qty: 18 ML | Refills: 5 | Status: SHIPPED | OUTPATIENT
Start: 2022-11-21

## 2022-11-21 RX ORDER — GABAPENTIN 300 MG/1
CAPSULE ORAL
Qty: 180 CAPSULE | Refills: 5 | Status: SHIPPED | OUTPATIENT
Start: 2022-11-21 | End: 2022-12-22

## 2022-11-21 NOTE — TELEPHONE ENCOUNTER
I sent this pt a message that she needs to get blood work done first prior to getting refills on these medications.  Please call and remind pt.

## 2022-11-22 ENCOUNTER — OFFICE VISIT (OUTPATIENT)
Dept: NEUROLOGY | Age: 77
End: 2022-11-22
Payer: MEDICARE

## 2022-11-22 VITALS
OXYGEN SATURATION: 94 % | WEIGHT: 268 LBS | HEART RATE: 95 BPM | SYSTOLIC BLOOD PRESSURE: 121 MMHG | HEIGHT: 62 IN | BODY MASS INDEX: 49.32 KG/M2 | DIASTOLIC BLOOD PRESSURE: 63 MMHG

## 2022-11-22 DIAGNOSIS — Z99.89 BIPAP (BIPHASIC POSITIVE AIRWAY PRESSURE) DEPENDENCE: ICD-10-CM

## 2022-11-22 DIAGNOSIS — G47.33 OSA (OBSTRUCTIVE SLEEP APNEA): Primary | ICD-10-CM

## 2022-11-22 PROCEDURE — 99213 OFFICE O/P EST LOW 20 MIN: CPT | Performed by: NURSE PRACTITIONER

## 2022-11-22 PROCEDURE — 3074F SYST BP LT 130 MM HG: CPT | Performed by: NURSE PRACTITIONER

## 2022-11-22 PROCEDURE — 3078F DIAST BP <80 MM HG: CPT | Performed by: NURSE PRACTITIONER

## 2022-11-22 PROCEDURE — 1123F ACP DISCUSS/DSCN MKR DOCD: CPT | Performed by: NURSE PRACTITIONER

## 2022-11-22 NOTE — PROGRESS NOTES
[facility] Sleep Follow Up Encounter      Information:   Patient Name: Al Lainez  :   1945  Age:   78 y.o. MRN:   [id number]  Account #:  [de-identified]  Today:                22    Provider:  Ivet Luna DNP, APRN    Chief Complaint   Patient presents with    1 Year Follow Up     C/o insomnia but notes she naps a few times during the day and stays up late at night    Sleep Apnea        Subjective:   Al Lainez is a 78 y.o. female with a history of severe DARSHAN, RLS, DM, fibromyalgia who comes in for a sleep clinic follow up. The compliance report is not available today. She  averages 10-11 hours of sleep per night. She reports that consistent BiPAP use has alleviated the previous DARSHAN symptoms. She is napping during the day, stays up late at night. Denies RLS symptoms. 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    Breast CA (HCC)     Left breast Nodules removed Took radiation    Breast cancer (HCC)     Chronic back pain     Chronic kidney disease     Overactive bladder     COPD (chronic obstructive pulmonary disease) (HCC)     x few years Scarring on lungs 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    Diabetes mellitus ([address]     On insulin x 10 years    Fibromyalgia     Fibromyalgia     for 20 years    GERD (gastroesophageal reflux disease)     History of blood transfusion     ?when    History of therapeutic radiation     Hyperlipidemia     High cholesterol    Hypertension     On medications for 2 years    Joint pain 2016    Liver disease     Has fatty liver    Morbid obesity ([address]     Multiple food allergies     Neuropathic pain     Neuropathy     Neuropathy involving both lower extremities 2/2/2016    Obstructive sleep apnea     AHI: 68.1 per PSG, 5/2018    Osteoarthritis     Other disorders of kidney and ureter in diseases classified elsewhere     Pneumonia     Postmenopausal osteoporosis     Psychiatric problem     Restless leg syndrome     S/P lumpectomy, left breast 8/19/2015 8/4/2015    Sleep apnea     Type II or unspecified type diabetes mellitus with neurological manifestations, uncontrolled(250.62)        Past Surgical History:   Procedure Laterality Date    BREAST SURGERY Left 8/4/2015    left partial mastectomy on 8/4/15    EYE SURGERY      EYE SURGERY Bilateral 04/02/2017    Dr. Gustavo Simpson      broke L ankle due to osteoporosis, has hardware in    FRACTURE SURGERY      Left ankle     HYSTERECTOMY (CERVIX STATUS UNKNOWN)      NC DRAIN SKIN ABSCESS COMPLIC N/A 4/74/7550    ABDOMINAL WALL ABSCESS INCISION AND DRAINAGE performed by Nicole Romero MD at [address] Replaced by [facility]  4/8/2016    Dr Tracee Coulter-Gastritis, (-) H Pylori    WRIST SURGERY         Recent Hospitalizations      Significant Injuries      Family History   Problem Relation Age of Onset    High Blood Pressure Father     Heart Disease Father     Diabetes Father     High Blood Pressure Sister     Diabetes Sister     Ovarian Cancer Sister 43    High Blood Pressure Brother     Colon Cancer Neg Hx     Colon Polyps Neg Hx     Esophageal Cancer Neg Hx     Liver Cancer Neg Hx     Liver Disease Neg Hx     Rectal Cancer Neg Hx     Stomach Cancer Neg Hx        Social History  Social History     Tobacco Use   Smoking Status Never   Smokeless Tobacco Never     Social History     Substance and Sexual Activity   Alcohol Use Yes    Alcohol/week: 2.0 standard drinks    Types: 1 Glasses of wine, 1 Cans of beer per week    Comment: occ     Social History     Substance and Sexual Activity   Drug Use No         Current Outpatient Medications   Medication Sig Dispense Refill    gabapentin (NEURONTIN) 300 MG capsule TAKE ONE CAPSULE BY MOUTH THREE TIMES DAILY (VIAL) 180 capsule 5    Insulin Degludec (TRESIBA FLEXTOUCH) 200 UNIT/ML SOPN INJECT 160 UNITS SUBCUTANEOUSLY NIGHTLY. ENSURE FASTING BLOOD SUGARS ARE . (BULK) 18 mL 5    DULoxetine (CYMBALTA) 30 MG extended release capsule TAKE 1 CAPSULE BY MOUTH DAILY 30 capsule 5    magnesium oxide (MAG-OX) 400 MG tablet Take 1 tablet by mouth daily 90 tablet 3    levocetirizine (XYZAL) 5 MG tablet TAKE 1 TABLET BY MOUTH NIGHTLY 30 tablet 2    albuterol sulfate HFA (VENTOLIN HFA) 108 (90 Base) MCG/ACT inhaler Inhale 2 puffs into the lungs 4 times daily as needed for Wheezing 54 g 0    naproxen (NAPROSYN) 500 MG tablet Take 1 tablet by mouth 2 times daily as needed for Pain 30 tablet 0    benzocaine (ORAJEL) 10 % mucosal gel Take by mouth as needed. 7 g 0    vitamin D (ERGOCALCIFEROL) 1.25 MG (41488 UT) CAPS capsule TAKE 1 CAPSULE BY MOUTH ONCE A WEEK 4 capsule 2    baclofen (LIORESAL) 10 MG tablet TAKE ONE TABLET BY MOUTH THREE TIMES DAILY (VIAL) 90 tablet 2    insulin aspart (NOVOLOG FLEXPEN) 100 UNIT/ML injection pen INJECT 60 UNITS with breakfast, Inject 80 units with dinner 45 mL 2    Dulaglutide (TRULICITY) 3 QA/9.6QN SOPN Inject 3 mg into the skin once a week 4 Adjustable Dose Pre-filled Pen Syringe 5    nystatin 484101 UNIT/GM powder APPLY TO AFFECTED AREA 3 TIMES DAILY.  30 g 2    Insulin Pen Needle (B-D UF III MINI PEN NEEDLES) 31G X 5 MM MISC **CLARIFY** USE AS DIRECTED (BULK) 100 each 1    INSULIN SYRINGE .5CC/29G (ULTICARE INSULIN SYRINGE) 29G X 1/2\" 0.5 ML MISC **NEED QTY UPDATED TO FULL BOX** USE WITH EACH DOSE OF NOVOLOG THREE TIMES DAILY (BULK) 90 each 1    citalopram (CELEXA) 20 MG tablet TAKE 1 TABLET BY MOUTH ONCE DAILY 90 tablet 1    chlorhexidine (PERIDEX) 0.12 % solution Take 15 mLs by mouth in the morning and 15 mLs before bedtime. metoprolol succinate (TOPROL XL) 50 MG extended release tablet TAKE 1 TABLET BY MOUTH ONCE DAILY FOR BLOOD PRESSURE AND HEART PROTECTION 30 tablet 1    losartan (COZAAR) 100 MG tablet TAKE 1 TABLET BY MOUTH ONCE DAILY FOR BLOOD PRESSURE 30 tablet 1    Blood Glucose Monitoring Suppl ([address]) w/Device KIT USE TO CHECK BLOOD GLUCOSE DAILY. 1 kit 0    blood glucose test strips (ONETOUCH VERIO) strip USE TO TEST BLOOD GLUCOSE 4 TIMES DAILY. 100 each 6    furosemide (LASIX) 20 MG tablet Take 1 tablet by mouth daily 90 tablet 2    empagliflozin (JARDIANCE) 25 MG tablet Take 1 tablet by mouth daily 90 tablet 2    metFORMIN (GLUCOPHAGE) 1000 MG tablet Take 1 tablet by mouth 2 times daily (with meals) 180 tablet 2    omeprazole (PRILOSEC) 20 MG delayed release capsule TAKE (1) CAPSULE BY MOUTH TWICE DAILY AS NEEDED. 180 capsule 2    oxybutynin (DITROPAN-XL) 10 MG extended release tablet TAKE 1 TABLET BY MOUTH ONCE DAILY 90 tablet 2    rosuvastatin (CRESTOR) 10 MG tablet TAKE 1 TABLET BY MOUTH ONCE DAILY 90 tablet 2    montelukast (SINGULAIR) 10 MG tablet TAKE 1 TABLET BY MOUTH AT NIGHT 90 tablet 2    loratadine (CLARITIN) 10 MG tablet TAKE 1 TABLET BY MOUTH ONCE DAILY. 30 tablet 1    Lancets MISC 1 each by Does not apply route 4 times daily 600 each 1    ULTICARE MINI PEN NEEDLES 31G X 6 MM MISC USE 2 TIMES A  each 0    clobetasol (TEMOVATE) 0.05 % ointment Apply topically 2 times daily. For 10 days 1 Tube 0    aspirin 81 MG chewable tablet Take 1 tablet by mouth daily 30 tablet 3    HYDROcodone-acetaminophen (NORCO)  MG per tablet Take 1 tablet by mouth Daily with lunch. .      Misc. Devices (ROLLER WALKER) MISC 1 each by Does not apply route daily Pt states that she falls at least once a month up to 3 times a month.  1 each 0    mometasone-formoterol (DULERA) 200-5 MCG/ACT inhaler Inhale 2 puffs into the lungs every 12 hours      anastrozole (ARIMIDEX) 1 MG tablet Take 1 mg by mouth daily      Incontinence Supply Disposable (POISE PANTILINERS) PADS Use pads as needed daily for urine leakage. 1 each 11     No current facility-administered medications for this visit. Allergies:  Pcn [penicillins] and Cefdinir    Review of Systems:   Constitutional: []Fever []Sweats []Chills [] Recent Injury [x] Denies all unless marked  HEENT:[]Headache  [] Head Injury [] Hearing Loss  [] Sore Throat  [] Ear Ache [x] Denies all unless marked  Spine:  [] Neck pain  [] Back pain  [] Sciaticia  [x] Denies all unless marked  Cardiovascular:[]Heart Disease []Palpitations [] Chest Pain   [x] Denies all unless marked  Pulmonary: []Shortness of Breath []Cough   [x] Denies all unless marked  Psychiatric/Behavioral:[] Depression [] Anxiety [x] Denies all unless marked  Gastrointestinal: []Nausea  []Vomiting  []Abdominal Pain  []Constipation  []Diarrhea  [x] Denies all unless marked  Genitourinary:   [] Frequency  [] Urgency  [] Dysuria [] Incontinence  [x] Denies all unless marked  Extremities: []Pain  []Swelling  [x] Denies all unless marked  Musculoskeletal: [] Myalgias  [] Joint Pain  [] Arthritis [] Muscle Cramps [] Muscle Twitches  [x] Denies all unless marked  Sleep: [x]Insomnia[]Snoring []Restless Legs  [x]Sleep Apnea  [x]Daytime Sleepiness  [x] Denies all unless marked  Skin:[] Rash [] Color Change [x] Denies all unless marked   Neurological:[]Visual Disturbance [] Memory Loss []Loss of Balance []Slurred Speech []Weakness []Seizures  [] Dizziness [x] Denies all unless marked    The MA has completed the ROS with the patient. I have reviewed it in its' entirety with the patient and agree with the documentation.       Examination:  Vitals:  /63   Pulse 95   Ht 5' 2\" (1.575 m)   Wt 268 lb (121.6 kg)   LMP  (LMP Unknown)   SpO2 94%   BMI 49.02 kg/m²   General appearance:  alert and cooperative with exam  HEENT:  PERRLA, EOMI, Sclera clear, anicteric, and Oropharynx clear, no lesions  Heart[de-identified] regular rate and rhythm  Lungs:   normal effort, no use of accessory muscles  Extremities:  extremities normal, atraumatic, no cyanosis or edema  Neurologic:  Extraocular movements are intact without nystagmus. Visual fields are full to confrontation. Facial movements are symmetrical and normal.  Speech is precise. Extremity strength is normal in both uppers and lowers. Deep tendon reflexes are intact and symmetrical.  Rapid alternating movements are unimpaired. Finger-to-nose testing is performed well, without dysmetria. Gait is normal.    I reviewed the following studies:    []  :  Clinical laboratory test results    []  :  Radiology reports    []  :  Review and summarization of medical records and/or obtain medical records     [x]  :  Previous/recent polysomnogram report(s)    []  :  [place] Sleepiness Scale    []  :  Compliance download: Not available at time of note. Assessment:       ICD-10-CM    1. DARSHAN (obstructive sleep apnea)  G47.33       2. BiPAP (biphasic positive airway pressure) dependence  Z99.89            [x]  :  Stable- per patient report however download is not available today. []  :  Improved                       []  :  Well controlled              []  :  Resolving     []  :  Resolved     []  :  Inadequately controlled     []  :  Worsening     []  :  Additional workup planned    Plan:     No orders of the defined types were placed in this encounter. No orders of the defined types were placed in this encounter. 1.   Patient advised of the etiology,  pathophysiology, diagnosis, treatment options, and risks of untreated DARSHAN. Risks may include, but are not limited to  hypertension, coronary artery disease, diabetes, stroke, weight gain, impaired cognition, daytime somnolence,  and motor vehicle accidents. Advised to abstain from driving or operating heavy machinery when drowsy and the use of respiratory suppressants.   2.  The following educational material has been included in this visit after visit summary for your review: DARSHAN/PAP guidelines-Discussed with the patient and all questions fully answered. 3.  The current medical regimen is effective;  continue present plan. Will plan to get download from DME. 4.  Return in about 1 year (around 11/22/2023) for follow up, sooner if worsening, Sleep patient follow up.      Natalie William DNP, APRN

## 2022-11-22 NOTE — PROGRESS NOTES
REVIEW OF SYSTEMS    Constitutional: []Fever []Sweats []Chills [] Recent Injury [x] Denies all unless marked  HEENT:[]Headache  [] Head Injury [] Hearing Loss  [] Sore Throat  [] Ear Ache [x] Denies all unless marked  Spine:  [] Neck pain  [] Back pain  [] Sciaticia  [x] Denies all unless marked  Cardiovascular:[]Heart Disease []Palpitations [] Chest Pain   [x] Denies all unless marked  Pulmonary: []Shortness of Breath []Cough   [x] Denies all unless marked  Psychiatric/Behavioral:[] Depression [] Anxiety [x] Denies all unless marked  Gastrointestinal: []Nausea  []Vomiting  []Abdominal Pain  []Constipation  []Diarrhea  [x] Denies all unless marked  Genitourinary:   [] Frequency  [] Urgency  [] Dysuria [] Incontinence  [x] Denies all unless marked  Extremities: []Pain  []Swelling  [x] Denies all unless marked  Musculoskeletal: [] Myalgias  [] Joint Pain  [] Arthritis [] Muscle Cramps [] Muscle Twitches  [x] Denies all unless marked  Sleep: [x]Insomnia[]Snoring []Restless Legs  [x]Sleep Apnea  [x]Daytime Sleepiness  [x] Denies all unless marked  Skin:[] Rash [] Color Change [x] Denies all unless marked   Neurological:[]Visual Disturbance [] Memory Loss []Loss of Balance []Slurred Speech []Weakness []Seizures  [] Dizziness [x] Denies all unless marked

## 2022-12-05 DIAGNOSIS — E55.9 VITAMIN D DEFICIENCY: ICD-10-CM

## 2022-12-05 DIAGNOSIS — Z51.81 MEDICATION MONITORING ENCOUNTER: ICD-10-CM

## 2022-12-05 DIAGNOSIS — I10 ESSENTIAL HYPERTENSION: ICD-10-CM

## 2022-12-05 DIAGNOSIS — E78.2 MIXED HYPERLIPIDEMIA: ICD-10-CM

## 2022-12-05 LAB
ALBUMIN SERPL-MCNC: 4 G/DL (ref 3.5–5.2)
ALP BLD-CCNC: 104 U/L (ref 35–104)
ALT SERPL-CCNC: 69 U/L (ref 5–33)
ANION GAP SERPL CALCULATED.3IONS-SCNC: 16 MMOL/L (ref 7–19)
AST SERPL-CCNC: 60 U/L (ref 5–32)
BILIRUB SERPL-MCNC: 0.3 MG/DL (ref 0.2–1.2)
BUN BLDV-MCNC: 10 MG/DL (ref 8–23)
CALCIUM SERPL-MCNC: 9.3 MG/DL (ref 8.8–10.2)
CHLORIDE BLD-SCNC: 101 MMOL/L (ref 98–111)
CHOLESTEROL, FASTING: 164 MG/DL (ref 160–199)
CO2: 23 MMOL/L (ref 22–29)
CREAT SERPL-MCNC: 0.7 MG/DL (ref 0.5–0.9)
GFR SERPL CREATININE-BSD FRML MDRD: >60 ML/MIN/{1.73_M2}
GLUCOSE BLD-MCNC: 192 MG/DL (ref 74–109)
HDLC SERPL-MCNC: 45 MG/DL (ref 65–121)
LDL CHOLESTEROL CALCULATED: 88 MG/DL
MAGNESIUM: 2 MG/DL (ref 1.6–2.4)
POTASSIUM SERPL-SCNC: 4.6 MMOL/L (ref 3.5–5)
SODIUM BLD-SCNC: 140 MMOL/L (ref 136–145)
TOTAL PROTEIN: 7.2 G/DL (ref 6.6–8.7)
TRIGLYCERIDE, FASTING: 157 MG/DL (ref 0–149)
VITAMIN D 25-HYDROXY: 76.6 NG/ML

## 2022-12-07 RX ORDER — ANASTROZOLE 1 MG/1
1 TABLET ORAL DAILY
Qty: 90 TABLET | Refills: 3 | Status: CANCELLED | OUTPATIENT
Start: 2022-12-07

## 2022-12-08 ENCOUNTER — TELEPHONE (OUTPATIENT)
Dept: VASCULAR SURGERY | Facility: CLINIC | Age: 77
End: 2022-12-08

## 2022-12-12 ENCOUNTER — TELEPHONE (OUTPATIENT)
Dept: ONCOLOGY | Facility: CLINIC | Age: 77
End: 2022-12-12

## 2022-12-12 RX ORDER — ANASTROZOLE 1 MG/1
1 TABLET ORAL DAILY
Qty: 30 TABLET | Refills: 0 | OUTPATIENT
Start: 2022-12-12

## 2022-12-13 ENCOUNTER — TELEPHONE (OUTPATIENT)
Dept: PRIMARY CARE CLINIC | Age: 77
End: 2022-12-13

## 2022-12-14 RX ORDER — ANASTROZOLE 1 MG/1
1 TABLET ORAL DAILY
Qty: 30 TABLET | Refills: 0 | Status: SHIPPED | OUTPATIENT
Start: 2022-12-14

## 2022-12-23 ENCOUNTER — TELEPHONE (OUTPATIENT)
Dept: PRIMARY CARE CLINIC | Age: 77
End: 2022-12-23

## 2022-12-23 NOTE — TELEPHONE ENCOUNTER
I left the patient a VM asking her to call the office for an appointment to discuss her hip pain. I was unable to find the paper work in the patient's chart.

## 2022-12-23 NOTE — TELEPHONE ENCOUNTER
I called and left a VM asking the patient to call the office to schedule an appointment to come in and discuss her left hip pain with Dr. Walda Osgood.

## 2022-12-23 NOTE — TELEPHONE ENCOUNTER
----- Message from Maciel Kapoor sent at 12/20/2022 10:34 AM CST -----  Subject: Referral Request    Reason for referral request? Patient is wanting to get an x-ray on her   left hip. Patient states it started hurting a little over a week since   around 12/12/22. Please advise patient. She would like to have the x-ray   at [facility]. Patient would like a call back once the order is   placed for the x-ray late afternoon. Thanks. Provider patient wants to be referred to(if known):     Provider Phone Number(if known):     Additional Information for Provider?   ---------------------------------------------------------------------------  --------------  [address] [place] [address] [facility]    [phone]; OK to leave message on voicemail  ---------------------------------------------------------------------------  --------------

## 2022-12-23 NOTE — TELEPHONE ENCOUNTER
----- Message from [place] sent at 12/21/2022 11:38 AM CST -----  Subject: Message to Provider    QUESTIONS  Information for Provider? Patient wants to know if an x-ray should be done   on her hip.  ---------------------------------------------------------------------------  --------------  Zora Bosworth INFO  [phone]; OK to leave message on voicemail  ---------------------------------------------------------------------------  --------------  SCRIPT ANSWERS  Relationship to Patient?  Self

## 2022-12-23 NOTE — TELEPHONE ENCOUNTER
----- Message from [place] sent at 12/21/2022 11:36 AM CST -----  Subject: Message to Provider    QUESTIONS  Information for Provider? Patient called wanting to know if the doctor   signed off on document for transportation.   ---------------------------------------------------------------------------  --------------  [id number] [employer]  [phone]; OK to leave message on voicemail  ---------------------------------------------------------------------------  --------------  SCRIPT ANSWERS  Relationship to Patient?  Self

## 2022-12-23 NOTE — TELEPHONE ENCOUNTER
VM left for the patient letting her know we need to schedule her an appointment. This is a duplicate message.

## 2022-12-23 NOTE — TELEPHONE ENCOUNTER
----- Message from [facility] sent at 12/21/2022 11:30 AM CST -----  Subject: Message to Provider    QUESTIONS  Information for Provider? pt would like the office to return her call. She   is asking if a referral has been sent for her Hip, and she needs to find   out if paperwork has been faxed for her transportation. Call Buck Pachecodrickson   ok to speak to her   ---------------------------------------------------------------------------  --------------  [id number] [employer]  [phone]; Do not leave any message, patient will call back for answer  ---------------------------------------------------------------------------  --------------  SCRIPT ANSWERS  Relationship to Patient?  Self

## 2022-12-23 NOTE — TELEPHONE ENCOUNTER
----- Message from Birdie Ronquillo sent at 12/21/2022 11:30 AM CST -----  Subject: Message to Provider    QUESTIONS  Information for Provider? pt would like the office to return her call. She   is asking if a referral has been sent for her Hip, and she needs to find   out if paperwork has been faxed for her transportation. Call Miesha Roa   ok to speak to her   ---------------------------------------------------------------------------  --------------  CALL BACK INFO  [phone]; Do not leave any message, patient will call back for answer  ---------------------------------------------------------------------------  --------------  SCRIPT ANSWERS  Relationship to Patient? Self

## 2022-12-23 NOTE — TELEPHONE ENCOUNTER
----- Message from [facility] sent at 12/21/2022 11:30 AM CST -----  Subject: Message to Provider    QUESTIONS  Information for Provider? pt would like the office to return her call. She   is asking if a referral has been sent for her Hip, and she needs to find   out if paperwork has been faxed for her transportation. Call Pradeep oshea to speak to her   ---------------------------------------------------------------------------  --------------  [id number] [employer]  [phone]; Do not leave any message, patient will call back for answer  ---------------------------------------------------------------------------  --------------  SCRIPT ANSWERS  Relationship to Patient?  Self

## 2022-12-23 NOTE — TELEPHONE ENCOUNTER
I left a VM for the patient asking her to call the office for an appointment to check on her hip. I was unable to see paper work in the patient's chart on the transportation.

## 2022-12-28 ENCOUNTER — OFFICE VISIT (OUTPATIENT)
Age: 77
End: 2022-12-28
Payer: MEDICARE

## 2022-12-28 ENCOUNTER — TELEPHONE (OUTPATIENT)
Dept: PODIATRY | Facility: CLINIC | Age: 77
End: 2022-12-28

## 2022-12-28 VITALS
HEIGHT: 62 IN | SYSTOLIC BLOOD PRESSURE: 122 MMHG | DIASTOLIC BLOOD PRESSURE: 64 MMHG | WEIGHT: 263 LBS | BODY MASS INDEX: 48.4 KG/M2 | OXYGEN SATURATION: 95 % | RESPIRATION RATE: 20 BRPM | TEMPERATURE: 97.7 F | HEART RATE: 93 BPM

## 2022-12-28 DIAGNOSIS — J44.1 COPD EXACERBATION (HCC): Primary | ICD-10-CM

## 2022-12-28 DIAGNOSIS — J02.9 SORE THROAT: ICD-10-CM

## 2022-12-28 DIAGNOSIS — R05.1 ACUTE COUGH: ICD-10-CM

## 2022-12-28 DIAGNOSIS — E11.8 TYPE 2 DIABETES MELLITUS WITH COMPLICATION (HCC): ICD-10-CM

## 2022-12-28 LAB
INFLUENZA A ANTIBODY: NORMAL
INFLUENZA B ANTIBODY: NORMAL
S PYO AG THROAT QL: NORMAL

## 2022-12-28 PROCEDURE — 87880 STREP A ASSAY W/OPTIC: CPT | Performed by: PHYSICIAN ASSISTANT

## 2022-12-28 PROCEDURE — 1123F ACP DISCUSS/DSCN MKR DOCD: CPT | Performed by: PHYSICIAN ASSISTANT

## 2022-12-28 PROCEDURE — 3078F DIAST BP <80 MM HG: CPT | Performed by: PHYSICIAN ASSISTANT

## 2022-12-28 PROCEDURE — 3074F SYST BP LT 130 MM HG: CPT | Performed by: PHYSICIAN ASSISTANT

## 2022-12-28 PROCEDURE — 99213 OFFICE O/P EST LOW 20 MIN: CPT | Performed by: PHYSICIAN ASSISTANT

## 2022-12-28 PROCEDURE — 87804 INFLUENZA ASSAY W/OPTIC: CPT | Performed by: PHYSICIAN ASSISTANT

## 2022-12-28 RX ORDER — AZITHROMYCIN 250 MG/1
250 TABLET, FILM COATED ORAL SEE ADMIN INSTRUCTIONS
Qty: 6 TABLET | Refills: 0 | Status: SHIPPED | OUTPATIENT
Start: 2022-12-28 | End: 2023-01-02

## 2022-12-28 RX ORDER — INSULIN ASPART 100 [IU]/ML
INJECTION, SOLUTION INTRAVENOUS; SUBCUTANEOUS
Qty: 135 ML | Refills: 2 | Status: SHIPPED | OUTPATIENT
Start: 2022-12-28 | End: 2023-03-25

## 2022-12-28 ASSESSMENT — ENCOUNTER SYMPTOMS
EYE PAIN: 0
COUGH: 1
ABDOMINAL PAIN: 0
SORE THROAT: 1
SINUS PAIN: 0
DIARRHEA: 0
SINUS PRESSURE: 0
NAUSEA: 0
ALLERGIC/IMMUNOLOGIC NEGATIVE: 1
WHEEZING: 1
SHORTNESS OF BREATH: 1
VOMITING: 0

## 2022-12-28 NOTE — TELEPHONE ENCOUNTER
Al Yamiladenae called to request a refill on her medication.       Last office visit : 11/1/2022   Next office visit : Visit date not found     Requested Prescriptions     Pending Prescriptions Disp Refills    insulin aspart (NOVOLOG FLEXPEN) 100 UNIT/ML injection pen 135 mL 2     Sig: INJECT 60 UNITS with breakfast, Inject 80 units with dinner            Foreign Santos MA

## 2022-12-28 NOTE — PATIENT INSTRUCTIONS
Complete full course of antibiotics as directed. Continue rest, increase hydration, take tylenol/ibuprofen as needed for fever/pain. Take intentional deep breaths and continue to cough up sputum. Return to clinic or follow up with PCP if you worsen or fail to improve. Patient verbalizes understanding and agrees with treatment plan.

## 2022-12-28 NOTE — PROGRESS NOTES
[address]  [address] [address] [place] [address]  Dept: [phone]  Dept Fax: [phone]  Loc: [phone]    Darshana Lino is a 78 y.o. female who presents today for her medical conditions/complaints as noted below. Darshana Lino is complaining of Pharyngitis (/), Cough, Congestion, and Headache    HPI:   Pharyngitis  This is a new problem. The current episode started in the past 7 days. The problem occurs constantly. The problem has been unchanged. Associated symptoms include congestion, coughing, fatigue, headaches and a sore throat. Pertinent negatives include no abdominal pain, chest pain, chills, fever, myalgias, nausea, rash, vomiting or weakness. Cough  This is a new problem. Episode onset: 2 weeks ago. The problem has been gradually worsening. The problem occurs every few minutes. The cough is Productive of sputum. Associated symptoms include headaches, a sore throat, shortness of breath and wheezing. Pertinent negatives include no chest pain, chills, fever, myalgias, postnasal drip or rash. She has tried nothing for the symptoms. Her past medical history is significant for asthma and COPD.          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    Breast CA (HCC)     Left breast Nodules removed Took radiation    Breast cancer (HCC)     Chronic back pain     Chronic kidney disease     Overactive bladder     COPD (chronic obstructive pulmonary disease) (HCC)     x few years Scarring on lungs 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    Diabetes mellitus ([facility] [address]     On insulin x 10 years    Fibromyalgia     Fibromyalgia     for 20 years    GERD (gastroesophageal reflux disease)     History of blood transfusion     ?when    History of therapeutic radiation Hyperlipidemia     High cholesterol    Hypertension     On medications for 2 years    Joint pain 2/2/2016    Liver disease     Has fatty liver    Morbid obesity ([facility] [address]     Multiple food allergies     Neuropathic pain     Neuropathy     Neuropathy involving both lower extremities 2/2/2016    Obstructive sleep apnea     AHI: 68.1 per PSG, 5/2018    Osteoarthritis     Other disorders of kidney and ureter in diseases classified elsewhere     Pneumonia     Postmenopausal osteoporosis     Psychiatric problem     Restless leg syndrome     S/P lumpectomy, left breast 8/19/2015 8/4/2015    Sleep apnea     Type II or unspecified type diabetes mellitus with neurological manifestations, uncontrolled(250.62)        Past Surgical History:   Procedure Laterality Date    BREAST SURGERY Left 8/4/2015    left partial mastectomy on 8/4/15    EYE SURGERY      EYE SURGERY Bilateral 04/02/2017    Dr. Henry Mota      broke L ankle due to osteoporosis, has hardware in    FRACTURE SURGERY      Left ankle     HYSTERECTOMY (CERVIX STATUS UNKNOWN)      TN DRAIN SKIN ABSCESS COMPLIC N/A 2/47/2704    ABDOMINAL WALL ABSCESS INCISION AND DRAINAGE performed by Yvrose Powers MD at [address]  4/8/2016    Dr Gaudencio Cooley, (-) H Pylori    WRIST SURGERY         Family History   Problem Relation Age of Onset    High Blood Pressure Father     Heart Disease Father     Diabetes Father     High Blood Pressure Sister     Diabetes Sister     Ovarian Cancer Sister 43    High Blood Pressure Brother     Colon Cancer Neg Hx     Colon Polyps Neg Hx     Esophageal Cancer Neg Hx     Liver Cancer Neg Hx     Liver Disease Neg Hx     Rectal Cancer Neg Hx     Stomach Cancer Neg Hx        Social History     Tobacco Use    Smoking status: Never    Smokeless tobacco: Never   Substance Use Topics    Alcohol use:  Yes     Alcohol/week: 2.0 standard drinks     Types: 1 Glasses of wine, 1 Cans of beer per week     Comment: occ        Current Outpatient Medications   Medication Sig Dispense Refill    azithromycin (ZITHROMAX) 250 MG tablet Take 1 tablet by mouth See Admin Instructions for 5 days 500mg on day 1 followed by 250mg on days 2 - 5 6 tablet 0    Insulin Degludec (TRESIBA FLEXTOUCH) 200 UNIT/ML SOPN INJECT 160 UNITS SUBCUTANEOUSLY NIGHTLY. ENSURE FASTING BLOOD SUGARS ARE . (BULK) 18 mL 5    DULoxetine (CYMBALTA) 30 MG extended release capsule TAKE 1 CAPSULE BY MOUTH DAILY 30 capsule 5    magnesium oxide (MAG-OX) 400 MG tablet Take 1 tablet by mouth daily 90 tablet 3    levocetirizine (XYZAL) 5 MG tablet TAKE 1 TABLET BY MOUTH NIGHTLY 30 tablet 2    albuterol sulfate HFA (VENTOLIN HFA) 108 (90 Base) MCG/ACT inhaler Inhale 2 puffs into the lungs 4 times daily as needed for Wheezing 54 g 0    naproxen (NAPROSYN) 500 MG tablet Take 1 tablet by mouth 2 times daily as needed for Pain 30 tablet 0    benzocaine (ORAJEL) 10 % mucosal gel Take by mouth as needed. 7 g 0    vitamin D (ERGOCALCIFEROL) 1.25 MG (58159 UT) CAPS capsule TAKE 1 CAPSULE BY MOUTH ONCE A WEEK 4 capsule 2    baclofen (LIORESAL) 10 MG tablet TAKE ONE TABLET BY MOUTH THREE TIMES DAILY (VIAL) 90 tablet 2    insulin aspart (NOVOLOG FLEXPEN) 100 UNIT/ML injection pen INJECT 60 UNITS with breakfast, Inject 80 units with dinner 45 mL 2    Dulaglutide (TRULICITY) 3 QV/8.4TA SOPN Inject 3 mg into the skin once a week 4 Adjustable Dose Pre-filled Pen Syringe 5    nystatin 841777 UNIT/GM powder APPLY TO AFFECTED AREA 3 TIMES DAILY.  30 g 2    Insulin Pen Needle (B-D UF III MINI PEN NEEDLES) 31G X 5 MM MISC **CLARIFY** USE AS DIRECTED (BULK) 100 each 1    INSULIN SYRINGE .5CC/29G (ULTICARE INSULIN SYRINGE) 29G X 1/2\" 0.5 ML MISC **NEED QTY UPDATED TO FULL BOX** USE WITH EACH DOSE OF NOVOLOG THREE TIMES DAILY (BULK) 90 each 1    citalopram (CELEXA) 20 MG tablet TAKE 1 TABLET BY MOUTH ONCE DAILY 90 tablet 1    chlorhexidine (PERIDEX) 0.12 % solution Take 15 mLs by mouth in the morning and 15 mLs before bedtime. metoprolol succinate (TOPROL XL) 50 MG extended release tablet TAKE 1 TABLET BY MOUTH ONCE DAILY FOR BLOOD PRESSURE AND HEART PROTECTION 30 tablet 1    losartan (COZAAR) 100 MG tablet TAKE 1 TABLET BY MOUTH ONCE DAILY FOR BLOOD PRESSURE 30 tablet 1    Blood Glucose Monitoring Suppl ([address]) w/Device KIT USE TO CHECK BLOOD GLUCOSE DAILY. 1 kit 0    blood glucose test strips (ONETOUCH VERIO) strip USE TO TEST BLOOD GLUCOSE 4 TIMES DAILY. 100 each 6    furosemide (LASIX) 20 MG tablet Take 1 tablet by mouth daily 90 tablet 2    empagliflozin (JARDIANCE) 25 MG tablet Take 1 tablet by mouth daily 90 tablet 2    metFORMIN (GLUCOPHAGE) 1000 MG tablet Take 1 tablet by mouth 2 times daily (with meals) 180 tablet 2    omeprazole (PRILOSEC) 20 MG delayed release capsule TAKE (1) CAPSULE BY MOUTH TWICE DAILY AS NEEDED. 180 capsule 2    oxybutynin (DITROPAN-XL) 10 MG extended release tablet TAKE 1 TABLET BY MOUTH ONCE DAILY 90 tablet 2    rosuvastatin (CRESTOR) 10 MG tablet TAKE 1 TABLET BY MOUTH ONCE DAILY 90 tablet 2    montelukast (SINGULAIR) 10 MG tablet TAKE 1 TABLET BY MOUTH AT NIGHT 90 tablet 2    loratadine (CLARITIN) 10 MG tablet TAKE 1 TABLET BY MOUTH ONCE DAILY. 30 tablet 1    Lancets MISC 1 each by Does not apply route 4 times daily 600 each 1    ULTICARE MINI PEN NEEDLES 31G X 6 MM MISC USE 2 TIMES A  each 0    clobetasol (TEMOVATE) 0.05 % ointment Apply topically 2 times daily. For 10 days 1 Tube 0    aspirin 81 MG chewable tablet Take 1 tablet by mouth daily 30 tablet 3    HYDROcodone-acetaminophen (NORCO)  MG per tablet Take 1 tablet by mouth Daily with lunch. .      Misc. Devices (ROLLER WALKER) MISC 1 each by Does not apply route daily Pt states that she falls at least once a month up to 3 times a month.  1 each 0    mometasone-formoterol (DULERA) 200-5 MCG/ACT inhaler Inhale 2 puffs into the lungs every 12 hours anastrozole (ARIMIDEX) 1 MG tablet Take 1 mg by mouth daily      Incontinence Supply Disposable (POISE PANTILINERS) PADS Use pads as needed daily for urine leakage. 1 each 11    gabapentin (NEURONTIN) 300 MG capsule TAKE ONE CAPSULE BY MOUTH THREE TIMES DAILY (VIAL) 180 capsule 5     No current facility-administered medications for this visit. Allergies   Allergen Reactions    Pcn [Penicillins] Hives and Itching     Patient states she can tolerate ampicillin and amoxicillin however. Cefdinir        Health Maintenance   Topic Date Due    Colorectal Cancer Screen  Never done    Shingles vaccine (1 of 2) Never done    Diabetic retinal exam  08/12/2016    Diabetic foot exam  03/05/2021    COVID-19 Vaccine (3 - Booster for Birtha Asper series) 11/28/2021    Annual Wellness Visit (AWV)  06/17/2022    Depression Screen  09/16/2023    A1C test (Diabetic or Prediabetic)  11/01/2023    Lipids  12/05/2023    DTaP/Tdap/Td vaccine (2 - Td or Tdap) 12/31/2024    DEXA (modify frequency per FRAX score)  Completed    Flu vaccine  Completed    Pneumococcal 65+ years Vaccine  Completed    Hepatitis C screen  Completed    Hepatitis A vaccine  Aged Out    Hib vaccine  Aged Out    Meningococcal (ACWY) vaccine  Aged Out       Subjective:   Review of Systems   Constitutional:  Positive for fatigue. Negative for chills and fever. HENT:  Positive for congestion and sore throat. Negative for postnasal drip, sinus pressure and sinus pain. Eyes:  Negative for pain and visual disturbance. Respiratory:  Positive for cough, shortness of breath and wheezing. Cardiovascular:  Negative for chest pain. Gastrointestinal:  Negative for abdominal pain, diarrhea, nausea and vomiting. Endocrine: Negative for cold intolerance and heat intolerance. Genitourinary:  Negative for frequency, hematuria and urgency. Musculoskeletal:  Negative for myalgias. Skin:  Negative for rash. Allergic/Immunologic: Negative.     Neurological: Positive for headaches. Negative for syncope, weakness and light-headedness. Hematological: Negative. Psychiatric/Behavioral: Negative. Objective    Physical Exam  Vitals and nursing note reviewed. Constitutional:       General: She is not in acute distress. Appearance: Normal appearance. She is obese. HENT:      Head: Normocephalic and atraumatic. Right Ear: Tympanic membrane, ear canal and external ear normal.      Left Ear: Tympanic membrane, ear canal and external ear normal.      Nose: Nose normal.      Mouth/Throat:      Mouth: Mucous membranes are moist.      Pharynx: Oropharynx is clear. Posterior oropharyngeal erythema present. Eyes:      Extraocular Movements: Extraocular movements intact. Conjunctiva/sclera: Conjunctivae normal.   Cardiovascular:      Rate and Rhythm: Normal rate and regular rhythm. Pulses: Normal pulses. Heart sounds: Normal heart sounds. Pulmonary:      Effort: Pulmonary effort is normal. No respiratory distress. Breath sounds: No stridor. Wheezing present. No rhonchi or rales. Chest:      Chest wall: No tenderness. Abdominal:      General: Abdomen is flat. Bowel sounds are normal. There is no distension. Palpations: Abdomen is soft. Tenderness: There is no abdominal tenderness. Musculoskeletal:         General: Normal range of motion. Cervical back: Normal range of motion and neck supple. No tenderness. Skin:     General: Skin is warm and dry. Findings: No erythema. Neurological:      General: No focal deficit present. Mental Status: She is alert and oriented to person, place, and time. Psychiatric:         Mood and Affect: Mood normal.         Behavior: Behavior normal.       /64   Pulse 93   Temp 97.7 °F (36.5 °C)   Resp 20   Ht 5' 2\" (1.575 m)   Wt 263 lb (119.3 kg)   LMP  (LMP Unknown)   SpO2 95%   BMI 48.10 kg/m²     Assessment         Diagnosis Orders   1.  COPD exacerbation ([address] azithromycin (ZITHROMAX) 250 MG tablet      2. Sore throat  POCT rapid strep A      3. Acute cough  POCT Influenza A/B    POCT rapid strep A          Plan   Complete full course of antibiotics as directed. Continue rest, increase hydration, take tylenol/ibuprofen as needed for fever/pain. Take intentional deep breaths and continue to cough up sputum. Return to clinic or follow up with PCP if you worsen or fail to improve. Patient verbalizes understanding and agrees with treatment plan. Orders Placed This Encounter   Procedures    POCT Influenza A/B    POCT rapid strep A       Results for orders placed or performed in visit on 12/28/22   POCT Influenza A/B   Result Value Ref Range    Influenza A Ab neg     Influenza B Ab neg    POCT rapid strep A   Result Value Ref Range    Strep A Ag None Detected None Detected       Orders Placed This Encounter   Medications    azithromycin (ZITHROMAX) 250 MG tablet     Sig: Take 1 tablet by mouth See Admin Instructions for 5 days 500mg on day 1 followed by 250mg on days 2 - 5     Dispense:  6 tablet     Refill:  0      New Prescriptions    AZITHROMYCIN (ZITHROMAX) 250 MG TABLET    Take 1 tablet by mouth See Admin Instructions for 5 days 500mg on day 1 followed by 250mg on days 2 - 5        Return if symptoms worsen or fail to improve. Discussed use, benefits, and side effects of any prescribed medications. All patient questions were answered. Patient voiced understanding of care plan. Patient was given educational materials - see patient instructions below. Patient Instructions   Complete full course of antibiotics as directed. Continue rest, increase hydration, take tylenol/ibuprofen as needed for fever/pain. Take intentional deep breaths and continue to cough up sputum. Return to clinic or follow up with PCP if you worsen or fail to improve. Patient verbalizes understanding and agrees with treatment plan.       Electronically signed by Breana Roberts PA-C on 12/28/2022 at 2:34 PM

## 2022-12-29 ENCOUNTER — OFFICE VISIT (OUTPATIENT)
Dept: PODIATRY | Facility: CLINIC | Age: 77
End: 2022-12-29

## 2022-12-29 VITALS
SYSTOLIC BLOOD PRESSURE: 148 MMHG | DIASTOLIC BLOOD PRESSURE: 80 MMHG | OXYGEN SATURATION: 98 % | HEART RATE: 88 BPM | WEIGHT: 263 LBS | BODY MASS INDEX: 48.4 KG/M2 | HEIGHT: 62 IN

## 2022-12-29 DIAGNOSIS — R26.9 GAIT ABNORMALITY: ICD-10-CM

## 2022-12-29 DIAGNOSIS — M20.12 VALGUS DEFORMITY OF BOTH GREAT TOES: ICD-10-CM

## 2022-12-29 DIAGNOSIS — M20.42 HAMMERTOE, BILATERAL: ICD-10-CM

## 2022-12-29 DIAGNOSIS — E11.42 TYPE 2 DIABETES MELLITUS WITH DIABETIC POLYNEUROPATHY, WITH LONG-TERM CURRENT USE OF INSULIN: ICD-10-CM

## 2022-12-29 DIAGNOSIS — Z17.0 MALIGNANT NEOPLASM OF UPPER-OUTER QUADRANT OF LEFT BREAST IN FEMALE, ESTROGEN RECEPTOR POSITIVE: Primary | ICD-10-CM

## 2022-12-29 DIAGNOSIS — M20.41 HAMMERTOE, BILATERAL: ICD-10-CM

## 2022-12-29 DIAGNOSIS — Z79.4 TYPE 2 DIABETES MELLITUS WITH DIABETIC POLYNEUROPATHY, WITH LONG-TERM CURRENT USE OF INSULIN: ICD-10-CM

## 2022-12-29 DIAGNOSIS — M79.672 FOOT PAIN, BILATERAL: ICD-10-CM

## 2022-12-29 DIAGNOSIS — I89.0 LYMPHEDEMA: ICD-10-CM

## 2022-12-29 DIAGNOSIS — M20.11 VALGUS DEFORMITY OF BOTH GREAT TOES: ICD-10-CM

## 2022-12-29 DIAGNOSIS — M79.671 FOOT PAIN, BILATERAL: ICD-10-CM

## 2022-12-29 DIAGNOSIS — B35.1 ONYCHOMYCOSIS: Primary | ICD-10-CM

## 2022-12-29 DIAGNOSIS — C50.412 MALIGNANT NEOPLASM OF UPPER-OUTER QUADRANT OF LEFT BREAST IN FEMALE, ESTROGEN RECEPTOR POSITIVE: Primary | ICD-10-CM

## 2022-12-29 PROCEDURE — 11721 DEBRIDE NAIL 6 OR MORE: CPT | Performed by: NURSE PRACTITIONER

## 2022-12-29 RX ORDER — AZITHROMYCIN 250 MG/1
TABLET, FILM COATED ORAL
COMMUNITY
Start: 2022-12-28

## 2023-01-06 ENCOUNTER — TELEPHONE (OUTPATIENT)
Dept: PRIMARY CARE CLINIC | Age: 78
End: 2023-01-06

## 2023-01-06 DIAGNOSIS — E11.8 TYPE 2 DIABETES MELLITUS WITH COMPLICATION (HCC): ICD-10-CM

## 2023-01-06 DIAGNOSIS — M54.9 MUSCULOSKELETAL BACK PAIN: ICD-10-CM

## 2023-01-06 RX ORDER — BACLOFEN 10 MG/1
TABLET ORAL
Qty: 90 TABLET | Refills: 2 | Status: SHIPPED | OUTPATIENT
Start: 2023-01-06

## 2023-01-06 RX ORDER — DULAGLUTIDE 3 MG/.5ML
3 INJECTION, SOLUTION SUBCUTANEOUS WEEKLY
Qty: 4 ADJUSTABLE DOSE PRE-FILLED PEN SYRINGE | Refills: 5 | Status: SHIPPED | OUTPATIENT
Start: 2023-01-06

## 2023-01-06 NOTE — TELEPHONE ENCOUNTER
Called and spoke with patient and advised her that the form was sent, and that I received success on the confirmation to fax number [phone]. That fax number was provided to me when I called to make sure patient would be able to ride bus to make her appointment time. She stated they are wanting to charge her $7.00 a mile and she could not afford that. She asked if I would be able to fax again to both fax number.

## 2023-01-06 NOTE — TELEPHONE ENCOUNTER
Ania Mejia called to request a refill on her medication.       Last office visit : 11/1/2022   Next office visit : Visit date not found     Requested Prescriptions     Pending Prescriptions Disp Refills    Dulaglutide (TRULICITY) 3 VR/7.3CE SOPN 4 Adjustable Dose Pre-filled Pen Syringe 5     Sig: Inject 3 mg into the skin once a week    baclofen (LIORESAL) 10 MG tablet 90 tablet 2     Sig: TAKE ONE TABLET BY MOUTH THREE TIMES DAILY (VIAL)            Elmer Riley MA

## 2023-01-09 ENCOUNTER — TELEPHONE (OUTPATIENT)
Dept: PRIMARY CARE CLINIC | Age: 78
End: 2023-01-09

## 2023-01-09 NOTE — TELEPHONE ENCOUNTER
Pt is calling to ask the office if she can fax   the paper to [employer]. Also call Mandy Holcomb at [phone]. Fax number is [phone]. Pt said they didn't receive the fax number on December 21st, 2022. Pt cant ride the [email or web address] bus if this isn't solved. Please call pt back so she knows this is done. Pt calling to request a transportation form sent to [address] [phone]. Pt is in need of utilizing the bus.

## 2023-01-09 NOTE — TELEPHONE ENCOUNTER
Patients daughter Luke Preston come in office and picked up the signed paperwork and the 3 fax confirmations that were scanned into patients chart. I also attempted to call patient, was unable to reach her, I did leave voicemail letting her know her daughter had picked up the paperwork and I also included the fax confirmations. I let patient know that were scanned into her chart here and advised if she were to have any questions to give us a call back and provided office telephone number.

## 2023-01-09 NOTE — TELEPHONE ENCOUNTER
----- Message from Jc Reza sent at 1/9/2023 10:50 AM CST -----  Subject: Message to Provider    QUESTIONS  Information for Provider? Pt is calling in to speak to [facility] about   transportation. Pt is going to send her daughter over to pick of the   transportation paperwork today and she would like to have 2 papers. Pt   would like a call back. ---------------------------------------------------------------------------  --------------  Daryle Haver [facility]  [phone]; Do not leave any message, patient will call back for answer  ---------------------------------------------------------------------------  --------------  SCRIPT ANSWERS  Relationship to Patient?  Self

## 2023-01-16 RX ORDER — ANASTROZOLE 1 MG/1
1 TABLET ORAL DAILY
Refills: 0 | OUTPATIENT
Start: 2023-01-16

## 2023-01-17 RX ORDER — ANASTROZOLE 1 MG/1
1 TABLET ORAL DAILY
Refills: 0 | OUTPATIENT
Start: 2023-01-17

## 2023-01-19 RX ORDER — ANASTROZOLE 1 MG/1
1 TABLET ORAL DAILY
Qty: 30 TABLET | Refills: 3 | Status: SHIPPED | OUTPATIENT
Start: 2023-01-19

## 2023-01-19 NOTE — TELEPHONE ENCOUNTER
----- Message from Erika Castañeda sent at 1/18/2023  1:03 PM CST -----  Subject: Referral Request    Reason for referral request? Home health aide for 2 days a week. Provider patient wants to be referred to(if known):     Provider Phone Number(if known): Additional Information for Provider?   ---------------------------------------------------------------------------  --------------  [address] [employer]    [phone];  Do not leave any message, patient will call back for answer  ---------------------------------------------------------------------------  --------------

## 2023-01-19 NOTE — TELEPHONE ENCOUNTER
----- Message from Rachael Heart sent at 1/18/2023  1:10 PM CST -----  Subject: Message to Provider    QUESTIONS  Information for Provider? The patient is requesting a refill on her   anastrozole (ARIMIDEX) 1 MG tablet, The patient stated that this needs to   be sent to [facility] in [place].   ---------------------------------------------------------------------------  --------------  [id number]  [phone]; Do not leave any message, patient will call back for answer  ---------------------------------------------------------------------------  --------------  SCRIPT ANSWERS  Relationship to Patient?  Self

## 2023-01-19 NOTE — TELEPHONE ENCOUNTER
Becky Calles called to request a refill on her medication.       Last office visit : 11/1/2022   Next office visit : Visit date not found     Requested Prescriptions     Pending Prescriptions Disp Refills    anastrozole (ARIMIDEX) 1 MG tablet 30 tablet 3     Sig: Take 1 tablet by mouth daily            Gretchen Ventura MA

## 2023-02-07 ENCOUNTER — OFFICE VISIT (OUTPATIENT)
Dept: PRIMARY CARE CLINIC | Age: 78
End: 2023-02-07
Payer: MEDICARE

## 2023-02-07 VITALS
SYSTOLIC BLOOD PRESSURE: 120 MMHG | OXYGEN SATURATION: 88 % | HEART RATE: 81 BPM | DIASTOLIC BLOOD PRESSURE: 64 MMHG | HEIGHT: 62 IN | BODY MASS INDEX: 50.02 KG/M2 | WEIGHT: 271.8 LBS | TEMPERATURE: 97.7 F

## 2023-02-07 DIAGNOSIS — E66.01 MORBID OBESITY (HCC): ICD-10-CM

## 2023-02-07 DIAGNOSIS — I10 ESSENTIAL HYPERTENSION: ICD-10-CM

## 2023-02-07 DIAGNOSIS — E11.8 TYPE 2 DIABETES MELLITUS WITH COMPLICATION (HCC): Primary | ICD-10-CM

## 2023-02-07 DIAGNOSIS — Z79.899 POLYPHARMACY: ICD-10-CM

## 2023-02-07 DIAGNOSIS — G30.9 MODERATE ALZHEIMER'S DEMENTIA, UNSPECIFIED TIMING OF DEMENTIA ONSET, UNSPECIFIED WHETHER BEHAVIORAL, PSYCHOTIC, OR MOOD DISTURBANCE OR ANXIETY (HCC): ICD-10-CM

## 2023-02-07 DIAGNOSIS — F02.B0 MODERATE ALZHEIMER'S DEMENTIA, UNSPECIFIED TIMING OF DEMENTIA ONSET, UNSPECIFIED WHETHER BEHAVIORAL, PSYCHOTIC, OR MOOD DISTURBANCE OR ANXIETY (HCC): ICD-10-CM

## 2023-02-07 LAB — HBA1C MFR BLD: 7.6 %

## 2023-02-07 PROCEDURE — 3074F SYST BP LT 130 MM HG: CPT | Performed by: FAMILY MEDICINE

## 2023-02-07 PROCEDURE — 3078F DIAST BP <80 MM HG: CPT | Performed by: FAMILY MEDICINE

## 2023-02-07 PROCEDURE — 99215 OFFICE O/P EST HI 40 MIN: CPT | Performed by: FAMILY MEDICINE

## 2023-02-07 PROCEDURE — 83036 HEMOGLOBIN GLYCOSYLATED A1C: CPT | Performed by: FAMILY MEDICINE

## 2023-02-07 PROCEDURE — 3051F HG A1C>EQUAL 7.0%<8.0%: CPT | Performed by: FAMILY MEDICINE

## 2023-02-07 PROCEDURE — 1123F ACP DISCUSS/DSCN MKR DOCD: CPT | Performed by: FAMILY MEDICINE

## 2023-02-07 RX ORDER — ERGOCALCIFEROL 1.25 MG/1
CAPSULE ORAL
Qty: 4 CAPSULE | Refills: 2 | Status: SHIPPED | OUTPATIENT
Start: 2023-02-07 | End: 2023-03-31

## 2023-02-07 ASSESSMENT — PATIENT HEALTH QUESTIONNAIRE - PHQ9
SUM OF ALL RESPONSES TO PHQ9 QUESTIONS 1 & 2: 0
SUM OF ALL RESPONSES TO PHQ QUESTIONS 1-9: 0
1. LITTLE INTEREST OR PLEASURE IN DOING THINGS: 0
SUM OF ALL RESPONSES TO PHQ QUESTIONS 1-9: 0
2. FEELING DOWN, DEPRESSED OR HOPELESS: 0

## 2023-02-07 NOTE — PROGRESS NOTES
[address] [facility]  [address] [address] [address]  [address] [address]  Dept: [phone]  Dept Fax: [phone]  Loc: [phone]      Subjective:     Chief Complaint   Patient presents with    Follow-up       HPI:  Terrell Atkins is a 78 y.o. female presents today to establish as a new patient. She is accompanied by her 2 daughter - one of them lives with the patient and the other one is the pt's MARIA LUISA  Both her daughters are here with multiple concerns:  Pt is a poorly controlled insulin dependent diabetic. Daughter states she does not take her medications (specifically her insulin) as directed. She does not follow a diabetic diet. She eats candies, chocolates and ice cream every night  Daughter claims pt is a hoarder and shows pictures of boxes lining up the walls of her home. They said there is not enough room to move around and if for some reason, they had to call EMS, they will not be able to bring in a gurney bed. The contents of the boxes are apparently books - some recently ordered from [employer], the others are old books that are already molded. Daughters state pt has declining memory. She repeats herself. She has periods of paranoia and even visual hallucinations. The patient on the other hand insists that she takes her medication as prescribed but also admits that she sometimes do not take her insulin around lunchtime  Her A1c today is 7.6%  She has on multiple medications and as such at risk for adverse drug drug interaction from the polypharmacy. Pt requests to see an endocrinologist. She requests a referral for home care aid.      ROS:   Review of Systems   Unable to perform ROS: Dementia     PMHx:  Past Medical History:   Diagnosis Date    Asthma     Has rescue inhalers    BiPAP (biphasic positive airway pressure) dependence     8cm to 21cm    Breast CA (HCC)     Left breast Nodules removed Took radiation    Chronic back pain 02/02/2016    Chronic kidney disease     Overactive bladder     COPD (chronic obstructive pulmonary disease) (HCC)     x few years Scarring on lungs 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    Diabetes mellitus ([facility]     On insulin x 10 years    Fibromyalgia     for 20 years    GERD (gastroesophageal reflux disease)     History of blood transfusion     ?when    History of therapeutic radiation     Hyperlipidemia     High cholesterol    Hypertension     On medications for 2 years    Liver disease     Has fatty liver    Morbid obesity ([address]     Multiple food allergies     Obstructive sleep apnea     AHI: 68.1 per PSG, 5/2018    Osteoarthritis 02/02/2016    Pneumonia     Postmenopausal osteoporosis     Psychiatric problem     Restless leg syndrome     S/P lumpectomy, left breast 08/19/2015 8/4/2015     Patient Active Problem List   Diagnosis    Postmenopausal osteoporosis    Type 2 diabetes mellitus with complication (HCC)    COPD (chronic obstructive pulmonary disease) (HCC)    Mood disorder (HCC)    Dementia (HCC)    Malignant neoplasm of upper-outer quadrant of female breast ([address]    Joint pain    Neuropathy involving both lower extremities    Back pain    Dyspepsia    Lumbar facet arthropathy    Body mass index 45.0-49.9, adult ([address]    Essential hypertension    Hyperlipidemia    Morbid obesity (HCC)    DARSHAN (obstructive sleep apnea)    BiPAP (biphasic positive airway pressure) dependence    Restless leg syndrome    History of breast cancer    Venous insufficiency of both lower extremities    History of stroke    Uncomplicated asthma    Vitamin D deficiency       PSHx:  Past Surgical History:   Procedure Laterality Date    BREAST SURGERY Left 8/4/2015    left partial mastectomy on 8/4/15    EYE SURGERY      EYE SURGERY Bilateral 04/02/2017    Dr. Emerald Millan      broke L ankle due to osteoporosis, has hardware in    FRACTURE SURGERY      Left ankle     HYSTERECTOMY (CERVIX STATUS UNKNOWN)      NH INCISION & DRAINAGE ABSCESS COMPLICATED/MULTIPLE N/A 7/24/2018    ABDOMINAL WALL ABSCESS INCISION AND DRAINAGE performed by Greg Perea MD at [address] EXTRACTION      UPPER GASTROINTESTINAL ENDOSCOPY  4/8/2016    Dr Josiah Bey, (-) H Pylori    WRIST SURGERY         PFHx:  Family History   Problem Relation Age of Onset    High Blood Pressure Father     Heart Disease Father     Diabetes Father     High Blood Pressure Sister     Diabetes Sister     Ovarian Cancer Sister 43    High Blood Pressure Brother     Colon Cancer Neg Hx     Colon Polyps Neg Hx     Esophageal Cancer Neg Hx     Liver Cancer Neg Hx     Liver Disease Neg Hx     Rectal Cancer Neg Hx     Stomach Cancer Neg Hx        SocialHx:  Social History     Tobacco Use    Smoking status: Never    Smokeless tobacco: Never   Substance Use Topics    Alcohol use: Yes     Alcohol/week: 2.0 standard drinks     Types: 1 Glasses of wine, 1 Cans of beer per week     Comment: occ       Allergies: Allergies   Allergen Reactions    Pcn [Penicillins] Hives and Itching     Patient states she can tolerate ampicillin and amoxicillin however. Cefdinir        Medications:  Current Outpatient Medications   Medication Sig Dispense Refill    vitamin D (ERGOCALCIFEROL) 1.25 MG (13906 UT) CAPS capsule TAKE 1 CAPSULE BY MOUTH ONCE A WEEK 4 capsule 2    anastrozole (ARIMIDEX) 1 MG tablet Take 1 tablet by mouth daily 30 tablet 3    Dulaglutide (TRULICITY) 3 FI/8.1IB SOPN Inject 3 mg into the skin once a week 4 Adjustable Dose Pre-filled Pen Syringe 5    baclofen (LIORESAL) 10 MG tablet TAKE ONE TABLET BY MOUTH THREE TIMES DAILY (VIAL) 90 tablet 2    insulin aspart (NOVOLOG FLEXPEN) 100 UNIT/ML injection pen INJECT 60 UNITS with breakfast, Inject 80 units with dinner 135 mL 2    gabapentin (NEURONTIN) 300 MG capsule TAKE ONE CAPSULE BY MOUTH THREE TIMES DAILY (VIAL) 180 capsule 5    Insulin Degludec (TRESIBA FLEXTOUCH) 200 UNIT/ML SOPN INJECT 160 UNITS SUBCUTANEOUSLY NIGHTLY. ENSURE FASTING BLOOD SUGARS ARE . (BULK) 18 mL 5    DULoxetine (CYMBALTA) 30 MG extended release capsule TAKE 1 CAPSULE BY MOUTH DAILY 30 capsule 5    magnesium oxide (MAG-OX) 400 MG tablet Take 1 tablet by mouth daily 90 tablet 3    levocetirizine (XYZAL) 5 MG tablet TAKE 1 TABLET BY MOUTH NIGHTLY 30 tablet 2    albuterol sulfate HFA (VENTOLIN HFA) 108 (90 Base) MCG/ACT inhaler Inhale 2 puffs into the lungs 4 times daily as needed for Wheezing 54 g 0    naproxen (NAPROSYN) 500 MG tablet Take 1 tablet by mouth 2 times daily as needed for Pain 30 tablet 0    benzocaine (ORAJEL) 10 % mucosal gel Take by mouth as needed. 7 g 0    nystatin 595461 UNIT/GM powder APPLY TO AFFECTED AREA 3 TIMES DAILY. 30 g 2    Insulin Pen Needle (B-D UF III MINI PEN NEEDLES) 31G X 5 MM MISC **CLARIFY** USE AS DIRECTED (BULK) 100 each 1    INSULIN SYRINGE .5CC/29G (ULTICARE INSULIN SYRINGE) 29G X 1/2\" 0.5 ML MISC **NEED QTY UPDATED TO FULL BOX** USE WITH EACH DOSE OF NOVOLOG THREE TIMES DAILY (BULK) 90 each 1    citalopram (CELEXA) 20 MG tablet TAKE 1 TABLET BY MOUTH ONCE DAILY 90 tablet 1    chlorhexidine (PERIDEX) 0.12 % solution Take 15 mLs by mouth in the morning and 15 mLs before bedtime. metoprolol succinate (TOPROL XL) 50 MG extended release tablet TAKE 1 TABLET BY MOUTH ONCE DAILY FOR BLOOD PRESSURE AND HEART PROTECTION 30 tablet 1    losartan (COZAAR) 100 MG tablet TAKE 1 TABLET BY MOUTH ONCE DAILY FOR BLOOD PRESSURE 30 tablet 1    Blood Glucose Monitoring Suppl ([address]) w/Device KIT USE TO CHECK BLOOD GLUCOSE DAILY. 1 kit 0    blood glucose test strips (ONETOUCH VERIO) strip USE TO TEST BLOOD GLUCOSE 4 TIMES DAILY.  100 each 6    furosemide (LASIX) 20 MG tablet Take 1 tablet by mouth daily 90 tablet 2    empagliflozin (JARDIANCE) 25 MG tablet Take 1 tablet by mouth daily 90 tablet 2    metFORMIN (GLUCOPHAGE) 1000 MG tablet Take 1 tablet by mouth 2 times daily (with meals) 180 tablet 2    omeprazole (PRILOSEC) 20 MG delayed release capsule TAKE (1) CAPSULE BY MOUTH TWICE DAILY AS NEEDED. 180 capsule 2    oxybutynin (DITROPAN-XL) 10 MG extended release tablet TAKE 1 TABLET BY MOUTH ONCE DAILY 90 tablet 2    rosuvastatin (CRESTOR) 10 MG tablet TAKE 1 TABLET BY MOUTH ONCE DAILY 90 tablet 2    montelukast (SINGULAIR) 10 MG tablet TAKE 1 TABLET BY MOUTH AT NIGHT 90 tablet 2    loratadine (CLARITIN) 10 MG tablet TAKE 1 TABLET BY MOUTH ONCE DAILY. 30 tablet 1    Lancets MISC 1 each by Does not apply route 4 times daily 600 each 1    ULTICARE MINI PEN NEEDLES 31G X 6 MM MISC USE 2 TIMES A  each 0    clobetasol (TEMOVATE) 0.05 % ointment Apply topically 2 times daily. For 10 days 1 Tube 0    aspirin 81 MG chewable tablet Take 1 tablet by mouth daily 30 tablet 3    HYDROcodone-acetaminophen (NORCO)  MG per tablet Take 1 tablet by mouth Daily with lunch. .      Misc. Devices (ROLLER WALKER) MISC 1 each by Does not apply route daily Pt states that she falls at least once a month up to 3 times a month. 1 each 0    mometasone-formoterol (DULERA) 200-5 MCG/ACT inhaler Inhale 2 puffs into the lungs every 12 hours      Incontinence Supply Disposable (POISE PANTILINERS) PADS Use pads as needed daily for urine leakage. 1 each 11     No current facility-administered medications for this visit. Objective:   PE:  /64   Pulse 81   Temp 97.7 °F (36.5 °C) (Temporal)   Ht 5' 2\" (1.575 m)   Wt 271 lb 12.8 oz (123.3 kg)   LMP  (LMP Unknown)   SpO2 (!) 88%   BMI 49.71 kg/m²   Physical Exam  Vitals and nursing note reviewed. Chaperone present: 2 daughters. Constitutional:       Appearance: She is morbidly obese. Comments: the patient and the 2 daughters were so disjointed during this visit , constantly disputing what the other is saying. HENT:      Head: Normocephalic. Nose: No congestion. Mouth/Throat:      Pharynx: Oropharynx is clear. Eyes:      General: No scleral icterus.      Conjunctiva/sclera: Conjunctivae normal. Cardiovascular:      Rate and Rhythm: Normal rate and regular rhythm. Heart sounds: Normal heart sounds. Pulmonary:      Breath sounds: Normal breath sounds. Abdominal:      Palpations: Abdomen is soft. Tenderness: There is no abdominal tenderness. Comments: Obese, globular, large panniculus   Musculoskeletal:         General: No swelling or tenderness. Skin:     General: Skin is warm. Coloration: Skin is not jaundiced or pale. Findings: No lesion or rash. Neurological:      Mental Status: She is alert. Gait: Gait abnormal (pt in wheelchair). Psychiatric:         Mood and Affect: Affect is inappropriate. Speech: Speech is tangential.         Thought Content: Thought content is paranoid. Assessment & Plan   Dionicio Piedra was seen today for follow-up. Diagnoses and all orders for this visit:    Type 2 diabetes mellitus with complication ([facility] [address]  Comments:  insulin dependent, poorly controlled  Orders:  -     POCT glycosylated hemoglobin (Hb A1C)    Moderate Alzheimer's dementia, unspecified timing of dementia onset, unspecified whether behavioral, psychotic, or mood disturbance or anxiety ([facility]    Essential hypertension    Polypharmacy    Morbid obesity ([facility]      Agree with referral to  to address   family concerns about possible NH placement  Suggest pill packaging through [address] all maintenance medications as prescribed  Encouraged to follow diabetic diet  Lose weight  Stay active and engaged in regular exercise as tolerated - at least 30 minutes/day  Low fat/low calorie diet  Daily foot care  Recommend annual diabetic eye exam  Stay well hydrated - 64 oz of water a day  Keep scheduled follow-up visit with me   Call with new concerns     Return for chronic care management, extended visit with me. All questions were answered.   Medications, including possible adverse effects, and instructions were reviewed and  understanding was confirmed. Follow-up recommendations, including when to contact or return to office (ie; if symptoms worsen or fail to improve), were discussed and acknowledged.     Electronically signed by Marilyn Zhang MD on 2/7/23 at 1:27 PM CST

## 2023-02-21 ENCOUNTER — OFFICE VISIT (OUTPATIENT)
Dept: PRIMARY CARE CLINIC | Age: 78
End: 2023-02-21
Payer: MEDICARE

## 2023-02-21 VITALS
SYSTOLIC BLOOD PRESSURE: 132 MMHG | OXYGEN SATURATION: 97 % | BODY MASS INDEX: 50.09 KG/M2 | WEIGHT: 272.2 LBS | DIASTOLIC BLOOD PRESSURE: 74 MMHG | HEART RATE: 76 BPM | TEMPERATURE: 97.4 F | HEIGHT: 62 IN

## 2023-02-21 DIAGNOSIS — Z91.119 DIETARY NONCOMPLIANCE: ICD-10-CM

## 2023-02-21 DIAGNOSIS — I10 ESSENTIAL HYPERTENSION: ICD-10-CM

## 2023-02-21 DIAGNOSIS — E11.8 TYPE 2 DIABETES MELLITUS WITH COMPLICATION (HCC): Primary | ICD-10-CM

## 2023-02-21 DIAGNOSIS — E66.01 MORBID OBESITY (HCC): ICD-10-CM

## 2023-02-21 PROCEDURE — 3075F SYST BP GE 130 - 139MM HG: CPT | Performed by: FAMILY MEDICINE

## 2023-02-21 PROCEDURE — 3078F DIAST BP <80 MM HG: CPT | Performed by: FAMILY MEDICINE

## 2023-02-21 PROCEDURE — 1123F ACP DISCUSS/DSCN MKR DOCD: CPT | Performed by: FAMILY MEDICINE

## 2023-02-21 PROCEDURE — 99214 OFFICE O/P EST MOD 30 MIN: CPT | Performed by: FAMILY MEDICINE

## 2023-02-21 PROCEDURE — 3051F HG A1C>EQUAL 7.0%<8.0%: CPT | Performed by: FAMILY MEDICINE

## 2023-02-21 RX ORDER — DULAGLUTIDE 3 MG/.5ML
3 INJECTION, SOLUTION SUBCUTANEOUS WEEKLY
Qty: 4 ADJUSTABLE DOSE PRE-FILLED PEN SYRINGE | Refills: 5 | Status: SHIPPED | OUTPATIENT
Start: 2023-02-21

## 2023-02-21 NOTE — PROGRESS NOTES
[address] [facility]  [address] [address] [place] [id number] [address] [address]  Dept: [phone]  Dept Fax: [phone]  Loc: [phone]      Subjective:     Chief Complaint   Patient presents with    2 Week Follow-Up    Discuss Medications       HPI:  Lorna Ford is a 78 y.o. female present today for medication management. She did not  bring all her medications for review. She comes alone today. During her last visit her daughters came with her and had some concerns about the way she was taking her medications. They said she is a hoarder. She has a hx of being noncompliant to medical management. Pt is a diabetic. No reported  symptomatic hypoglycemic episodes.   She states she had a diabetic eye exam done 2 months ago - done at [facility]        ROS:   Review of Systems   Unable to perform ROS: Dementia     PMHx:  Past Medical History:   Diagnosis Date    Asthma     Has rescue inhalers    BiPAP (biphasic positive airway pressure) dependence     8cm to 21cm    Breast CA (HCC)     Left breast Nodules removed Took radiation    Chronic back pain 02/02/2016    Chronic kidney disease     Overactive bladder     COPD (chronic obstructive pulmonary disease) (HCC)     x few years Scarring on lungs 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    Diabetes mellitus ([facility]     On insulin x 10 years    Fibromyalgia     for 20 years    GERD (gastroesophageal reflux disease)     History of blood transfusion     ?when    History of therapeutic radiation     Hyperlipidemia     High cholesterol    Hypertension     On medications for 2 years    Liver disease     Has fatty liver    Morbid obesity ([address]     Multiple food allergies     Obstructive sleep apnea     AHI: 68.1 per PSG, 5/2018    Osteoarthritis 02/02/2016    Pneumonia     Postmenopausal osteoporosis     Psychiatric problem     Restless leg syndrome     S/P lumpectomy, left breast 08/19/2015 8/4/2015     Patient Active Problem List   Diagnosis Postmenopausal osteoporosis    Type 2 diabetes mellitus with complication (HCC)    COPD (chronic obstructive pulmonary disease) (HCC)    Mood disorder (HCC)    Dementia (HCC)    Malignant neoplasm of upper-outer quadrant of female breast ([address]    Joint pain    Neuropathy involving both lower extremities    Dyspepsia    Lumbar facet arthropathy    Body mass index 45.0-49.9, adult (HCC)    Essential hypertension    Hyperlipidemia    Morbid obesity (HCC)    DARSHAN (obstructive sleep apnea)    BiPAP (biphasic positive airway pressure) dependence    Restless leg syndrome    History of breast cancer    Venous insufficiency of both lower extremities    History of stroke    Uncomplicated asthma    Vitamin D deficiency       PSHx:  Past Surgical History:   Procedure Laterality Date    BREAST SURGERY Left 8/4/2015    left partial mastectomy on 8/4/15    EYE SURGERY      EYE SURGERY Bilateral 04/02/2017    Dr. Pal Warner      broke L ankle due to osteoporosis, has hardware in    FRACTURE SURGERY      Left ankle     HYSTERECTOMY (CERVIX STATUS UNKNOWN)      [place] INCISION & DRAINAGE ABSCESS COMPLICATED/MULTIPLE N/A 7/24/2018    ABDOMINAL WALL ABSCESS INCISION AND DRAINAGE performed by John Danielle MD at [address] [facility] ENDOSCOPY  4/8/2016    Dr Brent Black, (-) H Pylori    WRIST SURGERY         PFHx:  Family History   Problem Relation Age of Onset    High Blood Pressure Father     Heart Disease Father     Diabetes Father     High Blood Pressure Sister     Diabetes Sister     Ovarian Cancer Sister 43    High Blood Pressure Brother     Colon Cancer Neg Hx     Colon Polyps Neg Hx     Esophageal Cancer Neg Hx     Liver Cancer Neg Hx     Liver Disease Neg Hx     Rectal Cancer Neg Hx     Stomach Cancer Neg Hx        SocialHx:  Social History     Tobacco Use    Smoking status: Never    Smokeless tobacco: Never   Substance Use Topics    Alcohol use:  Yes     Alcohol/week: 2.0 standard drinks     Types: 1 Glasses of wine, 1 Cans of beer per week     Comment: occ       Allergies: Allergies   Allergen Reactions    Pcn [Penicillins] Hives and Itching     Patient states she can tolerate ampicillin and amoxicillin however. Cefdinir        Medications:  Current Outpatient Medications   Medication Sig Dispense Refill    Dulaglutide (TRULICITY) 3 QQ/5.1RL SOPN Inject 3 mg into the skin once a week 4 Adjustable Dose Pre-filled Pen Syringe 5    vitamin D (ERGOCALCIFEROL) 1.25 MG (45384 UT) CAPS capsule TAKE 1 CAPSULE BY MOUTH ONCE A WEEK 4 capsule 2    anastrozole (ARIMIDEX) 1 MG tablet Take 1 tablet by mouth daily 30 tablet 3    baclofen (LIORESAL) 10 MG tablet TAKE ONE TABLET BY MOUTH THREE TIMES DAILY (VIAL) 90 tablet 2    insulin aspart (NOVOLOG FLEXPEN) 100 UNIT/ML injection pen INJECT 60 UNITS with breakfast, Inject 80 units with dinner 135 mL 2    gabapentin (NEURONTIN) 300 MG capsule TAKE ONE CAPSULE BY MOUTH THREE TIMES DAILY (VIAL) 180 capsule 5    Insulin Degludec (TRESIBA FLEXTOUCH) 200 UNIT/ML SOPN INJECT 160 UNITS SUBCUTANEOUSLY NIGHTLY. ENSURE FASTING BLOOD SUGARS ARE . (BULK) 18 mL 5    DULoxetine (CYMBALTA) 30 MG extended release capsule TAKE 1 CAPSULE BY MOUTH DAILY 30 capsule 5    magnesium oxide (MAG-OX) 400 MG tablet Take 1 tablet by mouth daily 90 tablet 3    levocetirizine (XYZAL) 5 MG tablet TAKE 1 TABLET BY MOUTH NIGHTLY 30 tablet 2    albuterol sulfate HFA (VENTOLIN HFA) 108 (90 Base) MCG/ACT inhaler Inhale 2 puffs into the lungs 4 times daily as needed for Wheezing 54 g 0    naproxen (NAPROSYN) 500 MG tablet Take 1 tablet by mouth 2 times daily as needed for Pain 30 tablet 0    benzocaine (ORAJEL) 10 % mucosal gel Take by mouth as needed. 7 g 0    nystatin 382593 UNIT/GM powder APPLY TO AFFECTED AREA 3 TIMES DAILY.  30 g 2    Insulin Pen Needle (B-D UF III MINI PEN NEEDLES) 31G X 5 MM MISC **CLARIFY** USE AS DIRECTED (BULK) 100 each 1    INSULIN SYRINGE .5CC/29G (ULTICARE INSULIN SYRINGE) 29G X 1/2\" 0.5 ML MISC **NEED QTY UPDATED TO FULL BOX** USE WITH EACH DOSE OF NOVOLOG THREE TIMES DAILY (BULK) 90 each 1    citalopram (CELEXA) 20 MG tablet TAKE 1 TABLET BY MOUTH ONCE DAILY 90 tablet 1    chlorhexidine (PERIDEX) 0.12 % solution Take 15 mLs by mouth in the morning and 15 mLs before bedtime. metoprolol succinate (TOPROL XL) 50 MG extended release tablet TAKE 1 TABLET BY MOUTH ONCE DAILY FOR BLOOD PRESSURE AND HEART PROTECTION 30 tablet 1    losartan (COZAAR) 100 MG tablet TAKE 1 TABLET BY MOUTH ONCE DAILY FOR BLOOD PRESSURE 30 tablet 1    Blood Glucose Monitoring Suppl ([address]) w/Device KIT USE TO CHECK BLOOD GLUCOSE DAILY. 1 kit 0    blood glucose test strips (ONETOUCH VERIO) strip USE TO TEST BLOOD GLUCOSE 4 TIMES DAILY. 100 each 6    furosemide (LASIX) 20 MG tablet Take 1 tablet by mouth daily 90 tablet 2    empagliflozin (JARDIANCE) 25 MG tablet Take 1 tablet by mouth daily 90 tablet 2    metFORMIN (GLUCOPHAGE) 1000 MG tablet Take 1 tablet by mouth 2 times daily (with meals) 180 tablet 2    omeprazole (PRILOSEC) 20 MG delayed release capsule TAKE (1) CAPSULE BY MOUTH TWICE DAILY AS NEEDED. 180 capsule 2    oxybutynin (DITROPAN-XL) 10 MG extended release tablet TAKE 1 TABLET BY MOUTH ONCE DAILY 90 tablet 2    rosuvastatin (CRESTOR) 10 MG tablet TAKE 1 TABLET BY MOUTH ONCE DAILY 90 tablet 2    montelukast (SINGULAIR) 10 MG tablet TAKE 1 TABLET BY MOUTH AT NIGHT 90 tablet 2    loratadine (CLARITIN) 10 MG tablet TAKE 1 TABLET BY MOUTH ONCE DAILY. 30 tablet 1    Lancets MISC 1 each by Does not apply route 4 times daily 600 each 1    ULTICARE MINI PEN NEEDLES 31G X 6 MM MISC USE 2 TIMES A  each 0    clobetasol (TEMOVATE) 0.05 % ointment Apply topically 2 times daily.  For 10 days 1 Tube 0    aspirin 81 MG chewable tablet Take 1 tablet by mouth daily 30 tablet 3    HYDROcodone-acetaminophen (NORCO)  MG per tablet Take 1 tablet by mouth Daily with lunch. .      Misc. Devices (ROLLER WALKER) MISC 1 each by Does not apply route daily Pt states that she falls at least once a month up to 3 times a month. 1 each 0    mometasone-formoterol (DULERA) 200-5 MCG/ACT inhaler Inhale 2 puffs into the lungs every 12 hours      Incontinence Supply Disposable (POISE PANTILINERS) PADS Use pads as needed daily for urine leakage. 1 each 11     No current facility-administered medications for this visit. Objective:   PE:  /74   Pulse 76   Temp 97.4 °F (36.3 °C) (Temporal)   Ht 5' 2\" (1.575 m)   Wt 272 lb 3.2 oz (123.5 kg)   LMP  (LMP Unknown)   SpO2 97%   BMI 49.79 kg/m²   Physical Exam  Vitals and nursing note reviewed. Constitutional:       Appearance: She is morbidly obese. HENT:      Head: Normocephalic. Nose: No congestion. Mouth/Throat:      Pharynx: Oropharynx is clear. Eyes:      General: No scleral icterus. Conjunctiva/sclera: Conjunctivae normal.   Cardiovascular:      Rate and Rhythm: Normal rate and regular rhythm. Heart sounds: Normal heart sounds. Pulmonary:      Breath sounds: Normal breath sounds. Abdominal:      Palpations: Abdomen is soft. Tenderness: There is no abdominal tenderness. Comments: Obese, globular, large panniculus   Musculoskeletal:         General: No swelling or tenderness. Skin:     General: Skin is warm. Coloration: Skin is not jaundiced or pale. Findings: No lesion or rash. Neurological:      Mental Status: She is alert and oriented to person, place, and time. Gait: Gait abnormal (pt in wheelchair). Psychiatric:         Speech: Speech is tangential.          Assessment & Plan   Jeancarlos Green was seen today for 2 week follow-up and discuss medications.     Diagnoses and all orders for this visit:    Type 2 diabetes mellitus with complication (HCC)  -     Dulaglutide (TRULICITY) 3 MP/7.8XE SOPN; Inject 3 mg into the skin once a week    Essential hypertension    Morbid obesity (HCC)    Dietary noncompliance    Continue all maintenance medications as prescribed  Continue diabetic diet  Continue attempts at weight loss. Engaged in regular exercise as tolerated - at least 30 minutes/day  Low fat/low calorie diet  Daily foot care  Recommend annual diabetic eye exam  Stay well hydrated - 64 oz of water a day  Keep scheduled follow-up visit with me and with other specialist  Call with new concerns   Bring all medications at next visit    Return in about 3 months (around 5/21/2023) for routine follow-up with me. All questions were answered. Medications, including possible adverse effects, and instructions were reviewed and  understanding was confirmed. Follow-up recommendations, including when to contact or return to office (ie; if symptoms worsen or fail to improve), were discussed and acknowledged.     Electronically signed by Drew March MD on 2/21/23 at 1:18 PM CST

## 2023-03-07 DIAGNOSIS — J30.2 SEASONAL ALLERGIES: ICD-10-CM

## 2023-03-07 RX ORDER — LEVOCETIRIZINE DIHYDROCHLORIDE 5 MG/1
5 TABLET, FILM COATED ORAL NIGHTLY
Qty: 30 TABLET | Refills: 2 | Status: SHIPPED | OUTPATIENT
Start: 2023-03-07

## 2023-03-07 NOTE — TELEPHONE ENCOUNTER
Matt Cheung called to request a refill on her medication.       Last office visit : 2/21/2023   Next office visit : 5/23/2023     Requested Prescriptions     Pending Prescriptions Disp Refills    levocetirizine (XYZAL) 5 MG tablet [Pharmacy Med Name: LEVOCETIRIZINE DIHYDROCHLORIDE 5MG TABLET] 30 tablet 2     Sig: TAKE 1 TABLET BY MOUTH NIGHTLY            Olga Nguyen MA

## 2023-03-09 ENCOUNTER — TELEPHONE (OUTPATIENT)
Dept: PRIMARY CARE CLINIC | Age: 78
End: 2023-03-09

## 2023-03-09 NOTE — TELEPHONE ENCOUNTER
----- Message from April Luis Manuel sent at 3/9/2023  9:42 AM CST -----  Subject: Message to Provider    QUESTIONS  Information for Provider? Patient would like you to fax an authorization   letter, the letter needs to say [employer] is   approved for , to [employer] at [phone]. please call patient back to   advise, thank you. patient states she needs this done as soon as possible  ---------------------------------------------------------------------------  --------------  Ranjith SALDANA  [phone]; OK to leave message on voicemail  ---------------------------------------------------------------------------  --------------  SCRIPT ANSWERS  Relationship to Patient?  Self

## 2023-03-09 NOTE — TELEPHONE ENCOUNTER
Duplicate request: Patient left phone numbers to [employer], will call and get additional information on what I'm needing to send to them to.

## 2023-03-14 ENCOUNTER — TELEPHONE (OUTPATIENT)
Dept: PRIMARY CARE CLINIC | Age: 78
End: 2023-03-14

## 2023-03-14 NOTE — TELEPHONE ENCOUNTER
Patient has called a couple of times asking for a letter to be sent to a company called [employer]. I called the number she provided [phone] and spoke with a representative and she stated we could not send it directly to them we had to fax to her insurance company and then her insurance company reaches out to them in order to receive the meals. Please advise?

## 2023-03-15 RX ORDER — CITALOPRAM 20 MG/1
TABLET ORAL
Qty: 30 TABLET | Refills: 1 | Status: SHIPPED | OUTPATIENT
Start: 2023-03-15

## 2023-03-15 NOTE — TELEPHONE ENCOUNTER
Christine Roa called to request a refill on her medication.      Last office visit : 2/21/2023   Next office visit : 5/23/2023     Requested Prescriptions     Pending Prescriptions Disp Refills    citalopram (CELEXA) 20 MG tablet [Pharmacy Med Name: CITALOPRAM HYDROBROMIDE 20MG TABLET] 30 tablet 1     Sig: TAKE 1 TABLET BY MOUTH ONCE DAILY            Alix Webster LPN

## 2023-03-23 NOTE — PATIENT INSTRUCTIONS
Use medication to chest sparingly twice daily for 10 days  Follow-up with PCP or return to  as needed   Patient Education        Rash: Care Instructions  Your Care Instructions  A rash is any irritation or inflammation of the skin. Rashes have many possible causes, including allergy, infection, illness, heat, and emotional stress. Follow-up care is a key part of your treatment and safety. Be sure to make and go to all appointments, and call your doctor if you are having problems. It's also a good idea to know your test results and keep a list of the medicines you take. How can you care for yourself at home? · Wash the area with water only. Soap can make dryness and itching worse. Pat dry. · Put cold, wet cloths on the rash to reduce itching. · Keep cool, and stay out of the sun. · Leave the rash open to the air as much of the time as possible. · Sometimes petroleum jelly (Vaseline) can help relieve the discomfort caused by a rash. A moisturizing lotion, such as Cetaphil, also may help. Calamine lotion may help for rashes caused by contact with something (such as a plant or soap) that irritated the skin. Use it 3 or 4 times a day. · If your doctor prescribed a cream, use it as directed. If your doctor prescribed medicine, take it exactly as directed. · If your rash itches so badly that it interferes with your normal activities, take an over-the-counter antihistamine, such as diphenhydramine (Benadryl) or loratadine (Claritin). Read and follow all instructions on the label. When should you call for help? Call your doctor now or seek immediate medical care if:    · You have signs of infection, such as:  ? Increased pain, swelling, warmth, or redness. ? Red streaks leading from the area. ? Pus draining from the area. ? A fever.     · You have joint pain along with the rash.    Watch closely for changes in your health, and be sure to contact your doctor if:    · Your rash is changing or getting worse.  For yes

## 2023-03-28 RX ORDER — LORATADINE 10 MG/1
TABLET ORAL
Qty: 30 TABLET | Refills: 0 | OUTPATIENT
Start: 2023-03-28

## 2023-03-29 DIAGNOSIS — E11.8 TYPE 2 DIABETES MELLITUS WITH COMPLICATION (HCC): ICD-10-CM

## 2023-03-29 RX ORDER — INSULIN DEGLUDEC 200 U/ML
INJECTION, SOLUTION SUBCUTANEOUS
Qty: 18 ML | Refills: 5 | OUTPATIENT
Start: 2023-03-29

## 2023-03-29 NOTE — TELEPHONE ENCOUNTER
Mary Cevallos called to request a refill on her medication.       Last office visit : 2/21/2023   Next office visit : 3/29/2023     Requested Prescriptions     Pending Prescriptions Disp Refills    Insulin Degludec FlexTouch 200 UNIT/ML SOPN [Pharmacy Med Name: insulin degludec (U-200) 200 unit/mL (3 mL) subcutaneous pen] 18 mL 5     Sig: INJECT 160 UNITS SUBCUTANEOUSLY NIGHTLY (ENSURE FASTING BLOOD SUGARS ARE ) ([facility])            Katlyn Le LPN

## 2023-03-29 NOTE — TELEPHONE ENCOUNTER
Marisabel Langford called to request a refill on her medication.       Last office visit : 2/21/2023   Next office visit : 5/23/2023     Requested Prescriptions     Pending Prescriptions Disp Refills    loratadine (CLARITIN) 10 MG tablet [Pharmacy Med Name: LORATADINE 10MG TABLET] 30 tablet 0     Sig: TAKE 1 TABLET DAILY            Maryanne Matos LPN

## 2023-03-30 RX ORDER — LORATADINE 10 MG/1
TABLET ORAL
Qty: 30 TABLET | Refills: 0 | Status: SHIPPED | OUTPATIENT
Start: 2023-03-30

## 2023-03-31 RX ORDER — ERGOCALCIFEROL 1.25 MG/1
CAPSULE ORAL
Qty: 4 CAPSULE | Refills: 2 | Status: SHIPPED | OUTPATIENT
Start: 2023-03-31

## 2023-03-31 NOTE — TELEPHONE ENCOUNTER
Indiana Brittcathrynfrancy called to request a refill on her medication.       Last office visit : 2/21/2023   Next office visit : 5/23/2023     Requested Prescriptions     Pending Prescriptions Disp Refills    vitamin D (ERGOCALCIFEROL) 1.25 MG (16714 UT) CAPS capsule [Pharmacy Med Name: VITAMIN D 1.25MG CAPSULE] 4 capsule 2     Sig: TAKE 1 CAPSULE BY MOUTH ONCE A WEEK            Kiel Benson LPN

## 2023-04-11 ENCOUNTER — TELEPHONE (OUTPATIENT)
Dept: PODIATRY | Facility: CLINIC | Age: 78
End: 2023-04-11
Payer: MEDICARE

## 2023-05-01 DIAGNOSIS — E11.8 TYPE 2 DIABETES MELLITUS WITH COMPLICATION (HCC): ICD-10-CM

## 2023-05-01 DIAGNOSIS — E78.2 MIXED HYPERLIPIDEMIA: ICD-10-CM

## 2023-05-01 RX ORDER — FLURBIPROFEN SODIUM 0.3 MG/ML
SOLUTION/ DROPS OPHTHALMIC
Qty: 100 EACH | Refills: 0 | OUTPATIENT
Start: 2023-05-01

## 2023-05-03 ENCOUNTER — OFFICE VISIT (OUTPATIENT)
Dept: PODIATRY | Facility: CLINIC | Age: 78
End: 2023-05-03
Payer: MEDICARE

## 2023-05-03 VITALS
BODY MASS INDEX: 48.4 KG/M2 | HEIGHT: 62 IN | DIASTOLIC BLOOD PRESSURE: 80 MMHG | HEART RATE: 85 BPM | SYSTOLIC BLOOD PRESSURE: 146 MMHG | OXYGEN SATURATION: 96 % | WEIGHT: 263 LBS

## 2023-05-03 DIAGNOSIS — B35.1 ONYCHOMYCOSIS: Primary | ICD-10-CM

## 2023-05-03 DIAGNOSIS — R26.9 GAIT ABNORMALITY: ICD-10-CM

## 2023-05-03 DIAGNOSIS — E11.9 ENCOUNTER FOR DIABETIC FOOT EXAM: ICD-10-CM

## 2023-05-03 DIAGNOSIS — M20.11 VALGUS DEFORMITY OF BOTH GREAT TOES: ICD-10-CM

## 2023-05-03 DIAGNOSIS — M79.672 FOOT PAIN, BILATERAL: ICD-10-CM

## 2023-05-03 DIAGNOSIS — M79.671 FOOT PAIN, BILATERAL: ICD-10-CM

## 2023-05-03 DIAGNOSIS — M20.12 VALGUS DEFORMITY OF BOTH GREAT TOES: ICD-10-CM

## 2023-05-03 DIAGNOSIS — Z79.4 TYPE 2 DIABETES MELLITUS WITH DIABETIC POLYNEUROPATHY, WITH LONG-TERM CURRENT USE OF INSULIN: ICD-10-CM

## 2023-05-03 DIAGNOSIS — M20.42 HAMMERTOE, BILATERAL: ICD-10-CM

## 2023-05-03 DIAGNOSIS — M20.41 HAMMERTOE, BILATERAL: ICD-10-CM

## 2023-05-03 DIAGNOSIS — L84 FOOT CALLUS: ICD-10-CM

## 2023-05-03 DIAGNOSIS — E11.42 TYPE 2 DIABETES MELLITUS WITH DIABETIC POLYNEUROPATHY, WITH LONG-TERM CURRENT USE OF INSULIN: ICD-10-CM

## 2023-05-03 RX ORDER — FUROSEMIDE 20 MG/1
TABLET ORAL
Qty: 30 TABLET | Refills: 1 | Status: SHIPPED | OUTPATIENT
Start: 2023-05-03

## 2023-05-03 RX ORDER — ROSUVASTATIN CALCIUM 10 MG/1
TABLET, COATED ORAL
Qty: 90 TABLET | Refills: 0 | Status: SHIPPED | OUTPATIENT
Start: 2023-05-03

## 2023-05-03 RX ORDER — FLURBIPROFEN SODIUM 0.3 MG/ML
SOLUTION/ DROPS OPHTHALMIC
Qty: 100 EACH | Refills: 0 | Status: SHIPPED | OUTPATIENT
Start: 2023-05-03

## 2023-05-03 NOTE — TELEPHONE ENCOUNTER
Sourav Carpio called to request a refill on her medication.       Last office visit : 2/21/2023   Next office visit : 5/23/2023     Requested Prescriptions     Pending Prescriptions Disp Refills    Insulin Pen Needle (B-D UF III MINI PEN NEEDLES) 31G X 5 MM MISC [Pharmacy Med Name: BD PEN NEEDLE/MINI/ULTRA-FINE/31G X 5MM  MISCELLANEOUS] 100 each 0     Sig: USE 4 TIMES DAILY WITH INSULINS    rosuvastatin (CRESTOR) 10 MG tablet [Pharmacy Med Name: ROSUVASTATIN CALCIUM 10MG TABLET] 90 tablet 0     Sig: TAKE 1 TABLET BY MOUTH ONCE DAILY    furosemide (LASIX) 20 MG tablet [Pharmacy Med Name: FUROSEMIDE 20MG TABLET] 30 tablet 1     Sig: TAKE 1 TABLET BY MOUTH ONCE DAILY            Kaycee Almeida LPN

## 2023-05-05 ENCOUNTER — OFFICE VISIT (OUTPATIENT)
Dept: VASCULAR SURGERY | Facility: CLINIC | Age: 78
End: 2023-05-05
Payer: MEDICARE

## 2023-05-05 VITALS
BODY MASS INDEX: 48.4 KG/M2 | OXYGEN SATURATION: 99 % | SYSTOLIC BLOOD PRESSURE: 146 MMHG | WEIGHT: 263 LBS | HEIGHT: 62 IN | DIASTOLIC BLOOD PRESSURE: 80 MMHG | HEART RATE: 78 BPM

## 2023-05-05 DIAGNOSIS — E66.01 MORBID OBESITY: ICD-10-CM

## 2023-05-05 DIAGNOSIS — I83.12 VARICOSE VEINS OF BOTH LOWER EXTREMITIES WITH INFLAMMATION: ICD-10-CM

## 2023-05-05 DIAGNOSIS — R60.0 BILATERAL LOWER EXTREMITY EDEMA: ICD-10-CM

## 2023-05-05 DIAGNOSIS — I83.11 VARICOSE VEINS OF BOTH LOWER EXTREMITIES WITH INFLAMMATION: ICD-10-CM

## 2023-05-05 DIAGNOSIS — I87.2 VENOUS INSUFFICIENCY OF BOTH LOWER EXTREMITIES: Primary | ICD-10-CM

## 2023-05-05 DIAGNOSIS — I10 ESSENTIAL HYPERTENSION: ICD-10-CM

## 2023-05-05 DIAGNOSIS — E11.65 TYPE 2 DIABETES MELLITUS WITH HYPERGLYCEMIA, WITH LONG-TERM CURRENT USE OF INSULIN: ICD-10-CM

## 2023-05-05 DIAGNOSIS — E78.2 MIXED HYPERLIPIDEMIA: ICD-10-CM

## 2023-05-05 DIAGNOSIS — Z79.4 TYPE 2 DIABETES MELLITUS WITH HYPERGLYCEMIA, WITH LONG-TERM CURRENT USE OF INSULIN: ICD-10-CM

## 2023-05-18 ENCOUNTER — TELEPHONE (OUTPATIENT)
Dept: PRIMARY CARE CLINIC | Age: 78
End: 2023-05-18

## 2023-05-18 DIAGNOSIS — M47.816 LUMBAR FACET ARTHROPATHY: Chronic | ICD-10-CM

## 2023-05-18 DIAGNOSIS — M25.50 ARTHRALGIA, UNSPECIFIED JOINT: Chronic | ICD-10-CM

## 2023-05-18 DIAGNOSIS — M54.9 MUSCULOSKELETAL BACK PAIN: Primary | ICD-10-CM

## 2023-05-18 DIAGNOSIS — H92.13 OTORRHEA OF BOTH EARS: Primary | ICD-10-CM

## 2023-05-18 NOTE — TELEPHONE ENCOUNTER
----- Message from Jamir Myles sent at 5/17/2023  4:37 PM CDT -----  Subject: Referral Request    Reason for referral request? Pain Management  Provider patient wants to be referred to(if known):     Provider Phone Number(if known):[phone]    Additional Information for Provider?  The [address] [place] [address], PT would like to be referred to this location, Would   like an appt for tues, Wed, Thurs or fridays after 12 pm  ---------------------------------------------------------------------------  --------------  Jovon Reyes INFO    [phone]; OK to leave message on voicemail,Do not leave any message,   patient will call back for answer  ---------------------------------------------------------------------------  --------------

## 2023-05-18 NOTE — TELEPHONE ENCOUNTER
----- Message from Fantasma Miki sent at 5/17/2023  4:42 PM CDT -----  Subject: Referral Request    Reason for referral request? ENT specialist  Provider patient wants to be referred to(if known):     Provider Phone Number(if known): Additional Information for Provider?  Would like to be referred to a ENT   for ear drainage, can feel it in her head but nothing comes out.  ---------------------------------------------------------------------------  --------------  [id number] [employer]    [phone]; OK to leave message on voicemail,Do not leave any message,   patient will call back for answer  ---------------------------------------------------------------------------  --------------

## 2023-05-19 ENCOUNTER — TELEPHONE (OUTPATIENT)
Dept: PRIMARY CARE CLINIC | Age: 78
End: 2023-05-19

## 2023-05-19 DIAGNOSIS — J44.9 CHRONIC OBSTRUCTIVE PULMONARY DISEASE, UNSPECIFIED COPD TYPE (HCC): ICD-10-CM

## 2023-05-19 DIAGNOSIS — J45.909 UNCOMPLICATED ASTHMA, UNSPECIFIED ASTHMA SEVERITY, UNSPECIFIED WHETHER PERSISTENT: Primary | ICD-10-CM

## 2023-05-19 NOTE — TELEPHONE ENCOUNTER
----- Message from Fantasma Livingston sent at 5/17/2023  4:45 PM CDT -----  Subject: Referral Request    Reason for referral request? Respitory specialist  Provider patient wants to be referred to(if known):     Provider Phone Number(if known): Additional Information for Provider? Pt would like to be referred to a   resp.  therapist. Contact Pt first then the other number listed below,   which id her daughter Teena Leavitt  ---------------------------------------------------------------------------  --------------  [id number] [employer]"    [phone]; OK to leave message on voicemail  ---------------------------------------------------------------------------  --------------

## 2023-05-23 ENCOUNTER — OFFICE VISIT (OUTPATIENT)
Dept: PRIMARY CARE CLINIC | Age: 78
End: 2023-05-23
Payer: MEDICARE

## 2023-05-23 VITALS
TEMPERATURE: 97.6 F | OXYGEN SATURATION: 95 % | HEIGHT: 62 IN | BODY MASS INDEX: 49.47 KG/M2 | SYSTOLIC BLOOD PRESSURE: 118 MMHG | DIASTOLIC BLOOD PRESSURE: 64 MMHG | HEART RATE: 94 BPM | WEIGHT: 268.8 LBS

## 2023-05-23 DIAGNOSIS — I10 PRIMARY HYPERTENSION: ICD-10-CM

## 2023-05-23 DIAGNOSIS — E78.2 MIXED HYPERLIPIDEMIA: ICD-10-CM

## 2023-05-23 DIAGNOSIS — E11.8 TYPE 2 DIABETES MELLITUS WITH COMPLICATION (HCC): Primary | ICD-10-CM

## 2023-05-23 DIAGNOSIS — Z79.899 POLYPHARMACY: ICD-10-CM

## 2023-05-23 DIAGNOSIS — E55.9 VITAMIN D DEFICIENCY: ICD-10-CM

## 2023-05-23 LAB — HBA1C MFR BLD: 7.4 %

## 2023-05-23 PROCEDURE — 1123F ACP DISCUSS/DSCN MKR DOCD: CPT | Performed by: FAMILY MEDICINE

## 2023-05-23 PROCEDURE — 99214 OFFICE O/P EST MOD 30 MIN: CPT | Performed by: FAMILY MEDICINE

## 2023-05-23 PROCEDURE — 3078F DIAST BP <80 MM HG: CPT | Performed by: FAMILY MEDICINE

## 2023-05-23 PROCEDURE — 3074F SYST BP LT 130 MM HG: CPT | Performed by: FAMILY MEDICINE

## 2023-05-23 PROCEDURE — 83036 HEMOGLOBIN GLYCOSYLATED A1C: CPT | Performed by: FAMILY MEDICINE

## 2023-05-23 PROCEDURE — 3051F HG A1C>EQUAL 7.0%<8.0%: CPT | Performed by: FAMILY MEDICINE

## 2023-05-23 RX ORDER — VIT C/B6/B5/MAGNESIUM/HERB 173 50-5-6-5MG
CAPSULE ORAL DAILY
COMMUNITY

## 2023-05-23 SDOH — ECONOMIC STABILITY: HOUSING INSECURITY
IN THE LAST 12 MONTHS, WAS THERE A TIME WHEN YOU DID NOT HAVE A STEADY PLACE TO SLEEP OR SLEPT IN A SHELTER (INCLUDING NOW)?: NO

## 2023-05-23 SDOH — ECONOMIC STABILITY: FOOD INSECURITY: WITHIN THE PAST 12 MONTHS, THE FOOD YOU BOUGHT JUST DIDN'T LAST AND YOU DIDN'T HAVE MONEY TO GET MORE.: NEVER TRUE

## 2023-05-23 SDOH — ECONOMIC STABILITY: INCOME INSECURITY: HOW HARD IS IT FOR YOU TO PAY FOR THE VERY BASICS LIKE FOOD, HOUSING, MEDICAL CARE, AND HEATING?: NOT HARD AT ALL

## 2023-05-23 SDOH — ECONOMIC STABILITY: FOOD INSECURITY: WITHIN THE PAST 12 MONTHS, YOU WORRIED THAT YOUR FOOD WOULD RUN OUT BEFORE YOU GOT MONEY TO BUY MORE.: NEVER TRUE

## 2023-05-23 ASSESSMENT — ENCOUNTER SYMPTOMS
EYES NEGATIVE: 1
RESPIRATORY NEGATIVE: 1
GASTROINTESTINAL NEGATIVE: 1

## 2023-05-25 DIAGNOSIS — I10 PRIMARY HYPERTENSION: ICD-10-CM

## 2023-05-25 DIAGNOSIS — E55.9 VITAMIN D DEFICIENCY: ICD-10-CM

## 2023-05-25 DIAGNOSIS — I10 ESSENTIAL HYPERTENSION: ICD-10-CM

## 2023-05-25 DIAGNOSIS — E11.8 TYPE 2 DIABETES MELLITUS WITH COMPLICATION (HCC): ICD-10-CM

## 2023-05-25 DIAGNOSIS — E78.2 MIXED HYPERLIPIDEMIA: ICD-10-CM

## 2023-05-25 LAB
25(OH)D3 SERPL-MCNC: 70.4 NG/ML
ALBUMIN SERPL-MCNC: 4.3 G/DL (ref 3.5–5.2)
ALP SERPL-CCNC: 104 U/L (ref 35–104)
ALT SERPL-CCNC: 57 U/L (ref 5–33)
ANION GAP SERPL CALCULATED.3IONS-SCNC: 12 MMOL/L (ref 7–19)
AST SERPL-CCNC: 58 U/L (ref 5–32)
BASOPHILS # BLD: 0.1 K/UL (ref 0–0.2)
BASOPHILS NFR BLD: 0.6 % (ref 0–1)
BILIRUB SERPL-MCNC: 0.4 MG/DL (ref 0.2–1.2)
BUN SERPL-MCNC: 12 MG/DL (ref 8–23)
CALCIUM SERPL-MCNC: 9.8 MG/DL (ref 8.8–10.2)
CHLORIDE SERPL-SCNC: 97 MMOL/L (ref 98–111)
CHOLEST SERPL-MCNC: 151 MG/DL (ref 160–199)
CO2 SERPL-SCNC: 30 MMOL/L (ref 22–29)
CREAT SERPL-MCNC: 0.8 MG/DL (ref 0.5–0.9)
EOSINOPHIL # BLD: 0.3 K/UL (ref 0–0.6)
EOSINOPHIL NFR BLD: 2.7 % (ref 0–5)
ERYTHROCYTE [DISTWIDTH] IN BLOOD BY AUTOMATED COUNT: 15 % (ref 11.5–14.5)
GLUCOSE SERPL-MCNC: 168 MG/DL (ref 74–109)
HCT VFR BLD AUTO: 46.9 % (ref 37–47)
HDLC SERPL-MCNC: 38 MG/DL (ref 65–121)
HGB BLD-MCNC: 14.6 G/DL (ref 12–16)
IMM GRANULOCYTES # BLD: 0 K/UL
LDLC SERPL CALC-MCNC: 76 MG/DL
LYMPHOCYTES # BLD: 3.4 K/UL (ref 1.1–4.5)
LYMPHOCYTES NFR BLD: 33.4 % (ref 20–40)
MCH RBC QN AUTO: 26.9 PG (ref 27–31)
MCHC RBC AUTO-ENTMCNC: 31.1 G/DL (ref 33–37)
MCV RBC AUTO: 86.4 FL (ref 81–99)
MONOCYTES # BLD: 0.7 K/UL (ref 0–0.9)
MONOCYTES NFR BLD: 7 % (ref 0–10)
NEUTROPHILS # BLD: 5.6 K/UL (ref 1.5–7.5)
NEUTS SEG NFR BLD: 56 % (ref 50–65)
PLATELET # BLD AUTO: 325 K/UL (ref 130–400)
PMV BLD AUTO: 10.2 FL (ref 9.4–12.3)
POTASSIUM SERPL-SCNC: 4.6 MMOL/L (ref 3.5–5)
PROT SERPL-MCNC: 7.5 G/DL (ref 6.6–8.7)
RBC # BLD AUTO: 5.43 M/UL (ref 4.2–5.4)
SODIUM SERPL-SCNC: 139 MMOL/L (ref 136–145)
TRIGL SERPL-MCNC: 183 MG/DL (ref 0–149)
WBC # BLD AUTO: 10.1 K/UL (ref 4.8–10.8)

## 2023-05-28 PROCEDURE — 87070 CULTURE OTHR SPECIMN AEROBIC: CPT | Performed by: NURSE PRACTITIONER

## 2023-05-28 PROCEDURE — 87205 SMEAR GRAM STAIN: CPT | Performed by: NURSE PRACTITIONER

## 2023-05-30 RX ORDER — LANCETS 30 GAUGE
EACH MISCELLANEOUS
Qty: 100 EACH | Refills: 3 | Status: SHIPPED | OUTPATIENT
Start: 2023-05-30

## 2023-05-30 RX ORDER — GLUCOSAMINE HCL/CHONDROITIN SU 500-400 MG
CAPSULE ORAL
Qty: 100 STRIP | Refills: 3 | Status: SHIPPED | OUTPATIENT
Start: 2023-05-30

## 2023-05-30 RX ORDER — MONTELUKAST SODIUM 10 MG/1
TABLET ORAL
Qty: 90 TABLET | Refills: 1 | Status: SHIPPED | OUTPATIENT
Start: 2023-05-30

## 2023-05-30 RX ORDER — LOSARTAN POTASSIUM 100 MG/1
TABLET ORAL
Qty: 30 TABLET | Refills: 1 | Status: SHIPPED | OUTPATIENT
Start: 2023-05-30

## 2023-05-30 NOTE — TELEPHONE ENCOUNTER
----- Message from Callynicolle Cary sent at 5/30/2023  1:14 PM CDT -----  Subject: Message to Provider    QUESTIONS  Information for Provider? Patient needs a new one Touch Ultra and booklet   to record her sugar readings sent to Brian goldstein, please call.   ---------------------------------------------------------------------------  --------------  Mamta GAMING  [phone]; OK to leave message on voicemail  ---------------------------------------------------------------------------  --------------  SCRIPT ANSWERS  Relationship to Patient?  Self

## 2023-05-31 NOTE — TELEPHONE ENCOUNTER
Claudette Horton called to request a refill on her medication. Last office visit : 5/23/2023   Next office visit : 6/27/2023     Requested Prescriptions     Pending Prescriptions Disp Refills    blood glucose monitor kit and supplies 1 kit 0     Sig: Dispense sufficient amount for indicated testing frequency plus additional to accommodate PRN testing needs. Dispense all needed supplies to include: monitor, strips, lancing device, lancets, control solutions, alcohol swabs.  ICD:10 E11.8            Danny Mackey LPN

## 2023-06-05 RX ORDER — DULOXETIN HYDROCHLORIDE 30 MG/1
CAPSULE, DELAYED RELEASE ORAL
Qty: 30 CAPSULE | Refills: 5 | Status: SHIPPED | OUTPATIENT
Start: 2023-06-05

## 2023-06-05 NOTE — TELEPHONE ENCOUNTER
Evita Rodriguez called to request a refill on her medication.       Last office visit : 5/23/2023   Next office visit : 6/27/2023     Requested Prescriptions     Pending Prescriptions Disp Refills    DULoxetine (CYMBALTA) 30 MG extended release capsule [Pharmacy Med Name: DULOXETINE HCL 30MG CAPSULE DELAYED RELEASE PARTICLES] 30 capsule 5     Sig: TAKE 1 CAPSULE BY MOUTH DAILY            Soto Vázquez MA

## 2023-06-06 DIAGNOSIS — E11.8 TYPE 2 DIABETES MELLITUS WITH COMPLICATION (HCC): ICD-10-CM

## 2023-06-06 RX ORDER — DULAGLUTIDE 3 MG/.5ML
3 INJECTION, SOLUTION SUBCUTANEOUS WEEKLY
Qty: 4 ADJUSTABLE DOSE PRE-FILLED PEN SYRINGE | Refills: 5 | Status: SHIPPED | OUTPATIENT
Start: 2023-06-06

## 2023-06-06 RX ORDER — BLOOD-GLUCOSE METER
EACH MISCELLANEOUS
Refills: 0 | OUTPATIENT
Start: 2023-06-06

## 2023-06-06 NOTE — TELEPHONE ENCOUNTER
Lesa Loredo called to request a refill on her medication.       Last office visit : 5/23/2023   Next office visit : 6/27/2023     Requested Prescriptions     Pending Prescriptions Disp Refills    Dulaglutide (TRULICITY) 3 XL/2.1TZ SOPN 4 Adjustable Dose Pre-filled Pen Syringe 5     Sig: Inject 3 mg into the skin once a week            Dora Swan MA

## 2023-06-08 DIAGNOSIS — I10 ESSENTIAL HYPERTENSION: ICD-10-CM

## 2023-06-08 RX ORDER — GLUCOSAMINE HCL/CHONDROITIN SU 500-400 MG
CAPSULE ORAL
Qty: 100 STRIP | Refills: 3 | Status: SHIPPED | OUTPATIENT
Start: 2023-06-08

## 2023-06-08 RX ORDER — LANCETS 30 GAUGE
EACH MISCELLANEOUS
Qty: 100 EACH | Refills: 3 | Status: SHIPPED | OUTPATIENT
Start: 2023-06-08

## 2023-06-08 RX ORDER — METOPROLOL SUCCINATE 50 MG/1
TABLET, EXTENDED RELEASE ORAL
Qty: 90 TABLET | Refills: 1 | Status: SHIPPED | OUTPATIENT
Start: 2023-06-08

## 2023-06-08 NOTE — TELEPHONE ENCOUNTER
Maynor Viramontes called to request a refill on her medication. Last office visit : 5/23/2023   Next office visit : 6/27/2023     Requested Prescriptions     Pending Prescriptions Disp Refills    Lancets MISC 100 each 3     Sig: Use to test blood glucose levels E11.8    blood glucose monitor strips 100 strip 3     Sig: Test 3 times a day & as needed for symptoms of irregular blood glucose. Dispense sufficient amount for indicated testing frequency plus additional to accommodate PRN testing needs. E11.8    blood glucose monitor kit and supplies 1 kit 0     Sig: Dispense sufficient amount for indicated testing frequency plus additional to accommodate PRN testing needs. Dispense all needed supplies to include: monitor, strips, lancing device, lancets, control solutions, alcohol swabs. metoprolol succinate (TOPROL XL) 50 MG extended release tablet 90 tablet 1     Sig: TAKE 1 TABLET BY MOUTH ONCE DAILY FOR BLOOD PRESSURE AND HEART PROTECTION     Order did not go through to [facility]. Please resend. Patient is also wanting to know if she can complete a ultrasound and physical therapy. Please advise?         Clemente Harkins MA

## 2023-06-21 DIAGNOSIS — I10 ESSENTIAL HYPERTENSION: ICD-10-CM

## 2023-06-21 RX ORDER — LOSARTAN POTASSIUM 100 MG/1
TABLET ORAL
Qty: 90 TABLET | Refills: 1 | Status: SHIPPED | OUTPATIENT
Start: 2023-06-21

## 2023-06-21 NOTE — TELEPHONE ENCOUNTER
Flores Martínezany called to request a refill on her medication.       Last office visit : 5/23/2023   Next office visit : 6/27/2023     Requested Prescriptions     Pending Prescriptions Disp Refills    losartan (COZAAR) 100 MG tablet 30 tablet 1     Sig: TAKE 1 TABLET BY MOUTH ONCE DAILY FOR BLOOD PRESSURE            [facility] JOCELINE Jha

## 2023-06-22 ENCOUNTER — OFFICE VISIT (OUTPATIENT)
Dept: ENT CLINIC | Age: 78
End: 2023-06-22
Payer: MEDICARE

## 2023-06-22 VITALS
DIASTOLIC BLOOD PRESSURE: 72 MMHG | SYSTOLIC BLOOD PRESSURE: 130 MMHG | HEIGHT: 62 IN | BODY MASS INDEX: 49.32 KG/M2 | WEIGHT: 268 LBS

## 2023-06-22 DIAGNOSIS — J30.9 ALLERGIC RHINITIS, UNSPECIFIED SEASONALITY, UNSPECIFIED TRIGGER: Primary | ICD-10-CM

## 2023-06-22 DIAGNOSIS — H60.542 ECZEMA OF LEFT EXTERNAL EAR: ICD-10-CM

## 2023-06-22 PROCEDURE — 99203 OFFICE O/P NEW LOW 30 MIN: CPT | Performed by: PHYSICIAN ASSISTANT

## 2023-06-22 PROCEDURE — 3075F SYST BP GE 130 - 139MM HG: CPT | Performed by: PHYSICIAN ASSISTANT

## 2023-06-22 PROCEDURE — 3078F DIAST BP <80 MM HG: CPT | Performed by: PHYSICIAN ASSISTANT

## 2023-06-22 PROCEDURE — 1123F ACP DISCUSS/DSCN MKR DOCD: CPT | Performed by: PHYSICIAN ASSISTANT

## 2023-06-22 RX ORDER — MOMETASONE FUROATE 1 MG/G
CREAM TOPICAL
Qty: 15 G | Refills: 3 | Status: SHIPPED | OUTPATIENT
Start: 2023-06-22

## 2023-06-22 RX ORDER — ECHINACEA PURPUREA EXTRACT 125 MG
2 TABLET ORAL PRN
Qty: 1 EACH | Refills: 3 | Status: SHIPPED | OUTPATIENT
Start: 2023-06-22

## 2023-06-22 RX ORDER — FLUTICASONE PROPIONATE 50 MCG
2 SPRAY, SUSPENSION (ML) NASAL DAILY
Qty: 16 G | Refills: 2 | Status: SHIPPED | OUTPATIENT
Start: 2023-06-22

## 2023-06-22 ASSESSMENT — ENCOUNTER SYMPTOMS
EYE DISCHARGE: 0
FACIAL SWELLING: 0
TROUBLE SWALLOWING: 0
SINUS PAIN: 0
EYE PAIN: 0
SINUS PRESSURE: 0
VOICE CHANGE: 0
RHINORRHEA: 0
SORE THROAT: 0

## 2023-06-22 NOTE — ASSESSMENT & PLAN NOTE
Eczema of the left ear canal  Plan: I will place the patient on mometasone cream.  We will reevaluate in 4 to 6 weeks.

## 2023-06-22 NOTE — PROGRESS NOTES
180 capsule 2    aspirin 81 MG chewable tablet Take 1 tablet by mouth daily 30 tablet 3    Incontinence Supply Disposable (POISE PANTILINERS) PADS Use pads as needed daily for urine leakage. 1 each 11    insulin aspart (NOVOLOG FLEXPEN) 100 UNIT/ML injection pen INJECT 60 UNITS with breakfast, Inject 80 units with dinner 135 mL 2    gabapentin (NEURONTIN) 300 MG capsule TAKE ONE CAPSULE BY MOUTH THREE TIMES DAILY (VIAL) 180 capsule 5     No current facility-administered medications for this visit.        Past Surgical History:   Procedure Laterality Date    BREAST SURGERY Left 8/4/2015    left partial mastectomy on 8/4/15    EYE SURGERY      EYE SURGERY Bilateral 04/02/2017    Dr. Maye Hook      broke L ankle due to osteoporosis, has hardware in    FRACTURE SURGERY      Left ankle     HYSTERECTOMY (CERVIX STATUS UNKNOWN)      ID INCISION & DRAINAGE ABSCESS COMPLICATED/MULTIPLE N/A 7/24/2018    ABDOMINAL WALL ABSCESS INCISION AND DRAINAGE performed by Constantino Medina MD at [facility]  4/8/2016    Dr Juliet Boudreaux, (-) H Pylori    WRIST SURGERY         Past Medical History:   Diagnosis Date    Asthma     Has rescue inhalers    BiPAP (biphasic positive airway pressure) dependence     8cm to 21cm    Breast CA (HCC)     Left breast Nodules removed Took radiation    Chronic back pain 02/02/2016    Chronic kidney disease     Overactive bladder     COPD (chronic obstructive pulmonary disease) (HCC)     x few years Scarring on lungs 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    Diabetes mellitus ([address]     On insulin x 10 years    Fibromyalgia     for 20 years    GERD (gastroesophageal reflux disease)     History of blood transfusion     ?when    History of therapeutic radiation     Hyperlipidemia     High cholesterol    Hypertension     On medications for 2 years    Liver disease     Has fatty liver    Morbid obesity ([address]     Multiple food allergies     Obstructive

## 2023-06-22 NOTE — ASSESSMENT & PLAN NOTE
Allergic rhinitis with postnasal drip  Plan: I recommended trying the patient on Flonase every morning and saline nasal spray every afternoon. She is to follow-up in 4 to 6 weeks for reevaluation. Patient already is taking an antihistamine.

## 2023-06-29 RX ORDER — ANASTROZOLE 1 MG/1
1 TABLET ORAL DAILY
Qty: 30 TABLET | Refills: 3 | Status: SHIPPED | OUTPATIENT
Start: 2023-06-29

## 2023-07-05 RX ORDER — FUROSEMIDE 20 MG/1
TABLET ORAL
Qty: 30 TABLET | Refills: 1 | Status: SHIPPED | OUTPATIENT
Start: 2023-07-05

## 2023-07-05 NOTE — TELEPHONE ENCOUNTER
Doyle Mcginnis called to request a refill on her medication.       Last office visit : 5/23/2023   Next office visit : 7/25/2023     Requested Prescriptions     Pending Prescriptions Disp Refills    furosemide (LASIX) 20 MG tablet [Pharmacy Med Name: FUROSEMIDE 20MG TABLET] 30 tablet 1     Sig: TAKE 1 TABLET BY MOUTH ONCE DAILY            Burke Bernardo LPN

## 2023-07-10 DIAGNOSIS — E11.8 TYPE 2 DIABETES MELLITUS WITH COMPLICATION (HCC): ICD-10-CM

## 2023-07-10 NOTE — TELEPHONE ENCOUNTER
Rita Almodovar called to request a refill on her medication.       Last office visit : 5/23/2023   Next office visit : 7/25/2023     Requested Prescriptions     Pending Prescriptions Disp Refills    metFORMIN (GLUCOPHAGE) 1000 MG tablet 60 tablet 1     Sig: Take 1 tablet by mouth 2 times daily (with meals)            Rachelle Beasley MA

## 2023-07-17 DIAGNOSIS — E11.8 TYPE 2 DIABETES MELLITUS WITH COMPLICATION (HCC): ICD-10-CM

## 2023-07-17 RX ORDER — INSULIN ASPART 100 [IU]/ML
INJECTION, SOLUTION INTRAVENOUS; SUBCUTANEOUS
Qty: 135 ML | Refills: 2 | Status: SHIPPED | OUTPATIENT
Start: 2023-07-17 | End: 2023-12-10

## 2023-07-17 NOTE — TELEPHONE ENCOUNTER
Singh Higuera called to request a refill on her medication.       Last office visit : 5/23/2023   Next office visit : 7/25/2023     Requested Prescriptions     Pending Prescriptions Disp Refills    insulin aspart (NOVOLOG FLEXPEN) 100 UNIT/ML injection pen 135 mL 2     Sig: INJECT 60 UNITS with breakfast, Inject 80 units with dinner            Andrew Bermudez MA

## 2023-07-18 DIAGNOSIS — E11.8 TYPE 2 DIABETES MELLITUS WITH COMPLICATION (HCC): ICD-10-CM

## 2023-07-18 RX ORDER — INSULIN DEGLUDEC 200 U/ML
INJECTION, SOLUTION SUBCUTANEOUS
Qty: 18 ML | Refills: 5 | Status: SHIPPED | OUTPATIENT
Start: 2023-07-18

## 2023-07-18 NOTE — TELEPHONE ENCOUNTER
Singh Dejesusbride called to request a refill on her medication.       Last office visit : 5/23/2023   Next office visit : 7/25/2023     Requested Prescriptions     Pending Prescriptions Disp Refills    Insulin Degludec (TRESIBA FLEXTOUCH) 200 UNIT/ML SOPN [Pharmacy Med Name: Guero Hayesana FlexTouch U-200 insulin 200 unit/mL (3 mL) subcutaneous pen] 18 mL 5     Sig: INJECT 160 UNITS SUBCUTANEOUSLY NIGHTLY (ensure fasting blood sugars are ) (BULK)            Andrew Bermudez MA

## 2023-07-25 ENCOUNTER — OFFICE VISIT (OUTPATIENT)
Dept: PRIMARY CARE CLINIC | Age: 78
End: 2023-07-25
Payer: MEDICARE

## 2023-07-25 VITALS
BODY MASS INDEX: 49.32 KG/M2 | HEART RATE: 90 BPM | DIASTOLIC BLOOD PRESSURE: 64 MMHG | HEIGHT: 62 IN | WEIGHT: 268 LBS | OXYGEN SATURATION: 94 % | SYSTOLIC BLOOD PRESSURE: 108 MMHG | TEMPERATURE: 97.3 F

## 2023-07-25 DIAGNOSIS — G47.33 OSA (OBSTRUCTIVE SLEEP APNEA): ICD-10-CM

## 2023-07-25 DIAGNOSIS — E11.8 TYPE 2 DIABETES MELLITUS WITH COMPLICATION (HCC): ICD-10-CM

## 2023-07-25 DIAGNOSIS — Z00.00 MEDICARE ANNUAL WELLNESS VISIT, SUBSEQUENT: Primary | ICD-10-CM

## 2023-07-25 DIAGNOSIS — I10 ESSENTIAL HYPERTENSION: ICD-10-CM

## 2023-07-25 LAB
CHP ED QC CHECK: NORMAL
GLUCOSE BLD-MCNC: 125 MG/DL

## 2023-07-25 PROCEDURE — 3078F DIAST BP <80 MM HG: CPT | Performed by: FAMILY MEDICINE

## 2023-07-25 PROCEDURE — 1123F ACP DISCUSS/DSCN MKR DOCD: CPT | Performed by: FAMILY MEDICINE

## 2023-07-25 PROCEDURE — 3074F SYST BP LT 130 MM HG: CPT | Performed by: FAMILY MEDICINE

## 2023-07-25 PROCEDURE — G0439 PPPS, SUBSEQ VISIT: HCPCS | Performed by: FAMILY MEDICINE

## 2023-07-25 PROCEDURE — 3051F HG A1C>EQUAL 7.0%<8.0%: CPT | Performed by: FAMILY MEDICINE

## 2023-07-25 RX ORDER — INSULIN ASPART 100 [IU]/ML
INJECTION, SOLUTION INTRAVENOUS; SUBCUTANEOUS
Qty: 135 ML | Refills: 2 | Status: SHIPPED | OUTPATIENT
Start: 2023-07-25 | End: 2023-12-18

## 2023-07-25 ASSESSMENT — LIFESTYLE VARIABLES
HOW MANY STANDARD DRINKS CONTAINING ALCOHOL DO YOU HAVE ON A TYPICAL DAY: 1 OR 2
HOW OFTEN DO YOU HAVE A DRINK CONTAINING ALCOHOL: MONTHLY OR LESS

## 2023-07-25 ASSESSMENT — PATIENT HEALTH QUESTIONNAIRE - PHQ9
SUM OF ALL RESPONSES TO PHQ QUESTIONS 1-9: 0
2. FEELING DOWN, DEPRESSED OR HOPELESS: 0
SUM OF ALL RESPONSES TO PHQ QUESTIONS 1-9: 0
SUM OF ALL RESPONSES TO PHQ9 QUESTIONS 1 & 2: 0
1. LITTLE INTEREST OR PLEASURE IN DOING THINGS: 0

## 2023-07-25 NOTE — PATIENT INSTRUCTIONS
We are committed to providing you with the best care possible. In order to help us achieve these goals please remember to bring all medications, herbal products, and over the counter supplements with you to each visit. If your provider has ordered testing for you, please be sure to follow up with our office if you have not received results within 7 days after the testing took place. *If you receive a survey after visiting one of our offices, please take time to share your experience concerning your physician office visit. These surveys are confidential and no health information about you is shared. We are eager to improve for you and we are counting on your feedback to help make that happen. Preventing Falls: Care Instructions    Talk to your doctor about the medicines you take. Ask if any of them increase the risk of falls and whether they can be changed or stopped. Try to exercise regularly. It can help improve your strength and balance. This can help lower your risk of falling. Practice fall safety and prevention. Wear low-heeled shoes that fit well and give your feet good support. Talk to your doctor if you have foot problems that make this hard. Carry a cellphone or wear a medical alert device that you can use to call for help. Use stepladders instead of chairs to reach high objects. Don't climb if you're at risk for falls. Ask for help, if needed. Wear the correct eyeglasses, if you need them. Make your home safer. Remove rugs, cords, clutter, and furniture from walkways. Keep your house well lit. Use night-lights in hallways and bathrooms. Install and use sturdy handrails on stairways. Wear nonskid footwear, even inside. Don't walk barefoot or in socks without shoes. Be safe outside. Use handrails, curb cuts, and ramps whenever possible. Keep your hands free by using a shoulder bag or backpack. Try to walk in well-lit areas.  Watch out for uneven ground, changes

## 2023-07-27 RX ORDER — EMPAGLIFLOZIN 25 MG/1
TABLET, FILM COATED ORAL
Qty: 30 TABLET | Refills: 0 | OUTPATIENT
Start: 2023-07-27

## 2023-07-27 NOTE — TELEPHONE ENCOUNTER
Zora Holm called to request a refill on her medication.       Last office visit : 5/23/2023   Next office visit : 7/25/2023     Requested Prescriptions     Pending Prescriptions Disp Refills    JARDIANCE 25 MG tablet [Pharmacy Med Name: Kalyani Million 25MG TABLET] 30 tablet 0     Sig: TAKE 1 TABLET BY MOUTH ONCE DAILY            Maribel Del Castillo LPN

## 2023-08-03 RX ORDER — FUROSEMIDE 20 MG/1
TABLET ORAL
Qty: 30 TABLET | Refills: 1 | OUTPATIENT
Start: 2023-08-03

## 2023-08-03 NOTE — TELEPHONE ENCOUNTER
Esther Adamsarita called to request a refill on her medication.       Last office visit : 7/25/2023   Next office visit : 10/31/2023     Requested Prescriptions     Pending Prescriptions Disp Refills    furosemide (LASIX) 20 MG tablet [Pharmacy Med Name: FUROSEMIDE 20MG TABLET] 30 tablet 1     Sig: TAKE 1 TABLET BY MOUTH ONCE DAILY            Abril Craft LPN

## 2023-08-07 ENCOUNTER — TELEPHONE (OUTPATIENT)
Dept: PODIATRY | Facility: CLINIC | Age: 78
End: 2023-08-07
Payer: MEDICARE

## 2023-08-08 ENCOUNTER — OFFICE VISIT (OUTPATIENT)
Dept: PODIATRY | Facility: CLINIC | Age: 78
End: 2023-08-08
Payer: MEDICARE

## 2023-08-08 VITALS
DIASTOLIC BLOOD PRESSURE: 88 MMHG | WEIGHT: 268 LBS | BODY MASS INDEX: 49.32 KG/M2 | SYSTOLIC BLOOD PRESSURE: 140 MMHG | HEIGHT: 62 IN | HEART RATE: 83 BPM | OXYGEN SATURATION: 97 %

## 2023-08-08 DIAGNOSIS — Z79.4 TYPE 2 DIABETES MELLITUS WITH DIABETIC POLYNEUROPATHY, WITH LONG-TERM CURRENT USE OF INSULIN: ICD-10-CM

## 2023-08-08 DIAGNOSIS — M20.11 VALGUS DEFORMITY OF BOTH GREAT TOES: ICD-10-CM

## 2023-08-08 DIAGNOSIS — L60.2 THICKENED NAILS: Primary | ICD-10-CM

## 2023-08-08 DIAGNOSIS — E66.01 OBESITY, MORBID, BMI 40.0-49.9: ICD-10-CM

## 2023-08-08 DIAGNOSIS — L84 FOOT CALLUS: ICD-10-CM

## 2023-08-08 DIAGNOSIS — E11.42 TYPE 2 DIABETES MELLITUS WITH DIABETIC POLYNEUROPATHY, WITH LONG-TERM CURRENT USE OF INSULIN: ICD-10-CM

## 2023-08-08 DIAGNOSIS — M20.42 HAMMERTOE, BILATERAL: ICD-10-CM

## 2023-08-08 DIAGNOSIS — M20.41 HAMMERTOE, BILATERAL: ICD-10-CM

## 2023-08-08 DIAGNOSIS — Z86.31 H/O DIABETIC FOOT ULCER: ICD-10-CM

## 2023-08-08 DIAGNOSIS — R26.9 GAIT ABNORMALITY: ICD-10-CM

## 2023-08-08 DIAGNOSIS — I89.0 LYMPHEDEMA: ICD-10-CM

## 2023-08-08 DIAGNOSIS — I87.2 VENOUS INSUFFICIENCY OF BOTH LOWER EXTREMITIES: ICD-10-CM

## 2023-08-08 DIAGNOSIS — M20.12 VALGUS DEFORMITY OF BOTH GREAT TOES: ICD-10-CM

## 2023-08-11 ENCOUNTER — TELEPHONE (OUTPATIENT)
Dept: PRIMARY CARE CLINIC | Age: 78
End: 2023-08-11

## 2023-08-11 NOTE — TELEPHONE ENCOUNTER
Left vm for patient regarding US of knees for her pain. Patient has an upcoming appointment to discuss her knee pain and advised her we can discuss testing options at her appointment and to call with any questions or concerns.

## 2023-08-11 NOTE — TELEPHONE ENCOUNTER
----- Message from [facility] sent at 8/10/2023  3:49 PM CDT -----  Subject: Referral Request    Reason for referral request? Pt is requesting an ultrasound for the pain   in her knees   Provider patient wants to be referred to(if known):     Provider Phone Number(if known): Additional Information for Provider?  Both knees Left leg is more severe   ---------------------------------------------------------------------------  --------------  Ofe NELSON    [phone]; OK to leave message on voicemail  ---------------------------------------------------------------------------  --------------

## 2023-08-12 DIAGNOSIS — I10 ESSENTIAL (PRIMARY) HYPERTENSION: ICD-10-CM

## 2023-08-14 RX ORDER — OMEPRAZOLE 20 MG/1
CAPSULE, DELAYED RELEASE ORAL
Qty: 180 CAPSULE | Refills: 2 | OUTPATIENT
Start: 2023-08-14

## 2023-08-14 RX ORDER — EMPAGLIFLOZIN 25 MG/1
TABLET, FILM COATED ORAL
Qty: 90 TABLET | Refills: 2 | OUTPATIENT
Start: 2023-08-14

## 2023-08-14 RX ORDER — FUROSEMIDE 20 MG/1
TABLET ORAL
Qty: 90 TABLET | Refills: 2 | OUTPATIENT
Start: 2023-08-14

## 2023-08-14 RX ORDER — OXYBUTYNIN CHLORIDE 10 MG/1
TABLET, EXTENDED RELEASE ORAL
Qty: 90 TABLET | Refills: 2 | OUTPATIENT
Start: 2023-08-14

## 2023-08-14 RX ORDER — LOSARTAN POTASSIUM 100 MG/1
TABLET ORAL
Qty: 90 TABLET | Refills: 2 | OUTPATIENT
Start: 2023-08-14

## 2023-08-14 RX ORDER — MONTELUKAST SODIUM 10 MG/1
TABLET ORAL
Qty: 90 TABLET | Refills: 2 | OUTPATIENT
Start: 2023-08-14

## 2023-08-14 RX ORDER — ROSUVASTATIN CALCIUM 10 MG/1
TABLET, COATED ORAL
Qty: 90 TABLET | Refills: 2 | OUTPATIENT
Start: 2023-08-14

## 2023-08-16 ENCOUNTER — HOSPITAL ENCOUNTER (OUTPATIENT)
Dept: GENERAL RADIOLOGY | Age: 78
Discharge: HOME | End: 2023-08-16
Payer: MEDICARE

## 2023-08-16 ENCOUNTER — OFFICE VISIT (OUTPATIENT)
Dept: PULMONOLOGY | Age: 78
End: 2023-08-16

## 2023-08-16 ENCOUNTER — OFFICE VISIT (OUTPATIENT)
Dept: PRIMARY CARE CLINIC | Age: 78
End: 2023-08-16
Payer: MEDICARE

## 2023-08-16 VITALS
TEMPERATURE: 97.8 F | WEIGHT: 268 LBS | DIASTOLIC BLOOD PRESSURE: 62 MMHG | HEART RATE: 91 BPM | SYSTOLIC BLOOD PRESSURE: 120 MMHG | OXYGEN SATURATION: 96 % | HEIGHT: 62 IN | BODY MASS INDEX: 49.32 KG/M2

## 2023-08-16 VITALS
TEMPERATURE: 97.6 F | SYSTOLIC BLOOD PRESSURE: 120 MMHG | DIASTOLIC BLOOD PRESSURE: 62 MMHG | HEART RATE: 87 BPM | BODY MASS INDEX: 49.32 KG/M2 | HEIGHT: 62 IN | WEIGHT: 268 LBS | OXYGEN SATURATION: 97 %

## 2023-08-16 DIAGNOSIS — M25.562 CHRONIC PAIN OF BOTH KNEES: ICD-10-CM

## 2023-08-16 DIAGNOSIS — G89.29 CHRONIC PAIN OF BOTH KNEES: ICD-10-CM

## 2023-08-16 DIAGNOSIS — J30.9 ALLERGIC RHINITIS, UNSPECIFIED SEASONALITY, UNSPECIFIED TRIGGER: ICD-10-CM

## 2023-08-16 DIAGNOSIS — J45.909 UNCOMPLICATED ASTHMA, UNSPECIFIED ASTHMA SEVERITY, UNSPECIFIED WHETHER PERSISTENT: Primary | ICD-10-CM

## 2023-08-16 DIAGNOSIS — M25.561 CHRONIC PAIN OF BOTH KNEES: ICD-10-CM

## 2023-08-16 DIAGNOSIS — J44.9 CHRONIC OBSTRUCTIVE PULMONARY DISEASE, UNSPECIFIED COPD TYPE (HCC): ICD-10-CM

## 2023-08-16 DIAGNOSIS — Z99.89 BIPAP (BIPHASIC POSITIVE AIRWAY PRESSURE) DEPENDENCE: ICD-10-CM

## 2023-08-16 DIAGNOSIS — G47.33 OSA (OBSTRUCTIVE SLEEP APNEA): ICD-10-CM

## 2023-08-16 DIAGNOSIS — M25.562 CHRONIC PAIN OF BOTH KNEES: Primary | ICD-10-CM

## 2023-08-16 DIAGNOSIS — R06.02 SHORTNESS OF BREATH: ICD-10-CM

## 2023-08-16 DIAGNOSIS — R06.2 WHEEZING: ICD-10-CM

## 2023-08-16 DIAGNOSIS — G89.29 CHRONIC PAIN OF BOTH KNEES: Primary | ICD-10-CM

## 2023-08-16 DIAGNOSIS — M25.561 CHRONIC PAIN OF BOTH KNEES: Primary | ICD-10-CM

## 2023-08-16 PROCEDURE — 1123F ACP DISCUSS/DSCN MKR DOCD: CPT | Performed by: FAMILY MEDICINE

## 2023-08-16 PROCEDURE — 3074F SYST BP LT 130 MM HG: CPT | Performed by: FAMILY MEDICINE

## 2023-08-16 PROCEDURE — 73565 X-RAY EXAM OF KNEES: CPT

## 2023-08-16 PROCEDURE — 3078F DIAST BP <80 MM HG: CPT | Performed by: FAMILY MEDICINE

## 2023-08-16 PROCEDURE — 99213 OFFICE O/P EST LOW 20 MIN: CPT | Performed by: FAMILY MEDICINE

## 2023-08-16 RX ORDER — ALBUTEROL SULFATE 90 UG/1
2 AEROSOL, METERED RESPIRATORY (INHALATION) 4 TIMES DAILY PRN
Qty: 18 G | Refills: 5 | Status: SHIPPED | OUTPATIENT
Start: 2023-08-16

## 2023-08-16 ASSESSMENT — ENCOUNTER SYMPTOMS
BACK PAIN: 0
ANAL BLEEDING: 0
COUGH: 0
CHEST TIGHTNESS: 0
RHINORRHEA: 0
ABDOMINAL DISTENTION: 0
SHORTNESS OF BREATH: 1
APNEA: 1
ABDOMINAL PAIN: 0
WHEEZING: 1

## 2023-08-16 NOTE — PROGRESS NOTES
[facility] PRIMARY CARE  [facility],[address] [facility] [address]  [facility] [place] [address]  Dept: [phone]  Dept Fax: [phone]  Loc: [phone]      Subjective:     Chief Complaint   Patient presents with    Knee Pain     Bilateral pt reports she they are swelling        HPI:  Rani Jacome is a 78 y.o. female presents with c/o bilateral knee pain. Last xray of the L knee revealed moderate degenerative disease    ROS:   Review of Systems   Unable to perform ROS: Dementia   Constitutional:  Positive for activity change. HENT: Negative. Eyes: Negative. Respiratory: Negative. Cardiovascular: Negative. Gastrointestinal: Negative. Musculoskeletal:  Positive for arthralgias (at baseline). Skin: Negative.       PMHx:  Past Medical History:   Diagnosis Date    Asthma     Has rescue inhalers    BiPAP (biphasic positive airway pressure) dependence     8cm to 21cm    Breast CA (HCC)     Left breast Nodules removed Took radiation    Chronic back pain 02/02/2016    Chronic kidney disease     Overactive bladder     COPD (chronic obstructive pulmonary disease) (HCC)     x few years Scarring on lungs 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    Diabetes mellitus ([address])     On insulin x 10 years    Fibromyalgia     for 20 years    GERD (gastroesophageal reflux disease)     History of blood transfusion     ?when    History of therapeutic radiation     Hyperlipidemia     High cholesterol    Hypertension     On medications for 2 years    Liver disease     Has fatty liver    Morbid obesity ([address])     Multiple food allergies     Obstructive sleep apnea     AHI: 68.1 per [facility], 5/2018    Osteoarthritis 02/02/2016    Pneumonia     Postmenopausal osteoporosis     Psychiatric problem     Restless leg syndrome     S/P lumpectomy, left breast 08/19/2015 8/4/2015     Patient Active Problem List   Diagnosis    Postmenopausal osteoporosis    Type 2 diabetes mellitus with complication (HCC)    COPD (chronic

## 2023-08-16 NOTE — PROGRESS NOTES
Pulmonary and Sleep Medicine    Kj Alves (:  1945) is a 78 y.o. female,New patient, here for evaluation of the following chief complaint(s):  New Patient (Referred by Dr Trinidad Cast- RUFINA, DARSHAN )      Referring physician:  Kala [address]  [address],  [address]     ASSESSMENT/PLAN:  1. Uncomplicated asthma, unspecified asthma severity, unspecified whether persistent  2. Allergic rhinitis, unspecified seasonality, unspecified trigger  3. BiPAP (biphasic positive airway pressure) dependence  4. DARSHAN (obstructive sleep apnea)  5. Chronic obstructive pulmonary disease, unspecified COPD type ([address])  6. Shortness of breath  -     Echo 2d w doppler w color w contrast; Future  7. Wheezing  -     albuterol sulfate HFA (VENTOLIN HFA) 108 (90 Base) MCG/ACT inhaler; Inhale 2 puffs into the lungs 4 times daily as needed for Wheezing, Disp-18 g, R-5Normal        Shortness of breath predominantly with exertion and occasionally at rest.  Likely secondary to asthma versus obesity or both. Cannot exclude cardiac factors. We will obtain echocardiogram.  The patient was unable to perform pulmonary function testing. We will add rescue inhaler for use as needed and reevaluate her symptoms. Weight loss. Harjeet Mendoza [facility], [facility]    Return in about 4 weeks (around 2023). SUBJECTIVE/OBJECTIVE:  The patient is here for evaluation of shortness of breath and asthma. She is also diagnosed with COPD. The patient could not perform a pulmonary function study due to inability to follow directions in the past.  She admits to occasional wheezing. She admits to shortness of breath predominantly with exertion but occasionally also at rest.  She denies using any inhalers. She does have a history of obstructive sleep apnea and she is on BiPAP. She uses her BiPAP faithfully. I was asked to see her regarding the above.       Prior to Visit Medications    Medication Sig

## 2023-08-17 ASSESSMENT — ENCOUNTER SYMPTOMS
RESPIRATORY NEGATIVE: 1
GASTROINTESTINAL NEGATIVE: 1
EYES NEGATIVE: 1

## 2023-08-18 ENCOUNTER — TELEPHONE (OUTPATIENT)
Dept: VASCULAR SURGERY | Facility: CLINIC | Age: 78
End: 2023-08-18
Payer: MEDICARE

## 2023-08-29 DIAGNOSIS — E11.8 TYPE 2 DIABETES MELLITUS WITH COMPLICATION (HCC): ICD-10-CM

## 2023-08-29 DIAGNOSIS — E78.2 MIXED HYPERLIPIDEMIA: ICD-10-CM

## 2023-08-29 NOTE — TELEPHONE ENCOUNTER
Zora Jackylebron called to request a refill on her medication.       Last office visit : 8/16/2023   Next office visit : 8/30/2023     Requested Prescriptions     Pending Prescriptions Disp Refills    rosuvastatin (CRESTOR) 10 MG tablet 90 tablet 1     Sig: Take 1 tablet by mouth daily            Jose Lindsey MA

## 2023-08-30 ENCOUNTER — TELEPHONE (OUTPATIENT)
Dept: PRIMARY CARE CLINIC | Age: 78
End: 2023-08-30

## 2023-08-30 ENCOUNTER — OFFICE VISIT (OUTPATIENT)
Dept: PRIMARY CARE CLINIC | Age: 78
End: 2023-08-30
Payer: MEDICARE

## 2023-08-30 ENCOUNTER — HOSPITAL ENCOUNTER (OUTPATIENT)
Dept: NON INVASIVE DIAGNOSTICS | Age: 78
Discharge: HOME | End: 2023-08-30
Attending: INTERNAL MEDICINE
Payer: MEDICARE

## 2023-08-30 VITALS
OXYGEN SATURATION: 97 % | HEART RATE: 74 BPM | SYSTOLIC BLOOD PRESSURE: 130 MMHG | DIASTOLIC BLOOD PRESSURE: 82 MMHG | HEIGHT: 62 IN | TEMPERATURE: 97.4 F | BODY MASS INDEX: 49.02 KG/M2

## 2023-08-30 DIAGNOSIS — R06.02 SHORTNESS OF BREATH: ICD-10-CM

## 2023-08-30 DIAGNOSIS — M17.12 PRIMARY OSTEOARTHRITIS OF LEFT KNEE: ICD-10-CM

## 2023-08-30 DIAGNOSIS — M25.562 ARTHRALGIA OF LEFT KNEE: Primary | ICD-10-CM

## 2023-08-30 LAB
LV EF: 70 %
LVEF MODALITY: NORMAL

## 2023-08-30 PROCEDURE — 6360000004 HC RX CONTRAST MEDICATION: Performed by: INTERNAL MEDICINE

## 2023-08-30 PROCEDURE — C8929 TTE W OR WO FOL WCON,DOPPLER: HCPCS

## 2023-08-30 PROCEDURE — 20610 DRAIN/INJ JOINT/BURSA W/O US: CPT | Performed by: FAMILY MEDICINE

## 2023-08-30 PROCEDURE — 99999 PR OFFICE/OUTPT VISIT,PROCEDURE ONLY: CPT | Performed by: FAMILY MEDICINE

## 2023-08-30 RX ORDER — ROSUVASTATIN CALCIUM 10 MG/1
10 TABLET, COATED ORAL DAILY
Qty: 90 TABLET | Refills: 1 | Status: SHIPPED | OUTPATIENT
Start: 2023-08-30

## 2023-08-30 RX ORDER — TRIAMCINOLONE ACETONIDE 40 MG/ML
40 INJECTION, SUSPENSION INTRA-ARTICULAR; INTRAMUSCULAR ONCE
Status: COMPLETED | OUTPATIENT
Start: 2023-08-30 | End: 2023-08-30

## 2023-08-30 RX ORDER — IBUPROFEN 800 MG/1
TABLET ORAL
COMMUNITY
Start: 2023-08-28

## 2023-08-30 RX ADMIN — PERFLUTREN 1.5 ML: 6.52 INJECTION, SUSPENSION INTRAVENOUS at 11:37

## 2023-08-30 RX ADMIN — TRIAMCINOLONE ACETONIDE 40 MG: 40 INJECTION, SUSPENSION INTRA-ARTICULAR; INTRAMUSCULAR at 16:27

## 2023-08-30 NOTE — TELEPHONE ENCOUNTER
----- Message from Emily King MD sent at 8/21/2023  4:54 PM CDT -----  Xray of both knees confirm arthritis

## 2023-08-30 NOTE — TELEPHONE ENCOUNTER
Patient Demographics     Address Phone E-mail Address    [id number] [phone] (Home) *Preferred*  [phone] [facility]) [email or web address]      Emergency Contact(s)     Name Relation Home Work [address] [place] [address] Patient was notified of result during scheduled office visit with provider today 08/30/2023 for more than one day. · Drainage, tenderness, redness, swelling, persistent pain from the incisions, or new numbness in the legs or feet. · Persistent chest discomfort or pain (angina) that radiates to the neck, jaw, or arm.    · Nausea or profuse sweati TAKE these medications        Instructions Authorizing Provider    Morning Afternoon Evening As Needed    Amlodipine Besylate-Valsartan  MG Tabs   Commonly known as:  EXFORGE        Take 1 tablet by mouth daily.     Jon Delgado     [    ]    [ Take 1 tablet (25 mg total) by mouth Daily Beta Blocker. Edmund Pretty     [    ]    [    ]    [    ]    [    ]       MULTI VITAMIN DAILY Tabs   Last time this was given:  1 tablet on 4/5/2017  9:03 AM        Take 1 tablet by mouth daily. [id number] Metoprolol Succinate ER (Toprol XL) 24 hr tab 25 mg 04/05/17 0611 Given      [id number] Vancomycin HCl (VANCOCIN) 1,750 mg in sodium chloride 0.9 % 500 mL IVPB 04/04/17 1426 New Bag      [id number] Zolpidem Tartrate (AMBIEN) tab 5 mg 04/04/17 9472 25-35 mg/dL  High CHD risk    35-45 mg/dL  Moderate CHD risk    45-60 mg/dL  Average CHD risk    60-75 md/dL  Below average CHD risk       LDL Cholesterol 86 <130 mg/dL  [facility]   VLDL 14 5-40 mg/dL  [facility]   Chol/HDL Ratio 3.78 <4.97   [facility] CO2 25.0 22.0-32.0 mmol/L  Jacob Karlo            MAGNESIUM [[id number]] (Normal)  Resulted: 04/05/17 0655, Result status: Final result    Ordering provider: Cara Damon NP  04/04/17 2457 Resulting lab: [facility]    Specimen Information    Type Source C [address] [facility] [id number] 02/03/16 1201 - Present            Microbiology Results (All)     Procedure Component Value Units Date/Time    MRSA Culture Only Once [[id number]] Collected:  04/04/17 1200    Order Status:  Completed Lab Status:  Preliminary result Upda • Ejection fraction < 50%      34%  Echo 8/28/13   • Pulmonary hypertension ([facility] [address]    • GI bleed      3/2015   • LV dysfunction    • Atherosclerosis of coronary artery      mild to moderate   • [facility]           Past Surgical History    ARIN GASTROINTESTINAL:  No nausea, vomiting or diarrhea. GENITOURINARY:  No dysuria, frequency or urgency. MUSCULOSKELETAL:  No arthritis  SKIN:  No change in skin, hair or nails. NEUROLOGIC:  No paresthesias, weakness, or numbness.   PSYCHIATRIC:  No disorde 3/10/2017  PROCEDURE:  BILATERAL VASCULAR CAROTID DOPPLER  COMPARISON:  None.   INDICATIONS:  Z01.810 Encounter for preprocedural cardiovascular examination I35.0 Nonrheumatic aortic (valve) stenosis  TECHNIQUE:  Real-time gray-scale and duplex ultrasound w cardiovascular examination I35.0 Nonrheumatic aortic (valve) stenosis  TECHNIQUE:  After obtaining the patient's consent, CT images of the abdomen, pelvis, and lower extremities were obtained pre- and post- injection of non-ionic intravenous contrast mater Evidence of prior hernia repair on the right inguinal region. .  No mass or hernia. URINARY BLADDER:  Small bladder diverticulum may represent Hutch diverticulum. .  No visible focal wall thickening, lesion, or calculus.   PELVIC NODES:  Normal.  No adenopat post CABG surgery. The vascularity is within normal limits. There is no mass or consolidation. Minimal scarring in the CP angles. No significant effusion. 4/3/2017  CONCLUSION:  Uncomplicated cardiomegaly. Stable tortuous thoracic aorta.     Dictated b NON-CONTRAST IMAGES:  LV OUTFLOW TRACT:   There is no calcification of the left ventricular outflow tract. AORTIC VALVE: The aortic valve is bioprosthetic, and has calcified leaflets. MITRAL ANNULUS: There is no mitral annular calcification.  ASCENDING AORT normal in size. LM: The left main coronary artery originates from the left coronary cusp. Its origin is 9.9 mm superior to the aortic valve annulus. It is a normal sized vessel. LAD: The left anterior descending coronary artery is a large vessel.  It give ADDENDUM:  An aortic and somewhat nodular focus in the inferomedial right upper lobe adjacent to the mediastinum on image 50 series 6 measures 9 x 9 and shows some thin linear tags to the pleura. This could represent scarring or a pulmonary nodule.  Lydia Tenorio calcification of the left ventricular outflow tract. AORTIC VALVE: The aortic valve is bioprosthetic, and has calcified leaflets. MITRAL ANNULUS: There is no mitral annular calcification. ASCENDING AORTA: There is mild calcification of the ascending aorta. left coronary cusp. Its origin is 9.9 mm superior to the aortic valve annulus. It is a normal sized vessel. LAD: The left anterior descending coronary artery is a large vessel. It gives off a diagonal branch.  It is heavily calcified; angiographic stenosis -Plan for TAVR tomorrow  -chest xr personally reviewed - clear    #Acute on chronic Diastolic HF  -2/2 above, bnp elevated    #HTN  -cont bp meds, changes per cards    #Chronic afib  -cont sotolol, on heparin gtt    PPX: on heparin gtt    Rupal Jean, 3/10/2017  PROCEDURE:  BILATERAL VASCULAR CAROTID DOPPLER  COMPARISON:  None.  INDICATIONS:  Z01.810 Encounter for preprocedural cardiovascular examination I35.0 Nonrheumatic aortic (valve) stenosis  TECHNIQUE:  Real-time gray-scale and duplex ultrasound w cardiovascular examination I35.0 Nonrheumatic aortic (valve) stenosis  TECHNIQUE:  After obtaining the patient's consent, CT images of the abdomen, pelvis, and lower extremities were obtained pre- and post- injection of non-ionic intravenous contrast mater Evidence of prior hernia repair on the right inguinal region. .  No mass or hernia.  URINARY BLADDER:  Small bladder diverticulum may represent Hutch diverticulum. .  No visible focal wall thickening, lesion, or calculus.  PELVIC NODES:  Normal.  No adenopat status post CABG surgery. The vascularity is within normal limits. There is no mass or consolidation. Minimal scarring in the CP angles. No significant effusion.      4/3/2017  CONCLUSION:  Uncomplicated cardiomegaly.  Stable tortuous thoracic aorta.    Dic NON-CONTRAST IMAGES:  LV OUTFLOW TRACT:   There is no calcification of the left ventricular outflow tract. AORTIC VALVE: The aortic valve is bioprosthetic, and has calcified leaflets. MITRAL ANNULUS: There is no mitral annular calcification.  ASCENDING AORT normal in size.   LM: The left main coronary artery originates from the left coronary cusp. Its origin is 9.9 mm superior to the aortic valve annulus. It is a normal sized vessel. LAD: The left anterior descending coronary artery is a large vessel.  It give ADDENDUM:  An aortic and somewhat nodular focus in the inferomedial right upper lobe adjacent to the mediastinum on image 50 series 6 measures 9 x 9 and shows some thin linear tags to the pleura. This could represent scarring or a pulmonary nodule.  [facility] TRACT:   There is no calcification of the left ventricular outflow tract. AORTIC VALVE: The aortic valve is bioprosthetic, and has calcified leaflets. MITRAL ANNULUS: There is no mitral annular calcification.  ASCENDING AORTA: There is mild calcification of main coronary artery originates from the left coronary cusp. Its origin is 9.9 mm superior to the aortic valve annulus. It is a normal sized vessel. LAD: The left anterior descending coronary artery is a large vessel. It gives off a diagonal branch.  It is 70year old with symptomatic aortic stenosis  Pt seen in TAVR clinic by Natalie Brock and Raj  STS Risk Score:  2.9%     Euroscore: 2.9%  Pt was seen and examined by me today and after reviewing the chart and talking with the patient   I agree with their ass Discharge Condition: stable    Disposition: home with home health    Important Follow up:    No follow-up provider specified.       Hospital Course:        History of Present Illness: Patient is a 70year old male with PMH sig for HTN, HL, AS, and chronic a omega-3 fatty acids 1000 MG Oral Cap  Take 1,000 mg by mouth 3 (three) times daily. Multiple Vitamin (MULTI VITAMIN DAILY) Oral Tab  Take 1 tablet by mouth daily. apixaban (ELIQUIS) 5 MG Oral Tab  Take 5 mg by mouth 2 (two) times daily.           Ac • Pulmonary hypertension ([facility] [address]    • GI bleed      3/2015   • LV dysfunction    • Atherosclerosis of coronary artery      mild to moderate   • Polycythemia vera [facility])        Past Surgical History      Past Surgical History    HERNIA SURGERY      CHOLECYSTEC AM-PAC '6-Clicks' INPATIENT SHORT FORM - BASIC MOBILITY  How much difficulty does the patient currently have. ..  -   Turning over in bed (including adjusting bedclothes, sheets and blankets)?: None   -   Sitting down on and standing up from a chair with ar mobility, transfers, ambulation, or stair negotiation. Therefore, Pt does not require further skilled IP PT at this time. Will D/C PT. Recommend Pt D/C home.     PT Discharge Recommendations: Home    PLAN  Patient has been evaluated and presents with no ski

## 2023-08-30 NOTE — PROGRESS NOTES
answered. Medications, including possible adverse effects, and instructions were reviewed and  understanding was confirmed. Follow-up recommendations, including when to contact or return to office (ie; if symptoms worsen or fail to improve), were discussed and acknowledged.     Electronically signed by Sarina Vazquez MD on 8/30/23 at 2:25 PM CDT

## 2023-09-04 ENCOUNTER — APPOINTMENT (OUTPATIENT)
Dept: GENERAL RADIOLOGY | Age: 78
DRG: 641 | End: 2023-09-04
Payer: MEDICARE

## 2023-09-04 ENCOUNTER — HOSPITAL ENCOUNTER (INPATIENT)
Age: 78
LOS: 2 days | Discharge: HOME | DRG: 641 | End: 2023-09-06
Attending: PEDIATRICS | Admitting: HOSPITALIST
Payer: MEDICARE

## 2023-09-04 ENCOUNTER — APPOINTMENT (OUTPATIENT)
Dept: CT IMAGING | Age: 78
DRG: 641 | End: 2023-09-04
Payer: MEDICARE

## 2023-09-04 DIAGNOSIS — I63.9 CEREBROVASCULAR ACCIDENT (CVA), UNSPECIFIED MECHANISM (HCC): Primary | ICD-10-CM

## 2023-09-04 DIAGNOSIS — N18.9 CHRONIC KIDNEY DISEASE, UNSPECIFIED CKD STAGE: ICD-10-CM

## 2023-09-04 DIAGNOSIS — R09.89 PULMONARY VASCULAR CONGESTION: ICD-10-CM

## 2023-09-04 DIAGNOSIS — E87.1 HYPONATREMIA: ICD-10-CM

## 2023-09-04 PROBLEM — E87.5 HYPERKALEMIA: Status: ACTIVE | Noted: 2023-09-04

## 2023-09-04 LAB
ALBUMIN SERPL-MCNC: 4.1 G/DL (ref 3.5–5.2)
ALP SERPL-CCNC: 97 U/L (ref 35–104)
ALT SERPL-CCNC: 28 U/L (ref 5–33)
ANION GAP SERPL CALCULATED.3IONS-SCNC: 11 MMOL/L (ref 7–19)
AST SERPL-CCNC: 23 U/L (ref 5–32)
BASE EXCESS ARTERIAL: -1.1 MMOL/L (ref -2–2)
BASOPHILS # BLD: 0.1 K/UL (ref 0–0.2)
BASOPHILS NFR BLD: 0.6 % (ref 0–1)
BILIRUB SERPL-MCNC: 0.4 MG/DL (ref 0.2–1.2)
BILIRUB UR QL STRIP: NEGATIVE
BNP BLD-MCNC: 304 PG/ML (ref 0–449)
BUN SERPL-MCNC: 15 MG/DL (ref 8–23)
CALCIUM SERPL-MCNC: 8.5 MG/DL (ref 8.8–10.2)
CARBOXYHEMOGLOBIN ARTERIAL: 1.3 % (ref 0–5)
CHLORIDE SERPL-SCNC: 93 MMOL/L (ref 98–111)
CLARITY UR: CLEAR
CO2 SERPL-SCNC: 22 MMOL/L (ref 22–29)
COLOR UR: YELLOW
CREAT SERPL-MCNC: 0.8 MG/DL (ref 0.5–0.9)
CREAT UR-MCNC: 14.1 MG/DL (ref 28–217)
EOSINOPHIL # BLD: 0.2 K/UL (ref 0–0.6)
EOSINOPHIL NFR BLD: 1.8 % (ref 0–5)
EOSINOPHIL URNS QL MICRO: NORMAL
ERYTHROCYTE [DISTWIDTH] IN BLOOD BY AUTOMATED COUNT: 14.6 % (ref 11.5–14.5)
FIO2: 21 %
GLUCOSE BLD-MCNC: 179 MG/DL (ref 70–99)
GLUCOSE SERPL-MCNC: 179 MG/DL (ref 74–109)
GLUCOSE UR STRIP.AUTO-MCNC: =>1000 MG/DL
HCO3 ARTERIAL: 22.4 MMOL/L (ref 22–26)
HCT VFR BLD AUTO: 41.1 % (ref 37–47)
HEMOGLOBIN, ART, EXTENDED: 13.5 G/DL (ref 12–16)
HGB BLD-MCNC: 13.3 G/DL (ref 12–16)
HGB UR STRIP.AUTO-MCNC: NEGATIVE MG/L
IMM GRANULOCYTES # BLD: 0 K/UL
INR PPP: 1.13 (ref 0.88–1.18)
KETONES UR STRIP.AUTO-MCNC: NEGATIVE MG/DL
LEUKOCYTE ESTERASE UR QL STRIP.AUTO: NEGATIVE
LYMPHOCYTES # BLD: 3.1 K/UL (ref 1.1–4.5)
LYMPHOCYTES NFR BLD: 25.5 % (ref 20–40)
MCH RBC QN AUTO: 26.9 PG (ref 27–31)
MCHC RBC AUTO-ENTMCNC: 32.4 G/DL (ref 33–37)
MCV RBC AUTO: 83.2 FL (ref 81–99)
METHEMOGLOBIN ARTERIAL: 0.9 %
MONOCYTES # BLD: 1 K/UL (ref 0–0.9)
MONOCYTES NFR BLD: 7.9 % (ref 0–10)
NEUTROPHILS # BLD: 7.7 K/UL (ref 1.5–7.5)
NEUTS SEG NFR BLD: 63.9 % (ref 50–65)
NITRITE UR QL STRIP.AUTO: NEGATIVE
O2 CONTENT ARTERIAL: 18.1 ML/DL
O2 SAT, ARTERIAL: 95.1 %
O2 THERAPY: ABNORMAL
OSMOLALITY URINE: 211 MOSM/KG (ref 250–1200)
PCO2 ARTERIAL: 33 MMHG (ref 35–45)
PERFORMED ON: ABNORMAL
PH ARTERIAL: 7.44 (ref 7.35–7.45)
PH UR STRIP.AUTO: 6.5 [PH] (ref 5–8)
PLATELET # BLD AUTO: 302 K/UL (ref 130–400)
PMV BLD AUTO: 9.1 FL (ref 9.4–12.3)
PO2 ARTERIAL: 86 MMHG (ref 80–100)
POTASSIUM BLD-SCNC: 4.9 MMOL/L
POTASSIUM SERPL-SCNC: 5.4 MMOL/L (ref 3.5–5)
PROCALCITONIN: 0.04 NG/ML (ref 0–0.09)
PROT SERPL-MCNC: 6.8 G/DL (ref 6.6–8.7)
PROT UR STRIP.AUTO-MCNC: NEGATIVE MG/DL
PROTHROMBIN TIME: 14.2 SEC (ref 12–14.6)
RBC # BLD AUTO: 4.94 M/UL (ref 4.2–5.4)
SODIUM SERPL-SCNC: 126 MMOL/L (ref 136–145)
SODIUM UR-SCNC: 33 MMOL/L
SP GR UR STRIP.AUTO: 1.01 (ref 1–1.03)
TROPONIN T SERPL-MCNC: <0.01 NG/ML (ref 0–0.03)
TSH SERPL DL<=0.005 MIU/L-ACNC: 2.57 UIU/ML (ref 0.35–5.5)
UROBILINOGEN UR STRIP.AUTO-MCNC: 1 E.U./DL
WBC # BLD AUTO: 12.1 K/UL (ref 4.8–10.8)

## 2023-09-04 PROCEDURE — 85610 PROTHROMBIN TIME: CPT

## 2023-09-04 PROCEDURE — 6360000004 HC RX CONTRAST MEDICATION: Performed by: PEDIATRICS

## 2023-09-04 PROCEDURE — 36415 COLL VENOUS BLD VENIPUNCTURE: CPT

## 2023-09-04 PROCEDURE — 70498 CT ANGIOGRAPHY NECK: CPT

## 2023-09-04 PROCEDURE — 87205 SMEAR GRAM STAIN: CPT

## 2023-09-04 PROCEDURE — 71046 X-RAY EXAM CHEST 2 VIEWS: CPT

## 2023-09-04 PROCEDURE — 83935 ASSAY OF URINE OSMOLALITY: CPT

## 2023-09-04 PROCEDURE — 6360000002 HC RX W HCPCS: Performed by: PEDIATRICS

## 2023-09-04 PROCEDURE — 83880 ASSAY OF NATRIURETIC PEPTIDE: CPT

## 2023-09-04 PROCEDURE — 36600 WITHDRAWAL OF ARTERIAL BLOOD: CPT

## 2023-09-04 PROCEDURE — 70450 CT HEAD/BRAIN W/O DYE: CPT

## 2023-09-04 PROCEDURE — 82962 GLUCOSE BLOOD TEST: CPT

## 2023-09-04 PROCEDURE — 85025 COMPLETE CBC W/AUTO DIFF WBC: CPT

## 2023-09-04 PROCEDURE — 82803 BLOOD GASES ANY COMBINATION: CPT

## 2023-09-04 PROCEDURE — 71045 X-RAY EXAM CHEST 1 VIEW: CPT

## 2023-09-04 PROCEDURE — 84484 ASSAY OF TROPONIN QUANT: CPT

## 2023-09-04 PROCEDURE — 99285 EMERGENCY DEPT VISIT HI MDM: CPT

## 2023-09-04 PROCEDURE — 96374 THER/PROPH/DIAG INJ IV PUSH: CPT

## 2023-09-04 PROCEDURE — 84145 PROCALCITONIN (PCT): CPT

## 2023-09-04 PROCEDURE — 84300 ASSAY OF URINE SODIUM: CPT

## 2023-09-04 PROCEDURE — 1210000000 HC MED SURG R&B

## 2023-09-04 PROCEDURE — 84443 ASSAY THYROID STIM HORMONE: CPT

## 2023-09-04 PROCEDURE — 93005 ELECTROCARDIOGRAM TRACING: CPT | Performed by: PEDIATRICS

## 2023-09-04 PROCEDURE — 82570 ASSAY OF URINE CREATININE: CPT

## 2023-09-04 PROCEDURE — 81003 URINALYSIS AUTO W/O SCOPE: CPT

## 2023-09-04 PROCEDURE — 80053 COMPREHEN METABOLIC PANEL: CPT

## 2023-09-04 RX ORDER — FUROSEMIDE 10 MG/ML
40 INJECTION INTRAMUSCULAR; INTRAVENOUS ONCE
Status: COMPLETED | OUTPATIENT
Start: 2023-09-04 | End: 2023-09-04

## 2023-09-04 RX ADMIN — IOPAMIDOL 90 ML: 755 INJECTION, SOLUTION INTRAVENOUS at 21:05

## 2023-09-04 RX ADMIN — FUROSEMIDE 40 MG: 10 INJECTION, SOLUTION INTRAMUSCULAR; INTRAVENOUS at 22:26

## 2023-09-04 ASSESSMENT — ENCOUNTER SYMPTOMS
SHORTNESS OF BREATH: 1
RHINORRHEA: 0
DIARRHEA: 0
FACIAL SWELLING: 1
COUGH: 1
VOMITING: 0
BACK PAIN: 1

## 2023-09-05 ENCOUNTER — APPOINTMENT (OUTPATIENT)
Dept: MRI IMAGING | Age: 78
DRG: 641 | End: 2023-09-05
Payer: MEDICARE

## 2023-09-05 LAB
ANION GAP SERPL CALCULATED.3IONS-SCNC: 12 MMOL/L (ref 7–19)
BASOPHILS # BLD: 0.1 K/UL (ref 0–0.2)
BASOPHILS NFR BLD: 0.6 % (ref 0–1)
BUN SERPL-MCNC: 15 MG/DL (ref 8–23)
CALCIUM SERPL-MCNC: 9.2 MG/DL (ref 8.8–10.2)
CHLORIDE SERPL-SCNC: 98 MMOL/L (ref 98–111)
CO2 SERPL-SCNC: 24 MMOL/L (ref 22–29)
CREAT SERPL-MCNC: 0.8 MG/DL (ref 0.5–0.9)
EOSINOPHIL # BLD: 0.3 K/UL (ref 0–0.6)
EOSINOPHIL NFR BLD: 3.1 % (ref 0–5)
ERYTHROCYTE [DISTWIDTH] IN BLOOD BY AUTOMATED COUNT: 14.7 % (ref 11.5–14.5)
GLUCOSE BLD-MCNC: 141 MG/DL (ref 70–99)
GLUCOSE BLD-MCNC: 151 MG/DL (ref 70–99)
GLUCOSE BLD-MCNC: 179 MG/DL (ref 70–99)
GLUCOSE BLD-MCNC: 196 MG/DL (ref 70–99)
GLUCOSE BLD-MCNC: 196 MG/DL (ref 70–99)
GLUCOSE SERPL-MCNC: 176 MG/DL (ref 74–109)
HCT VFR BLD AUTO: 45.9 % (ref 37–47)
HGB BLD-MCNC: 14.9 G/DL (ref 12–16)
IMM GRANULOCYTES # BLD: 0 K/UL
LYMPHOCYTES # BLD: 3.2 K/UL (ref 1.1–4.5)
LYMPHOCYTES NFR BLD: 33.9 % (ref 20–40)
MAGNESIUM SERPL-MCNC: 2 MG/DL (ref 1.6–2.4)
MCH RBC QN AUTO: 27 PG (ref 27–31)
MCHC RBC AUTO-ENTMCNC: 32.5 G/DL (ref 33–37)
MCV RBC AUTO: 83.2 FL (ref 81–99)
MONOCYTES # BLD: 0.8 K/UL (ref 0–0.9)
MONOCYTES NFR BLD: 8.1 % (ref 0–10)
NEUTROPHILS # BLD: 5 K/UL (ref 1.5–7.5)
NEUTS SEG NFR BLD: 54 % (ref 50–65)
PERFORMED ON: ABNORMAL
PHOSPHATE SERPL-MCNC: 3.9 MG/DL (ref 2.5–4.5)
PLATELET # BLD AUTO: 303 K/UL (ref 130–400)
PMV BLD AUTO: 9.1 FL (ref 9.4–12.3)
POTASSIUM SERPL-SCNC: 4.7 MMOL/L (ref 3.5–5)
POTASSIUM SERPL-SCNC: 4.9 MMOL/L (ref 3.5–5)
RBC # BLD AUTO: 5.52 M/UL (ref 4.2–5.4)
SODIUM SERPL-SCNC: 134 MMOL/L (ref 136–145)
TROPONIN T SERPL-MCNC: <0.01 NG/ML (ref 0–0.03)
WBC # BLD AUTO: 9.4 K/UL (ref 4.8–10.8)

## 2023-09-05 PROCEDURE — 1210000000 HC MED SURG R&B

## 2023-09-05 PROCEDURE — 70551 MRI BRAIN STEM W/O DYE: CPT

## 2023-09-05 PROCEDURE — 85025 COMPLETE CBC W/AUTO DIFF WBC: CPT

## 2023-09-05 PROCEDURE — 80048 BASIC METABOLIC PNL TOTAL CA: CPT

## 2023-09-05 PROCEDURE — 83735 ASSAY OF MAGNESIUM: CPT

## 2023-09-05 PROCEDURE — 84100 ASSAY OF PHOSPHORUS: CPT

## 2023-09-05 PROCEDURE — 84132 ASSAY OF SERUM POTASSIUM: CPT

## 2023-09-05 PROCEDURE — 6360000002 HC RX W HCPCS: Performed by: STUDENT IN AN ORGANIZED HEALTH CARE EDUCATION/TRAINING PROGRAM

## 2023-09-05 PROCEDURE — 82962 GLUCOSE BLOOD TEST: CPT

## 2023-09-05 PROCEDURE — 36415 COLL VENOUS BLD VENIPUNCTURE: CPT

## 2023-09-05 PROCEDURE — 94760 N-INVAS EAR/PLS OXIMETRY 1: CPT

## 2023-09-05 PROCEDURE — 2580000003 HC RX 258: Performed by: HOSPITALIST

## 2023-09-05 PROCEDURE — 6370000000 HC RX 637 (ALT 250 FOR IP): Performed by: HOSPITALIST

## 2023-09-05 PROCEDURE — 84484 ASSAY OF TROPONIN QUANT: CPT

## 2023-09-05 PROCEDURE — 94660 CPAP INITIATION&MGMT: CPT

## 2023-09-05 RX ORDER — GABAPENTIN 300 MG/1
300 CAPSULE ORAL 3 TIMES DAILY
Status: DISCONTINUED | OUTPATIENT
Start: 2023-09-05 | End: 2023-09-06 | Stop reason: HOSPADM

## 2023-09-05 RX ORDER — METOPROLOL SUCCINATE 50 MG/1
50 TABLET, EXTENDED RELEASE ORAL DAILY
Status: DISCONTINUED | OUTPATIENT
Start: 2023-09-05 | End: 2023-09-06 | Stop reason: HOSPADM

## 2023-09-05 RX ORDER — HEPARIN SODIUM 5000 [USP'U]/ML
5000 INJECTION, SOLUTION INTRAVENOUS; SUBCUTANEOUS EVERY 8 HOURS SCHEDULED
Status: DISCONTINUED | OUTPATIENT
Start: 2023-09-05 | End: 2023-09-05

## 2023-09-05 RX ORDER — ASPIRIN 81 MG/1
81 TABLET, CHEWABLE ORAL DAILY
Status: DISCONTINUED | OUTPATIENT
Start: 2023-09-05 | End: 2023-09-06 | Stop reason: HOSPADM

## 2023-09-05 RX ORDER — ACETAMINOPHEN 325 MG/1
650 TABLET ORAL EVERY 4 HOURS PRN
Status: DISCONTINUED | OUTPATIENT
Start: 2023-09-05 | End: 2023-09-06 | Stop reason: HOSPADM

## 2023-09-05 RX ORDER — MECOBALAMIN 5000 MCG
5 TABLET,DISINTEGRATING ORAL NIGHTLY PRN
Status: DISCONTINUED | OUTPATIENT
Start: 2023-09-05 | End: 2023-09-06 | Stop reason: HOSPADM

## 2023-09-05 RX ORDER — FLUTICASONE PROPIONATE 50 MCG
2 SPRAY, SUSPENSION (ML) NASAL DAILY
Status: DISCONTINUED | OUTPATIENT
Start: 2023-09-05 | End: 2023-09-06 | Stop reason: HOSPADM

## 2023-09-05 RX ORDER — SODIUM CHLORIDE 9 MG/ML
INJECTION, SOLUTION INTRAVENOUS PRN
Status: DISCONTINUED | OUTPATIENT
Start: 2023-09-05 | End: 2023-09-06 | Stop reason: HOSPADM

## 2023-09-05 RX ORDER — ACETAMINOPHEN 650 MG/1
650 SUPPOSITORY RECTAL EVERY 4 HOURS PRN
Status: DISCONTINUED | OUTPATIENT
Start: 2023-09-05 | End: 2023-09-06 | Stop reason: HOSPADM

## 2023-09-05 RX ORDER — DEXTROSE MONOHYDRATE 100 MG/ML
INJECTION, SOLUTION INTRAVENOUS CONTINUOUS PRN
Status: DISCONTINUED | OUTPATIENT
Start: 2023-09-05 | End: 2023-09-06 | Stop reason: HOSPADM

## 2023-09-05 RX ORDER — LANOLIN ALCOHOL/MO/W.PET/CERES
400 CREAM (GRAM) TOPICAL DAILY
Status: DISCONTINUED | OUTPATIENT
Start: 2023-09-05 | End: 2023-09-06 | Stop reason: HOSPADM

## 2023-09-05 RX ORDER — INSULIN GLARGINE 100 [IU]/ML
64 INJECTION, SOLUTION SUBCUTANEOUS DAILY
Status: DISCONTINUED | OUTPATIENT
Start: 2023-09-06 | End: 2023-09-06 | Stop reason: HOSPADM

## 2023-09-05 RX ORDER — ALBUTEROL SULFATE 2.5 MG/3ML
2.5 SOLUTION RESPIRATORY (INHALATION) EVERY 4 HOURS PRN
Status: DISCONTINUED | OUTPATIENT
Start: 2023-09-05 | End: 2023-09-06 | Stop reason: HOSPADM

## 2023-09-05 RX ORDER — ANASTROZOLE 1 MG/1
1 TABLET ORAL DAILY
Status: DISCONTINUED | OUTPATIENT
Start: 2023-09-05 | End: 2023-09-06 | Stop reason: HOSPADM

## 2023-09-05 RX ORDER — SODIUM CHLORIDE 0.9 % (FLUSH) 0.9 %
5-40 SYRINGE (ML) INJECTION PRN
Status: DISCONTINUED | OUTPATIENT
Start: 2023-09-05 | End: 2023-09-06 | Stop reason: HOSPADM

## 2023-09-05 RX ORDER — FUROSEMIDE 20 MG/1
20 TABLET ORAL DAILY
Status: DISCONTINUED | OUTPATIENT
Start: 2023-09-05 | End: 2023-09-06 | Stop reason: HOSPADM

## 2023-09-05 RX ORDER — ROSUVASTATIN CALCIUM 10 MG/1
10 TABLET, COATED ORAL DAILY
Status: DISCONTINUED | OUTPATIENT
Start: 2023-09-05 | End: 2023-09-06 | Stop reason: HOSPADM

## 2023-09-05 RX ORDER — MONTELUKAST SODIUM 10 MG/1
10 TABLET ORAL NIGHTLY
Status: DISCONTINUED | OUTPATIENT
Start: 2023-09-05 | End: 2023-09-06 | Stop reason: HOSPADM

## 2023-09-05 RX ORDER — IBUPROFEN 400 MG/1
800 TABLET ORAL EVERY 8 HOURS PRN
Status: DISCONTINUED | OUTPATIENT
Start: 2023-09-05 | End: 2023-09-06 | Stop reason: HOSPADM

## 2023-09-05 RX ORDER — INSULIN GLARGINE 100 [IU]/ML
64 INJECTION, SOLUTION SUBCUTANEOUS 2 TIMES DAILY
Status: DISCONTINUED | OUTPATIENT
Start: 2023-09-05 | End: 2023-09-05

## 2023-09-05 RX ORDER — POLYETHYLENE GLYCOL 3350 17 G/17G
17 POWDER, FOR SOLUTION ORAL DAILY PRN
Status: DISCONTINUED | OUTPATIENT
Start: 2023-09-05 | End: 2023-09-06 | Stop reason: HOSPADM

## 2023-09-05 RX ORDER — VIT C/B6/B5/MAGNESIUM/HERB 173 50-5-6-5MG
500 CAPSULE ORAL DAILY
Status: DISCONTINUED | OUTPATIENT
Start: 2023-09-05 | End: 2023-09-05 | Stop reason: CLARIF

## 2023-09-05 RX ORDER — CALCIUM CARBONATE 500 MG/1
500 TABLET, CHEWABLE ORAL 3 TIMES DAILY PRN
Status: DISCONTINUED | OUTPATIENT
Start: 2023-09-05 | End: 2023-09-06 | Stop reason: HOSPADM

## 2023-09-05 RX ORDER — CETIRIZINE HYDROCHLORIDE 10 MG/1
10 TABLET ORAL DAILY PRN
Status: DISCONTINUED | OUTPATIENT
Start: 2023-09-05 | End: 2023-09-06 | Stop reason: HOSPADM

## 2023-09-05 RX ORDER — ONDANSETRON 4 MG/1
4 TABLET, ORALLY DISINTEGRATING ORAL EVERY 8 HOURS PRN
Status: DISCONTINUED | OUTPATIENT
Start: 2023-09-05 | End: 2023-09-06 | Stop reason: HOSPADM

## 2023-09-05 RX ORDER — OXYBUTYNIN CHLORIDE 5 MG/1
10 TABLET, EXTENDED RELEASE ORAL DAILY
Status: DISCONTINUED | OUTPATIENT
Start: 2023-09-05 | End: 2023-09-06 | Stop reason: HOSPADM

## 2023-09-05 RX ORDER — LORAZEPAM 2 MG/ML
1 INJECTION INTRAMUSCULAR
Status: COMPLETED | OUTPATIENT
Start: 2023-09-05 | End: 2023-09-05

## 2023-09-05 RX ORDER — SODIUM CHLORIDE 0.9 % (FLUSH) 0.9 %
5-40 SYRINGE (ML) INJECTION EVERY 12 HOURS SCHEDULED
Status: DISCONTINUED | OUTPATIENT
Start: 2023-09-05 | End: 2023-09-06 | Stop reason: HOSPADM

## 2023-09-05 RX ORDER — ONDANSETRON 2 MG/ML
4 INJECTION INTRAMUSCULAR; INTRAVENOUS EVERY 6 HOURS PRN
Status: DISCONTINUED | OUTPATIENT
Start: 2023-09-05 | End: 2023-09-06 | Stop reason: HOSPADM

## 2023-09-05 RX ORDER — ENOXAPARIN SODIUM 100 MG/ML
30 INJECTION SUBCUTANEOUS 2 TIMES DAILY
Status: DISCONTINUED | OUTPATIENT
Start: 2023-09-05 | End: 2023-09-06 | Stop reason: HOSPADM

## 2023-09-05 RX ADMIN — GABAPENTIN 300 MG: 300 CAPSULE ORAL at 08:26

## 2023-09-05 RX ADMIN — LORAZEPAM 1 MG: 2 INJECTION INTRAMUSCULAR; INTRAVENOUS at 13:27

## 2023-09-05 RX ADMIN — ASPIRIN 81 MG: 81 TABLET, CHEWABLE ORAL at 08:26

## 2023-09-05 RX ADMIN — GABAPENTIN 300 MG: 300 CAPSULE ORAL at 21:02

## 2023-09-05 RX ADMIN — ENOXAPARIN SODIUM 30 MG: 100 INJECTION SUBCUTANEOUS at 21:03

## 2023-09-05 RX ADMIN — MONTELUKAST 10 MG: 10 TABLET, FILM COATED ORAL at 21:03

## 2023-09-05 RX ADMIN — METOPROLOL SUCCINATE 50 MG: 50 TABLET, EXTENDED RELEASE ORAL at 08:26

## 2023-09-05 RX ADMIN — FUROSEMIDE 20 MG: 20 TABLET ORAL at 08:26

## 2023-09-05 RX ADMIN — EMPAGLIFLOZIN 25 MG: 25 TABLET, FILM COATED ORAL at 10:26

## 2023-09-05 RX ADMIN — MONTELUKAST 10 MG: 10 TABLET, FILM COATED ORAL at 02:45

## 2023-09-05 RX ADMIN — ENOXAPARIN SODIUM 30 MG: 100 INJECTION SUBCUTANEOUS at 09:58

## 2023-09-05 RX ADMIN — Medication 400 MG: at 08:26

## 2023-09-05 RX ADMIN — INSULIN GLARGINE 64 UNITS: 100 INJECTION, SOLUTION SUBCUTANEOUS at 08:26

## 2023-09-05 RX ADMIN — ROSUVASTATIN CALCIUM 10 MG: 10 TABLET, FILM COATED ORAL at 08:26

## 2023-09-05 RX ADMIN — OXYBUTYNIN CHLORIDE 10 MG: 5 TABLET, EXTENDED RELEASE ORAL at 08:26

## 2023-09-05 RX ADMIN — SODIUM CHLORIDE, PRESERVATIVE FREE 10 ML: 5 INJECTION INTRAVENOUS at 21:03

## 2023-09-05 RX ADMIN — SODIUM CHLORIDE, PRESERVATIVE FREE 10 ML: 5 INJECTION INTRAVENOUS at 10:37

## 2023-09-05 RX ADMIN — ANASTROZOLE 1 MG: 1 TABLET, COATED ORAL at 08:31

## 2023-09-05 RX ADMIN — FLUTICASONE PROPIONATE 2 SPRAY: 50 SPRAY, METERED NASAL at 08:31

## 2023-09-05 SDOH — ECONOMIC STABILITY: TRANSPORTATION INSECURITY
IN THE PAST 12 MONTHS, HAS LACK OF TRANSPORTATION KEPT YOU FROM MEETINGS, WORK, OR FROM GETTING THINGS NEEDED FOR DAILY LIVING?: NO

## 2023-09-05 SDOH — HEALTH STABILITY: PHYSICAL HEALTH: ON AVERAGE, HOW MANY MINUTES DO YOU ENGAGE IN EXERCISE AT THIS LEVEL?: 0 MIN

## 2023-09-05 SDOH — ECONOMIC STABILITY: TRANSPORTATION INSECURITY
IN THE PAST 12 MONTHS, HAS THE LACK OF TRANSPORTATION KEPT YOU FROM MEDICAL APPOINTMENTS OR FROM GETTING MEDICATIONS?: NO

## 2023-09-05 SDOH — HEALTH STABILITY: PHYSICAL HEALTH: ON AVERAGE, HOW MANY DAYS PER WEEK DO YOU ENGAGE IN MODERATE TO STRENUOUS EXERCISE (LIKE A BRISK WALK)?: 0 DAYS

## 2023-09-05 SDOH — ECONOMIC STABILITY: HOUSING INSECURITY: IN THE LAST 12 MONTHS, HOW MANY PLACES HAVE YOU LIVED?: 1

## 2023-09-05 SDOH — ECONOMIC STABILITY: INCOME INSECURITY: IN THE LAST 12 MONTHS, WAS THERE A TIME WHEN YOU WERE NOT ABLE TO PAY THE MORTGAGE OR RENT ON TIME?: NO

## 2023-09-05 ASSESSMENT — SOCIAL DETERMINANTS OF HEALTH (SDOH)
WITHIN THE LAST YEAR, HAVE TO BEEN RAPED OR FORCED TO HAVE ANY KIND OF SEXUAL ACTIVITY BY YOUR PARTNER OR EX-PARTNER?: NO
WITHIN THE LAST YEAR, HAVE YOU BEEN HUMILIATED OR EMOTIONALLY ABUSED IN OTHER WAYS BY YOUR PARTNER OR EX-PARTNER?: NO
HOW HARD IS IT FOR YOU TO PAY FOR THE VERY BASICS LIKE FOOD, HOUSING, MEDICAL CARE, AND HEATING?: NOT HARD AT ALL
WITHIN THE LAST YEAR, HAVE YOU BEEN AFRAID OF YOUR PARTNER OR EX-PARTNER?: NO
WITHIN THE LAST YEAR, HAVE YOU BEEN KICKED, HIT, SLAPPED, OR OTHERWISE PHYSICALLY HURT BY YOUR PARTNER OR EX-PARTNER?: NO

## 2023-09-05 ASSESSMENT — PATIENT HEALTH QUESTIONNAIRE - PHQ9
2. FEELING DOWN, DEPRESSED OR HOPELESS: NOT AT ALL
SUM OF ALL RESPONSES TO PHQ9 QUESTIONS 1 & 2: 0
1. LITTLE INTEREST OR PLEASURE IN DOING THINGS: NOT AT ALL

## 2023-09-05 ASSESSMENT — ENCOUNTER SYMPTOMS
DIARRHEA: 0
NAUSEA: 0
RHINORRHEA: 0
SHORTNESS OF BREATH: 1
COUGH: 1

## 2023-09-05 ASSESSMENT — LIFESTYLE VARIABLES
HOW OFTEN DO YOU HAVE A DRINK CONTAINING ALCOHOL: MONTHLY OR LESS
HOW MANY STANDARD DRINKS CONTAINING ALCOHOL DO YOU HAVE ON A TYPICAL DAY: 1 OR 2

## 2023-09-05 NOTE — PLAN OF CARE
Problem: Safety - Adult  Goal: Free from fall injury  9/5/2023 0353 by Mery Alonso RN  Outcome: Progressing  9/5/2023 0353 by Mery Alonso RN  Outcome: Progressing     Problem: Pain  Goal: Verbalizes/displays adequate comfort level or baseline comfort level  9/5/2023 0353 by Mery Alonso RN  Outcome: Progressing     Problem: Skin/Tissue Integrity  Goal: Absence of new skin breakdown  Description: 1. Monitor for areas of redness and/or skin breakdown  2. Assess vascular access sites hourly  3. Every 4-6 hours minimum:  Change oxygen saturation probe site  4. Every 4-6 hours:  If on nasal continuous positive airway pressure, respiratory therapy assess nares and determine need for appliance change or resting period.   9/5/2023 0353 by Mery Alonso RN  Outcome: Progressing

## 2023-09-05 NOTE — ED NOTES
Spoke with  regarding Code Stoke orders. Per MD since symptoms started greater than 24hrs ago no Code stroke being called.       Anil Boothe RN  09/04/23 2029

## 2023-09-05 NOTE — H&P
change  Nephrology Consultation will be sought  BMP with Mag reflex daily    Hyperkalemia: [K] 5.4  Nephrology Consultation will be sought  IVF  Lokelma 10 x once then 5 PO TID thereafter  BMP with Mag reflex Daily    Chronic medical Problems:  Continue home regimen as indicated   anastrozole  1 mg Oral Daily    fluticasone  2 spray Each Nostril Daily    magnesium oxide  400 mg Oral Daily    metoprolol succinate  50 mg Oral Daily    montelukast  10 mg Oral Nightly    oxybutynin  10 mg Oral Daily    furosemide  20 mg Oral Daily    gabapentin  300 mg Oral TID    rosuvastatin  10 mg Oral Daily    aspirin  81 mg Oral Daily    empagliflozin  25 mg Oral Daily    insulin glargine  64 Units SubCUTAneous BID     Supoportive and Prophylactic Txx:  DVT Prophylaxis: Heparin SQ  GI (PUD) PPx: not indicated  PT consult for evalutation and Txx as indicated: indicated as per age and admitted status, will also get OT as suspect ay need placement for a more structured environment  ADULT DIET; Regular; 4 carb choices (60 gm/meal)  albuterol, glucose, dextrose bolus **OR** dextrose bolus, glucagon (rDNA), dextrose, cetirizine, sodium chloride, ibuprofen, calcium carbonate, polyethylene glycol, melatonin, sodium chloride flush, sodium chloride, ondansetron **OR** ondansetron, acetaminophen **OR** acetaminophen      Care time of 40 minutes  Pt seen/examined and admitted to inpatient status. Inpatient status is used for patients with an expected LOS extending past two midnights due to medical therapy and or critical care needs, otherwise patients are placed to OBServation status. Signed:  Electronically signed by Gume Cain MD on 9/5/23 at  9 AM CDT.

## 2023-09-05 NOTE — CONSULTS
09/04/23 2006   AST 23   ALT 28   BILITOT 0.4   ALKPHOS 97     PT/INR:   Recent Labs     09/04/23 2006   PROTIME 14.2   INR 1.13     APTT: No results for input(s): APTT in the last 72 hours. BNP:  No results for input(s): BNP in the last 72 hours. Ionized Calcium:No results for input(s): IONCA in the last 72 hours. Magnesium:  Recent Labs     09/05/23  0125   MG 2.0     Phosphorus:  Recent Labs     09/05/23  0125   PHOS 3.9     HgbA1C: No results for input(s): LABA1C in the last 72 hours. Hepatic:   Recent Labs     09/04/23 2006   ALKPHOS 97   ALT 28   AST 23   PROT 6.8   BILITOT 0.4   LABALBU 4.1     Lactic Acid: No results for input(s): LACTA in the last 72 hours. Troponin: No results for input(s): CKTOTAL, CKMB, TROPONINT in the last 72 hours. ABGs: No results for input(s): PH, PCO2, PO2, HCO3, O2SAT in the last 72 hours. CRP:  No results for input(s): CRP in the last 72 hours. Sed Rate:  No results for input(s): SEDRATE in the last 72 hours. Cultures:   No results for input(s): CULTURE in the last 72 hours. No results for input(s): BC, Thornell Salaam in the last 72 hours. No results for input(s): CXSURG in the last 72 hours. Radiology reports as per the Radiologist  Radiology: XR CHEST (2 VW)    Result Date: 9/5/2023  EXAM: CHEST RADIOGRAPH  TECHNIQUE: Two views. Frontal and lateral.  HISTORY: Dyspnea  COMPARISON: None. FINDINGS: The lungs are clear. The heart size is normal. Osseous structures are unremarkable      1. Normal Exam.   ______________________________________ Electronically signed by: Param Ramírez D.O. Date:     09/05/2023 Time:    06:34     CT HEAD WO CONTRAST    Result Date: 9/4/2023  EXAM:  CT OF THE HEAD WITHOUT CONTRAST. HISTORY:  Right upper extremity numbness and weakness. COMPARISON:  None available. TECHNIQUE:  Axial noncontrast CT of the head. Sagittal and coronal reformats were obtained. FINDINGS: No intracranial hemorrhage or mass effect is identified.   There is

## 2023-09-05 NOTE — PLAN OF CARE
Problem: Safety - Adult  Goal: Free from fall injury  9/5/2023 0353 by Zhou Boogie RN  Outcome: Progressing  9/5/2023 0353 by Zhou Boogie RN  Outcome: Progressing     Problem: Pain  Goal: Verbalizes/displays adequate comfort level or baseline comfort level  Outcome: Progressing     Problem: Skin/Tissue Integrity  Goal: Absence of new skin breakdown  Description: 1. Monitor for areas of redness and/or skin breakdown  2. Assess vascular access sites hourly  3. Every 4-6 hours minimum:  Change oxygen saturation probe site  4. Every 4-6 hours:  If on nasal continuous positive airway pressure, respiratory therapy assess nares and determine need for appliance change or resting period.   Outcome: Progressing

## 2023-09-06 VITALS
BODY MASS INDEX: 49.06 KG/M2 | DIASTOLIC BLOOD PRESSURE: 77 MMHG | RESPIRATION RATE: 18 BRPM | SYSTOLIC BLOOD PRESSURE: 141 MMHG | OXYGEN SATURATION: 95 % | HEIGHT: 62 IN | HEART RATE: 78 BPM | TEMPERATURE: 97.3 F | WEIGHT: 266.6 LBS

## 2023-09-06 LAB
25(OH)D3 SERPL-MCNC: 49.6 NG/ML
ANION GAP SERPL CALCULATED.3IONS-SCNC: 14 MMOL/L (ref 7–19)
BUN SERPL-MCNC: 17 MG/DL (ref 8–23)
CALCIUM SERPL-MCNC: 9.4 MG/DL (ref 8.8–10.2)
CHLORIDE SERPL-SCNC: 100 MMOL/L (ref 98–111)
CO2 SERPL-SCNC: 23 MMOL/L (ref 22–29)
CREAT SERPL-MCNC: 0.8 MG/DL (ref 0.5–0.9)
GLUCOSE BLD-MCNC: 144 MG/DL (ref 70–99)
GLUCOSE SERPL-MCNC: 167 MG/DL (ref 74–109)
MAGNESIUM SERPL-MCNC: 2.3 MG/DL (ref 1.6–2.4)
PERFORMED ON: ABNORMAL
PHOSPHATE SERPL-MCNC: 4.5 MG/DL (ref 2.5–4.5)
POTASSIUM SERPL-SCNC: 4.4 MMOL/L (ref 3.5–5)
PTH-INTACT SERPL-MCNC: 28.3 PG/ML (ref 15–65)
SODIUM SERPL-SCNC: 137 MMOL/L (ref 136–145)

## 2023-09-06 PROCEDURE — 97116 GAIT TRAINING THERAPY: CPT

## 2023-09-06 PROCEDURE — 36415 COLL VENOUS BLD VENIPUNCTURE: CPT

## 2023-09-06 PROCEDURE — 6370000000 HC RX 637 (ALT 250 FOR IP): Performed by: HOSPITALIST

## 2023-09-06 PROCEDURE — 6360000002 HC RX W HCPCS: Performed by: STUDENT IN AN ORGANIZED HEALTH CARE EDUCATION/TRAINING PROGRAM

## 2023-09-06 PROCEDURE — 82962 GLUCOSE BLOOD TEST: CPT

## 2023-09-06 PROCEDURE — 84100 ASSAY OF PHOSPHORUS: CPT

## 2023-09-06 PROCEDURE — 97161 PT EVAL LOW COMPLEX 20 MIN: CPT

## 2023-09-06 PROCEDURE — 6370000000 HC RX 637 (ALT 250 FOR IP): Performed by: STUDENT IN AN ORGANIZED HEALTH CARE EDUCATION/TRAINING PROGRAM

## 2023-09-06 PROCEDURE — 94760 N-INVAS EAR/PLS OXIMETRY 1: CPT

## 2023-09-06 PROCEDURE — 82306 VITAMIN D 25 HYDROXY: CPT

## 2023-09-06 PROCEDURE — 83735 ASSAY OF MAGNESIUM: CPT

## 2023-09-06 PROCEDURE — 2580000003 HC RX 258: Performed by: HOSPITALIST

## 2023-09-06 PROCEDURE — 80048 BASIC METABOLIC PNL TOTAL CA: CPT

## 2023-09-06 PROCEDURE — 97535 SELF CARE MNGMENT TRAINING: CPT

## 2023-09-06 PROCEDURE — 94660 CPAP INITIATION&MGMT: CPT

## 2023-09-06 PROCEDURE — 97165 OT EVAL LOW COMPLEX 30 MIN: CPT

## 2023-09-06 PROCEDURE — 83970 ASSAY OF PARATHORMONE: CPT

## 2023-09-06 RX ORDER — FUROSEMIDE 20 MG/1
TABLET ORAL
Qty: 30 TABLET | Refills: 1 | Status: ON HOLD | OUTPATIENT
Start: 2023-09-06

## 2023-09-06 RX ORDER — IBUPROFEN 800 MG/1
800 TABLET ORAL EVERY 8 HOURS PRN
Qty: 90 TABLET | Refills: 0 | Status: ON HOLD | OUTPATIENT
Start: 2023-09-06 | End: 2023-10-06

## 2023-09-06 RX ADMIN — ANASTROZOLE 1 MG: 1 TABLET, COATED ORAL at 09:02

## 2023-09-06 RX ADMIN — ASPIRIN 81 MG: 81 TABLET, CHEWABLE ORAL at 09:02

## 2023-09-06 RX ADMIN — Medication 400 MG: at 09:02

## 2023-09-06 RX ADMIN — OXYBUTYNIN CHLORIDE 10 MG: 5 TABLET, EXTENDED RELEASE ORAL at 09:02

## 2023-09-06 RX ADMIN — ROSUVASTATIN CALCIUM 10 MG: 10 TABLET, FILM COATED ORAL at 09:02

## 2023-09-06 RX ADMIN — SODIUM CHLORIDE, PRESERVATIVE FREE 10 ML: 5 INJECTION INTRAVENOUS at 09:10

## 2023-09-06 RX ADMIN — FLUTICASONE PROPIONATE 2 SPRAY: 50 SPRAY, METERED NASAL at 09:02

## 2023-09-06 RX ADMIN — GABAPENTIN 300 MG: 300 CAPSULE ORAL at 09:02

## 2023-09-06 RX ADMIN — EMPAGLIFLOZIN 25 MG: 25 TABLET, FILM COATED ORAL at 09:02

## 2023-09-06 RX ADMIN — INSULIN GLARGINE 64 UNITS: 100 INJECTION, SOLUTION SUBCUTANEOUS at 09:03

## 2023-09-06 RX ADMIN — FUROSEMIDE 20 MG: 20 TABLET ORAL at 09:02

## 2023-09-06 RX ADMIN — ENOXAPARIN SODIUM 30 MG: 100 INJECTION SUBCUTANEOUS at 09:02

## 2023-09-06 RX ADMIN — METOPROLOL SUCCINATE 50 MG: 50 TABLET, EXTENDED RELEASE ORAL at 09:02

## 2023-09-06 NOTE — PLAN OF CARE
Problem: Safety - Adult  Goal: Free from fall injury  9/6/2023 1225 by Russel Cortez LPN  Outcome: Adequate for Discharge  9/5/2023 2325 by Familia Lucio LPN  Outcome: Progressing     Problem: Pain  Goal: Verbalizes/displays adequate comfort level or baseline comfort level  9/6/2023 1225 by Russel Cortez LPN  Outcome: Adequate for Discharge  9/5/2023 2325 by Familia Lucio LPN  Outcome: Progressing     Problem: Skin/Tissue Integrity  Goal: Absence of new skin breakdown  Description: 1. Monitor for areas of redness and/or skin breakdown  2. Assess vascular access sites hourly  3. Every 4-6 hours minimum:  Change oxygen saturation probe site  4. Every 4-6 hours:  If on nasal continuous positive airway pressure, respiratory therapy assess nares and determine need for appliance change or resting period.   9/6/2023 1225 by Russel Cortez LPN  Outcome: Adequate for Discharge  9/5/2023 2325 by Familia Lucio LPN  Outcome: Progressing     Problem: Chronic Conditions and Co-morbidities  Goal: Patient's chronic conditions and co-morbidity symptoms are monitored and maintained or improved  9/6/2023 1225 by Russel Cortez LPN  Outcome: Adequate for Discharge  Flowsheets (Taken 9/5/2023 2328 by Familia Lucio LPN)  Care Plan - Patient's Chronic Conditions and Co-Morbidity Symptoms are Monitored and Maintained or Improved: Monitor and assess patient's chronic conditions and comorbid symptoms for stability, deterioration, or improvement  9/5/2023 2325 by Familia Lucio LPN  Outcome: Progressing     Problem: ABCDS Injury Assessment  Goal: Absence of physical injury  Outcome: Adequate for Discharge

## 2023-09-06 NOTE — DISCHARGE SUMMARY
frequency plus additional to accommodate PRN testing needs. Dispense all needed supplies to include: monitor, strips, lancing device, lancets, control solutions, alcohol swabs. DULoxetine 30 MG extended release capsule  Commonly known as: CYMBALTA  TAKE 1 CAPSULE BY MOUTH DAILY     empagliflozin 25 MG tablet  Commonly known as: JARDIANCE  Take 1 tablet by mouth daily     fluticasone 50 MCG/ACT nasal spray  Commonly known as: FLONASE  2 sprays by Each Nostril route daily     furosemide 20 MG tablet  Commonly known as: LASIX  TAKE 1 TABLET BY MOUTH ONCE DAILY     gabapentin 300 MG capsule  Commonly known as: NEURONTIN  TAKE ONE CAPSULE BY MOUTH THREE TIMES DAILY (VIAL)     ibuprofen 800 MG tablet  Commonly known as: ADVIL;MOTRIN     * Lancets Misc  Use as directed to test blood sugar. ICD:10 E11.8     * Lancets Misc  Use to test blood glucose levels E11.8     loratadine 10 MG tablet  Commonly known as: CLARITIN  TAKE 1 TABLET DAILY     losartan 100 MG tablet  Commonly known as: COZAAR  TAKE 1 TABLET BY MOUTH ONCE DAILY FOR BLOOD PRESSURE     magnesium oxide 400 MG tablet  Commonly known as: MAG-OX  Take 1 tablet by mouth daily     metFORMIN 1000 MG tablet  Commonly known as: GLUCOPHAGE  Take 1 tablet by mouth 2 times daily (with meals)     metoprolol succinate 50 MG extended release tablet  Commonly known as: TOPROL XL  TAKE 1 TABLET BY MOUTH ONCE DAILY FOR BLOOD PRESSURE AND HEART PROTECTION     mometasone 0.1 % cream  Commonly known as: Elocon  Apply small amount nightly to opening of external ear canal.  Continue to use nightly until symptoms of dryness and itching improve.      montelukast 10 MG tablet  Commonly known as: SINGULAIR  TAKE 1 TABLET BY MOUTH AT NIGHT     NovoLOG FlexPen 100 UNIT/ML injection pen  Generic drug: insulin aspart  Inject 60 units with breakfast , 80 units with dinner     omeprazole 20 MG delayed release capsule  Commonly known as: PRILOSEC  TAKE (1) CAPSULE BY MOUTH TWICE DAILY AS

## 2023-09-06 NOTE — TELEPHONE ENCOUNTER
Marnenicolle Dottie called to request a refill on her medication.       Last office visit : 8/30/2023   Next office visit : 9/6/2023     Requested Prescriptions     Pending Prescriptions Disp Refills    furosemide (LASIX) 20 MG tablet [Pharmacy Med Name: FUROSEMIDE 20MG TABLET] 30 tablet 1     Sig: TAKE 1 TABLET BY MOUTH ONCE DAILY    ibuprofen (ADVIL;MOTRIN) 800 MG tablet 120 tablet             Celio Back MA

## 2023-09-06 NOTE — PLAN OF CARE
Problem: Safety - Adult  Goal: Free from fall injury  Outcome: Progressing     Problem: Pain  Goal: Verbalizes/displays adequate comfort level or baseline comfort level  Outcome: Progressing     Problem: Skin/Tissue Integrity  Goal: Absence of new skin breakdown  Description: 1. Monitor for areas of redness and/or skin breakdown  2. Assess vascular access sites hourly  3. Every 4-6 hours minimum:  Change oxygen saturation probe site  4. Every 4-6 hours:  If on nasal continuous positive airway pressure, respiratory therapy assess nares and determine need for appliance change or resting period.   Outcome: Progressing     Problem: Chronic Conditions and Co-morbidities  Goal: Patient's chronic conditions and co-morbidity symptoms are monitored and maintained or improved  Outcome: Progressing

## 2023-09-07 ENCOUNTER — TELEPHONE (OUTPATIENT)
Dept: INTERNAL MEDICINE | Age: 78
End: 2023-09-07

## 2023-09-07 LAB
EKG P AXIS: 32 DEGREES
EKG P AXIS: 51 DEGREES
EKG P-R INTERVAL: 144 MS
EKG P-R INTERVAL: 144 MS
EKG Q-T INTERVAL: 374 MS
EKG Q-T INTERVAL: 382 MS
EKG QRS DURATION: 76 MS
EKG QRS DURATION: 78 MS
EKG QTC CALCULATION (BAZETT): 421 MS
EKG QTC CALCULATION (BAZETT): 425 MS
EKG T AXIS: 37 DEGREES
EKG T AXIS: 50 DEGREES

## 2023-09-07 RX ORDER — IBUPROFEN 800 MG/1
TABLET ORAL
Qty: 16 TABLET | Refills: 0 | OUTPATIENT
Start: 2023-09-07

## 2023-09-07 NOTE — TELEPHONE ENCOUNTER
[id number] [facility] Transitions Initial Follow Up Call    Outreach made within 2 business days of discharge: Yes    Patient: Rita Almodovar   Patient : 1945 MRN: [id number]    Reason for Admission: Right arm weakness and numbness    Discharge Date: 23      Discharge Diagnoses   Principal Problem:    Hyponatremia  Active Problems:    Hyperkalemia  Resolved Problems:    * No resolved hospital problems. *      Spoke with: Attempted to make contact with patient/caregiver for an initial transitions of care follow up call post discharge without success. I will reach out again at a later time. Any previously scheduled hospital follow up appointments noted below.         Discharge department[facility]facility[facility]    RECORDS IN CHART MH PT    Scheduled appointment with PCP within 7-14 days    Follow Up  Future Appointments   Date Time Provider [address]   2023  3:00 PM ANTONIA Vazquez [facility]   2023 11:30 AM Kayley Lucas MD [place] [facility]   10/27/2023 12:30 PM [facility] MAMMO RM 1 [facility] WOMENS [facility] Women's   10/30/2023 11:30 AM NARESH Prabhakar [facility]   2023  1:30 PM ANTONIA Mercedes LPS [address] [place] [address] Neurology -   2023  1:30 PM Dorothy Brown MD [place], [place]

## 2023-09-08 ENCOUNTER — TELEPHONE (OUTPATIENT)
Dept: PULMONOLOGY | Age: 78
End: 2023-09-08

## 2023-09-08 ENCOUNTER — TELEPHONE (OUTPATIENT)
Dept: INTERNAL MEDICINE | Age: 78
End: 2023-09-08

## 2023-09-08 NOTE — TELEPHONE ENCOUNTER
[address] [facility],[address] Transitions Initial Follow Up Call    Outreach made within 2 business days of discharge: Yes    Patient: Jin Quintero   Patient : 1945 MRN: [id number]    Reason for Admission: Right arm weakness and numbness    Discharge Date: 23      Discharge Diagnoses   Principal Problem:    Hyponatremia  Active Problems:    Hyperkalemia  Resolved Problems:    * No resolved hospital problems. Spoke with: 2nd message left for pt to call us back to do the TCM call and to confirm the TCM apt.     Discharge department/facility: [facility]    RECORDS IN CHART  PT    Scheduled appointment with PCP within 7-14 days    Follow Up  Future Appointments   Date Time Provider [address]   2023  3:00 PM Lennox Sniff Crumble, APRN [facility]   2023 11:30 AM MD Zully Shelton [facility]   10/27/2023 12:30 PM L MAMMO RM 1 [facility] WOMENS L Women's   10/30/2023 11:30 AM NARESH Clarke LPS [facility]   2023  1:30 PM ANTONIA Gomez LPS [address] [place] [address] Neurology -   2023  1:30 PM Dorothy Gudino MD [place], [place]

## 2023-09-09 ENCOUNTER — APPOINTMENT (OUTPATIENT)
Dept: GENERAL RADIOLOGY | Age: 78
DRG: 637 | End: 2023-09-09
Payer: MEDICARE

## 2023-09-09 ENCOUNTER — APPOINTMENT (OUTPATIENT)
Dept: CT IMAGING | Age: 78
DRG: 637 | End: 2023-09-09
Payer: MEDICARE

## 2023-09-09 ENCOUNTER — HOSPITAL ENCOUNTER (INPATIENT)
Age: 78
LOS: 4 days | Discharge: HOME HEALTH | DRG: 637 | End: 2023-09-13
Attending: EMERGENCY MEDICINE | Admitting: STUDENT IN AN ORGANIZED HEALTH CARE EDUCATION/TRAINING PROGRAM
Payer: MEDICARE

## 2023-09-09 DIAGNOSIS — E16.2 HYPOGLYCEMIA: Primary | ICD-10-CM

## 2023-09-09 DIAGNOSIS — E11.8 TYPE 2 DIABETES MELLITUS WITH COMPLICATION (HCC): ICD-10-CM

## 2023-09-09 DIAGNOSIS — J96.01 ACUTE RESPIRATORY FAILURE WITH HYPOXIA (HCC): ICD-10-CM

## 2023-09-09 DIAGNOSIS — R68.0 HYPOTHERMIA NOT ASSOCIATED WITH LOW ENVIRONMENTAL TEMPERATURE: ICD-10-CM

## 2023-09-09 LAB
ALBUMIN SERPL-MCNC: 4.2 G/DL (ref 3.5–5.2)
ALLENS TEST: NORMAL
ALP SERPL-CCNC: 102 U/L (ref 35–104)
ALT SERPL-CCNC: 36 U/L (ref 5–33)
AMMONIA PLAS-SCNC: 12 UMOL/L (ref 11–51)
AMPHET UR QL SCN: NEGATIVE
ANION GAP SERPL CALCULATED.3IONS-SCNC: 11 MMOL/L (ref 7–19)
APAP SERPL-MCNC: <5 UG/ML (ref 10–30)
AST SERPL-CCNC: 45 U/L (ref 5–32)
B PARAP IS1001 DNA NPH QL NAA+NON-PROBE: NOT DETECTED
B PERT.PT PRMT NPH QL NAA+NON-PROBE: NOT DETECTED
BACTERIA URNS QL MICRO: NEGATIVE /HPF
BARBITURATES UR QL SCN: NEGATIVE
BASE EXCESS ARTERIAL: -0.5 MMOL/L (ref -2–2)
BASE EXCESS VENOUS: -1 MMOL/L
BASOPHILS # BLD: 0.1 K/UL (ref 0–0.2)
BASOPHILS NFR BLD: 0.5 % (ref 0–1)
BENZODIAZ UR QL SCN: NEGATIVE
BILIRUB SERPL-MCNC: 0.4 MG/DL (ref 0.2–1.2)
BILIRUB UR QL STRIP: NEGATIVE
BUN SERPL-MCNC: 16 MG/DL (ref 8–23)
BUPRENORPHINE URINE: NEGATIVE
C PNEUM DNA NPH QL NAA+NON-PROBE: NOT DETECTED
CALCIUM SERPL-MCNC: 9.1 MG/DL (ref 8.8–10.2)
CANNABINOIDS UR QL SCN: NEGATIVE
CARBOXYHEMOGLOBIN ARTERIAL: 1.6 % (ref 0–5)
CARBOXYHEMOGLOBIN: 3.4 %
CHLORIDE SERPL-SCNC: 98 MMOL/L (ref 98–111)
CHP ED QC CHECK: YES
CK SERPL-CCNC: 560 U/L (ref 26–192)
CLARITY UR: CLEAR
CO2 SERPL-SCNC: 25 MMOL/L (ref 22–29)
COCAINE UR QL SCN: NEGATIVE
COLOR UR: YELLOW
CORTIS SERPL-MCNC: 13.2 UG/DL
CREAT SERPL-MCNC: 0.8 MG/DL (ref 0.5–0.9)
CRYSTALS URNS MICRO: ABNORMAL /HPF
DRUG SCREEN COMMENT UR-IMP: NORMAL
EOSINOPHIL # BLD: 0.1 K/UL (ref 0–0.6)
EOSINOPHIL NFR BLD: 1.4 % (ref 0–5)
EPI CELLS #/AREA URNS AUTO: 0 /HPF (ref 0–5)
ERYTHROCYTE [DISTWIDTH] IN BLOOD BY AUTOMATED COUNT: 15.3 % (ref 11.5–14.5)
ETHANOLAMINE SERPL-MCNC: <10 MG/DL (ref 0–0.08)
FENTANYL SCREEN, URINE: NEGATIVE
FIO2: 21 %
FLUAV RNA NPH QL NAA+NON-PROBE: NOT DETECTED
FLUBV RNA NPH QL NAA+NON-PROBE: NOT DETECTED
GLUCOSE BLD-MCNC: 117 MG/DL (ref 70–99)
GLUCOSE BLD-MCNC: 138 MG/DL (ref 70–99)
GLUCOSE BLD-MCNC: 145 MG/DL (ref 70–99)
GLUCOSE BLD-MCNC: 245 MG/DL (ref 70–99)
GLUCOSE BLD-MCNC: 87 MG/DL (ref 70–99)
GLUCOSE BLD-MCNC: 89 MG/DL
GLUCOSE BLD-MCNC: 89 MG/DL (ref 70–99)
GLUCOSE BLD-MCNC: 98 MG/DL (ref 70–99)
GLUCOSE SERPL-MCNC: 96 MG/DL (ref 74–109)
GLUCOSE UR STRIP.AUTO-MCNC: 500 MG/DL
HADV DNA NPH QL NAA+NON-PROBE: NOT DETECTED
HBA1C MFR BLD: 7 % (ref 4–6)
HCO3 ARTERIAL: 24.8 MMOL/L (ref 22–26)
HCO3 VENOUS: 28 MMOL/L (ref 23–29)
HCOV 229E RNA NPH QL NAA+NON-PROBE: NOT DETECTED
HCOV HKU1 RNA NPH QL NAA+NON-PROBE: NOT DETECTED
HCOV NL63 RNA NPH QL NAA+NON-PROBE: NOT DETECTED
HCOV OC43 RNA NPH QL NAA+NON-PROBE: NOT DETECTED
HCT VFR BLD AUTO: 45.7 % (ref 37–47)
HEMOGLOBIN, ART, EXTENDED: 15.7 G/DL (ref 12–16)
HGB BLD-MCNC: 14.9 G/DL (ref 12–16)
HGB UR STRIP.AUTO-MCNC: NEGATIVE MG/L
HMPV RNA NPH QL NAA+NON-PROBE: NOT DETECTED
HPIV1 RNA NPH QL NAA+NON-PROBE: NOT DETECTED
HPIV2 RNA NPH QL NAA+NON-PROBE: NOT DETECTED
HPIV3 RNA NPH QL NAA+NON-PROBE: NOT DETECTED
HPIV4 RNA NPH QL NAA+NON-PROBE: NOT DETECTED
HYALINE CASTS #/AREA URNS AUTO: 1 /HPF (ref 0–8)
IMM GRANULOCYTES # BLD: 0 K/UL
KETONES UR STRIP.AUTO-MCNC: NEGATIVE MG/DL
LACTATE BLDV-SCNC: 1.3 MG/DL (ref 0.5–1.9)
LEUKOCYTE ESTERASE UR QL STRIP.AUTO: NEGATIVE
LYMPHOCYTES # BLD: 2.2 K/UL (ref 1.1–4.5)
LYMPHOCYTES NFR BLD: 22.2 % (ref 20–40)
M PNEUMO DNA NPH QL NAA+NON-PROBE: NOT DETECTED
MAGNESIUM SERPL-MCNC: 2.1 MG/DL (ref 1.6–2.4)
MCH RBC QN AUTO: 27.3 PG (ref 27–31)
MCHC RBC AUTO-ENTMCNC: 32.6 G/DL (ref 33–37)
MCV RBC AUTO: 83.9 FL (ref 81–99)
METHADONE UR QL SCN: NEGATIVE
METHAMPHETAMINE, URINE: NEGATIVE
METHEMOGLOBIN ARTERIAL: 0.6 %
MONOCYTES # BLD: 0.8 K/UL (ref 0–0.9)
MONOCYTES NFR BLD: 8 % (ref 0–10)
NEUTROPHILS # BLD: 6.6 K/UL (ref 1.5–7.5)
NEUTS SEG NFR BLD: 67.6 % (ref 50–65)
NITRITE UR QL STRIP.AUTO: NEGATIVE
O2 CONTENT ARTERIAL: 20.8 ML/DL
O2 CONTENT, VEN: 11 ML/DL
O2 SAT, ARTERIAL: 94.1 %
O2 SAT, VEN: 50 %
O2 THERAPY: NORMAL
OPIATES UR QL SCN: NEGATIVE
OXYCODONE UR QL SCN: NEGATIVE
PCO2 ARTERIAL: 42 MMHG (ref 35–45)
PCO2, VEN: 61 MMHG (ref 40–50)
PCP UR QL SCN: NEGATIVE
PERFORMED ON: ABNORMAL
PERFORMED ON: NORMAL
PH ARTERIAL: 7.38 (ref 7.35–7.45)
PH UR STRIP.AUTO: 6.5 [PH] (ref 5–8)
PH VENOUS: 7.27 (ref 7.35–7.45)
PLATELET # BLD AUTO: 230 K/UL (ref 130–400)
PMV BLD AUTO: 10.3 FL (ref 9.4–12.3)
PO2 ARTERIAL: 81 MMHG (ref 80–100)
PO2, VEN: 32 MMHG
POTASSIUM BLD-SCNC: 4.4 MMOL/L
POTASSIUM SERPL-SCNC: 4.4 MMOL/L (ref 3.5–5)
PROPOXYPH UR QL SCN: NEGATIVE
PROT SERPL-MCNC: 7.3 G/DL (ref 6.6–8.7)
PROT UR STRIP.AUTO-MCNC: 30 MG/DL
RBC # BLD AUTO: 5.45 M/UL (ref 4.2–5.4)
RBC #/AREA URNS AUTO: 0 /HPF (ref 0–4)
REASON FOR REJECTION: NORMAL
REJECTED TEST: NORMAL
RSV RNA NPH QL NAA+NON-PROBE: NOT DETECTED
RV+EV RNA NPH QL NAA+NON-PROBE: NOT DETECTED
SALICYLATES SERPL-MCNC: <0.3 MG/DL (ref 3–10)
SAMPLE SOURCE: NORMAL
SARS-COV-2 RNA NPH QL NAA+NON-PROBE: NOT DETECTED
SODIUM SERPL-SCNC: 134 MMOL/L (ref 136–145)
SP GR UR STRIP.AUTO: 1.01 (ref 1–1.03)
TRICYCLIC, URINE: NEGATIVE
TROPONIN T SERPL-MCNC: <0.01 NG/ML (ref 0–0.03)
TSH SERPL DL<=0.005 MIU/L-ACNC: 2.22 UIU/ML (ref 0.27–4.2)
UROBILINOGEN UR STRIP.AUTO-MCNC: 0.2 E.U./DL
WBC # BLD AUTO: 9.8 K/UL (ref 4.8–10.8)
WBC #/AREA URNS AUTO: 1 /HPF (ref 0–5)

## 2023-09-09 PROCEDURE — 84443 ASSAY THYROID STIM HORMONE: CPT

## 2023-09-09 PROCEDURE — 2580000003 HC RX 258

## 2023-09-09 PROCEDURE — 84484 ASSAY OF TROPONIN QUANT: CPT

## 2023-09-09 PROCEDURE — 82533 TOTAL CORTISOL: CPT

## 2023-09-09 PROCEDURE — 94150 VITAL CAPACITY TEST: CPT

## 2023-09-09 PROCEDURE — 83735 ASSAY OF MAGNESIUM: CPT

## 2023-09-09 PROCEDURE — 36415 COLL VENOUS BLD VENIPUNCTURE: CPT

## 2023-09-09 PROCEDURE — 72125 CT NECK SPINE W/O DYE: CPT

## 2023-09-09 PROCEDURE — 80179 DRUG ASSAY SALICYLATE: CPT

## 2023-09-09 PROCEDURE — 80053 COMPREHEN METABOLIC PANEL: CPT

## 2023-09-09 PROCEDURE — 6360000002 HC RX W HCPCS

## 2023-09-09 PROCEDURE — 2580000003 HC RX 258: Performed by: EMERGENCY MEDICINE

## 2023-09-09 PROCEDURE — 70450 CT HEAD/BRAIN W/O DYE: CPT

## 2023-09-09 PROCEDURE — 6370000000 HC RX 637 (ALT 250 FOR IP)

## 2023-09-09 PROCEDURE — 82550 ASSAY OF CK (CPK): CPT

## 2023-09-09 PROCEDURE — 1210000000 HC MED SURG R&B

## 2023-09-09 PROCEDURE — 0202U NFCT DS 22 TRGT SARS-COV-2: CPT

## 2023-09-09 PROCEDURE — 94640 AIRWAY INHALATION TREATMENT: CPT

## 2023-09-09 PROCEDURE — 80306 DRUG TEST PRSMV INSTRMNT: CPT

## 2023-09-09 PROCEDURE — 82140 ASSAY OF AMMONIA: CPT

## 2023-09-09 PROCEDURE — 80143 DRUG ASSAY ACETAMINOPHEN: CPT

## 2023-09-09 PROCEDURE — 87040 BLOOD CULTURE FOR BACTERIA: CPT

## 2023-09-09 PROCEDURE — 83605 ASSAY OF LACTIC ACID: CPT

## 2023-09-09 PROCEDURE — 36600 WITHDRAWAL OF ARTERIAL BLOOD: CPT

## 2023-09-09 PROCEDURE — 83036 HEMOGLOBIN GLYCOSYLATED A1C: CPT

## 2023-09-09 PROCEDURE — 82077 ASSAY SPEC XCP UR&BREATH IA: CPT

## 2023-09-09 PROCEDURE — 82803 BLOOD GASES ANY COMBINATION: CPT

## 2023-09-09 PROCEDURE — 71045 X-RAY EXAM CHEST 1 VIEW: CPT

## 2023-09-09 PROCEDURE — 85025 COMPLETE CBC W/AUTO DIFF WBC: CPT

## 2023-09-09 PROCEDURE — 82530 CORTISOL FREE: CPT

## 2023-09-09 PROCEDURE — 82800 BLOOD PH: CPT

## 2023-09-09 PROCEDURE — 82962 GLUCOSE BLOOD TEST: CPT

## 2023-09-09 PROCEDURE — 81001 URINALYSIS AUTO W/SCOPE: CPT

## 2023-09-09 PROCEDURE — 99285 EMERGENCY DEPT VISIT HI MDM: CPT

## 2023-09-09 RX ORDER — DEXTROSE AND SODIUM CHLORIDE 5; .9 G/100ML; G/100ML
INJECTION, SOLUTION INTRAVENOUS CONTINUOUS
Status: DISCONTINUED | OUTPATIENT
Start: 2023-09-09 | End: 2023-09-10

## 2023-09-09 RX ORDER — ASPIRIN 81 MG/1
81 TABLET, CHEWABLE ORAL DAILY
Status: DISCONTINUED | OUTPATIENT
Start: 2023-09-09 | End: 2023-09-13 | Stop reason: HOSPADM

## 2023-09-09 RX ORDER — ALBUTEROL SULFATE 90 UG/1
2 AEROSOL, METERED RESPIRATORY (INHALATION) EVERY 6 HOURS PRN
Status: DISCONTINUED | OUTPATIENT
Start: 2023-09-09 | End: 2023-09-13 | Stop reason: HOSPADM

## 2023-09-09 RX ORDER — DULOXETIN HYDROCHLORIDE 30 MG/1
30 CAPSULE, DELAYED RELEASE ORAL DAILY
Status: DISCONTINUED | OUTPATIENT
Start: 2023-09-09 | End: 2023-09-13 | Stop reason: HOSPADM

## 2023-09-09 RX ORDER — CETIRIZINE HYDROCHLORIDE 10 MG/1
5 TABLET ORAL DAILY
Status: DISCONTINUED | OUTPATIENT
Start: 2023-09-09 | End: 2023-09-13 | Stop reason: HOSPADM

## 2023-09-09 RX ORDER — ENOXAPARIN SODIUM 100 MG/ML
30 INJECTION SUBCUTANEOUS 2 TIMES DAILY
Status: DISCONTINUED | OUTPATIENT
Start: 2023-09-09 | End: 2023-09-13 | Stop reason: HOSPADM

## 2023-09-09 RX ORDER — ROSUVASTATIN CALCIUM 10 MG/1
10 TABLET, COATED ORAL DAILY
Status: DISCONTINUED | OUTPATIENT
Start: 2023-09-09 | End: 2023-09-13 | Stop reason: HOSPADM

## 2023-09-09 RX ORDER — SODIUM CHLORIDE 0.9 % (FLUSH) 0.9 %
5-40 SYRINGE (ML) INJECTION PRN
Status: DISCONTINUED | OUTPATIENT
Start: 2023-09-09 | End: 2023-09-13 | Stop reason: HOSPADM

## 2023-09-09 RX ORDER — MONTELUKAST SODIUM 10 MG/1
10 TABLET ORAL NIGHTLY
Status: DISCONTINUED | OUTPATIENT
Start: 2023-09-09 | End: 2023-09-13 | Stop reason: HOSPADM

## 2023-09-09 RX ORDER — LOSARTAN POTASSIUM 50 MG/1
100 TABLET ORAL DAILY
Status: DISCONTINUED | OUTPATIENT
Start: 2023-09-09 | End: 2023-09-13 | Stop reason: HOSPADM

## 2023-09-09 RX ORDER — INSULIN LISPRO 100 [IU]/ML
0-4 INJECTION, SOLUTION INTRAVENOUS; SUBCUTANEOUS
Status: DISCONTINUED | OUTPATIENT
Start: 2023-09-09 | End: 2023-09-10

## 2023-09-09 RX ORDER — SODIUM CHLORIDE 0.9 % (FLUSH) 0.9 %
5-40 SYRINGE (ML) INJECTION EVERY 12 HOURS SCHEDULED
Status: DISCONTINUED | OUTPATIENT
Start: 2023-09-09 | End: 2023-09-13 | Stop reason: HOSPADM

## 2023-09-09 RX ORDER — GABAPENTIN 100 MG/1
100 CAPSULE ORAL 3 TIMES DAILY
Status: DISCONTINUED | OUTPATIENT
Start: 2023-09-09 | End: 2023-09-13 | Stop reason: HOSPADM

## 2023-09-09 RX ORDER — METOPROLOL SUCCINATE 50 MG/1
50 TABLET, EXTENDED RELEASE ORAL DAILY
Status: DISCONTINUED | OUTPATIENT
Start: 2023-09-09 | End: 2023-09-13 | Stop reason: HOSPADM

## 2023-09-09 RX ORDER — FLUTICASONE PROPIONATE 50 MCG
2 SPRAY, SUSPENSION (ML) NASAL DAILY
Status: DISCONTINUED | OUTPATIENT
Start: 2023-09-09 | End: 2023-09-13 | Stop reason: HOSPADM

## 2023-09-09 RX ORDER — FUROSEMIDE 40 MG/1
20 TABLET ORAL DAILY
Status: DISCONTINUED | OUTPATIENT
Start: 2023-09-09 | End: 2023-09-13 | Stop reason: HOSPADM

## 2023-09-09 RX ORDER — ALBUTEROL SULFATE 2.5 MG/3ML
2.5 SOLUTION RESPIRATORY (INHALATION) EVERY 6 HOURS PRN
Status: DISCONTINUED | OUTPATIENT
Start: 2023-09-09 | End: 2023-09-09 | Stop reason: CLARIF

## 2023-09-09 RX ORDER — SODIUM CHLORIDE 9 MG/ML
INJECTION, SOLUTION INTRAVENOUS PRN
Status: DISCONTINUED | OUTPATIENT
Start: 2023-09-09 | End: 2023-09-13 | Stop reason: HOSPADM

## 2023-09-09 RX ORDER — DEXTROSE MONOHYDRATE 100 MG/ML
INJECTION, SOLUTION INTRAVENOUS CONTINUOUS PRN
Status: DISCONTINUED | OUTPATIENT
Start: 2023-09-09 | End: 2023-09-13 | Stop reason: HOSPADM

## 2023-09-09 RX ORDER — OXYBUTYNIN CHLORIDE 5 MG/1
10 TABLET, EXTENDED RELEASE ORAL DAILY
Status: DISCONTINUED | OUTPATIENT
Start: 2023-09-09 | End: 2023-09-13 | Stop reason: HOSPADM

## 2023-09-09 RX ORDER — ONDANSETRON 2 MG/ML
4 INJECTION INTRAMUSCULAR; INTRAVENOUS EVERY 6 HOURS PRN
Status: DISCONTINUED | OUTPATIENT
Start: 2023-09-09 | End: 2023-09-13 | Stop reason: HOSPADM

## 2023-09-09 RX ORDER — ONDANSETRON 4 MG/1
4 TABLET, ORALLY DISINTEGRATING ORAL EVERY 8 HOURS PRN
Status: DISCONTINUED | OUTPATIENT
Start: 2023-09-09 | End: 2023-09-13 | Stop reason: HOSPADM

## 2023-09-09 RX ORDER — ANASTROZOLE 1 MG/1
1 TABLET ORAL DAILY
Status: DISCONTINUED | OUTPATIENT
Start: 2023-09-09 | End: 2023-09-13 | Stop reason: HOSPADM

## 2023-09-09 RX ORDER — ARFORMOTEROL TARTRATE 15 UG/2ML
15 SOLUTION RESPIRATORY (INHALATION)
Status: DISCONTINUED | OUTPATIENT
Start: 2023-09-09 | End: 2023-09-09 | Stop reason: CLARIF

## 2023-09-09 RX ORDER — INSULIN LISPRO 100 [IU]/ML
0-4 INJECTION, SOLUTION INTRAVENOUS; SUBCUTANEOUS NIGHTLY
Status: DISCONTINUED | OUTPATIENT
Start: 2023-09-09 | End: 2023-09-10

## 2023-09-09 RX ORDER — ACETAMINOPHEN 325 MG/1
650 TABLET ORAL EVERY 6 HOURS PRN
Status: DISCONTINUED | OUTPATIENT
Start: 2023-09-09 | End: 2023-09-13 | Stop reason: HOSPADM

## 2023-09-09 RX ORDER — POLYETHYLENE GLYCOL 3350 17 G/17G
17 POWDER, FOR SOLUTION ORAL DAILY PRN
Status: DISCONTINUED | OUTPATIENT
Start: 2023-09-09 | End: 2023-09-13 | Stop reason: HOSPADM

## 2023-09-09 RX ORDER — ACETAMINOPHEN 650 MG/1
650 SUPPOSITORY RECTAL EVERY 6 HOURS PRN
Status: DISCONTINUED | OUTPATIENT
Start: 2023-09-09 | End: 2023-09-13 | Stop reason: HOSPADM

## 2023-09-09 RX ORDER — LANOLIN ALCOHOL/MO/W.PET/CERES
400 CREAM (GRAM) TOPICAL DAILY
Status: DISCONTINUED | OUTPATIENT
Start: 2023-09-09 | End: 2023-09-13 | Stop reason: HOSPADM

## 2023-09-09 RX ADMIN — SODIUM CHLORIDE, PRESERVATIVE FREE 10 ML: 5 INJECTION INTRAVENOUS at 20:14

## 2023-09-09 RX ADMIN — CETIRIZINE HYDROCHLORIDE 5 MG: 10 TABLET, FILM COATED ORAL at 18:01

## 2023-09-09 RX ADMIN — ENOXAPARIN SODIUM 30 MG: 100 INJECTION SUBCUTANEOUS at 20:15

## 2023-09-09 RX ADMIN — Medication 400 MG: at 18:00

## 2023-09-09 RX ADMIN — FUROSEMIDE 20 MG: 40 TABLET ORAL at 18:00

## 2023-09-09 RX ADMIN — DEXTROSE AND SODIUM CHLORIDE: 5; 900 INJECTION, SOLUTION INTRAVENOUS at 17:19

## 2023-09-09 RX ADMIN — LOSARTAN POTASSIUM 100 MG: 50 TABLET, FILM COATED ORAL at 18:00

## 2023-09-09 RX ADMIN — DULOXETINE HYDROCHLORIDE 30 MG: 30 CAPSULE, DELAYED RELEASE ORAL at 18:00

## 2023-09-09 RX ADMIN — METOPROLOL SUCCINATE 50 MG: 50 TABLET, EXTENDED RELEASE ORAL at 18:00

## 2023-09-09 RX ADMIN — ANASTROZOLE 1 MG: 1 TABLET, COATED ORAL at 18:38

## 2023-09-09 RX ADMIN — MONTELUKAST 10 MG: 10 TABLET, FILM COATED ORAL at 20:12

## 2023-09-09 RX ADMIN — GABAPENTIN 100 MG: 100 CAPSULE ORAL at 18:01

## 2023-09-09 RX ADMIN — GABAPENTIN 100 MG: 100 CAPSULE ORAL at 20:12

## 2023-09-09 RX ADMIN — DEXTROSE AND SODIUM CHLORIDE: 5; 900 INJECTION, SOLUTION INTRAVENOUS at 12:40

## 2023-09-09 RX ADMIN — TIOTROPIUM BROMIDE AND OLODATEROL 2 PUFF: 3.124; 2.736 SPRAY, METERED RESPIRATORY (INHALATION) at 19:23

## 2023-09-09 RX ADMIN — ROSUVASTATIN 10 MG: 10 TABLET, FILM COATED ORAL at 18:00

## 2023-09-09 RX ADMIN — ASPIRIN 81 MG: 81 TABLET, CHEWABLE ORAL at 18:00

## 2023-09-09 RX ADMIN — OXYBUTYNIN CHLORIDE 10 MG: 5 TABLET, EXTENDED RELEASE ORAL at 18:00

## 2023-09-09 ASSESSMENT — ENCOUNTER SYMPTOMS
NAUSEA: 0
RESPIRATORY NEGATIVE: 1
DIARRHEA: 0
VOMITING: 0

## 2023-09-09 ASSESSMENT — PAIN - FUNCTIONAL ASSESSMENT: PAIN_FUNCTIONAL_ASSESSMENT: NONE - DENIES PAIN

## 2023-09-09 NOTE — H&P
[facility]      Hospitalist - History & Physical      PCP: Elizabeth Nance MD    Date of Admission: 9/9/2023    Date of Service: 9/9/2023    Chief Complaint:  Altered mental status    History Of Present Illness: The patient is a 78 y.o. female who presented to Type II DM, COPD, DARSHAN on BIPAP, fibromyalgia, fatty liver disease, HTN, obesity, breast cancer, s/p lumpectomy, s/p radiation complaining of altered mental status. Of note patient has had prior history of frequent hypoglycemic events due to incorrect insulin dosing per care everywhere, 2021 oncology [address] [facility] APRN note. Patient was recently admitted on 9/4 due to right arm weakness and electrolyte abnormalities. MRI negative, electrolytes improved and patient was discharged home on 9/6. Patient returns to [facility] ER today due to hypoglycemia. Patient states she was in her normal state of health last evening, did have a decreased appetite. Took her Insulin as instructed and went to bed. She woke up in her normal state of her, was walking around her home around 0800 and does not recall events after that time. Granddaughter found her face down and notified EMS. Upon EMS arrival, blood sugar in the 40's. D10 given in route. Currently patient is alert and oriented X4. She states that she did not take her morning medications including insulin. She usually does not eat until around noon and does not check her sugar at all. Does state compliance with BIPAP. Endorses difficulty urinating and generalized weakness. She denies fever, chills, nausea, vomiting, abdominal pain, shortness of breath and chest pain. States that she has been in the process of moving to her daughter's home. While in ER, oxygen saturation 85% on room air.  Work up in [address] [place] [address] no acute cardiopulmonary process, CT cervical spine no acute osseous abnormality, CT head no acute intracranial abnormality, small vessel white matter changes, urinalysis negative, UDS negative, and VBG pH 7.27 pCO2 61

## 2023-09-09 NOTE — ED NOTES
Pepe hugo placed on pt for rectal temp of 94.7. MD notified.       Mckenna Stephens, RN  09/09/23 9927

## 2023-09-10 LAB
ALBUMIN SERPL-MCNC: 3.5 G/DL (ref 3.5–5.2)
ALP SERPL-CCNC: 93 U/L (ref 35–104)
ALT SERPL-CCNC: 33 U/L (ref 5–33)
ANION GAP SERPL CALCULATED.3IONS-SCNC: 12 MMOL/L (ref 7–19)
AST SERPL-CCNC: 33 U/L (ref 5–32)
BASOPHILS # BLD: 0 K/UL (ref 0–0.2)
BASOPHILS NFR BLD: 0.5 % (ref 0–1)
BILIRUB SERPL-MCNC: 0.3 MG/DL (ref 0.2–1.2)
BUN SERPL-MCNC: 15 MG/DL (ref 8–23)
CALCIUM SERPL-MCNC: 8.7 MG/DL (ref 8.8–10.2)
CHLORIDE SERPL-SCNC: 103 MMOL/L (ref 98–111)
CO2 SERPL-SCNC: 23 MMOL/L (ref 22–29)
CREAT SERPL-MCNC: 0.8 MG/DL (ref 0.5–0.9)
EOSINOPHIL # BLD: 0.2 K/UL (ref 0–0.6)
EOSINOPHIL NFR BLD: 2.4 % (ref 0–5)
ERYTHROCYTE [DISTWIDTH] IN BLOOD BY AUTOMATED COUNT: 15 % (ref 11.5–14.5)
GLUCOSE BLD-MCNC: 130 MG/DL (ref 70–99)
GLUCOSE BLD-MCNC: 181 MG/DL (ref 70–99)
GLUCOSE BLD-MCNC: 189 MG/DL (ref 70–99)
GLUCOSE BLD-MCNC: 192 MG/DL (ref 70–99)
GLUCOSE BLD-MCNC: 219 MG/DL (ref 70–99)
GLUCOSE BLD-MCNC: 236 MG/DL (ref 70–99)
GLUCOSE SERPL-MCNC: 206 MG/DL (ref 74–109)
HCT VFR BLD AUTO: 44.2 % (ref 37–47)
HGB BLD-MCNC: 14 G/DL (ref 12–16)
IMM GRANULOCYTES # BLD: 0 K/UL
LYMPHOCYTES # BLD: 2.9 K/UL (ref 1.1–4.5)
LYMPHOCYTES NFR BLD: 35.4 % (ref 20–40)
MCH RBC QN AUTO: 26.9 PG (ref 27–31)
MCHC RBC AUTO-ENTMCNC: 31.7 G/DL (ref 33–37)
MCV RBC AUTO: 85 FL (ref 81–99)
MONOCYTES # BLD: 0.7 K/UL (ref 0–0.9)
MONOCYTES NFR BLD: 8.4 % (ref 0–10)
NEUTROPHILS # BLD: 4.4 K/UL (ref 1.5–7.5)
NEUTS SEG NFR BLD: 53.1 % (ref 50–65)
PERFORMED ON: ABNORMAL
PLATELET # BLD AUTO: 290 K/UL (ref 130–400)
PMV BLD AUTO: 9.5 FL (ref 9.4–12.3)
POTASSIUM SERPL-SCNC: 4.3 MMOL/L (ref 3.5–5)
PROT SERPL-MCNC: 6.2 G/DL (ref 6.6–8.7)
RBC # BLD AUTO: 5.2 M/UL (ref 4.2–5.4)
SODIUM SERPL-SCNC: 138 MMOL/L (ref 136–145)
WBC # BLD AUTO: 8.2 K/UL (ref 4.8–10.8)

## 2023-09-10 PROCEDURE — 2580000003 HC RX 258

## 2023-09-10 PROCEDURE — 80053 COMPREHEN METABOLIC PANEL: CPT

## 2023-09-10 PROCEDURE — 82962 GLUCOSE BLOOD TEST: CPT

## 2023-09-10 PROCEDURE — 94640 AIRWAY INHALATION TREATMENT: CPT

## 2023-09-10 PROCEDURE — 36415 COLL VENOUS BLD VENIPUNCTURE: CPT

## 2023-09-10 PROCEDURE — 6360000002 HC RX W HCPCS

## 2023-09-10 PROCEDURE — 1210000000 HC MED SURG R&B

## 2023-09-10 PROCEDURE — 85025 COMPLETE CBC W/AUTO DIFF WBC: CPT

## 2023-09-10 PROCEDURE — 6370000000 HC RX 637 (ALT 250 FOR IP): Performed by: STUDENT IN AN ORGANIZED HEALTH CARE EDUCATION/TRAINING PROGRAM

## 2023-09-10 PROCEDURE — 2580000003 HC RX 258: Performed by: STUDENT IN AN ORGANIZED HEALTH CARE EDUCATION/TRAINING PROGRAM

## 2023-09-10 PROCEDURE — 6370000000 HC RX 637 (ALT 250 FOR IP)

## 2023-09-10 PROCEDURE — 94760 N-INVAS EAR/PLS OXIMETRY 1: CPT

## 2023-09-10 RX ORDER — SODIUM CHLORIDE, SODIUM LACTATE, POTASSIUM CHLORIDE, CALCIUM CHLORIDE 600; 310; 30; 20 MG/100ML; MG/100ML; MG/100ML; MG/100ML
INJECTION, SOLUTION INTRAVENOUS CONTINUOUS
Status: DISCONTINUED | OUTPATIENT
Start: 2023-09-10 | End: 2023-09-10

## 2023-09-10 RX ORDER — AMLODIPINE BESYLATE 5 MG/1
5 TABLET ORAL DAILY
Status: DISCONTINUED | OUTPATIENT
Start: 2023-09-10 | End: 2023-09-13 | Stop reason: HOSPADM

## 2023-09-10 RX ORDER — INSULIN LISPRO 100 [IU]/ML
0-4 INJECTION, SOLUTION INTRAVENOUS; SUBCUTANEOUS NIGHTLY
Status: DISCONTINUED | OUTPATIENT
Start: 2023-09-10 | End: 2023-09-13 | Stop reason: HOSPADM

## 2023-09-10 RX ORDER — INSULIN LISPRO 100 [IU]/ML
0-8 INJECTION, SOLUTION INTRAVENOUS; SUBCUTANEOUS
Status: DISCONTINUED | OUTPATIENT
Start: 2023-09-10 | End: 2023-09-13 | Stop reason: HOSPADM

## 2023-09-10 RX ORDER — INSULIN GLARGINE 100 [IU]/ML
10 INJECTION, SOLUTION SUBCUTANEOUS NIGHTLY
Status: DISCONTINUED | OUTPATIENT
Start: 2023-09-10 | End: 2023-09-13 | Stop reason: HOSPADM

## 2023-09-10 RX ADMIN — GABAPENTIN 100 MG: 100 CAPSULE ORAL at 08:16

## 2023-09-10 RX ADMIN — FLUTICASONE PROPIONATE 2 SPRAY: 50 SPRAY, METERED NASAL at 08:25

## 2023-09-10 RX ADMIN — TIOTROPIUM BROMIDE AND OLODATEROL 2 PUFF: 3.124; 2.736 SPRAY, METERED RESPIRATORY (INHALATION) at 06:32

## 2023-09-10 RX ADMIN — ENOXAPARIN SODIUM 30 MG: 100 INJECTION SUBCUTANEOUS at 19:47

## 2023-09-10 RX ADMIN — ENOXAPARIN SODIUM 30 MG: 100 INJECTION SUBCUTANEOUS at 08:16

## 2023-09-10 RX ADMIN — LOSARTAN POTASSIUM 100 MG: 50 TABLET, FILM COATED ORAL at 08:15

## 2023-09-10 RX ADMIN — SODIUM CHLORIDE, PRESERVATIVE FREE 10 ML: 5 INJECTION INTRAVENOUS at 08:17

## 2023-09-10 RX ADMIN — AMLODIPINE BESYLATE 5 MG: 5 TABLET ORAL at 12:56

## 2023-09-10 RX ADMIN — ANASTROZOLE 1 MG: 1 TABLET, COATED ORAL at 08:15

## 2023-09-10 RX ADMIN — OXYBUTYNIN CHLORIDE 10 MG: 5 TABLET, EXTENDED RELEASE ORAL at 08:15

## 2023-09-10 RX ADMIN — GABAPENTIN 100 MG: 100 CAPSULE ORAL at 14:51

## 2023-09-10 RX ADMIN — ROSUVASTATIN 10 MG: 10 TABLET, FILM COATED ORAL at 08:16

## 2023-09-10 RX ADMIN — CETIRIZINE HYDROCHLORIDE 5 MG: 10 TABLET, FILM COATED ORAL at 08:16

## 2023-09-10 RX ADMIN — SODIUM CHLORIDE, PRESERVATIVE FREE 10 ML: 5 INJECTION INTRAVENOUS at 19:48

## 2023-09-10 RX ADMIN — METOPROLOL SUCCINATE 50 MG: 50 TABLET, EXTENDED RELEASE ORAL at 08:15

## 2023-09-10 RX ADMIN — Medication 400 MG: at 08:15

## 2023-09-10 RX ADMIN — INSULIN GLARGINE 10 UNITS: 100 INJECTION, SOLUTION SUBCUTANEOUS at 19:47

## 2023-09-10 RX ADMIN — GABAPENTIN 100 MG: 100 CAPSULE ORAL at 19:47

## 2023-09-10 RX ADMIN — ASPIRIN 81 MG: 81 TABLET, CHEWABLE ORAL at 08:15

## 2023-09-10 RX ADMIN — MONTELUKAST 10 MG: 10 TABLET, FILM COATED ORAL at 19:47

## 2023-09-10 RX ADMIN — DULOXETINE HYDROCHLORIDE 30 MG: 30 CAPSULE, DELAYED RELEASE ORAL at 08:16

## 2023-09-10 RX ADMIN — FUROSEMIDE 20 MG: 40 TABLET ORAL at 08:15

## 2023-09-10 RX ADMIN — SODIUM CHLORIDE, POTASSIUM CHLORIDE, SODIUM LACTATE AND CALCIUM CHLORIDE: 600; 310; 30; 20 INJECTION, SOLUTION INTRAVENOUS at 10:04

## 2023-09-10 NOTE — PLAN OF CARE
Problem: Discharge Planning  Goal: Discharge to home or other facility with appropriate resources  9/10/2023 0930 by Carlos Jiang RN  Outcome: Progressing  Flowsheets (Taken 9/10/2023 0825)  Discharge to home or other facility with appropriate resources:   Identify barriers to discharge with patient and caregiver   Arrange for needed discharge resources and transportation as appropriate  9/10/2023 0042 by Helen Allen RN  Outcome: Progressing     Problem: Safety - Adult  Goal: Free from fall injury  9/10/2023 0930 by Carlos Jiang RN  Outcome: Progressing  9/10/2023 0042 by Helen Allen RN  Outcome: Progressing

## 2023-09-10 NOTE — PLAN OF CARE
Problem: Discharge Planning  Goal: Discharge to home or other facility with appropriate resources  9/10/2023 0042 by Elinore Bloch, RN  Outcome: Progressing  9/9/2023 1821 by Ligia Marsh RN  Outcome: Progressing     Problem: Skin/Tissue Integrity  Goal: Absence of new skin breakdown  Description: 1. Monitor for areas of redness and/or skin breakdown  2. Assess vascular access sites hourly  3. Every 4-6 hours minimum:  Change oxygen saturation probe site  4. Every 4-6 hours:  If on nasal continuous positive airway pressure, respiratory therapy assess nares and determine need for appliance change or resting period.   9/10/2023 0042 by Elinore Bloch, RN  Outcome: Progressing  9/9/2023 1821 by Ligia Marsh RN  Outcome: Progressing     Problem: ABCDS Injury Assessment  Goal: Absence of physical injury  9/10/2023 0042 by Elinore Bloch, RN  Outcome: Progressing  9/9/2023 1821 by Ligia Marsh RN  Outcome: Progressing     Problem: Safety - Adult  Goal: Free from fall injury  9/10/2023 0042 by Elinore Bloch, RN  Outcome: Progressing  9/9/2023 1821 by Ligia Marsh RN  Outcome: Progressing

## 2023-09-11 LAB
ALBUMIN SERPL-MCNC: 3.8 G/DL (ref 3.5–5.2)
ALP SERPL-CCNC: 90 U/L (ref 35–104)
ALT SERPL-CCNC: 31 U/L (ref 5–33)
ANION GAP SERPL CALCULATED.3IONS-SCNC: 12 MMOL/L (ref 7–19)
AST SERPL-CCNC: 24 U/L (ref 5–32)
BASOPHILS # BLD: 0.1 K/UL (ref 0–0.2)
BASOPHILS NFR BLD: 0.9 % (ref 0–1)
BILIRUB SERPL-MCNC: 0.4 MG/DL (ref 0.2–1.2)
BUN SERPL-MCNC: 14 MG/DL (ref 8–23)
CALCIUM SERPL-MCNC: 8.9 MG/DL (ref 8.8–10.2)
CHLORIDE SERPL-SCNC: 104 MMOL/L (ref 98–111)
CO2 SERPL-SCNC: 23 MMOL/L (ref 22–29)
CREAT SERPL-MCNC: 0.7 MG/DL (ref 0.5–0.9)
EOSINOPHIL # BLD: 0.3 K/UL (ref 0–0.6)
EOSINOPHIL NFR BLD: 4.5 % (ref 0–5)
ERYTHROCYTE [DISTWIDTH] IN BLOOD BY AUTOMATED COUNT: 15 % (ref 11.5–14.5)
GLUCOSE BLD-MCNC: 133 MG/DL (ref 70–99)
GLUCOSE BLD-MCNC: 154 MG/DL (ref 70–99)
GLUCOSE BLD-MCNC: 160 MG/DL (ref 70–99)
GLUCOSE BLD-MCNC: 182 MG/DL (ref 70–99)
GLUCOSE BLD-MCNC: 185 MG/DL (ref 70–99)
GLUCOSE BLD-MCNC: 211 MG/DL (ref 70–99)
GLUCOSE SERPL-MCNC: 156 MG/DL (ref 74–109)
HCT VFR BLD AUTO: 44.6 % (ref 37–47)
HGB BLD-MCNC: 13.8 G/DL (ref 12–16)
IMM GRANULOCYTES # BLD: 0 K/UL
LYMPHOCYTES # BLD: 3.4 K/UL (ref 1.1–4.5)
LYMPHOCYTES NFR BLD: 45.2 % (ref 20–40)
MCH RBC QN AUTO: 26.9 PG (ref 27–31)
MCHC RBC AUTO-ENTMCNC: 30.9 G/DL (ref 33–37)
MCV RBC AUTO: 86.9 FL (ref 81–99)
MONOCYTES # BLD: 0.7 K/UL (ref 0–0.9)
MONOCYTES NFR BLD: 8.7 % (ref 0–10)
NEUTROPHILS # BLD: 3 K/UL (ref 1.5–7.5)
NEUTS SEG NFR BLD: 40.4 % (ref 50–65)
PERFORMED ON: ABNORMAL
PLATELET # BLD AUTO: 271 K/UL (ref 130–400)
PMV BLD AUTO: 9.4 FL (ref 9.4–12.3)
POTASSIUM SERPL-SCNC: 4.6 MMOL/L (ref 3.5–5)
PROT SERPL-MCNC: 6.4 G/DL (ref 6.6–8.7)
RBC # BLD AUTO: 5.13 M/UL (ref 4.2–5.4)
SODIUM SERPL-SCNC: 139 MMOL/L (ref 136–145)
WBC # BLD AUTO: 7.5 K/UL (ref 4.8–10.8)

## 2023-09-11 PROCEDURE — 1210000000 HC MED SURG R&B

## 2023-09-11 PROCEDURE — 36415 COLL VENOUS BLD VENIPUNCTURE: CPT

## 2023-09-11 PROCEDURE — 80053 COMPREHEN METABOLIC PANEL: CPT

## 2023-09-11 PROCEDURE — 97530 THERAPEUTIC ACTIVITIES: CPT

## 2023-09-11 PROCEDURE — 97116 GAIT TRAINING THERAPY: CPT

## 2023-09-11 PROCEDURE — 2580000003 HC RX 258

## 2023-09-11 PROCEDURE — 85025 COMPLETE CBC W/AUTO DIFF WBC: CPT

## 2023-09-11 PROCEDURE — 6360000002 HC RX W HCPCS

## 2023-09-11 PROCEDURE — 6370000000 HC RX 637 (ALT 250 FOR IP)

## 2023-09-11 PROCEDURE — 97165 OT EVAL LOW COMPLEX 30 MIN: CPT

## 2023-09-11 PROCEDURE — 97161 PT EVAL LOW COMPLEX 20 MIN: CPT

## 2023-09-11 PROCEDURE — 94640 AIRWAY INHALATION TREATMENT: CPT

## 2023-09-11 PROCEDURE — 82962 GLUCOSE BLOOD TEST: CPT

## 2023-09-11 PROCEDURE — 6370000000 HC RX 637 (ALT 250 FOR IP): Performed by: STUDENT IN AN ORGANIZED HEALTH CARE EDUCATION/TRAINING PROGRAM

## 2023-09-11 PROCEDURE — 94760 N-INVAS EAR/PLS OXIMETRY 1: CPT

## 2023-09-11 RX ADMIN — SODIUM CHLORIDE, PRESERVATIVE FREE 10 ML: 5 INJECTION INTRAVENOUS at 20:30

## 2023-09-11 RX ADMIN — ROSUVASTATIN 10 MG: 10 TABLET, FILM COATED ORAL at 08:30

## 2023-09-11 RX ADMIN — SODIUM CHLORIDE, PRESERVATIVE FREE 10 ML: 5 INJECTION INTRAVENOUS at 08:21

## 2023-09-11 RX ADMIN — OXYBUTYNIN CHLORIDE 10 MG: 5 TABLET, EXTENDED RELEASE ORAL at 08:29

## 2023-09-11 RX ADMIN — TIOTROPIUM BROMIDE AND OLODATEROL 2 PUFF: 3.124; 2.736 SPRAY, METERED RESPIRATORY (INHALATION) at 19:11

## 2023-09-11 RX ADMIN — ANASTROZOLE 1 MG: 1 TABLET, COATED ORAL at 08:19

## 2023-09-11 RX ADMIN — ACETAMINOPHEN 650 MG: 325 TABLET ORAL at 05:40

## 2023-09-11 RX ADMIN — Medication 400 MG: at 08:20

## 2023-09-11 RX ADMIN — MONTELUKAST 10 MG: 10 TABLET, FILM COATED ORAL at 20:28

## 2023-09-11 RX ADMIN — ENOXAPARIN SODIUM 30 MG: 100 INJECTION SUBCUTANEOUS at 20:28

## 2023-09-11 RX ADMIN — DULOXETINE HYDROCHLORIDE 30 MG: 30 CAPSULE, DELAYED RELEASE ORAL at 08:19

## 2023-09-11 RX ADMIN — AMLODIPINE BESYLATE 5 MG: 5 TABLET ORAL at 08:19

## 2023-09-11 RX ADMIN — GABAPENTIN 100 MG: 100 CAPSULE ORAL at 15:08

## 2023-09-11 RX ADMIN — GABAPENTIN 100 MG: 100 CAPSULE ORAL at 08:19

## 2023-09-11 RX ADMIN — GABAPENTIN 100 MG: 100 CAPSULE ORAL at 20:28

## 2023-09-11 RX ADMIN — CETIRIZINE HYDROCHLORIDE 5 MG: 10 TABLET, FILM COATED ORAL at 08:19

## 2023-09-11 RX ADMIN — ENOXAPARIN SODIUM 30 MG: 100 INJECTION SUBCUTANEOUS at 08:18

## 2023-09-11 RX ADMIN — LOSARTAN POTASSIUM 100 MG: 50 TABLET, FILM COATED ORAL at 08:19

## 2023-09-11 RX ADMIN — INSULIN GLARGINE 10 UNITS: 100 INJECTION, SOLUTION SUBCUTANEOUS at 20:29

## 2023-09-11 RX ADMIN — ASPIRIN 81 MG: 81 TABLET, CHEWABLE ORAL at 08:19

## 2023-09-11 RX ADMIN — METOPROLOL SUCCINATE 50 MG: 50 TABLET, EXTENDED RELEASE ORAL at 08:19

## 2023-09-11 RX ADMIN — FUROSEMIDE 20 MG: 40 TABLET ORAL at 08:20

## 2023-09-11 RX ADMIN — FLUTICASONE PROPIONATE 2 SPRAY: 50 SPRAY, METERED NASAL at 08:18

## 2023-09-11 ASSESSMENT — PAIN SCALES - GENERAL
PAINLEVEL_OUTOF10: 3
PAINLEVEL_OUTOF10: 1

## 2023-09-11 ASSESSMENT — PAIN DESCRIPTION - DESCRIPTORS: DESCRIPTORS: ACHING;BURNING

## 2023-09-11 ASSESSMENT — PAIN DESCRIPTION - ORIENTATION: ORIENTATION: LEFT;RIGHT

## 2023-09-11 ASSESSMENT — PAIN DESCRIPTION - LOCATION: LOCATION: LEG

## 2023-09-11 NOTE — PROGRESS NOTES
Physical Therapy  Facility/Department: [facility] 3 JAMAAL/VAS/MED  Physical Therapy Initial Assessment    Name: Henry Garcia  : 1945  MRN: [id number]  Date of Service: 2023    Discharge Recommendations:  Continue to assess pending progress          Patient Diagnosis(es): The primary encounter diagnosis was Hypoglycemia. Diagnoses of Hypothermia not associated with low environmental temperature and Acute respiratory failure with hypoxia ([address]) were also pertinent to this visit. Past Medical History:  has a past medical history of Asthma, BiPAP (biphasic positive airway pressure) dependence, Breast CA (HCC), Chronic back pain, Chronic kidney disease, COPD (chronic obstructive pulmonary disease) ([address]), Diabetes mellitus ([address]), Fibromyalgia, GERD (gastroesophageal reflux disease), History of blood transfusion, History of therapeutic radiation, Hyperlipidemia, Hypertension, Irregular heart beat, Liver disease, Morbid obesity ([address]), Multiple food allergies, Neuropathy, Obstructive sleep apnea, Osteoarthritis, Pneumonia, Postmenopausal osteoporosis, Psychiatric problem, PVD (peripheral vascular disease) ([address]), Restless leg syndrome, and S/P lumpectomy, left breast.  Past Surgical History:  has a past surgical history that includes Foot surgery; Eye surgery; Wrist surgery; Upper gastrointestinal endoscopy (2016); Tooth Extraction; Breast surgery (Left, 2015); eye surgery (Bilateral, 2017); Hysterectomy; fracture surgery; and pr incision & drainage abscess complicated/multiple (N/A, 2018). Assessment   Body Structures, Functions, Activity Limitations Requiring Skilled Therapeutic Intervention: Decreased functional mobility ; Decreased strength;Decreased safe awareness;Decreased endurance;Decreased balance  Assessment: Pt would benefit from skilled PT in this setting to progress her endurance and mobility as well as assist in safe dc planning  Therapy Prognosis: Good  Decision Making: Low

## 2023-09-11 NOTE — PLAN OF CARE
Problem: Discharge Planning  Goal: Discharge to home or other facility with appropriate resources  Outcome: Progressing  Flowsheets (Taken 9/11/2023 0153)  Discharge to home or other facility with appropriate resources: Identify barriers to discharge with patient and caregiver     Problem: Skin/Tissue Integrity  Goal: Absence of new skin breakdown  Description: 1. Monitor for areas of redness and/or skin breakdown  2. Assess vascular access sites hourly  3. Every 4-6 hours minimum:  Change oxygen saturation probe site  4. Every 4-6 hours:  If on nasal continuous positive airway pressure, respiratory therapy assess nares and determine need for appliance change or resting period.   Outcome: Progressing     Problem: ABCDS Injury Assessment  Goal: Absence of physical injury  Outcome: Progressing     Problem: Safety - Adult  Goal: Free from fall injury  Outcome: Progressing

## 2023-09-11 NOTE — CARE COORDINATION
09/11/23 1125   Readmission Assessment   Number of Days since last admission? 1-7 days   Previous Disposition Home with Family   Who is being Denece Yehuda  (medical records; RN)   What was the patient's/caregiver's perception as to why they think they needed to return back to the hospital? Other (Comment)  (hypoglycemia)   Did you visit your Primary Care Physician after you left the hospital, before you returned this time? Yes   Did you see a specialist, such as Cardiac, Pulmonary, Orthopedic Physician, etc. after you left the hospital? No   Who advised the patient to return to the hospital? Self-referral   Does the patient report anything that got in the way of taking their medications? No  (uncertain if taking properly)   In our efforts to provide the best possible care to you and others like you, can you think of anything that we could have done to help you after you left the hospital the first time, so that you might not have needed to return so soon? Teach back instructions regarding management of illness;Education on how to continue taking medications upon discharge; Identify patient's health literacy needs     Electronically signed by CAM Roper on 9/11/2023 at 11:27 AM

## 2023-09-11 NOTE — CARE COORDINATION
Referrals pending with 1. Manjit 2. Rod  Will require precert  Electronically signed by CAM Smith on 9/11/2023 at 3:23 PM

## 2023-09-11 NOTE — PROGRESS NOTES
Occupational Therapy  Facility/Department: [facility] 3 JAMAAL/VAS/MED  Occupational Therapy Initial Assessment    Name: Keren Rodríguez  : 1945  MRN: [id number]  Date of Service: 2023    Discharge Recommendations:             Patient Diagnosis(es): The primary encounter diagnosis was Hypoglycemia. Diagnoses of Hypothermia not associated with low environmental temperature and Acute respiratory failure with hypoxia ([address]) were also pertinent to this visit. Past Medical History:  has a past medical history of Asthma, BiPAP (biphasic positive airway pressure) dependence, Breast CA (HCC), Chronic back pain, Chronic kidney disease, COPD (chronic obstructive pulmonary disease) ([address]), Diabetes mellitus ([address]), Fibromyalgia, GERD (gastroesophageal reflux disease), History of blood transfusion, History of therapeutic radiation, Hyperlipidemia, Hypertension, Irregular heart beat, Liver disease, Morbid obesity ([address]), Multiple food allergies, Neuropathy, Obstructive sleep apnea, Osteoarthritis, Pneumonia, Postmenopausal osteoporosis, Psychiatric problem, PVD (peripheral vascular disease) ([address]), Restless leg syndrome, and S/P lumpectomy, left breast.  Past Surgical History:  has a past surgical history that includes Foot surgery; Eye surgery; Wrist surgery; Upper gastrointestinal endoscopy (2016); Tooth Extraction; Breast surgery (Left, 2015); eye surgery (Bilateral, 2017); Hysterectomy; fracture surgery; and pr incision & drainage abscess complicated/multiple (N/A, 2018). Treatment Diagnosis: Acute respiratory failure, hypoglycemia      Assessment   Performance deficits / Impairments: Decreased functional mobility ; Decreased endurance;Decreased ADL status; Decreased balance  Assessment: Will progress as tolerated  Treatment Diagnosis: Acute respiratory failure, hypoglycemia  Prognosis: Good  Decision Making: Low Complexity  REQUIRES OT FOLLOW-UP: Yes  Activity Tolerance  Activity Tolerance: Patient

## 2023-09-12 LAB
ALBUMIN SERPL-MCNC: 3.9 G/DL (ref 3.5–5.2)
ALP SERPL-CCNC: 91 U/L (ref 35–104)
ALT SERPL-CCNC: 34 U/L (ref 5–33)
ANION GAP SERPL CALCULATED.3IONS-SCNC: 11 MMOL/L (ref 7–19)
AST SERPL-CCNC: 25 U/L (ref 5–32)
BASOPHILS # BLD: 0.1 K/UL (ref 0–0.2)
BASOPHILS NFR BLD: 1 % (ref 0–1)
BILIRUB SERPL-MCNC: 0.4 MG/DL (ref 0.2–1.2)
BUN SERPL-MCNC: 15 MG/DL (ref 8–23)
CALCIUM SERPL-MCNC: 9 MG/DL (ref 8.8–10.2)
CHLORIDE SERPL-SCNC: 102 MMOL/L (ref 98–111)
CO2 SERPL-SCNC: 24 MMOL/L (ref 22–29)
CREAT SERPL-MCNC: 0.7 MG/DL (ref 0.5–0.9)
EOSINOPHIL # BLD: 0.2 K/UL (ref 0–0.6)
EOSINOPHIL NFR BLD: 3.1 % (ref 0–5)
ERYTHROCYTE [DISTWIDTH] IN BLOOD BY AUTOMATED COUNT: 14.9 % (ref 11.5–14.5)
GLUCOSE BLD-MCNC: 114 MG/DL (ref 70–99)
GLUCOSE BLD-MCNC: 186 MG/DL (ref 70–99)
GLUCOSE BLD-MCNC: 195 MG/DL (ref 70–99)
GLUCOSE BLD-MCNC: 233 MG/DL (ref 70–99)
GLUCOSE SERPL-MCNC: 115 MG/DL (ref 74–109)
HCT VFR BLD AUTO: 45.8 % (ref 37–47)
HGB BLD-MCNC: 15 G/DL (ref 12–16)
IMM GRANULOCYTES # BLD: 0 K/UL
LYMPHOCYTES # BLD: 3.3 K/UL (ref 1.1–4.5)
LYMPHOCYTES NFR BLD: 42.5 % (ref 20–40)
MCH RBC QN AUTO: 27.8 PG (ref 27–31)
MCHC RBC AUTO-ENTMCNC: 32.8 G/DL (ref 33–37)
MCV RBC AUTO: 84.8 FL (ref 81–99)
MONOCYTES # BLD: 0.7 K/UL (ref 0–0.9)
MONOCYTES NFR BLD: 9.3 % (ref 0–10)
NEUTROPHILS # BLD: 3.4 K/UL (ref 1.5–7.5)
NEUTS SEG NFR BLD: 43.7 % (ref 50–65)
PERFORMED ON: ABNORMAL
PLATELET # BLD AUTO: 270 K/UL (ref 130–400)
PMV BLD AUTO: 9.4 FL (ref 9.4–12.3)
POTASSIUM SERPL-SCNC: 4.3 MMOL/L (ref 3.5–5)
PROT SERPL-MCNC: 7.1 G/DL (ref 6.6–8.7)
RBC # BLD AUTO: 5.4 M/UL (ref 4.2–5.4)
SODIUM SERPL-SCNC: 137 MMOL/L (ref 136–145)
WBC # BLD AUTO: 7.7 K/UL (ref 4.8–10.8)

## 2023-09-12 PROCEDURE — 2580000003 HC RX 258

## 2023-09-12 PROCEDURE — 97116 GAIT TRAINING THERAPY: CPT

## 2023-09-12 PROCEDURE — 94760 N-INVAS EAR/PLS OXIMETRY 1: CPT

## 2023-09-12 PROCEDURE — 6370000000 HC RX 637 (ALT 250 FOR IP): Performed by: STUDENT IN AN ORGANIZED HEALTH CARE EDUCATION/TRAINING PROGRAM

## 2023-09-12 PROCEDURE — 6360000002 HC RX W HCPCS

## 2023-09-12 PROCEDURE — 92610 EVALUATE SWALLOWING FUNCTION: CPT

## 2023-09-12 PROCEDURE — 36415 COLL VENOUS BLD VENIPUNCTURE: CPT

## 2023-09-12 PROCEDURE — 6370000000 HC RX 637 (ALT 250 FOR IP)

## 2023-09-12 PROCEDURE — 97535 SELF CARE MNGMENT TRAINING: CPT

## 2023-09-12 PROCEDURE — 1210000000 HC MED SURG R&B

## 2023-09-12 PROCEDURE — 92523 SPEECH SOUND LANG COMPREHEN: CPT

## 2023-09-12 PROCEDURE — 85025 COMPLETE CBC W/AUTO DIFF WBC: CPT

## 2023-09-12 PROCEDURE — 82962 GLUCOSE BLOOD TEST: CPT

## 2023-09-12 PROCEDURE — 94640 AIRWAY INHALATION TREATMENT: CPT

## 2023-09-12 PROCEDURE — 80053 COMPREHEN METABOLIC PANEL: CPT

## 2023-09-12 RX ORDER — DIPHENHYDRAMINE HCL 25 MG
25 TABLET ORAL EVERY 6 HOURS PRN
Status: DISCONTINUED | OUTPATIENT
Start: 2023-09-12 | End: 2023-09-13 | Stop reason: HOSPADM

## 2023-09-12 RX ORDER — MONTELUKAST SODIUM 10 MG/1
TABLET ORAL
Qty: 90 TABLET | Refills: 2 | OUTPATIENT
Start: 2023-09-12

## 2023-09-12 RX ADMIN — ASPIRIN 81 MG: 81 TABLET, CHEWABLE ORAL at 08:47

## 2023-09-12 RX ADMIN — OXYBUTYNIN CHLORIDE 10 MG: 5 TABLET, EXTENDED RELEASE ORAL at 08:46

## 2023-09-12 RX ADMIN — SODIUM CHLORIDE, PRESERVATIVE FREE 10 ML: 5 INJECTION INTRAVENOUS at 21:00

## 2023-09-12 RX ADMIN — INSULIN GLARGINE 10 UNITS: 100 INJECTION, SOLUTION SUBCUTANEOUS at 20:59

## 2023-09-12 RX ADMIN — CETIRIZINE HYDROCHLORIDE 5 MG: 10 TABLET, FILM COATED ORAL at 08:47

## 2023-09-12 RX ADMIN — INSULIN LISPRO 2 UNITS: 100 INJECTION, SOLUTION INTRAVENOUS; SUBCUTANEOUS at 12:58

## 2023-09-12 RX ADMIN — HYDROCORTISONE: 25 CREAM TOPICAL at 12:59

## 2023-09-12 RX ADMIN — MONTELUKAST 10 MG: 10 TABLET, FILM COATED ORAL at 20:59

## 2023-09-12 RX ADMIN — ANASTROZOLE 1 MG: 1 TABLET, COATED ORAL at 08:46

## 2023-09-12 RX ADMIN — FLUTICASONE PROPIONATE 2 SPRAY: 50 SPRAY, METERED NASAL at 08:48

## 2023-09-12 RX ADMIN — HYDROCORTISONE: 25 CREAM TOPICAL at 21:52

## 2023-09-12 RX ADMIN — METOPROLOL SUCCINATE 50 MG: 50 TABLET, EXTENDED RELEASE ORAL at 08:47

## 2023-09-12 RX ADMIN — ENOXAPARIN SODIUM 30 MG: 100 INJECTION SUBCUTANEOUS at 20:58

## 2023-09-12 RX ADMIN — GABAPENTIN 100 MG: 100 CAPSULE ORAL at 08:48

## 2023-09-12 RX ADMIN — ROSUVASTATIN 10 MG: 10 TABLET, FILM COATED ORAL at 08:46

## 2023-09-12 RX ADMIN — Medication 400 MG: at 08:47

## 2023-09-12 RX ADMIN — DIPHENHYDRAMINE HYDROCHLORIDE 25 MG: 25 TABLET ORAL at 12:59

## 2023-09-12 RX ADMIN — SODIUM CHLORIDE, PRESERVATIVE FREE 10 ML: 5 INJECTION INTRAVENOUS at 08:47

## 2023-09-12 RX ADMIN — GABAPENTIN 100 MG: 100 CAPSULE ORAL at 15:44

## 2023-09-12 RX ADMIN — DIPHENHYDRAMINE HYDROCHLORIDE 25 MG: 25 TABLET ORAL at 20:59

## 2023-09-12 RX ADMIN — GABAPENTIN 100 MG: 100 CAPSULE ORAL at 20:59

## 2023-09-12 RX ADMIN — TIOTROPIUM BROMIDE AND OLODATEROL 2 PUFF: 3.124; 2.736 SPRAY, METERED RESPIRATORY (INHALATION) at 19:40

## 2023-09-12 RX ADMIN — LOSARTAN POTASSIUM 100 MG: 50 TABLET, FILM COATED ORAL at 08:47

## 2023-09-12 RX ADMIN — DULOXETINE HYDROCHLORIDE 30 MG: 30 CAPSULE, DELAYED RELEASE ORAL at 08:47

## 2023-09-12 RX ADMIN — FUROSEMIDE 20 MG: 40 TABLET ORAL at 08:47

## 2023-09-12 RX ADMIN — ENOXAPARIN SODIUM 30 MG: 100 INJECTION SUBCUTANEOUS at 08:47

## 2023-09-12 RX ADMIN — AMLODIPINE BESYLATE 5 MG: 5 TABLET ORAL at 08:47

## 2023-09-12 NOTE — PROGRESS NOTES
Occupational Therapy  Facility/Department: [facility] 3 JAMAAL/VAS/MED  Occupational Therapy Treatment    Name: Speedy Valladares  : 1945  MRN: [id number]  Date of Service: 2023    Discharge Recommendations:             Patient Diagnosis(es): The primary encounter diagnosis was Hypoglycemia. Diagnoses of Hypothermia not associated with low environmental temperature and Acute respiratory failure with hypoxia ([address]) were also pertinent to this visit. Past Medical History:  has a past medical history of Asthma, BiPAP (biphasic positive airway pressure) dependence, Breast CA (HCC), Chronic back pain, Chronic kidney disease, COPD (chronic obstructive pulmonary disease) ([address]), Diabetes mellitus ([address]), Fibromyalgia, GERD (gastroesophageal reflux disease), History of blood transfusion, History of therapeutic radiation, Hyperlipidemia, Hypertension, Irregular heart beat, Liver disease, Morbid obesity ([address]), Multiple food allergies, Neuropathy, Obstructive sleep apnea, Osteoarthritis, Pneumonia, Postmenopausal osteoporosis, Psychiatric problem, PVD (peripheral vascular disease) ([address]), Restless leg syndrome, and S/P lumpectomy, left breast.  Past Surgical History:  has a past surgical history that includes Foot surgery; Eye surgery; Wrist surgery; Upper gastrointestinal endoscopy (2016); Tooth Extraction; Breast surgery (Left, 2015); eye surgery (Bilateral, 2017); Hysterectomy; fracture surgery; and pr incision & drainage abscess complicated/multiple (N/A, 2018). Treatment Diagnosis: Acute respiratory failure, hypoglycemia      Assessment               Plan   Occupational Therapy Plan  Times Per Week: 3-5x/week  Times Per Day:  Once a day     Restrictions       Subjective   General  Chart Reviewed: Yes  Patient assessed for rehabilitation services?: Yes  Family / Caregiver Present: No  Diagnosis: Acute respiratory failure, hypoglycemia  General Comment  Comments: Pt. feels like she is better than today

## 2023-09-12 NOTE — CARE COORDINATION
Manjit offered; will initiate precert today  [place]  [phone]  Electronically signed by CAM Us on 9/12/2023 at 9:41 AM

## 2023-09-12 NOTE — PROGRESS NOTES
09/12/23 1500   Subjective   Subjective OK I'll walk   Pain Assessment   Pain Assessment None - Denies Pain   Vitals   O2 Device None (Room air)   Bed Mobility Training   Bed Mobility Training Yes   Supine to Sit Modified independent   Transfer Training   Transfer Training Yes   Sit to Stand Supervision   Stand to Sit Supervision   Gait Training   Gait Training Yes   Gait   Overall Level of Assistance Supervision   Distance (ft) 200 Feet   Assistive Device Walker, rolling   Other Specialty Interventions   Other Treatments/Modalities Pt. sitting EOB all needs in reach.        Electronically signed by Lisa Luke PTA on 9/12/2023 at 3:42 PM

## 2023-09-12 NOTE — PROGRESS NOTES
Facility/Department: [facility] 3 JAMAAL/VAS/MED   CLINICAL BEDSIDE SWALLOW EVALUATION  INITIAL SPEECH/LANGUAGE/COGNITIVE EVALUATION    NAME: Jero Monroy  : 1945  MRN: [id number]    ADMISSION DATE: 2023  ADMITTING DIAGNOSIS: has Postmenopausal osteoporosis; Type 2 diabetes mellitus with complication (HCC); COPD (chronic obstructive pulmonary disease) ([address]); Mood disorder ([address]); Dementia ([address]); Malignant neoplasm of upper-outer quadrant of female breast ([address]); Joint pain; Neuropathy involving both lower extremities; Dyspepsia; Lumbar facet arthropathy; Body mass index 45.0-49.9, adult ([address]); Essential hypertension; Hyperlipidemia; Morbid obesity ([address]); DARSHAN (obstructive sleep apnea); BiPAP (biphasic positive airway pressure) dependence; Restless leg syndrome; History of breast cancer; Venous insufficiency of both lower extremities; History of stroke; Uncomplicated asthma; Vitamin D deficiency; Eczema of left external ear; Allergic rhinitis; Hyponatremia; Hyperkalemia; Acute respiratory failure with hypoxia ([address]); and Hypoglycemia on their problem list.    Recent Chest Xray/CT of Chest: (23)    IMPRESSION:     No acute cardiopulmonary findings. Recent CT Head WO Contrast: (23)    IMPRESSION:  No acute intracranial process. Mild atrophy and small vessel white matter change, stable compared to the prior study .     Date of Eval: 2023  Evaluating Therapist: CARLA Saldaña    Current Diet level:  Current Diet : Regular  Current Liquid Diet : Thin    Pain:  Pain Assessment  Pain Assessment: None - Denies Pain  Pain Level: 1  Patient's Stated Pain Goal: 2  Pain Location: Leg  Pain Orientation: Left, Right  Pain Descriptors: Aching, Burning  Non-Pharmaceutical Pain Intervention(s): Heat applied, Repositioned, Rest  Response to Pain Intervention: Patient satisfied  Side Effects: No reported side effects    Impression  Patient exhibited minimal, decreased oral prep with ice chips and decreased oral

## 2023-09-13 ENCOUNTER — TELEPHONE (OUTPATIENT)
Dept: INTERNAL MEDICINE CLINIC | Age: 78
End: 2023-09-13

## 2023-09-13 VITALS
WEIGHT: 251.38 LBS | HEART RATE: 80 BPM | BODY MASS INDEX: 46.26 KG/M2 | RESPIRATION RATE: 17 BRPM | TEMPERATURE: 97.7 F | DIASTOLIC BLOOD PRESSURE: 53 MMHG | SYSTOLIC BLOOD PRESSURE: 110 MMHG | OXYGEN SATURATION: 96 % | HEIGHT: 62 IN

## 2023-09-13 LAB
GLUCOSE BLD-MCNC: 146 MG/DL (ref 70–99)
GLUCOSE BLD-MCNC: 220 MG/DL (ref 70–99)
PERFORMED ON: ABNORMAL
PERFORMED ON: ABNORMAL

## 2023-09-13 PROCEDURE — 94760 N-INVAS EAR/PLS OXIMETRY 1: CPT

## 2023-09-13 PROCEDURE — 82962 GLUCOSE BLOOD TEST: CPT

## 2023-09-13 PROCEDURE — 6370000000 HC RX 637 (ALT 250 FOR IP): Performed by: STUDENT IN AN ORGANIZED HEALTH CARE EDUCATION/TRAINING PROGRAM

## 2023-09-13 PROCEDURE — 2580000003 HC RX 258

## 2023-09-13 PROCEDURE — 6370000000 HC RX 637 (ALT 250 FOR IP)

## 2023-09-13 PROCEDURE — 6360000002 HC RX W HCPCS

## 2023-09-13 RX ORDER — INSULIN DEGLUDEC 200 U/ML
20 INJECTION, SOLUTION SUBCUTANEOUS NIGHTLY
Qty: 18 ML | Refills: 5 | Status: SHIPPED | OUTPATIENT
Start: 2023-09-13

## 2023-09-13 RX ADMIN — INSULIN LISPRO 2 UNITS: 100 INJECTION, SOLUTION INTRAVENOUS; SUBCUTANEOUS at 12:22

## 2023-09-13 RX ADMIN — Medication 400 MG: at 10:53

## 2023-09-13 RX ADMIN — DULOXETINE HYDROCHLORIDE 30 MG: 30 CAPSULE, DELAYED RELEASE ORAL at 10:52

## 2023-09-13 RX ADMIN — OXYBUTYNIN CHLORIDE 10 MG: 5 TABLET, EXTENDED RELEASE ORAL at 10:52

## 2023-09-13 RX ADMIN — GABAPENTIN 100 MG: 100 CAPSULE ORAL at 12:22

## 2023-09-13 RX ADMIN — SODIUM CHLORIDE, PRESERVATIVE FREE 10 ML: 5 INJECTION INTRAVENOUS at 10:55

## 2023-09-13 RX ADMIN — AMLODIPINE BESYLATE 5 MG: 5 TABLET ORAL at 10:53

## 2023-09-13 RX ADMIN — HYDROCORTISONE: 25 CREAM TOPICAL at 10:57

## 2023-09-13 RX ADMIN — ANASTROZOLE 1 MG: 1 TABLET, COATED ORAL at 10:53

## 2023-09-13 RX ADMIN — ROSUVASTATIN 10 MG: 10 TABLET, FILM COATED ORAL at 10:52

## 2023-09-13 RX ADMIN — FLUTICASONE PROPIONATE 2 SPRAY: 50 SPRAY, METERED NASAL at 10:57

## 2023-09-13 RX ADMIN — ENOXAPARIN SODIUM 30 MG: 100 INJECTION SUBCUTANEOUS at 10:53

## 2023-09-13 RX ADMIN — LOSARTAN POTASSIUM 100 MG: 50 TABLET, FILM COATED ORAL at 10:53

## 2023-09-13 RX ADMIN — GABAPENTIN 100 MG: 100 CAPSULE ORAL at 10:53

## 2023-09-13 RX ADMIN — METOPROLOL SUCCINATE 50 MG: 50 TABLET, EXTENDED RELEASE ORAL at 10:53

## 2023-09-13 RX ADMIN — CETIRIZINE HYDROCHLORIDE 5 MG: 10 TABLET, FILM COATED ORAL at 11:08

## 2023-09-13 RX ADMIN — ASPIRIN 81 MG: 81 TABLET, CHEWABLE ORAL at 10:52

## 2023-09-13 RX ADMIN — FUROSEMIDE 20 MG: 40 TABLET ORAL at 10:53

## 2023-09-13 NOTE — CARE COORDINATION
09/13/23 1320   IMM Letter   IMM Letter given to Patient/Family/Significant other/Guardian/POA/by: SW gave letter to Pt and explained; she didn't wish to wait 4h to d/c; Arlette Morelos   IMM Letter date given: 09/13/23   IMM Letter time given: 1320     Placed into soft chart;   Electronically signed by CAM Haney on 9/13/2023 at 3:09 PM

## 2023-09-13 NOTE — TELEPHONE ENCOUNTER
[address] has referral from [facility]  - will Dr Trinidad Cast follow pt for [facility]  ?

## 2023-09-13 NOTE — PLAN OF CARE
Problem: Discharge Planning  Goal: Discharge to home or other facility with appropriate resources  9/13/2023 1559 by Alyse Hatchet, RN  Outcome: Completed  9/13/2023 1548 by Alyse Hatchet, RN  Outcome: Progressing     Problem: Skin/Tissue Integrity  Goal: Absence of new skin breakdown  Description: 1. Monitor for areas of redness and/or skin breakdown  2. Assess vascular access sites hourly  3. Every 4-6 hours minimum:  Change oxygen saturation probe site  4. Every 4-6 hours:  If on nasal continuous positive airway pressure, respiratory therapy assess nares and determine need for appliance change or resting period.   9/13/2023 1559 by Alyse Hatchet, RN  Outcome: Completed  9/13/2023 1548 by Alyse Hatchet, RN  Outcome: Progressing     Problem: ABCDS Injury Assessment  Goal: Absence of physical injury  9/13/2023 1559 by Alyse Hatchet, RN  Outcome: Completed  9/13/2023 1548 by Alyse Hatchet, RN  Outcome: Progressing     Problem: Safety - Adult  Goal: Free from fall injury  9/13/2023 1559 by Alyse Hatchet, RN  Outcome: Completed  9/13/2023 1548 by Alyse Hatchet, RN  Outcome: Progressing     Problem: Chronic Conditions and Co-morbidities  Goal: Patient's chronic conditions and co-morbidity symptoms are monitored and maintained or improved  9/13/2023 1559 by Alyse Hatchet, RN  Outcome: Completed  9/13/2023 1548 by Alyse Hatchet, RN  Outcome: Progressing

## 2023-09-13 NOTE — PROGRESS NOTES
Nutrition Assessment     Type and Reason for Visit: Initial, RD Nutrition Re-Screen/LOS    Nutrition Recommendations/Plan:   Continue current POC     Malnutrition Assessment:  Malnutrition Status: No malnutrition    Nutrition Assessment:  Following patient for LOS x 4 days. SLP working with patient. Diet was downgraded to Easy to chew Carb Control. Aware pt c/o of sore gums. With downgarde po intake is improved with intake ranging %. Pt's weight has decreased approx 16.5# or 6.2% of UBW in the past 3 months.     Nutrition Related Findings:   +1 generalized edema Wound Type: None (rash)    Current Nutrition Therapies:    ADULT DIET; Easy to Chew; 4 carb choices (60 gm/meal)    Anthropometric Measures:  Height: 5' 2\" (157.5 cm)  Current Body Wt: 251 lb 6 oz (114 kg)   BMI: 46    Nutrition Diagnosis:   Unintended weight loss, Altered nutrition-related lab values related to biting/chewing (masticatory) difficulty, endocrine dysfuntion as evidenced by swallow study results, weight loss    Nutrition Interventions:   Food and/or Nutrient Delivery: Continue Current Diet  Nutrition Education/Counseling: No recommendation at this time  Coordination of Nutrition Care: Continue to monitor while inpatient       Goals:     Goals: Meet at least 75% of estimated needs, PO intake 50% or greater       Nutrition Monitoring and Evaluation:   Behavioral-Environmental Outcomes: None Identified  Food/Nutrient Intake Outcomes: Food and Nutrient Intake  Physical Signs/Symptoms Outcomes: Biochemical Data, Chewing or Swallowing, Weight, Skin, Fluid Status or Edema    Discharge Planning:    Continue current diet     Rukhsana Alexander, MS, RD, LD  Contact: [phone]

## 2023-09-13 NOTE — CARE COORDINATION
Insurance denied SNF but offered P2P [phone]; gave to Dr. Adarsh Chin  Electronically signed by CAM Beal on 9/13/2023 at 12:48 PM

## 2023-09-13 NOTE — DISCHARGE SUMMARY
Discharge Summary    NAME: Lenin Guardado  :    MRN:  [id number]    Admit date:  2023  Discharge date: 2023    Advance Directive: Full Code      Primary Care Physician:  Connor Piedra MD    Discharge Diagnoses:  Principal Problem:    Hypoglycemia  Active Problems:    Dementia ([address])    Essential hypertension    Morbid obesity (HCC)    BiPAP (biphasic positive airway pressure) dependence    History of breast cancer    Acute respiratory failure with hypoxia (HCC)  Resolved Problems:    * No resolved hospital problems. *      Significant Diagnostic Studies:   CT CERVICAL SPINE WO CONTRAST    Result Date: 2023  EXAM:  CT CERVICAL SPINE WITHOUT CONTRAST. HISTORY:  Trauma, possible fall. COMPARISON:  None. TECHNIQUE: Axial CT imaging of the cervical spine was performed without contrast.  Sagittal and coronal reconstructed images were made to better evaluate pathology. FINDINGS: There is no acute fracture. The vertebral endplates all appear preserved without compression deformity. There is anatomic vertebral alignment. There is intervertebral disc space narrowing seen at the C5 - C7 levels with endplate spurring. The prevertebral soft tissue is within normal limits. Note is made of fluid within several left inferior mastoid air cells. No acute cervical spine osseous abnormality. All CT scans are performed using dose optimization techniques as appropriate to the performed exam and include at least one of the following: Automated exposure control, adjustment of the mA and/or kV according to size, and the use of iterative reconstruction technique. ______________________________________ Electronically signed by: Radha King M.D. Date:     2023 Time:    15:25     CT HEAD WO CONTRAST    Result Date: 2023  EXAM:  CT HEAD WITHOUT CONTRAST. HISTORY:  Altered mental status. COMPARISON:  2023 CT head.   TECHNIQUE: Axial CT imaging of the brain was performed without contrast.

## 2023-09-13 NOTE — CARE COORDINATION
Spoke with patient regarding MD orders for [facility] services. Patient agreeable and has chosen [facility]. Referral Faxed. [phone] [facility] [phone]. [phone]. Please notify [phone] [facility] when patient discharges and fax DC Summary,  DC med list and any new [facility] orders. The Patient and/or patient representative was provided with a choice of provider and agrees   with the discharge plan. [x] Yes [] No    Freedom of choice list was provided with basic dialogue that supports the patient's individualized plan of care/goals, treatment preferences and shares the quality data associated with the providers.  [x] Yes [] No  Electronically signed by Kinsey Nixon on 9/13/2023 at 1:17 PM

## 2023-09-14 LAB
BACTERIA BLD CULT ORG #2: NORMAL
BACTERIA BLD CULT: NORMAL
CORTIS F SERPL-MCNC: 1.03 UG/DL

## 2023-09-15 ENCOUNTER — TELEPHONE (OUTPATIENT)
Dept: INTERNAL MEDICINE | Age: 78
End: 2023-09-15

## 2023-09-15 ENCOUNTER — TELEPHONE (OUTPATIENT)
Dept: PRIMARY CARE CLINIC | Age: 78
End: 2023-09-15

## 2023-09-15 NOTE — TELEPHONE ENCOUNTER
Mindy Kahn with [id number] [facility] called stating she has admitted patient to their services. She also stated patient medications have level 3 severity interactions to drugs:    Aspirin has interaction  with Duloxetine, Losartan, Furosemide and Ibuprofen. Duloxetine has interaction with Ibuprofen and Metoprolol    Furosemide has interaction with Ibuprofen, Jardiance, and Losartan    Ibuprofen has interaction with Losartan and Metoprolol. Mindy Kanh can be reached at  [phone] for any questions. Please advise.

## 2023-09-15 NOTE — TELEPHONE ENCOUNTER
[address] [facility],[address]St Floor Transitions Initial Follow Up Call    Outreach made within 2 business days of discharge: Yes    Patient: Henry Garcia   Patient : 1945 MRN: [id number]    Reason for Admission: Hypoglycemia    Discharge Date: 23      Discharge Diagnoses:  Principal Problem:    Hypoglycemia  Active Problems:    Dementia ([address])    Essential hypertension    Morbid obesity (HCC)    BiPAP (biphasic positive airway pressure) dependence    History of breast cancer    Acute respiratory failure with hypoxia (HCC)  Resolved Problems:    * No resolved hospital problems. *      Spoke with: Attempted to make contact with patient/caregiver for an initial transitions of care follow up call post discharge without success. I will reach out again at a later time. Any previously scheduled hospital follow up appointments noted below.         Discharge department/facility: [facility]    RECORDS IN CHART  PT    Scheduled appointment with PCP within 7-14 days    Follow Up  Future Appointments   Date Time Provider [address]   2023  9:00 AM Lb Reaves MD [facility]-[place]   10/27/2023 12:30 PM [facility] MAMMO RM 1 [facility] WOMENS [facility] Women's   10/30/2023 11:30 AM NARESH Michaels [facility] Gen SG P[place]   2023  1:30 PM ANTONIA Daniels [address] Neurology -   2023  1:30 PM Dorothy Joshua MD [place], [place]

## 2023-09-18 ENCOUNTER — TELEPHONE (OUTPATIENT)
Dept: INTERNAL MEDICINE CLINIC | Age: 78
End: 2023-09-18

## 2023-09-18 ENCOUNTER — TELEPHONE (OUTPATIENT)
Dept: INTERNAL MEDICINE | Age: 78
End: 2023-09-18

## 2023-09-18 ENCOUNTER — TELEPHONE (OUTPATIENT)
Dept: PRIMARY CARE CLINIC | Age: 78
End: 2023-09-18

## 2023-09-18 NOTE — TELEPHONE ENCOUNTER
[facility] Mary Kate LANZA requesting order for continuous glucose monitor be sent to [facility] in [place]

## 2023-09-18 NOTE — TELEPHONE ENCOUNTER
[address] [facility],[address] Floor Transitions Initial Follow Up Call    Outreach made within 2 business days of discharge: Yes    Patient: Heide Billingsley   Patient : 1945 MRN: [id number]    Reason for Admission: Hypoglycemia    Discharge Date: 23      Discharge Diagnoses:  Principal Problem:    Hypoglycemia  Active Problems:    Dementia ([address])    Essential hypertension    Morbid obesity (HCC)    BiPAP (biphasic positive airway pressure) dependence    History of breast cancer    Acute respiratory failure with hypoxia (HCC)  Resolved Problems:    * No resolved hospital problems     Spoke with: 2nd message left for pt to call us back to do the TCM note.  Pt is already scheduled for the TCM apt for 23    Discharge department/facility: [address] [facility] CHART  PT    Scheduled appointment with PCP within 7-14 days    Follow Up  Future Appointments   Date Time Provider [address]   2023  9:00 AM Tosin Gallegos MD [facility]   10/27/2023 12:30 PM [facility] MAMMO RM 1 [facility] WOMENS L Women's   10/30/2023 11:30 AM NARESH [facility] Gen SG [place]   2023  1:30 PM ANTOINA Crook [address] Neurology -   2023  1:30 PM Dorothy Pineda MD [place], [place]

## 2023-09-18 NOTE — TELEPHONE ENCOUNTER
----- Message from Rand Silverman sent at 9/15/2023  1:42 PM CDT -----  Subject: Message to Provider    QUESTIONS  Information for Provider? moreno called in regards of evaluation on the pt   . Moreno seen pt 9/15 and she will be seen for physicals therapy for 2visit   to focusing on strength and balance. Moreno plan on seen pt next week moreno   number [phone]  ---------------------------------------------------------------------------  --------------  CALL BACK INFO  [phone]; OK to leave message on voicemail  ---------------------------------------------------------------------------  --------------  SCRIPT ANSWERS  Relationship to Patient? Covered Entity  Covered Entity Type? Physical Therapy Office?   Representative Name? Darcie Fox

## 2023-09-19 NOTE — TELEPHONE ENCOUNTER
Gee Schneider called requesting a refill of the below medication which has been pended for you:     Requested Prescriptions     Pending Prescriptions Disp Refills    montelukast (SINGULAIR) 10 MG tablet [Pharmacy Med Name: montelukast 10 mg tablet] 90 tablet 2     Sig: TAKE ONE TABLET BY MOUTH AT NIGHT Tex Christopher)       Last Appointment Date: 8/30/2023  Next Appointment Date: 9/18/2023    Allergies   Allergen Reactions    Pcn [Penicillins] Hives and Itching     Patient states she can tolerate ampicillin and amoxicillin however.     Cefdinir

## 2023-09-20 ENCOUNTER — OFFICE VISIT (OUTPATIENT)
Dept: PRIMARY CARE CLINIC | Age: 78
End: 2023-09-20

## 2023-09-20 VITALS
OXYGEN SATURATION: 96 % | DIASTOLIC BLOOD PRESSURE: 65 MMHG | SYSTOLIC BLOOD PRESSURE: 122 MMHG | TEMPERATURE: 97 F | HEART RATE: 90 BPM | BODY MASS INDEX: 45.91 KG/M2 | WEIGHT: 251 LBS

## 2023-09-20 DIAGNOSIS — Z23 NEED FOR INFLUENZA VACCINATION: ICD-10-CM

## 2023-09-20 DIAGNOSIS — E16.0 HYPOGLYCEMIA DUE TO INSULIN: ICD-10-CM

## 2023-09-20 DIAGNOSIS — E11.9 TYPE 2 DIABETES MELLITUS WITHOUT COMPLICATION, WITH LONG-TERM CURRENT USE OF INSULIN (HCC): Primary | ICD-10-CM

## 2023-09-20 DIAGNOSIS — M54.9 MUSCULOSKELETAL BACK PAIN: ICD-10-CM

## 2023-09-20 DIAGNOSIS — E78.2 MIXED HYPERLIPIDEMIA: ICD-10-CM

## 2023-09-20 DIAGNOSIS — E11.65 POORLY CONTROLLED TYPE 2 DIABETES MELLITUS (HCC): ICD-10-CM

## 2023-09-20 DIAGNOSIS — Z09 HOSPITAL DISCHARGE FOLLOW-UP: Primary | ICD-10-CM

## 2023-09-20 DIAGNOSIS — E11.8 TYPE 2 DIABETES MELLITUS WITH COMPLICATION (HCC): ICD-10-CM

## 2023-09-20 DIAGNOSIS — Z79.899 POLYPHARMACY: ICD-10-CM

## 2023-09-20 DIAGNOSIS — I10 ESSENTIAL HYPERTENSION: ICD-10-CM

## 2023-09-20 DIAGNOSIS — N39.41 URGE INCONTINENCE OF URINE: ICD-10-CM

## 2023-09-20 DIAGNOSIS — T38.3X5A HYPOGLYCEMIA DUE TO INSULIN: ICD-10-CM

## 2023-09-20 DIAGNOSIS — Z79.4 TYPE 2 DIABETES MELLITUS WITHOUT COMPLICATION, WITH LONG-TERM CURRENT USE OF INSULIN (HCC): Primary | ICD-10-CM

## 2023-09-20 RX ORDER — MONTELUKAST SODIUM 10 MG/1
TABLET ORAL
Qty: 90 TABLET | Refills: 2 | Status: SHIPPED | OUTPATIENT
Start: 2023-09-20

## 2023-09-20 NOTE — TELEPHONE ENCOUNTER
Pharmacy called requesting a refill of the below medication which has been pended for you:   Patient needs refills sent to her mail order pharmacy. Requested Prescriptions     Pending Prescriptions Disp Refills    oxybutynin (DITROPAN-XL) 10 MG extended release tablet 90 tablet 1     Sig: Take 1 tablet by mouth daily    furosemide (LASIX) 20 MG tablet 30 tablet 1     Sig: Take 1 tablet by mouth daily    metFORMIN (GLUCOPHAGE) 1000 MG tablet 180 tablet 1     Sig: Take 1 tablet by mouth 2 times daily (with meals)    empagliflozin (JARDIANCE) 25 MG tablet 90 tablet 1     Sig: Take 1 tablet by mouth daily    omeprazole (PRILOSEC) 20 MG delayed release capsule 180 capsule 2     Sig: TAKE (1) CAPSULE BY MOUTH TWICE DAILY AS NEEDED. rosuvastatin (CRESTOR) 10 MG tablet 90 tablet 1     Sig: Take 1 tablet by mouth daily    losartan (COZAAR) 100 MG tablet 90 tablet 1     Sig: TAKE 1 TABLET BY MOUTH ONCE DAILY FOR BLOOD PRESSURE    insulin aspart (NOVOLOG FLEXPEN) 100 UNIT/ML injection pen 135 mL 5     Sig: Inject 60 units with breakfast , 80 units with dinner    Insulin Degludec (TRESIBA FLEXTOUCH) 200 UNIT/ML SOPN 18 mL 5     Sig: Inject 20 Units into the skin at bedtime (ensure fasting blood sugars are ) (BULK)    metoprolol succinate (TOPROL XL) 50 MG extended release tablet 90 tablet 1     Sig: TAKE 1 TABLET BY MOUTH ONCE DAILY FOR BLOOD PRESSURE AND HEART PROTECTION    Dulaglutide (TRULICITY) 3 PU/0.2XP SOPN 4 Adjustable Dose Pre-filled Pen Syringe 5     Sig: Inject 3 mg into the skin once a week    baclofen (LIORESAL) 10 MG tablet 90 tablet 2     Sig: TAKE ONE TABLET BY MOUTH THREE TIMES DAILY (VIAL)       Last Appointment Date: 8/30/2023  Next Appointment Date: 9/20/2023    Allergies   Allergen Reactions    Pcn [Penicillins] Hives and Itching     Patient states she can tolerate ampicillin and amoxicillin however.     Cefdinir

## 2023-09-20 NOTE — PROGRESS NOTES
Post-Discharge Transitional Care  Follow Up      Andrew Lam   YOB: 1945    Date of Office Visit:  9/20/2023  Date of Hospital Admission: 9/9/23  Date of Hospital Discharge: 9/13/23  Risk of hospital readmission (high >=14%. Medium >=10%) :Readmission Risk Score: 16.7      Care management risk score Rising risk (score 2-5) and Complex Care (Scores >=6): No Risk Score On File     Non face to face  following discharge, date last encounter closed (first attempt may have been earlier): 09/18/2023    Call initiated 2 business days of discharge: Yes    ASSESSMENT/PLAN:   Hospital discharge follow-up  -     NC DISCHARGE MEDS RECONCILED W/ CURRENT OUTPATIENT MED LIST  Polypharmacy  Poorly controlled type 2 diabetes mellitus ([address])  Hypoglycemia due to insulin  Essential hypertension  Need for influenza vaccination  -     Influenza, FLUAD, (age 72 y+), IM, Preservative Free, 0.5 mL      Medical Decision Making: high complexity  Return in 1 week (on 9/27/2023). On this date 9/20/2023 I have spent 40 minutes reviewing previous notes, test results and face to face with the patient discussing the diagnosis and importance of compliance with the treatment plan as well as documenting on the day of the visit. Subjective:   HPI:  Follow up of Hospital problems/diagnosis(es):   Inpatient course: Discharge summary reviewed- see chart.     Interval history/Current status:   Pt was admitted at [address] twice i    Patient Active Problem List   Diagnosis    Postmenopausal osteoporosis    Type 2 diabetes mellitus with complication (HCC)    COPD (chronic obstructive pulmonary disease) (HCC)    Mood disorder (HCC)    Dementia (HCC)    Joint pain    Neuropathy involving both lower extremities    Dyspepsia    Lumbar facet arthropathy    Body mass index 45.0-49.9, adult (HCC)    Essential hypertension    Hyperlipidemia    DARSHAN (obstructive sleep apnea)    BiPAP (biphasic positive airway pressure) dependence    Restless

## 2023-09-21 ENCOUNTER — TELEPHONE (OUTPATIENT)
Dept: INTERNAL MEDICINE CLINIC | Age: 78
End: 2023-09-21

## 2023-09-21 NOTE — TELEPHONE ENCOUNTER
[address] [facility] PT reporting :   \" Assessment: Pt. has met all goals and no longer requires skilled physical therapy. She is able to complete transfers from multiple surfaces in the home safely and independently, and ambulates in the home with SC with good stride length and foot clearance. Cues needed to walk slower and take her time for better safety, but did not decrease her christiano any. Improved balance with a tinetti score of 23/28 and has not had any falls since [facility]. Pt. has been given and instructed in a HEP, but has not been compliant with is thus far. Educated on importance of exercising and walking program and stated that she would start doing it. Discharged to HEP at this time.   \"    [facility] nursing is still seeing this pt , It is only PT that has dc her

## 2023-09-22 RX ORDER — METOPROLOL SUCCINATE 50 MG/1
TABLET, EXTENDED RELEASE ORAL
Qty: 30 TABLET | Refills: 0 | Status: SHIPPED | OUTPATIENT
Start: 2023-09-22 | End: 2023-09-26 | Stop reason: SDUPTHER

## 2023-09-22 RX ORDER — OMEPRAZOLE 40 MG/1
CAPSULE, DELAYED RELEASE ORAL
Qty: 30 CAPSULE | Refills: 0 | Status: SHIPPED | OUTPATIENT
Start: 2023-09-22 | End: 2023-09-26

## 2023-09-22 RX ORDER — OXYBUTYNIN CHLORIDE 10 MG/1
10 TABLET, EXTENDED RELEASE ORAL DAILY
Qty: 30 TABLET | Refills: 0 | Status: SHIPPED | OUTPATIENT
Start: 2023-09-22

## 2023-09-22 RX ORDER — FUROSEMIDE 20 MG/1
20 TABLET ORAL DAILY
Qty: 30 TABLET | Refills: 0 | Status: SHIPPED | OUTPATIENT
Start: 2023-09-22 | End: 2023-09-26

## 2023-09-22 RX ORDER — ROSUVASTATIN CALCIUM 10 MG/1
10 TABLET, COATED ORAL DAILY
Qty: 30 TABLET | Refills: 0 | Status: SHIPPED | OUTPATIENT
Start: 2023-09-22

## 2023-09-22 RX ORDER — LOSARTAN POTASSIUM 100 MG/1
TABLET ORAL
Qty: 30 TABLET | Refills: 0 | Status: SHIPPED | OUTPATIENT
Start: 2023-09-22

## 2023-09-22 NOTE — TELEPHONE ENCOUNTER
Noted. Pt is supposed to come back next week with ALL her medication for review and reconciliation.  Please remind pt to keep that appointment

## 2023-09-26 ENCOUNTER — OFFICE VISIT (OUTPATIENT)
Dept: PRIMARY CARE CLINIC | Age: 78
End: 2023-09-26
Payer: MEDICARE

## 2023-09-26 VITALS
BODY MASS INDEX: 46.74 KG/M2 | HEIGHT: 62 IN | DIASTOLIC BLOOD PRESSURE: 68 MMHG | OXYGEN SATURATION: 97 % | WEIGHT: 254 LBS | SYSTOLIC BLOOD PRESSURE: 130 MMHG | HEART RATE: 86 BPM | TEMPERATURE: 97.6 F

## 2023-09-26 DIAGNOSIS — E11.649 HYPOGLYCEMIC EPISODE IN PATIENT WITH DIABETES MELLITUS (HCC): ICD-10-CM

## 2023-09-26 DIAGNOSIS — E11.65 POORLY CONTROLLED TYPE 2 DIABETES MELLITUS (HCC): ICD-10-CM

## 2023-09-26 DIAGNOSIS — Z79.899 POLYPHARMACY: Primary | ICD-10-CM

## 2023-09-26 DIAGNOSIS — I10 ESSENTIAL HYPERTENSION: ICD-10-CM

## 2023-09-26 PROCEDURE — 99214 OFFICE O/P EST MOD 30 MIN: CPT | Performed by: FAMILY MEDICINE

## 2023-09-26 PROCEDURE — 1123F ACP DISCUSS/DSCN MKR DOCD: CPT | Performed by: FAMILY MEDICINE

## 2023-09-26 PROCEDURE — 3051F HG A1C>EQUAL 7.0%<8.0%: CPT | Performed by: FAMILY MEDICINE

## 2023-09-26 PROCEDURE — 3075F SYST BP GE 130 - 139MM HG: CPT | Performed by: FAMILY MEDICINE

## 2023-09-26 PROCEDURE — 3078F DIAST BP <80 MM HG: CPT | Performed by: FAMILY MEDICINE

## 2023-09-26 RX ORDER — BACLOFEN 10 MG/1
TABLET ORAL
Qty: 90 TABLET | Refills: 2 | OUTPATIENT
Start: 2023-09-26

## 2023-09-26 RX ORDER — DULAGLUTIDE 3 MG/.5ML
3 INJECTION, SOLUTION SUBCUTANEOUS WEEKLY
Qty: 4 ADJUSTABLE DOSE PRE-FILLED PEN SYRINGE | Refills: 5 | Status: SHIPPED | OUTPATIENT
Start: 2023-09-26

## 2023-09-26 RX ORDER — INSULIN DEGLUDEC 200 U/ML
20 INJECTION, SOLUTION SUBCUTANEOUS NIGHTLY
Qty: 18 ML | Refills: 5 | Status: SHIPPED | OUTPATIENT
Start: 2023-09-26

## 2023-09-26 RX ORDER — INSULIN ASPART 100 [IU]/ML
15 INJECTION, SOLUTION INTRAVENOUS; SUBCUTANEOUS 3 TIMES DAILY
COMMUNITY
End: 2023-09-26 | Stop reason: SDUPTHER

## 2023-09-26 RX ORDER — INSULIN ASPART 100 [IU]/ML
15 INJECTION, SOLUTION INTRAVENOUS; SUBCUTANEOUS 3 TIMES DAILY
Qty: 10 ML | Refills: 1 | Status: SHIPPED | OUTPATIENT
Start: 2023-09-26

## 2023-09-26 RX ORDER — INSULIN ASPART 100 [IU]/ML
INJECTION, SOLUTION INTRAVENOUS; SUBCUTANEOUS
Qty: 135 ML | Refills: 1 | Status: SHIPPED | OUTPATIENT
Start: 2023-09-26

## 2023-09-26 RX ORDER — OMEPRAZOLE 20 MG/1
20 CAPSULE, DELAYED RELEASE ORAL DAILY
COMMUNITY

## 2023-09-26 RX ORDER — METOPROLOL SUCCINATE 50 MG/1
TABLET, EXTENDED RELEASE ORAL
Qty: 30 TABLET | Refills: 0 | Status: SHIPPED | OUTPATIENT
Start: 2023-09-26

## 2023-09-26 NOTE — PROGRESS NOTES
[facility] PRIMARY CARE  [address] [facility],[address]  [place] [address]  Dept: [phone]  Dept Fax: [phone]  Loc: [phone]      Subjective:     Chief Complaint   Patient presents with    Follow-up     1 week        HPI:  Singh Higuera is a 78 y.o. female presents [facility] for medication management and monitoring. She is on a very long list of medication an dis at increase risk for drug  to drug reaction. . She recently moved in with her daughter Yovani Ribeiro (who is an established pt here as well)  Daughter has been helping pt manage her DM better. Daughter states that pt's BS has been up and down. Pt would normally adjust her insulin dosage  on her own without guidance from a physician or NP. She was recently admitted at [facility] due to hypoglycemia. FBS this am was 176 mg/dl  She is on Trulicity 3 mg once a week, Tresiba 20 units daily in HS, Novolog 60 units in am and 80 units at pm.  The last few visits she has had with me, pt was arguing with family members about her medication, diet and overall health. Family was also concerned about her hoarding.   ALL her medications were brought in today for review and reconciliation      ROS:   Review of Systems  No interval change since last week    PMHx:  Past Medical History:   Diagnosis Date    Asthma     Has rescue inhalers    BiPAP (biphasic positive airway pressure) dependence     8cm to 21cm    Breast CA (HCC)     Left breast Nodules removed Took radiation    Chronic back pain 02/02/2016    Chronic kidney disease     Overactive bladder     COPD (chronic obstructive pulmonary disease) ([facility])     x few years Scarring on lungs 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    Diabetes mellitus ([address])     On insulin x 10 years    Fibromyalgia     for 20 years    GERD (gastroesophageal reflux disease)     History of blood transfusion     ?when    History of therapeutic radiation     Hyperlipidemia     High cholesterol    Hypertension     On medications

## 2023-09-27 PROBLEM — E66.01 MORBID OBESITY (HCC): Status: RESOLVED | Noted: 2017-05-18 | Resolved: 2023-09-27

## 2023-09-27 PROBLEM — E87.5 HYPERKALEMIA: Status: RESOLVED | Noted: 2023-09-04 | Resolved: 2023-09-27

## 2023-09-27 PROBLEM — E87.1 HYPONATREMIA: Status: RESOLVED | Noted: 2023-09-04 | Resolved: 2023-09-27

## 2023-10-09 DIAGNOSIS — E11.8 TYPE 2 DIABETES MELLITUS WITH COMPLICATION (HCC): ICD-10-CM

## 2023-10-09 NOTE — TELEPHONE ENCOUNTER
Jero Monroy called to request a refill on her medication.       Last office visit : 9/26/2023   Next office visit : 12/11/2023     Requested Prescriptions     Pending Prescriptions Disp Refills    metFORMIN (GLUCOPHAGE) 1000 MG tablet [Pharmacy Med Name: METFORMIN HYDROCHLORIDE 1000MG TABLET] 60 tablet 1     Sig: TAKE 1 TABLET BY MOUTH 2 TIMES DAILY (WITH MEALS)            Virginie Blount LPN

## 2023-10-10 ENCOUNTER — TELEPHONE (OUTPATIENT)
Dept: PRIMARY CARE CLINIC | Age: 78
End: 2023-10-10

## 2023-10-10 DIAGNOSIS — N39.41 URGE INCONTINENCE OF URINE: ICD-10-CM

## 2023-10-10 DIAGNOSIS — E11.8 TYPE 2 DIABETES MELLITUS WITH COMPLICATION (HCC): ICD-10-CM

## 2023-10-10 DIAGNOSIS — I10 ESSENTIAL HYPERTENSION: ICD-10-CM

## 2023-10-10 RX ORDER — OXYBUTYNIN CHLORIDE 10 MG/1
10 TABLET, EXTENDED RELEASE ORAL DAILY
Qty: 30 TABLET | Refills: 0 | Status: SHIPPED | OUTPATIENT
Start: 2023-10-10

## 2023-10-10 RX ORDER — MONTELUKAST SODIUM 10 MG/1
TABLET ORAL
Qty: 90 TABLET | Refills: 2 | OUTPATIENT
Start: 2023-10-10

## 2023-10-10 RX ORDER — LOSARTAN POTASSIUM 100 MG/1
TABLET ORAL
Qty: 30 TABLET | Refills: 0 | Status: SHIPPED | OUTPATIENT
Start: 2023-10-10

## 2023-10-10 RX ORDER — LORATADINE 10 MG/1
TABLET ORAL
Qty: 30 TABLET | Refills: 0 | Status: SHIPPED | OUTPATIENT
Start: 2023-10-10

## 2023-10-10 NOTE — TELEPHONE ENCOUNTER
Evy Hinojosa called to request a refill on her medication.       Last office visit : 9/26/2023   Next office visit : 12/11/2023     Requested Prescriptions     Signed Prescriptions Disp Refills    metFORMIN (GLUCOPHAGE) 1000 MG tablet 60 tablet 0     Sig: Take 1 tablet by mouth 2 times daily (with meals)     Authorizing Provider: Gus Myers     Ordering User: DORENE FRIEND    losartan (COZAAR) 100 MG tablet 30 tablet 0     Sig: TAKE 1 TABLET BY MOUTH ONCE DAILY FOR BLOOD PRESSURE     Authorizing Provider: Gus Bowen User: DORENE FRIEND    loratadine (CLARITIN) 10 MG tablet 30 tablet 0     Sig: TAKE 1 TABLET DAILY     Authorizing Provider: Gus Myers     Ordering User: DORENE FRIEND    oxybutynin (DITROPAN-XL) 10 MG extended release tablet 30 tablet 0     Sig: Take 1 tablet by mouth daily     Authorizing Provider: Gus Myers     Ordering User: DORENE FRIEND     Refused Prescriptions Disp Refills    montelukast (SINGULAIR) 10 MG tablet 90 tablet 2     Sig: TAKE ONE TABLET BY MOUTH AT NIGHT (VIAL)     Refused By: Gordon Owen     Reason for Refusal: Patient has requested refill too soon            Gordon Owen LPN

## 2023-10-10 NOTE — TELEPHONE ENCOUNTER
----- Message from Verona Gaines sent at 10/10/2023  9:40 AM CDT -----  Subject: Referral Request    Reason for referral request? Pt is requesting incontinences Pads under   [employer]. States they did not received the request for these pads. Pt   states the referral should have been faxed about the 26th of last month   and they have not received as of yet. Please re-fax to My[facility].  Please   call daughter, Morris Sabillon, with the confirmation number once sent so   that [facility] can [facility]  Provider patient wants to be referred to(if known):     Provider Phone Number(if known):[phone]    Additional Information for Provider?   ---------------------------------------------------------------------------  --------------  [address] [place] [address] Ashwini    [phone]; OK to leave message on voicemail  ---------------------------------------------------------------------------  --------------

## 2023-10-10 NOTE — TELEPHONE ENCOUNTER
----- Message from Betty Crockett sent at 10/10/2023  9:35 AM CDT -----  Subject: Refill Request    QUESTIONS  Name of Medication? metFORMIN (GLUCOPHAGE) 1000 MG tablet  Patient-reported dosage and instructions? 2 tabs daily   How many days do you have left? 0  Preferred Pharmacy? [facility] phone number (if available)? [phone]  ---------------------------------------------------------------------------  --------------,  Name of Medication? losartan (COZAAR) 100 MG tablet  Patient-reported dosage and instructions? 1 tab daily  How many days do you have left? 0  Preferred Pharmacy? [facility] phone number (if available)? [phone]  ---------------------------------------------------------------------------  --------------,  Name of Medication? loratadine (CLARITIN) 10 MG tablet  Patient-reported dosage and instructions? 1 tab daily in morning  How many days do you have left? 0  Preferred Pharmacy? [facility] phone number (if available)? [phone]  ---------------------------------------------------------------------------  --------------,  Name of Medication? montelukast (SINGULAIR) 10 MG tablet  Patient-reported dosage and instructions? 1 at night  How many days do you have left? 7  Preferred Pharmacy? [facility] phone number (if available)? [phone]  ---------------------------------------------------------------------------  --------------,  Name of Medication? oxybutynin (DITROPAN-XL) 10 MG extended release tablet  Patient-reported dosage and instructions? 1 tab by mouth in morning  How many days do you have left? 0  Preferred Pharmacy? [facility] phone number (if available)? [phone]  ---------------------------------------------------------------------------  --------------  CALL BACK INFO  What is the best way for the office to contact you? OK to leave message on   voicemail  Preferred Call Back Phone Number?

## 2023-10-23 DIAGNOSIS — I10 ESSENTIAL HYPERTENSION: ICD-10-CM

## 2023-10-23 RX ORDER — FUROSEMIDE 20 MG/1
TABLET ORAL
Qty: 30 TABLET | Refills: 11 | OUTPATIENT
Start: 2023-10-23

## 2023-10-23 RX ORDER — OMEPRAZOLE 40 MG/1
CAPSULE, DELAYED RELEASE ORAL
Qty: 30 CAPSULE | Refills: 11 | OUTPATIENT
Start: 2023-10-23

## 2023-10-23 RX ORDER — EMPAGLIFLOZIN 25 MG/1
TABLET, FILM COATED ORAL
Qty: 90 TABLET | Refills: 1 | Status: SHIPPED | OUTPATIENT
Start: 2023-10-23

## 2023-10-23 RX ORDER — METOPROLOL SUCCINATE 50 MG/1
TABLET, EXTENDED RELEASE ORAL
Qty: 30 TABLET | Refills: 2 | Status: SHIPPED | OUTPATIENT
Start: 2023-10-23

## 2023-10-23 NOTE — TELEPHONE ENCOUNTER
Maynor La called to request a refill on her medication.       Last office visit : 9/26/2023   Next office visit : 12/11/2023     Requested Prescriptions     Pending Prescriptions Disp Refills    furosemide (LASIX) 20 MG tablet [Pharmacy Med Name: furosemide 20 mg tablet] 30 tablet 11     Sig: TAKE ONE TABLET BY MOUTH DAILY (VIAL)    omeprazole (PRILOSEC) 40 MG delayed release capsule [Pharmacy Med Name: omeprazole 40 mg capsule,delayed release] 30 capsule 11     Sig: TAKE ONE CAPSULE BY MOUTH ONCE DAILY AS NEEDED (Watt Iron)            Alla Mckeon LPN

## 2023-10-23 NOTE — TELEPHONE ENCOUNTER
Rosalind Meter called requesting a refill of the below medication which has been pended for you:     Requested Prescriptions     Pending Prescriptions Disp Refills    metoprolol succinate (TOPROL XL) 50 MG extended release tablet [Pharmacy Med Name: metoprolol succinate ER 50 mg tablet,extended release 24 hr] 30 tablet 0     Sig: TAKE ONE TABLET BY MOUTH ONCE DAILY FOR BLOOD PRESSURE AND HEART PROTECTION (VIAL)       Last Appointment Date: 9/26/2023  Next Appointment Date: 12/11/2023    Allergies   Allergen Reactions    Pcn [Penicillins] Hives and Itching     Patient states she can tolerate ampicillin and amoxicillin however.     Cefdinir

## 2023-10-23 NOTE — TELEPHONE ENCOUNTER
Keren Anne called to request a refill on her medication.       Last office visit : 9/26/2023   Next office visit : 12/11/2023     Requested Prescriptions     Pending Prescriptions Disp Refills    JARDIANCE 25 MG tablet [Pharmacy Med Name: Jardiance 25 mg tablet] 30 tablet 11     Sig: TAKE ONE TABLET BY MOUTH DAILY (Roseann Cherry)            Poonam Hutton LPN

## 2023-10-24 ENCOUNTER — TELEPHONE (OUTPATIENT)
Dept: VASCULAR SURGERY | Facility: CLINIC | Age: 78
End: 2023-10-24
Payer: MEDICARE

## 2023-10-27 ENCOUNTER — HOSPITAL ENCOUNTER (OUTPATIENT)
Dept: WOMENS IMAGING | Age: 78
Discharge: HOME | End: 2023-10-27
Payer: MEDICARE

## 2023-10-27 DIAGNOSIS — Z12.31 VISIT FOR SCREENING MAMMOGRAM: ICD-10-CM

## 2023-10-27 PROCEDURE — 77063 BREAST TOMOSYNTHESIS BI: CPT

## 2023-11-02 ENCOUNTER — TELEPHONE (OUTPATIENT)
Dept: PRIMARY CARE CLINIC | Age: 78
End: 2023-11-02

## 2023-11-02 DIAGNOSIS — Z79.4 TYPE 2 DIABETES MELLITUS WITHOUT COMPLICATION, WITH LONG-TERM CURRENT USE OF INSULIN (HCC): ICD-10-CM

## 2023-11-02 DIAGNOSIS — E11.9 TYPE 2 DIABETES MELLITUS WITHOUT COMPLICATION, WITH LONG-TERM CURRENT USE OF INSULIN (HCC): ICD-10-CM

## 2023-11-02 NOTE — TELEPHONE ENCOUNTER
----- Message from Yovani Castaneda sent at 11/2/2023  3:19 PM CDT -----  Subject: Referral Request    Reason for referral request? Pt would like a referral to [facility] phone number [phone], [address],   [address] [facility]. Please advise. Provider patient wants to be referred to(if known):     Provider Phone Number(if known):     Additional Information for Provider?   ---------------------------------------------------------------------------  --------------  [address] Ash[place]ini    [phone]; OK to leave message on voicemail  ---------------------------------------------------------------------------  --------------

## 2023-11-02 NOTE — TELEPHONE ENCOUNTER
Brianne Lockett called to request a refill on her medication.       Last office visit : 9/26/2023   Next office visit : 12/11/2023     Requested Prescriptions     Pending Prescriptions Disp Refills    Continuous Blood Gluc Sensor (FREESTYLE SHARMAINE 2 SENSOR) MISC [Pharmacy Med Name: Sultana Rasmussen 2/SENSOR/FLASH GLUCOSE MONITORING SYSTEM  MISCELLANEOUS] 2 each 1     Sig: USE AS DIRECTED EVERY 14 DAYS            Alissa Brink LPN

## 2023-11-08 DIAGNOSIS — E11.8 TYPE 2 DIABETES MELLITUS WITH COMPLICATION (HCC): ICD-10-CM

## 2023-11-09 DIAGNOSIS — Z80.1 FAMILY HISTORY OF LUNG CANCER: ICD-10-CM

## 2023-11-09 DIAGNOSIS — Z80.8 FAMILY HISTORY OF MELANOMA: ICD-10-CM

## 2023-11-09 DIAGNOSIS — Z85.3 PERSONAL HISTORY OF MALIGNANT NEOPLASM OF BREAST: Primary | ICD-10-CM

## 2023-11-09 DIAGNOSIS — Z80.8 FAMILY HISTORY OF THYROID CANCER: ICD-10-CM

## 2023-11-09 DIAGNOSIS — Z80.41 FAMILY HISTORY OF OVARIAN CANCER: ICD-10-CM

## 2023-11-09 NOTE — TELEPHONE ENCOUNTER
Mallorie Taylor called to request a refill on her medication.       Last office visit : 9/26/2023   Next office visit : 12/11/2023     Requested Prescriptions     Pending Prescriptions Disp Refills    metFORMIN (GLUCOPHAGE) 1000 MG tablet [Pharmacy Med Name: metformin 1,000 mg tablet] 60 tablet 11     Sig: TAKE ONE TABLET BY MOUTH [address] (Terrial Dies)            Lynette Quarles LPN

## 2023-11-10 ENCOUNTER — PATIENT MESSAGE (OUTPATIENT)
Dept: PRIMARY CARE CLINIC | Age: 78
End: 2023-11-10

## 2023-11-10 DIAGNOSIS — E11.8 TYPE 2 DIABETES MELLITUS WITH COMPLICATION (HCC): ICD-10-CM

## 2023-11-10 DIAGNOSIS — G57.93 NEUROPATHY INVOLVING BOTH LOWER EXTREMITIES: Chronic | ICD-10-CM

## 2023-11-10 RX ORDER — FUROSEMIDE 20 MG/1
TABLET ORAL
Qty: 30 TABLET | Refills: 0 | OUTPATIENT
Start: 2023-11-10

## 2023-11-10 RX ORDER — OMEPRAZOLE 40 MG/1
CAPSULE, DELAYED RELEASE ORAL
Qty: 30 CAPSULE | Refills: 0 | OUTPATIENT
Start: 2023-11-10

## 2023-11-10 RX ORDER — OMEPRAZOLE 20 MG/1
CAPSULE, DELAYED RELEASE ORAL
Qty: 60 CAPSULE | Refills: 0 | OUTPATIENT
Start: 2023-11-10

## 2023-11-10 RX ORDER — DULAGLUTIDE 3 MG/.5ML
INJECTION, SOLUTION SUBCUTANEOUS
Qty: 2 ML | Refills: 5 | OUTPATIENT
Start: 2023-11-10

## 2023-11-10 NOTE — TELEPHONE ENCOUNTER
Chaedward Gurmeet called to request a refill on her medication.       Last office visit : 9/26/2023   Next office visit : 11/10/2023     Requested Prescriptions     Pending Prescriptions Disp Refills    furosemide (LASIX) 20 MG tablet [Pharmacy Med Name: furosemide 20 mg tablet] 30 tablet 0     Sig: TAKE ONE TABLET BY MOUTH DAILY (VIAL)    omeprazole (PRILOSEC) 40 MG delayed release capsule [Pharmacy Med Name: omeprazole 40 mg capsule,delayed release] 30 capsule 0     Sig: TAKE ONE CAPSULE BY MOUTH ONCE DAILY AS NEEDED (Aline Robert)            Sandro Tirado LPN

## 2023-11-10 NOTE — TELEPHONE ENCOUNTER
Sandip Mondragon called requesting a refill of the below medication which has been pended for you:     Requested Prescriptions     Pending Prescriptions Disp Refills    metFORMIN (GLUCOPHAGE) 1000 MG tablet 60 tablet 1     Sig: TAKE ONE TABLET BY MOUTH TWICE DAILY WITH MEALS (VIAL)       Last Appointment Date: 9/26/2023  Next Appointment Date: 12/11/2023    Allergies   Allergen Reactions    Pcn [Penicillins] Hives and Itching     Patient states she can tolerate ampicillin and amoxicillin however.     Cefdinir

## 2023-11-10 NOTE — TELEPHONE ENCOUNTER
Zora Holm called to request a refill on her medication. Last office visit : 9/26/2023   Next office visit : 12/11/2023     Requested Prescriptions     Pending Prescriptions Disp Refills    TRULICITY 3 AX/1.7ZJ SOPN [Pharmacy Med Name: TRULICITY 1JE/3.4AZ SOLUTION PEN-INJECTOR] 2 mL 5     Sig: INJECT 3 MG SUBCUTANEOUSLY ONCE WEEKLY.     metFORMIN (GLUCOPHAGE) 1000 MG tablet [Pharmacy Med Name: METFORMIN HYDROCHLORIDE 1000MG TABLET] 60 tablet 0     Sig: TAKE 1 TABLET BY MOUTH 2 TIMES DAILY (WITH MEALS)            Maribel Del Castillo LPN

## 2023-11-13 DIAGNOSIS — E78.2 MIXED HYPERLIPIDEMIA: ICD-10-CM

## 2023-11-13 DIAGNOSIS — E11.8 TYPE 2 DIABETES MELLITUS WITH COMPLICATION (HCC): ICD-10-CM

## 2023-11-13 NOTE — TELEPHONE ENCOUNTER
Maynor La called to request a refill on her medication.       Last office visit : 9/26/2023   Next office visit : 11/10/2023     Requested Prescriptions     Pending Prescriptions Disp Refills    omeprazole (PRILOSEC) 20 MG delayed release capsule 90 capsule 0     Sig: Take 1 capsule by mouth daily 1 capsule by mouth twice daily as needed            Darryle Bramble, MA

## 2023-11-13 NOTE — TELEPHONE ENCOUNTER
Brianne Lockett called to request a refill on her medication.       Last office visit : 9/26/2023   Next office visit : 12/11/2023     Requested Prescriptions     Pending Prescriptions Disp Refills    anastrozole (ARIMIDEX) 1 MG tablet 30 tablet 3     Sig: Take 1 tablet by mouth daily            Chani Wadsworth MA

## 2023-11-14 RX ORDER — OMEPRAZOLE 20 MG/1
20 CAPSULE, DELAYED RELEASE ORAL DAILY
Qty: 90 CAPSULE | Refills: 0 | Status: SHIPPED | OUTPATIENT
Start: 2023-11-14

## 2023-11-14 RX ORDER — ROSUVASTATIN CALCIUM 10 MG/1
TABLET, COATED ORAL
Qty: 30 TABLET | Refills: 0 | Status: SHIPPED | OUTPATIENT
Start: 2023-11-14

## 2023-11-14 RX ORDER — ANASTROZOLE 1 MG/1
1 TABLET ORAL DAILY
Qty: 30 TABLET | Refills: 3 | Status: SHIPPED | OUTPATIENT
Start: 2023-11-14

## 2023-11-14 NOTE — TELEPHONE ENCOUNTER
Arnie Sargent called to request a refill on her medication.       Last office visit : 9/26/2023   Next office visit : 12/11/2023     Requested Prescriptions     Pending Prescriptions Disp Refills    rosuvastatin (CRESTOR) 10 MG tablet [Pharmacy Med Name: rosuvastatin 10 mg tablet] 30 tablet 11     Sig: TAKE ONE TABLET BY MOUTH DAILY (Jazzmine Ruiz)            Greyson Coreas LPN

## 2023-11-23 LAB
Lab: NORMAL
Lab: NORMAL

## 2023-11-27 ENCOUNTER — HOSPITAL ENCOUNTER (OUTPATIENT)
Dept: WOMENS IMAGING | Age: 78
Discharge: HOME | End: 2023-11-27
Payer: MEDICARE

## 2023-11-27 DIAGNOSIS — Z12.31 ENCOUNTER FOR SCREENING MAMMOGRAM FOR MALIGNANT NEOPLASM OF BREAST: ICD-10-CM

## 2023-11-27 DIAGNOSIS — R92.8 ABNORMAL MAMMOGRAM: ICD-10-CM

## 2023-11-27 PROCEDURE — 76642 ULTRASOUND BREAST LIMITED: CPT

## 2023-11-27 PROCEDURE — G0279 TOMOSYNTHESIS, MAMMO: HCPCS

## 2023-11-28 ENCOUNTER — OFFICE VISIT (OUTPATIENT)
Dept: NEUROLOGY | Age: 78
End: 2023-11-28
Payer: MEDICARE

## 2023-11-28 VITALS
BODY MASS INDEX: 46.74 KG/M2 | HEART RATE: 96 BPM | HEIGHT: 62 IN | DIASTOLIC BLOOD PRESSURE: 81 MMHG | WEIGHT: 254 LBS | SYSTOLIC BLOOD PRESSURE: 144 MMHG

## 2023-11-28 DIAGNOSIS — Z99.89 BIPAP (BIPHASIC POSITIVE AIRWAY PRESSURE) DEPENDENCE: ICD-10-CM

## 2023-11-28 DIAGNOSIS — G47.33 OSA (OBSTRUCTIVE SLEEP APNEA): Primary | ICD-10-CM

## 2023-11-28 PROCEDURE — 99213 OFFICE O/P EST LOW 20 MIN: CPT | Performed by: NURSE PRACTITIONER

## 2023-11-28 PROCEDURE — 3079F DIAST BP 80-89 MM HG: CPT | Performed by: NURSE PRACTITIONER

## 2023-11-28 PROCEDURE — 3077F SYST BP >= 140 MM HG: CPT | Performed by: NURSE PRACTITIONER

## 2023-11-28 PROCEDURE — 1123F ACP DISCUSS/DSCN MKR DOCD: CPT | Performed by: NURSE PRACTITIONER

## 2023-11-28 RX ORDER — OMEPRAZOLE 40 MG/1
CAPSULE, DELAYED RELEASE ORAL
COMMUNITY
Start: 2023-10-11

## 2023-11-28 RX ORDER — FUROSEMIDE 20 MG/1
TABLET ORAL
COMMUNITY
Start: 2023-10-11

## 2023-11-28 RX ORDER — BLOOD-GLUCOSE METER
EACH MISCELLANEOUS
COMMUNITY
Start: 2023-09-20

## 2023-11-28 NOTE — PROGRESS NOTES
REVIEW OF SYSTEMS    Constitutional: []Fever []Sweats []Chills [] Recent Injury [x] Denies all unless marked  HEENT:[]Headache  [] Head Injury [] Hearing Loss  [] Sore Throat  [] Ear Ache [x] Denies all unless marked  Spine:  [] Neck pain  [] Back pain  [] Sciaticia  [x] Denies all unless marked  Cardiovascular:[]Heart Disease []Palpitations [] Chest Pain   [x] Denies all unless marked  Pulmonary: []Shortness of Breath []Cough   [x] Denies all unless marked  Psychiatric/Behavioral:[] Depression [] Anxiety [x] Denies all unless marked  Gastrointestinal: []Nausea  []Vomiting  []Abdominal Pain  []Constipation  []Diarrhea  [x] Denies all unless marked  Genitourinary:   [] Frequency  [] Urgency  [] Dysuria [] Incontinence  [x] Denies all unless marked  Extremities: []Pain  []Swelling  [x] Denies all unless marked  Musculoskeletal: [] Myalgias  [] Joint Pain  [] Arthritis [] Muscle Cramps [] Muscle Twitches  [x] Denies all unless marked  Sleep: []Insomnia[]Snoring []Restless Legs  [x]Sleep Apnea  []Daytime Sleepiness  [x] Denies all unless marked  Skin:[] Rash [] Color Change [x] Denies all unless marked   Neurological:[]Visual Disturbance [] Memory Loss []Loss of Balance []Slurred Speech []Weakness []Seizures  [] Dizziness [x] Denies all unless marked
lowers. Deep tendon reflexes are intact and symmetrical.  Rapid alternating movements are unimpaired. Finger-to-nose testing is performed well, without dysmetria. Gait is normal.    I reviewed the following studies:    []  :  Clinical laboratory test results    []  :  Radiology reports    []  :  Review and summarization of medical records and/or obtain medical records     []  :  Previous/recent polysomnogram report(s)    []  :  [place] Sleepiness Scale      [x]  :  Compliance download: Unavailable today. Patient will need to take SD card to [facility] and have them check it. Assessment:       ICD-10-CM    1. DARSHAN (obstructive sleep apnea)  G47.33       2. BiPAP (biphasic positive airway pressure) dependence  Z99.89            [x]  :  Stable- patient reports wearing BiPAP machine nightly with benefit. Compliance report is unavailable today, patient will need to take SD care to [facility] [employer] to have them check it. []  :  Improved                       []  :  Well controlled              []  :  Resolving     []  :  Resolved     []  :  Inadequately controlled     []  :  Worsening     []  :  Additional workup planned    Plan:     No orders of the defined types were placed in this encounter. No orders of the defined types were placed in this encounter. 1.   Patient advised of the etiology,  pathophysiology, diagnosis, treatment options, and risks of untreated DARSHAN. Risks may include, but are not limited to  hypertension, coronary artery disease, diabetes, stroke, weight gain, impaired cognition, daytime somnolence,  and motor vehicle accidents. Advised to abstain from driving or operating heavy machinery when drowsy and the use of respiratory suppressants. 2.  The following educational material has been included in this visit after visit summary for your review: DARSHAN/PAP guidelines-Discussed with the patient and all questions fully answered.   3.  The current medical regimen is effective;  continue present

## 2023-11-29 NOTE — PROGRESS NOTES
(PRILOSEC) 20 MG delayed release capsule Take 1 capsule by mouth daily 1 capsule by mouth twice daily as needed 90 capsule 0    rosuvastatin (CRESTOR) 10 MG tablet TAKE ONE TABLET BY MOUTH DAILY (VIAL) 30 tablet 0    metFORMIN (GLUCOPHAGE) 1000 MG tablet TAKE ONE TABLET BY MOUTH TWICE DAILY WITH MEALS (VIAL) 60 tablet 1    Continuous Blood Gluc Sensor (FREESTYLE SHARMAINE 2 SENSOR) MISC USE AS DIRECTED EVERY 14 DAYS 2 each 1    empagliflozin (JARDIANCE) 25 MG tablet TAKE ONE TABLET BY MOUTH DAILY (VIAL) 90 tablet 1    metoprolol succinate (TOPROL XL) 50 MG extended release tablet TAKE ONE TABLET BY MOUTH ONCE DAILY FOR BLOOD PRESSURE AND HEART PROTECTION (VIAL) 30 tablet 2    losartan (COZAAR) 100 MG tablet TAKE 1 TABLET BY MOUTH ONCE DAILY FOR BLOOD PRESSURE 30 tablet 0    loratadine (CLARITIN) 10 MG tablet TAKE 1 TABLET DAILY 30 tablet 0    oxybutynin (DITROPAN-XL) 10 MG extended release tablet Take 1 tablet by mouth daily 30 tablet 0    insulin aspart (NOVOLOG FLEXPEN) 100 UNIT/ML injection pen 15 units with meals 135 mL 1    Insulin Degludec (TRESIBA FLEXTOUCH) 200 UNIT/ML SOPN Inject 20 Units into the skin at bedtime (ensure fasting blood sugars are ) (BULK) 18 mL 5    Dulaglutide (TRULICITY) 3 VA/6.6LB SOPN Inject 3 mg into the skin once a week 4 Adjustable Dose Pre-filled Pen Syringe 5    insulin aspart (NOVOLOG) 100 UNIT/ML injection vial Inject 15 Units into the skin 3 times daily Inject 15 units with meals 10 mL 1    montelukast (SINGULAIR) 10 MG tablet TAKE ONE TABLET BY MOUTH AT NIGHT (VIAL) 90 tablet 2    Continuous Blood Gluc  (FREESTYLE SHARMAINE 2 READER) JENISE 1 Device by Does not apply route continuous 1 each 0    hydrocortisone 2.5 % cream Apply topically 2 times daily. 3.5 g 0    mupirocin (BACTROBAN) 2 % ointment APPLY 1 APPLICATION TOPICALLY TO THE APPROPRIATE AREA AS DIRECTED 3 (THREE) TIMES A DAY.       ibuprofen (ADVIL;MOTRIN) 800 MG tablet Take 1 tablet by mouth every 8 hours as needed

## 2023-11-30 ENCOUNTER — OFFICE VISIT (OUTPATIENT)
Dept: SURGERY | Age: 78
End: 2023-11-30
Payer: MEDICARE

## 2023-11-30 VITALS
SYSTOLIC BLOOD PRESSURE: 132 MMHG | HEIGHT: 62 IN | DIASTOLIC BLOOD PRESSURE: 70 MMHG | WEIGHT: 240 LBS | BODY MASS INDEX: 44.16 KG/M2

## 2023-11-30 DIAGNOSIS — Z85.3 PERSONAL HISTORY OF MALIGNANT NEOPLASM OF BREAST: ICD-10-CM

## 2023-11-30 DIAGNOSIS — Z12.31 VISIT FOR SCREENING MAMMOGRAM: Primary | ICD-10-CM

## 2023-11-30 PROCEDURE — 3075F SYST BP GE 130 - 139MM HG: CPT | Performed by: PHYSICIAN ASSISTANT

## 2023-11-30 PROCEDURE — 99213 OFFICE O/P EST LOW 20 MIN: CPT | Performed by: PHYSICIAN ASSISTANT

## 2023-11-30 PROCEDURE — 3078F DIAST BP <80 MM HG: CPT | Performed by: PHYSICIAN ASSISTANT

## 2023-11-30 PROCEDURE — 1123F ACP DISCUSS/DSCN MKR DOCD: CPT | Performed by: PHYSICIAN ASSISTANT

## 2023-12-04 ENCOUNTER — TELEPHONE (OUTPATIENT)
Dept: PODIATRY | Facility: CLINIC | Age: 78
End: 2023-12-04
Payer: MEDICARE

## 2023-12-04 ENCOUNTER — TELEPHONE (OUTPATIENT)
Dept: PRIMARY CARE CLINIC | Age: 78
End: 2023-12-04

## 2023-12-04 RX ORDER — DIAPER,BRIEF,INFANT-TODD,DISP
EACH MISCELLANEOUS
Qty: 30 G | Refills: 1 | Status: SHIPPED | OUTPATIENT
Start: 2023-12-04 | End: 2023-12-11

## 2023-12-04 NOTE — TELEPHONE ENCOUNTER
----- Message from Reba Hernandez sent at 11/30/2023  3:16 PM CST -----  Subject: Message to Provider    QUESTIONS  Information for Provider? Patient was calling because she said she need a   prescription for Monistat and she would like it to go to [facility], patient   would like a call back once sent to pharmacy.  ---------------------------------------------------------------------------  --------------  [address]  [phone]; OK to leave message on voicemail  ---------------------------------------------------------------------------  --------------  SCRIPT ANSWERS  Relationship to Patient?  Self

## 2023-12-05 ENCOUNTER — TELEPHONE (OUTPATIENT)
Dept: PRIMARY CARE CLINIC | Age: 78
End: 2023-12-05

## 2023-12-05 ENCOUNTER — HOSPITAL ENCOUNTER (OUTPATIENT)
Dept: GENERAL RADIOLOGY | Facility: HOSPITAL | Age: 78
Discharge: HOME | End: 2023-12-05
Admitting: PODIATRIST
Payer: MEDICARE

## 2023-12-05 ENCOUNTER — OFFICE VISIT (OUTPATIENT)
Dept: PODIATRY | Facility: CLINIC | Age: 78
End: 2023-12-05
Payer: MEDICARE

## 2023-12-05 VITALS
SYSTOLIC BLOOD PRESSURE: 132 MMHG | WEIGHT: 268 LBS | OXYGEN SATURATION: 95 % | HEART RATE: 82 BPM | BODY MASS INDEX: 49.32 KG/M2 | DIASTOLIC BLOOD PRESSURE: 84 MMHG | HEIGHT: 62 IN

## 2023-12-05 DIAGNOSIS — M20.41 HAMMERTOE, BILATERAL: ICD-10-CM

## 2023-12-05 DIAGNOSIS — R26.9 GAIT ABNORMALITY: ICD-10-CM

## 2023-12-05 DIAGNOSIS — I89.0 LYMPHEDEMA: ICD-10-CM

## 2023-12-05 DIAGNOSIS — M20.11 VALGUS DEFORMITY OF BOTH GREAT TOES: ICD-10-CM

## 2023-12-05 DIAGNOSIS — Z79.4 TYPE 2 DIABETES MELLITUS WITH DIABETIC POLYNEUROPATHY, WITH LONG-TERM CURRENT USE OF INSULIN: ICD-10-CM

## 2023-12-05 DIAGNOSIS — M20.12 VALGUS DEFORMITY OF BOTH GREAT TOES: ICD-10-CM

## 2023-12-05 DIAGNOSIS — E66.01 OBESITY, MORBID, BMI 40.0-49.9: ICD-10-CM

## 2023-12-05 DIAGNOSIS — L84 FOOT CALLUS: ICD-10-CM

## 2023-12-05 DIAGNOSIS — E11.42 TYPE 2 DIABETES MELLITUS WITH DIABETIC POLYNEUROPATHY, WITH LONG-TERM CURRENT USE OF INSULIN: ICD-10-CM

## 2023-12-05 DIAGNOSIS — M79.671 FOOT PAIN, BILATERAL: ICD-10-CM

## 2023-12-05 DIAGNOSIS — I87.2 VENOUS INSUFFICIENCY OF BOTH LOWER EXTREMITIES: ICD-10-CM

## 2023-12-05 DIAGNOSIS — M20.42 HAMMERTOE, BILATERAL: ICD-10-CM

## 2023-12-05 DIAGNOSIS — L60.2 THICKENED NAILS: Primary | ICD-10-CM

## 2023-12-05 DIAGNOSIS — M79.672 FOOT PAIN, BILATERAL: ICD-10-CM

## 2023-12-05 PROCEDURE — 1159F MED LIST DOCD IN RCRD: CPT | Performed by: PODIATRIST

## 2023-12-05 PROCEDURE — 1160F RVW MEDS BY RX/DR IN RCRD: CPT | Performed by: PODIATRIST

## 2023-12-05 PROCEDURE — 3079F DIAST BP 80-89 MM HG: CPT | Performed by: PODIATRIST

## 2023-12-05 PROCEDURE — 3075F SYST BP GE 130 - 139MM HG: CPT | Performed by: PODIATRIST

## 2023-12-05 PROCEDURE — 11721 DEBRIDE NAIL 6 OR MORE: CPT | Performed by: PODIATRIST

## 2023-12-05 PROCEDURE — 11056 PARNG/CUTG B9 HYPRKR LES 2-4: CPT | Performed by: PODIATRIST

## 2023-12-05 PROCEDURE — 99213 OFFICE O/P EST LOW 20 MIN: CPT | Performed by: PODIATRIST

## 2023-12-05 PROCEDURE — 73630 X-RAY EXAM OF FOOT: CPT

## 2023-12-05 NOTE — TELEPHONE ENCOUNTER
----- Message from [address] sent at 12/4/2023  1:50 PM CST -----  Subject: Message to Provider    QUESTIONS  Information for Provider? Patient would like to know if her medication   will be sent to the pharmacy today.  ---------------------------------------------------------------------------  --------------  [address]  [phone]; OK to leave message on voicemail  ---------------------------------------------------------------------------  --------------  SCRIPT ANSWERS  Relationship to Patient?  Self

## 2023-12-07 ENCOUNTER — TELEPHONE (OUTPATIENT)
Dept: PODIATRY | Facility: CLINIC | Age: 78
End: 2023-12-07
Payer: MEDICARE

## 2023-12-08 DIAGNOSIS — E11.8 TYPE 2 DIABETES MELLITUS WITH COMPLICATION (HCC): ICD-10-CM

## 2023-12-08 DIAGNOSIS — E78.2 MIXED HYPERLIPIDEMIA: ICD-10-CM

## 2023-12-08 RX ORDER — OMEPRAZOLE 20 MG/1
20 CAPSULE, DELAYED RELEASE ORAL DAILY
Qty: 60 CAPSULE | Refills: 0 | Status: SHIPPED | OUTPATIENT
Start: 2023-12-08

## 2023-12-08 NOTE — TELEPHONE ENCOUNTER
Virgilio Sol called to request a refill on her medication.       Last office visit : 9/26/2023   Next office visit : 12/14/2023     Requested Prescriptions     Pending Prescriptions Disp Refills    rosuvastatin (CRESTOR) 10 MG tablet [Pharmacy Med Name: rosuvastatin 10 mg tablet] 30 tablet 11     Sig: TAKE ONE TABLET BY MOUTH DAILY            Joana Mendoza LPN

## 2023-12-08 NOTE — TELEPHONE ENCOUNTER
Patrice Navarretebush called to request a refill on her medication.       Last office visit : 9/26/2023   Next office visit : 12/14/2023     Requested Prescriptions     Pending Prescriptions Disp Refills    omeprazole (PRILOSEC) 20 MG delayed release capsule 90 capsule 0     Sig: Take 1 capsule by mouth daily 1 capsule by mouth twice daily as needed            Abbi Ulloa LPN

## 2023-12-11 RX ORDER — ROSUVASTATIN CALCIUM 10 MG/1
10 TABLET, COATED ORAL DAILY
Qty: 30 TABLET | Refills: 11 | Status: SHIPPED | OUTPATIENT
Start: 2023-12-11

## 2023-12-14 ENCOUNTER — OFFICE VISIT (OUTPATIENT)
Dept: PRIMARY CARE CLINIC | Age: 78
End: 2023-12-14
Payer: MEDICARE

## 2023-12-14 VITALS
WEIGHT: 244 LBS | HEIGHT: 62 IN | OXYGEN SATURATION: 97 % | DIASTOLIC BLOOD PRESSURE: 74 MMHG | HEART RATE: 78 BPM | SYSTOLIC BLOOD PRESSURE: 124 MMHG | BODY MASS INDEX: 44.9 KG/M2 | TEMPERATURE: 97.9 F

## 2023-12-14 DIAGNOSIS — I10 ESSENTIAL HYPERTENSION: ICD-10-CM

## 2023-12-14 DIAGNOSIS — E11.8 TYPE 2 DIABETES MELLITUS WITH COMPLICATION (HCC): ICD-10-CM

## 2023-12-14 DIAGNOSIS — Z76.0 MEDICATION REFILL: ICD-10-CM

## 2023-12-14 DIAGNOSIS — N39.41 URGE INCONTINENCE OF URINE: ICD-10-CM

## 2023-12-14 DIAGNOSIS — J06.9 UPPER RESPIRATORY TRACT INFECTION, UNSPECIFIED TYPE: Primary | ICD-10-CM

## 2023-12-14 PROCEDURE — 3074F SYST BP LT 130 MM HG: CPT | Performed by: FAMILY MEDICINE

## 2023-12-14 PROCEDURE — 99213 OFFICE O/P EST LOW 20 MIN: CPT | Performed by: FAMILY MEDICINE

## 2023-12-14 PROCEDURE — 3051F HG A1C>EQUAL 7.0%<8.0%: CPT | Performed by: FAMILY MEDICINE

## 2023-12-14 PROCEDURE — 3078F DIAST BP <80 MM HG: CPT | Performed by: FAMILY MEDICINE

## 2023-12-14 PROCEDURE — 1123F ACP DISCUSS/DSCN MKR DOCD: CPT | Performed by: FAMILY MEDICINE

## 2023-12-14 RX ORDER — LORATADINE 10 MG/1
TABLET ORAL
Qty: 30 TABLET | Refills: 2 | Status: SHIPPED | OUTPATIENT
Start: 2023-12-14

## 2023-12-14 RX ORDER — FLUTICASONE PROPIONATE 50 MCG
2 SPRAY, SUSPENSION (ML) NASAL DAILY
Qty: 16 G | Refills: 2 | Status: SHIPPED | OUTPATIENT
Start: 2023-12-14

## 2023-12-14 RX ORDER — BENZONATATE 100 MG/1
100 CAPSULE ORAL 2 TIMES DAILY PRN
Qty: 20 CAPSULE | Refills: 0 | Status: SHIPPED | OUTPATIENT
Start: 2023-12-14 | End: 2023-12-24

## 2023-12-14 RX ORDER — OXYBUTYNIN CHLORIDE 10 MG/1
10 TABLET, EXTENDED RELEASE ORAL DAILY
Qty: 90 TABLET | Refills: 0 | Status: SHIPPED | OUTPATIENT
Start: 2023-12-14

## 2023-12-14 RX ORDER — DULOXETIN HYDROCHLORIDE 30 MG/1
CAPSULE, DELAYED RELEASE ORAL
Qty: 90 CAPSULE | Refills: 0 | Status: SHIPPED | OUTPATIENT
Start: 2023-12-14

## 2023-12-14 RX ORDER — METOPROLOL SUCCINATE 50 MG/1
TABLET, EXTENDED RELEASE ORAL
Qty: 90 TABLET | Refills: 1 | OUTPATIENT
Start: 2023-12-14

## 2023-12-14 NOTE — TELEPHONE ENCOUNTER
Carlos A Jose called to request a refill on her medication.       Last office visit : 9/26/2023   Next office visit : 12/14/2023     Requested Prescriptions     Pending Prescriptions Disp Refills    loratadine (CLARITIN) 10 MG tablet [Pharmacy Med Name: LORATADINE 10MG TABLET] 30 tablet 0     Sig: TAKE 1 TABLET DAILY    DULoxetine (CYMBALTA) 30 MG extended release capsule [Pharmacy Med Name: DULOXETINE HYDROCHLORIDE 30MG CAPSULE DELAYED RELEASE PARTICLES] 30 capsule 5     Sig: TAKE 1 CAPSULE BY MOUTH DAILY    oxybutynin (DITROPAN-XL) 10 MG extended release tablet [Pharmacy Med Name: OXYBUTYNIN CHLORIDE ER 10MG TABLET EXTENDED RELEASE 24 HOUR] 30 tablet 0     Sig: TAKE 1 TABLET BY MOUTH DAILY    metoprolol succinate (TOPROL XL) 50 MG extended release tablet [Pharmacy Med Name: METOPROLOL SUCCINATE ER 50MG TABLET EXTENDED RELEASE 24 HOUR] 90 tablet 1     Sig: TAKE 1 TABLET BY MOUTH ONCE DAILY FOR BLOOD PRESSURE AND HEART PROTECTION            Pablo Medina LPN

## 2023-12-14 NOTE — PROGRESS NOTES
[facility]  [address] [facility],[address]  [place] [address]  Dept: [phone]  Dept Fax: [phone]  Loc: [phone]      Subjective:     Chief Complaint   Patient presents with    3 Month Follow-Up       HPI:  Singh Higuera is a 78 y.o. female presents today for he r 3 month follow-up/ She also presents with head cold and cough for several weeks  No fever or chills    ROS:   Review of Systems    PMHx:  Past Medical History:   Diagnosis Date    Asthma     Has rescue inhalers    BiPAP (biphasic positive airway pressure) dependence     8cm to 21cm    Breast CA (HCC)     Left breast Nodules removed Took radiation    Breast cancer ([address])     Chronic back pain 02/02/2016    Chronic kidney disease     Overactive bladder     COPD (chronic obstructive pulmonary disease) ([facility])     x few years Scarring on lungs 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    Diabetes mellitus ([address])     On insulin x 10 years    Fibromyalgia     for 20 years    GERD (gastroesophageal reflux disease)     History of blood transfusion     ?when    History of therapeutic radiation     Hyperlipidemia     High cholesterol    Hypertension     On medications for 2 years    Irregular heart beat     Liver disease     Has fatty liver    Morbid obesity ([address])     Multiple food allergies     Neuropathy     Obstructive sleep apnea     AHI: 68.1 per PSG, 5/2018    Osteoarthritis 02/02/2016    Pneumonia     Postmenopausal osteoporosis     Psychiatric problem     PVD (peripheral vascular disease) ([address])     Restless leg syndrome     S/P lumpectomy, left breast 08/19/2015 8/4/2015     Patient Active Problem List   Diagnosis    Postmenopausal osteoporosis    Type 2 diabetes mellitus with complication (HCC)    COPD (chronic obstructive pulmonary disease) ([facility])    Mood disorder (HCC)    Dementia ([facility])    Joint pain    Neuropathy involving both lower extremities    Dyspepsia    Lumbar facet arthropathy    Body mass index

## 2023-12-15 ENCOUNTER — TELEPHONE (OUTPATIENT)
Dept: VASCULAR SURGERY | Facility: CLINIC | Age: 78
End: 2023-12-15
Payer: MEDICARE

## 2023-12-20 DIAGNOSIS — E11.8 TYPE 2 DIABETES MELLITUS WITH COMPLICATION (HCC): ICD-10-CM

## 2023-12-28 DIAGNOSIS — E11.8 TYPE 2 DIABETES MELLITUS WITH COMPLICATION (HCC): ICD-10-CM

## 2023-12-29 ENCOUNTER — TELEPHONE (OUTPATIENT)
Dept: PRIMARY CARE CLINIC | Age: 78
End: 2023-12-29

## 2023-12-29 NOTE — TELEPHONE ENCOUNTER
Singh Higuera called to request a refill on her medication.       Last office visit : 2023   Next office visit : 2023     Requested Prescriptions     Pending Prescriptions Disp Refills    metFORMIN (GLUCOPHAGE) 1000 MG tablet [Pharmacy Med Name: metformin 1,000 mg tablet] 60 tablet 1     [address]            Lauri Kelly LPN

## 2024-01-02 DIAGNOSIS — R10.13 DYSPEPSIA: Primary | ICD-10-CM

## 2024-01-02 RX ORDER — OMEPRAZOLE 20 MG/1
20 CAPSULE, DELAYED RELEASE ORAL 2 TIMES DAILY
Qty: 60 CAPSULE | Refills: 0 | Status: SHIPPED | OUTPATIENT
Start: 2024-01-02 | End: 2024-02-01

## 2024-01-05 DIAGNOSIS — I10 ESSENTIAL HYPERTENSION: ICD-10-CM

## 2024-01-05 DIAGNOSIS — Z79.4 TYPE 2 DIABETES MELLITUS WITHOUT COMPLICATION, WITH LONG-TERM CURRENT USE OF INSULIN (HCC): ICD-10-CM

## 2024-01-05 DIAGNOSIS — E11.9 TYPE 2 DIABETES MELLITUS WITHOUT COMPLICATION, WITH LONG-TERM CURRENT USE OF INSULIN (HCC): ICD-10-CM

## 2024-01-05 RX ORDER — LOSARTAN POTASSIUM 100 MG/1
TABLET ORAL
Qty: 30 TABLET | Refills: 1 | Status: SHIPPED | OUTPATIENT
Start: 2024-01-05

## 2024-01-05 NOTE — TELEPHONE ENCOUNTER
Christine Roa called to request a refill on her medication.      Last office visit : 12/14/2023   Next office visit : 3/18/2024     Requested Prescriptions     Pending Prescriptions Disp Refills    Continuous Blood Gluc Sensor (FREESTYLE SHARMAINE 2 SENSOR) MISC [Pharmacy Med Name: FREESTYLE SHARMAINE 2/SENSOR/FLASH GLUCOSE MONITORING SYSTEM  MISCELLANEOUS] 2 each 1     Sig: USE 1 SENSOR AS DIRECTED EVERY 14 DAYS    losartan (COZAAR) 100 MG tablet [Pharmacy Med Name: LOSARTAN POTASSIUM 100MG TABLET] 30 tablet 0     Sig: TAKE 1 TABLET BY MOUTH ONCE DAILY FOR BLOOD PRESSURE            Alix Webster LPN

## 2024-01-08 DIAGNOSIS — Z79.4 TYPE 2 DIABETES MELLITUS WITHOUT COMPLICATION, WITH LONG-TERM CURRENT USE OF INSULIN (HCC): ICD-10-CM

## 2024-01-08 DIAGNOSIS — E11.9 TYPE 2 DIABETES MELLITUS WITHOUT COMPLICATION, WITH LONG-TERM CURRENT USE OF INSULIN (HCC): ICD-10-CM

## 2024-01-12 NOTE — TELEPHONE ENCOUNTER
Christine Roa called to request a refill on her medication.      Last office visit : 12/14/2023   Next office visit : 3/18/2024     Requested Prescriptions     Pending Prescriptions Disp Refills    Continuous Blood Gluc Sensor (FREESTYLE SHARMAINE 2 SENSOR) [facility] 2 each 1            Alix Webster LPN

## 2024-01-23 DIAGNOSIS — R10.13 DYSPEPSIA: ICD-10-CM

## 2024-01-23 DIAGNOSIS — G57.93 NEUROPATHY INVOLVING BOTH LOWER EXTREMITIES: Chronic | ICD-10-CM

## 2024-01-23 RX ORDER — OMEPRAZOLE 40 MG/1
CAPSULE, DELAYED RELEASE ORAL
Qty: 90 CAPSULE | Refills: 0 | Status: SHIPPED | OUTPATIENT
Start: 2024-01-23

## 2024-01-23 RX ORDER — GABAPENTIN 300 MG/1
CAPSULE ORAL
Qty: 90 CAPSULE | Refills: 0 | Status: SHIPPED | OUTPATIENT
Start: 2024-01-23 | End: 2025-02-14

## 2024-01-23 NOTE — TELEPHONE ENCOUNTER
Christine Roa called to request a refill on her medication.      Last office visit : 12/14/2023   Next office visit : 3/18/2024     Last UDS:   Amphetamine Screen, Urine   Date Value Ref Range Status   09/09/2023 Negative Negative <500 ng/mL Final     Barbiturate Screen, Urine   Date Value Ref Range Status   05/16/2022 neg  Final     Benzodiazepine Screen, Urine   Date Value Ref Range Status   09/09/2023 Negative Negative <150 ng/mL Final     Buprenorphine Urine   Date Value Ref Range Status   09/09/2023 Negative Negative <10 ng/mL Final     Cocaine Metabolite Screen, Urine   Date Value Ref Range Status   09/09/2023 Negative Negative <150 ng/mL Final     Gabapentin Screen, Urine   Date Value Ref Range Status   05/16/2022 neg  Final     MDMA, Urine   Date Value Ref Range Status   05/16/2022 neg  Final     Methamphetamine, Urine   Date Value Ref Range Status   09/09/2023 Negative Negative <500 ng/mL Final     Opiate Scrn, Ur   Date Value Ref Range Status   09/09/2023 Negative Negative < 100 ng/mL Final     Oxycodone Screen, Ur   Date Value Ref Range Status   05/16/2022 neg  Final     PCP Screen, Urine   Date Value Ref Range Status   09/09/2023 Negative Negative <25 ng/mL Final     Propoxyphene Screen, Urine   Date Value Ref Range Status   05/16/2022 neg  Final     THC Screen, Urine   Date Value Ref Range Status   05/16/2022 neg  Final     Tricyclic Antidepressants, Urine   Date Value Ref Range Status   05/16/2022 neg  Final       Last Immanuel: 01-  Medication Contract: 05-  Last Fill: 11-    Requested Prescriptions     Pending Prescriptions Disp Refills    gabapentin (NEURONTIN) 300 MG capsule 180 capsule 5     Sig: TAKE ONE CAPSULE BY MOUTH THREE TIMES DAILY (VIAL)    omeprazole (PRILOSEC) 40 MG delayed release capsule 90 capsule 0     Sig: TAKE ONE CAPSULE BY MOUTH ONCE DAILY AS NEEDED (VIAL)         Please approve or refuse this medication.   Bette Sorto MA

## 2024-01-29 DIAGNOSIS — E11.8 TYPE 2 DIABETES MELLITUS WITH COMPLICATION (HCC): ICD-10-CM

## 2024-01-29 DIAGNOSIS — N39.41 URGE INCONTINENCE OF URINE: ICD-10-CM

## 2024-01-29 RX ORDER — OXYBUTYNIN CHLORIDE 10 MG/1
10 TABLET, EXTENDED RELEASE ORAL DAILY
Qty: 30 TABLET | Refills: 0 | Status: SHIPPED | OUTPATIENT
Start: 2024-01-29

## 2024-01-29 NOTE — TELEPHONE ENCOUNTER
Christine Roa called to request a refill on her medication.      Last office visit : 12/14/2023   Next office visit : 3/18/2024     Requested Prescriptions     Pending Prescriptions Disp Refills    oxyBUTYnin (DITROPAN-XL) 10 MG extended release tablet [Pharmacy Med Name: oxybutynin chloride ER 10 mg tablet,extended release 24 hr] 30 tablet 0     Sig: TAKE ONE TABLET BY MOUTH DAILY    metFORMIN (GLUCOPHAGE) 1000 MG tablet [Pharmacy Med Name: metformin 1,000 mg tablet] 60 tablet 0     Sig: TAKE ONE TABLET BY MOUTH TWICE DAILY WITH MEALS            Bette Sorto MA

## 2024-01-30 DIAGNOSIS — G57.93 NEUROPATHY INVOLVING BOTH LOWER EXTREMITIES: Chronic | ICD-10-CM

## 2024-01-30 NOTE — TELEPHONE ENCOUNTER
Christine Roa called to request a refill on her medication.      Last office visit : 12/14/2023   Next office visit : 3/18/2024     Last UDS:   Amphetamine Screen, Urine   Date Value Ref Range Status   09/09/2023 Negative Negative <500 ng/mL Final     Barbiturate Screen, Urine   Date Value Ref Range Status   05/16/2022 neg  Final     Benzodiazepine Screen, Urine   Date Value Ref Range Status   09/09/2023 Negative Negative <150 ng/mL Final     Buprenorphine Urine   Date Value Ref Range Status   09/09/2023 Negative Negative <10 ng/mL Final     Cocaine Metabolite Screen, Urine   Date Value Ref Range Status   09/09/2023 Negative Negative <150 ng/mL Final     Gabapentin Screen, Urine   Date Value Ref Range Status   05/16/2022 neg  Final     MDMA, Urine   Date Value Ref Range Status   05/16/2022 neg  Final     Methamphetamine, Urine   Date Value Ref Range Status   09/09/2023 Negative Negative <500 ng/mL Final     Opiate Scrn, Ur   Date Value Ref Range Status   09/09/2023 Negative Negative < 100 ng/mL Final     Oxycodone Screen, Ur   Date Value Ref Range Status   05/16/2022 neg  Final     PCP Screen, Urine   Date Value Ref Range Status   09/09/2023 Negative Negative <25 ng/mL Final     Propoxyphene Screen, Urine   Date Value Ref Range Status   05/16/2022 neg  Final     THC Screen, Urine   Date Value Ref Range Status   05/16/2022 neg  Final     Tricyclic Antidepressants, Urine   Date Value Ref Range Status   05/16/2022 neg  Final       Last Immanuel: 01-  Medication Contract: 05-  Last Fill: 01-    Requested Prescriptions     Pending Prescriptions Disp Refills    gabapentin (NEURONTIN) 300 MG capsule [Pharmacy Med Name: gabapentin 300 mg capsule] 90 capsule 11     Sig: TAKE ONE CAPSULE BY MOUTH THREE TIMES DAILY         Please approve or refuse this medication.   Bette Sorto MA

## 2024-01-31 RX ORDER — GABAPENTIN 300 MG/1
CAPSULE ORAL
Qty: 90 CAPSULE | Refills: 0 | Status: SHIPPED | OUTPATIENT
Start: 2024-01-31 | End: 2024-03-02

## 2024-02-05 DIAGNOSIS — I10 ESSENTIAL HYPERTENSION: ICD-10-CM

## 2024-02-05 RX ORDER — LOSARTAN POTASSIUM 100 MG/1
TABLET ORAL
Qty: 30 TABLET | Refills: 11 | OUTPATIENT
Start: 2024-02-05

## 2024-02-05 NOTE — TELEPHONE ENCOUNTER
Christine Roa called to request a refill on her medication.      Last office visit : 12/14/2023   Next office visit : 3/18/2024     Requested Prescriptions     Pending Prescriptions Disp Refills    losartan (COZAAR) 100 MG tablet [Pharmacy Med Name: losartan 100 mg tablet] 30 tablet 11     Sig: TAKE ONE TABLET BY MOUTH ONCE DAILY FOR BLOOD PRESSURE            Alix Webster LPN

## 2024-02-14 DIAGNOSIS — I83.11 VARICOSE VEINS OF BOTH LOWER EXTREMITIES WITH INFLAMMATION: ICD-10-CM

## 2024-02-14 DIAGNOSIS — R60.0 BILATERAL LOWER EXTREMITY EDEMA: ICD-10-CM

## 2024-02-14 DIAGNOSIS — I87.2 VENOUS INSUFFICIENCY OF BOTH LOWER EXTREMITIES: Primary | ICD-10-CM

## 2024-02-14 DIAGNOSIS — I83.12 VARICOSE VEINS OF BOTH LOWER EXTREMITIES WITH INFLAMMATION: ICD-10-CM

## 2024-02-21 ENCOUNTER — OFFICE VISIT (OUTPATIENT)
Dept: PRIMARY CARE CLINIC | Age: 79
End: 2024-02-21
Payer: MEDICARE

## 2024-02-21 VITALS
TEMPERATURE: 99.4 F | BODY MASS INDEX: 42.33 KG/M2 | HEART RATE: 94 BPM | OXYGEN SATURATION: 97 % | DIASTOLIC BLOOD PRESSURE: 72 MMHG | WEIGHT: 230 LBS | HEIGHT: 62 IN | SYSTOLIC BLOOD PRESSURE: 124 MMHG

## 2024-02-21 DIAGNOSIS — R05.1 ACUTE COUGH: ICD-10-CM

## 2024-02-21 DIAGNOSIS — E11.8 TYPE 2 DIABETES MELLITUS WITH COMPLICATION (HCC): ICD-10-CM

## 2024-02-21 DIAGNOSIS — J10.1 INFLUENZA A: Primary | ICD-10-CM

## 2024-02-21 DIAGNOSIS — N39.41 URGE INCONTINENCE OF URINE: ICD-10-CM

## 2024-02-21 DIAGNOSIS — I10 ESSENTIAL HYPERTENSION: ICD-10-CM

## 2024-02-21 LAB
INFLUENZA A ANTIGEN, POC: POSITIVE
INFLUENZA B ANTIGEN, POC: NEGATIVE
LOT EXPIRE DATE: NORMAL
LOT KIT NUMBER: NORMAL
SARS-COV-2, POC: NORMAL
VALID INTERNAL CONTROL: NORMAL
VENDOR AND KIT NAME POC: NORMAL

## 2024-02-21 PROCEDURE — 3078F DIAST BP <80 MM HG: CPT | Performed by: FAMILY MEDICINE

## 2024-02-21 PROCEDURE — 1123F ACP DISCUSS/DSCN MKR DOCD: CPT | Performed by: FAMILY MEDICINE

## 2024-02-21 PROCEDURE — 1036F TOBACCO NON-USER: CPT | Performed by: FAMILY MEDICINE

## 2024-02-21 PROCEDURE — G8417 CALC BMI ABV UP PARAM F/U: HCPCS | Performed by: FAMILY MEDICINE

## 2024-02-21 PROCEDURE — G8484 FLU IMMUNIZE NO ADMIN: HCPCS | Performed by: FAMILY MEDICINE

## 2024-02-21 PROCEDURE — G8427 DOCREV CUR MEDS BY ELIG CLIN: HCPCS | Performed by: FAMILY MEDICINE

## 2024-02-21 PROCEDURE — 1090F PRES/ABSN URINE INCON ASSESS: CPT | Performed by: FAMILY MEDICINE

## 2024-02-21 PROCEDURE — G8399 PT W/DXA RESULTS DOCUMENT: HCPCS | Performed by: FAMILY MEDICINE

## 2024-02-21 PROCEDURE — 3074F SYST BP LT 130 MM HG: CPT | Performed by: FAMILY MEDICINE

## 2024-02-21 PROCEDURE — 99213 OFFICE O/P EST LOW 20 MIN: CPT | Performed by: FAMILY MEDICINE

## 2024-02-21 RX ORDER — OXYBUTYNIN CHLORIDE 10 MG/1
10 TABLET, EXTENDED RELEASE ORAL DAILY
Qty: 30 TABLET | Refills: 0 | Status: SHIPPED | OUTPATIENT
Start: 2024-02-21

## 2024-02-21 RX ORDER — LOSARTAN POTASSIUM 100 MG/1
TABLET ORAL
Qty: 30 TABLET | Refills: 0 | OUTPATIENT
Start: 2024-02-21

## 2024-02-21 RX ORDER — DEXTROMETHORPHAN HYDROBROMIDE AND PROMETHAZINE HYDROCHLORIDE 15; 6.25 MG/5ML; MG/5ML
5 SYRUP ORAL NIGHTLY
Qty: 50 ML | Refills: 0 | Status: SHIPPED | OUTPATIENT
Start: 2024-02-21 | End: 2024-03-02

## 2024-02-21 NOTE — PROGRESS NOTES
Daily Treatment Note  Date: 2020  Patient Name: Artelia Sever  MRN: [id number]     :   1945    Subjective:   General  Chart Reviewed: Yes  Response To Previous Treatment: Not applicable  Family / Caregiver Present: No  Referring Practitioner: Damien KNIGHT  PT Visit Information  Onset Date: 20  PT Insurance Information: [facility]   Total # of Visits Approved: 6  Total # of Visits to Date: 5  Plan of Care/Certification Expiration Date: 20  Progress Note Due Date: 20  Subjective  Subjective: Patient states she had some injections in her back 2020. She says that she is still having some leakage. She says she leaked about 4x yesterday. Pain Screening  Patient Currently in Pain: No  Vital Signs  Patient Currently in Pain: No       Treatment Activities:                                      Exercises  Exercise 1: supine ta contraction alone 10 sec x 10, alt ue 1 x 10, alt le 1 x 10, alt ue/le 1 x 5  Exercise 2: supine posterior pelvic tilts 1 x 10  Exercise 3: supine bilateral hs 90-90 stretch 4 x 10 sec   Exercise 4: supine bilateral piriformis stretch > 90 4 x 10 sec  Exercise 5: seated ta contraction alone 10 sec x 5, alt ue 1 x 10, alt le 1 x 10, alt ue/le 1 x 10  Exercise 6: supine downtraining on biofeedback on wedge in slight trendelenburg x 3 min   Exercise 7: supine pfm contraction on biofeedback on wedge in slight trendelenburg alone 10 sec x 5, with towel squeeze 10 sec x 10,with yellow t-band for hip abd 10 sec x 5, hooklying alone 10 sec hold x 5, with towel for hip add 10 sec x 0, quick flicks 2 x 5  Exercise 11: Bladder diary issued 2020  Exercise 12: bladder and bowel information and nutritional sheets provided and patient educated on 2020                                   Assessment:   Conditions Requiring Skilled Therapeutic Intervention  Body structures, Functions, Activity limitations: Decreased functional mobility ; Decreased ADL REVIEW OF SYSTEMS:  Constitutional: Negative  Integumentary problem(s):rash  HEENT Problem(s): Cataracts and Sinus Congestion  Cardiovascular problem(s): Irregular Heartbeat, Shortness of Breath on Exertion, and Swelling in Legs, Ankles, Abdomen  Respiratory problem(s): None  Gastro-intestinal problem(s): constipation  Genito-urinary problem(s): Negative  Musculoskeletal problem(s): Negative  Neurological problem(s): Numbness or Tingling in Hands or Feet from Chemo  Psychiatric problem(s): Sleep Problems  Endocrine problem(s): Negative  Hematologic and/or Lymphatic problem(s): Easy Bruising    Patient does not smoke.    Patient does not take aspirin.

## 2024-02-21 NOTE — TELEPHONE ENCOUNTER
Christine Roa called to request a refill on her medication.      Last office visit : 12/14/2023   Next office visit : 2/21/2024     Requested Prescriptions     Pending Prescriptions Disp Refills    losartan (COZAAR) 100 MG tablet [Pharmacy Med Name: losartan 100 mg tablet] 30 tablet 0     Sig: TAKE ONE TABLET BY MOUTH ONCE DAILY FOR BLOOD PRESSURE    oxyBUTYnin (DITROPAN-XL) 10 MG extended release tablet [Pharmacy Med Name: oxybutynin chloride ER 10 mg tablet,extended release 24 hr] 30 tablet 0     Sig: TAKE ONE TABLET BY MOUTH DAILY    metFORMIN (GLUCOPHAGE) 1000 MG tablet [Pharmacy Med Name: metformin 1,000 mg tablet] 60 tablet 0     Sig: TAKE ONE TABLET BY MOUTH TWICE DAILY WITH MEALS            Alix Webster LPN

## 2024-02-22 ASSESSMENT — ENCOUNTER SYMPTOMS
COUGH: 1
RHINORRHEA: 1
SHORTNESS OF BREATH: 0

## 2024-02-22 NOTE — PROGRESS NOTES
[facility]  [address]  [address]  [place] [address]  Dept: [phone]  Dept Fax: [phone]  Loc: [phone]      Subjective:     Chief Complaint   Patient presents with    Pharyngitis    Generalized Body Aches    Chills    Fever    Fatigue       HPI:  Christine Roa is a 78 y.o. female presents with above symptoms Other household members with similar symptoms  Flu test today is positive    ROS:   Review of Systems   Constitutional:  Positive for chills, fatigue and fever.   HENT:  Positive for rhinorrhea.    Respiratory:  Positive for cough. Negative for shortness of breath.    Cardiovascular:  Negative for chest pain.   Musculoskeletal:  Positive for myalgias.       PMHx:  Past Medical History:   Diagnosis Date    Asthma     Has rescue inhalers    BiPAP (biphasic positive airway pressure) dependence     8cm to 21cm    Breast CA (HCC)     Left breast Nodules removed Took radiation    Breast cancer (HCC)     Chronic back pain 02/02/2016    Chronic kidney disease     Overactive bladder     COPD (chronic obstructive pulmonary disease) (HCC)     x few years Scarring on lungs 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    Diabetes mellitus (HCC)     On insulin x 10 years    Fibromyalgia     for 20 years    GERD (gastroesophageal reflux disease)     History of blood transfusion     ?when    History of therapeutic radiation     Hyperlipidemia     High cholesterol    Hypertension     On medications for 2 years    Irregular heart beat     Liver disease     Has fatty liver    Morbid obesity (HCC)     Multiple food allergies     Neuropathy     Obstructive sleep apnea     AHI: 68.1 per PSG, 5/2018    Osteoarthritis 02/02/2016    Pneumonia     Postmenopausal osteoporosis     Psychiatric problem     PVD (peripheral vascular disease) (HCC)     Restless leg syndrome     S/P lumpectomy, left breast 08/19/2015 8/4/2015     Patient Active Problem List   Diagnosis    Postmenopausal

## 2024-02-23 ENCOUNTER — TELEPHONE (OUTPATIENT)
Dept: VASCULAR SURGERY | Facility: CLINIC | Age: 79
End: 2024-02-23
Payer: MEDICARE

## 2024-02-27 RX ORDER — LANOLIN ALCOHOL/MO/W.PET/CERES
400 CREAM (GRAM) TOPICAL DAILY
Qty: 90 TABLET | Refills: 0 | OUTPATIENT
Start: 2024-02-27

## 2024-02-28 ENCOUNTER — APPOINTMENT (OUTPATIENT)
Dept: GENERAL RADIOLOGY | Age: 79
End: 2024-02-28
Payer: MEDICARE

## 2024-02-28 ENCOUNTER — HOSPITAL ENCOUNTER (OUTPATIENT)
Age: 79
Setting detail: OBSERVATION
Discharge: HOME HEALTH | End: 2024-03-01
Attending: HOSPITALIST
Payer: MEDICARE

## 2024-02-28 DIAGNOSIS — R09.02 HYPOXIA: Primary | ICD-10-CM

## 2024-02-28 PROBLEM — J96.01 ACUTE HYPOXIC RESPIRATORY FAILURE (HCC): Status: ACTIVE | Noted: 2024-02-28

## 2024-02-28 LAB
25(OH)D3 SERPL-MCNC: 37.5 NG/ML
ALBUMIN SERPL-MCNC: 4.3 G/DL (ref 3.5–5.2)
ALP SERPL-CCNC: 87 U/L (ref 35–104)
ALT SERPL-CCNC: 12 U/L (ref 5–33)
ANION GAP SERPL CALCULATED.3IONS-SCNC: 13 MMOL/L (ref 7–19)
AST SERPL-CCNC: 13 U/L (ref 5–32)
B PARAP IS1001 DNA NPH QL NAA+NON-PROBE: NOT DETECTED
B PERT.PT PRMT NPH QL NAA+NON-PROBE: NOT DETECTED
BASOPHILS # BLD: 0.1 K/UL (ref 0–0.2)
BASOPHILS NFR BLD: 0.5 % (ref 0–1)
BILIRUB SERPL-MCNC: 0.3 MG/DL (ref 0.2–1.2)
BNP BLD-MCNC: 112 PG/ML (ref 0–449)
BUN SERPL-MCNC: 13 MG/DL (ref 8–23)
C PNEUM DNA NPH QL NAA+NON-PROBE: NOT DETECTED
CALCIUM SERPL-MCNC: 9.3 MG/DL (ref 8.8–10.2)
CHLORIDE SERPL-SCNC: 97 MMOL/L (ref 98–111)
CO2 SERPL-SCNC: 26 MMOL/L (ref 22–29)
CREAT SERPL-MCNC: 0.6 MG/DL (ref 0.5–0.9)
D DIMER PPP FEU-MCNC: 0.6 UG/ML FEU (ref 0–0.48)
EOSINOPHIL # BLD: 0.2 K/UL (ref 0–0.6)
EOSINOPHIL NFR BLD: 2.2 % (ref 0–5)
ERYTHROCYTE [DISTWIDTH] IN BLOOD BY AUTOMATED COUNT: 14.4 % (ref 11.5–14.5)
FLUAV RNA NPH QL NAA+NON-PROBE: NOT DETECTED
FLUBV RNA NPH QL NAA+NON-PROBE: NOT DETECTED
GLUCOSE BLD-MCNC: 410 MG/DL (ref 70–99)
GLUCOSE SERPL-MCNC: 283 MG/DL (ref 74–109)
HADV DNA NPH QL NAA+NON-PROBE: NOT DETECTED
HBA1C MFR BLD: 9 % (ref 4–6)
HCOV 229E RNA NPH QL NAA+NON-PROBE: NOT DETECTED
HCOV HKU1 RNA NPH QL NAA+NON-PROBE: NOT DETECTED
HCOV NL63 RNA NPH QL NAA+NON-PROBE: NOT DETECTED
HCOV OC43 RNA NPH QL NAA+NON-PROBE: NOT DETECTED
HCT VFR BLD AUTO: 44.4 % (ref 37–47)
HGB BLD-MCNC: 14.3 G/DL (ref 12–16)
HMPV RNA NPH QL NAA+NON-PROBE: NOT DETECTED
HPIV1 RNA NPH QL NAA+NON-PROBE: NOT DETECTED
HPIV2 RNA NPH QL NAA+NON-PROBE: NOT DETECTED
HPIV3 RNA NPH QL NAA+NON-PROBE: NOT DETECTED
HPIV4 RNA NPH QL NAA+NON-PROBE: NOT DETECTED
IMM GRANULOCYTES # BLD: 0 K/UL
LACTATE BLDV-SCNC: 1.4 MMOL/L (ref 0.5–1.9)
LYMPHOCYTES # BLD: 3.7 K/UL (ref 1.1–4.5)
LYMPHOCYTES NFR BLD: 40.7 % (ref 20–40)
M PNEUMO DNA NPH QL NAA+NON-PROBE: NOT DETECTED
MAGNESIUM SERPL-MCNC: 2 MG/DL (ref 1.6–2.4)
MCH RBC QN AUTO: 26.1 PG (ref 27–31)
MCHC RBC AUTO-ENTMCNC: 32.2 G/DL (ref 33–37)
MCV RBC AUTO: 81 FL (ref 81–99)
MONOCYTES # BLD: 0.6 K/UL (ref 0–0.9)
MONOCYTES NFR BLD: 6.4 % (ref 0–10)
NEUTROPHILS # BLD: 4.6 K/UL (ref 1.5–7.5)
NEUTS SEG NFR BLD: 50 % (ref 50–65)
PERFORMED ON: ABNORMAL
PHOSPHATE SERPL-MCNC: 3.9 MG/DL (ref 2.5–4.5)
PLATELET # BLD AUTO: 294 K/UL (ref 130–400)
PMV BLD AUTO: 9.4 FL (ref 9.4–12.3)
POTASSIUM SERPL-SCNC: 5 MMOL/L (ref 3.5–5)
PROT SERPL-MCNC: 7.2 G/DL (ref 6.6–8.7)
RBC # BLD AUTO: 5.48 M/UL (ref 4.2–5.4)
RSV RNA NPH QL NAA+NON-PROBE: NOT DETECTED
RV+EV RNA NPH QL NAA+NON-PROBE: NOT DETECTED
SARS-COV-2 RNA NPH QL NAA+NON-PROBE: NOT DETECTED
SODIUM SERPL-SCNC: 136 MMOL/L (ref 136–145)
TROPONIN, HIGH SENSITIVITY: 9 NG/L (ref 0–14)
TSH SERPL DL<=0.005 MIU/L-ACNC: 0.92 UIU/ML (ref 0.35–5.5)
WBC # BLD AUTO: 9.1 K/UL (ref 4.8–10.8)

## 2024-02-28 PROCEDURE — 80053 COMPREHEN METABOLIC PANEL: CPT

## 2024-02-28 PROCEDURE — 96372 THER/PROPH/DIAG INJ SC/IM: CPT

## 2024-02-28 PROCEDURE — 6370000000 HC RX 637 (ALT 250 FOR IP): Performed by: HOSPITALIST

## 2024-02-28 PROCEDURE — 96375 TX/PRO/DX INJ NEW DRUG ADDON: CPT

## 2024-02-28 PROCEDURE — 36415 COLL VENOUS BLD VENIPUNCTURE: CPT

## 2024-02-28 PROCEDURE — 87040 BLOOD CULTURE FOR BACTERIA: CPT

## 2024-02-28 PROCEDURE — 83880 ASSAY OF NATRIURETIC PEPTIDE: CPT

## 2024-02-28 PROCEDURE — G0378 HOSPITAL OBSERVATION PER HR: HCPCS

## 2024-02-28 PROCEDURE — 83036 HEMOGLOBIN GLYCOSYLATED A1C: CPT

## 2024-02-28 PROCEDURE — 82962 GLUCOSE BLOOD TEST: CPT

## 2024-02-28 PROCEDURE — 93005 ELECTROCARDIOGRAM TRACING: CPT | Performed by: PHYSICIAN ASSISTANT

## 2024-02-28 PROCEDURE — 83605 ASSAY OF LACTIC ACID: CPT

## 2024-02-28 PROCEDURE — 84100 ASSAY OF PHOSPHORUS: CPT

## 2024-02-28 PROCEDURE — 6370000000 HC RX 637 (ALT 250 FOR IP): Performed by: NURSE PRACTITIONER

## 2024-02-28 PROCEDURE — 6360000002 HC RX W HCPCS: Performed by: NURSE PRACTITIONER

## 2024-02-28 PROCEDURE — 85379 FIBRIN DEGRADATION QUANT: CPT

## 2024-02-28 PROCEDURE — 96366 THER/PROPH/DIAG IV INF ADDON: CPT

## 2024-02-28 PROCEDURE — 83735 ASSAY OF MAGNESIUM: CPT

## 2024-02-28 PROCEDURE — 99285 EMERGENCY DEPT VISIT HI MDM: CPT

## 2024-02-28 PROCEDURE — 6360000002 HC RX W HCPCS: Performed by: PHYSICIAN ASSISTANT

## 2024-02-28 PROCEDURE — 84484 ASSAY OF TROPONIN QUANT: CPT

## 2024-02-28 PROCEDURE — 2580000003 HC RX 258: Performed by: NURSE PRACTITIONER

## 2024-02-28 PROCEDURE — 96367 TX/PROPH/DG ADDL SEQ IV INF: CPT

## 2024-02-28 PROCEDURE — 96374 THER/PROPH/DIAG INJ IV PUSH: CPT

## 2024-02-28 PROCEDURE — 71045 X-RAY EXAM CHEST 1 VIEW: CPT

## 2024-02-28 PROCEDURE — 96365 THER/PROPH/DIAG IV INF INIT: CPT

## 2024-02-28 PROCEDURE — 2500000003 HC RX 250 WO HCPCS: Performed by: NURSE PRACTITIONER

## 2024-02-28 PROCEDURE — 0202U NFCT DS 22 TRGT SARS-COV-2: CPT

## 2024-02-28 PROCEDURE — 2580000003 HC RX 258: Performed by: PHYSICIAN ASSISTANT

## 2024-02-28 PROCEDURE — 85025 COMPLETE CBC W/AUTO DIFF WBC: CPT

## 2024-02-28 PROCEDURE — 82306 VITAMIN D 25 HYDROXY: CPT

## 2024-02-28 PROCEDURE — 84443 ASSAY THYROID STIM HORMONE: CPT

## 2024-02-28 PROCEDURE — 96376 TX/PRO/DX INJ SAME DRUG ADON: CPT

## 2024-02-28 RX ORDER — PANTOPRAZOLE SODIUM 40 MG/1
40 TABLET, DELAYED RELEASE ORAL
Status: DISCONTINUED | OUTPATIENT
Start: 2024-02-29 | End: 2024-03-01 | Stop reason: HOSPADM

## 2024-02-28 RX ORDER — INSULIN LISPRO 100 [IU]/ML
0-4 INJECTION, SOLUTION INTRAVENOUS; SUBCUTANEOUS
Status: DISCONTINUED | OUTPATIENT
Start: 2024-02-29 | End: 2024-02-28

## 2024-02-28 RX ORDER — SODIUM CHLORIDE 0.9 % (FLUSH) 0.9 %
5-40 SYRINGE (ML) INJECTION EVERY 12 HOURS SCHEDULED
Status: DISCONTINUED | OUTPATIENT
Start: 2024-02-28 | End: 2024-03-01 | Stop reason: HOSPADM

## 2024-02-28 RX ORDER — DEXTROSE MONOHYDRATE 100 MG/ML
INJECTION, SOLUTION INTRAVENOUS CONTINUOUS PRN
Status: DISCONTINUED | OUTPATIENT
Start: 2024-02-28 | End: 2024-03-01 | Stop reason: HOSPADM

## 2024-02-28 RX ORDER — ONDANSETRON 4 MG/1
4 TABLET, ORALLY DISINTEGRATING ORAL EVERY 8 HOURS PRN
Status: DISCONTINUED | OUTPATIENT
Start: 2024-02-28 | End: 2024-03-01 | Stop reason: HOSPADM

## 2024-02-28 RX ORDER — GUAIFENESIN 600 MG/1
600 TABLET, EXTENDED RELEASE ORAL 2 TIMES DAILY
Status: DISCONTINUED | OUTPATIENT
Start: 2024-02-28 | End: 2024-03-01 | Stop reason: HOSPADM

## 2024-02-28 RX ORDER — SODIUM CHLORIDE 9 MG/ML
INJECTION, SOLUTION INTRAVENOUS PRN
Status: DISCONTINUED | OUTPATIENT
Start: 2024-02-28 | End: 2024-03-01 | Stop reason: HOSPADM

## 2024-02-28 RX ORDER — ACETYLCYSTEINE 200 MG/ML
600 SOLUTION ORAL; RESPIRATORY (INHALATION) 2 TIMES DAILY PRN
Status: DISCONTINUED | OUTPATIENT
Start: 2024-02-28 | End: 2024-02-29

## 2024-02-28 RX ORDER — ASPIRIN 81 MG/1
81 TABLET, CHEWABLE ORAL DAILY
Status: DISCONTINUED | OUTPATIENT
Start: 2024-02-29 | End: 2024-03-01 | Stop reason: HOSPADM

## 2024-02-28 RX ORDER — DEXTROMETHORPHAN HYDROBROMIDE AND PROMETHAZINE HYDROCHLORIDE 15; 6.25 MG/5ML; MG/5ML
5 SYRUP ORAL NIGHTLY PRN
Status: DISCONTINUED | OUTPATIENT
Start: 2024-02-28 | End: 2024-02-28

## 2024-02-28 RX ORDER — FUROSEMIDE 40 MG/1
20 TABLET ORAL DAILY
Status: DISCONTINUED | OUTPATIENT
Start: 2024-02-28 | End: 2024-03-01 | Stop reason: HOSPADM

## 2024-02-28 RX ORDER — METOPROLOL SUCCINATE 25 MG/1
50 TABLET, EXTENDED RELEASE ORAL DAILY
Status: DISCONTINUED | OUTPATIENT
Start: 2024-02-29 | End: 2024-03-01 | Stop reason: HOSPADM

## 2024-02-28 RX ORDER — FLUTICASONE PROPIONATE 50 MCG
2 SPRAY, SUSPENSION (ML) NASAL DAILY
Status: DISCONTINUED | OUTPATIENT
Start: 2024-02-29 | End: 2024-03-01 | Stop reason: HOSPADM

## 2024-02-28 RX ORDER — POLYETHYLENE GLYCOL 3350 17 G/17G
17 POWDER, FOR SOLUTION ORAL DAILY PRN
Status: DISCONTINUED | OUTPATIENT
Start: 2024-02-28 | End: 2024-03-01 | Stop reason: HOSPADM

## 2024-02-28 RX ORDER — SODIUM CHLORIDE 0.9 % (FLUSH) 0.9 %
5-40 SYRINGE (ML) INJECTION PRN
Status: DISCONTINUED | OUTPATIENT
Start: 2024-02-28 | End: 2024-03-01 | Stop reason: HOSPADM

## 2024-02-28 RX ORDER — LOSARTAN POTASSIUM 100 MG/1
100 TABLET ORAL DAILY
Status: DISCONTINUED | OUTPATIENT
Start: 2024-02-29 | End: 2024-03-01 | Stop reason: HOSPADM

## 2024-02-28 RX ORDER — ACETAMINOPHEN 325 MG/1
650 TABLET ORAL EVERY 6 HOURS PRN
Status: DISCONTINUED | OUTPATIENT
Start: 2024-02-28 | End: 2024-03-01 | Stop reason: HOSPADM

## 2024-02-28 RX ORDER — IPRATROPIUM BROMIDE AND ALBUTEROL SULFATE 2.5; .5 MG/3ML; MG/3ML
1 SOLUTION RESPIRATORY (INHALATION)
Status: DISCONTINUED | OUTPATIENT
Start: 2024-02-29 | End: 2024-02-29

## 2024-02-28 RX ORDER — ROSUVASTATIN CALCIUM 20 MG/1
10 TABLET, COATED ORAL NIGHTLY
Status: DISCONTINUED | OUTPATIENT
Start: 2024-02-29 | End: 2024-03-01 | Stop reason: HOSPADM

## 2024-02-28 RX ORDER — ONDANSETRON 2 MG/ML
4 INJECTION INTRAMUSCULAR; INTRAVENOUS EVERY 6 HOURS PRN
Status: DISCONTINUED | OUTPATIENT
Start: 2024-02-28 | End: 2024-03-01 | Stop reason: HOSPADM

## 2024-02-28 RX ORDER — ENOXAPARIN SODIUM 100 MG/ML
30 INJECTION SUBCUTANEOUS 2 TIMES DAILY
Status: DISCONTINUED | OUTPATIENT
Start: 2024-02-28 | End: 2024-03-01 | Stop reason: HOSPADM

## 2024-02-28 RX ORDER — LEVOFLOXACIN 5 MG/ML
750 INJECTION, SOLUTION INTRAVENOUS ONCE
Status: COMPLETED | OUTPATIENT
Start: 2024-02-28 | End: 2024-02-28

## 2024-02-28 RX ORDER — INSULIN LISPRO 100 [IU]/ML
0-8 INJECTION, SOLUTION INTRAVENOUS; SUBCUTANEOUS
Status: DISCONTINUED | OUTPATIENT
Start: 2024-02-29 | End: 2024-03-01 | Stop reason: HOSPADM

## 2024-02-28 RX ORDER — INSULIN LISPRO 100 [IU]/ML
0-4 INJECTION, SOLUTION INTRAVENOUS; SUBCUTANEOUS NIGHTLY
Status: DISCONTINUED | OUTPATIENT
Start: 2024-02-28 | End: 2024-02-28

## 2024-02-28 RX ORDER — MONTELUKAST SODIUM 10 MG/1
10 TABLET ORAL NIGHTLY
Status: DISCONTINUED | OUTPATIENT
Start: 2024-02-28 | End: 2024-03-01 | Stop reason: HOSPADM

## 2024-02-28 RX ORDER — CETIRIZINE HYDROCHLORIDE 5 MG/1
5 TABLET ORAL NIGHTLY PRN
Status: DISCONTINUED | OUTPATIENT
Start: 2024-02-28 | End: 2024-03-01 | Stop reason: HOSPADM

## 2024-02-28 RX ORDER — INSULIN LISPRO 100 [IU]/ML
0-4 INJECTION, SOLUTION INTRAVENOUS; SUBCUTANEOUS NIGHTLY
Status: DISCONTINUED | OUTPATIENT
Start: 2024-02-28 | End: 2024-03-01 | Stop reason: HOSPADM

## 2024-02-28 RX ORDER — INSULIN GLARGINE 100 [IU]/ML
15 INJECTION, SOLUTION SUBCUTANEOUS 2 TIMES DAILY
Status: DISCONTINUED | OUTPATIENT
Start: 2024-02-28 | End: 2024-02-29

## 2024-02-28 RX ORDER — GABAPENTIN 300 MG/1
300 CAPSULE ORAL 3 TIMES DAILY
Status: DISCONTINUED | OUTPATIENT
Start: 2024-02-28 | End: 2024-03-01 | Stop reason: HOSPADM

## 2024-02-28 RX ORDER — DULOXETIN HYDROCHLORIDE 30 MG/1
30 CAPSULE, DELAYED RELEASE ORAL DAILY
Status: DISCONTINUED | OUTPATIENT
Start: 2024-02-29 | End: 2024-03-01 | Stop reason: HOSPADM

## 2024-02-28 RX ORDER — ALBUTEROL SULFATE 90 UG/1
2 AEROSOL, METERED RESPIRATORY (INHALATION) EVERY 6 HOURS PRN
Status: DISCONTINUED | OUTPATIENT
Start: 2024-02-28 | End: 2024-03-01 | Stop reason: HOSPADM

## 2024-02-28 RX ADMIN — WATER 125 MG: 1 INJECTION INTRAMUSCULAR; INTRAVENOUS; SUBCUTANEOUS at 18:27

## 2024-02-28 RX ADMIN — GABAPENTIN 300 MG: 300 CAPSULE ORAL at 23:48

## 2024-02-28 RX ADMIN — WATER 40 MG: 1 INJECTION INTRAMUSCULAR; INTRAVENOUS; SUBCUTANEOUS at 23:52

## 2024-02-28 RX ADMIN — INSULIN GLARGINE 15 UNITS: 100 INJECTION, SOLUTION SUBCUTANEOUS at 23:48

## 2024-02-28 RX ADMIN — MONTELUKAST 10 MG: 10 TABLET, FILM COATED ORAL at 23:48

## 2024-02-28 RX ADMIN — FUROSEMIDE 20 MG: 40 TABLET ORAL at 23:47

## 2024-02-28 RX ADMIN — GUAIFENESIN 600 MG: 600 TABLET ORAL at 23:48

## 2024-02-28 RX ADMIN — SODIUM CHLORIDE, PRESERVATIVE FREE 10 ML: 5 INJECTION INTRAVENOUS at 23:56

## 2024-02-28 RX ADMIN — DOXYCYCLINE 100 MG: 100 INJECTION, POWDER, LYOPHILIZED, FOR SOLUTION INTRAVENOUS at 23:47

## 2024-02-28 RX ADMIN — LEVOFLOXACIN 750 MG: 5 INJECTION, SOLUTION INTRAVENOUS at 20:02

## 2024-02-28 RX ADMIN — ENOXAPARIN SODIUM 30 MG: 100 INJECTION SUBCUTANEOUS at 23:54

## 2024-02-28 RX ADMIN — INSULIN LISPRO 4 UNITS: 100 INJECTION, SOLUTION INTRAVENOUS; SUBCUTANEOUS at 23:32

## 2024-02-28 ASSESSMENT — ENCOUNTER SYMPTOMS
NAUSEA: 0
ABDOMINAL PAIN: 0
WHEEZING: 1
COUGH: 1
SHORTNESS OF BREATH: 1
VOMITING: 0
DIARRHEA: 0

## 2024-02-28 NOTE — ED PROVIDER NOTES
Oriented  Best Motor Response: Obeys commands  [place] Coma Scale Score: 15        PHYSICAL EXAM    (up to 7 for level 4, 8 or more for level 5)     ED Triage Vitals [02/28/24 1625]   BP Temp Temp src Pulse Respirations SpO2 Height Weight - Scale   (!) 151/71 98.1 °F (36.7 °C) -- 84 20 97 % -- 104.3 kg (230 lb)       Physical Exam  Vitals and nursing note reviewed.   Constitutional:       General: She is not in acute distress.     Appearance: Normal appearance. She is obese. She is not ill-appearing, toxic-appearing or diaphoretic.   HENT:      Head: Normocephalic and atraumatic.   Neck:      Vascular: No JVD.   Cardiovascular:      Rate and Rhythm: Normal rate and regular rhythm.      Pulses: Normal pulses.   Pulmonary:      Effort: Pulmonary effort is normal. No respiratory distress.      Breath sounds: Normal breath sounds.      Comments: Ambulatory with her walker in the ED without difficulty or labored breathing  Chest:      Chest wall: No tenderness.   Abdominal:      General: There is no distension.      Palpations: Abdomen is soft.      Tenderness: There is no abdominal tenderness.   Musculoskeletal:      Cervical back: Normal range of motion and neck supple. No rigidity or tenderness.   Lymphadenopathy:      Cervical: No cervical adenopathy.   Skin:     General: Skin is warm and dry.   Neurological:      Mental Status: She is alert and oriented to person, place, and time. Mental status is at baseline.         DIAGNOSTIC RESULTS     EKG: All EKG's areinterpreted by the Emergency Department Physician who either signs or Co-signs this chart in the absence of a cardiologist.    EKG interpreted by attending, Normal sinus rhythm at a rate of 83, No stemi or acute ischemia, , QTc 450       RADIOLOGY:  Non-plain film images such as CT, Ultrasound and MRI are read by the radiologist. Plain radiographic images are visualized and preliminarily interpreted bythe emergency physician with the below findings:    XR

## 2024-02-29 DIAGNOSIS — G57.93 NEUROPATHY INVOLVING BOTH LOWER EXTREMITIES: Chronic | ICD-10-CM

## 2024-02-29 DIAGNOSIS — N39.41 URGE INCONTINENCE OF URINE: ICD-10-CM

## 2024-02-29 DIAGNOSIS — E11.8 TYPE 2 DIABETES MELLITUS WITH COMPLICATION (HCC): ICD-10-CM

## 2024-02-29 LAB
ANION GAP SERPL CALCULATED.3IONS-SCNC: 15 MMOL/L (ref 7–19)
BACTERIA BLD CULT ORG #2: NORMAL
BACTERIA BLD CULT: NORMAL
BACTERIA URNS QL MICRO: NEGATIVE /HPF
BILIRUB UR QL STRIP: NEGATIVE
BUN SERPL-MCNC: 14 MG/DL (ref 8–23)
CALCIUM SERPL-MCNC: 9 MG/DL (ref 8.8–10.2)
CHLORIDE SERPL-SCNC: 97 MMOL/L (ref 98–111)
CLARITY UR: CLEAR
CO2 SERPL-SCNC: 23 MMOL/L (ref 22–29)
COLOR UR: YELLOW
CREAT SERPL-MCNC: 0.6 MG/DL (ref 0.5–0.9)
CRYSTALS URNS MICRO: ABNORMAL /HPF
EKG P AXIS: 70 DEGREES
EKG P-R INTERVAL: 146 MS
EKG Q-T INTERVAL: 384 MS
EKG QRS DURATION: 82 MS
EKG QTC CALCULATION (BAZETT): 424 MS
EKG T AXIS: 73 DEGREES
EPI CELLS #/AREA URNS AUTO: 0 /HPF (ref 0–5)
ERYTHROCYTE [DISTWIDTH] IN BLOOD BY AUTOMATED COUNT: 14 % (ref 11.5–14.5)
GLUCOSE BLD-MCNC: 233 MG/DL (ref 70–99)
GLUCOSE BLD-MCNC: 342 MG/DL (ref 70–99)
GLUCOSE BLD-MCNC: 385 MG/DL (ref 70–99)
GLUCOSE BLD-MCNC: 410 MG/DL (ref 70–99)
GLUCOSE SERPL-MCNC: 317 MG/DL (ref 74–109)
GLUCOSE UR STRIP.AUTO-MCNC: =>1000 MG/DL
HCT VFR BLD AUTO: 44.6 % (ref 37–47)
HGB BLD-MCNC: 14.9 G/DL (ref 12–16)
HGB UR STRIP.AUTO-MCNC: NEGATIVE MG/L
HYALINE CASTS #/AREA URNS AUTO: 0 /HPF (ref 0–8)
KETONES UR STRIP.AUTO-MCNC: NEGATIVE MG/DL
LEUKOCYTE ESTERASE UR QL STRIP.AUTO: NEGATIVE
MCH RBC QN AUTO: 26.1 PG (ref 27–31)
MCHC RBC AUTO-ENTMCNC: 33.4 G/DL (ref 33–37)
MCV RBC AUTO: 78.2 FL (ref 81–99)
NITRITE UR QL STRIP.AUTO: NEGATIVE
PERFORMED ON: ABNORMAL
PH UR STRIP.AUTO: 6 [PH] (ref 5–8)
PLATELET # BLD AUTO: 287 K/UL (ref 130–400)
PMV BLD AUTO: 9.7 FL (ref 9.4–12.3)
POTASSIUM SERPL-SCNC: 4.7 MMOL/L (ref 3.5–5)
PROT UR STRIP.AUTO-MCNC: 30 MG/DL
RBC # BLD AUTO: 5.7 M/UL (ref 4.2–5.4)
RBC #/AREA URNS AUTO: 1 /HPF (ref 0–4)
SODIUM SERPL-SCNC: 135 MMOL/L (ref 136–145)
SP GR UR STRIP.AUTO: 1.04 (ref 1–1.03)
UROBILINOGEN UR STRIP.AUTO-MCNC: 0.2 E.U./DL
WBC # BLD AUTO: 6.6 K/UL (ref 4.8–10.8)
WBC #/AREA URNS AUTO: 1 /HPF (ref 0–5)

## 2024-02-29 PROCEDURE — 96366 THER/PROPH/DIAG IV INF ADDON: CPT

## 2024-02-29 PROCEDURE — 6370000000 HC RX 637 (ALT 250 FOR IP): Performed by: NURSE PRACTITIONER

## 2024-02-29 PROCEDURE — 93010 ELECTROCARDIOGRAM REPORT: CPT | Performed by: INTERNAL MEDICINE

## 2024-02-29 PROCEDURE — 81001 URINALYSIS AUTO W/SCOPE: CPT

## 2024-02-29 PROCEDURE — 97161 PT EVAL LOW COMPLEX 20 MIN: CPT

## 2024-02-29 PROCEDURE — 94640 AIRWAY INHALATION TREATMENT: CPT

## 2024-02-29 PROCEDURE — 6360000002 HC RX W HCPCS: Performed by: NURSE PRACTITIONER

## 2024-02-29 PROCEDURE — 94760 N-INVAS EAR/PLS OXIMETRY 1: CPT

## 2024-02-29 PROCEDURE — 82962 GLUCOSE BLOOD TEST: CPT

## 2024-02-29 PROCEDURE — 2700000000 HC OXYGEN THERAPY PER DAY

## 2024-02-29 PROCEDURE — 2580000003 HC RX 258: Performed by: NURSE PRACTITIONER

## 2024-02-29 PROCEDURE — 85027 COMPLETE CBC AUTOMATED: CPT

## 2024-02-29 PROCEDURE — 94150 VITAL CAPACITY TEST: CPT

## 2024-02-29 PROCEDURE — 36415 COLL VENOUS BLD VENIPUNCTURE: CPT

## 2024-02-29 PROCEDURE — 80048 BASIC METABOLIC PNL TOTAL CA: CPT

## 2024-02-29 PROCEDURE — 96372 THER/PROPH/DIAG INJ SC/IM: CPT

## 2024-02-29 PROCEDURE — G0378 HOSPITAL OBSERVATION PER HR: HCPCS

## 2024-02-29 PROCEDURE — 97116 GAIT TRAINING THERAPY: CPT

## 2024-02-29 PROCEDURE — 6370000000 HC RX 637 (ALT 250 FOR IP): Performed by: HOSPITALIST

## 2024-02-29 PROCEDURE — 96376 TX/PRO/DX INJ SAME DRUG ADON: CPT

## 2024-02-29 RX ORDER — BUDESONIDE 0.25 MG/2ML
0.25 INHALANT ORAL
Status: DISCONTINUED | OUTPATIENT
Start: 2024-02-29 | End: 2024-03-01 | Stop reason: HOSPADM

## 2024-02-29 RX ORDER — INSULIN GLARGINE 100 [IU]/ML
20 INJECTION, SOLUTION SUBCUTANEOUS 2 TIMES DAILY
Status: DISCONTINUED | OUTPATIENT
Start: 2024-02-29 | End: 2024-02-29

## 2024-02-29 RX ORDER — OXYBUTYNIN CHLORIDE 10 MG/1
10 TABLET, EXTENDED RELEASE ORAL DAILY
Qty: 30 TABLET | Refills: 11 | OUTPATIENT
Start: 2024-02-29

## 2024-02-29 RX ORDER — INSULIN GLARGINE 100 [IU]/ML
5 INJECTION, SOLUTION SUBCUTANEOUS
Status: DISCONTINUED | OUTPATIENT
Start: 2024-02-29 | End: 2024-02-29

## 2024-02-29 RX ORDER — MINERAL OIL AND WHITE PETROLATUM 150; 830 MG/G; MG/G
OINTMENT OPHTHALMIC PRN
Status: DISCONTINUED | OUTPATIENT
Start: 2024-02-29 | End: 2024-03-01 | Stop reason: HOSPADM

## 2024-02-29 RX ORDER — ANASTROZOLE 1 MG/1
1 TABLET ORAL DAILY
Status: DISCONTINUED | OUTPATIENT
Start: 2024-02-29 | End: 2024-03-01 | Stop reason: HOSPADM

## 2024-02-29 RX ORDER — MOMETASONE FUROATE 1 MG/G
CREAM TOPICAL DAILY
Status: DISCONTINUED | OUTPATIENT
Start: 2024-02-29 | End: 2024-03-01 | Stop reason: HOSPADM

## 2024-02-29 RX ORDER — ARFORMOTEROL TARTRATE 15 UG/2ML
15 SOLUTION RESPIRATORY (INHALATION)
Status: DISCONTINUED | OUTPATIENT
Start: 2024-02-29 | End: 2024-03-01 | Stop reason: HOSPADM

## 2024-02-29 RX ORDER — INSULIN GLARGINE 100 [IU]/ML
15 INJECTION, SOLUTION SUBCUTANEOUS 2 TIMES DAILY
Status: DISCONTINUED | OUTPATIENT
Start: 2024-02-29 | End: 2024-03-01

## 2024-02-29 RX ORDER — ACETYLCYSTEINE 200 MG/ML
600 SOLUTION ORAL; RESPIRATORY (INHALATION) 2 TIMES DAILY
Status: DISCONTINUED | OUTPATIENT
Start: 2024-02-29 | End: 2024-03-01 | Stop reason: HOSPADM

## 2024-02-29 RX ORDER — GABAPENTIN 300 MG/1
CAPSULE ORAL
Qty: 90 CAPSULE | Refills: 0 | Status: SHIPPED | OUTPATIENT
Start: 2024-03-01 | End: 2024-03-31

## 2024-02-29 RX ORDER — LEVOFLOXACIN 5 MG/ML
750 INJECTION, SOLUTION INTRAVENOUS EVERY 24 HOURS
Status: DISCONTINUED | OUTPATIENT
Start: 2024-02-29 | End: 2024-03-01 | Stop reason: HOSPADM

## 2024-02-29 RX ORDER — INSULIN GLARGINE 100 [IU]/ML
25 INJECTION, SOLUTION SUBCUTANEOUS 2 TIMES DAILY
Status: DISCONTINUED | OUTPATIENT
Start: 2024-02-29 | End: 2024-02-29

## 2024-02-29 RX ORDER — INSULIN LISPRO 100 [IU]/ML
5 INJECTION, SOLUTION INTRAVENOUS; SUBCUTANEOUS
Status: DISCONTINUED | OUTPATIENT
Start: 2024-02-29 | End: 2024-03-01 | Stop reason: HOSPADM

## 2024-02-29 RX ORDER — ALBUTEROL SULFATE 2.5 MG/3ML
2.5 SOLUTION RESPIRATORY (INHALATION) EVERY 6 HOURS PRN
Status: DISCONTINUED | OUTPATIENT
Start: 2024-02-29 | End: 2024-03-01 | Stop reason: HOSPADM

## 2024-02-29 RX ORDER — LEVOFLOXACIN 5 MG/ML
500 INJECTION, SOLUTION INTRAVENOUS EVERY 24 HOURS
Status: DISCONTINUED | OUTPATIENT
Start: 2024-02-29 | End: 2024-02-29 | Stop reason: DRUGHIGH

## 2024-02-29 RX ADMIN — ACETYLCYSTEINE 600 MG: 200 SOLUTION ORAL; RESPIRATORY (INHALATION) at 14:21

## 2024-02-29 RX ADMIN — INSULIN LISPRO 4 UNITS: 100 INJECTION, SOLUTION INTRAVENOUS; SUBCUTANEOUS at 21:07

## 2024-02-29 RX ADMIN — INSULIN LISPRO 8 UNITS: 100 INJECTION, SOLUTION INTRAVENOUS; SUBCUTANEOUS at 17:51

## 2024-02-29 RX ADMIN — IPRATROPIUM BROMIDE 0.5 MG: 0.5 SOLUTION RESPIRATORY (INHALATION) at 14:17

## 2024-02-29 RX ADMIN — IPRATROPIUM BROMIDE AND ALBUTEROL SULFATE 1 DOSE: .5; 2.5 SOLUTION RESPIRATORY (INHALATION) at 11:21

## 2024-02-29 RX ADMIN — INSULIN GLARGINE 15 UNITS: 100 INJECTION, SOLUTION SUBCUTANEOUS at 21:06

## 2024-02-29 RX ADMIN — INSULIN LISPRO 5 UNITS: 100 INJECTION, SOLUTION INTRAVENOUS; SUBCUTANEOUS at 17:51

## 2024-02-29 RX ADMIN — LEVOFLOXACIN 750 MG: 5 INJECTION, SOLUTION INTRAVENOUS at 21:19

## 2024-02-29 RX ADMIN — ASPIRIN 81 MG: 81 TABLET, CHEWABLE ORAL at 08:26

## 2024-02-29 RX ADMIN — METOPROLOL SUCCINATE 50 MG: 25 TABLET, EXTENDED RELEASE ORAL at 08:26

## 2024-02-29 RX ADMIN — GABAPENTIN 300 MG: 300 CAPSULE ORAL at 15:38

## 2024-02-29 RX ADMIN — MONTELUKAST 10 MG: 10 TABLET, FILM COATED ORAL at 21:06

## 2024-02-29 RX ADMIN — GABAPENTIN 300 MG: 300 CAPSULE ORAL at 08:24

## 2024-02-29 RX ADMIN — BUDESONIDE 250 MCG: 0.25 SUSPENSION RESPIRATORY (INHALATION) at 19:31

## 2024-02-29 RX ADMIN — GUAIFENESIN 600 MG: 600 TABLET ORAL at 21:05

## 2024-02-29 RX ADMIN — INSULIN LISPRO 2 UNITS: 100 INJECTION, SOLUTION INTRAVENOUS; SUBCUTANEOUS at 08:35

## 2024-02-29 RX ADMIN — ARFORMOTEROL TARTRATE 15 MCG: 15 SOLUTION RESPIRATORY (INHALATION) at 14:21

## 2024-02-29 RX ADMIN — IPRATROPIUM BROMIDE 0.5 MG: 0.5 SOLUTION RESPIRATORY (INHALATION) at 19:31

## 2024-02-29 RX ADMIN — IPRATROPIUM BROMIDE AND ALBUTEROL SULFATE 1 DOSE: .5; 2.5 SOLUTION RESPIRATORY (INHALATION) at 07:09

## 2024-02-29 RX ADMIN — CETIRIZINE HYDROCHLORIDE 5 MG: 5 TABLET ORAL at 21:11

## 2024-02-29 RX ADMIN — INSULIN GLARGINE 20 UNITS: 100 INJECTION, SOLUTION SUBCUTANEOUS at 06:12

## 2024-02-29 RX ADMIN — FLUTICASONE PROPIONATE 2 SPRAY: 50 SPRAY, METERED NASAL at 08:28

## 2024-02-29 RX ADMIN — WATER 40 MG: 1 INJECTION INTRAMUSCULAR; INTRAVENOUS; SUBCUTANEOUS at 06:14

## 2024-02-29 RX ADMIN — DULOXETINE HYDROCHLORIDE 30 MG: 30 CAPSULE, DELAYED RELEASE ORAL at 08:25

## 2024-02-29 RX ADMIN — LOSARTAN POTASSIUM 100 MG: 100 TABLET, FILM COATED ORAL at 08:26

## 2024-02-29 RX ADMIN — SODIUM CHLORIDE, PRESERVATIVE FREE 10 ML: 5 INJECTION INTRAVENOUS at 08:40

## 2024-02-29 RX ADMIN — ACETYLCYSTEINE 600 MG: 200 SOLUTION ORAL; RESPIRATORY (INHALATION) at 19:31

## 2024-02-29 RX ADMIN — ANASTROZOLE 1 MG: 1 TABLET, COATED ORAL at 15:38

## 2024-02-29 RX ADMIN — GABAPENTIN 300 MG: 300 CAPSULE ORAL at 21:05

## 2024-02-29 RX ADMIN — BUDESONIDE 250 MCG: 0.25 SUSPENSION RESPIRATORY (INHALATION) at 14:21

## 2024-02-29 RX ADMIN — SODIUM CHLORIDE, PRESERVATIVE FREE 10 ML: 5 INJECTION INTRAVENOUS at 21:13

## 2024-02-29 RX ADMIN — FUROSEMIDE 20 MG: 40 TABLET ORAL at 08:25

## 2024-02-29 RX ADMIN — ARFORMOTEROL TARTRATE 15 MCG: 15 SOLUTION RESPIRATORY (INHALATION) at 19:31

## 2024-02-29 RX ADMIN — ENOXAPARIN SODIUM 30 MG: 100 INJECTION SUBCUTANEOUS at 21:06

## 2024-02-29 RX ADMIN — PANTOPRAZOLE SODIUM 40 MG: 40 TABLET, DELAYED RELEASE ORAL at 06:15

## 2024-02-29 RX ADMIN — INSULIN LISPRO 6 UNITS: 100 INJECTION, SOLUTION INTRAVENOUS; SUBCUTANEOUS at 12:21

## 2024-02-29 RX ADMIN — GUAIFENESIN 600 MG: 600 TABLET ORAL at 08:24

## 2024-02-29 RX ADMIN — ROSUVASTATIN CALCIUM 10 MG: 20 TABLET, COATED ORAL at 21:05

## 2024-02-29 RX ADMIN — ENOXAPARIN SODIUM 30 MG: 100 INJECTION SUBCUTANEOUS at 08:36

## 2024-02-29 ASSESSMENT — ENCOUNTER SYMPTOMS
COUGH: 1
CHEST TIGHTNESS: 1
NAUSEA: 0
CONSTIPATION: 0
COLOR CHANGE: 0
VOMITING: 0
BACK PAIN: 1
ABDOMINAL PAIN: 0
DIARRHEA: 0
WHEEZING: 1
SHORTNESS OF BREATH: 1

## 2024-02-29 NOTE — PROGRESS NOTES
Patient is A&O x4 but seems to be paranoid/anxious. Pt has asked a few times while staff was at bedside \"Who is that man?\", while looking into the hallway. No one noted to be in the hallway at the time patient asked these questions.     Pt was reassured that she was safe and there was no one trying to enter her room. Pt voiced understanding. Call light within reach. Pt requested lights off and door to be shut. Electronically signed by Yvon Spencer RN on 2/29/2024 at 12:25 AM

## 2024-02-29 NOTE — PROGRESS NOTES
Christine Roa arrived to room # 411  .   Presented with: resp failure  Mental Status: Patient is oriented and alert.   Vitals:    02/28/24 2241   BP: 125/75   Pulse: 93   Resp: 20   Temp: 97 °F (36.1 °C)   SpO2: 96%     Patient safety contract and falls prevention contract reviewed with patient Yes.  Oriented Patient to room.  Call light within reach. Yes.  Needs, issues or concerns expressed at this time: no.      Electronically signed by Yvon Spencer RN on 2/29/2024 at 12:20 AM

## 2024-02-29 NOTE — TELEPHONE ENCOUNTER
Christine Roa called to request a refill on her medication.      Last office visit : 2/21/2024   Next office visit : 3/18/2024     Last UDS:   Amphetamine Screen, Urine   Date Value Ref Range Status   09/09/2023 Negative Negative <500 ng/mL Final     Barbiturate Screen, Urine   Date Value Ref Range Status   05/16/2022 neg  Final     Benzodiazepine Screen, Urine   Date Value Ref Range Status   09/09/2023 Negative Negative <150 ng/mL Final     Buprenorphine Urine   Date Value Ref Range Status   09/09/2023 Negative Negative <10 ng/mL Final     Cocaine Metabolite Screen, Urine   Date Value Ref Range Status   09/09/2023 Negative Negative <150 ng/mL Final     Gabapentin Screen, Urine   Date Value Ref Range Status   05/16/2022 neg  Final     MDMA, Urine   Date Value Ref Range Status   05/16/2022 neg  Final     Methamphetamine, Urine   Date Value Ref Range Status   09/09/2023 Negative Negative <500 ng/mL Final     Opiate Scrn, Ur   Date Value Ref Range Status   09/09/2023 Negative Negative < 100 ng/mL Final     Oxycodone Screen, Ur   Date Value Ref Range Status   05/16/2022 neg  Final     PCP Screen, Urine   Date Value Ref Range Status   09/09/2023 Negative Negative <25 ng/mL Final     Propoxyphene Screen, Urine   Date Value Ref Range Status   05/16/2022 neg  Final     THC Screen, Urine   Date Value Ref Range Status   05/16/2022 neg  Final     Tricyclic Antidepressants, Urine   Date Value Ref Range Status   05/16/2022 neg  Final       Last Immanuel: 01-  Medication Contract: 05-  Last Fill: 01-    Requested Prescriptions     Pending Prescriptions Disp Refills    gabapentin (NEURONTIN) 300 MG capsule [Pharmacy Med Name: gabapentin 300 mg capsule] 90 capsule 1     Sig: TAKE ONE CAPSULE BY MOUTH THREE TIMES DAILY         Please approve or refuse this medication.   Bette Sorto MA

## 2024-02-29 NOTE — PROGRESS NOTES
[facility]      Patient:  Christine Roa  YOB: 1945  Date of Service: 2/29/2024  MRN: [id number]   Acct: [id number]   Primary Care Physician: Dorothy Smith MD  Advance Directive: Full Code  Admit Date: 2/28/2024       Hospital Day: 0  Portions of this note have been copied forward, however, changed to reflect the most current clinical status of this patient.  CHIEF COMPLAINT cough, shortness of breath    SUBJECTIVE: Patient reports improvement in cough and shortness of breath today.  Patient reports cough is productive.  Patient denies fever or chills overnight.  Nursing staff indicates confusion overnight with paranoia.  Family at bedside indicates this is an ongoing thing and patient does have dementia.    CUMULATIVE HOSPITAL COURSE:  The patient is a 78-year-old female with past medical history of CVA, type 2 diabetes, COPD not maintained on oxygen at home, mood disorder, dementia, hypertension, hyperlipidemia, osteoarthritis, breast cancer, fibromyalgia, chronic back pain who presented to [facility] ED with multiple complaints.  Patient reported ongoing flulike symptoms and had been diagnosed with flu approximately 2 weeks ago.  Patient reports continued cough, shortness of breath, increase in sputum production.  Patient reports fever at home with associated chills.  Patient denies gastrointestinal issues.  In ED patient was found to have glucose of 410.  Viral respiratory panel negative.  Blood cultures were obtained.  Chest x-ray indicates mild bibasilar lung infiltrates.  While in the ED patient's SpO2 dropped to 83% requiring 2 L nasal cannula.  Decision was made to admit patient to hospitalist service for further evaluation.  Patient was given Levaquin in the ED and continued at admission.  Patient remains on 2 L nasal cannula however sats are in the high 90s.  Will attempt to wean.  Patient on Solu-Medrol and nebs.  Family indicates history of dementia with worsening confusion at

## 2024-02-29 NOTE — ED NOTES
ED TO INPATIENT SBAR HANDOFF    Patient Name: Christine Roa   : 1945  78 y.o.   Family/Caregiver Present: No  Code Status Order: Full Code    C-SSRS: Risk of Suicide: No Risk  Sitter No  Restraints:         Situation  Chief Complaint:   Chief Complaint   Patient presents with    Illness     Dx with flu 2 weeks ago, reports symptoms not getting better; cough, chills, fevers     Patient Diagnosis: Acute hypoxic respiratory failure (HCC) [J96.01]     Brief Description of Patient's Condition: Pt was DX with flu 2 weeks ago, pt came in because she was not feeling any better. Pt being admitted for hypoxia. Pt sat dropped to 83% and was placed on 2L NC which improved pt sat.  Mental Status: oriented, alert, and coherent  Arrived from: home    Imaging:   XR CHEST PORTABLE   Final Result   Impression:  Mild bibasilar lung infiltrates           ______________________________________    Electronically signed by: ENRIQUE TAVAREZ M.D.   Date:     2024   Time:    18:55         COVID-19 Results:   Internal Administration   First Dose COVID-19, MODERNA BLUE border, Primary or Immunocompromised, (age 12y+), IM, 100 mcg/0.5mL  2021   Second Dose COVID-19, MODERNA BLUE border, Primary or Immunocompromised, (age 12y+), IM, 100 mcg/0.5mL   10/03/2021       Last COVID Lab SARS-CoV-2, PCR (no units)   Date Value   2024 Not Detected     SARS-COV-2, POC (no units)   Date Value   2024 Not-Detected     POC Yash's Test (no units)   Date Value   10/28/2014 +     POC Glucose (mg/dl)   Date Value   2023 220 (H)     POC Troponin I (NG/ML)   Date Value   10/28/2014 0.00 (L)           Abnormal labs:   Abnormal Labs Reviewed   CBC WITH AUTO DIFFERENTIAL - Abnormal; Notable for the following components:       Result Value    RBC 5.48 (*)     MCH 26.1 (*)     MCHC 32.2 (*)     Lymphocytes % 40.7 (*)     All other components within normal limits   COMPREHENSIVE METABOLIC PANEL - Abnormal; Notable for the

## 2024-02-29 NOTE — H&P
[facility]      Hospitalist - History & Physical      PCP: Dorothy Smith MD    Date of Admission: 2/28/2024    Date of Service: 2/28/2024    Chief Complaint: Shortness of breath     History Of Present Illness:   The patient is a 78 y.o. female who presented to [facility] ED for evaluation of shortness of breath. Pt has history of copd, diabetes, hypertension and DARSHAN.    Pt reports increased cough, congestion, shortness of breath and wheezing intially worsening over past 2 weeks. She had positive test for influenza A at her pcp office 2/21/2024. She relates that symptoms have worsened over past few days with urging by her daughter who lives with patient for reevaluation. Pt reports recent subjective fevers. She denies chest pain, abdominal pain as well as problems with vomiting.    In ED, pt was given levaquin 750mg iv, resp pcr panel negative, troponin 9, sodium 136, potassium 5.0, creatinine 0.6, glucose 283, wbc 9k, hgb 14, platelets 294k. Pt is admitted observation to hospitalist.    Past Medical History:        Diagnosis Date    Asthma     Has rescue inhalers    BiPAP (biphasic positive airway pressure) dependence     8cm to 21cm    Breast CA (HCC)     Left breast Nodules removed Took radiation    Breast cancer (HCC)     Chronic back pain 02/02/2016    Chronic kidney disease     Overactive bladder     COPD (chronic obstructive pulmonary disease) (HCC)     x few years Scarring on lungs 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    Diabetes mellitus (HCC)     On insulin x 10 years    Fibromyalgia     for 20 years    GERD (gastroesophageal reflux disease)     History of blood transfusion     ?when    History of therapeutic radiation     Hyperlipidemia     High cholesterol    Hypertension     On medications for 2 years    Irregular heart beat     Liver disease     Has fatty liver    Morbid obesity (HCC)     Multiple food allergies     Neuropathy     Obstructive sleep apnea     AHI: 68.1 per PSG, 5/2018    Osteoarthritis

## 2024-02-29 NOTE — PROGRESS NOTES
[facility]      Christine Roa    : 1945  MRN: [id number]   PHYSICIAN:  Freddie Smalls MD  Primary Problem    Patient Active Problem List   Diagnosis    Postmenopausal osteoporosis    Type 2 diabetes mellitus with complication (HCC)    COPD exacerbation ([facility])    Mood disorder (HCC)    Dementia ([facility])    Joint pain    Neuropathy involving both lower extremities    Dyspepsia    Lumbar facet arthropathy    Body mass index 45.0-49.9, adult ([facility])    Essential hypertension    Hyperlipidemia    DARSHAN (obstructive sleep apnea)    BiPAP (biphasic positive airway pressure) dependence    Restless leg syndrome    History of breast cancer    Venous insufficiency of both lower extremities    History of stroke    Uncomplicated asthma    Vitamin D deficiency    Eczema of left external ear    Allergic rhinitis    Hypoglycemia    Acute hypoxic respiratory failure ([facility])       Rehabilitation Diagnosis:     Hypoxia [R09.02]  Acute hypoxic respiratory failure ([facility]) [J96.01]       SERVICE DATE: 2024        SUBJECTIVE: Patient agrees that they are safe with mobility and do not require physical therapy services. Feels ready to d/c home with family support.     Pain: No complaint of pain    OBJECTIVE:    Orientation is Within normal limits     O2 at 2L per n/c        ACTIVE ROM:       All major lower extremity joints are within functional limits      STRENGTH     Both lower extremities are grossly within functional limits.    TRANSFERS   Sit to stand   SBA     Bed to chair   SBA    Bed to mobility   Supine to sit   Independent       Roll     Independent     Scoot Side to side / Up and down   Independent    AMBULATION   Distance: 20'     Device: NONE     Assistance: SBA       Comment: no LOB    BALANCE   Sitting    Good     Standing    Good    Tx Initiate: ambulation, dynamic balance activity, education    ASSESSMENT      Independent and safe with all functional mobility. No skilled physical therapy

## 2024-02-29 NOTE — PROGRESS NOTES
Patient arrived to room 411 from ER. Tolerated transport well. Will review orders and complete admission assessment. PCA to obtain vitals.   Electronically signed by Yvon Spencer RN on 2/28/2024 at 10:28 PM

## 2024-02-29 NOTE — PROGRESS NOTES
Pharmacy Adjustment per [facility] protocol    Christine Roa is a 78 y.o. female. Pharmacy has adjusted medications per [facility] protocol.    Recent Labs     02/28/24  1719 02/29/24  0214   BUN 13 14       Recent Labs     02/28/24  1719 02/29/24  0214   CREATININE 0.6 0.6       Estimated Creatinine Clearance: 91 mL/min (based on SCr of 0.6 mg/dL).    Height:   Ht Readings from Last 1 Encounters:   02/21/24 1.575 m (5' 2\")     Weight:  Wt Readings from Last 1 Encounters:   02/28/24 105.7 kg (233 lb)         Plan: Adjust the following medications based on [facility] protocol:           Increase Levaquin from 500 mg IV Q 24 hours to 750 mg IV Q 24 hours for pneumonia indication.    Electronically signed by Aston Triplett RPH on 2/29/2024 at 11:28 AM

## 2024-03-01 ENCOUNTER — TELEPHONE (OUTPATIENT)
Dept: INTERNAL MEDICINE CLINIC | Age: 79
End: 2024-03-01

## 2024-03-01 VITALS
BODY MASS INDEX: 42.62 KG/M2 | RESPIRATION RATE: 18 BRPM | WEIGHT: 233 LBS | DIASTOLIC BLOOD PRESSURE: 60 MMHG | OXYGEN SATURATION: 95 % | SYSTOLIC BLOOD PRESSURE: 143 MMHG | TEMPERATURE: 97.7 F | HEART RATE: 78 BPM

## 2024-03-01 LAB
ANION GAP SERPL CALCULATED.3IONS-SCNC: 18 MMOL/L (ref 7–19)
BUN SERPL-MCNC: 20 MG/DL (ref 8–23)
CALCIUM SERPL-MCNC: 9.2 MG/DL (ref 8.8–10.2)
CHLORIDE SERPL-SCNC: 98 MMOL/L (ref 98–111)
CO2 SERPL-SCNC: 22 MMOL/L (ref 22–29)
CREAT SERPL-MCNC: 0.6 MG/DL (ref 0.5–0.9)
ERYTHROCYTE [DISTWIDTH] IN BLOOD BY AUTOMATED COUNT: 14.3 % (ref 11.5–14.5)
GLUCOSE BLD-MCNC: 272 MG/DL (ref 70–99)
GLUCOSE BLD-MCNC: 306 MG/DL (ref 70–99)
GLUCOSE SERPL-MCNC: 292 MG/DL (ref 74–109)
HCT VFR BLD AUTO: 42.9 % (ref 37–47)
HGB BLD-MCNC: 13.8 G/DL (ref 12–16)
MCH RBC QN AUTO: 26.5 PG (ref 27–31)
MCHC RBC AUTO-ENTMCNC: 32.2 G/DL (ref 33–37)
MCV RBC AUTO: 82.5 FL (ref 81–99)
PERFORMED ON: ABNORMAL
PERFORMED ON: ABNORMAL
PLATELET # BLD AUTO: 316 K/UL (ref 130–400)
PMV BLD AUTO: 10.4 FL (ref 9.4–12.3)
POTASSIUM SERPL-SCNC: 4.1 MMOL/L (ref 3.5–5)
RBC # BLD AUTO: 5.2 M/UL (ref 4.2–5.4)
SODIUM SERPL-SCNC: 138 MMOL/L (ref 136–145)
WBC # BLD AUTO: 11.6 K/UL (ref 4.8–10.8)

## 2024-03-01 PROCEDURE — 36415 COLL VENOUS BLD VENIPUNCTURE: CPT

## 2024-03-01 PROCEDURE — 80048 BASIC METABOLIC PNL TOTAL CA: CPT

## 2024-03-01 PROCEDURE — 6360000002 HC RX W HCPCS: Performed by: NURSE PRACTITIONER

## 2024-03-01 PROCEDURE — G0378 HOSPITAL OBSERVATION PER HR: HCPCS

## 2024-03-01 PROCEDURE — 94640 AIRWAY INHALATION TREATMENT: CPT

## 2024-03-01 PROCEDURE — 82962 GLUCOSE BLOOD TEST: CPT

## 2024-03-01 PROCEDURE — 94760 N-INVAS EAR/PLS OXIMETRY 1: CPT

## 2024-03-01 PROCEDURE — 6370000000 HC RX 637 (ALT 250 FOR IP): Performed by: NURSE PRACTITIONER

## 2024-03-01 PROCEDURE — 96372 THER/PROPH/DIAG INJ SC/IM: CPT

## 2024-03-01 PROCEDURE — 94618 PULMONARY STRESS TESTING: CPT

## 2024-03-01 PROCEDURE — 85027 COMPLETE CBC AUTOMATED: CPT

## 2024-03-01 PROCEDURE — 2580000003 HC RX 258: Performed by: NURSE PRACTITIONER

## 2024-03-01 PROCEDURE — 6370000000 HC RX 637 (ALT 250 FOR IP): Performed by: HOSPITALIST

## 2024-03-01 PROCEDURE — 2700000000 HC OXYGEN THERAPY PER DAY

## 2024-03-01 PROCEDURE — 94761 N-INVAS EAR/PLS OXIMETRY MLT: CPT

## 2024-03-01 RX ORDER — MOMETASONE FUROATE 1 MG/G
CREAM TOPICAL
Qty: 15 G | Refills: 3 | Status: SHIPPED | OUTPATIENT
Start: 2024-03-01

## 2024-03-01 RX ORDER — GUAIFENESIN 600 MG/1
600 TABLET, EXTENDED RELEASE ORAL 2 TIMES DAILY
Qty: 60 TABLET | Refills: 0 | Status: SHIPPED | OUTPATIENT
Start: 2024-03-01 | End: 2024-03-04

## 2024-03-01 RX ORDER — PREDNISONE 20 MG/1
40 TABLET ORAL DAILY
Qty: 4 TABLET | Refills: 0 | Status: SHIPPED | OUTPATIENT
Start: 2024-03-01 | End: 2024-03-03

## 2024-03-01 RX ORDER — LEVOFLOXACIN 750 MG/1
750 TABLET, FILM COATED ORAL DAILY
Qty: 5 TABLET | Refills: 0 | Status: SHIPPED | OUTPATIENT
Start: 2024-03-01 | End: 2024-03-06

## 2024-03-01 RX ORDER — ALBUTEROL SULFATE 2.5 MG/3ML
2.5 SOLUTION RESPIRATORY (INHALATION) 4 TIMES DAILY PRN
Qty: 120 EACH | Refills: 3 | Status: SHIPPED | OUTPATIENT
Start: 2024-03-01

## 2024-03-01 RX ORDER — INSULIN GLARGINE 100 [IU]/ML
20 INJECTION, SOLUTION SUBCUTANEOUS 2 TIMES DAILY
Status: DISCONTINUED | OUTPATIENT
Start: 2024-03-01 | End: 2024-03-01 | Stop reason: HOSPADM

## 2024-03-01 RX ADMIN — INSULIN LISPRO 6 UNITS: 100 INJECTION, SOLUTION INTRAVENOUS; SUBCUTANEOUS at 12:53

## 2024-03-01 RX ADMIN — LOSARTAN POTASSIUM 100 MG: 100 TABLET, FILM COATED ORAL at 08:36

## 2024-03-01 RX ADMIN — METOPROLOL SUCCINATE 50 MG: 25 TABLET, EXTENDED RELEASE ORAL at 08:37

## 2024-03-01 RX ADMIN — GABAPENTIN 300 MG: 300 CAPSULE ORAL at 08:36

## 2024-03-01 RX ADMIN — DULOXETINE HYDROCHLORIDE 30 MG: 30 CAPSULE, DELAYED RELEASE ORAL at 08:36

## 2024-03-01 RX ADMIN — INSULIN LISPRO 5 UNITS: 100 INJECTION, SOLUTION INTRAVENOUS; SUBCUTANEOUS at 08:42

## 2024-03-01 RX ADMIN — FLUTICASONE PROPIONATE 2 SPRAY: 50 SPRAY, METERED NASAL at 08:44

## 2024-03-01 RX ADMIN — IPRATROPIUM BROMIDE 0.5 MG: 0.5 SOLUTION RESPIRATORY (INHALATION) at 10:18

## 2024-03-01 RX ADMIN — INSULIN LISPRO 5 UNITS: 100 INJECTION, SOLUTION INTRAVENOUS; SUBCUTANEOUS at 12:53

## 2024-03-01 RX ADMIN — ANASTROZOLE 1 MG: 1 TABLET, COATED ORAL at 08:38

## 2024-03-01 RX ADMIN — FUROSEMIDE 20 MG: 40 TABLET ORAL at 08:36

## 2024-03-01 RX ADMIN — SODIUM CHLORIDE, PRESERVATIVE FREE 10 ML: 5 INJECTION INTRAVENOUS at 08:39

## 2024-03-01 RX ADMIN — ARFORMOTEROL TARTRATE 15 MCG: 15 SOLUTION RESPIRATORY (INHALATION) at 06:22

## 2024-03-01 RX ADMIN — ENOXAPARIN SODIUM 30 MG: 100 INJECTION SUBCUTANEOUS at 08:38

## 2024-03-01 RX ADMIN — ASPIRIN 81 MG: 81 TABLET, CHEWABLE ORAL at 08:37

## 2024-03-01 RX ADMIN — INSULIN LISPRO 4 UNITS: 100 INJECTION, SOLUTION INTRAVENOUS; SUBCUTANEOUS at 08:43

## 2024-03-01 RX ADMIN — IPRATROPIUM BROMIDE 0.5 MG: 0.5 SOLUTION RESPIRATORY (INHALATION) at 06:22

## 2024-03-01 RX ADMIN — ACETYLCYSTEINE 600 MG: 200 SOLUTION ORAL; RESPIRATORY (INHALATION) at 06:22

## 2024-03-01 RX ADMIN — PANTOPRAZOLE SODIUM 40 MG: 40 TABLET, DELAYED RELEASE ORAL at 06:10

## 2024-03-01 RX ADMIN — BUDESONIDE 250 MCG: 0.25 SUSPENSION RESPIRATORY (INHALATION) at 06:23

## 2024-03-01 RX ADMIN — INSULIN GLARGINE 20 UNITS: 100 INJECTION, SOLUTION SUBCUTANEOUS at 08:40

## 2024-03-01 RX ADMIN — GUAIFENESIN 600 MG: 600 TABLET ORAL at 08:36

## 2024-03-01 NOTE — PLAN OF CARE
Problem: Safety - Adult  Goal: Free from fall injury  Outcome: Progressing     Problem: Respiratory - Adult  Goal: Achieves optimal ventilation and oxygenation  Outcome: Progressing

## 2024-03-01 NOTE — DISCHARGE SUMMARY
Christine Roa  :  1945  MRN:  [id number]    Admit date:  2024  Discharge date:  3/1/2024    Discharging Physician:  Dr. Smalls    Advance Directive: Full Code    Consults: IP CONSULT TO HOME CARE NEEDS     Primary Care Physician:  Dorothy Smith MD    Discharge Diagnoses:  Principal Problem:    Acute hypoxic respiratory failure (HCC)  Active Problems:    Type 2 diabetes mellitus with complication (HCC)    COPD exacerbation (HCC)    Essential hypertension    DARSHAN (obstructive sleep apnea)  Resolved Problems:    * No resolved hospital problems. *      Portions of this note have been copied forward, however, changed to reflect the most current clinical status of this patient.  Hospital Course:   The patient is a 78-year-old female with past medical history of CVA, type 2 diabetes, COPD not maintained on oxygen at home, mood disorder, dementia, hypertension, hyperlipidemia, osteoarthritis, breast cancer, fibromyalgia, chronic back pain who presented to [facility] ED with multiple complaints. Patient reported ongoing flulike symptoms and had been diagnosed with flu approximately 2 weeks ago. Patient reports continued cough, shortness of breath, increase in sputum production. Patient reports fever at home with associated chills. Patient denies gastrointestinal issues. In ED patient was found to have glucose of 410. Viral respiratory panel negative. Blood cultures were obtained. Chest x-ray indicates mild bibasilar lung infiltrates. While in the ED patient's SpO2 dropped to 83% requiring 2 L nasal cannula. Decision was made to admit patient to hospitalist service for further evaluation. Patient was given Levaquin in the ED and continued at admission. Patient remains on 2 L nasal cannula however sats are in the high 90s at rest. Home o2 eval completed, patient will require 2 LPM NC at home. Family indicates history of dementia with worsening confusion at night, nursing staff has indicated this here as well. Blood  sugar improved with insulin adjustments. Recommends to continue levaquin and short prednisone taper. Recommends home health at [place] and patient is agreeable. At this time patient is medically stable for discharge home with home health care.     Significant Diagnostic Studies:   XR CHEST PORTABLE    Result Date: 2/28/2024  EXAM:  SINGLE VIEW OF THE CHEST.  History:  Flu  Comparison:  Chest radiograph 09/09/2023  FINDINGS: Heart is mildly enlarged.  Mild bibasilar lung infiltrates.  No appreciable pleural fluid and no pneumothorax.  No acute osseous abnormalities.      Impression:  Mild bibasilar lung infiltrates   ______________________________________ Electronically signed by: ENRIQUE TAVAREZ M.D. Date:     02/28/2024 Time:    18:55       Pertinent Labs:   CBC:   Recent Labs     02/28/24 1719 02/29/24 0214 03/01/24  0403   WBC 9.1 6.6 11.6*   HGB 14.3 14.9 13.8    287 316     BMP:    Recent Labs     02/28/24 1719 02/29/24 0214 03/01/24  0403    135* 138   K 5.0 4.7 4.1   CL 97* 97* 98   CO2 26 23 22   BUN 13 14 20   CREATININE 0.6 0.6 0.6   GLUCOSE 283* 317* 292*     INR: No results for input(s): \"INR\" in the last 72 hours.    Physical Exam:  Vital Signs: BP (!) 143/60   Pulse 78   Temp 97.7 °F (36.5 °C)   Resp 18   Wt 105.7 kg (233 lb)   LMP  (LMP Unknown)   SpO2 95%   BMI 42.62 kg/m²   Physical Exam  Vitals reviewed.   Constitutional:       Appearance: She is obese.   HENT:      Mouth/Throat:      Mouth: Mucous membranes are moist.      Pharynx: Oropharynx is clear.   Eyes:      Pupils: Pupils are equal, round, and reactive to light.   Cardiovascular:      Rate and Rhythm: Normal rate and regular rhythm.      Pulses: Normal pulses.      Heart sounds: Normal heart sounds.   Pulmonary:      Effort: Pulmonary effort is normal.      Comments: Diminished in bilateral bases. Good air entry, no wheezes noted.   Abdominal:      General: Bowel sounds are normal. There is no distension.

## 2024-03-01 NOTE — PROGRESS NOTES
CLINICAL PHARMACY NOTE: MEDS TO BEDS    Total # of Prescriptions Filled: 3   The following medications were delivered to the patient:  Discharge Medication List as of 3/1/2024 11:53 AM        START taking these medications    Details   albuterol (PROVENTIL) (2.5 MG/3ML) 0.083% nebulizer solution Take 3 mLs by nebulization 4 times daily as needed for Wheezing or Shortness of Breath, Disp-120 each, R-3Normal      guaiFENesin (MUCINEX) 600 MG extended release tablet Take 1 tablet by mouth 2 times daily, Disp-60 tablet, R-0Normal      levoFLOXacin (LEVAQUIN) 750 MG tablet Take 1 tablet by mouth daily for 5 days, Disp-5 tablet, R-0Normal      predniSONE (DELTASONE) 20 MG tablet Take 2 tablets by mouth daily for 2 days, Disp-4 tablet, R-0Normal           Patient did not  mucinex      Additional Documentation:  Handed to patients family at [facility]. Paid with cash

## 2024-03-01 NOTE — PROGRESS NOTES
03/01/24 1000   Resting (Room Air)   SpO2 92   HR 86   Resting (On O2)   SpO2 95   O2 Device Nasal cannula   O2 Flow Rate (l/min) 2 l/min   During Walk (Room Air)   SpO2 86   Walk/Assistance Device Walker   Rate of Dyspnea 1   Symptoms Shortness of breath;Fatigue   During Walk (On O2)   SpO2 93   O2 Device Nasal cannula   O2 Flow Rate (l/min) 2 l/min   Need Additional O2 Flow Rate Rows No   Walk/Assistance Device Walker   Rate of Dyspnea 1   Symptoms Shortness of breath   After Walk   SpO2 95   O2 Device Nasal cannula   O2 Flow Rate (l/min) 2 l/min   Rate of Dyspnea 0   Does the Patient Qualify for Home O2 Yes   Liter Flow at Rest 2   Liter Flow on Exertion 2   Does the Patient Need Portable Oxygen Tanks Yes

## 2024-03-01 NOTE — CARE COORDINATION
Spoke with patient regarding MD orders for HH services.  Patient agreeable and has chosen [facility].  Referral Faxed.  --[phone].  [phone].  Please notify - when patient discharges and fax DC Summary,  DC med list and any new HH orders.    The Patient and/or patient representative was provided with a choice of provider and agrees   with the discharge plan. [x] Yes [] No    Freedom of choice list was provided with basic dialogue that supports the patient's individualized plan of care/goals, treatment preferences and shares the quality data associated with the providers. [x] Yes [] No  Electronically signed by Yoaan Cannon on 3/1/2024 at 11:35 AM

## 2024-03-01 NOTE — PLAN OF CARE
Problem: Safety - Adult  Goal: Free from fall injury  3/1/2024 1103 by Xuan Quinn, RN  Outcome: Progressing  Flowsheets (Taken 3/1/2024 1101)  Free From Fall Injury: Instruct family/caregiver on patient safety  2/29/2024 2351 by Anastasiia Davis, RN  Outcome: Progressing     Problem: Respiratory - Adult  Goal: Achieves optimal ventilation and oxygenation  3/1/2024 1103 by Xuan Quinn, RN  Outcome: Progressing  2/29/2024 2351 by Anastasiia Davis RN  Outcome: Progressing

## 2024-03-04 ENCOUNTER — OFFICE VISIT (OUTPATIENT)
Dept: PRIMARY CARE CLINIC | Age: 79
End: 2024-03-04

## 2024-03-04 VITALS
DIASTOLIC BLOOD PRESSURE: 64 MMHG | HEIGHT: 62 IN | WEIGHT: 231 LBS | SYSTOLIC BLOOD PRESSURE: 134 MMHG | HEART RATE: 97 BPM | BODY MASS INDEX: 42.51 KG/M2 | TEMPERATURE: 97.6 F | OXYGEN SATURATION: 97 %

## 2024-03-04 DIAGNOSIS — Z09 HOSPITAL DISCHARGE FOLLOW-UP: Primary | ICD-10-CM

## 2024-03-04 LAB
BACTERIA BLD CULT ORG #2: NORMAL
BACTERIA BLD CULT: NORMAL

## 2024-03-04 ASSESSMENT — PATIENT HEALTH QUESTIONNAIRE - PHQ9
SUM OF ALL RESPONSES TO PHQ QUESTIONS 1-9: 0
SUM OF ALL RESPONSES TO PHQ9 QUESTIONS 1 & 2: 0
SUM OF ALL RESPONSES TO PHQ QUESTIONS 1-9: 0
1. LITTLE INTEREST OR PLEASURE IN DOING THINGS: 0
2. FEELING DOWN, DEPRESSED OR HOPELESS: 0

## 2024-03-04 NOTE — PATIENT INSTRUCTIONS
We are committed to providing you with the best care possible.   In order to help us achieve these goals please remember to bring all medications, herbal products, and over the counter supplements with you to each visit.     If your provider has ordered testing for you, please be sure to follow up with our office if you have not received results within 7 days after the testing took place.     *If you receive a survey after visiting one of our offices, please take time to share your experience concerning your physician office visit. These surveys are confidential and no health information about you is shared.  We are eager to improve for you and we are counting on your feedback to help make that happen.  '

## 2024-03-04 NOTE — PROGRESS NOTES
aspirin 81 MG chewable tablet Take 1 tablet by mouth daily 30 tablet 3    Incontinence Supply Disposable (POISE PANTILINERS) PADS Use pads as needed daily for urine leakage. 1 each 11        Medications patient taking as of now reconciled against medications ordered at time of hospital discharge: Yes        Objective:    /64   Pulse 97   Temp 97.6 °F (36.4 °C) (Temporal)   Ht 1.575 m (5' 2\")   Wt 104.8 kg (231 lb)   LMP  (LMP Unknown)   SpO2 97%   BMI 42.25 kg/m²   General Appearance: alert and oriented to person, place and time, well-developed and well-nourished, in no acute distress,morbidly obese, pt uses a walker  Skin: warm and dry, no rash or erythema  Neck: neck supple and non tender without mass, no thyromegaly or thyroid nodules, no cervical lymphadenopathy. Tick neck  Pulmonary/Chest: clear to auscultation bilaterally- no wheezes, rales or rhonchi, normal air movement, no respiratory distress      An electronic signature was used to authenticate this note.  --Dorothy Smith MD

## 2024-03-12 PROCEDURE — 99284 EMERGENCY DEPT VISIT MOD MDM: CPT

## 2024-03-13 ENCOUNTER — HOSPITAL ENCOUNTER (EMERGENCY)
Age: 79
Discharge: HOME | End: 2024-03-13
Attending: EMERGENCY MEDICINE
Payer: MEDICARE

## 2024-03-13 ENCOUNTER — APPOINTMENT (OUTPATIENT)
Dept: GENERAL RADIOLOGY | Age: 79
End: 2024-03-13
Payer: MEDICARE

## 2024-03-13 VITALS
RESPIRATION RATE: 16 BRPM | TEMPERATURE: 97.9 F | OXYGEN SATURATION: 98 % | DIASTOLIC BLOOD PRESSURE: 65 MMHG | HEART RATE: 93 BPM | SYSTOLIC BLOOD PRESSURE: 131 MMHG

## 2024-03-13 DIAGNOSIS — M25.562 LEFT KNEE PAIN, UNSPECIFIED CHRONICITY: Primary | ICD-10-CM

## 2024-03-13 LAB
ALBUMIN SERPL-MCNC: 4.1 G/DL (ref 3.5–5.2)
ALP SERPL-CCNC: 77 U/L (ref 35–104)
ALT SERPL-CCNC: 15 U/L (ref 5–33)
ANION GAP SERPL CALCULATED.3IONS-SCNC: 12 MMOL/L (ref 7–19)
APTT PPP: 22.7 SEC (ref 26–36.2)
AST SERPL-CCNC: 21 U/L (ref 5–32)
BASOPHILS # BLD: 0.1 K/UL (ref 0–0.2)
BASOPHILS NFR BLD: 0.5 % (ref 0–1)
BILIRUB SERPL-MCNC: 0.4 MG/DL (ref 0.2–1.2)
BUN SERPL-MCNC: 9 MG/DL (ref 8–23)
CALCIUM SERPL-MCNC: 9.3 MG/DL (ref 8.8–10.2)
CHLORIDE SERPL-SCNC: 99 MMOL/L (ref 98–111)
CO2 SERPL-SCNC: 28 MMOL/L (ref 22–29)
CREAT SERPL-MCNC: 0.6 MG/DL (ref 0.5–0.9)
EOSINOPHIL # BLD: 0.2 K/UL (ref 0–0.6)
EOSINOPHIL NFR BLD: 1.8 % (ref 0–5)
ERYTHROCYTE [DISTWIDTH] IN BLOOD BY AUTOMATED COUNT: 14.6 % (ref 11.5–14.5)
GLUCOSE SERPL-MCNC: 180 MG/DL (ref 74–109)
HCT VFR BLD AUTO: 45.4 % (ref 37–47)
HGB BLD-MCNC: 14.5 G/DL (ref 12–16)
IMM GRANULOCYTES # BLD: 0 K/UL
INR PPP: 1.05 (ref 0.88–1.18)
LYMPHOCYTES # BLD: 3.9 K/UL (ref 1.1–4.5)
LYMPHOCYTES NFR BLD: 35.6 % (ref 20–40)
MCH RBC QN AUTO: 26.5 PG (ref 27–31)
MCHC RBC AUTO-ENTMCNC: 31.9 G/DL (ref 33–37)
MCV RBC AUTO: 82.8 FL (ref 81–99)
MONOCYTES # BLD: 0.9 K/UL (ref 0–0.9)
MONOCYTES NFR BLD: 8.2 % (ref 0–10)
NEUTROPHILS # BLD: 5.8 K/UL (ref 1.5–7.5)
NEUTS SEG NFR BLD: 53.6 % (ref 50–65)
PLATELET # BLD AUTO: 252 K/UL (ref 130–400)
PMV BLD AUTO: 9.8 FL (ref 9.4–12.3)
POTASSIUM SERPL-SCNC: 5 MMOL/L (ref 3.5–5)
PROT SERPL-MCNC: 6.8 G/DL (ref 6.6–8.7)
PROTHROMBIN TIME: 13.4 SEC (ref 12–14.6)
RBC # BLD AUTO: 5.48 M/UL (ref 4.2–5.4)
SODIUM SERPL-SCNC: 139 MMOL/L (ref 136–145)
WBC # BLD AUTO: 10.9 K/UL (ref 4.8–10.8)

## 2024-03-13 PROCEDURE — 73560 X-RAY EXAM OF KNEE 1 OR 2: CPT

## 2024-03-13 PROCEDURE — 85025 COMPLETE CBC W/AUTO DIFF WBC: CPT

## 2024-03-13 PROCEDURE — 36415 COLL VENOUS BLD VENIPUNCTURE: CPT

## 2024-03-13 PROCEDURE — 80053 COMPREHEN METABOLIC PANEL: CPT

## 2024-03-13 PROCEDURE — 85730 THROMBOPLASTIN TIME PARTIAL: CPT

## 2024-03-13 PROCEDURE — 85610 PROTHROMBIN TIME: CPT

## 2024-03-13 PROCEDURE — 6370000000 HC RX 637 (ALT 250 FOR IP): Performed by: EMERGENCY MEDICINE

## 2024-03-13 PROCEDURE — 93971 EXTREMITY STUDY: CPT

## 2024-03-13 RX ORDER — HYDROCODONE BITARTRATE AND ACETAMINOPHEN 5; 325 MG/1; MG/1
1 TABLET ORAL ONCE
Status: DISCONTINUED | OUTPATIENT
Start: 2024-03-13 | End: 2024-03-13

## 2024-03-13 RX ORDER — OXYCODONE HYDROCHLORIDE AND ACETAMINOPHEN 5; 325 MG/1; MG/1
1 TABLET ORAL ONCE
Status: COMPLETED | OUTPATIENT
Start: 2024-03-13 | End: 2024-03-13

## 2024-03-13 RX ADMIN — OXYCODONE AND ACETAMINOPHEN 1 TABLET: 5; 325 TABLET ORAL at 02:53

## 2024-03-13 ASSESSMENT — ENCOUNTER SYMPTOMS
APNEA: 0
SHORTNESS OF BREATH: 0
BACK PAIN: 0
EYE PAIN: 0
COUGH: 0
COLOR CHANGE: 0
RHINORRHEA: 0
PHOTOPHOBIA: 0
NAUSEA: 0
ABDOMINAL PAIN: 0
EYE DISCHARGE: 0
ABDOMINAL DISTENTION: 0
SORE THROAT: 0

## 2024-03-13 ASSESSMENT — PAIN DESCRIPTION - LOCATION: LOCATION: KNEE

## 2024-03-13 ASSESSMENT — PAIN SCALES - GENERAL: PAINLEVEL_OUTOF10: 6

## 2024-03-13 ASSESSMENT — PAIN DESCRIPTION - DESCRIPTORS: DESCRIPTORS: ACHING

## 2024-03-13 ASSESSMENT — PAIN DESCRIPTION - ORIENTATION: ORIENTATION: LEFT

## 2024-03-13 NOTE — ED NOTES
This RN attempted to ambulate pt with a walker per MD order. Pt refused and stated, \"I want to relax\". This RN educated pt on the importance of ambulation. Pt refused education.    Electronically signed by Tip Goncalves RN on 3/13/2024 at 3:45 AM

## 2024-03-13 NOTE — ED PROVIDER NOTES
Attending Supervisory Note/Shared Visit   I have personally performed a face to face diagnostic evaluation on this patient. I have reviewed the mid-level’s findings and agree.     Left knee pain last several days painful to ambulate.  no recent injury reports fell a month or so.  Recently in hospital after influenza and was hypoxic.  No resp issues tonight.  Xray with degen changes likely cause of her pain.  No swelling or erythema of joint.  Extrem warm well perfused palp pulses.  Has walker at home.  Venous u/s neg for DVT.  Labs stable.  Ace wrap and has walker.  Will have f/u pcp and can see ortho.  Stable for DC.    Knee xray- degen changes no fx or dislocation      FINAL IMPRESSION      1. Left knee pain, unspecified chronicity          MIGUEL KEATING MD  Attending Emergency Physician        Miguel Keating MD  03/13/24 0334       Miguel Keating MD  03/13/24 0335    
or Ex-Partner: No     Emotionally Abused: No     Physically Abused: No     Sexually Abused: No   Housing Stability: Low Risk  (9/5/2023)    Housing Stability Vital Sign     Unable to Pay for Housing in the Last Year: No     Number of Places Lived in the Last Year: 1     Unstable Housing in the Last Year: No       SCREENINGS    Luis Coma Scale  Eye Opening: Spontaneous  Best Verbal Response: Oriented  Best Motor Response: Obeys commands  Luis Coma Scale Score: 15      PHYSICAL EXAM    (up to 7 forlevel 4, 8 or more for level 5)     ED Triage Vitals [03/12/24 2157]   BP Temp Temp Source Pulse Respirations SpO2 Height Weight   131/65 97.9 °F (36.6 °C) Oral 93 17 98 % -- --       Physical Exam  Vitals and nursing note reviewed.   Constitutional:       General: She is not in acute distress.     Appearance: She is well-developed. She is obese. She is not diaphoretic.   HENT:      Head: Normocephalic and atraumatic.      Right Ear: External ear normal.      Left Ear: External ear normal.      Mouth/Throat:      Pharynx: No oropharyngeal exudate.   Eyes:      General:         Right eye: No discharge.         Left eye: No discharge.      Pupils: Pupils are equal, round, and reactive to light.   Neck:      Thyroid: No thyromegaly.   Cardiovascular:      Rate and Rhythm: Normal rate and regular rhythm.      Heart sounds: Normal heart sounds. No murmur heard.     No friction rub.   Pulmonary:      Effort: Pulmonary effort is normal. No respiratory distress.      Breath sounds: Normal breath sounds. No stridor. No wheezing.   Abdominal:      General: Bowel sounds are normal. There is no distension.      Palpations: Abdomen is soft.      Tenderness: There is no abdominal tenderness.   Musculoskeletal:         General: Swelling and tenderness present. No signs of injury.      Cervical back: Normal range of motion and neck supple.   Skin:     General: Skin is warm and dry.      Capillary Refill: Capillary refill takes less

## 2024-03-15 DIAGNOSIS — Z79.4 TYPE 2 DIABETES MELLITUS WITHOUT COMPLICATION, WITH LONG-TERM CURRENT USE OF INSULIN (HCC): ICD-10-CM

## 2024-03-15 DIAGNOSIS — I10 ESSENTIAL HYPERTENSION: ICD-10-CM

## 2024-03-15 DIAGNOSIS — E11.9 TYPE 2 DIABETES MELLITUS WITHOUT COMPLICATION, WITH LONG-TERM CURRENT USE OF INSULIN (HCC): ICD-10-CM

## 2024-03-15 RX ORDER — LOSARTAN POTASSIUM 100 MG/1
TABLET ORAL
Qty: 30 TABLET | Refills: 1 | OUTPATIENT
Start: 2024-03-15

## 2024-03-15 NOTE — TELEPHONE ENCOUNTER
Christine Roa called to request a refill on her medication.      Last office visit : 3/4/2024   Next office visit : 3/15/2024     Requested Prescriptions     Pending Prescriptions Disp Refills    DULoxetine (CYMBALTA) 30 MG extended release capsule [Pharmacy Med Name: DULOXETINE HCL 30MG CAPSULE DELAYED RELEASE PARTICLES] 90 capsule 0     Sig: TAKE 1 CAPSULE BY MOUTH DAILY    Continuous Blood Gluc Sensor (FREESTYLE SHARMAINE 2 SENSOR) MISC [Pharmacy Med Name: FREESTYLE SHARMAINE 2/SENSOR/FLASH GLUCOSE MONITORING SYSTEM  MISCELLANEOUS] 2 each 1     Sig: USE 1 SENSOR AS DIRECTED EVERY 14 DAYS    losartan (COZAAR) 100 MG tablet [Pharmacy Med Name: LOSARTAN POTASSIUM 100MG TABLET] 30 tablet 1     Sig: TAKE 1 TABLET BY MOUTH ONCE DAILY FOR BLOOD PRESSURE            Bette Sorto MA

## 2024-03-15 NOTE — TELEPHONE ENCOUNTER
Christine Roa called to request a refill on her medication.      Last office visit : 3/4/2024   Next office visit : 4/8/2024     Requested Prescriptions     Pending Prescriptions Disp Refills    anastrozole (ARIMIDEX) 1 MG tablet 30 tablet 3     Sig: Take 1 tablet by mouth daily    DULoxetine (CYMBALTA) 30 MG extended release capsule 90 capsule 0     Sig: Take 1 capsule by mouth daily    Continuous Blood Gluc Sensor (FREESTYLE SHARMAINE 2 SENSOR) MISC 2 each 1     Sig: USE 1 SENSOR AS DIRECTED EVERY 14 DAYS    losartan (COZAAR) 100 MG tablet 30 tablet 1     Sig: Take 1 tablet by mouth daily for blood pressure            Bette Sorto MA

## 2024-03-18 RX ORDER — DULOXETIN HYDROCHLORIDE 30 MG/1
CAPSULE, DELAYED RELEASE ORAL
Qty: 90 CAPSULE | Refills: 0 | Status: SHIPPED | OUTPATIENT
Start: 2024-03-18

## 2024-03-18 RX ORDER — DULOXETIN HYDROCHLORIDE 30 MG/1
30 CAPSULE, DELAYED RELEASE ORAL DAILY
Qty: 90 CAPSULE | Refills: 0 | Status: SHIPPED | OUTPATIENT
Start: 2024-03-18

## 2024-03-18 RX ORDER — ANASTROZOLE 1 MG/1
1 TABLET ORAL DAILY
Qty: 30 TABLET | Refills: 3 | Status: SHIPPED | OUTPATIENT
Start: 2024-03-18

## 2024-03-18 RX ORDER — LOSARTAN POTASSIUM 100 MG/1
100 TABLET ORAL DAILY
Qty: 30 TABLET | Refills: 1 | Status: SHIPPED | OUTPATIENT
Start: 2024-03-18

## 2024-03-22 DIAGNOSIS — N39.41 URGE INCONTINENCE OF URINE: ICD-10-CM

## 2024-03-22 DIAGNOSIS — I10 ESSENTIAL HYPERTENSION: ICD-10-CM

## 2024-03-22 DIAGNOSIS — E11.8 TYPE 2 DIABETES MELLITUS WITH COMPLICATION (HCC): ICD-10-CM

## 2024-03-22 RX ORDER — OXYBUTYNIN CHLORIDE 10 MG/1
10 TABLET, EXTENDED RELEASE ORAL DAILY
Qty: 30 TABLET | Refills: 0 | OUTPATIENT
Start: 2024-03-22

## 2024-03-22 RX ORDER — LOSARTAN POTASSIUM 100 MG/1
TABLET ORAL
Qty: 30 TABLET | Refills: 0 | OUTPATIENT
Start: 2024-03-22

## 2024-03-22 NOTE — TELEPHONE ENCOUNTER
Christine Roa called to request a refill on her medication.      Last office visit : 3/4/2024   Next office visit : 4/8/2024     Requested Prescriptions     Pending Prescriptions Disp Refills    losartan (COZAAR) 100 MG tablet [Pharmacy Med Name: losartan 100 mg tablet] 30 tablet 0     Sig: TAKE ONE TABLET BY MOUTH ONCE DAILY FOR BLOOD PRESSURE    oxyBUTYnin (DITROPAN-XL) 10 MG extended release tablet [Pharmacy Med Name: oxybutynin chloride ER 10 mg tablet,extended release 24 hr] 30 tablet 0     Sig: TAKE ONE TABLET BY MOUTH DAILY    metFORMIN (GLUCOPHAGE) 1000 MG tablet [Pharmacy Med Name: metformin 1,000 mg tablet] 60 tablet 0     Sig: TAKE ONE TABLET BY MOUTH TWICE DAILY WITH MEALS            Alix Webster LPN

## 2024-03-26 DIAGNOSIS — E11.8 TYPE 2 DIABETES MELLITUS WITH COMPLICATION (HCC): ICD-10-CM

## 2024-03-26 RX ORDER — DULAGLUTIDE 3 MG/.5ML
3 INJECTION, SOLUTION SUBCUTANEOUS WEEKLY
Qty: 2 ML | Refills: 1 | Status: SHIPPED | OUTPATIENT
Start: 2024-03-26

## 2024-03-26 NOTE — TELEPHONE ENCOUNTER
Christine Roa called to request a refill on her medication.      Last office visit : 3/4/2024   Next office visit : 4/8/2024     Requested Prescriptions     Pending Prescriptions Disp Refills    TRULICITY 3 MG/0.5ML SOPN [Pharmacy Med Name: Trulicity 3 mg/0.5 mL subcutaneous pen injector] 2 mL 1     Sig: INJECT 3 MG INTO THE SKIN ONCE A WEEK            Bette Sorto MA

## 2024-03-31 DIAGNOSIS — E11.8 TYPE 2 DIABETES MELLITUS WITH COMPLICATION (HCC): ICD-10-CM

## 2024-03-31 DIAGNOSIS — G57.93 NEUROPATHY INVOLVING BOTH LOWER EXTREMITIES: Chronic | ICD-10-CM

## 2024-03-31 DIAGNOSIS — I10 ESSENTIAL HYPERTENSION: ICD-10-CM

## 2024-04-01 RX ORDER — METOPROLOL SUCCINATE 50 MG/1
TABLET, EXTENDED RELEASE ORAL
Qty: 30 TABLET | Refills: 1 | Status: SHIPPED | OUTPATIENT
Start: 2024-04-01

## 2024-04-01 RX ORDER — OMEPRAZOLE 40 MG/1
CAPSULE, DELAYED RELEASE ORAL
Qty: 90 CAPSULE | Refills: 0 | Status: SHIPPED | OUTPATIENT
Start: 2024-04-01

## 2024-04-01 RX ORDER — GABAPENTIN 300 MG/1
CAPSULE ORAL
Qty: 90 CAPSULE | Refills: 0 | Status: SHIPPED | OUTPATIENT
Start: 2024-04-01 | End: 2024-05-01

## 2024-04-01 NOTE — TELEPHONE ENCOUNTER
succinate (TOPROL XL) 50 MG extended release tablet [Pharmacy Med Name: metoprolol succinate ER 50 mg tablet,extended release 24 hr] 30 tablet 11     Sig: TAKE ONE TABLET BY MOUTH ONCE DAILY FOR BLOOD PRESSURE AND HEART PROTECTION (VIAL)    gabapentin (NEURONTIN) 300 MG capsule [Pharmacy Med Name: gabapentin 300 mg capsule] 90 capsule 11     Sig: TAKE ONE CAPSULE BY MOUTH THREE TIMES DAILY                   Please approve or refuse this medication.   Alix Webster LPN

## 2024-04-04 DIAGNOSIS — I10 ESSENTIAL HYPERTENSION: ICD-10-CM

## 2024-04-04 DIAGNOSIS — E11.8 TYPE 2 DIABETES MELLITUS WITH COMPLICATION (HCC): ICD-10-CM

## 2024-04-04 RX ORDER — DULOXETIN HYDROCHLORIDE 30 MG/1
30 CAPSULE, DELAYED RELEASE ORAL DAILY
Qty: 90 CAPSULE | Refills: 0 | OUTPATIENT
Start: 2024-04-04

## 2024-04-04 RX ORDER — LOSARTAN POTASSIUM 100 MG/1
100 TABLET ORAL DAILY
Qty: 90 TABLET | Refills: 1 | Status: CANCELLED | OUTPATIENT
Start: 2024-04-04

## 2024-04-04 RX ORDER — FLURBIPROFEN SODIUM 0.3 MG/ML
SOLUTION/ DROPS OPHTHALMIC
Qty: 100 EACH | Refills: 1 | Status: CANCELLED | OUTPATIENT
Start: 2024-04-04

## 2024-04-04 NOTE — TELEPHONE ENCOUNTER
Christine Roa called to request a refill on her medication.      Last office visit : 3/4/2024   Next office visit : 4/8/2024     Requested Prescriptions     Pending Prescriptions Disp Refills    Insulin Pen Needle (B-D UF III MINI PEN NEEDLES) 31G X 5 MM MISC 100 each 1     Sig: USE 4 TIMES DAILY WITH INSULINS    losartan (COZAAR) 100 MG tablet 90 tablet 1     Sig: Take 1 tablet by mouth daily for blood pressure     Refused Prescriptions Disp Refills    DULoxetine (CYMBALTA) 30 MG extended release capsule [Pharmacy Med Name: DULOXETINE HYDROCHLORIDE 30MG CAPSULE DELAYED RELEASE PARTICLES] 90 capsule 0     Sig: TAKE 1 CAPSULE BY MOUTH DAILY     Refused By: DYAN BELTRAN     Reason for Refusal: Patient has requested refill too soon            Bette Sorto MA

## 2024-04-04 NOTE — TELEPHONE ENCOUNTER
----- Message from Lajanay Blair sent at 4/4/2024  9:36 AM CDT -----  Subject: Medication Problem     Medication: losartan (COZAAR) 100 MG tablet  Dosage: 1 tablet daily for bp  Ordering Provider:     Question/Problem: Pt's refill was recently denied and pt needs refill on   med. Pharmacy asking if an electronic request can be sent instead of fax,   if possible.      Pharmacy: [facility] ([place]) - [address]   [phone] -  [phone]     Medication: Oxybutin 10MG  Dosage: 1 tablet daily  Ordering Provider:     Question/Problem: Pt is out of this med. Pt's refill was recently denied   and pt needs refill on med. Pharmacy asking if an electronic request can   be sent instead of fax, if possible.      Pharmacy: [facility] ([address]   [phone] -  [phone]     Medication: Insulin Pen Needle (B-D UF III MINI PEN NEEDLES) 31G X 5 MM   MISC  Dosage: USE 4 TIMES DAILY WITH INSULINS  Ordering Provider:     Question/Problem: Pt's refill was recently denied and pt needs refill on   med. Pharmacy asking if an electronic request can be sent instead of fax,   if possible.      Pharmacy: [facility] ([address]   [phone] -  [phone]     Medication: furosemide (LASIX) 20 MG tablet  Dosage: 1 tablet daily  Ordering Provider:     Question/Problem: Pt's refill was recently denied and pt needs refill on   med. Pharmacy asking if an electronic request can be sent instead of fax,   if possible.      Pharmacy: [facility] ([address]   [phone] -  [phone]     Medication: metFORMIN (GLUCOPHAGE) 1000 MG tablet  Dosage: 1 tablet twice a day with meals  Ordering Provider:     Question/Problem: Pt's refill was recently denied and pt needs refill on   med. Pharmacy asking if an electronic request can be sent instead of fax,   if possible.      Pharmacy: [facility] ([facility]

## 2024-04-04 NOTE — TELEPHONE ENCOUNTER
Spoke with patients daughter and she stated they had an appointment scheduled for Monday, I advised her to bring a list of what medication refills she needed and would would get everything refilled during scheduled office visit.

## 2024-04-08 ENCOUNTER — TELEPHONE (OUTPATIENT)
Dept: PODIATRY | Facility: CLINIC | Age: 79
End: 2024-04-08
Payer: MEDICARE

## 2024-04-08 ENCOUNTER — TELEPHONE (OUTPATIENT)
Dept: VASCULAR SURGERY | Facility: CLINIC | Age: 79
End: 2024-04-08
Payer: MEDICARE

## 2024-04-09 ENCOUNTER — OFFICE VISIT (OUTPATIENT)
Dept: VASCULAR SURGERY | Facility: CLINIC | Age: 79
End: 2024-04-09
Payer: MEDICARE

## 2024-04-09 ENCOUNTER — OFFICE VISIT (OUTPATIENT)
Dept: PODIATRY | Facility: CLINIC | Age: 79
End: 2024-04-09
Payer: MEDICARE

## 2024-04-09 ENCOUNTER — HOSPITAL ENCOUNTER (OUTPATIENT)
Dept: ULTRASOUND IMAGING | Facility: HOSPITAL | Age: 79
Discharge: HOME | End: 2024-04-09
Admitting: NURSE PRACTITIONER
Payer: MEDICARE

## 2024-04-09 VITALS
SYSTOLIC BLOOD PRESSURE: 142 MMHG | WEIGHT: 228 LBS | OXYGEN SATURATION: 94 % | HEART RATE: 86 BPM | HEIGHT: 62 IN | DIASTOLIC BLOOD PRESSURE: 82 MMHG | BODY MASS INDEX: 41.96 KG/M2

## 2024-04-09 VITALS
BODY MASS INDEX: 41.96 KG/M2 | SYSTOLIC BLOOD PRESSURE: 142 MMHG | WEIGHT: 228 LBS | HEIGHT: 62 IN | DIASTOLIC BLOOD PRESSURE: 82 MMHG

## 2024-04-09 DIAGNOSIS — M20.42 HAMMERTOE, BILATERAL: ICD-10-CM

## 2024-04-09 DIAGNOSIS — R60.0 BILATERAL LOWER EXTREMITY EDEMA: ICD-10-CM

## 2024-04-09 DIAGNOSIS — I87.2 VENOUS INSUFFICIENCY OF BOTH LOWER EXTREMITIES: Primary | ICD-10-CM

## 2024-04-09 DIAGNOSIS — L60.2 THICKENED NAILS: Primary | ICD-10-CM

## 2024-04-09 DIAGNOSIS — E11.9 ENCOUNTER FOR DIABETIC FOOT EXAM: ICD-10-CM

## 2024-04-09 DIAGNOSIS — I65.23 BILATERAL CAROTID ARTERY STENOSIS: ICD-10-CM

## 2024-04-09 DIAGNOSIS — I83.11 VARICOSE VEINS OF BOTH LOWER EXTREMITIES WITH INFLAMMATION: ICD-10-CM

## 2024-04-09 DIAGNOSIS — Z79.4 TYPE 2 DIABETES MELLITUS WITH DIABETIC POLYNEUROPATHY, WITH LONG-TERM CURRENT USE OF INSULIN: ICD-10-CM

## 2024-04-09 DIAGNOSIS — M20.12 VALGUS DEFORMITY OF BOTH GREAT TOES: ICD-10-CM

## 2024-04-09 DIAGNOSIS — I83.12 VARICOSE VEINS OF BOTH LOWER EXTREMITIES WITH INFLAMMATION: ICD-10-CM

## 2024-04-09 DIAGNOSIS — I89.0 LYMPHEDEMA: ICD-10-CM

## 2024-04-09 DIAGNOSIS — I87.2 VENOUS INSUFFICIENCY OF BOTH LOWER EXTREMITIES: ICD-10-CM

## 2024-04-09 DIAGNOSIS — M20.41 HAMMERTOE, BILATERAL: ICD-10-CM

## 2024-04-09 DIAGNOSIS — E11.42 TYPE 2 DIABETES MELLITUS WITH DIABETIC POLYNEUROPATHY, WITH LONG-TERM CURRENT USE OF INSULIN: ICD-10-CM

## 2024-04-09 DIAGNOSIS — E66.01 OBESITY, MORBID, BMI 40.0-49.9: ICD-10-CM

## 2024-04-09 DIAGNOSIS — R26.9 GAIT ABNORMALITY: ICD-10-CM

## 2024-04-09 DIAGNOSIS — L84 FOOT CALLUS: ICD-10-CM

## 2024-04-09 DIAGNOSIS — M20.11 VALGUS DEFORMITY OF BOTH GREAT TOES: ICD-10-CM

## 2024-04-09 PROCEDURE — 93970 EXTREMITY STUDY: CPT | Performed by: SURGERY

## 2024-04-09 PROCEDURE — 93970 EXTREMITY STUDY: CPT

## 2024-04-16 ENCOUNTER — OFFICE VISIT (OUTPATIENT)
Dept: PRIMARY CARE CLINIC | Age: 79
End: 2024-04-16
Payer: MEDICARE

## 2024-04-16 VITALS
DIASTOLIC BLOOD PRESSURE: 68 MMHG | BODY MASS INDEX: 42.1 KG/M2 | WEIGHT: 230.2 LBS | HEART RATE: 84 BPM | TEMPERATURE: 97.2 F | SYSTOLIC BLOOD PRESSURE: 108 MMHG | OXYGEN SATURATION: 93 %

## 2024-04-16 DIAGNOSIS — E11.8 TYPE 2 DIABETES MELLITUS WITH COMPLICATION (HCC): ICD-10-CM

## 2024-04-16 DIAGNOSIS — I10 ESSENTIAL HYPERTENSION: Primary | ICD-10-CM

## 2024-04-16 DIAGNOSIS — Z76.0 MEDICATION REFILL: ICD-10-CM

## 2024-04-16 DIAGNOSIS — J41.0 SIMPLE CHRONIC BRONCHITIS (HCC): ICD-10-CM

## 2024-04-16 DIAGNOSIS — G47.33 OSA (OBSTRUCTIVE SLEEP APNEA): ICD-10-CM

## 2024-04-16 PROBLEM — H60.542 ECZEMA OF LEFT EXTERNAL EAR: Status: RESOLVED | Noted: 2023-06-22 | Resolved: 2024-04-16

## 2024-04-16 PROBLEM — J96.01 ACUTE HYPOXIC RESPIRATORY FAILURE (HCC): Status: RESOLVED | Noted: 2024-02-28 | Resolved: 2024-04-16

## 2024-04-16 PROCEDURE — 1123F ACP DISCUSS/DSCN MKR DOCD: CPT | Performed by: FAMILY MEDICINE

## 2024-04-16 PROCEDURE — 3052F HG A1C>EQUAL 8.0%<EQUAL 9.0%: CPT | Performed by: FAMILY MEDICINE

## 2024-04-16 PROCEDURE — 3074F SYST BP LT 130 MM HG: CPT | Performed by: FAMILY MEDICINE

## 2024-04-16 PROCEDURE — 3078F DIAST BP <80 MM HG: CPT | Performed by: FAMILY MEDICINE

## 2024-04-16 RX ORDER — LOSARTAN POTASSIUM 100 MG/1
100 TABLET ORAL DAILY
Qty: 90 TABLET | Refills: 1 | Status: SHIPPED | OUTPATIENT
Start: 2024-04-16

## 2024-04-16 RX ORDER — LORATADINE 10 MG/1
10 TABLET ORAL DAILY
Qty: 90 TABLET | Refills: 0 | Status: SHIPPED | OUTPATIENT
Start: 2024-04-16

## 2024-04-16 RX ORDER — FLUTICASONE PROPIONATE 50 MCG
2 SPRAY, SUSPENSION (ML) NASAL DAILY
Qty: 16 G | Refills: 2 | Status: SHIPPED | OUTPATIENT
Start: 2024-04-16

## 2024-04-16 RX ORDER — OXYBUTYNIN CHLORIDE 10 MG/1
10 TABLET, EXTENDED RELEASE ORAL DAILY
Qty: 90 TABLET | Refills: 1 | Status: SHIPPED | OUTPATIENT
Start: 2024-04-16

## 2024-04-16 RX ORDER — DULOXETIN HYDROCHLORIDE 30 MG/1
30 CAPSULE, DELAYED RELEASE ORAL DAILY
Qty: 90 CAPSULE | Refills: 0 | Status: CANCELLED | OUTPATIENT
Start: 2024-04-16

## 2024-04-16 RX ORDER — DULOXETIN HYDROCHLORIDE 30 MG/1
30 CAPSULE, DELAYED RELEASE ORAL DAILY
Qty: 90 CAPSULE | Refills: 1 | Status: SHIPPED | OUTPATIENT
Start: 2024-04-16

## 2024-04-16 ASSESSMENT — ENCOUNTER SYMPTOMS
SHORTNESS OF BREATH: 0
GASTROINTESTINAL NEGATIVE: 1
COUGH: 0
EYES NEGATIVE: 1

## 2024-04-16 NOTE — PROGRESS NOTES
R-1Normal  2. Type 2 diabetes mellitus with complication ([facility])  Assessment & Plan:   Borderline controlled, continue current medications, continue current treatment plan, medication adherence emphasized, and lifestyle modifications recommended  3. Simple chronic bronchitis (HCC)  Assessment & Plan:    Continue breathing treatments at home  4. DARSHAN (obstructive sleep apnea)  Assessment & Plan:    Continue to use CPAP regularly  5. BMI 40.0-44.9, adult ([facility])  Assessment & Plan:    Encourage to stay on low carb diet, stay active as tolerated  6. Medication refill  -     DULoxetine (CYMBALTA) 30 MG extended release capsule; Take 1 capsule by mouth daily, Disp-90 capsule, R-1Normal  -     fluticasone (FLONASE) 50 MCG/ACT nasal spray; 2 sprays by Each Nostril route daily, Disp-16 g, R-2Normal  -     loratadine (CLARITIN) 10 MG tablet; Take 1 tablet by mouth daily, Disp-90 tablet, R-0Normal  -     losartan (COZAAR) 100 MG tablet; Take 1 tablet by mouth daily for blood pressure, Disp-90 tablet, R-1Normal  -     oxyBUTYnin (DITROPAN XL) 10 MG extended release tablet; Take 1 tablet by mouth daily, Disp-90 tablet, R-1Normal     Continue present care and management  Continue all maintenance medications  Encouraged to continue healthy lifestyle change  Engage in regular exercise daily -30 minutes a day as tolerated  Stay well  hydrated - drink at least 64 oz of fluid a day  Eat 6 servings of fruit and vegetables daily  Keep scheduled follow-up appt with me and other providers/specialists  Call with new concerns     Return in about 17 weeks (around 8/13/2024) for routine follow-up with me, med check, med refills, chronic care management.    All questions were answered.  Medications, including possible adverse effects, and instructions were reviewed and  understanding was confirmed.   Follow-up recommendations, including when to contact or return to office (ie; if symptoms worsen or fail to improve), were discussed and

## 2024-04-17 PROBLEM — J41.0 SIMPLE CHRONIC BRONCHITIS (HCC): Status: ACTIVE | Noted: 2024-04-17

## 2024-04-17 RX ORDER — ANASTROZOLE 1 MG/1
1 TABLET ORAL DAILY
Qty: 30 TABLET | Refills: 3 | Status: SHIPPED | OUTPATIENT
Start: 2024-04-17

## 2024-04-17 NOTE — TELEPHONE ENCOUNTER
Christine Roa called to request a refill on her medication.      Last office visit : 4/16/2024   Next office visit : 8/13/2024     Requested Prescriptions     Pending Prescriptions Disp Refills    anastrozole (ARIMIDEX) 1 MG tablet 30 tablet 3     Sig: Take 1 tablet by mouth daily            Alix Webster LPN

## 2024-04-25 ENCOUNTER — TELEPHONE (OUTPATIENT)
Dept: PHARMACY | Facility: CLINIC | Age: 79
End: 2024-04-25

## 2024-04-25 NOTE — TELEPHONE ENCOUNTER
Risk score (Cory IRWIN, et al., 2019) failed to calculate for the following reasons:    The patient has a prior MI or stroke diagnosis     PLAN  The following are interventions that have been identified:   Patient overdue refilling ROSUVASTATIN TAB 10 MG & LOSARTAN POT  MG  and active on home medication list.   Pt has numerous pharmacies listed. LOSARTAN POT  MG- [facility] - [place], [place] - [address] - P [phone] & ROSUVASTATIN TAB 10 MG- [facility] ([place] - [facility] [place] - [address] - P [phone]. What pharmacy is preferred?    Outreach:    Patient:  MyChart message sent to patient.  Attempting to reach patient to review.  Left message asking for return call.    Last Visit: 04.16.24  Next Visit: 08.13.24    Court Barrow CPhT  Population Health    [facility]   [phone] option 2      For Pharmacy Admin Tracking Only    Program: [employer]  CPA in place:  No  Gap Closed?: No   Time Spent (min): 20

## 2024-05-21 DIAGNOSIS — G57.93 NEUROPATHY INVOLVING BOTH LOWER EXTREMITIES: Chronic | ICD-10-CM

## 2024-05-21 DIAGNOSIS — E11.8 TYPE 2 DIABETES MELLITUS WITH COMPLICATION (HCC): ICD-10-CM

## 2024-05-21 NOTE — TELEPHONE ENCOUNTER
Sent [employer] message to patient advising she would need to come in and complete a urine drug screen, prior to refill of medication.

## 2024-05-23 RX ORDER — GABAPENTIN 300 MG/1
CAPSULE ORAL
Qty: 90 CAPSULE | Refills: 0 | Status: SHIPPED | OUTPATIENT
Start: 2024-05-23 | End: 2024-06-22

## 2024-05-23 NOTE — TELEPHONE ENCOUNTER
Christine Roa called to request a refill on her medication.      Last office visit : 4/16/2024   Next office visit : 8/13/2024     Last UDS:   Amphetamines   Date Value Ref Range Status   06/24/2014 Negative NEGATIVE Final     Comment:     URINE AMPHETAMINE QTEJPB=4834 NG/ML     Benzodiazepines   Date Value Ref Range Status   06/24/2014 Negative NEGATIVE Final     Comment:     URINE BENZODIAZEPINE HVCXBP=821 NG/ML     Benzodiazepine Screen, Urine   Date Value Ref Range Status   09/09/2023 Negative Negative <150 ng/mL Final     Benzodiazepines, Urine   Date Value Ref Range Status   02/27/2018 Negative Lxpugw=873 ng/mL Final     Buprenorphine Urine   Date Value Ref Range Status   09/09/2023 Negative Negative <10 ng/mL Final     Cocaine Metabolite Screen, Urine   Date Value Ref Range Status   09/09/2023 Negative Negative <150 ng/mL Final     Gabapentin Screen, Urine   Date Value Ref Range Status   05/16/2022 neg  Final     MDMA, Urine   Date Value Ref Range Status   05/16/2022 neg  Final     Oxycodone Screen, Ur   Date Value Ref Range Status   05/16/2022 neg  Final     Propoxyphene Screen, Urine   Date Value Ref Range Status   05/16/2022 neg  Final     THC Screen, Urine   Date Value Ref Range Status   05/16/2022 neg  Final     Tricyclic Antidepressants, Urine   Date Value Ref Range Status   09/09/2023 Negative Negative <300 ng/mL Final       Last Immanuel: 05-  Medication Contract: not found   Last Fill: 04/01/2024    Requested Prescriptions     Pending Prescriptions Disp Refills    gabapentin (NEURONTIN) 300 MG capsule [Pharmacy Med Name: gabapentin 300 mg capsule] 90 capsule 0     Sig: TAKE ONE CAPSULE BY MOUTH THREE TIMES DAILY    metFORMIN (GLUCOPHAGE) 1000 MG tablet [Pharmacy Med Name: metformin 1,000 mg tablet] 60 tablet 0     Sig: TAKE ONE TABLET BY MOUTH TWICE DAILY WITH MEALS         Please approve or refuse this medication.   Bette Sorto MA

## 2024-06-20 DIAGNOSIS — I10 ESSENTIAL HYPERTENSION: ICD-10-CM

## 2024-06-20 RX ORDER — METOPROLOL SUCCINATE 50 MG/1
TABLET, EXTENDED RELEASE ORAL
Qty: 90 TABLET | Refills: 0 | Status: SHIPPED | OUTPATIENT
Start: 2024-06-20

## 2024-06-20 NOTE — TELEPHONE ENCOUNTER
Christine Roa called to request a refill on her medication.      Last office visit : 4/16/2024   Next office visit : 8/13/2024     Requested Prescriptions     Pending Prescriptions Disp Refills    metoprolol succinate (TOPROL XL) 50 MG extended release tablet [Pharmacy Med Name: metoprolol succinate ER 50 mg tablet,extended release 24 hr] 90 tablet 0     Sig: TAKE ONE TABLET BY MOUTH ONCE DAILY FOR BLOOD PRESSURE AND HEART PROTECTION            Bette Sorto MA

## 2024-06-24 DIAGNOSIS — G57.93 NEUROPATHY INVOLVING BOTH LOWER EXTREMITIES: Chronic | ICD-10-CM

## 2024-06-25 RX ORDER — GABAPENTIN 300 MG/1
CAPSULE ORAL
Qty: 90 CAPSULE | Refills: 1 | OUTPATIENT
Start: 2024-06-25

## 2024-06-25 NOTE — TELEPHONE ENCOUNTER
Pharmacy sent notification on refill for gabapentin. Denied refill because there was a refill attached to last request.

## 2024-06-26 DIAGNOSIS — G57.93 NEUROPATHY INVOLVING BOTH LOWER EXTREMITIES: Chronic | ICD-10-CM

## 2024-06-27 RX ORDER — GABAPENTIN 300 MG/1
CAPSULE ORAL
Qty: 90 CAPSULE | Refills: 1 | Status: SHIPPED | OUTPATIENT
Start: 2024-06-27 | End: 2024-07-02

## 2024-06-27 NOTE — TELEPHONE ENCOUNTER
Christine Roa called to request a refill on her medication.      Last office visit : 4/16/2024   Next office visit : 8/13/2024     Last UDS:   Amphetamines   Date Value Ref Range Status   06/24/2014 Negative NEGATIVE Final     Comment:     URINE AMPHETAMINE NEXMXN=9818 NG/ML     Benzodiazepines   Date Value Ref Range Status   06/24/2014 Negative NEGATIVE Final     Comment:     URINE BENZODIAZEPINE RFWVCH=130 NG/ML     Benzodiazepine Screen, Urine   Date Value Ref Range Status   09/09/2023 Negative Negative <150 ng/mL Final     Benzodiazepines, Urine   Date Value Ref Range Status   02/27/2018 Negative Bbaukl=876 ng/mL Final     Buprenorphine Urine   Date Value Ref Range Status   09/09/2023 Negative Negative <10 ng/mL Final     Cocaine Metabolite Screen, Urine   Date Value Ref Range Status   09/09/2023 Negative Negative <150 ng/mL Final     Gabapentin Screen, Urine   Date Value Ref Range Status   05/16/2022 neg  Final     MDMA, Urine   Date Value Ref Range Status   05/16/2022 neg  Final     Oxycodone Screen, Ur   Date Value Ref Range Status   05/16/2022 neg  Final     Propoxyphene Screen, Urine   Date Value Ref Range Status   05/16/2022 neg  Final     THC Screen, Urine   Date Value Ref Range Status   05/16/2022 neg  Final     Tricyclic Antidepressants, Urine   Date Value Ref Range Status   09/09/2023 Negative Negative <300 ng/mL Final       Last Immanuel: 06-  Medication Contract: Not found   Last Fill: 05-    Requested Prescriptions     Pending Prescriptions Disp Refills    gabapentin (NEURONTIN) 300 MG capsule [Pharmacy Med Name: gabapentin 300 mg capsule] 90 capsule 1     Sig: TAKE ONE CAPSULE BY MOUTH THREE TIMES DAILY         Please approve or refuse this medication.   Bette Sorto MA

## 2024-07-10 ENCOUNTER — TELEPHONE (OUTPATIENT)
Age: 79
End: 2024-07-10
Payer: MEDICARE

## 2024-07-11 ENCOUNTER — OFFICE VISIT (OUTPATIENT)
Age: 79
End: 2024-07-11
Payer: MEDICARE

## 2024-07-11 VITALS
DIASTOLIC BLOOD PRESSURE: 84 MMHG | OXYGEN SATURATION: 96 % | HEIGHT: 62 IN | BODY MASS INDEX: 41.96 KG/M2 | WEIGHT: 228 LBS | HEART RATE: 80 BPM | SYSTOLIC BLOOD PRESSURE: 126 MMHG

## 2024-07-11 DIAGNOSIS — M20.12 VALGUS DEFORMITY OF BOTH GREAT TOES: ICD-10-CM

## 2024-07-11 DIAGNOSIS — R26.9 GAIT ABNORMALITY: ICD-10-CM

## 2024-07-11 DIAGNOSIS — M20.41 HAMMERTOE, BILATERAL: ICD-10-CM

## 2024-07-11 DIAGNOSIS — E11.42 TYPE 2 DIABETES MELLITUS WITH DIABETIC POLYNEUROPATHY, WITH LONG-TERM CURRENT USE OF INSULIN: ICD-10-CM

## 2024-07-11 DIAGNOSIS — I87.2 VENOUS INSUFFICIENCY OF BOTH LOWER EXTREMITIES: ICD-10-CM

## 2024-07-11 DIAGNOSIS — I89.0 LYMPHEDEMA: ICD-10-CM

## 2024-07-11 DIAGNOSIS — E66.01 OBESITY, MORBID, BMI 40.0-49.9: ICD-10-CM

## 2024-07-11 DIAGNOSIS — Z79.4 TYPE 2 DIABETES MELLITUS WITH DIABETIC POLYNEUROPATHY, WITH LONG-TERM CURRENT USE OF INSULIN: ICD-10-CM

## 2024-07-11 DIAGNOSIS — L60.2 THICKENED NAILS: Primary | ICD-10-CM

## 2024-07-11 DIAGNOSIS — M20.11 VALGUS DEFORMITY OF BOTH GREAT TOES: ICD-10-CM

## 2024-07-11 DIAGNOSIS — M20.42 HAMMERTOE, BILATERAL: ICD-10-CM

## 2024-07-11 DIAGNOSIS — E11.9 ENCOUNTER FOR DIABETIC FOOT EXAM: ICD-10-CM

## 2024-07-11 DIAGNOSIS — L84 FOOT CALLUS: ICD-10-CM

## 2024-07-15 DIAGNOSIS — E11.8 TYPE 2 DIABETES MELLITUS WITH COMPLICATION (HCC): ICD-10-CM

## 2024-07-16 RX ORDER — DULAGLUTIDE 3 MG/.5ML
3 INJECTION, SOLUTION SUBCUTANEOUS WEEKLY
Qty: 12 ML | Refills: 1 | Status: SHIPPED | OUTPATIENT
Start: 2024-07-16

## 2024-07-23 RX ORDER — OMEPRAZOLE 40 MG/1
CAPSULE, DELAYED RELEASE ORAL
Qty: 90 CAPSULE | Refills: 0 | Status: SHIPPED | OUTPATIENT
Start: 2024-07-23

## 2024-07-23 RX ORDER — EMPAGLIFLOZIN 25 MG/1
25 TABLET, FILM COATED ORAL DAILY
Qty: 90 TABLET | Refills: 0 | Status: SHIPPED | OUTPATIENT
Start: 2024-07-23

## 2024-07-23 NOTE — TELEPHONE ENCOUNTER
Christine Roa called to request a refill on her medication.      Last office visit : 4/16/2024   Next office visit : 8/13/2024     Requested Prescriptions     Pending Prescriptions Disp Refills    omeprazole (PRILOSEC) 40 MG delayed release capsule [Pharmacy Med Name: omeprazole 40 mg capsule,delayed release] 90 capsule 0     Sig: TAKE ONE CAPSULE BY MOUTH ONCE DAILY AS NEEDED    JARDIANCE 25 MG tablet [Pharmacy Med Name: Jardiance 25 mg tablet] 90 tablet 0     Sig: TAKE ONE TABLET BY MOUTH DAILY            Katie Perry MA

## 2024-08-07 DIAGNOSIS — I10 ESSENTIAL HYPERTENSION: ICD-10-CM

## 2024-08-07 DIAGNOSIS — E11.8 TYPE 2 DIABETES MELLITUS WITH COMPLICATION (HCC): ICD-10-CM

## 2024-08-07 DIAGNOSIS — Z79.4 TYPE 2 DIABETES MELLITUS WITHOUT COMPLICATION, WITH LONG-TERM CURRENT USE OF INSULIN (HCC): ICD-10-CM

## 2024-08-07 DIAGNOSIS — Z76.0 MEDICATION REFILL: ICD-10-CM

## 2024-08-07 DIAGNOSIS — E11.9 TYPE 2 DIABETES MELLITUS WITHOUT COMPLICATION, WITH LONG-TERM CURRENT USE OF INSULIN (HCC): ICD-10-CM

## 2024-08-08 RX ORDER — LOSARTAN POTASSIUM 100 MG/1
100 TABLET ORAL DAILY
Qty: 90 TABLET | Refills: 1 | OUTPATIENT
Start: 2024-08-08

## 2024-08-08 RX ORDER — ANASTROZOLE 1 MG/1
1 TABLET ORAL DAILY
Qty: 30 TABLET | Refills: 3 | OUTPATIENT
Start: 2024-08-08

## 2024-08-08 RX ORDER — DULOXETIN HYDROCHLORIDE 30 MG/1
30 CAPSULE, DELAYED RELEASE ORAL DAILY
Qty: 90 CAPSULE | Refills: 1 | OUTPATIENT
Start: 2024-08-08

## 2024-08-08 RX ORDER — DULAGLUTIDE 3 MG/.5ML
3 INJECTION, SOLUTION SUBCUTANEOUS WEEKLY
Qty: 12 ML | Refills: 1 | OUTPATIENT
Start: 2024-08-08

## 2024-08-13 ENCOUNTER — OFFICE VISIT (OUTPATIENT)
Dept: PRIMARY CARE CLINIC | Age: 79
End: 2024-08-13

## 2024-08-13 VITALS
HEIGHT: 62 IN | DIASTOLIC BLOOD PRESSURE: 64 MMHG | BODY MASS INDEX: 41.7 KG/M2 | WEIGHT: 226.6 LBS | OXYGEN SATURATION: 96 % | SYSTOLIC BLOOD PRESSURE: 110 MMHG | HEART RATE: 90 BPM | TEMPERATURE: 97.5 F

## 2024-08-13 DIAGNOSIS — E11.65 UNCONTROLLED TYPE 2 DIABETES MELLITUS WITH HYPERGLYCEMIA (HCC): Primary | ICD-10-CM

## 2024-08-13 DIAGNOSIS — Z91.199 NON-COMPLIANCE WITH TREATMENT: ICD-10-CM

## 2024-08-13 DIAGNOSIS — M17.0 PRIMARY OSTEOARTHRITIS OF BOTH KNEES: ICD-10-CM

## 2024-08-13 DIAGNOSIS — I10 ESSENTIAL HYPERTENSION: ICD-10-CM

## 2024-08-13 LAB — HBA1C MFR BLD: 9.2 %

## 2024-08-13 RX ORDER — DULAGLUTIDE 3 MG/.5ML
3 INJECTION, SOLUTION SUBCUTANEOUS WEEKLY
Qty: 12 ML | Refills: 1 | Status: SHIPPED | OUTPATIENT
Start: 2024-08-13

## 2024-08-13 ASSESSMENT — ENCOUNTER SYMPTOMS
GASTROINTESTINAL NEGATIVE: 1
EYES NEGATIVE: 1
COUGH: 0
SHORTNESS OF BREATH: 0

## 2024-08-13 NOTE — PROGRESS NOTES
[facility]  [address]  [address]  [place] [address]  Dept: [phone]  Dept Fax: [phone]  Loc: [phone]      Subjective:     Chief Complaint   Patient presents with    Follow-up     4 month     Knee Pain       HPI:  Christine Roa is a 78 y.o. female presents today for  her routine follow-up visit. She is accompanied by her daughter Miesha.   Pt complains of pain in both her knees , L worse than the R. Recent  xray of the L knee done a few months ago confirmed tricompartmental DJD. She states her legs get weak at times  She is also here for follow-up of DM. A1c today is 9.6%. It was 9.0% prior. Daughter states pt wont give up her sweets. Both patient and daughter argued back and forth about her diet.  Pt requests to have PT on her knee.       ROS:   Review of Systems   Constitutional:  Positive for activity change (at baseline, uses her walker). Negative for fatigue.   HENT: Negative.     Eyes: Negative.    Respiratory:  Negative for cough and shortness of breath.    Cardiovascular:  Negative for chest pain.   Gastrointestinal: Negative.    Genitourinary: Negative.    Musculoskeletal:  Positive for arthralgias (at baseline  L knee).   Skin: Negative.    Neurological: Negative.        PMHx:  Past Medical History:   Diagnosis Date    Acute hypoxic respiratory failure (HCC) 02/28/2024    Asthma     Has rescue inhalers    BiPAP (biphasic positive airway pressure) dependence     8cm to 21cm    Breast CA (HCC)     Left breast Nodules removed Took radiation    Breast cancer (HCC)     Chronic back pain 02/02/2016    Chronic kidney disease     Overactive bladder     COPD (chronic obstructive pulmonary disease) (HCC)     x few years Scarring on lungs 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    Diabetes mellitus (HCC)     On insulin x 10 years    Fibromyalgia     for 20 years    GERD (gastroesophageal reflux disease)     History of blood transfusion     ?when    History of

## 2024-08-14 NOTE — TELEPHONE ENCOUNTER
Left  regarding patients appointment on 9/26/23 and to bring her medications with her to the appointment. Called and spoke with Elaine Wall patients daughter and advised her to bring all of patients medications to her office appointment on 0/26/23. [Time Spent: ___ minutes] : I have spent [unfilled] minutes of time on the encounter.

## 2024-08-19 DIAGNOSIS — E11.8 TYPE 2 DIABETES MELLITUS WITH COMPLICATION (HCC): ICD-10-CM

## 2024-08-19 DIAGNOSIS — I10 ESSENTIAL HYPERTENSION: ICD-10-CM

## 2024-08-19 DIAGNOSIS — Z76.0 MEDICATION REFILL: ICD-10-CM

## 2024-08-19 RX ORDER — DULOXETIN HYDROCHLORIDE 30 MG/1
30 CAPSULE, DELAYED RELEASE ORAL DAILY
Qty: 90 CAPSULE | Refills: 1 | Status: SHIPPED | OUTPATIENT
Start: 2024-08-19

## 2024-08-19 RX ORDER — MONTELUKAST SODIUM 10 MG/1
TABLET ORAL
Qty: 90 TABLET | Refills: 2 | Status: SHIPPED | OUTPATIENT
Start: 2024-08-19

## 2024-08-19 RX ORDER — MONTELUKAST SODIUM 10 MG/1
TABLET ORAL
Qty: 90 TABLET | Refills: 2 | OUTPATIENT
Start: 2024-08-19

## 2024-08-19 RX ORDER — FLURBIPROFEN SODIUM 0.3 MG/ML
SOLUTION/ DROPS OPHTHALMIC
Qty: 100 EACH | Refills: 0 | Status: SHIPPED | OUTPATIENT
Start: 2024-08-19

## 2024-08-19 RX ORDER — LOSARTAN POTASSIUM 100 MG/1
100 TABLET ORAL DAILY
Qty: 90 TABLET | Refills: 1 | Status: SHIPPED | OUTPATIENT
Start: 2024-08-19

## 2024-08-19 NOTE — TELEPHONE ENCOUNTER
Christine Roa called to request a refill on her medication.      Last office visit : 8/13/2024   Next office visit : 10/7/2024     Requested Prescriptions     Pending Prescriptions Disp Refills    Insulin Pen Needle (B-D UF III MINI PEN NEEDLES) 31G X 5 MM MISC 100 each 0     Sig: USE 4 TIMES DAILY WITH INSULINS    montelukast (SINGULAIR) 10 MG tablet 90 tablet 2     Sig: TAKE ONE TABLET BY MOUTH AT NIGHT (VIAL)    DULoxetine (CYMBALTA) 30 MG extended release capsule 90 capsule 1     Sig: Take 1 capsule by mouth daily    losartan (COZAAR) 100 MG tablet 90 tablet 1     Sig: Take 1 tablet by mouth daily for blood pressure            Paty Thornton MA

## 2024-08-23 DIAGNOSIS — I10 ESSENTIAL HYPERTENSION: ICD-10-CM

## 2024-08-23 DIAGNOSIS — G57.93 NEUROPATHY INVOLVING BOTH LOWER EXTREMITIES: Chronic | ICD-10-CM

## 2024-08-23 DIAGNOSIS — Z76.0 MEDICATION REFILL: ICD-10-CM

## 2024-08-26 RX ORDER — GABAPENTIN 300 MG/1
CAPSULE ORAL
Qty: 90 CAPSULE | Refills: 0 | Status: SHIPPED | OUTPATIENT
Start: 2024-08-26 | End: 2024-08-28

## 2024-08-26 RX ORDER — OXYBUTYNIN CHLORIDE 10 MG/1
10 TABLET, EXTENDED RELEASE ORAL DAILY
Qty: 90 TABLET | Refills: 1 | Status: SHIPPED | OUTPATIENT
Start: 2024-08-26

## 2024-08-26 RX ORDER — METOPROLOL SUCCINATE 50 MG/1
TABLET, EXTENDED RELEASE ORAL
Qty: 90 TABLET | Refills: 1 | Status: SHIPPED | OUTPATIENT
Start: 2024-08-26

## 2024-08-28 ENCOUNTER — OFFICE VISIT (OUTPATIENT)
Age: 79
End: 2024-08-28

## 2024-08-28 VITALS
OXYGEN SATURATION: 94 % | RESPIRATION RATE: 20 BRPM | BODY MASS INDEX: 38.78 KG/M2 | WEIGHT: 212 LBS | SYSTOLIC BLOOD PRESSURE: 112 MMHG | HEART RATE: 85 BPM | TEMPERATURE: 97.3 F | DIASTOLIC BLOOD PRESSURE: 64 MMHG

## 2024-08-28 DIAGNOSIS — U07.1 COVID-19: Primary | ICD-10-CM

## 2024-08-28 DIAGNOSIS — J39.8 CONGESTION OF UPPER RESPIRATORY TRACT: ICD-10-CM

## 2024-08-28 DIAGNOSIS — R05.1 ACUTE COUGH: ICD-10-CM

## 2024-08-28 LAB
INFLUENZA A ANTIBODY: NEGATIVE
INFLUENZA B ANTIBODY: NEGATIVE
Lab: ABNORMAL
QC PASS/FAIL: ABNORMAL
SARS-COV-2, POC: DETECTED

## 2024-08-28 RX ORDER — FLUTICASONE PROPIONATE 50 MCG
1 SPRAY, SUSPENSION (ML) NASAL DAILY
Qty: 16 G | Refills: 0 | Status: SHIPPED | OUTPATIENT
Start: 2024-08-28

## 2024-08-28 RX ORDER — BENZONATATE 100 MG/1
100 CAPSULE ORAL 3 TIMES DAILY PRN
Qty: 15 CAPSULE | Refills: 0 | Status: SHIPPED | OUTPATIENT
Start: 2024-08-28 | End: 2024-09-02

## 2024-08-28 ASSESSMENT — ENCOUNTER SYMPTOMS
NAUSEA: 0
EYE ITCHING: 0
ABDOMINAL PAIN: 0
SINUS PRESSURE: 0
CONSTIPATION: 0
SHORTNESS OF BREATH: 0
EYE DISCHARGE: 0
SORE THROAT: 1
DIARRHEA: 0
WHEEZING: 0
COUGH: 1
BLOOD IN STOOL: 0
VOMITING: 0
RHINORRHEA: 0
COLOR CHANGE: 0

## 2024-08-28 NOTE — PATIENT INSTRUCTIONS
COVID testing is positive today  Patient has not been using her inhalers, discussed starting those in addition to her antihistamines. She no longer has flonase, rx sent.   Quarantine guidelines discussed  Medications such as zyrtec or claritin may help with symptoms. Also use OTC cough medicine like delsym if applicable.   Use tylenol/motrin as needed for body aches/fevers unless contraindicated  Multivitamin is recommended to help boost the immune system  Drink plenty of water to stay hydrated.  May use humidifier as needed.  Allow adequate rest.  Encourage deep breathing exercises  Recommended staying home until fever free for at least 24 hours without medication and symptoms are improving.  Go to the ER if you develop severe SOA, chest pain, difficulty breathing, decreased urine output or signs of dehydration, severe abdominal pain or vomiting, or you can not keep the fever below 102F with medications.     Any condition can change, despite proper treatment. Therefore, if symptoms still persist or worsen after treatment plan intitated today, either go to the nearest ER, or call PCP, or return to  for further evaluation.    Urgent Care evaluation today is not a substitute for PCP visit. Follow up care is your responsibility to discuss and review this UC visit.     Discussed use, benefits, and side effects of any prescribed medications. All patient questions were answered. Patient demonstrates understanding and agrees with care plan. Patient was given educational materials - see patient instructions below

## 2024-08-28 NOTE — PROGRESS NOTES
unless contraindicated  Multivitamin is recommended to help boost the immune system  Drink plenty of water to stay hydrated.  May use humidifier as needed.  Allow adequate rest.  Encourage deep breathing exercises  Recommended staying home until fever free for at least 24 hours without medication and symptoms are improving.  Go to the ER if you develop severe SOA, chest pain, difficulty breathing, decreased urine output or signs of dehydration, severe abdominal pain or vomiting, or you can not keep the fever below 102F with medications.     Any condition can change, despite proper treatment. Therefore, if symptoms still persist or worsen after treatment plan intitated today, either go to the nearest ER, or call PCP, or return to UC for further evaluation.    Urgent Care evaluation today is not a substitute for PCP visit. Follow up care is your responsibility to discuss and review this UC visit.     Discussed use, benefits, and side effects of any prescribed medications. All patient questions were answered. Patient demonstrates understanding and agrees with care plan. Patient was given educational materials - see patient instructions below         Electronically signed by ANTONIA Womack NP on 8/28/2024 at 8:54 AM

## 2024-09-16 RX ORDER — ANASTROZOLE 1 MG/1
1 TABLET ORAL DAILY
Qty: 30 TABLET | Refills: 2 | Status: SHIPPED | OUTPATIENT
Start: 2024-09-16

## 2024-09-20 DIAGNOSIS — G57.93 NEUROPATHY INVOLVING BOTH LOWER EXTREMITIES: Chronic | ICD-10-CM

## 2024-09-20 RX ORDER — EMPAGLIFLOZIN 25 MG/1
25 TABLET, FILM COATED ORAL DAILY
Qty: 90 TABLET | Refills: 11 | OUTPATIENT
Start: 2024-09-20

## 2024-09-20 RX ORDER — OMEPRAZOLE 40 MG/1
CAPSULE, DELAYED RELEASE ORAL
Qty: 90 CAPSULE | Refills: 11 | OUTPATIENT
Start: 2024-09-20

## 2024-09-23 RX ORDER — GABAPENTIN 300 MG/1
CAPSULE ORAL
Qty: 90 CAPSULE | Refills: 0 | Status: SHIPPED | OUTPATIENT
Start: 2024-09-27 | End: 2024-10-27

## 2024-09-27 DIAGNOSIS — E11.8 TYPE 2 DIABETES MELLITUS WITH COMPLICATION (HCC): ICD-10-CM

## 2024-09-27 RX ORDER — INSULIN DEGLUDEC 200 U/ML
20 INJECTION, SOLUTION SUBCUTANEOUS NIGHTLY
Qty: 18 ML | Refills: 1 | Status: SHIPPED | OUTPATIENT
Start: 2024-09-27

## 2024-10-07 ENCOUNTER — OFFICE VISIT (OUTPATIENT)
Dept: PRIMARY CARE CLINIC | Age: 79
End: 2024-10-07
Payer: MEDICARE

## 2024-10-07 VITALS
DIASTOLIC BLOOD PRESSURE: 64 MMHG | WEIGHT: 221.4 LBS | SYSTOLIC BLOOD PRESSURE: 104 MMHG | HEART RATE: 84 BPM | BODY MASS INDEX: 40.74 KG/M2 | HEIGHT: 62 IN | OXYGEN SATURATION: 96 % | TEMPERATURE: 97.4 F

## 2024-10-07 DIAGNOSIS — G30.0 MILD EARLY ONSET ALZHEIMER'S DEMENTIA, UNSPECIFIED WHETHER BEHAVIORAL, PSYCHOTIC, OR MOOD DISTURBANCE OR ANXIETY (HCC): ICD-10-CM

## 2024-10-07 DIAGNOSIS — G47.33 OSA (OBSTRUCTIVE SLEEP APNEA): ICD-10-CM

## 2024-10-07 DIAGNOSIS — Z00.00 MEDICARE ANNUAL WELLNESS VISIT, SUBSEQUENT: Primary | ICD-10-CM

## 2024-10-07 DIAGNOSIS — E11.8 TYPE 2 DIABETES MELLITUS WITH COMPLICATION (HCC): ICD-10-CM

## 2024-10-07 DIAGNOSIS — F02.A0 MILD EARLY ONSET ALZHEIMER'S DEMENTIA, UNSPECIFIED WHETHER BEHAVIORAL, PSYCHOTIC, OR MOOD DISTURBANCE OR ANXIETY (HCC): ICD-10-CM

## 2024-10-07 DIAGNOSIS — I10 ESSENTIAL HYPERTENSION: ICD-10-CM

## 2024-10-07 PROCEDURE — 1123F ACP DISCUSS/DSCN MKR DOCD: CPT | Performed by: FAMILY MEDICINE

## 2024-10-07 PROCEDURE — 3046F HEMOGLOBIN A1C LEVEL >9.0%: CPT | Performed by: FAMILY MEDICINE

## 2024-10-07 PROCEDURE — 3078F DIAST BP <80 MM HG: CPT | Performed by: FAMILY MEDICINE

## 2024-10-07 PROCEDURE — G0439 PPPS, SUBSEQ VISIT: HCPCS | Performed by: FAMILY MEDICINE

## 2024-10-07 PROCEDURE — 3074F SYST BP LT 130 MM HG: CPT | Performed by: FAMILY MEDICINE

## 2024-10-07 SDOH — ECONOMIC STABILITY: FOOD INSECURITY: WITHIN THE PAST 12 MONTHS, YOU WORRIED THAT YOUR FOOD WOULD RUN OUT BEFORE YOU GOT MONEY TO BUY MORE.: NEVER TRUE

## 2024-10-07 SDOH — ECONOMIC STABILITY: FOOD INSECURITY: WITHIN THE PAST 12 MONTHS, THE FOOD YOU BOUGHT JUST DIDN'T LAST AND YOU DIDN'T HAVE MONEY TO GET MORE.: NEVER TRUE

## 2024-10-07 SDOH — ECONOMIC STABILITY: INCOME INSECURITY: HOW HARD IS IT FOR YOU TO PAY FOR THE VERY BASICS LIKE FOOD, HOUSING, MEDICAL CARE, AND HEATING?: NOT HARD AT ALL

## 2024-10-07 ASSESSMENT — PATIENT HEALTH QUESTIONNAIRE - PHQ9
SUM OF ALL RESPONSES TO PHQ QUESTIONS 1-9: 0
SUM OF ALL RESPONSES TO PHQ9 QUESTIONS 1 & 2: 0
SUM OF ALL RESPONSES TO PHQ QUESTIONS 1-9: 0
1. LITTLE INTEREST OR PLEASURE IN DOING THINGS: NOT AT ALL
SUM OF ALL RESPONSES TO PHQ QUESTIONS 1-9: 0
2. FEELING DOWN, DEPRESSED OR HOPELESS: NOT AT ALL
SUM OF ALL RESPONSES TO PHQ QUESTIONS 1-9: 0

## 2024-10-07 NOTE — PROGRESS NOTES
[facility]  [address]  [address]  [place] [address]  Dept: [phone]  Dept Fax: [phone]  Loc: [phone]      Subjective:     Chief Complaint   Patient presents with    Medicare AWV       HPI:  Christine Roa is a 78 y.o. female presents today for her annual medicare wellness visit    ROS:   Review of Systems    PMHx:  Past Medical History:   Diagnosis Date    Acute hypoxic respiratory failure 02/28/2024    Asthma     Has rescue inhalers    BiPAP (biphasic positive airway pressure) dependence     8cm to 21cm    Breast CA (HCC)     Left breast Nodules removed Took radiation    Breast cancer (HCC)     Chronic back pain 02/02/2016    Chronic kidney disease     Overactive bladder     COPD (chronic obstructive pulmonary disease) ([facility])     x few years Scarring on lungs 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    Diabetes mellitus ([facility])     On insulin x 10 years    Fibromyalgia     for 20 years    GERD (gastroesophageal reflux disease)     History of blood transfusion     ?when    History of therapeutic radiation     Hyperlipidemia     High cholesterol    Hypertension     On medications for 2 years    Irregular heart beat     Liver disease     Has fatty liver    Morbid obesity     Multiple food allergies     Neuropathy     Obstructive sleep apnea     AHI: 68.1 per PSG, 5/2018    Osteoarthritis 02/02/2016    Pneumonia     Postmenopausal osteoporosis     Psychiatric problem     PVD (peripheral vascular disease) ([facility])     Restless leg syndrome     S/P lumpectomy, left breast 08/19/2015 8/4/2015     Patient Active Problem List   Diagnosis    Postmenopausal osteoporosis    Type 2 diabetes mellitus with complication ([facility])    Mood disorder ([facility])    Dementia ([facility])    Joint pain    Neuropathy involving both lower extremities    Dyspepsia    Lumbar facet arthropathy    BMI 40.0-44.9, adult    Essential hypertension    Hyperlipidemia    DARSHAN (obstructive sleep apnea)    BiPAP

## 2024-10-07 NOTE — PATIENT INSTRUCTIONS
We are committed to providing you with the best care possible.   In order to help us achieve these goals please remember to bring all medications, herbal products, and over the counter supplements with you to each visit.     If your provider has ordered testing for you, please be sure to follow up with our office if you have not received results within 7 days after the testing took place.     *If you receive a survey after visiting one of our offices, please take time to share your experience concerning your physician office visit. These surveys are confidential and no health information about you is shared.  We are eager to improve for you and we are counting on your feedback to help make that happen.         Preventing Falls: Care Instructions  Injuries and health problems such as trouble walking or poor eyesight can increase your risk of falling. So can some medicines. But there are things you can do to help prevent falls. You can exercise to get stronger. You can also arrange your home to make it safer.    Talk to your doctor about the medicines you take. Ask if any of them increase the risk of falls and whether they can be changed or stopped.   Try to exercise regularly. It can help improve your strength and balance. This can help lower your risk of falling.         Practice fall safety and prevention.   Wear low-heeled shoes that fit well and give your feet good support. Talk to your doctor if you have foot problems that make this hard.  Carry a cellphone or wear a medical alert device that you can use to call for help.  Use stepladders instead of chairs to reach high objects. Don't climb if you're at risk for falls. Ask for help, if needed.  Wear the correct eyeglasses, if you need them.        Make your home safer.   Remove rugs, cords, clutter, and furniture from walkways.  Keep your house well lit. Use night-lights in hallways and bathrooms.  Install and use sturdy handrails on stairways.  Wear nonskid

## 2024-10-08 ENCOUNTER — TELEPHONE (OUTPATIENT)
Dept: VASCULAR SURGERY | Facility: CLINIC | Age: 79
End: 2024-10-08
Payer: MEDICARE

## 2024-10-09 ENCOUNTER — HOSPITAL ENCOUNTER (OUTPATIENT)
Dept: ULTRASOUND IMAGING | Facility: HOSPITAL | Age: 79
Discharge: HOME | End: 2024-10-09
Admitting: NURSE PRACTITIONER
Payer: MEDICARE

## 2024-10-09 ENCOUNTER — OFFICE VISIT (OUTPATIENT)
Dept: VASCULAR SURGERY | Facility: CLINIC | Age: 79
End: 2024-10-09
Payer: MEDICARE

## 2024-10-09 VITALS
HEIGHT: 62 IN | DIASTOLIC BLOOD PRESSURE: 80 MMHG | OXYGEN SATURATION: 99 % | HEART RATE: 81 BPM | BODY MASS INDEX: 41.04 KG/M2 | WEIGHT: 223 LBS | SYSTOLIC BLOOD PRESSURE: 130 MMHG

## 2024-10-09 DIAGNOSIS — I65.23 BILATERAL CAROTID ARTERY STENOSIS: ICD-10-CM

## 2024-10-09 DIAGNOSIS — E78.5 HYPERLIPIDEMIA, UNSPECIFIED HYPERLIPIDEMIA TYPE: ICD-10-CM

## 2024-10-09 DIAGNOSIS — I10 ESSENTIAL HYPERTENSION: ICD-10-CM

## 2024-10-09 DIAGNOSIS — I65.23 BILATERAL CAROTID ARTERY STENOSIS: Primary | ICD-10-CM

## 2024-10-09 DIAGNOSIS — I87.2 VENOUS INSUFFICIENCY OF BOTH LOWER EXTREMITIES: ICD-10-CM

## 2024-10-09 DIAGNOSIS — Z79.4 TYPE 2 DIABETES MELLITUS WITH HYPERGLYCEMIA, WITH LONG-TERM CURRENT USE OF INSULIN: ICD-10-CM

## 2024-10-09 DIAGNOSIS — E66.01 MORBID OBESITY WITH BODY MASS INDEX (BMI) OF 40.0 OR HIGHER: ICD-10-CM

## 2024-10-09 DIAGNOSIS — E11.65 TYPE 2 DIABETES MELLITUS WITH HYPERGLYCEMIA, WITH LONG-TERM CURRENT USE OF INSULIN: ICD-10-CM

## 2024-10-09 PROBLEM — G25.81 RESTLESS LEG SYNDROME: Status: ACTIVE | Noted: 2018-08-15

## 2024-10-09 PROBLEM — M54.9 BACK PAIN: Status: ACTIVE | Noted: 2018-05-29

## 2024-10-09 PROBLEM — J44.9 COPD (CHRONIC OBSTRUCTIVE PULMONARY DISEASE): Status: ACTIVE | Noted: 2018-07-27

## 2024-10-09 PROBLEM — Z86.73 HISTORY OF STROKE: Status: ACTIVE | Noted: 2022-11-01

## 2024-10-09 PROBLEM — M25.50 JOINT PAIN: Status: ACTIVE | Noted: 2018-05-29

## 2024-10-09 PROBLEM — J30.9 ALLERGIC RHINITIS: Status: ACTIVE | Noted: 2023-06-22

## 2024-10-09 PROBLEM — G62.9 NEUROPATHY: Status: ACTIVE | Noted: 2022-05-09

## 2024-10-09 PROBLEM — M47.816 LUMBAR FACET ARTHROPATHY: Status: ACTIVE | Noted: 2018-05-29

## 2024-10-09 PROBLEM — J45.909 UNCOMPLICATED ASTHMA: Status: ACTIVE | Noted: 2022-11-01

## 2024-10-09 PROBLEM — E55.9 VITAMIN D DEFICIENCY: Status: ACTIVE | Noted: 2022-11-01

## 2024-10-09 PROBLEM — G57.93 NEUROPATHY INVOLVING BOTH LOWER EXTREMITIES: Status: ACTIVE | Noted: 2018-05-29

## 2024-10-09 PROCEDURE — 3079F DIAST BP 80-89 MM HG: CPT | Performed by: NURSE PRACTITIONER

## 2024-10-09 PROCEDURE — 93880 EXTRACRANIAL BILAT STUDY: CPT

## 2024-10-09 PROCEDURE — 3075F SYST BP GE 130 - 139MM HG: CPT | Performed by: NURSE PRACTITIONER

## 2024-10-09 PROCEDURE — 99214 OFFICE O/P EST MOD 30 MIN: CPT | Performed by: NURSE PRACTITIONER

## 2024-10-09 PROCEDURE — 1159F MED LIST DOCD IN RCRD: CPT | Performed by: NURSE PRACTITIONER

## 2024-10-09 PROCEDURE — 1160F RVW MEDS BY RX/DR IN RCRD: CPT | Performed by: NURSE PRACTITIONER

## 2024-10-14 RX ORDER — EMPAGLIFLOZIN 25 MG/1
25 TABLET, FILM COATED ORAL DAILY
Qty: 90 TABLET | Refills: 0 | Status: SHIPPED | OUTPATIENT
Start: 2024-10-14

## 2024-10-14 RX ORDER — OMEPRAZOLE 40 MG/1
CAPSULE, DELAYED RELEASE ORAL
Qty: 90 CAPSULE | Refills: 0 | Status: SHIPPED | OUTPATIENT
Start: 2024-10-14

## 2024-10-14 NOTE — TELEPHONE ENCOUNTER
Christine Roa called to request a refill on her medication.      Last office visit : 10/7/2024   Next office visit : Visit date not found     Requested Prescriptions     Pending Prescriptions Disp Refills    JARDIANCE 25 MG tablet [Pharmacy Med Name: Jardiance 25 mg tablet] 90 tablet 0     Sig: TAKE ONE TABLET BY MOUTH DAILY    omeprazole (PRILOSEC) 40 MG delayed release capsule [Pharmacy Med Name: omeprazole 40 mg capsule,delayed release] 90 capsule 0     Sig: TAKE ONE CAPSULE BY MOUTH DAILY AS NEEDED            Alix Webster LPN

## 2024-10-21 DIAGNOSIS — E11.8 TYPE 2 DIABETES MELLITUS WITH COMPLICATION (HCC): ICD-10-CM

## 2024-10-21 DIAGNOSIS — G57.93 NEUROPATHY INVOLVING BOTH LOWER EXTREMITIES: Chronic | ICD-10-CM

## 2024-10-23 RX ORDER — GABAPENTIN 300 MG/1
CAPSULE ORAL
Qty: 90 CAPSULE | Refills: 11 | OUTPATIENT
Start: 2024-10-23

## 2024-10-23 NOTE — TELEPHONE ENCOUNTER
Patients pharmacy requested refill on Gabapentin- patient stated in last office visit she no longer takes medication. Metformin and that medication was requested too soon. Refill request denied.

## 2024-10-24 DIAGNOSIS — E11.8 TYPE 2 DIABETES MELLITUS WITH COMPLICATION (HCC): ICD-10-CM

## 2024-10-24 NOTE — TELEPHONE ENCOUNTER
Christine Roa called to request a refill on her medication.      Last office visit : 10/7/2024   Next office visit : Visit date not found     Requested Prescriptions     Pending Prescriptions Disp Refills    metFORMIN (GLUCOPHAGE) 1000 MG tablet 180 tablet 1     Sig: TAKE ONE TABLET BY MOUTH TWICE DAILY WITH MEALS            Alix Webster LPN

## 2024-10-25 ENCOUNTER — OFFICE VISIT (OUTPATIENT)
Age: 79
End: 2024-10-25
Payer: MEDICARE

## 2024-10-25 VITALS
BODY MASS INDEX: 41.04 KG/M2 | DIASTOLIC BLOOD PRESSURE: 76 MMHG | SYSTOLIC BLOOD PRESSURE: 128 MMHG | WEIGHT: 223 LBS | HEIGHT: 62 IN

## 2024-10-25 DIAGNOSIS — I87.2 VENOUS INSUFFICIENCY OF BOTH LOWER EXTREMITIES: ICD-10-CM

## 2024-10-25 DIAGNOSIS — I89.0 LYMPHEDEMA: ICD-10-CM

## 2024-10-25 DIAGNOSIS — E11.40 PAINFUL DIABETIC NEUROPATHY: ICD-10-CM

## 2024-10-25 DIAGNOSIS — L60.2 THICKENED NAILS: Primary | ICD-10-CM

## 2024-10-25 DIAGNOSIS — M79.671 FOOT PAIN, BILATERAL: ICD-10-CM

## 2024-10-25 DIAGNOSIS — L84 FOOT CALLUS: ICD-10-CM

## 2024-10-25 DIAGNOSIS — E11.42 TYPE 2 DIABETES MELLITUS WITH DIABETIC POLYNEUROPATHY, WITH LONG-TERM CURRENT USE OF INSULIN: ICD-10-CM

## 2024-10-25 DIAGNOSIS — Z79.4 TYPE 2 DIABETES MELLITUS WITH DIABETIC POLYNEUROPATHY, WITH LONG-TERM CURRENT USE OF INSULIN: ICD-10-CM

## 2024-10-25 DIAGNOSIS — E66.01 OBESITY, MORBID, BMI 40.0-49.9: ICD-10-CM

## 2024-10-25 DIAGNOSIS — R26.9 GAIT ABNORMALITY: ICD-10-CM

## 2024-10-25 DIAGNOSIS — L85.3 XEROSIS CUTIS: ICD-10-CM

## 2024-10-25 DIAGNOSIS — M79.672 FOOT PAIN, BILATERAL: ICD-10-CM

## 2024-10-25 RX ORDER — AMMONIUM LACTATE 12 G/100G
LOTION TOPICAL AS NEEDED
Qty: 396 G | Refills: 0 | Status: SHIPPED | OUTPATIENT
Start: 2024-10-25

## 2024-10-30 ENCOUNTER — HOSPITAL ENCOUNTER (OUTPATIENT)
Dept: WOMENS IMAGING | Age: 79
Discharge: HOME | End: 2024-10-30
Payer: MEDICARE

## 2024-10-30 DIAGNOSIS — Z12.31 VISIT FOR SCREENING MAMMOGRAM: ICD-10-CM

## 2024-10-30 PROCEDURE — 77063 BREAST TOMOSYNTHESIS BI: CPT

## 2024-11-04 NOTE — TELEPHONE ENCOUNTER
Christine Roa called to request a refill on her medication.      Last office visit : 10/7/2024   Next office visit : Visit date not found     Requested Prescriptions     Pending Prescriptions Disp Refills    Continuous Glucose Sensor (FREESTYLE SHARMAINE 2 SENSOR) MISC [Pharmacy Med Name: FREESTYLE SHARMAINE 2/SENSOR/FLASH GLUCOSE MONITORING SYSTEM  MISCELLANEOUS] 2 each 1     Sig: USE 1 SENSOR AS DIRECTED EVERY 14 DAYS            Bette Sorto MA

## 2024-11-05 NOTE — PROGRESS NOTES
Christine Roa comes today for her follow-up breast exam.  She has had no new breast complaints.  She reports no new palpable masses.  There is no skin or nipple changes.  There is no nipple discharge.  She has no appreciable evidence of supraclavicular or axillary adenopathy.    Of note, she is  left partial mastectomy for 1.2 cm grade 3 invasive ductal carcinoma on 8-4-15.  Her margins and SNB were negative.  ER+, IA+, Fgc2Ean -.  Mammoprint high risk.  She completed radiation.    Patient Active Problem List    Diagnosis Date Noted    History of stroke 11/01/2022    Uncomplicated asthma 11/01/2022    Vitamin D deficiency 11/01/2022    Allergic rhinitis 06/22/2023    Venous insufficiency of both lower extremities 11/10/2021    History of breast cancer 03/18/2021    BiPAP (biphasic positive airway pressure) dependence     Restless leg syndrome     BMI 40.0-44.9, adult 05/02/2018    Lumbar facet arthropathy 08/25/2017    Essential hypertension 05/18/2017    Hyperlipidemia 05/18/2017    DARSHAN (obstructive sleep apnea) 05/18/2017    Dyspepsia     Joint pain 02/02/2016    Neuropathy involving both lower extremities 02/02/2016    Mood disorder (HCC) 03/11/2015    Dementia (HCC) 03/11/2015    Type 2 diabetes mellitus with complication (HCC)     Postmenopausal osteoporosis        Current Outpatient Medications   Medication Sig Dispense Refill    Continuous Glucose Sensor (FREESTYLE SHARMAINE 2 SENSOR) [facility] USE 1 SENSOR AS DIRECTED EVERY 14 DAYS 2 each 1    metFORMIN (GLUCOPHAGE) 1000 MG tablet TAKE ONE TABLET BY MOUTH TWICE DAILY WITH MEALS 180 tablet 1    JARDIANCE 25 MG tablet TAKE ONE TABLET BY MOUTH DAILY 90 tablet 0    omeprazole (PRILOSEC) 40 MG delayed release capsule TAKE ONE CAPSULE BY MOUTH DAILY AS NEEDED 90 capsule 0    Insulin Degludec (TRESIBA FLEXTOUCH) 200 UNIT/ML SOPN INJECT 20 UNITS INTO THE SKIN AT BEDTIME (ENSURE FASTING BLOOD SUGARS ARE ) (BULK) 18 mL 1    anastrozole (ARIMIDEX) 1 MG tablet Take 1

## 2024-11-06 ENCOUNTER — OFFICE VISIT (OUTPATIENT)
Dept: OBGYN CLINIC | Age: 79
End: 2024-11-06

## 2024-11-06 VITALS
HEIGHT: 62 IN | BODY MASS INDEX: 38.83 KG/M2 | HEART RATE: 87 BPM | SYSTOLIC BLOOD PRESSURE: 123 MMHG | WEIGHT: 211 LBS | DIASTOLIC BLOOD PRESSURE: 75 MMHG

## 2024-11-06 DIAGNOSIS — R35.0 URINARY FREQUENCY: ICD-10-CM

## 2024-11-06 DIAGNOSIS — N90.89 VULVAR IRRITATION: Primary | ICD-10-CM

## 2024-11-06 DIAGNOSIS — N39.42 URINARY INCONTINENCE WITHOUT SENSORY AWARENESS: ICD-10-CM

## 2024-11-06 DIAGNOSIS — N90.5 VULVAR ATROPHY: ICD-10-CM

## 2024-11-06 LAB
BACTERIA URNS QL MICRO: NEGATIVE /HPF
BILIRUB UR QL STRIP: NEGATIVE
CLARITY UR: CLEAR
COLOR UR: YELLOW
CRYSTALS URNS MICRO: NORMAL /HPF
EPI CELLS #/AREA URNS AUTO: 1 /HPF (ref 0–5)
GLUCOSE UR STRIP.AUTO-MCNC: =>1000 MG/DL
HGB UR STRIP.AUTO-MCNC: ABNORMAL MG/L
HYALINE CASTS #/AREA URNS AUTO: 1 /HPF (ref 0–8)
KETONES UR STRIP.AUTO-MCNC: NEGATIVE MG/DL
LEUKOCYTE ESTERASE UR QL STRIP.AUTO: NEGATIVE
NITRITE UR QL STRIP.AUTO: NEGATIVE
PH UR STRIP.AUTO: 6 [PH] (ref 5–8)
PROT UR STRIP.AUTO-MCNC: NEGATIVE MG/DL
RBC #/AREA URNS AUTO: 4 /HPF (ref 0–4)
SP GR UR STRIP.AUTO: 1.02 (ref 1–1.03)
UROBILINOGEN UR STRIP.AUTO-MCNC: 0.2 E.U./DL
WBC #/AREA URNS AUTO: 2 /HPF (ref 0–5)

## 2024-11-06 ASSESSMENT — ENCOUNTER SYMPTOMS
EYES NEGATIVE: 1
ALLERGIC/IMMUNOLOGIC NEGATIVE: 1
RESPIRATORY NEGATIVE: 1
GASTROINTESTINAL NEGATIVE: 1

## 2024-11-06 NOTE — PROGRESS NOTES
Christine Roa is a 78 y.o. female who presents today for her medical conditions/ complaints as noted below. Christine Roa is c/o of Vaginal Itching and vaginal burning        HPI  Pt presents today stating she thinks she has a UTI. Pt states it itches sometimes not all the time but sometimes it burns and she's 78 so she thought you could tell by examining her. She uses what sounds like otc vagisil.   Pt states she has slight itching, irritation, burning, and it takes her a while and she drinks slurpees and drinks a lot of water.  She's a diabetic and her knee has a problem and needs a new bandage and she just has a lot of health issues.   Pt states and I had breast cancer.   Pt states she is looking for a diabetic doctor   She just had a mammogram done.  No LMP recorded (lmp unknown). Patient has had a hysterectomy.      Past Medical History:   Diagnosis Date    Acute hypoxic respiratory failure 2024    Asthma     Has rescue inhalers    BiPAP (biphasic positive airway pressure) dependence     8cm to 21cm    Breast CA (HCC)     Left breast Nodules removed Took radiation    Breast cancer (HCC)     Chronic back pain 2016    Chronic kidney disease     Overactive bladder     COPD (chronic obstructive pulmonary disease) ([facility])     x few years Scarring on lungs 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    Diabetes mellitus (HCC)     On insulin x 10 years    Fibromyalgia     for 20 years    GERD (gastroesophageal reflux disease)     History of blood transfusion     ?when    History of therapeutic radiation     Hyperlipidemia     High cholesterol    Hypertension     On medications for 2 years    Irregular heart beat     Liver disease     Has fatty liver    Morbid obesity     Multiple food allergies     Neuropathy     Obstructive sleep apnea     AHI: 68.1 per PSG, 2018    Osteoarthritis 2016    Pneumonia     Postmenopausal osteoporosis     Psychiatric problem     PVD (peripheral vascular disease) ([facility])

## 2024-11-07 ENCOUNTER — OFFICE VISIT (OUTPATIENT)
Dept: SURGERY | Age: 79
End: 2024-11-07
Payer: MEDICARE

## 2024-11-07 VITALS
BODY MASS INDEX: 38.83 KG/M2 | TEMPERATURE: 98.5 F | HEIGHT: 62 IN | HEART RATE: 85 BPM | WEIGHT: 211 LBS | OXYGEN SATURATION: 97 %

## 2024-11-07 DIAGNOSIS — Z85.3 PERSONAL HISTORY OF MALIGNANT NEOPLASM OF BREAST: ICD-10-CM

## 2024-11-07 DIAGNOSIS — Z12.31 VISIT FOR SCREENING MAMMOGRAM: Primary | ICD-10-CM

## 2024-11-07 PROCEDURE — 1159F MED LIST DOCD IN RCRD: CPT | Performed by: PHYSICIAN ASSISTANT

## 2024-11-07 PROCEDURE — 1123F ACP DISCUSS/DSCN MKR DOCD: CPT | Performed by: PHYSICIAN ASSISTANT

## 2024-11-07 PROCEDURE — 99213 OFFICE O/P EST LOW 20 MIN: CPT | Performed by: PHYSICIAN ASSISTANT

## 2024-11-08 LAB
BACTERIA UR CULT: ABNORMAL
ORGANISM: ABNORMAL
ORGANISM: ABNORMAL

## 2024-11-11 ENCOUNTER — TELEPHONE (OUTPATIENT)
Dept: OBGYN CLINIC | Age: 79
End: 2024-11-11

## 2024-11-11 RX ORDER — NITROFURANTOIN 25; 75 MG/1; MG/1
100 CAPSULE ORAL 2 TIMES DAILY
Qty: 14 CAPSULE | Refills: 0 | Status: SHIPPED | OUTPATIENT
Start: 2024-11-11 | End: 2024-11-18

## 2024-11-11 RX ORDER — FLUCONAZOLE 150 MG/1
TABLET ORAL
Qty: 2 TABLET | Refills: 0 | Status: SHIPPED | OUTPATIENT
Start: 2024-11-11

## 2024-11-11 RX ORDER — METRONIDAZOLE 500 MG/1
500 TABLET ORAL 2 TIMES DAILY
Qty: 14 TABLET | Refills: 0 | Status: SHIPPED | OUTPATIENT
Start: 2024-11-11 | End: 2024-11-18

## 2024-11-11 NOTE — TELEPHONE ENCOUNTER
Informed patient that per Saadia's mychart  rx cream not covered. Do over the counter hydrocortisone cream

## 2024-11-11 NOTE — TELEPHONE ENCOUNTER
Pt calling to speak with someone regarding medication. Said medication was supposed to be sent to pharmacy. Pharmacy don't have it. Send to [email or web address]. Please advise.

## 2024-11-21 ENCOUNTER — TELEPHONE (OUTPATIENT)
Dept: OBGYN CLINIC | Age: 79
End: 2024-11-21

## 2024-11-21 NOTE — TELEPHONE ENCOUNTER
Called and spoke with pt and daughter Miesha at this time. Pts daughter states she will go get her some hydrocortisone cream so that she can start using it if she doesn't already have some.

## 2024-11-22 DIAGNOSIS — E11.8 TYPE 2 DIABETES MELLITUS WITH COMPLICATION (HCC): ICD-10-CM

## 2024-11-22 RX ORDER — INSULIN ASPART 100 [IU]/ML
INJECTION, SOLUTION INTRAVENOUS; SUBCUTANEOUS
Qty: 0 ML | Refills: 0 | OUTPATIENT
Start: 2024-11-22

## 2024-11-22 NOTE — TELEPHONE ENCOUNTER
Christine Roa called to request a refill on her medication.      Last office visit : 10/7/2024   Next office visit : Visit date not found     Requested Prescriptions     Pending Prescriptions Disp Refills    NOVOLOG FLEXPEN 100 UNIT/ML injection pen [Pharmacy Med Name: NOVOLOG FLEXPEN 100UNIT/ML SOLUTION PEN-INJECTOR] 0 mL 0     Sig: INJECT 60 UNITS WITH BREAKFAST , 80 UNITS WITH DINNER     Called Ricardo Rivera,last fill date was June 2023           Evelina Trinidad LPN

## 2024-12-09 ENCOUNTER — TELEPHONE (OUTPATIENT)
Dept: OBGYN CLINIC | Age: 79
End: 2024-12-09

## 2024-12-09 DIAGNOSIS — R30.0 BURNING WITH URINATION: Primary | ICD-10-CM

## 2024-12-09 NOTE — TELEPHONE ENCOUNTER
Patient called in stating she felt as if her \"pee was hot\" and requested antibiotic. Left message for patient that urine sample would be needed

## 2025-05-07 ENCOUNTER — OFFICE VISIT (OUTPATIENT)
Dept: FAMILY MEDICINE CLINIC | Facility: CLINIC | Age: 80
End: 2025-05-07
Payer: MEDICARE

## 2025-05-07 ENCOUNTER — LAB (OUTPATIENT)
Dept: LAB | Facility: HOSPITAL | Age: 80
End: 2025-05-07
Payer: MEDICARE

## 2025-05-07 VITALS
HEIGHT: 62 IN | SYSTOLIC BLOOD PRESSURE: 100 MMHG | HEART RATE: 91 BPM | WEIGHT: 223 LBS | TEMPERATURE: 97.9 F | RESPIRATION RATE: 12 BRPM | OXYGEN SATURATION: 95 % | BODY MASS INDEX: 41.04 KG/M2 | DIASTOLIC BLOOD PRESSURE: 67 MMHG

## 2025-05-07 DIAGNOSIS — C50.412 MALIGNANT NEOPLASM OF UPPER-OUTER QUADRANT OF LEFT BREAST IN FEMALE, ESTROGEN RECEPTOR POSITIVE: ICD-10-CM

## 2025-05-07 DIAGNOSIS — Z79.4 TYPE 2 DIABETES MELLITUS WITH HYPERGLYCEMIA, WITH LONG-TERM CURRENT USE OF INSULIN: ICD-10-CM

## 2025-05-07 DIAGNOSIS — E11.65 TYPE 2 DIABETES MELLITUS WITH HYPERGLYCEMIA, WITH LONG-TERM CURRENT USE OF INSULIN: ICD-10-CM

## 2025-05-07 DIAGNOSIS — I10 ESSENTIAL (PRIMARY) HYPERTENSION: ICD-10-CM

## 2025-05-07 DIAGNOSIS — M81.0 POSTMENOPAUSAL OSTEOPOROSIS: ICD-10-CM

## 2025-05-07 DIAGNOSIS — E78.5 HYPERLIPIDEMIA, UNSPECIFIED HYPERLIPIDEMIA TYPE: ICD-10-CM

## 2025-05-07 DIAGNOSIS — Z17.0 MALIGNANT NEOPLASM OF UPPER-OUTER QUADRANT OF LEFT BREAST IN FEMALE, ESTROGEN RECEPTOR POSITIVE: ICD-10-CM

## 2025-05-07 DIAGNOSIS — Z00.00 ENCOUNTER FOR MEDICAL EXAMINATION TO ESTABLISH CARE: Primary | ICD-10-CM

## 2025-05-07 DIAGNOSIS — M47.816 LUMBAR FACET ARTHROPATHY: ICD-10-CM

## 2025-05-07 DIAGNOSIS — F03.A0 MILD DEMENTIA WITHOUT BEHAVIORAL DISTURBANCE, PSYCHOTIC DISTURBANCE, MOOD DISTURBANCE, OR ANXIETY, UNSPECIFIED DEMENTIA TYPE: ICD-10-CM

## 2025-05-07 DIAGNOSIS — J44.9 CHRONIC OBSTRUCTIVE PULMONARY DISEASE, UNSPECIFIED COPD TYPE: ICD-10-CM

## 2025-05-07 DIAGNOSIS — E66.01 MORBID OBESITY WITH BODY MASS INDEX (BMI) OF 40.0 OR HIGHER: ICD-10-CM

## 2025-05-07 PROBLEM — M25.50 JOINT PAIN: Status: RESOLVED | Noted: 2018-05-29 | Resolved: 2025-05-07

## 2025-05-07 LAB
ALBUMIN SERPL-MCNC: 4.5 G/DL (ref 3.5–5)
ALBUMIN/GLOB SERPL: 1.7 G/DL (ref 1.1–2.5)
ALP SERPL-CCNC: 150 U/L (ref 24–120)
ALT SERPL W P-5'-P-CCNC: 13 U/L (ref 0–35)
ANION GAP SERPL CALCULATED.3IONS-SCNC: 11 MMOL/L (ref 4–13)
AST SERPL-CCNC: 17 U/L (ref 7–45)
AUTO MIXED CELLS #: 0.8 10*3/MM3 (ref 0.1–2.6)
AUTO MIXED CELLS %: 9.6 % (ref 0.1–24)
BILIRUB SERPL-MCNC: 0.3 MG/DL (ref 0.1–1)
BUN SERPL-MCNC: 24 MG/DL (ref 5–21)
BUN/CREAT SERPL: 30
CALCIUM SPEC-SCNC: 9.3 MG/DL (ref 8.6–10.5)
CHLORIDE SERPL-SCNC: 95 MMOL/L (ref 98–110)
CHOLEST SERPL-MCNC: 198 MG/DL (ref 130–200)
CO2 SERPL-SCNC: 27 MMOL/L (ref 24–31)
CREAT SERPL-MCNC: 0.8 MG/DL (ref 0.5–1.4)
EGFRCR SERPLBLD CKD-EPI 2021: 76 ML/MIN/1.73
ERYTHROCYTE [DISTWIDTH] IN BLOOD BY AUTOMATED COUNT: 13.6 % (ref 12.3–15.4)
GLOBULIN UR ELPH-MCNC: 2.6 GM/DL
GLUCOSE SERPL-MCNC: 495 MG/DL (ref 65–99)
HBA1C MFR BLD: 12.2 % (ref 4.8–5.9)
HCT VFR BLD AUTO: 44.3 % (ref 34–46.6)
HDLC SERPL-MCNC: 49 MG/DL
HGB BLD-MCNC: 14.4 G/DL (ref 12–15.9)
LDLC SERPL CALC-MCNC: 87 MG/DL (ref 0–99)
LDLC/HDLC SERPL: 1.49 {RATIO}
LYMPHOCYTES # BLD AUTO: 2.8 10*3/MM3 (ref 0.7–3.1)
LYMPHOCYTES NFR BLD AUTO: 35.1 % (ref 19.6–45.3)
MCH RBC QN AUTO: 27 PG (ref 26.6–33)
MCHC RBC AUTO-ENTMCNC: 32.5 G/DL (ref 31.5–35.7)
MCV RBC AUTO: 83.1 FL (ref 79–97)
NEUTROPHILS NFR BLD AUTO: 4.3 10*3/MM3 (ref 1.7–7)
NEUTROPHILS NFR BLD AUTO: 55.3 % (ref 42.7–76)
PLATELET # BLD AUTO: 294 10*3/MM3 (ref 140–450)
PMV BLD AUTO: 9.2 FL (ref 6–12)
POTASSIUM SERPL-SCNC: 4.3 MMOL/L (ref 3.5–5.3)
PROT SERPL-MCNC: 7.1 G/DL (ref 6.3–8.7)
RBC # BLD AUTO: 5.33 10*6/MM3 (ref 3.77–5.28)
SODIUM SERPL-SCNC: 133 MMOL/L (ref 135–145)
TRIGL SERPL-MCNC: 381 MG/DL (ref 0–149)
TSH SERPL DL<=0.05 MIU/L-ACNC: 1.48 UIU/ML (ref 0.27–4.2)
VLDLC SERPL-MCNC: 62 MG/DL (ref 5–40)
WBC NRBC COR # BLD AUTO: 7.9 10*3/MM3 (ref 3.4–10.8)

## 2025-05-07 PROCEDURE — 82570 ASSAY OF URINE CREATININE: CPT

## 2025-05-07 PROCEDURE — 84443 ASSAY THYROID STIM HORMONE: CPT

## 2025-05-07 PROCEDURE — 80061 LIPID PANEL: CPT

## 2025-05-07 PROCEDURE — 83036 HEMOGLOBIN GLYCOSYLATED A1C: CPT

## 2025-05-07 PROCEDURE — 82746 ASSAY OF FOLIC ACID SERUM: CPT

## 2025-05-07 PROCEDURE — 80053 COMPREHEN METABOLIC PANEL: CPT

## 2025-05-07 PROCEDURE — 82043 UR ALBUMIN QUANTITATIVE: CPT

## 2025-05-07 PROCEDURE — 36415 COLL VENOUS BLD VENIPUNCTURE: CPT

## 2025-05-07 PROCEDURE — 85025 COMPLETE CBC W/AUTO DIFF WBC: CPT

## 2025-05-07 PROCEDURE — 82607 VITAMIN B-12: CPT

## 2025-05-07 PROCEDURE — 82306 VITAMIN D 25 HYDROXY: CPT

## 2025-05-07 NOTE — PROGRESS NOTES
Chief Complaint  Establish Care and referrals    Subjective        Luz Elena Torres presents to [facility]    HPI    History of Present Illness  The patient presents for evaluation of diabetes mellitus, carpal tunnel syndrome, and Alzheimer's disease.  She is a previous patient of mine at [facility].  I last saw her 3 years ago.    She has been unable to obtain Trulicity due to an insurance error but is in the process of rectifying this issue.  Her most recent A1c result I see is 9.0 2/2024. Elevated blood glucose levels are reported, typically ranging from 200 to 300, and she is uncertain if this is related to her GERD medication or intermittent use of metformin. Currently, she is on Tresiba 24 units and NovoLog 18 to 20 units postprandially. Metformin is taken occasionally, and she has recently started Berberine, a supplement for diabetic metabolism.    Carpal tunnel syndrome has been diagnosed in both hands, and she is seeking a referral for physical therapy. No nerve conduction study has been performed. Neuropathy, attributed to diabetes, is reported along with arthritis in her fingers, hands, arms, and knees. Back pain is also noted, which she believes may be due to recent moving activities. She has not consulted a back specialist but is aware of her scoliosis diagnosis. Appointments are scheduled with orthopedics on 06/11/2025 for her hand and wrist, and with a podiatrist on 06/23/2025.    Patient states she was told by her previous doctor at [facility] Dr. Mc that she had dementia and she is requesting a letter for Medicaid stating. Memory issues have been progressively worsening over the past 2 years. Her youngest daughter is her primary caregiver and will be accompanying her to future appointments. She does not have a living will. An appointment with neurology is scheduled for 06/24/2025.    A history of breast cancer, diagnosed approximately 9 years ago, is noted. Concern  "about potential asbestos exposure is reported, and she is requesting a lung examination. An appointment with Dr. Pinzon is scheduled for 06/10/2025 for sleep apnea.    FAMILY HISTORY  Her granddaughters in their late 20s had throat cancer.    Past Medical History:   Diagnosis Date    Anxiety     Asthma     Body mass index 45.0-49.9, adult 07/27/2018    Breast cancer 2015    left breast    Chronic back pain     COPD (chronic obstructive pulmonary disease)     Dementia     Depression     Diabetes mellitus     Fast heart beat     related to anxiety    Fibromyalgia     GERD (gastroesophageal reflux disease)     Heart burn     History of radiation therapy 12/18/2015    6040 cGy to left breast    Hyperlipidemia     Hypertension     Mood disorder     Neuropathy     Neuropathy in diabetes     Postmenopausal osteoporosis     Sleep apnea     CPAP     Past Surgical History:   Procedure Laterality Date    ANKLE OPEN REDUCTION INTERNAL FIXATION      BLEPHAROPLASTY Bilateral     BREAST BIOPSY Left 07/01/2015    BREAST LUMPECTOMY Left 08/04/2015    CATARACT EXTRACTION WITH INTRAOCULAR LENS IMPLANT Bilateral     HYSTERECTOMY      partial    REFRACTIVE SURGERY Left 06/2023    WRIST SURGERY       Social History     Socioeconomic History    Marital status:    Tobacco Use    Smoking status: Never     Passive exposure: Never    Smokeless tobacco: Never   Vaping Use    Vaping status: Never Used   Substance and Sexual Activity    Alcohol use: Yes     Alcohol/week: 2.0 standard drinks of alcohol     Types: 1 Glasses of wine, 1 Cans of beer per week     Comment: monthly    Drug use: No    Sexual activity: Yes     Partners: Male     Birth control/protection: None       Objective   Vital Signs:  /67   Pulse 91   Temp 97.9 °F (36.6 °C) (Temporal)   Resp 12   Ht 157.5 cm (62.01\")   Wt 101 kg (223 lb)   SpO2 95%   BMI 40.78 kg/m²   Estimated body mass index is 40.78 kg/m² as calculated from the following:    Height as of this " "encounter: 157.5 cm (62.01\").    Weight as of this encounter: 101 kg (223 lb).              Physical Exam  Vitals reviewed.   Constitutional:       Appearance: Normal appearance.   HENT:      Head: Normocephalic.      Nose: Nose normal.      Mouth/Throat:      Mouth: Mucous membranes are moist.   Eyes:      Extraocular Movements: Extraocular movements intact.   Cardiovascular:      Rate and Rhythm: Normal rate and regular rhythm.      Heart sounds: Normal heart sounds.   Pulmonary:      Effort: Pulmonary effort is normal.      Breath sounds: Normal breath sounds.   Musculoskeletal:         General: Normal range of motion.      Cervical back: Normal range of motion.   Skin:     General: Skin is warm and dry.   Neurological:      General: No focal deficit present.      Mental Status: She is alert and oriented to person, place, and time.   Psychiatric:         Mood and Affect: Mood normal.         Behavior: Behavior normal.         Cognition and Memory: She exhibits impaired recent memory.        Physical Exam      Result Review :        Results  Labs   - A1c: 02/2024, 9             Assessment and Plan   Diagnoses and all orders for this visit:    1. Encounter for medical examination to establish care (Primary)    2. Type 2 diabetes mellitus with hyperglycemia, with long-term current use of insulin  -Uncontrolled, overdue for labs.  Recommend continue current insulin regimen for now, encouraged blood glucose readings, particularly fasting.  Continue metformin and Jardiance.  Recommend reinitiating GLP-1 at that the Trulicity or Mounjaro.  -     CBC w AUTO Differential; Future  -     Comprehensive metabolic panel; Future  -     Microalbumin / Creatinine Urine Ratio - Urine, Clean Catch; Future  -     Hemoglobin A1c; Future  -     TSH Rfx On Abnormal To Free T4; Future  -     Lipid panel; Future  -     Vitamin B12; Future  -     Folate; Future  -     Tirzepatide 5 MG/0.5ML solution auto-injector; Inject 5 mg under the " skin into the appropriate area as directed 1 (One) Time Per Week.  Dispense: 2 mL; Refill: 0    3. Essential (primary) hypertension  - Controlled    4. Mild dementia without behavioral disturbance, psychotic disturbance, mood disturbance, or anxiety, unspecified dementia type  - Patient be seen neurology.  I do see some decline in her memory since I last saw her 3 years ago.  I recommend she have family member accompany her to visits.  Discussed bring her medicines to next visit.    5. Morbid obesity with body mass index (BMI) of 40.0 or higher  -Discussed with patient would likely improve with GLP-1 therapy and minimizing need for insulin     Encourage to stay on low carb diet, stay active as tolerated      6. Postmenopausal osteoporosis  -     Vitamin D 25 hydroxy; Future    7. Hyperlipidemia, unspecified hyperlipidemia type  -Continue Crestor  -     Lipid panel; Future    8. Malignant neoplasm of upper-outer quadrant of left breast in female, estrogen receptor positive  - Continue Arimidex.  Mammogram up-to-date 10/2024    9. Chronic obstructive pulmonary disease, unspecified COPD type  - Continue Spiriva and Dulera inhaler therapy    10. Lumbar facet arthropathy  -     Ambulatory Referral to Physical Therapy for Evaluation & Treatment      Follow-up 1 week after labs     I spent 60 minutes caring for Luz Elena on this date of service. This time includes time spent by me in the following activities:preparing for the visit, reviewing tests, obtaining and/or reviewing a separately obtained history, performing a medically appropriate examination and/or evaluation , counseling and educating the patient/family/caregiver, ordering medications, tests, or procedures, documenting information in the medical record, independently interpreting results and communicating that information with the patient/family/caregiver, and care coordination  EMR Dragon/Transcription disclaimer:   Part of this note may be an electronic  transcription/translation of spoken language to printed text using the Dragon Dictation System     Follow Up   Return in about 1 week (around 5/14/2025) for Recheck.    Patient or patient representative verbalized consent for the use of Ambient Listening during the visit with  Jaydon Burger MD for chart documentation. 5/15/2025  13:25 CDT    Patient was given instructions and counseling regarding her condition or for health maintenance advice. Please see specific information pulled into the AVS if appropriate.

## 2025-05-07 NOTE — LETTER
May 13, 2025    Luz Elena Torres  [address] [place] [address]    I am Luz Elena's primary care doctor and can attest patient has history of dementia. She requires help of her daughter Khalida for help in transporting to appointments, assistance with taking medications and general supervision.                            Jaydon Burger MD

## 2025-05-08 LAB
25(OH)D3 SERPL-MCNC: 45 NG/ML (ref 30–100)
ALBUMIN UR-MCNC: 8.2 MG/DL
CREAT UR-MCNC: 20.2 MG/DL
FOLATE SERPL-MCNC: 8.43 NG/ML (ref 4.78–24.2)
MICROALBUMIN/CREAT UR: 405.9 MG/G (ref 0–29)
VIT B12 BLD-MCNC: 509 PG/ML (ref 211–946)

## 2025-05-14 ENCOUNTER — OFFICE VISIT (OUTPATIENT)
Dept: FAMILY MEDICINE CLINIC | Facility: CLINIC | Age: 80
End: 2025-05-14
Payer: MEDICARE

## 2025-05-14 VITALS
BODY MASS INDEX: 38.28 KG/M2 | HEART RATE: 63 BPM | OXYGEN SATURATION: 96 % | RESPIRATION RATE: 20 BRPM | TEMPERATURE: 98 F | DIASTOLIC BLOOD PRESSURE: 77 MMHG | WEIGHT: 208 LBS | SYSTOLIC BLOOD PRESSURE: 122 MMHG | HEIGHT: 62 IN

## 2025-05-14 DIAGNOSIS — E11.65 TYPE 2 DIABETES MELLITUS WITH HYPERGLYCEMIA, WITH LONG-TERM CURRENT USE OF INSULIN: Primary | ICD-10-CM

## 2025-05-14 DIAGNOSIS — E78.5 HYPERLIPIDEMIA, UNSPECIFIED HYPERLIPIDEMIA TYPE: ICD-10-CM

## 2025-05-14 DIAGNOSIS — Z79.4 TYPE 2 DIABETES MELLITUS WITH HYPERGLYCEMIA, WITH LONG-TERM CURRENT USE OF INSULIN: Primary | ICD-10-CM

## 2025-05-14 DIAGNOSIS — J02.0 PHARYNGITIS DUE TO STREPTOCOCCUS SPECIES: ICD-10-CM

## 2025-05-14 RX ORDER — AZITHROMYCIN 250 MG/1
TABLET, FILM COATED ORAL
Qty: 6 TABLET | Refills: 0 | Status: SHIPPED | OUTPATIENT
Start: 2025-05-14

## 2025-05-14 RX ORDER — HYDROCHLOROTHIAZIDE 12.5 MG/1
CAPSULE ORAL
Qty: 2 EACH | Refills: 5 | Status: SHIPPED | OUTPATIENT
Start: 2025-05-14

## 2025-05-14 NOTE — PROGRESS NOTES
"       Chief Complaint  Hypertension, Obesity, and Sore Throat    Subjective    {Problem List  Visit Diagnosis   Encounters  Notes  Medications  Labs  Result Review Imaging  Media :23}    Luz Elena Torres presents to [facility]    HPI    History of Present Illness      Past Medical History:   Diagnosis Date    Anxiety     Asthma     Body mass index 45.0-49.9, adult 07/27/2018    Breast cancer 2015    left breast    Chronic back pain     COPD (chronic obstructive pulmonary disease)     Dementia     Depression     Diabetes mellitus     Fast heart beat     related to anxiety    Fibromyalgia     GERD (gastroesophageal reflux disease)     Heart burn     History of radiation therapy 12/18/2015    6040 cGy to left breast    Hyperlipidemia     Hypertension     Mood disorder     Neuropathy     Neuropathy in diabetes     Postmenopausal osteoporosis     Sleep apnea     CPAP     Past Surgical History:   Procedure Laterality Date    ANKLE OPEN REDUCTION INTERNAL FIXATION      BLEPHAROPLASTY Bilateral     BREAST BIOPSY Left 07/01/2015    BREAST LUMPECTOMY Left 08/04/2015    CATARACT EXTRACTION WITH INTRAOCULAR LENS IMPLANT Bilateral     HYSTERECTOMY      partial    REFRACTIVE SURGERY Left 06/2023    WRIST SURGERY       Social History     Socioeconomic History    Marital status:    Tobacco Use    Smoking status: Never     Passive exposure: Never    Smokeless tobacco: Never   Vaping Use    Vaping status: Never Used   Substance and Sexual Activity    Alcohol use: Yes     Alcohol/week: 2.0 standard drinks of alcohol     Types: 1 Glasses of wine, 1 Cans of beer per week     Comment: monthly    Drug use: No    Sexual activity: Yes     Partners: Male     Birth control/protection: None       Objective   Vital Signs:  /77   Pulse 63   Temp 98 °F (36.7 °C) (Temporal)   Resp 20   Ht 157.5 cm (62\")   Wt 94.3 kg (208 lb)   SpO2 96%   BMI 38.04 kg/m²   Estimated body mass index is " "38.04 kg/m² as calculated from the following:    Height as of this encounter: 157.5 cm (62\").    Weight as of this encounter: 94.3 kg (208 lb).              Physical Exam   Physical Exam      Result Review :{Labs  Result Review  Imaging  Med Tab  Media  Procedures :23}  {The following data was reviewed by (Optional):40652}  {Lab Choices (Optional):87151}  {Data reviewed (Optional):76487:::1}  Results               Assessment and Plan {CC Problem List  Visit Diagnosis   ROS  Review (Popup)  Health Maintenance  Quality  BestPractice  Medications  SmartSets  SnapShot Encounters  Media :23}  There are no diagnoses linked to this encounter.  Assessment & Plan         {Time Spent (Optional):66523}  EMR Dragon/Transcription disclaimer:   Part of this note may be an electronic transcription/translation of spoken language to printed text using the Dragon Dictation System     Follow Up {Instructions Charge Capture  Follow-up Communications :23}  No follow-ups on file.    {[employer] Provider Statement:72618}    Patient was given instructions and counseling regarding her condition or for health maintenance advice. Please see specific information pulled into the AVS if appropriate.         "

## 2025-05-14 NOTE — PROGRESS NOTES
Chief Complaint  Hypertension, Obesity, and Sore Throat    Subjective        Luz Elena Torres presents to [facility] PRIMARY [facility]ARE    HPI    Follow-up  -Patient is here today with her youngest daughter.  Patient had labs done notable for microalbuminuria as well as an A1c of 12.2.  She is on Tresiba 24 units currently and describes fasting blood glucose level in the 200s to 300s.  She is taking her mealtime insulin but is not clear what postprandial readings are 2 hours later.  She notes she is trying to get previous Trulicity covered with some kind of cost lowering assistance.  She notes changing her Medicare plans some point within the last year from [employer].  Daughter states patient has been eating more and appears carbohydrate intake has increased.  There is no report of hypoglycemia.  -Patient also states that her throat is sore.  A grandchild at home has been sick lately with similar symptoms.  She associates a cough but no fever or other significant symptoms    Past Medical History:   Diagnosis Date    Anxiety     Asthma     Body mass index 45.0-49.9, adult 07/27/2018    Breast cancer 2015    left breast    Chronic back pain     COPD (chronic obstructive pulmonary disease)     Dementia     Depression     Diabetes mellitus     Fast heart beat     related to anxiety    Fibromyalgia     GERD (gastroesophageal reflux disease)     Heart burn     History of radiation therapy 12/18/2015    6040 cGy to left breast    Hyperlipidemia     Hypertension     Mood disorder     Neuropathy     Neuropathy in diabetes     Postmenopausal osteoporosis     Sleep apnea     CPAP     Past Surgical History:   Procedure Laterality Date    ANKLE OPEN REDUCTION INTERNAL FIXATION      BLEPHAROPLASTY Bilateral     BREAST BIOPSY Left 07/01/2015    BREAST LUMPECTOMY Left 08/04/2015    CATARACT EXTRACTION WITH INTRAOCULAR LENS IMPLANT Bilateral     HYSTERECTOMY      partial    REFRACTIVE SURGERY Left 06/2023    WRIST  "SURGERY       Social History     Socioeconomic History    Marital status:    Tobacco Use    Smoking status: Never     Passive exposure: Never    Smokeless tobacco: Never   Vaping Use    Vaping status: Never Used   Substance and Sexual Activity    Alcohol use: Yes     Alcohol/week: 2.0 standard drinks of alcohol     Types: 1 Glasses of wine, 1 Cans of beer per week     Comment: monthly    Drug use: No    Sexual activity: Yes     Partners: Male     Birth control/protection: None       Objective   Vital Signs:  /77   Pulse 63   Temp 98 °F (36.7 °C) (Temporal)   Resp 20   Ht 157.5 cm (62\")   Wt 94.3 kg (208 lb)   SpO2 96%   BMI 38.04 kg/m²   Estimated body mass index is 38.04 kg/m² as calculated from the following:    Height as of this encounter: 157.5 cm (62\").    Weight as of this encounter: 94.3 kg (208 lb).              Physical Exam  Vitals reviewed.   Constitutional:       Appearance: Normal appearance.   HENT:      Head: Normocephalic.      Nose: Nose normal.      Mouth/Throat:      Mouth: Mucous membranes are moist.      Comments: Moderate erythema in the posterior oropharynx with some exudate present  Eyes:      Extraocular Movements: Extraocular movements intact.   Cardiovascular:      Rate and Rhythm: Normal rate and regular rhythm.      Heart sounds: Normal heart sounds.   Pulmonary:      Effort: Pulmonary effort is normal.      Breath sounds: Normal breath sounds.   Musculoskeletal:         General: Normal range of motion.      Cervical back: Normal range of motion.   Skin:     General: Skin is warm and dry.   Neurological:      General: No focal deficit present.      Mental Status: She is alert and oriented to person, place, and time.   Psychiatric:         Mood and Affect: Mood normal.         Behavior: Behavior normal.        Result Review :                   Assessment and Plan   Diagnoses and all orders for this visit:    1. Type 2 diabetes mellitus with hyperglycemia, with " long-term current use of insulin (Primary)  -Today's discussion centered around uncontrolled A1c and appropriate response.  I have sent in prescription for Mounjaro however it is unclear what the response from insurance is.  I recommended she check with pharmacy to see if this covered.  Otherwise she can continue with application to lower cost of Trulicity in order to continue this.  -Additionally we discussed titrating up Tresiba to 26 units tonight and to increase by 2 units every 2 days if fasting glucose levels are not at goal ().  I provided her blood glucose log to keep track of these levels.  Will follow-up on this at next visit  -We will consider other insurance opportunities that may lower her prescription drug cost for diabetes.  -     Continuous Glucose Sensor (FreeStyle Michelle 3 Plus Sensor); Use Every 14 (Fourteen) Days.  Dispense: 2 each; Refill: 5    2. Pharyngitis due to Streptococcus species  -Given sick contact and appearance clinically will cover for strep pharyngitis  -     azithromycin (Zithromax Z-Yasmani) 250 MG tablet; Take 2 tablets by mouth on day 1, then 1 tablet daily on days 2-5  Dispense: 6 tablet; Refill: 0    3. Hyperlipidemia, unspecified hyperlipidemia type  - LDL at goal, continue Crestor         I spent 43 minutes caring for Luz Elena on this date of service. This time includes time spent by me in the following activities:preparing for the visit, reviewing tests, obtaining and/or reviewing a separately obtained history, performing a medically appropriate examination and/or evaluation , counseling and educating the patient/family/caregiver, ordering medications, tests, or procedures, documenting information in the medical record, independently interpreting results and communicating that information with the patient/family/caregiver, and care coordination  EMR Dragon/Transcription disclaimer:   Part of this note may be an electronic transcription/translation of spoken language to  printed text using the Dragon Dictation System     Follow Up   Return in about 1 week (around 5/21/2025) for Recheck.  Patient was given instructions and counseling regarding her condition or for health maintenance advice. Please see specific information pulled into the AVS if appropriate.

## 2025-05-14 NOTE — PROGRESS NOTES
"Chief Complaint  Hypertension, Obesity, and Sore Throat    Subjective    History of Present Illness {CC  Problem List  Visit Diagnosis   Encounters  Notes  Medications  Labs  Result Review Imaging  Media :23}     Patient presents to [facility] for   History of Present Illness     History of Present Illness      Review of Systems    I have reviewed and agree with the {HPI ROS Review:42499} information as above.  Karlene Trimble RN     Objective   Vital Signs:   /77   Pulse 63   Temp 98 °F (36.7 °C) (Temporal)   Resp 20   Ht 157.5 cm (62\")   Wt 94.3 kg (208 lb)   SpO2 96%   BMI 38.04 kg/m²            Physical Exam     RANULFO-7:      PHQ-2 Depression Screening    Little interest or pleasure in doing things?     Feeling down, depressed, or hopeless?     PHQ-2 Total Score        PHQ-9 Depression Screening  Little interest or pleasure in doing things?     Feeling down, depressed, or hopeless?     PHQ-2 Total Score     Trouble falling or staying asleep, or sleeping too much?     Feeling tired or having little energy?     Poor appetite or overeating?     Feeling bad about yourself - or that you are a failure or have let yourself or your family down?     Trouble concentrating on things, such as reading the newspaper or watching television?     Moving or speaking so slowly that other people could have noticed? Or the opposite - being so fidgety or restless that you have been moving around a lot more than usual?     Thoughts that you would be better off dead, or of hurting yourself in some way?     PHQ-9 Total Score     If you checked off any problems, how difficult have these problems made it for you to do your work, take care of things at home, or get along with other people?             Result Review  Data Reviewed:{ Labs  Result Review  Imaging  Med Tab  Media :23}   {The following data was reviewed by (Optional):24308}  {Ambulatory Labs (Optional):45403}  {Data " reviewed (Optional):70347:::1}            Assessment and Plan {CC Problem List  Visit Diagnosis  ROS  Review (Popup)  Health Maintenance  Quality  BestPractice  Medications  SmartSets  SnapShot Encounters  Media :23}     There are no diagnoses linked to this encounter.    Assessment & Plan      {Time Spent (Optional):96130}    Follow Up {Instructions Charge Capture  Follow-up Communications :23}  No follow-ups on file.  Patient was given instructions and counseling regarding her condition or for health maintenance advice. Please see specific information pulled into the AVS if appropriate.     {[employer] Provider Statement:35672}

## 2025-05-21 ENCOUNTER — TELEPHONE (OUTPATIENT)
Dept: FAMILY MEDICINE CLINIC | Facility: CLINIC | Age: 80
End: 2025-05-21

## 2025-05-21 ENCOUNTER — OFFICE VISIT (OUTPATIENT)
Dept: FAMILY MEDICINE CLINIC | Facility: CLINIC | Age: 80
End: 2025-05-21
Payer: MEDICARE

## 2025-05-21 VITALS
TEMPERATURE: 97.8 F | RESPIRATION RATE: 14 BRPM | BODY MASS INDEX: 38.28 KG/M2 | HEART RATE: 90 BPM | SYSTOLIC BLOOD PRESSURE: 122 MMHG | OXYGEN SATURATION: 92 % | DIASTOLIC BLOOD PRESSURE: 68 MMHG | WEIGHT: 208 LBS | HEIGHT: 62 IN

## 2025-05-21 DIAGNOSIS — E11.65 TYPE 2 DIABETES MELLITUS WITH HYPERGLYCEMIA, WITH LONG-TERM CURRENT USE OF INSULIN: Primary | ICD-10-CM

## 2025-05-21 DIAGNOSIS — J30.89 SEASONAL ALLERGIC RHINITIS DUE TO OTHER ALLERGIC TRIGGER: ICD-10-CM

## 2025-05-21 DIAGNOSIS — Z79.4 TYPE 2 DIABETES MELLITUS WITH HYPERGLYCEMIA, WITH LONG-TERM CURRENT USE OF INSULIN: Primary | ICD-10-CM

## 2025-05-21 DIAGNOSIS — H90.5 SENSORINEURAL HEARING LOSS (SNHL), UNSPECIFIED LATERALITY: ICD-10-CM

## 2025-05-21 RX ORDER — BENZONATATE 100 MG/1
100 CAPSULE ORAL 3 TIMES DAILY PRN
Qty: 60 CAPSULE | Refills: 2 | Status: SHIPPED | OUTPATIENT
Start: 2025-05-21

## 2025-05-21 RX ORDER — FLUTICASONE PROPIONATE 50 MCG
2 SPRAY, SUSPENSION (ML) NASAL DAILY
Qty: 16 G | Refills: 1 | Status: SHIPPED | OUTPATIENT
Start: 2025-05-21

## 2025-05-21 NOTE — TELEPHONE ENCOUNTER
Caller: Luz Elena Torres    Relationship to patient: Self    Best call back number: [phone]     Patient is needing: PT WAS CALLING TO DISCUSS MEDICATIONS WITH PROVIDER.

## 2025-05-21 NOTE — PROGRESS NOTES
Chief Complaint  Results    Cortney Torres presents to [facility] PRIMARY [facility]ARE    HPI    History of Present Illness  The patient presents for evaluation of a persistent cough, diabetes mellitus, and hearing loss.    She reports a severe cough accompanied by sneezing and the expectoration of yellow phlegm. The cough worsens when she lies on her back. Associated w/ sneezing and congestion. Despite being prescribed an antibiotic regimen of 250 mg, there has been no noticeable improvement in her condition. She has not been using any nasal sprays recently, although she has used them in the past. She is requesting Tessalon Perles for her cough.    She has been managing her blood glucose levels with Tresiba, initially at a dose of 24 units, which was subsequently increased to 26 units. Her fasting blood glucose level was recorded at 159 this morning. She has been gradually increasing her Tresiba dosage by 2 units every 2 days, with the most recent dose being 34 units. She has not yet administered her insulin today. Daughter indicates she is consuming ice cream and chocolate strawberries. Pt presents w/ letter from "[employer] regarding adverse event report and PHI release. She states she never had adverse s/e from trulicity. She seems to think this inquiry was supposed to help her w/ GLP-1 coverage. She says pharmacy quoted her $300/mo for mounjaro.    She has expressed a need for a hearing aid. Her last hearing evaluation was conducted approximately 3 years ago at an unspecified location in [place].    She has a scheduled appointment with physical therapy on Friday, 05/27/2025.    Past Medical History:   Diagnosis Date    Anxiety     Asthma     Body mass index 45.0-49.9, adult 07/27/2018    Breast cancer 2015    left breast    Chronic back pain     COPD (chronic obstructive pulmonary disease)     Dementia     Depression     Diabetes mellitus     Fast heart beat     related to  "anxiety    Fibromyalgia     GERD (gastroesophageal reflux disease)     Heart burn     History of radiation therapy 12/18/2015    6040 cGy to left breast    Hyperlipidemia     Hypertension     Mood disorder     Neuropathy     Neuropathy in diabetes     Postmenopausal osteoporosis     Sleep apnea     CPAP     Past Surgical History:   Procedure Laterality Date    ANKLE OPEN REDUCTION INTERNAL FIXATION      BLEPHAROPLASTY Bilateral     BREAST BIOPSY Left 07/01/2015    BREAST LUMPECTOMY Left 08/04/2015    CATARACT EXTRACTION WITH INTRAOCULAR LENS IMPLANT Bilateral     HYSTERECTOMY      partial    REFRACTIVE SURGERY Left 06/2023    WRIST SURGERY       Social History     Socioeconomic History    Marital status:    Tobacco Use    Smoking status: Never     Passive exposure: Never    Smokeless tobacco: Never   Vaping Use    Vaping status: Never Used   Substance and Sexual Activity    Alcohol use: Yes     Alcohol/week: 2.0 standard drinks of alcohol     Types: 1 Glasses of wine, 1 Cans of beer per week     Comment: monthly    Drug use: No    Sexual activity: Yes     Partners: Male     Birth control/protection: None       Objective   Vital Signs:  /68   Pulse 90   Temp 97.8 °F (36.6 °C) (Temporal)   Resp 14   Ht 157.5 cm (62.01\")   Wt 94.3 kg (208 lb)   SpO2 92%   BMI 38.03 kg/m²   Estimated body mass index is 38.03 kg/m² as calculated from the following:    Height as of this encounter: 157.5 cm (62.01\").    Weight as of this encounter: 94.3 kg (208 lb).              Physical Exam  Vitals reviewed.   Constitutional:       Appearance: Normal appearance.   HENT:      Head: Normocephalic.      Nose: Nose normal.      Mouth/Throat:      Mouth: Mucous membranes are moist.   Eyes:      Extraocular Movements: Extraocular movements intact.   Cardiovascular:      Rate and Rhythm: Normal rate and regular rhythm.      Heart sounds: Normal heart sounds.   Pulmonary:      Effort: Pulmonary effort is normal.      Breath " sounds: Normal breath sounds.   Musculoskeletal:         General: Normal range of motion.      Cervical back: Normal range of motion.   Skin:     General: Skin is warm and dry.   Neurological:      General: No focal deficit present.      Mental Status: She is alert and oriented to person, place, and time.   Psychiatric:         Mood and Affect: Mood normal.         Behavior: Behavior normal.        Physical Exam      Result Review :        Results  Labs   - Fasting Blood Sugar: 159 mg/dL             Assessment and Plan   Diagnoses and all orders for this visit:    1. Type 2 diabetes mellitus with hyperglycemia, with long-term current use of insulin (Primary)  -Slowly improving. BG log was disorganized. I helped her and daughter understand proper log and spent extensive amount of time in this discussion. This was complicated by patient's increasing signs of dementia.  - Continue tresiba 34 u nightly, can go up as needed every 2-3 days by 2 u until at fasting goal range . Limit excess carbs. FU 1 week. Recommended she disregard Rosa adverse s/e form. We discussed considering other medicare insurance options going forward in regard to GLP-1 coverage.    2. Seasonal allergic rhinitis due to other allergic trigger  -Restart flonase, advised her sx are due to underlying sinus allergies, not infection  -     fluticasone (FLONASE) 50 MCG/ACT nasal spray; Administer 2 sprays into the nostril(s) as directed by provider Daily.  Dispense: 16 g; Refill: 1    3. Sensorineural hearing loss (SNHL), unspecified laterality  -     Ambulatory Referral to Audiology    Other orders  -     fluticasone (FLONASE) 50 MCG/ACT nasal spray; Administer 2 sprays into the nostril(s) as directed by provider Daily.  Dispense: 16 g; Refill: 1  -     benzonatate (Tessalon Perles) 100 MG capsule; Take 1 capsule by mouth 3 (Three) Times a Day As Needed for Cough.  Dispense: 60 capsule; Refill: 2           I spent 42 minutes caring for Luz Elena on  this date of service. This time includes time spent by me in the following activities:preparing for the visit, reviewing tests, obtaining and/or reviewing a separately obtained history, performing a medically appropriate examination and/or evaluation , counseling and educating the patient/family/caregiver, ordering medications, tests, or procedures, referring and communicating with other health care professionals , documenting information in the medical record, independently interpreting results and communicating that information with the patient/family/caregiver, and care coordination  EMR Dragon/Transcription disclaimer:   Part of this note may be an electronic transcription/translation of spoken language to printed text using the Dragon Dictation System     Follow Up   Return in about 1 week (around 5/28/2025).    Patient or patient representative verbalized consent for the use of Ambient Listening during the visit with  Jaydon Burger MD for chart documentation. 5/21/2025  13:13 CDT    Patient was given instructions and counseling regarding her condition or for health maintenance advice. Please see specific information pulled into the AVS if appropriate.

## 2025-05-22 DIAGNOSIS — J30.9 ALLERGIC RHINITIS, UNSPECIFIED SEASONALITY, UNSPECIFIED TRIGGER: Primary | ICD-10-CM

## 2025-05-22 RX ORDER — LORATADINE 10 MG/1
10 TABLET ORAL DAILY
Qty: 30 TABLET | Refills: 2 | Status: SHIPPED | OUTPATIENT
Start: 2025-05-22

## 2025-05-22 RX ORDER — LORATADINE 10 MG/1
TABLET ORAL
Qty: 30 TABLET | Refills: 0 | OUTPATIENT
Start: 2025-05-22

## 2025-05-22 NOTE — TELEPHONE ENCOUNTER
Caller: Brian Luz Elena    Relationship: Self    Best call back number:     [phone]        Requested Prescriptions:     loratadine (CLARITIN) 10 MG tablet            Pharmacy where request should be sent: [facility], [address] - [phone]  - [phone] FX     Last office visit with prescribing clinician: 5/21/2025   Last telemedicine visit with prescribing clinician: Visit date not found   Next office visit with prescribing clinician: 5/28/2025     Additional details provided by patient: 90 DAY SUPPLY    Does the patient have less than a 3 day supply:  [x] Yes  [] No    Would you like a call back once the refill request has been completed: [x] Yes [] No    If the office needs to give you a call back, can they leave a voicemail: [x] Yes [] No    Franck Parra Rep   05/22/25 11:25 CDT

## 2025-05-23 ENCOUNTER — TELEPHONE (OUTPATIENT)
Dept: FAMILY MEDICINE CLINIC | Facility: CLINIC | Age: 80
End: 2025-05-23
Payer: MEDICARE

## 2025-05-23 NOTE — TELEPHONE ENCOUNTER
Caller: Luz Elena Torres    Relationship: Self    Best call back number: [phone]     What is the best time to reach you: ANYTIME    Who are you requesting to speak with (clinical staff, provider,  specific staff member): CLINICAL    Do you know the name of the person who called: LUZ ELENA    What was the call regarding: PATIENT IS NEEDING YOU TO CALL OVER TO [facility] BECAUSE SHE HAS BEEN TOLD THAT SHE HAS TO PAY OVER $100 FOR HER JARDIANCE 25MG FOR 90DAYS SUPPLY.    SHE NORMALLY PAYS NO MORE THAN $10 IF ANYTHING.    Is it okay if the provider responds through zhiwot: NO    PLEASE CALL BACK

## 2025-05-23 NOTE — TELEPHONE ENCOUNTER
I called the pharmacy, they do not know what is going on with her Jardiance, it does not need PA, pharmacy said that your Nurse did something with her mounjaro on the 13th, it was not a PA but they do not know what she did to make it cheaper.

## 2025-05-28 ENCOUNTER — OFFICE VISIT (OUTPATIENT)
Dept: FAMILY MEDICINE CLINIC | Facility: CLINIC | Age: 80
End: 2025-05-28
Payer: MEDICARE

## 2025-05-28 VITALS
TEMPERATURE: 97.9 F | WEIGHT: 197 LBS | DIASTOLIC BLOOD PRESSURE: 66 MMHG | SYSTOLIC BLOOD PRESSURE: 118 MMHG | HEIGHT: 62 IN | OXYGEN SATURATION: 95 % | RESPIRATION RATE: 12 BRPM | HEART RATE: 88 BPM | BODY MASS INDEX: 36.25 KG/M2

## 2025-05-28 DIAGNOSIS — E11.65 TYPE 2 DIABETES MELLITUS WITH HYPERGLYCEMIA, WITH LONG-TERM CURRENT USE OF INSULIN: Primary | ICD-10-CM

## 2025-05-28 DIAGNOSIS — E66.01 MORBID OBESITY WITH BODY MASS INDEX (BMI) OF 40.0 OR HIGHER: ICD-10-CM

## 2025-05-28 DIAGNOSIS — Z79.4 TYPE 2 DIABETES MELLITUS WITH HYPERGLYCEMIA, WITH LONG-TERM CURRENT USE OF INSULIN: Primary | ICD-10-CM

## 2025-05-28 NOTE — PROGRESS NOTES
Chief Complaint  Diabetes    Subjective        Luz Elena Torres presents to [facility] PRIMARY [facility]    HPI    History of Present Illness  The patient presents for follow-up on diabetes mellitus.    She has recently obtained Mounjaro 5 mg and has taken a dose, tolerating well. The first dose was administered on 05/21/2025, and the next dose is due today. She states jardiance was too expensive at pharmacy. She is currently on NovoLog, with a dosage range of 18 to 20 units, and Tresiba, which was increased from 24 to 36 units. However, she experienced hypoglycemia, with a blood glucose level dropping to 92, accompanied by shaking. She then reduced her Tresiba dosage back to 24 units for 3 days before increasing it to 26 units. Today marks the second day of her 26-unit regimen. She plans to maintain this dosage for one more day before adjusting it again after 3 days. She suspects that she may have forgotten to take her insulin last night, as her blood glucose level was 228 today.  As noted previously most recent A1c 3 weeks ago 12.2.    She went to physical therapy for 2 hours for carpal tunnel syndrome. The doctor told her she does not have carpal tunnel syndrome, but she has arthritis in her body. She is going to cancel the 06/11/2025 appointment with the other doctor for her hands. She is going to continue seeing Dr. Rea for her feet and the rest of her body. He gave her some exercises to do, which she will start today. She saw him yesterday and will be seeing him twice a week. She has an appointment with the breathing doctor on 06/10/2025 at 9:45 AM for sleep apnea. She has an appointment with Mimi on 06/24/2025.    FAMILY HISTORY  The patient mentions that arthritis runs in the family.    Past Medical History:   Diagnosis Date    Anxiety     Asthma     Body mass index 45.0-49.9, adult 07/27/2018    Breast cancer 2015    left breast    Chronic back pain     COPD (chronic obstructive  "pulmonary disease)     Dementia     Depression     Diabetes mellitus     Fast heart beat     related to anxiety    Fibromyalgia     GERD (gastroesophageal reflux disease)     Heart burn     History of radiation therapy 12/18/2015    6040 cGy to left breast    Hyperlipidemia     Hypertension     Mood disorder     Neuropathy     Neuropathy in diabetes     Postmenopausal osteoporosis     Sleep apnea     CPAP     Past Surgical History:   Procedure Laterality Date    ANKLE OPEN REDUCTION INTERNAL FIXATION      BLEPHAROPLASTY Bilateral     BREAST BIOPSY Left 07/01/2015    BREAST LUMPECTOMY Left 08/04/2015    CATARACT EXTRACTION WITH INTRAOCULAR LENS IMPLANT Bilateral     HYSTERECTOMY      partial    REFRACTIVE SURGERY Left 06/2023    WRIST SURGERY       Social History     Socioeconomic History    Marital status:    Tobacco Use    Smoking status: Never     Passive exposure: Never    Smokeless tobacco: Never   Vaping Use    Vaping status: Never Used   Substance and Sexual Activity    Alcohol use: Yes     Alcohol/week: 2.0 standard drinks of alcohol     Types: 1 Glasses of wine, 1 Cans of beer per week     Comment: monthly    Drug use: No    Sexual activity: Yes     Partners: Male     Birth control/protection: None       Objective   Vital Signs:  /66   Pulse 88   Temp 97.9 °F (36.6 °C) (Temporal)   Resp 12   Ht 157.5 cm (62.01\")   Wt 89.4 kg (197 lb)   SpO2 95%   BMI 36.02 kg/m²   Estimated body mass index is 36.02 kg/m² as calculated from the following:    Height as of this encounter: 157.5 cm (62.01\").    Weight as of this encounter: 89.4 kg (197 lb).              Physical Exam  Vitals reviewed.   Constitutional:       Appearance: Normal appearance.   HENT:      Head: Normocephalic.      Nose: Nose normal.      Mouth/Throat:      Mouth: Mucous membranes are moist.   Eyes:      Extraocular Movements: Extraocular movements intact.   Cardiovascular:      Rate and Rhythm: Normal rate and regular rhythm.     "  Heart sounds: Normal heart sounds.   Pulmonary:      Effort: Pulmonary effort is normal.      Breath sounds: Normal breath sounds.   Musculoskeletal:         General: Normal range of motion.      Cervical back: Normal range of motion.   Skin:     General: Skin is warm and dry.   Neurological:      General: No focal deficit present.      Mental Status: She is alert and oriented to person, place, and time.   Psychiatric:         Mood and Affect: Mood normal.         Behavior: Behavior normal.        Physical Exam      Result Review :        Results  Labs   - Blood sugar: 92   - Blood sugar: 05/28/2025, 228             Assessment and Plan   Diagnoses and all orders for this visit:    1. Type 2 diabetes mellitus with hyperglycemia, with long-term current use of insulin (Primary)  - Uncontrolled.  Now on GLP-1, continue Mounjaro 5 mg for an additional 3 weeks, will follow-up at that time to plan for next dose increase, to approximately 7.5 mg.  Regarding insulin dosage okay to slowly titrate up as she has been doing with Tresiba from 24 units.  No overt hypoglycemia however 90 is low given her blood sugar has likely been averaging in the 3-4 100s of late.  Discussed will need to keep close eye on insulin dose while we go up on GLP-1.  DC Jardiance as it is unaffordable currently.  Continue metformin.    2. Morbid obesity with body mass index (BMI) of 40.0 or higher  - Encouraged limitation of excess carbohydrates and overall calories, stay physically active as possible.  Expect continued improvement with GLP-1 as noted above         EMR Dragon/Transcription disclaimer:   Part of this note may be an electronic transcription/translation of spoken language to printed text using the Dragon Dictation System     Follow Up   Return in about 2 weeks (around 6/11/2025).    Patient or patient representative verbalized consent for the use of Ambient Listening during the visit with  Jaydon Burger MD for chart documentation.  5/30/2025  12:19 CDT    Patient was given instructions and counseling regarding her condition or for health maintenance advice. Please see specific information pulled into the AVS if appropriate.

## 2025-05-29 DIAGNOSIS — Z79.4 TYPE 2 DIABETES MELLITUS WITH HYPERGLYCEMIA, WITH LONG-TERM CURRENT USE OF INSULIN: ICD-10-CM

## 2025-05-29 DIAGNOSIS — J30.9 ALLERGIC RHINITIS, UNSPECIFIED SEASONALITY, UNSPECIFIED TRIGGER: ICD-10-CM

## 2025-05-29 DIAGNOSIS — E11.65 TYPE 2 DIABETES MELLITUS WITH HYPERGLYCEMIA, WITH LONG-TERM CURRENT USE OF INSULIN: ICD-10-CM

## 2025-05-29 DIAGNOSIS — L97.522 NEUROPATHIC ULCER OF TOE, LEFT, WITH FAT LAYER EXPOSED: ICD-10-CM

## 2025-05-29 NOTE — TELEPHONE ENCOUNTER
"  Caller: [facility] ([place]) - [place], [place] - [address] - [phone] [facility] [phone]     Relationship: Pharmacy    Best call back number: [phone]     Requested Prescriptions:   Requested Prescriptions     Pending Prescriptions Disp Refills    Tirzepatide 5 MG/0.5ML solution auto-injector 2 mL 0     Sig: Inject 5 mg under the skin into the appropriate area as directed 1 (One) Time Per Week.    tiotropium (SPIRIVA) 18 MCG per inhalation capsule      rosuvastatin (CRESTOR) 10 MG tablet 90 tablet      Si tablet Daily.    oxybutynin XL (DITROPAN-XL) 10 MG 24 hr tablet       Sig: Take 1 tablet by mouth Daily.    omeprazole (priLOSEC) 20 MG capsule      olopatadine (PATADAY) 0.2 % solution ophthalmic solution       Sig: Administer 1 drop to both eyes Take As Directed.    mupirocin (BACTROBAN) 2 % ointment 15 g 0     Sig: Apply 1 Application topically to the appropriate area as directed 3 (Three) Times a Day.    multivitamin with minerals tablet tablet       Sig: Take 1 tablet by mouth Daily.    montelukast (SINGULAIR) 10 MG tablet      mometasone-formoterol (DULERA 200) 200-5 MCG/ACT inhaler       Sig: Every 12 (Twelve) Hours.    mometasone (ELOCON) 0.1 % cream      metoprolol succinate XL (TOPROL-XL) 50 MG 24 hr tablet      metFORMIN (GLUCOPHAGE) 1000 MG tablet       Sig: Take 1 tablet by mouth 2 (Two) Times a Day.    losartan (COZAAR) 100 MG tablet      loratadine (CLARITIN) 10 MG tablet 30 tablet 2     Sig: Take 1 tablet by mouth Daily.    Lancets (OneTouch Delica Plus Myusoj25H) misc      Insulin Syringe 29G X 1/2\" 1 ML misc      Insulin Pen Needle 32G X 6 MM misc      Insulin Degludec (TRESIBA FLEXTOUCH) 200 UNIT/ML solution pen-injector pen injection      insulin aspart (NovoLOG FlexPen) 100 UNIT/ML solution pen-injector sc pen      Glucose Blood (Blood Glucose Test) strip      gabapentin (NEURONTIN) 300 MG capsule       Sig: Take 1 capsule by mouth 3 (Three) Times a Day.    fluticasone " (FLONASE) 50 MCG/ACT nasal spray 16 g 1     Sig: Administer 2 sprays into the nostril(s) as directed by provider Daily.    empagliflozin (Jardiance) 25 MG tablet tablet 30 tablet      Sig: Take 1 tablet by mouth Daily.    DULoxetine (CYMBALTA) 30 MG capsule       Sig: Take 1 capsule by mouth Daily.    Continuous Glucose Sensor (FreeStyle Michelle 3 Plus Sensor) 2 each 5     Sig: Use Every 14 (Fourteen) Days.    benzonatate (Tessalon Perles) 100 MG capsule 60 capsule 2     Sig: Take 1 capsule by mouth 3 (Three) Times a Day As Needed for Cough.    aspirin 81 MG EC tablet       Sig: Take 1 tablet by mouth Daily.    anastrozole (ARIMIDEX) 1 MG tablet 30 tablet 0     Sig: Take 1 tablet by mouth Daily.    albuterol sulfate  (90 Base) MCG/ACT inhaler       Sig: Every 4 (Four) Hours.        Pharmacy where request should be sent: [facility] ([place]) - [address] - [phone] [facility] [phone] FX     Last office visit with prescribing clinician: 5/28/2025   Last telemedicine visit with prescribing clinician: Visit date not found   Next office visit with prescribing clinician: 6/18/2025     Additional details provided by patient: STATING THAT SHE NEEDS HER JARDIANCE    Does the patient have less than a 3 day supply:  [] Yes  [x] No    Would you like a call back once the refill request has been completed: [] Yes [x] No    If the office needs to give you a call back, can they leave a voicemail: [] Yes [x] No    Franck eDan Rep   05/29/25 11:57 CDT

## 2025-06-03 DIAGNOSIS — E11.65 TYPE 2 DIABETES MELLITUS WITH HYPERGLYCEMIA, WITH LONG-TERM CURRENT USE OF INSULIN: Primary | ICD-10-CM

## 2025-06-03 DIAGNOSIS — Z79.4 TYPE 2 DIABETES MELLITUS WITH HYPERGLYCEMIA, WITH LONG-TERM CURRENT USE OF INSULIN: Primary | ICD-10-CM

## 2025-06-03 RX ORDER — LORATADINE 10 MG/1
10 TABLET ORAL DAILY
Qty: 30 TABLET | Refills: 2 | Status: SHIPPED | OUTPATIENT
Start: 2025-06-03

## 2025-06-03 RX ORDER — ASPIRIN 81 MG/1
81 TABLET ORAL DAILY
Qty: 90 TABLET | Refills: 5 | Status: SHIPPED | OUTPATIENT
Start: 2025-06-03

## 2025-06-03 RX ORDER — OMEPRAZOLE 20 MG/1
20 CAPSULE, DELAYED RELEASE ORAL DAILY
Qty: 90 CAPSULE | Refills: 1 | Status: SHIPPED | OUTPATIENT
Start: 2025-06-03

## 2025-06-03 RX ORDER — BENZONATATE 100 MG/1
100 CAPSULE ORAL 3 TIMES DAILY PRN
Qty: 60 CAPSULE | Refills: 2 | Status: SHIPPED | OUTPATIENT
Start: 2025-06-03

## 2025-06-03 RX ORDER — ALBUTEROL SULFATE 90 UG/1
2 INHALANT RESPIRATORY (INHALATION) EVERY 4 HOURS PRN
Qty: 18 G | Refills: 2 | Status: SHIPPED | OUTPATIENT
Start: 2025-06-03

## 2025-06-03 RX ORDER — MULTIPLE VITAMINS W/ MINERALS TAB 9MG-400MCG
1 TAB ORAL DAILY
Qty: 90 TABLET | Refills: 1 | Status: SHIPPED | OUTPATIENT
Start: 2025-06-03

## 2025-06-03 RX ORDER — TIOTROPIUM BROMIDE 18 UG/1
1 CAPSULE ORAL; RESPIRATORY (INHALATION)
Qty: 90 CAPSULE | Refills: 1 | Status: SHIPPED | OUTPATIENT
Start: 2025-06-03

## 2025-06-03 RX ORDER — ROSUVASTATIN CALCIUM 10 MG/1
10 TABLET, COATED ORAL DAILY
Qty: 90 TABLET | Refills: 1 | Status: SHIPPED | OUTPATIENT
Start: 2025-06-03

## 2025-06-03 RX ORDER — LANCETS 33 GAUGE
1 EACH MISCELLANEOUS DAILY
Qty: 100 EACH | Refills: 2 | Status: SHIPPED | OUTPATIENT
Start: 2025-06-03

## 2025-06-03 RX ORDER — MONTELUKAST SODIUM 10 MG/1
10 TABLET ORAL NIGHTLY
Qty: 90 TABLET | Refills: 1 | Status: SHIPPED | OUTPATIENT
Start: 2025-06-03

## 2025-06-03 RX ORDER — INSULIN ASPART 100 [IU]/ML
100 INJECTION, SOLUTION INTRAVENOUS; SUBCUTANEOUS ONCE
Qty: 3 ML | Refills: 4 | Status: SHIPPED | OUTPATIENT
Start: 2025-06-03 | End: 2025-06-03

## 2025-06-03 RX ORDER — OLOPATADINE HYDROCHLORIDE 2 MG/ML
1 SOLUTION OPHTHALMIC TAKE AS DIRECTED
Qty: 2.5 ML | Refills: 1 | Status: SHIPPED | OUTPATIENT
Start: 2025-06-03

## 2025-06-03 RX ORDER — ANASTROZOLE 1 MG/1
1 TABLET ORAL DAILY
Qty: 30 TABLET | Refills: 0 | Status: SHIPPED | OUTPATIENT
Start: 2025-06-03

## 2025-06-03 RX ORDER — MUPIROCIN 20 MG/G
1 OINTMENT TOPICAL 3 TIMES DAILY
Qty: 15 G | Refills: 0 | Status: SHIPPED | OUTPATIENT
Start: 2025-06-03

## 2025-06-03 RX ORDER — GABAPENTIN 300 MG/1
300 CAPSULE ORAL 3 TIMES DAILY
Qty: 270 CAPSULE | Refills: 1 | Status: SHIPPED | OUTPATIENT
Start: 2025-06-03

## 2025-06-03 RX ORDER — HYDROCHLOROTHIAZIDE 12.5 MG/1
CAPSULE ORAL
Qty: 2 EACH | Refills: 5 | Status: SHIPPED | OUTPATIENT
Start: 2025-06-03

## 2025-06-03 RX ORDER — OXYBUTYNIN CHLORIDE 10 MG/1
10 TABLET, EXTENDED RELEASE ORAL DAILY
Qty: 90 TABLET | Refills: 1 | Status: SHIPPED | OUTPATIENT
Start: 2025-06-03

## 2025-06-03 RX ORDER — FLUTICASONE PROPIONATE 50 MCG
2 SPRAY, SUSPENSION (ML) NASAL DAILY
Qty: 16 G | Refills: 1 | Status: SHIPPED | OUTPATIENT
Start: 2025-06-03

## 2025-06-03 RX ORDER — LOSARTAN POTASSIUM 100 MG/1
100 TABLET ORAL DAILY
Qty: 90 TABLET | Refills: 1 | Status: SHIPPED | OUTPATIENT
Start: 2025-06-03 | End: 2025-06-09

## 2025-06-03 RX ORDER — GLUCOSAMINE HCL/CHONDROITIN SU 500-400 MG
1 CAPSULE ORAL DAILY
Qty: 200 EACH | Refills: 4 | Status: SHIPPED | OUTPATIENT
Start: 2025-06-03

## 2025-06-03 RX ORDER — IBUPROFEN 200 MG
1 TABLET ORAL 3 TIMES DAILY
Qty: 200 EACH | Refills: 4 | Status: SHIPPED | OUTPATIENT
Start: 2025-06-03

## 2025-06-03 RX ORDER — MOMETASONE FUROATE 1 MG/G
CREAM TOPICAL DAILY
Qty: 45 G | Refills: 2 | Status: SHIPPED | OUTPATIENT
Start: 2025-06-03

## 2025-06-03 RX ORDER — METOPROLOL SUCCINATE 50 MG/1
50 TABLET, EXTENDED RELEASE ORAL DAILY
Qty: 90 TABLET | Refills: 1 | Status: SHIPPED | OUTPATIENT
Start: 2025-06-03

## 2025-06-03 RX ORDER — DULOXETIN HYDROCHLORIDE 30 MG/1
30 CAPSULE, DELAYED RELEASE ORAL DAILY
Qty: 90 CAPSULE | Refills: 1 | Status: SHIPPED | OUTPATIENT
Start: 2025-06-03

## 2025-06-03 NOTE — TELEPHONE ENCOUNTER
"Rx Refill Note  Requested Prescriptions     Pending Prescriptions Disp Refills    Tirzepatide 5 MG/0.5ML solution auto-injector 2 mL 0     Sig: Inject 5 mg under the skin into the appropriate area as directed 1 (One) Time Per Week.    tiotropium (SPIRIVA) 18 MCG per inhalation capsule 90 capsule 1     Sig: Place 1 capsule into inhaler and inhale Daily.    rosuvastatin (CRESTOR) 10 MG tablet 90 tablet 1     Sig: Take 1 tablet by mouth Daily.    oxybutynin XL (DITROPAN-XL) 10 MG 24 hr tablet 90 tablet 1     Sig: Take 1 tablet by mouth Daily.    omeprazole (priLOSEC) 20 MG capsule 90 capsule 1     Sig: Take 1 capsule by mouth Daily.    olopatadine (PATADAY) 0.2 % solution ophthalmic solution 2.5 mL 1     Sig: Administer 1 drop to both eyes Take As Directed.    mupirocin (BACTROBAN) 2 % ointment 15 g 0     Sig: Apply 1 Application topically to the appropriate area as directed 3 (Three) Times a Day.    multivitamin with minerals tablet tablet 90 tablet 1     Sig: Take 1 tablet by mouth Daily.    montelukast (SINGULAIR) 10 MG tablet 90 tablet 1     Sig: Take 1 tablet by mouth Every Night.    mometasone-formoterol (DULERA 200) 200-5 MCG/ACT inhaler 13 g 2     Sig: Inhale 2 puffs Every 12 (Twelve) Hours.    mometasone (ELOCON) 0.1 % cream 45 g 2     Sig: Apply  topically to the appropriate area as directed Daily.    metoprolol succinate XL (TOPROL-XL) 50 MG 24 hr tablet 90 tablet 1     Sig: Take 1 tablet by mouth Daily.    metFORMIN (GLUCOPHAGE) 1000 MG tablet 180 tablet 1     Sig: Take 1 tablet by mouth 2 (Two) Times a Day.    losartan (COZAAR) 100 MG tablet 90 tablet 1     Sig: Take 1 tablet by mouth Daily.    loratadine (CLARITIN) 10 MG tablet 30 tablet 2     Sig: Take 1 tablet by mouth Daily.    Lancets (OneTouch Delica Plus [id number]) misc 100 each 2     Si Box by Other route Daily.    Insulin Syringe 29G X 1/2\" 1 ML misc 200 each 4     Sig: Inject 1 mL as directed 3 times a day.    Insulin Pen Needle 32G X 6 MM " misc 100 each 4     Sig: Inject 6 mm as directed Daily.    Insulin Degludec (TRESIBA FLEXTOUCH) 200 UNIT/ML solution pen-injector pen injection 600 mL 1     Sig: Inject 100 Units under the skin into the appropriate area as directed Daily.    insulin aspart (NovoLOG FlexPen) 100 UNIT/ML solution pen-injector sc pen 3 mL 4     Sig: Inject 100 Units under the skin into the appropriate area as directed 1 (One) Time for 1 dose.    Glucose Blood (Blood Glucose Test) strip 200 each 4     Si Box by Other route Daily.    gabapentin (NEURONTIN) 300 MG capsule 270 capsule 1     Sig: Take 1 capsule by mouth 3 (Three) Times a Day.    fluticasone (FLONASE) 50 MCG/ACT nasal spray 16 g 1     Sig: Administer 2 sprays into the nostril(s) as directed by provider Daily.    empagliflozin (Jardiance) 25 MG tablet tablet 90 tablet 1     Sig: Take 1 tablet by mouth Daily.    DULoxetine (CYMBALTA) 30 MG capsule 90 capsule 1     Sig: Take 1 capsule by mouth Daily.    Continuous Glucose Sensor (FreeStyle Michelle 3 Plus Sensor) 2 each 5     Sig: Use Every 14 (Fourteen) Days.    benzonatate (Tessalon Perles) 100 MG capsule 60 capsule 2     Sig: Take 1 capsule by mouth 3 (Three) Times a Day As Needed for Cough.    aspirin 81 MG EC tablet       Sig: Take 1 tablet by mouth Daily.    anastrozole (ARIMIDEX) 1 MG tablet 30 tablet 0     Sig: Take 1 tablet by mouth Daily.    albuterol sulfate  (90 Base) MCG/ACT inhaler       Sig: Every 4 (Four) Hours.      Last office visit with prescribing clinician: 2025   Last telemedicine visit with prescribing clinician: Visit date not found   Next office visit with prescribing clinician: 2025           Please check the novolog and tresiba to make sure that is correct.           Adri Conrad MA  25, 07:37 CDT

## 2025-06-09 ENCOUNTER — TELEPHONE (OUTPATIENT)
Dept: FAMILY MEDICINE CLINIC | Facility: CLINIC | Age: 80
End: 2025-06-09
Payer: MEDICARE

## 2025-06-09 DIAGNOSIS — I10 ESSENTIAL (PRIMARY) HYPERTENSION: Primary | ICD-10-CM

## 2025-06-09 RX ORDER — LOSARTAN POTASSIUM 100 MG/1
100 TABLET ORAL DAILY
Qty: 30 TABLET | Refills: 0 | Status: SHIPPED | OUTPATIENT
Start: 2025-06-09

## 2025-06-09 NOTE — TELEPHONE ENCOUNTER
Pharmacy Name:  [facility]     Reference Number (if applicable):  NONE     Pharmacy representative name:  ROMULO     Pharmacy representative phone number: [phone]    What medication are you calling in regards to: EYE DROP GENERIC OF PATADAY     What question does the pharmacy have: CLARIFY FREQUENCY FOR DIRECTIONS     Who is the provider that prescribed the medication: DR MERAZ     Additional notes:  NONE

## 2025-06-16 DIAGNOSIS — J30.9 ALLERGIC RHINITIS, UNSPECIFIED SEASONALITY, UNSPECIFIED TRIGGER: ICD-10-CM

## 2025-06-16 RX ORDER — FLUTICASONE PROPIONATE 50 MCG
2 SPRAY, SUSPENSION (ML) NASAL DAILY
Qty: 16 G | Refills: 1 | OUTPATIENT
Start: 2025-06-16

## 2025-06-16 RX ORDER — LORATADINE 10 MG/1
10 TABLET ORAL DAILY
Qty: 30 TABLET | Refills: 2 | OUTPATIENT
Start: 2025-06-16

## 2025-06-16 NOTE — TELEPHONE ENCOUNTER
Caller: [facility] ([place]) - [place], [place] - [address] [facility] - [phone] [facility] [phone] FX    Relationship: Pharmacy    Best call back number: [phone] (Work)     Requested Prescriptions:   Requested Prescriptions     Pending Prescriptions Disp Refills    fluticasone (FLONASE) 50 MCG/ACT nasal spray 16 g 1     Sig: Administer 2 sprays into the nostril(s) as directed by provider Daily.    mometasone-formoterol (DULERA 200) 200-5 MCG/ACT inhaler 13 g 2     Sig: Inhale 2 puffs Every 12 (Twelve) Hours.    loratadine (CLARITIN) 10 MG tablet 30 tablet 2     Sig: Take 1 tablet by mouth Daily.        Pharmacy where request should be sent: [facility] [place]) - [place], [place] - [address] [facility] - [phone] [facility] [phone] FX     Last office visit with prescribing clinician: 5/28/2025   Last telemedicine visit with prescribing clinician: Visit date not found   Next office visit with prescribing clinician: 6/18/2025         Does the patient have less than a 3 day supply:  [x] Yes  [] No      Franck Lux Rep   06/16/25 09:39 CDT

## 2025-06-18 ENCOUNTER — OFFICE VISIT (OUTPATIENT)
Dept: FAMILY MEDICINE CLINIC | Facility: CLINIC | Age: 80
End: 2025-06-18
Payer: MEDICARE

## 2025-06-18 VITALS
DIASTOLIC BLOOD PRESSURE: 62 MMHG | OXYGEN SATURATION: 97 % | RESPIRATION RATE: 12 BRPM | HEIGHT: 62 IN | BODY MASS INDEX: 36.25 KG/M2 | WEIGHT: 197 LBS | TEMPERATURE: 97.8 F | SYSTOLIC BLOOD PRESSURE: 118 MMHG | HEART RATE: 86 BPM

## 2025-06-18 DIAGNOSIS — E11.65 TYPE 2 DIABETES MELLITUS WITH HYPERGLYCEMIA, WITH LONG-TERM CURRENT USE OF INSULIN: Primary | ICD-10-CM

## 2025-06-18 DIAGNOSIS — Z79.4 TYPE 2 DIABETES MELLITUS WITH HYPERGLYCEMIA, WITH LONG-TERM CURRENT USE OF INSULIN: Primary | ICD-10-CM

## 2025-06-18 DIAGNOSIS — E66.01 MORBID OBESITY WITH BODY MASS INDEX (BMI) OF 40.0 OR HIGHER: ICD-10-CM

## 2025-06-18 NOTE — PROGRESS NOTES
Chief Complaint  Diabetes    Subjective        Luz Elena Torres presents to [facility]    HPI    History of Present Illness  The patient presents for evaluation of diabetes mellitus and neuropathy.    She has been managing her diabetes with Mounjaro 5mg  injections, which she tolerates well. She has completed her current supply and requires a new prescription. Her last injection was administered 4 days ago. She has not yet received the 7.5 mg dosage. Her dietary habits include small, frequent meals throughout the day. She is currently on a regimen of Tresiba 32 units. She recalls a previous increase in her Tresiba dosage to 36 units, which resulted in hypoglycemia, leading to a reduction back to 24 or 26 units. A1c last mo 12.2.  Her fasting levels on her blood glucose sheet today reflect average level in the low 200s    She reports experiencing bone soreness, which she attributes to her recent physical therapy sessions. She has a known diagnosis of neuropathy, initially localized from the knee down, but she now reports a progression of the condition up her legs, characterized by a burning sensation. This symptom is somewhat alleviated by gabapentin.    She has an upcoming appointment with Dr. Fields's office on 06/23/2025 at 2 PM and a neurology appointment with Mimi Reyes at [facility] on 06/24/2025 at 1 PM.    Past Medical History:   Diagnosis Date    Anxiety     Asthma     Body mass index 45.0-49.9, adult 07/27/2018    Breast cancer 2015    left breast    Chronic back pain     COPD (chronic obstructive pulmonary disease)     Dementia     Depression     Diabetes mellitus     Fast heart beat     related to anxiety    Fibromyalgia     GERD (gastroesophageal reflux disease)     Heart burn     History of radiation therapy 12/18/2015    6040 cGy to left breast    Hyperlipidemia     Hypertension     Mood disorder     Neuropathy     Neuropathy in diabetes     Postmenopausal  "osteoporosis     Sleep apnea     CPAP     Past Surgical History:   Procedure Laterality Date    ANKLE OPEN REDUCTION INTERNAL FIXATION      BLEPHAROPLASTY Bilateral     BREAST BIOPSY Left 07/01/2015    BREAST LUMPECTOMY Left 08/04/2015    CATARACT EXTRACTION WITH INTRAOCULAR LENS IMPLANT Bilateral     HYSTERECTOMY      partial    REFRACTIVE SURGERY Left 06/2023    WRIST SURGERY       Social History     Socioeconomic History    Marital status:    Tobacco Use    Smoking status: Never     Passive exposure: Never    Smokeless tobacco: Never   Vaping Use    Vaping status: Never Used   Substance and Sexual Activity    Alcohol use: Yes     Alcohol/week: 2.0 standard drinks of alcohol     Types: 1 Glasses of wine, 1 Cans of beer per week     Comment: monthly    Drug use: No    Sexual activity: Yes     Partners: Male     Birth control/protection: None       Objective   Vital Signs:  /62   Pulse 86   Temp 97.8 °F (36.6 °C) (Temporal)   Resp 12   Ht 157.5 cm (62.01\")   Wt 89.4 kg (197 lb)   SpO2 97%   BMI 36.02 kg/m²   Estimated body mass index is 36.02 kg/m² as calculated from the following:    Height as of this encounter: 157.5 cm (62.01\").    Weight as of this encounter: 89.4 kg (197 lb).              Physical Exam  Vitals reviewed.   Constitutional:       Appearance: Normal appearance.   HENT:      Head: Normocephalic.      Nose: Nose normal.      Mouth/Throat:      Mouth: Mucous membranes are moist.   Eyes:      Extraocular Movements: Extraocular movements intact.   Cardiovascular:      Rate and Rhythm: Normal rate and regular rhythm.      Heart sounds: Normal heart sounds.   Pulmonary:      Effort: Pulmonary effort is normal.      Breath sounds: Normal breath sounds.   Musculoskeletal:         General: Normal range of motion.      Cervical back: Normal range of motion.   Skin:     General: Skin is warm and dry.   Neurological:      General: No focal deficit present.      Mental Status: She is alert " and oriented to person, place, and time.   Psychiatric:         Mood and Affect: Mood normal.         Behavior: Behavior normal.        Physical Exam  Other: Weight is 197.    Result Review :        Results  Labs   - A1c: 12.2             Assessment and Plan   Diagnoses and all orders for this visit:    1. Type 2 diabetes mellitus with hyperglycemia, with long-term current use of insulin (Primary)  Her A1c level has escalated to 12.2, likely contributing to her neuropathic symptoms. However, her blood glucose levels have shown improvement over the past few weeks compared to the previous three months. She has experienced a weight loss of approximately 9 pounds since 05/14/2025. Her fasting blood glucose levels are in the low to mid-200s, which is higher than desired but lower than what an A1c of 12 would predict. The Mounjaro dosage will be increased to 10 mg per week. She is advised to contact [facility] to ensure timely delivery of her medication. She is also instructed to monitor her blood glucose levels and adjust her Tresiba dosage by reducing it by 2 to 3 units if she observes morning readings in the range of 70 to 80. If she encounters any issues with this adjustment, she should inform us promptly.  -She reports that her neuropathy, previously from the knee down, has now traveled up her legs with a burning sensation. Gabapentin helps alleviate the symptoms. The dosage of gabapentin may need to be adjusted if symptoms persist or worsen.  -     Tirzepatide 7.5 MG/0.5ML solution auto-injector; Inject 7.5 mg under the skin into the appropriate area as directed 1 (One) Time Per Week.  Dispense: 2 mL; Refill: 0    2. Morbid obesity with body mass index (BMI) of 40.0 or higher  Overview:  Last Assessment & Plan:    Formatting of this note might be different from the original.     Encourage to stay on low carb diet, stay active as tolerated               EMR Dragon/Transcription disclaimer:   Part of this note may be  an electronic transcription/translation of spoken language to printed text using the Dragon Dictation System     Follow Up   Return in about 4 weeks (around 7/16/2025) for Recheck.    Patient or patient representative verbalized consent for the use of Ambient Listening during the visit with  Jaydon Burger MD for chart documentation. 6/18/2025  11:03 CDT    Patient was given instructions and counseling regarding her condition or for health maintenance advice. Please see specific information pulled into the AVS if appropriate.

## 2025-06-20 NOTE — PROGRESS NOTES
[facility] - PODIATRY    Today's Date: 06/23/25    Patient Name: Luz Elena Torres  MRN: [id number]  CSN: [id number]  PCP: Jaydon Burger MD  Referring Provider: No ref. provider found    SUBJECTIVE     Chief Complaint   Patient presents with    Follow-up     PCP: Jaydon Burger MD 06/18/25  3 MO FU FOOT CARE CLINIC   Pt states she is here today for diabetic foot/nail care. Pt has neuropathy. Pt denies pain outside of neuropathy. Pt would like to get info of bunion sx of bilateral feet.     Diabetes     242 mg/dLbg      HPI: Luz Elena Torres, a 79 y.o.female, comes to clinic as a(n) established patient presenting for diabetic foot exam and complaining of toenail/callus issues. Patient has h/o anxiety, asthma, COPD, breast CA, chronic pain, DM with neuropathy, fibromyalgia, HTN, HLD, sleep apnea. Patient is IDDM with last stated BG level of 242mg/dl. Unsure of last A1c.   Does not take it regularly but when she does it helps somewhat.  Denies open wounds or sores today.  Toenails are thickened, elongated, irregular and in need of attention.  She is unable to care for them herself.   Admits to numbness and tingling in her feet that is occasionally painful  Continues gabapentin however does not take it every day.  She is also expressing concerns about bunion deformities to her feet today.  Relates that they are not painful, she is not developing wounds to these areas, nor is she having trouble finding shoes that fit.   Relates previous treatment(s) including care by podiatrist. Denies any constitutional symptoms. No other pedal complaints at this time.    Past Medical History:   Diagnosis Date    Anxiety     Asthma     Body mass index 45.0-49.9, adult 07/27/2018    Breast cancer 2015    left breast    Chronic back pain     COPD (chronic obstructive pulmonary disease)     Dementia     Depression     Diabetes mellitus     Fast heart beat     related to anxiety    Fibromyalgia     GERD (gastroesophageal  reflux disease)     Heart burn     History of radiation therapy 12/18/2015    6040 cGy to left breast    Hyperlipidemia     Hypertension     Mood disorder     Neuropathy     Neuropathy in diabetes     Postmenopausal osteoporosis     Sleep apnea     CPAP     Past Surgical History:   Procedure Laterality Date    ANKLE OPEN REDUCTION INTERNAL FIXATION      BLEPHAROPLASTY Bilateral     BREAST BIOPSY Left 07/01/2015    BREAST LUMPECTOMY Left 08/04/2015    CATARACT EXTRACTION WITH INTRAOCULAR LENS IMPLANT Bilateral     HYSTERECTOMY      partial    REFRACTIVE SURGERY Left 06/2023    WRIST SURGERY       Family History   Problem Relation Age of Onset    Diabetes Father     Cervical cancer Sister     Skin cancer Sister      Social History     Socioeconomic History    Marital status:    Tobacco Use    Smoking status: Never     Passive exposure: Never    Smokeless tobacco: Never   Vaping Use    Vaping status: Never Used   Substance and Sexual Activity    Alcohol use: Yes     Alcohol/week: 2.0 standard drinks of alcohol     Types: 1 Glasses of wine, 1 Cans of beer per week     Comment: monthly    Drug use: No    Sexual activity: Yes     Partners: Male     Birth control/protection: None     Allergies   Allergen Reactions    Cefdinir     Penicillins Hives and Itching     Current Outpatient Medications   Medication Sig Dispense Refill    albuterol sulfate  (90 Base) MCG/ACT inhaler Inhale 2 puffs Every 4 (Four) Hours As Needed for Wheezing. 18 g 2    anastrozole (ARIMIDEX) 1 MG tablet Take 1 tablet by mouth Daily. 30 tablet 0    aspirin 81 MG EC tablet Take 1 tablet by mouth Daily. 90 tablet 5    benzonatate (Tessalon Perles) 100 MG capsule Take 1 capsule by mouth 3 (Three) Times a Day As Needed for Cough. 60 capsule 2    Continuous Glucose Sensor (FreeStyle Michelle 3 Plus Sensor) Use Every 14 (Fourteen) Days. 2 each 5    DULoxetine (CYMBALTA) 30 MG capsule Take 1 capsule by mouth Daily. 90 capsule 1    empagliflozin  "(Jardiance) 25 MG tablet tablet Take 1 tablet by mouth Daily.      fluticasone (FLONASE) 50 MCG/ACT nasal spray Administer 2 sprays into the nostril(s) as directed by provider Daily. 16 g 1    gabapentin (NEURONTIN) 300 MG capsule Take 1 capsule by mouth 3 (Three) Times a Day. 270 capsule 1    Glucose Blood (Blood Glucose Test) strip 1 Box by Other route Daily. 200 each 4    Insulin Degludec (TRESIBA FLEXTOUCH) 200 UNIT/ML solution pen-injector pen injection Inject 100 Units under the skin into the appropriate area as directed Daily. 600 mL 1    Insulin Pen Needle 32G X 6 MM misc Inject 6 mm as directed Daily. 100 each 4    Insulin Syringe 29G X 1/2\" 1 ML misc Inject 1 mL as directed 3 times a day. 200 each 4    Lancets (OneTouch Delica Plus Inyyhp61V) misc 1 Box by Other route Daily. 100 each 2    loratadine (CLARITIN) 10 MG tablet Take 1 tablet by mouth Daily. 30 tablet 2    losartan (COZAAR) 100 MG tablet TAKE 1 TABLET BY MOUTH DAILY FOR BLOOD PRESSURE 30 tablet 0    metFORMIN (GLUCOPHAGE) 1000 MG tablet Take 1 tablet by mouth 2 (Two) Times a Day. 180 tablet 1    metoprolol succinate XL (TOPROL-XL) 50 MG 24 hr tablet Take 1 tablet by mouth Daily. 90 tablet 1    mometasone (ELOCON) 0.1 % cream Apply  topically to the appropriate area as directed Daily. 45 g 2    mometasone-formoterol (DULERA 200) 200-5 MCG/ACT inhaler Inhale 2 puffs Every 12 (Twelve) Hours. 13 g 2    montelukast (SINGULAIR) 10 MG tablet Take 1 tablet by mouth Every Night. 90 tablet 1    multivitamin with minerals tablet tablet Take 1 tablet by mouth Daily. 90 tablet 1    mupirocin (BACTROBAN) 2 % ointment Apply 1 Application topically to the appropriate area as directed 3 (Three) Times a Day. 15 g 0    olopatadine (PATADAY) 0.2 % solution ophthalmic solution Administer 1 drop to both eyes Take As Directed. 2.5 mL 1    omeprazole (priLOSEC) 20 MG capsule Take 1 capsule by mouth Daily. 90 capsule 1    oxybutynin XL (DITROPAN-XL) 10 MG 24 hr tablet " Take 1 tablet by mouth Daily. 90 tablet 1    rosuvastatin (CRESTOR) 10 MG tablet Take 1 tablet by mouth Daily. 90 tablet 1    Tirzepatide 7.5 MG/0.5ML solution auto-injector Inject 7.5 mg under the skin into the appropriate area as directed 1 (One) Time Per Week. 2 mL 0    insulin aspart (NovoLOG FlexPen) 100 UNIT/ML solution pen-injector sc pen Inject 100 Units under the skin into the appropriate area as directed 1 (One) Time for 1 dose. 3 mL 4     No current facility-administered medications for this visit.     Review of Systems   Constitutional:  Negative for chills and fever.   HENT:  Negative for congestion.    Respiratory:  Negative for shortness of breath.    Cardiovascular:  Positive for leg swelling. Negative for chest pain.   Gastrointestinal:  Negative for constipation, diarrhea, nausea and vomiting.   Musculoskeletal:  Positive for arthralgias (Foot pain) and gait problem. Negative for myalgias.   Skin:  Positive for color change. Negative for wound.   Neurological:  Positive for numbness.       OBJECTIVE     Vitals:    25 1430   BP: 110/74   Pulse: 91   SpO2: 94%             PHYSICAL EXAM  GEN:   Accompanied by none.     Foot/Ankle Exam    GENERAL  Diabetic foot exam performed    Appearance:  appears stated age and obese  Orientation:  AAOx3  Affect:  appropriate  Gait:  (unsteady)  Assistance:  walker  Right shoe gear: casual shoe  Left shoe gear: casual shoe    VASCULAR     Right Foot Vascularity   Dorsalis pedis:  1+  Posterior tibial:  1+  Skin temperature:  warm  Edema gradin+ and pitting  CFT:  3  Pedal hair growth:  Present  Varicosities:  mild varicosities     Left Foot Vascularity   Dorsalis pedis:  1+  Posterior tibial:  1+  Skin temperature:  warm  Edema gradin+ and pitting  CFT:  3  Pedal hair growth:  Present  Varicosities:  mild varicosities     NEUROLOGIC     Right Foot Neurologic   Light touch sensation: diminished  Vibratory sensation: diminished  Hot/Cold sensation:  diminished  Protective Sensation using [place] Monofilament:   Sites intact: 4  Sites tested: 10     Left Foot Neurologic   Light touch sensation: diminished  Vibratory sensation: diminished  Hot/Cold sensation:  diminished  Protective Sensation using [place] Monofilament:   Sites intact: 4  Sites tested: 10    MUSCULOSKELETAL     Right Foot Musculoskeletal   Ecchymosis:  none  Tenderness:  toenail problem    Arch:  Normal  Hammertoe:  Second toe, third toe and fourth toe  Hallux valgus: Yes    Hallux limitus: No       Left Foot Musculoskeletal   Ecchymosis:  none  Tenderness:  toenail problem  Arch:  Normal  Hammertoe:  Third toe and fourth toe  Hallux valgus: Yes    Hallux limitus: No      MUSCLE STRENGTH     Right Foot Muscle Strength   Foot dorsiflexion:  4+  Foot plantar flexion:  4+  Foot inversion:  4+  Foot eversion:  4+     Left Foot Muscle Strength   Foot dorsiflexion:  4+  Foot plantar flexion:  4+  Foot inversion:  4+  Foot eversion:  4+    DERMATOLOGIC      Right Foot Dermatologic   Skin  Positive for corn and dryness.   Nails  1.  Positive for elongated, onychomycosis, abnormal thickness, subungual debris and dystrophic nail.  2.  Positive for elongated and abnormal thickness.  3.  Positive for elongated and abnormal thickness.  4.  Positive for elongated and abnormal thickness.  5.  Positive for elongated and abnormal thickness.     Left Foot Dermatologic   Skin  Positive for corn and dryness.   Nails  1.  Positive for elongated, onychomycosis, abnormal thickness, subungual debris and dystrophic nail.  2.  Positive for elongated, abnormal thickness and dystrophic nail.  3.  Positive for elongated and abnormal thickness.  4.  Positive for elongated and abnormally thick.  5.  Positive for elongated and abnormally thick.    Image:       RADIOLOGY/NUCLEAR:  No results found.    LABORATORY/CULTURE RESULTS:      PATHOLOGY RESULTS:       ASSESSMENT/PLAN     Diagnoses and all orders for this  visit:    1. Thickened nails (Primary)    2. Type 2 diabetes mellitus with diabetic polyneuropathy, with long-term current use of insulin    3. Encounter for diabetic foot exam    4. Foot callus    5. Painful diabetic neuropathy    6. Venous insufficiency of both lower extremities    7. Gait abnormality    8. Lymphedema    9. Valgus deformity of both great toes    10. Hammertoe, bilateral    11. H/O diabetic foot ulcer          Comprehensive lower extremity examination and evaluation was performed.  Discussed findings and treatment plan including risks, benefits, and treatment options with patient in detail. Patient agreed with treatment plan.  DFE performed today.  Last A1c for review 12.2%.  Reviewed PCP notes.  Control management of type II DM to be continued per PCP.  After verbal consent obtained, nail(s) x10 debrided of length and thickness with nail nipper without incidence  After verbal consent obtained, calluses x2 pared utilizing dermal curette and/or scalpel without incidence  Patient may maintain nails and calluses at home utilizing emery board or pumice stone between visits as needed  Reviewed at home diabetic foot care including daily foot checks   Reviewed previous x-rays from April 2025.  Reviewed note from MINDI Craft with [facility] for evaluation of neuropathy, bilateral hallux valgus and midfoot arthritis.  For bunion deformity.  Discussed conservative measures including use of bunion shields, gel toe spacer, and wearing wider shoes, and oral and/or topical NSAIDs for pain relief versus surgical correction.  Patient expresses interest in surgical procedure-however I discussed with patient that procedure is not warranted unless bunion deformity is painful, causing wounds, or she is unable to find shoes that will accommodate the prominence.  Also discussed with patient that A1c is far too elevated for Dr. Fields to consider and elective surgical procedure.  A1c would need to be 8 or below before   Donnie typically considers procedure.  Elevate legs while at rest and utilize compression stockings.  Continue utilization of walker for balance.  Continue follow-ups with vascular surgery.  Class 3 Severe Obesity (BMI >=40). Obesity-related health conditions include the following: diabetes mellitus. Obesity is unchanged. BMI is is above average; BMI management plan is completed. We discussed portion control and increasing exercise.     An After Visit Summary was printed and given to the patient at discharge, including (if requested) any available informative/educational handouts regarding diagnosis, treatment, or medications. All questions were answered to patient/family satisfaction. Should symptoms fail to improve or worsen they agree to call or return to clinic or to go to the Emergency Department. Discussed the importance of following up with any needed screening tests/labs/specialist appointments and any requested follow-up recommended by me today. Importance of maintaining follow-up discussed and patient accepts that missed appointments can delay diagnosis and potentially lead to worsening of conditions.  Return in about 3 months (around 9/23/2025) for Follow-up with APRN, Follow-up in [facility] or sooner if acute issues arise.    Advance Care Planning   ACP discussion was declined by the patient. Patient does not have an advance directive, declines further assistance.       This document has been electronically signed by MINDI Goodman on June 23, 2025 16:25 CDT

## 2025-06-23 ENCOUNTER — OFFICE VISIT (OUTPATIENT)
Age: 80
End: 2025-06-23
Payer: MEDICARE

## 2025-06-23 VITALS
BODY MASS INDEX: 36.25 KG/M2 | SYSTOLIC BLOOD PRESSURE: 110 MMHG | WEIGHT: 197 LBS | HEIGHT: 62 IN | DIASTOLIC BLOOD PRESSURE: 74 MMHG | HEART RATE: 91 BPM | OXYGEN SATURATION: 94 %

## 2025-06-23 DIAGNOSIS — R26.9 GAIT ABNORMALITY: ICD-10-CM

## 2025-06-23 DIAGNOSIS — E11.40 PAINFUL DIABETIC NEUROPATHY: ICD-10-CM

## 2025-06-23 DIAGNOSIS — M20.42 HAMMERTOE, BILATERAL: ICD-10-CM

## 2025-06-23 DIAGNOSIS — M20.41 HAMMERTOE, BILATERAL: ICD-10-CM

## 2025-06-23 DIAGNOSIS — L60.2 THICKENED NAILS: Primary | ICD-10-CM

## 2025-06-23 DIAGNOSIS — L84 FOOT CALLUS: ICD-10-CM

## 2025-06-23 DIAGNOSIS — M20.12 VALGUS DEFORMITY OF BOTH GREAT TOES: ICD-10-CM

## 2025-06-23 DIAGNOSIS — M20.11 VALGUS DEFORMITY OF BOTH GREAT TOES: ICD-10-CM

## 2025-06-23 DIAGNOSIS — Z86.31 H/O DIABETIC FOOT ULCER: ICD-10-CM

## 2025-06-23 DIAGNOSIS — I87.2 VENOUS INSUFFICIENCY OF BOTH LOWER EXTREMITIES: ICD-10-CM

## 2025-06-23 DIAGNOSIS — I89.0 LYMPHEDEMA: ICD-10-CM

## 2025-06-23 DIAGNOSIS — Z79.4 TYPE 2 DIABETES MELLITUS WITH DIABETIC POLYNEUROPATHY, WITH LONG-TERM CURRENT USE OF INSULIN: ICD-10-CM

## 2025-06-23 DIAGNOSIS — E11.42 TYPE 2 DIABETES MELLITUS WITH DIABETIC POLYNEUROPATHY, WITH LONG-TERM CURRENT USE OF INSULIN: ICD-10-CM

## 2025-06-23 DIAGNOSIS — E11.9 ENCOUNTER FOR DIABETIC FOOT EXAM: ICD-10-CM

## 2025-06-23 PROCEDURE — 99214 OFFICE O/P EST MOD 30 MIN: CPT

## 2025-06-23 PROCEDURE — 1159F MED LIST DOCD IN RCRD: CPT

## 2025-06-23 PROCEDURE — 11056 PARNG/CUTG B9 HYPRKR LES 2-4: CPT

## 2025-06-23 PROCEDURE — 11721 DEBRIDE NAIL 6 OR MORE: CPT

## 2025-06-23 PROCEDURE — 3074F SYST BP LT 130 MM HG: CPT

## 2025-06-23 PROCEDURE — 3078F DIAST BP <80 MM HG: CPT

## 2025-06-23 PROCEDURE — 1160F RVW MEDS BY RX/DR IN RCRD: CPT

## 2025-06-29 ENCOUNTER — NURSE TRIAGE (OUTPATIENT)
Dept: CALL CENTER | Facility: HOSPITAL | Age: 80
End: 2025-06-29
Payer: MEDICARE

## 2025-06-29 NOTE — TELEPHONE ENCOUNTER
Reason for Disposition  • [1] Caller has NON-URGENT medicine question about med that PCP prescribed AND [2] triager unable to answer question    Additional Information  • Negative: [1] Intentional drug overdose AND [2] suicidal thoughts or ideas  • Negative: Drug overdose and triager unable to answer question  • Negative: Caller requesting a renewal or refill of a medicine patient is currently taking  • Negative: Caller requesting information unrelated to medicine  • Negative: Caller requesting information about COVID-19 Vaccine  • Negative: Caller requesting information about Emergency Contraception  • Negative: Caller requesting information about Combined Birth Control Pills  • Negative: Caller requesting information about Progestin Birth Control Pills  • Negative: Caller requesting information about Post-Op pain or medicines  • Negative: Caller requesting a prescription antibiotic (such as Penicillin) for Strep throat and has a positive culture result  • Negative: Caller requesting a prescription anti-viral med (such as Tamiflu) and has influenza (flu) symptoms  • Negative: Immunization reaction suspected  • Negative: Rash while taking a medicine or within 3 days of stopping it  • Negative: [1] Asthma and [2] having symptoms of asthma (cough, wheezing, etc.)  • Negative: [1] Symptom of illness (e.g., headache, abdominal pain, earache, vomiting) AND [2] more than mild  • Negative: Breastfeeding questions about mother's medicines and diet  • Negative: MORE THAN A DOUBLE DOSE of a prescription or over-the-counter (OTC) drug  • Negative: [1] DOUBLE DOSE (an extra dose or lesser amount) of prescription drug AND [2] any symptoms (e.g., dizziness, nausea, pain, sleepiness)  • Negative: [1] DOUBLE DOSE (an extra dose or lesser amount) of over-the-counter (OTC) drug AND [2] any symptoms (e.g., dizziness, nausea, pain, sleepiness)  • Negative: Took another person's prescription drug  • Negative: [1] DOUBLE DOSE (an extra  "dose or lesser amount) of prescription drug AND [2] NO symptoms  (Exception: A double dose of antibiotics.)  • Negative: Diabetes drug error or overdose (e.g., took wrong type of insulin or took extra dose)  • Negative: [1] Prescription not at pharmacy AND [2] was prescribed by PCP recently (Exception: Triager has access to EMR and prescription is recorded there. Go to Home Care and confirm for pharmacy.)  • Negative: [1] Pharmacy calling with prescription question AND [2] triager unable to answer question  • Negative: [1] Caller has URGENT medicine question about med that PCP or specialist prescribed AND [2] triager unable to answer question  • Negative: Medicine patch causing local rash or itching  • Negative: [1] Caller has medicine question about med NOT prescribed by PCP AND [2] triager unable to answer question (e.g., compatibility with other med, storage)  • Negative: Prescription request for new medicine (not a refill)    Answer Assessment - Initial Assessment Questions  1. NAME of MEDICINE: \"What medicine(s) are you calling about?\"      degludec  2. QUESTION: \"What is your question?\" (e.g., double dose of medicine, side effect)      Am I supposed to take it with my other inuslin?  3. PRESCRIBER: \"Who prescribed the medicine?\" Reason: if prescribed by specialist, call should be referred to that group.      Dr. Burger  4. SYMPTOMS: \"Do you have any symptoms?\" If Yes, ask: \"What symptoms are you having?\"  \"How bad are the symptoms (e.g., mild, moderate, severe)      no  5. PREGNANCY:  \"Is there any chance that you are pregnant?\" \"When was your last menstrual period?\"      na    Protocols used: Medication Question Call-ADULT-AH    "

## 2025-06-30 ENCOUNTER — TELEPHONE (OUTPATIENT)
Dept: FAMILY MEDICINE CLINIC | Facility: CLINIC | Age: 80
End: 2025-06-30

## 2025-06-30 DIAGNOSIS — Z79.4 TYPE 2 DIABETES MELLITUS WITH HYPERGLYCEMIA, WITH LONG-TERM CURRENT USE OF INSULIN: ICD-10-CM

## 2025-06-30 DIAGNOSIS — E11.65 TYPE 2 DIABETES MELLITUS WITH HYPERGLYCEMIA, WITH LONG-TERM CURRENT USE OF INSULIN: ICD-10-CM

## 2025-06-30 NOTE — TELEPHONE ENCOUNTER
Associate from [facility] [employer] contacted office regarding order for a Hospital Bed.  Associate informed the Hospital Bed order did not come from this office and was ordered by [facility]ematology [facility] Oncology group.  Hematology and Oncology group office number provided.     I spoke with pt's daughter again and she forgot to mention that cardiology put him on lasix 40 MG daily for CHF.  They didn't mention how long to stay on it.  Should he just stay on it until his f/u here with APRN?

## 2025-06-30 NOTE — TELEPHONE ENCOUNTER
Caller: Luz Elena Torres    Relationship: Self    Best call back number:     [phone]   REMEDIOS DAUGHTER [phone]    What is the best time to reach you:ANYTIME     Who are you requesting to speak with (clinical staff, provider,  specific staff member):CLINICAL     Do you know the name of the person who called: CLINICAL     What was the call regarding:  SHE STATES SHE NEEDS TO BE ADVISED OF WHICH DIABETIC MEDICATION TO TAKE   SHE STATES SHE HAS QUESTIONS ABOUT Insulin Degludec TRESIBA FLEXTOUCH     Is it okay if the provider responds through MyChart: CALL BACK REQUEST

## 2025-07-01 ENCOUNTER — TELEPHONE (OUTPATIENT)
Dept: FAMILY MEDICINE CLINIC | Facility: CLINIC | Age: 80
End: 2025-07-01

## 2025-07-01 NOTE — TELEPHONE ENCOUNTER
Caller: Luz Elena Torres    Relationship: Self    Best call back number: [phone]     What is the best time to reach you: ANY     Who are you requesting to speak with (clinical staff, provider,  specific staff member): NONE SPECIFIED     What was the call regarding:     PATIENT CALLED TO CHECK ON THE STATUS OF SEVERAL REQUEST. PATIENT WAS WONDERING IF HER PROVIDER HAD SPOKE WITH SANJU REILLY REGARDING HER HOSPITAL BED AND MOUNJARO PRESCRIPTION.     PATIENT IS ALSO WONDERING ABOUT SETTING UP A PHONE CALL BETWEEN HER, ADRIANA, AND DR. MERAZ.     PATIENT IS ALSO WONDERING ABOUT AN INSULIN PRESCRIPTION SHE RECEIVED IN THE MAIL THAT SHE HAS NEVER TAKEN BEFORE.     Is it okay if the provider responds through [email or web address]: PLEASE CALL

## 2025-07-07 ENCOUNTER — TELEPHONE (OUTPATIENT)
Dept: FAMILY MEDICINE CLINIC | Facility: CLINIC | Age: 80
End: 2025-07-07
Payer: MEDICARE

## 2025-07-07 NOTE — TELEPHONE ENCOUNTER
Pt called with daughter also on the line. She is concerned and frustrated over not hearing from Dr Burger or Dr Burger's nurse since her message left 7/1. I relayed to HUB Dr Burger and his nurse are out of the office today. Pt is requesting her telephone message from 7/1 is addressed and she receive a phone call 7/8.

## 2025-07-08 DIAGNOSIS — E11.65 TYPE 2 DIABETES MELLITUS WITH HYPERGLYCEMIA, WITH LONG-TERM CURRENT USE OF INSULIN: ICD-10-CM

## 2025-07-08 DIAGNOSIS — Z79.4 TYPE 2 DIABETES MELLITUS WITH HYPERGLYCEMIA, WITH LONG-TERM CURRENT USE OF INSULIN: ICD-10-CM

## 2025-07-08 DIAGNOSIS — I10 ESSENTIAL (PRIMARY) HYPERTENSION: ICD-10-CM

## 2025-07-08 RX ORDER — TIRZEPATIDE 5 MG/.5ML
INJECTION, SOLUTION SUBCUTANEOUS
Qty: 2 ML | Refills: 0 | OUTPATIENT
Start: 2025-07-08

## 2025-07-08 NOTE — TELEPHONE ENCOUNTER
Rx Refill Note  Requested Prescriptions     Refused Prescriptions Disp Refills    Mounjaro 5 MG/0.5ML solution auto-injector [Pharmacy Med Name: MOUNJARO 5MG/0.5ML SOLUTION AUTO-INJECTOR] 2 mL 0     Sig: INJECT 5 MG UNDER THE SKIN INTO THE APPROPRIATE AREA AS DIRECTED 1 (ONE) TIME PER WEEK.      Last office visit with prescribing clinician: 6/18/2025   Last telemedicine visit with prescribing clinician: Visit date not found   Next office visit with prescribing clinician: 7/1/2025                         Would you like a call back once the refill request has been completed: [] Yes [] No    If the office needs to give you a call back, can they leave a voicemail: [] Yes [] No    Adri Conrad MA  07/08/25, 07:18 CDT

## 2025-07-09 RX ORDER — FLUTICASONE PROPIONATE 50 MCG
SPRAY, SUSPENSION (ML) NASAL
Qty: 16 G | Refills: 1 | OUTPATIENT
Start: 2025-07-09

## 2025-07-09 RX ORDER — LOSARTAN POTASSIUM 100 MG/1
100 TABLET ORAL DAILY
Qty: 30 TABLET | Refills: 0 | Status: SHIPPED | OUTPATIENT
Start: 2025-07-09

## 2025-07-09 RX ORDER — OMEPRAZOLE 40 MG/1
40 CAPSULE, DELAYED RELEASE ORAL DAILY
Qty: 30 CAPSULE | Refills: 0 | Status: SHIPPED | OUTPATIENT
Start: 2025-07-09

## 2025-07-09 NOTE — TELEPHONE ENCOUNTER
Rx Refill Note  Requested Prescriptions     Pending Prescriptions Disp Refills    omeprazole (priLOSEC) 40 MG capsule [Pharmacy Med Name: OMEPRAZOLE 40MG CAPSULE DELAYED RELEASE] 30 capsule 0     Sig: TAKE ONE CAPSULE BY MOUTH DAILY AS NEEDED    losartan (COZAAR) 100 MG tablet [Pharmacy Med Name: LOSARTAN POTASSIUM 100MG TABLET] 30 tablet 0     Sig: TAKE 1 TABLET BY MOUTH DAILY FOR BLOOD PRESSURE      Last office visit with prescribing clinician: 6/18/2025   Last telemedicine visit with prescribing clinician: Visit date not found   Next office visit with prescribing clinician: 7/9/2025                         Would you like a call back once the refill request has been completed: [] Yes [] No    If the office needs to give you a call back, can they leave a voicemail: [] Yes [] No    May Burger MA  07/09/25, 16:27 CDT

## 2025-07-10 ENCOUNTER — TELEPHONE (OUTPATIENT)
Dept: FAMILY MEDICINE CLINIC | Facility: CLINIC | Age: 80
End: 2025-07-10
Payer: MEDICARE

## 2025-07-10 NOTE — TELEPHONE ENCOUNTER
Called and spoke to za to let her know nandini is supposed to start taking iron she recved - this was after consult with Dr. Burger

## 2025-07-16 ENCOUNTER — OFFICE VISIT (OUTPATIENT)
Dept: FAMILY MEDICINE CLINIC | Facility: CLINIC | Age: 80
End: 2025-07-16
Payer: MEDICARE

## 2025-07-16 VITALS
SYSTOLIC BLOOD PRESSURE: 128 MMHG | HEIGHT: 62 IN | OXYGEN SATURATION: 96 % | TEMPERATURE: 97.5 F | BODY MASS INDEX: 36.25 KG/M2 | HEART RATE: 83 BPM | WEIGHT: 197 LBS | DIASTOLIC BLOOD PRESSURE: 80 MMHG | RESPIRATION RATE: 12 BRPM

## 2025-07-16 DIAGNOSIS — R41.3 MEMORY CHANGES: ICD-10-CM

## 2025-07-16 DIAGNOSIS — Z79.4 TYPE 2 DIABETES MELLITUS WITH HYPERGLYCEMIA, WITH LONG-TERM CURRENT USE OF INSULIN: Primary | ICD-10-CM

## 2025-07-16 DIAGNOSIS — E11.65 TYPE 2 DIABETES MELLITUS WITH HYPERGLYCEMIA, WITH LONG-TERM CURRENT USE OF INSULIN: Primary | ICD-10-CM

## 2025-07-16 RX ORDER — AMMONIUM LACTATE 120 MG/G
1 CREAM TOPICAL AS NEEDED
COMMUNITY

## 2025-07-16 NOTE — PROGRESS NOTES
Chief Complaint  Diabetes    Subjective        Luz Elena Torres presents to [facility]    HPI    History of Present Illness  The patient presents for evaluation of diabetes and memory loss.    She reports experiencing low blood sugar levels in the evenings, which she attributes to her bedtime medication. She is currently on Mounjaro 7.5 mg, which she started on 07/12/2025, and plans to take it again tomorrow. She also takes Tresiba 28 or 29 units once daily and NovoLog 20 units with meals. She has been advised not to increase her NovoLog dosage. She has been consuming ice cream and soup but does not snack between meals. She is considering taking Tresiba twice daily due to her low blood sugar levels. She is curious about whether she should adjust her Tresiba dosage if her morning blood sugar level is high, even if it was low the previous night. She is also interested in knowing when she can discontinue some of her medications.    She is awaiting results from Dr. Guerra regarding a brain scan and blood test for Alzheimer's disease. She reports seeing people at night who are not there, a phenomenon that has intensified since she left home and got . This has been ongoing for years and has worsened over time. She also experiences difficulty sleeping, often going to bed at 11:00 PM or 8:00 PM, but being kept awake by these visions. She occasionally forgets the names of places and events but can usually recall where they occurred. She also struggles to remember the names of certain things, such as her favorite restaurant or hospital. Her mother had Alzheimer's disease, which also runs in her family.    She had a growth in her ear, which was vacuumed out. She was prescribed mometasone cream to use at bedtime but has not received it yet.    Social History:  Marital Status:   Diet: Consumes ice cream and soup  Sleep: Difficulty sleeping, usually goes to bed at 11:00 PM or  "8:00 PM    FAMILY HISTORY  Her mother had Alzheimer's disease, which also runs in her family. Her father  from a massive heart attack.    Past Medical History:   Diagnosis Date    Anxiety     Asthma     Body mass index 45.0-49.9, adult 2018    Breast cancer     left breast    Chronic back pain     COPD (chronic obstructive pulmonary disease)     Dementia     Depression     Diabetes mellitus     Fast heart beat     related to anxiety    Fibromyalgia     GERD (gastroesophageal reflux disease)     Heart burn     History of radiation therapy 2015    6040 cGy to left breast    Hyperlipidemia     Hypertension     Mood disorder     Neuropathy     Neuropathy in diabetes     Postmenopausal osteoporosis     Sleep apnea     CPAP     Past Surgical History:   Procedure Laterality Date    ANKLE OPEN REDUCTION INTERNAL FIXATION      BLEPHAROPLASTY Bilateral     BREAST BIOPSY Left 2015    BREAST LUMPECTOMY Left 2015    CATARACT EXTRACTION WITH INTRAOCULAR LENS IMPLANT Bilateral     HYSTERECTOMY      partial    REFRACTIVE SURGERY Left 2023    WRIST SURGERY       Social History     Socioeconomic History    Marital status:    Tobacco Use    Smoking status: Never     Passive exposure: Never    Smokeless tobacco: Never   Vaping Use    Vaping status: Never Used   Substance and Sexual Activity    Alcohol use: Yes     Alcohol/week: 2.0 standard drinks of alcohol     Types: 1 Glasses of wine, 1 Cans of beer per week     Comment: monthly    Drug use: No    Sexual activity: Yes     Partners: Male     Birth control/protection: None       Objective   Vital Signs:  /80   Pulse 83   Temp 97.5 °F (36.4 °C) (Temporal)   Resp 12   Ht 157.5 cm (62.01\")   Wt 89.4 kg (197 lb)   SpO2 96%   BMI 36.02 kg/m²   Estimated body mass index is 36.02 kg/m² as calculated from the following:    Height as of this encounter: 157.5 cm (62.01\").    Weight as of this encounter: 89.4 kg (197 lb).        "       Physical Exam  Vitals reviewed.   Constitutional:       Appearance: Normal appearance.   HENT:      Head: Normocephalic.      Nose: Nose normal.      Mouth/Throat:      Mouth: Mucous membranes are moist.   Eyes:      Extraocular Movements: Extraocular movements intact.   Cardiovascular:      Rate and Rhythm: Normal rate and regular rhythm.      Heart sounds: Normal heart sounds.   Pulmonary:      Effort: Pulmonary effort is normal.      Breath sounds: Normal breath sounds.   Musculoskeletal:         General: Normal range of motion.      Cervical back: Normal range of motion.   Skin:     General: Skin is warm and dry.   Neurological:      General: No focal deficit present.      Mental Status: She is alert and oriented to person, place, and time.   Psychiatric:         Mood and Affect: Mood normal.         Behavior: Behavior normal.        Physical Exam      Result Review :        Results  Imaging   - Brain MRI: Mild atrophy, specifically shrinking of the hippocampus.             Assessment and Plan   Diagnoses and all orders for this visit:    1. Type 2 diabetes mellitus with hyperglycemia, with long-term current use of insulin (Primary)  -Uncontrolled. Most recent A1c 12.2.  - Blood glucose levels are elevated, with morning readings ranging from 194 to 257.  - Continue current regimen of Tresiba 28 units at night and NovoLog 20 units with meals. Monitor blood glucose levels 2 hours post-meal and adjust insulin dosage accordingly.  - If morning blood glucose levels exceed 140 for several consecutive days, increase Tresiba dosage by 2 units until it reaches 30 units. Avoid daily adjustments to insulin dosage.  - A1c levels will be rechecked in approximately 3 weeks. Prescription for Mounjaro 10 mg provided, to be started after completing current dose of 7.5 mg.  -     Hemoglobin A1c; Future  -     Tirzepatide 10 MG/0.5ML solution auto-injector; Inject 10 mg under the skin into the appropriate area as directed 1  (One) Time Per Week.  Dispense: 2 mL; Refill: 0    2. Memory changes  - Discussed with pt memory loss supported by mild atrophy of the hippocampus, as indicated by MRI results.  - Pattern of memory impairment and Alzheimer's disease is distinctive, with declarative episodic memory events being profoundly affected in Alzheimer's disease. This type of memory is heavily dependent on the hippocampus.  - MRI findings could be consistent with these concerns. Maintain good blood glucose control, as high blood sugar levels can exacerbate memory issues and potentially contribute to early or mild forms of Alzheimer's disease.  -FU with neurology as scheduled         I spent 41 minutes caring for Luz Elena on this date of service. This time includes time spent by me in the following activities:preparing for the visit, reviewing tests, obtaining and/or reviewing a separately obtained history, performing a medically appropriate examination and/or evaluation , counseling and educating the patient/family/caregiver, ordering medications, tests, or procedures, documenting information in the medical record, independently interpreting results and communicating that information with the patient/family/caregiver, and care coordination  EMR Dragon/Transcription disclaimer:   Part of this note may be an electronic transcription/translation of spoken language to printed text using the Dragon Dictation System     Follow Up   Return in about 3 weeks (around 8/6/2025) for Annual physical.    Patient or patient representative verbalized consent for the use of Ambient Listening during the visit with  Jaydon Burger MD for chart documentation. 7/17/2025  17:43 CDT    Patient was given instructions and counseling regarding her condition or for health maintenance advice. Please see specific information pulled into the AVS if appropriate.

## 2025-08-01 DIAGNOSIS — J30.9 ALLERGIC RHINITIS, UNSPECIFIED SEASONALITY, UNSPECIFIED TRIGGER: ICD-10-CM

## 2025-08-01 RX ORDER — LORATADINE 10 MG/1
10 TABLET ORAL DAILY
Qty: 30 TABLET | Refills: 2 | Status: SHIPPED | OUTPATIENT
Start: 2025-08-01

## 2025-08-08 DIAGNOSIS — Z79.4 TYPE 2 DIABETES MELLITUS WITH HYPERGLYCEMIA, WITH LONG-TERM CURRENT USE OF INSULIN: ICD-10-CM

## 2025-08-08 DIAGNOSIS — E11.65 TYPE 2 DIABETES MELLITUS WITH HYPERGLYCEMIA, WITH LONG-TERM CURRENT USE OF INSULIN: ICD-10-CM

## 2025-08-11 RX ORDER — TIRZEPATIDE 10 MG/.5ML
INJECTION, SOLUTION SUBCUTANEOUS
Qty: 2 ML | Refills: 1 | Status: SHIPPED | OUTPATIENT
Start: 2025-08-11

## 2025-08-12 ENCOUNTER — OFFICE VISIT (OUTPATIENT)
Dept: FAMILY MEDICINE CLINIC | Facility: CLINIC | Age: 80
End: 2025-08-12
Payer: MEDICARE

## 2025-08-12 VITALS
HEIGHT: 62 IN | HEART RATE: 74 BPM | RESPIRATION RATE: 12 BRPM | BODY MASS INDEX: 36.25 KG/M2 | TEMPERATURE: 97.5 F | SYSTOLIC BLOOD PRESSURE: 128 MMHG | DIASTOLIC BLOOD PRESSURE: 78 MMHG | WEIGHT: 197 LBS | OXYGEN SATURATION: 96 %

## 2025-08-12 DIAGNOSIS — K21.9 GASTROESOPHAGEAL REFLUX DISEASE, UNSPECIFIED WHETHER ESOPHAGITIS PRESENT: ICD-10-CM

## 2025-08-12 DIAGNOSIS — G47.33 OSA (OBSTRUCTIVE SLEEP APNEA): ICD-10-CM

## 2025-08-12 DIAGNOSIS — G89.4 CHRONIC PAIN DISORDER: ICD-10-CM

## 2025-08-12 DIAGNOSIS — M17.12 OSTEOARTHRITIS OF LEFT KNEE, UNSPECIFIED OSTEOARTHRITIS TYPE: ICD-10-CM

## 2025-08-12 DIAGNOSIS — E11.65 TYPE 2 DIABETES MELLITUS WITH HYPERGLYCEMIA, WITH LONG-TERM CURRENT USE OF INSULIN: Primary | ICD-10-CM

## 2025-08-12 DIAGNOSIS — G57.93 NEUROPATHY INVOLVING BOTH LOWER EXTREMITIES: ICD-10-CM

## 2025-08-12 DIAGNOSIS — M54.40 LOW BACK PAIN WITH SCIATICA, SCIATICA LATERALITY UNSPECIFIED, UNSPECIFIED BACK PAIN LATERALITY, UNSPECIFIED CHRONICITY: ICD-10-CM

## 2025-08-12 DIAGNOSIS — F51.01 PRIMARY INSOMNIA: ICD-10-CM

## 2025-08-12 DIAGNOSIS — R41.3 MEMORY LOSS: ICD-10-CM

## 2025-08-12 DIAGNOSIS — I10 ESSENTIAL HYPERTENSION: ICD-10-CM

## 2025-08-12 DIAGNOSIS — Z79.4 TYPE 2 DIABETES MELLITUS WITH HYPERGLYCEMIA, WITH LONG-TERM CURRENT USE OF INSULIN: Primary | ICD-10-CM

## 2025-08-12 DIAGNOSIS — M81.0 POSTMENOPAUSAL OSTEOPOROSIS: ICD-10-CM

## 2025-08-12 DIAGNOSIS — M47.816 LUMBAR FACET ARTHROPATHY: ICD-10-CM

## 2025-08-12 DIAGNOSIS — G25.81 RESTLESS LEG SYNDROME: ICD-10-CM

## 2025-08-12 RX ORDER — OMEPRAZOLE 40 MG/1
40 CAPSULE, DELAYED RELEASE ORAL DAILY
Qty: 90 CAPSULE | Refills: 1 | Status: SHIPPED | OUTPATIENT
Start: 2025-08-12

## 2025-08-12 RX ORDER — QUETIAPINE FUMARATE 25 MG/1
25 TABLET, FILM COATED ORAL NIGHTLY
Qty: 30 TABLET | Refills: 2 | Status: SHIPPED | OUTPATIENT
Start: 2025-08-12

## 2025-08-12 RX ORDER — LOSARTAN POTASSIUM 100 MG/1
100 TABLET ORAL DAILY
Qty: 90 TABLET | Refills: 1 | Status: SHIPPED | OUTPATIENT
Start: 2025-08-12

## 2025-08-20 DIAGNOSIS — J30.9 ALLERGIC RHINITIS, UNSPECIFIED SEASONALITY, UNSPECIFIED TRIGGER: ICD-10-CM

## 2025-08-20 RX ORDER — METOPROLOL SUCCINATE 50 MG/1
TABLET, EXTENDED RELEASE ORAL
Qty: 90 TABLET | Refills: 2 | Status: SHIPPED | OUTPATIENT
Start: 2025-08-20

## 2025-08-21 RX ORDER — LORATADINE 10 MG/1
10 TABLET ORAL DAILY
Qty: 90 TABLET | Refills: 0 | Status: SHIPPED | OUTPATIENT
Start: 2025-08-21

## (undated) DEVICE — KIT SPEC COLL 1ML WHT POLYPR TB W/ LIQ AMIES M REG FLOCKED

## (undated) DEVICE — GLOVE SURG SZ 85 L12IN FNGR ORTHO 126MIL CRM LTX FREE

## (undated) DEVICE — MINOR CDS: Brand: MEDLINE INDUSTRIES, INC.

## (undated) DEVICE — ABDOMINAL PAD: Brand: DERMACEA

## (undated) DEVICE — ASTOUND STANDARD SURGICAL GOWN, XXL: Brand: CONVERTORS

## (undated) DEVICE — GAUZE,SPONGE,FLUFF,6"X6.75",STRL,10/TRAY: Brand: MEDLINE

## (undated) DEVICE — SUTURE ETHLN SZ 3-0 L18IN NONABSORBABLE BLK FS-1 L24MM 3/8 663H

## (undated) DEVICE — PACK,UNIVERSAL,NO GOWNS: Brand: MEDLINE

## (undated) DEVICE — GOWN,PREVENTION PLUS,XL,ST,24/CS: Brand: MEDLINE

## (undated) DEVICE — GOWN,PREVENTION PLUS,2XL,ST,22/CS: Brand: MEDLINE

## (undated) DEVICE — BLADE SURG NO11 C STL RETRCT DISPOSABLE

## (undated) DEVICE — AGENT HEMSTAT W4XL8IN OXIDIZED REGENERATED CELOS ABSRB

## (undated) DEVICE — DRAIN SURG W3/8XL18IN FOR OPN WND PENROSE

## (undated) DEVICE — GLOVE SURG SZ 75 CRM LTX FREE POLYISOPRENE POLYMER BEAD ANTI